# Patient Record
Sex: FEMALE | Race: BLACK OR AFRICAN AMERICAN | HISPANIC OR LATINO | URBAN - METROPOLITAN AREA
[De-identification: names, ages, dates, MRNs, and addresses within clinical notes are randomized per-mention and may not be internally consistent; named-entity substitution may affect disease eponyms.]

---

## 2017-01-26 ENCOUNTER — INPATIENT (INPATIENT)
Facility: HOSPITAL | Age: 37
LOS: 10 days | Discharge: HOME | DRG: 264 | End: 2017-02-06
Attending: FAMILY MEDICINE
Payer: MEDICAID

## 2017-01-26 VITALS
SYSTOLIC BLOOD PRESSURE: 231 MMHG | OXYGEN SATURATION: 96 % | DIASTOLIC BLOOD PRESSURE: 19 MMHG | RESPIRATION RATE: 16 BRPM | HEIGHT: 62 IN | TEMPERATURE: 97 F | HEART RATE: 113 BPM | WEIGHT: 253.09 LBS

## 2017-01-26 DIAGNOSIS — N19 UNSPECIFIED KIDNEY FAILURE: ICD-10-CM

## 2017-01-26 DIAGNOSIS — I10 ESSENTIAL (PRIMARY) HYPERTENSION: ICD-10-CM

## 2017-01-26 DIAGNOSIS — E11.9 TYPE 2 DIABETES MELLITUS WITHOUT COMPLICATIONS: ICD-10-CM

## 2017-01-26 DIAGNOSIS — I50.9 HEART FAILURE, UNSPECIFIED: ICD-10-CM

## 2017-01-26 DIAGNOSIS — D64.9 ANEMIA, UNSPECIFIED: ICD-10-CM

## 2017-01-26 DIAGNOSIS — R79.89 OTHER SPECIFIED ABNORMAL FINDINGS OF BLOOD CHEMISTRY: ICD-10-CM

## 2017-01-26 LAB
ALBUMIN SERPL ELPH-MCNC: 2.7 G/DL — LOW (ref 3.3–5.2)
ALP SERPL-CCNC: 96 U/L — SIGNIFICANT CHANGE UP (ref 40–120)
ALT FLD-CCNC: 23 U/L — SIGNIFICANT CHANGE UP
ALT FLD-CCNC: 24 U/L — SIGNIFICANT CHANGE UP
ANION GAP SERPL CALC-SCNC: 20 MMOL/L — HIGH (ref 5–17)
APPEARANCE UR: ABNORMAL
APTT BLD: 32.3 SEC — SIGNIFICANT CHANGE UP (ref 27.5–37.4)
AST SERPL-CCNC: 12 U/L — SIGNIFICANT CHANGE UP
BACTERIA # UR AUTO: ABNORMAL
BILIRUB SERPL-MCNC: 0.1 MG/DL — LOW (ref 0.4–2)
BILIRUB UR-MCNC: NEGATIVE — SIGNIFICANT CHANGE UP
BUN SERPL-MCNC: 98 MG/DL — HIGH (ref 8–20)
CALCIUM SERPL-MCNC: 7.6 MG/DL — LOW (ref 8.6–10.2)
CHLORIDE SERPL-SCNC: 103 MMOL/L — SIGNIFICANT CHANGE UP (ref 98–107)
CK MB CFR SERPL CALC: 7.4 NG/ML — HIGH (ref 0–6.7)
CK SERPL-CCNC: 370 U/L — HIGH (ref 25–170)
CO2 SERPL-SCNC: 16 MMOL/L — LOW (ref 22–29)
COLOR SPEC: YELLOW — SIGNIFICANT CHANGE UP
CREAT ?TM UR-MCNC: 69 MG/DL — SIGNIFICANT CHANGE UP
CREAT SERPL-MCNC: 9.3 MG/DL — HIGH (ref 0.5–1.3)
D DIMER BLD IA.RAPID-MCNC: 740 NG/ML DDU — HIGH
DIFF PNL FLD: ABNORMAL
EPI CELLS # UR: SIGNIFICANT CHANGE UP
FERRITIN SERPL-MCNC: 113.5 NG/ML — SIGNIFICANT CHANGE UP (ref 11–306)
GLUCOSE SERPL-MCNC: 169 MG/DL — HIGH (ref 70–115)
GLUCOSE UR QL: 100 MG/DL
HCT VFR BLD CALC: 22.7 % — LOW (ref 37–47)
HGB BLD-MCNC: 7.3 G/DL — LOW (ref 12–16)
INR BLD: 1.26 RATIO — HIGH (ref 0.88–1.16)
IRON SATN MFR SERPL: 11 % — LOW (ref 14–50)
IRON SATN MFR SERPL: 35 UG/DL — LOW (ref 37–145)
KETONES UR-MCNC: NEGATIVE — SIGNIFICANT CHANGE UP
LEUKOCYTE ESTERASE UR-ACNC: ABNORMAL
MCHC RBC-ENTMCNC: 26.1 PG — LOW (ref 27–31)
MCHC RBC-ENTMCNC: 32.2 G/DL — SIGNIFICANT CHANGE UP (ref 32–36)
MCV RBC AUTO: 81.1 FL — SIGNIFICANT CHANGE UP (ref 81–99)
NITRITE UR-MCNC: NEGATIVE — SIGNIFICANT CHANGE UP
NT-PROBNP SERPL-SCNC: 5925 PG/ML — HIGH (ref 0–300)
OSMOLALITY UR: 375 MOSM/KG — SIGNIFICANT CHANGE UP (ref 300–1000)
PH UR: 7 — SIGNIFICANT CHANGE UP (ref 4.8–8)
PLATELET # BLD AUTO: 338 K/UL — SIGNIFICANT CHANGE UP (ref 150–400)
POTASSIUM SERPL-MCNC: 4.2 MMOL/L — SIGNIFICANT CHANGE UP (ref 3.5–5.3)
POTASSIUM SERPL-SCNC: 4.2 MMOL/L — SIGNIFICANT CHANGE UP (ref 3.5–5.3)
PROT ?TM UR-MCNC: 81 MG/DL — HIGH (ref 0–12)
PROT SERPL-MCNC: 6.4 G/DL — LOW (ref 6.6–8.7)
PROT UR-MCNC: 500 MG/DL
PROT/CREAT UR-RTO: 1.17 RATIO — SIGNIFICANT CHANGE UP
PROTHROM AB SERPL-ACNC: 13.9 SEC — HIGH (ref 10–13.1)
RBC # BLD: 2.8 M/UL — LOW (ref 4.4–5.2)
RBC # FLD: 14.8 % — SIGNIFICANT CHANGE UP (ref 11–15.6)
RBC CASTS # UR COMP ASSIST: >50 /HPF (ref 0–4)
SODIUM SERPL-SCNC: 139 MMOL/L — SIGNIFICANT CHANGE UP (ref 135–145)
SODIUM UR-SCNC: 57 MMOL/L — SIGNIFICANT CHANGE UP
SP GR SPEC: 1.01 — SIGNIFICANT CHANGE UP (ref 1.01–1.02)
T3 SERPL-MCNC: 113 NG/DL — SIGNIFICANT CHANGE UP (ref 80–200)
T4 AB SER-ACNC: 5.82 UG/DL — SIGNIFICANT CHANGE UP (ref 4.5–12)
TIBC SERPL-MCNC: 316 UG/DL — SIGNIFICANT CHANGE UP (ref 220–430)
TRANSFERRIN SERPL-MCNC: 221 MG/DL — SIGNIFICANT CHANGE UP (ref 192–382)
TROPONIN T SERPL-MCNC: 0.1 NG/ML — HIGH (ref 0–0.06)
TROPONIN T SERPL-MCNC: 0.11 NG/ML — CRITICAL HIGH (ref 0–0.06)
TSH SERPL-MCNC: 4.06 UIU/ML — SIGNIFICANT CHANGE UP (ref 0.27–4.2)
UROBILINOGEN FLD QL: NEGATIVE MG/DL — SIGNIFICANT CHANGE UP
WBC # BLD: 10.41 K/UL — SIGNIFICANT CHANGE UP (ref 4.8–10.8)
WBC # FLD AUTO: 10.41 K/UL — SIGNIFICANT CHANGE UP (ref 4.8–10.8)
WBC UR QL: SIGNIFICANT CHANGE UP

## 2017-01-26 PROCEDURE — 99285 EMERGENCY DEPT VISIT HI MDM: CPT

## 2017-01-26 PROCEDURE — 93010 ELECTROCARDIOGRAM REPORT: CPT

## 2017-01-26 PROCEDURE — G0365: CPT | Mod: 26

## 2017-01-26 PROCEDURE — 71010: CPT | Mod: 26

## 2017-01-26 PROCEDURE — 76775 US EXAM ABDO BACK WALL LIM: CPT | Mod: 26

## 2017-01-26 PROCEDURE — 99223 1ST HOSP IP/OBS HIGH 75: CPT

## 2017-01-26 RX ORDER — DEXTROSE 50 % IN WATER 50 %
12.5 SYRINGE (ML) INTRAVENOUS ONCE
Qty: 0 | Refills: 0 | Status: DISCONTINUED | OUTPATIENT
Start: 2017-01-26 | End: 2017-02-02

## 2017-01-26 RX ORDER — DEXTROSE 50 % IN WATER 50 %
1 SYRINGE (ML) INTRAVENOUS ONCE
Qty: 0 | Refills: 0 | Status: DISCONTINUED | OUTPATIENT
Start: 2017-01-26 | End: 2017-02-02

## 2017-01-26 RX ORDER — DEXTROSE 50 % IN WATER 50 %
25 SYRINGE (ML) INTRAVENOUS ONCE
Qty: 0 | Refills: 0 | Status: DISCONTINUED | OUTPATIENT
Start: 2017-01-26 | End: 2017-02-02

## 2017-01-26 RX ORDER — GLUCAGON INJECTION, SOLUTION 0.5 MG/.1ML
1 INJECTION, SOLUTION SUBCUTANEOUS ONCE
Qty: 0 | Refills: 0 | Status: DISCONTINUED | OUTPATIENT
Start: 2017-01-26 | End: 2017-02-02

## 2017-01-26 RX ORDER — LABETALOL HCL 100 MG
20 TABLET ORAL ONCE
Qty: 0 | Refills: 0 | Status: COMPLETED | OUTPATIENT
Start: 2017-01-26 | End: 2017-01-26

## 2017-01-26 RX ORDER — FUROSEMIDE 40 MG
40 TABLET ORAL ONCE
Qty: 0 | Refills: 0 | Status: COMPLETED | OUTPATIENT
Start: 2017-01-26 | End: 2017-01-26

## 2017-01-26 RX ORDER — ERYTHROPOIETIN 10000 [IU]/ML
10000 INJECTION, SOLUTION INTRAVENOUS; SUBCUTANEOUS
Qty: 0 | Refills: 0 | Status: DISCONTINUED | OUTPATIENT
Start: 2017-01-26 | End: 2017-01-27

## 2017-01-26 RX ORDER — INSULIN LISPRO 100/ML
VIAL (ML) SUBCUTANEOUS
Qty: 0 | Refills: 0 | Status: DISCONTINUED | OUTPATIENT
Start: 2017-01-26 | End: 2017-02-02

## 2017-01-26 RX ORDER — SODIUM CHLORIDE 9 MG/ML
3 INJECTION INTRAMUSCULAR; INTRAVENOUS; SUBCUTANEOUS ONCE
Qty: 0 | Refills: 0 | Status: COMPLETED | OUTPATIENT
Start: 2017-01-26 | End: 2017-01-26

## 2017-01-26 RX ORDER — ASPIRIN/CALCIUM CARB/MAGNESIUM 325 MG
325 TABLET ORAL ONCE
Qty: 0 | Refills: 0 | Status: COMPLETED | OUTPATIENT
Start: 2017-01-26 | End: 2017-01-26

## 2017-01-26 RX ORDER — AMLODIPINE BESYLATE 2.5 MG/1
10 TABLET ORAL DAILY
Qty: 0 | Refills: 0 | Status: DISCONTINUED | OUTPATIENT
Start: 2017-01-27 | End: 2017-02-02

## 2017-01-26 RX ORDER — SODIUM BICARBONATE 1 MEQ/ML
0.08 SYRINGE (ML) INTRAVENOUS
Qty: 150 | Refills: 0 | Status: DISCONTINUED | OUTPATIENT
Start: 2017-01-26 | End: 2017-01-29

## 2017-01-26 RX ORDER — AMLODIPINE BESYLATE 2.5 MG/1
10 TABLET ORAL ONCE
Qty: 0 | Refills: 0 | Status: COMPLETED | OUTPATIENT
Start: 2017-01-26 | End: 2017-01-26

## 2017-01-26 RX ORDER — SODIUM CHLORIDE 9 MG/ML
1000 INJECTION, SOLUTION INTRAVENOUS
Qty: 0 | Refills: 0 | Status: DISCONTINUED | OUTPATIENT
Start: 2017-01-26 | End: 2017-02-02

## 2017-01-26 RX ORDER — ASPIRIN/CALCIUM CARB/MAGNESIUM 324 MG
81 TABLET ORAL DAILY
Qty: 0 | Refills: 0 | Status: DISCONTINUED | OUTPATIENT
Start: 2017-01-26 | End: 2017-02-02

## 2017-01-26 RX ORDER — METOPROLOL TARTRATE 50 MG
12.5 TABLET ORAL
Qty: 0 | Refills: 0 | Status: DISCONTINUED | OUTPATIENT
Start: 2017-01-26 | End: 2017-01-31

## 2017-01-26 RX ADMIN — Medication 20 MILLIGRAM(S): at 15:35

## 2017-01-26 RX ADMIN — Medication 60 MEQ/KG/HR: at 20:14

## 2017-01-26 RX ADMIN — Medication 325 MILLIGRAM(S): at 19:42

## 2017-01-26 RX ADMIN — AMLODIPINE BESYLATE 10 MILLIGRAM(S): 2.5 TABLET ORAL at 19:45

## 2017-01-26 RX ADMIN — Medication 40 MILLIGRAM(S): at 15:35

## 2017-01-26 RX ADMIN — ERYTHROPOIETIN 10000 UNIT(S): 10000 INJECTION, SOLUTION INTRAVENOUS; SUBCUTANEOUS at 22:51

## 2017-01-26 RX ADMIN — SODIUM CHLORIDE 3 MILLILITER(S): 9 INJECTION INTRAMUSCULAR; INTRAVENOUS; SUBCUTANEOUS at 15:36

## 2017-01-26 NOTE — H&P ADULT. - PROBLEM SELECTOR PLAN 5
no chest pain.  minimally elevated troponin.  CXR showing cardiomegaly.  serial troponin and echo ordered.  started on low dose ASA and metoprolol.  lipid panel ordered.  may consider cardiology if there is a bump in troponin.

## 2017-01-26 NOTE — ED ADULT NURSE NOTE - OBJECTIVE STATEMENT
Pt a7ox3 into ED for c/o BLE swelling and SOB x 1 month. States went to urgent care and was sent here. Denies chets pain. Lungs CTa. BLE 3+ edema noted

## 2017-01-26 NOTE — H&P ADULT. - PROBLEM SELECTOR PLAN 1
Likely due to diabetic and ?hypertensive nephropathy.  nephrology has been consulted.  on sodium bicarb drip.  renally dosed medications.  avoid nephrotoxins.  strict intake/output record.

## 2017-01-26 NOTE — H&P ADULT. - PROBLEM SELECTOR PLAN 2
Likely due to CKD.  has marcelina started on Epopoitin Likely due to CKD.  anemia work up.  has marcelina started on Epoetin  monitor H/H

## 2017-01-26 NOTE — H&P ADULT. - PROBLEM SELECTOR PLAN 6
received 20 mg of IV labetalol in ER.  norvasc 10 mg daily.  monitor BP received 20 mg of IV labetalol in ER.  started on metoprolol 12.5 mg bid and norvasc 10 mg daily.  monitor BP

## 2017-01-26 NOTE — ED STATDOCS - DETAILS:
I, Elina Cadena, performed the initial face to face bedside interview with this patient regarding history of present illness, review of symptoms and relevant past medical, social and family history.  I completed an independent physical examination.  I was the initial provider who evaluated this patient.   The history, relevant review of systems, past medical and surgical history, medical decision making, and physical examination was documented by the scribe in my presence and I attest to the accuracy of the documentation.

## 2017-01-26 NOTE — ED PROVIDER NOTE - OBJECTIVE STATEMENT
37 yo female c/o increasing shortness of breath and leg swelling x few days. Denies any chest pain. denies fever or chills. denies nausea or vomiting. decreased urine output.   patient states she is diabetic, but stopped taking her medication several years ago.   hasn't seen a doctor in many years

## 2017-01-26 NOTE — H&P ADULT. - FAMILY HISTORY
Father  Still living? Unknown  Family history of diabetes mellitus (DM), Age at diagnosis: Age Unknown     Mother  Still living? Unknown  Family history of diabetes mellitus (DM), Age at diagnosis: Age Unknown

## 2017-01-26 NOTE — ED PROVIDER NOTE - CARE PLAN
Principal Discharge DX:	Renal failure  Secondary Diagnosis:	Anemia  Secondary Diagnosis:	Myocardial infarction

## 2017-01-26 NOTE — ED STATDOCS - PROGRESS NOTE DETAILS
37 y/o F with h/o DM ( non compliant on meds x 6 years) presents to the ED from [facility] c/o increased edema to LE x one month. Pt is SOB on exertion, /119. Pt recently quit smoking and has multiple cardiac risk factors. Must r/o ACS, pe, CHF, hyperglycemic episode.

## 2017-01-26 NOTE — H&P ADULT. - ASSESSMENT
36 years old female with PMH of DM, not on any medication admitted with several months history of worsening lower extremity edema and exertional SOB.

## 2017-01-26 NOTE — H&P ADULT. - HISTORY OF PRESENT ILLNESS
36 years old female with PMH of DM, not on any medication for several years presented to the ER with several months history of worsening lower extremity edema. She also c/o exertional SOB. Denies any nausea, vomiting, chest pain, abdominal pain, dysuria or hematuria.

## 2017-01-26 NOTE — ED ADULT TRIAGE NOTE - CHIEF COMPLAINT QUOTE
pt presents to ED sent by urgent care for b/l LE swelling, SOB on exertion and hypertension. pt non-complaint with diabetes medication. breathing si even and unlabored. a&ox3 . pt hypertensive in triage. 231/109

## 2017-01-27 DIAGNOSIS — D64.9 ANEMIA, UNSPECIFIED: ICD-10-CM

## 2017-01-27 DIAGNOSIS — E66.01 MORBID (SEVERE) OBESITY DUE TO EXCESS CALORIES: ICD-10-CM

## 2017-01-27 DIAGNOSIS — I10 ESSENTIAL (PRIMARY) HYPERTENSION: ICD-10-CM

## 2017-01-27 DIAGNOSIS — N17.9 ACUTE KIDNEY FAILURE, UNSPECIFIED: ICD-10-CM

## 2017-01-27 DIAGNOSIS — E11.22 TYPE 2 DIABETES MELLITUS WITH DIABETIC CHRONIC KIDNEY DISEASE: ICD-10-CM

## 2017-01-27 LAB
24R-OH-CALCIDIOL SERPL-MCNC: 6.3 NG/ML — LOW (ref 30–100)
ANION GAP SERPL CALC-SCNC: 21 MMOL/L — HIGH (ref 5–17)
BUN SERPL-MCNC: 97 MG/DL — HIGH (ref 8–20)
CALCIUM SERPL-MCNC: 7.3 MG/DL — LOW (ref 8.6–10.2)
CHLORIDE SERPL-SCNC: 105 MMOL/L — SIGNIFICANT CHANGE UP (ref 98–107)
CHOLEST SERPL-MCNC: 143 MG/DL — SIGNIFICANT CHANGE UP (ref 110–199)
CO2 SERPL-SCNC: 16 MMOL/L — LOW (ref 22–29)
CREAT SERPL-MCNC: 8.61 MG/DL — HIGH (ref 0.5–1.3)
GLUCOSE SERPL-MCNC: 125 MG/DL — HIGH (ref 70–115)
HBA1C BLD-MCNC: 8 % — HIGH (ref 4–5.6)
HDLC SERPL-MCNC: 39 MG/DL — LOW
LIPID PNL WITH DIRECT LDL SERPL: 87 MG/DL — SIGNIFICANT CHANGE UP
PHOSPHATE SERPL-MCNC: 9.2 MG/DL — HIGH (ref 2.4–4.7)
POTASSIUM SERPL-MCNC: 4.2 MMOL/L — SIGNIFICANT CHANGE UP (ref 3.5–5.3)
POTASSIUM SERPL-SCNC: 4.2 MMOL/L — SIGNIFICANT CHANGE UP (ref 3.5–5.3)
PTH-INTACT FLD-MCNC: 546 PG/ML — HIGH (ref 15–65)
SODIUM SERPL-SCNC: 142 MMOL/L — SIGNIFICANT CHANGE UP (ref 135–145)
TOTAL CHOLESTEROL/HDL RATIO MEASUREMENT: 4 RATIO — SIGNIFICANT CHANGE UP (ref 3.3–7.1)
TRIGL SERPL-MCNC: 84 MG/DL — SIGNIFICANT CHANGE UP (ref 10–200)

## 2017-01-27 PROCEDURE — 99221 1ST HOSP IP/OBS SF/LOW 40: CPT

## 2017-01-27 PROCEDURE — 99223 1ST HOSP IP/OBS HIGH 75: CPT

## 2017-01-27 PROCEDURE — 93010 ELECTROCARDIOGRAM REPORT: CPT

## 2017-01-27 PROCEDURE — 99232 SBSQ HOSP IP/OBS MODERATE 35: CPT

## 2017-01-27 RX ORDER — ERYTHROPOIETIN 10000 [IU]/ML
10000 INJECTION, SOLUTION INTRAVENOUS; SUBCUTANEOUS
Qty: 0 | Refills: 0 | Status: DISCONTINUED | OUTPATIENT
Start: 2017-01-27 | End: 2017-01-30

## 2017-01-27 RX ORDER — SEVELAMER CARBONATE 2400 MG/1
1600 POWDER, FOR SUSPENSION ORAL
Qty: 0 | Refills: 0 | Status: DISCONTINUED | OUTPATIENT
Start: 2017-01-27 | End: 2017-02-02

## 2017-01-27 RX ORDER — IRON SUCROSE 20 MG/ML
100 INJECTION, SOLUTION INTRAVENOUS DAILY
Qty: 0 | Refills: 0 | Status: COMPLETED | OUTPATIENT
Start: 2017-01-27 | End: 2017-01-31

## 2017-01-27 RX ADMIN — Medication 12.5 MILLIGRAM(S): at 18:43

## 2017-01-27 RX ADMIN — SEVELAMER CARBONATE 1600 MILLIGRAM(S): 2400 POWDER, FOR SUSPENSION ORAL at 18:43

## 2017-01-27 RX ADMIN — Medication 12.5 MILLIGRAM(S): at 05:51

## 2017-01-27 RX ADMIN — Medication 60 MEQ/KG/HR: at 18:31

## 2017-01-27 RX ADMIN — AMLODIPINE BESYLATE 10 MILLIGRAM(S): 2.5 TABLET ORAL at 05:51

## 2017-01-27 RX ADMIN — IRON SUCROSE 210 MILLIGRAM(S): 20 INJECTION, SOLUTION INTRAVENOUS at 13:38

## 2017-01-27 RX ADMIN — Medication 81 MILLIGRAM(S): at 13:38

## 2017-01-27 RX ADMIN — Medication: at 13:38

## 2017-01-27 RX ADMIN — ERYTHROPOIETIN 10000 UNIT(S): 10000 INJECTION, SOLUTION INTRAVENOUS; SUBCUTANEOUS at 13:38

## 2017-01-27 NOTE — ED ADULT NURSE REASSESSMENT NOTE - NS ED NURSE REASSESS COMMENT FT1
patient alert skin warm and dry color good on cardiac monitor and  medicated as ordered iv infusing well awaiting bed in ctu report given to days

## 2017-01-27 NOTE — PROGRESS NOTE ADULT - SUBJECTIVE AND OBJECTIVE BOX
36 years old female with PMH of DM, not on any medication admitted with several months history of worsening lower extremity edema and exertional SOB. Feels better not SOB, found in stretcher eating lunch.    Vital Signs Last 24 Hrs  T(C): 36.8, Max: 36.8 (01-27 @ 03:29)  T(F): 98.2, Max: 98.2 (01-27 @ 03:29)  HR: 94 (94 - 113)  BP: 143/79 (143/74 - 231/109)  BP(mean): 149 (149 - 149)  RR: 15 (15 - 18)  SpO2: 100% (96% - 100%)    PHYSICAL EXAM:      Constitutional: obese    Neck: short, no jvd    Respiratory: decreased BS    Cardiovascular: s1s2 distant    Gastrointestinal: abd soft, nt/nd + BS      Extremities:+ pitting edema    Neurological: non focal    Skin:dry

## 2017-01-27 NOTE — CONSULT NOTE ADULT - SUBJECTIVE AND OBJECTIVE BOX
Vascular Attending:  Dr Yusuf      HPI:  36 years old female with PMH of DM, not on any medication for several years presented to the ER with several months history of worsening lower extremity edema. She also c/o exertional SOB. Denies any nausea, vomiting, chest pain, abdominal pain, dysuria or hematuria. (2017 18:33)      PAST MEDICAL & SURGICAL HISTORY:  Diabetes  No significant past surgical history      REVIEW OF SYSTEMS  General: denies fever, chills	  Respiratory and Thorax: + SOB denies cough	  Cardiovascular:	denies CP or palps  Gastrointestinal:	denies N/V    MEDICATIONS  (STANDING):  sodium bicarbonate  Infusion 0.078mEq/kG/Hr IV Continuous <Continuous>  amLODIPine   Tablet 10milliGRAM(s) Oral daily  insulin lispro (HumaLOG) corrective regimen sliding scale  SubCutaneous three times a day before meals  dextrose 5%. 1000milliLiter(s) IV Continuous <Continuous>  dextrose 50% Injectable 12.5Gram(s) IV Push once  dextrose 50% Injectable 25Gram(s) IV Push once  dextrose 50% Injectable 25Gram(s) IV Push once  aspirin enteric coated 81milliGRAM(s) Oral daily  metoprolol 12.5milliGRAM(s) Oral two times a day  iron sucrose IVPB 100milliGRAM(s) IV Intermittent daily  epoetin raina Injectable 07189Tect(s) SubCutaneous <User Schedule>  sevelamer hydrochloride 1600milliGRAM(s) Oral three times a day with meals    MEDICATIONS  (PRN):  dextrose Gel 1Dose(s) Oral once PRN Blood Glucose LESS THAN 70 milliGRAM(s)/deciliter  glucagon  Injectable 1milliGRAM(s) IntraMuscular once PRN Glucose LESS THAN 70 milligrams/deciliter      Allergies:No Known Allergies    SOCIAL HISTORY: Single, former smoker      Vital Signs Last 24 Hrs  T(C): 36.8, Max: 36.8 ( @ 03:29)  T(F): 98.2, Max: 98.2 ( @ 03:29)  HR: 94 (94 - 113)  BP: 143/79 (143/74 - 231/109)  BP(mean): 149 (149 - 149)  RR: 15 (15 - 18)  SpO2: 100% (96% - 100%)    PHYSICAL EXAM:  Constitutional: Obese female in NAD  Cardiovascular: nl S1, S2  Extremities: BLE edema, BUE without sig edema. R PIV, R hand dominant. Radial 2+B/L    LABS:                        7.3    10.41 )-----------( 338      ( 2017 15:21 )             22.7     2017 06:57    142    |  105    |  97.0   ----------------------------<  125    4.2     |  16.0   |  8.61     Ca    7.3        2017 06:57  Phos  9.2       2017 06:57    TPro  x      /  Alb  x      /  TBili  x      /  DBili  x      /  AST  x      /  ALT  23     /  AlkPhos  x      2017 23:01    PT/INR - ( 2017 15:21 )   PT: 13.9 sec;   INR: 1.26 ratio         PTT - ( 2017 15:21 )  PTT:32.3 sec  Urinalysis Basic - ( 2017 17:40 )    Color: Yellow / Appearance: Slightly Turbid / S.010 / pH: x  Gluc: x / Ketone: Negative  / Bili: Negative / Urobili: Negative mg/dL   Blood: x / Protein: 500 mg/dL / Nitrite: Negative   Leuk Esterase: Trace / RBC: >50 /HPF / WBC 0-2   Sq Epi: x / Non Sq Epi: Occasional / Bacteria: Many        RADIOLOGY & ADDITIONAL STUDIES    Impression and Plan: 35 y/o diabetic, obese female with renal failure acute on chronic likely due to DM  Vein mapping done  LUE limb preservation ordered for possible future L AVF/AVG  Renal optimization as per Dr Daniel. No need for HD at this time  Will follow for timing  Seen and discussed with Dr Yusuf

## 2017-01-27 NOTE — PROGRESS NOTE ADULT - PROBLEM SELECTOR PLAN 1
Likely due to diabetic vs hypertensive nephropathy.  Will need HD most likely  - check 24hrs urine; echo, MISSY; r/o sec causes HTN  renally dosed medications.  avoid nephrotoxins.  strict intake/output record.

## 2017-01-27 NOTE — ED ADULT NURSE REASSESSMENT NOTE - NS ED NURSE REASSESS COMMENT FT1
Report recvd from off going RN, pt sitting on stretcher, speaking and laughing with family at bedside, in no apparent distress, SodBicarb running at 60cc/hr via pump via 20G-RAC, pt denies pain, offers no complaints, POC discussed, pt and family verbalized understanding.

## 2017-01-27 NOTE — CONSULT NOTE ADULT - SUBJECTIVE AND OBJECTIVE BOX
Patient is a 36y old  Female who presents with a chief complaint of Lower extremity edema (2017 18:33)      HPI:  36 years old female with PMH of DM, not on any medication for several years presented to the ER with several months history of worsening lower extremity edema. She also c/o exertional SOB. Denies any nausea, vomiting, chest pain, abdominal pain, dysuria or hematuria. (2017 18:33)      PAST MEDICAL & SURGICAL HISTORY:  Diabetes  No significant past surgical history      FAMILY HISTORY:  Family history of diabetes mellitus (DM)      Social History: Non Smoker    MEDICATIONS  (STANDING):  sodium bicarbonate  Infusion 0.078mEq/kG/Hr IV Continuous,  amLODIPine   Tablet 10milliGRAM(s) Oral daily  insulin lispro (HumaLOG) corrective regimen sliding scale  SC  three times a day before meals  aspirin enteric coated 81milliGRAM(s) Oral daily  metoprolol 12.5milliGRAM(s) Oral two times a day  iron sucrose IVPB 100milliGRAM(s) IV Intermittent daily  epoetin raina Injectable 28958Nyun(s) SubCutaneous <User Schedule>  sevelamer hydrochloride 1600milliGRAM(s) Oral three times a day with meals    MEDICATIONS  (PRN):  dextrose Gel 1Dose(s) Oral once PRN Blood Glucose LESS THAN 70 milliGRAM(s)/deciliter  glucagon  Injectable 1milliGRAM(s) IntraMuscular once PRN Glucose LESS THAN 70 milligrams/deciliter      Allergies    No Known Allergies            REVIEW OF SYSTEMS:    CONSTITUTIONAL: No fever, weight loss, or fatigue  EYES: No eye pain, visual disturbances, or discharge  EMT:  No difficulty hearing, tinnitus, vertigo; No sinus or throat pain  NECK: No pain or stiffness  BREASTS: No pain, masses, or nipple discharge  RESPIRATORY: + shortness of breath w. Exertion,  CARDIOVASCULAR: No chest pain, palpitations, dizziness,   + leg swelling  GASTROINTESTINAL: No abdominal or epigastric pain. No nausea, vomiting, or hematemesis; No diarrhea or constipation. No melena or hematochezia.  GENITOURINARY: No dysuria, frequency, hematuria, or incontinence  NEUROLOGICAL: No headaches, memory loss, loss of strength, numbness, or tremors  SKIN: No itching, burning, rashes, or lesions   LYMPH NODES: No enlarged glands  ENDOCRINE: No heat or cold intolerance; No hair loss  MUSCULOSKELETAL: No joint pain or swelling; No muscle, back, or extremity pain  PSYCHIATRIC: No depression, anxiety, mood swings, or difficulty sleeping  HEME/LYMPH: No easy bruising, or bleeding gums        Vital Signs Last 24 Hrs  T(C): 36.8, Max: 36.8 ( @ 03:29)  T(F): 98.2, Max: 98.2 ( @ 03:29)  HR: 94 (94 - 113)  BP: 143/74 (143/74 - 231/109)  BP(mean): 149 (149 - 149)  RR: 18 (16 - 18)  SpO2: 98% (96% - 98%)    PHYSICAL EXAM:    GENERAL: NAD, well-groomed, well-developed  HEAD:  Atraumatic, Normocephalic  EYES: EOMI, PERRLA, conjunctiva and sclera clear  ENMT: No tonsillar erythema, exudates, or enlargement; Moist mucous membranes, Good dentition, No lesions  NECK: Supple, No JVD, Normal thyroid  NERVOUS SYSTEM:  Alert & Oriented X3, Good concentration; Motor Strength 5/5 B/L upper and lower extremities; DTRs 2+ intact and symmetric  CHEST/LUNG: Clear to percussion bilaterally; No rales, rhonchi, wheezing, or rubs  HEART: Regular rate and rhythm; No murmurs, rubs, or gallops  ABDOMEN: Soft, Nontender, Nondistended; Bowel sounds present  EXTREMITIES:  2+ Peripheral Pulses, No clubbing, cyanosis,  1+ edema  LYMPH: No lymphadenopathy noted  SKIN: No rashes or lesions      LABS:                        7.3    10.41 )-----------( 338      ( 2017 15:21 )             22.7     2017 06:57    142    |  105    |  97.0   ----------------------------<  125    4.2     |  16.0   |  8.61     Ca    7.3        2017 06:57  Phos  9.2       2017 06:57    TPro  x      /  Alb  x      /  TBili  x      /  DBili  x      /  AST  x      /  ALT  23     /  AlkPhos  x      2017 23:01    PT/INR - ( 2017 15:21 )   PT: 13.9 sec;   INR: 1.26 ratio         PTT - ( 2017 15:21 )  PTT:32.3 sec  Urinalysis Basic - ( 2017 17:40 )    Color: Yellow / Appearance: Slightly Turbid / S.010 / pH: x  Gluc: x / Ketone: Negative  / Bili: Negative / Urobili: Negative mg/dL   Blood: x / Protein: 500 mg/dL / Nitrite: Negative   Leuk Esterase: Trace / RBC: >50 /HPF / WBC 0-2   Sq Epi: x / Non Sq Epi: Occasional / Bacteria: Many      Phosphorus Level, Serum: 9.2 mg/dL ( @ 06:57)      RADIOLOGY & ADDITIONAL TESTS:       EXAM:  US KIDNEY(S)                          PROCEDURE DATE:  2017        INTERPRETATION:  INDICATIONS:  Renal failure    TECHNIQUE:  The examination was performed with the real time apparatus   with color flow and spectral doppler imaging.    COMPARISON EXAMINATION:  No prior exam    FINDINGS:  RIGHT KIDNEY:  Normal in size, shape, and configuration measuring 11.6 x   5.8 x 4.8 cm.  No cystic or solid masses.  No calculi.  LEFT KIDNEY:  Normal in size, shape, and configuration [id number] x   6.0 x 4.9 cm. No cystic or solid masses.  No calculi.    There was no evidence of obstruction in either kidney.    No focal abnormality of the bladder is noted. The bladder is not   distended. Ureteral jets not visualized.    IMPRESSION:   Normal renal sonogram.  No evidence of hydronephrosis.

## 2017-01-27 NOTE — ED ADULT NURSE REASSESSMENT NOTE - NS ED NURSE REASSESS COMMENT FT1
Pt provided with self care toiletries as requested; assisted to the restroom, linens cleaned for comfort.

## 2017-01-27 NOTE — ED ADULT NURSE REASSESSMENT NOTE - NS ED NURSE REASSESS COMMENT FT1
ECCO at bedside. Pt A&Ox3 and reports being comfortable, family also at bedside providing pt with emotional support.

## 2017-01-27 NOTE — ED ADULT NURSE REASSESSMENT NOTE - NS ED NURSE REASSESS COMMENT FT1
patient alert skin warm and dry color good on cardiac monitor denies pains or discomfort at this time iv infusing well will continue to monitor

## 2017-01-27 NOTE — CONSULT NOTE ADULT - ASSESSMENT
1.ESRD - Likely DM N , HTN , Hyper Phosphatemia , Metabolic Acidosis,  2.Renal & Fe - Deficiency Anemia,  3.N.S  4.Obesity

## 2017-01-28 LAB
CALCIUM SERPL-MCNC: 7.1 MG/DL — LOW (ref 8.4–10.5)
CALCIUM SERPL-MCNC: 7.5 MG/DL — LOW (ref 8.4–10.5)
HAV IGM SER-ACNC: SIGNIFICANT CHANGE UP
HBV CORE AB SER-ACNC: SIGNIFICANT CHANGE UP
HBV CORE IGM SER-ACNC: SIGNIFICANT CHANGE UP
HBV SURFACE AB SER-ACNC: <3 MIU/ML — LOW
HBV SURFACE AG SER-ACNC: SIGNIFICANT CHANGE UP
HCV AB S/CO SERPL IA: 0.11 S/CO — SIGNIFICANT CHANGE UP
HCV AB SERPL-IMP: SIGNIFICANT CHANGE UP
PTH-INTACT FLD-MCNC: 755 PG/ML — HIGH (ref 15–65)
TSH SERPL-MCNC: 3.55 UIU/ML — SIGNIFICANT CHANGE UP (ref 0.27–4.2)

## 2017-01-28 PROCEDURE — 99232 SBSQ HOSP IP/OBS MODERATE 35: CPT

## 2017-01-28 RX ORDER — CALCITRIOL 0.5 UG/1
0.25 CAPSULE ORAL DAILY
Qty: 0 | Refills: 0 | Status: DISCONTINUED | OUTPATIENT
Start: 2017-01-28 | End: 2017-01-29

## 2017-01-28 RX ORDER — ERGOCALCIFEROL 1.25 MG/1
50000 CAPSULE ORAL
Qty: 0 | Refills: 0 | Status: DISCONTINUED | OUTPATIENT
Start: 2017-01-28 | End: 2017-02-02

## 2017-01-28 RX ADMIN — Medication 12.5 MILLIGRAM(S): at 06:26

## 2017-01-28 RX ADMIN — SEVELAMER CARBONATE 1600 MILLIGRAM(S): 2400 POWDER, FOR SUSPENSION ORAL at 17:33

## 2017-01-28 RX ADMIN — IRON SUCROSE 210 MILLIGRAM(S): 20 INJECTION, SOLUTION INTRAVENOUS at 13:06

## 2017-01-28 RX ADMIN — Medication 12.5 MILLIGRAM(S): at 17:34

## 2017-01-28 RX ADMIN — Medication 60 MEQ/KG/HR: at 06:26

## 2017-01-28 RX ADMIN — AMLODIPINE BESYLATE 10 MILLIGRAM(S): 2.5 TABLET ORAL at 06:26

## 2017-01-28 RX ADMIN — CALCITRIOL 0.25 MICROGRAM(S): 0.5 CAPSULE ORAL at 13:07

## 2017-01-28 RX ADMIN — ERGOCALCIFEROL 50000 UNIT(S): 1.25 CAPSULE ORAL at 13:08

## 2017-01-28 RX ADMIN — SEVELAMER CARBONATE 1600 MILLIGRAM(S): 2400 POWDER, FOR SUSPENSION ORAL at 13:07

## 2017-01-28 RX ADMIN — Medication 60 MEQ/KG/HR: at 20:18

## 2017-01-28 RX ADMIN — Medication 81 MILLIGRAM(S): at 13:08

## 2017-01-28 NOTE — PROGRESS NOTE ADULT - SUBJECTIVE AND OBJECTIVE BOX
36 years old female with PMH of DM, not on any medication admitted with several months history of worsening lower extremity edema and exertional SOB. Feels better not SOB    Vital Signs Last 24 Hrs  T(C): 36.7, Max: 36.9 (01-28 @ 11:20)  T(F): 98.1, Max: 98.4 (01-28 @ 11:20)  HR: 94 (80 - 100)  BP: 140/90 (130/80 - 140/90)  BP(mean): --  RR: 16 (16 - 20)  SpO2: 98% (96% - 100%)    PHYSICAL EXAM:      Constitutional: obese    Neck: short, no jvd    Respiratory: decreased BS    Cardiovascular: s1s2 distant    Gastrointestinal: abd soft, nt/nd + BS      Extremities:+ pitting edema    Neurological: non focal    Skin:dry 36 years old female with PMH of DM, not on any medication admitted with several months history of worsening lower extremity edema and exertional SOB. Feels better not SOB. Found to have a creatinine of 8; not anuric or oliguric.    Vital Signs Last 24 Hrs  T(C): 36.7, Max: 36.9 (01-28 @ 11:20)  T(F): 98.1, Max: 98.4 (01-28 @ 11:20)  HR: 94 (80 - 100)  BP: 140/90 (130/80 - 140/90)  BP(mean): --  RR: 16 (16 - 20)  SpO2: 98% (96% - 100%)    PHYSICAL EXAM:      Constitutional: obese    Neck: short, no jvd    Respiratory: decreased BS    Cardiovascular: s1s2 distant    Gastrointestinal: abd soft, nt/nd + BS      Extremities:+ pitting edema    Neurological: non focal    Skin:dry

## 2017-01-28 NOTE — PROGRESS NOTE ADULT - PROBLEM SELECTOR PLAN 1
Likely due to diabetic vs hypertensive nephropathy.  24hrs urine; echo, MISSY; r/o sec causes HTN  renally dosed medications.  avoid nephrotoxins.  strict intake/output record.

## 2017-01-28 NOTE — PROCEDURE NOTE - NSPOSTCAREGUIDE_GEN_A_CORE
Instructed patient/caregiver regarding signs and symptoms of infection/Verbal/written post procedure instructions were given to patient/caregiver

## 2017-01-28 NOTE — PROCEDURE NOTE - ADDITIONAL PROCEDURE DETAILS
20G IV in Right AC. Ultrasound was used to ID a good venipuncture. Patient tolerated the procedure well.

## 2017-01-28 NOTE — PROGRESS NOTE ADULT - PROBLEM SELECTOR PLAN 1
Likely due to diabetic vs hypertensive nephropathy.  Will need HD most likely- outpt; on bicarb drip per renal  - check 24hrs urine; echo, MISSY; r/o sec causes HTN Likely due to diabetic vs hypertensive nephropathy.  - s/p bicarb drip per renal; worsening creatinine  - check 24hrs urine; echo, MISSY; r/o sec causes HTN  - biopsy in am; poss cath placement if renal decides so- not sure at this time  - normal 2d echo

## 2017-01-28 NOTE — PROGRESS NOTE ADULT - SUBJECTIVE AND OBJECTIVE BOX
Renal :      Prior Records Reviewed .    Seen in [facility]   in ,    Scr., 0.7 - 1.3 mg.,    Discharge DX : DM - 2, HTN , obesity , S/P I & D Abscess - R - 3rd Finger ,    Non compliant,

## 2017-01-28 NOTE — PROGRESS NOTE ADULT - SUBJECTIVE AND OBJECTIVE BOX
Renal :      ICU Vital Signs Last 24 Hrs  T(C): 36.7, Max: 36.8 (01-27 @ 12:01)  T(F): 98, Max: 98.3 (01-28 @ 04:03)  HR: 83 (83 - 97)  BP: 171/82 (143/79 - 187/89)  BP(mean): --  ABP: --  ABP(mean): --  RR: 15 (15 - 20)  SpO2: 100% (99% - 100%)        36 years old AA  female with PMH of DM - 2, HTN , VIOLET  ( Scr., 1.3 mg., in 2014 ) not on any medications  admitted with several month history of worsening lower extremity edema and exertional  Dyspnea,            PHYSICAL EXAM:      Constitutional: obese    Neck: short, no  JVD,    Respiratory: decreased BS    Cardiovascular: s1s2 distant    Gastrointestinal: abd soft, nt/nd + BS    Extremities:+ pitting edema    Neurological: non focal    Skin: dry

## 2017-01-28 NOTE — ED ADULT NURSE REASSESSMENT NOTE - NS ED NURSE REASSESS COMMENT FT1
Pt lying comfortably on stretcher, offers no complaints, POC discussed awaiting bed assignment. Pt verbalized understanding.

## 2017-01-29 LAB
24R-OH-CALCIDIOL SERPL-MCNC: 6.8 NG/ML — LOW (ref 30–100)
ANION GAP SERPL CALC-SCNC: 20 MMOL/L — HIGH (ref 5–17)
BUN SERPL-MCNC: 98 MG/DL — HIGH (ref 8–20)
CALCIUM SERPL-MCNC: 6.8 MG/DL — CRITICAL LOW (ref 8.6–10.2)
CHLORIDE SERPL-SCNC: 99 MMOL/L — SIGNIFICANT CHANGE UP (ref 98–107)
CO2 SERPL-SCNC: 19 MMOL/L — LOW (ref 22–29)
CREAT SERPL-MCNC: 9.4 MG/DL — HIGH (ref 0.5–1.3)
GLUCOSE SERPL-MCNC: 132 MG/DL — HIGH (ref 70–115)
POTASSIUM SERPL-MCNC: 4.4 MMOL/L — SIGNIFICANT CHANGE UP (ref 3.5–5.3)
POTASSIUM SERPL-SCNC: 4.4 MMOL/L — SIGNIFICANT CHANGE UP (ref 3.5–5.3)
SODIUM SERPL-SCNC: 138 MMOL/L — SIGNIFICANT CHANGE UP (ref 135–145)

## 2017-01-29 PROCEDURE — 99233 SBSQ HOSP IP/OBS HIGH 50: CPT

## 2017-01-29 RX ORDER — CALCITRIOL 0.5 UG/1
0.5 CAPSULE ORAL DAILY
Qty: 0 | Refills: 0 | Status: DISCONTINUED | OUTPATIENT
Start: 2017-01-29 | End: 2017-02-02

## 2017-01-29 RX ADMIN — AMLODIPINE BESYLATE 10 MILLIGRAM(S): 2.5 TABLET ORAL at 05:44

## 2017-01-29 RX ADMIN — Medication 81 MILLIGRAM(S): at 12:01

## 2017-01-29 RX ADMIN — Medication 12.5 MILLIGRAM(S): at 17:40

## 2017-01-29 RX ADMIN — Medication 12.5 MILLIGRAM(S): at 05:44

## 2017-01-29 RX ADMIN — CALCITRIOL 0.5 MICROGRAM(S): 0.5 CAPSULE ORAL at 12:02

## 2017-01-29 RX ADMIN — SEVELAMER CARBONATE 1600 MILLIGRAM(S): 2400 POWDER, FOR SUSPENSION ORAL at 09:02

## 2017-01-29 RX ADMIN — Medication 60 MEQ/KG/HR: at 05:44

## 2017-01-29 RX ADMIN — SEVELAMER CARBONATE 1600 MILLIGRAM(S): 2400 POWDER, FOR SUSPENSION ORAL at 17:40

## 2017-01-29 RX ADMIN — IRON SUCROSE 210 MILLIGRAM(S): 20 INJECTION, SOLUTION INTRAVENOUS at 12:01

## 2017-01-29 NOTE — PROGRESS NOTE ADULT - SUBJECTIVE AND OBJECTIVE BOX
Renal :    36 years old female with PMH of DM - 2 , not on any medications,  admitted with several month history of worsening lower extremity edema and exertional SOB.     Feels better now &  not dyspneic,    ICU Vital Signs Last 24 Hrs  T(C): 36.7, Max: 36.7 (01-29 @ 05:40)  T(F): 98, Max: 98.1 (01-29 @ 05:40)  HR: 93 (80 - 94)  BP: 128/78 (128/78 - 140/90)  BP(mean): --  ABP: --  ABP(mean): --  RR: 18 (16 - 20)  SpO2: 98% (97% - 100%)      PHYSICAL EXAM:      Constitutional: obese    Neck: short, no  JVD,    Respiratory: decreased BS, Both Bases,    Cardiovascular: S1, S2,  distant Heart sounds,    Gastrointestinal: abd soft, Obese, + BS    Extremities:+ pitting edema    Neurological: non focal    Skin: dry,

## 2017-01-30 DIAGNOSIS — N18.5 CHRONIC KIDNEY DISEASE, STAGE 5: ICD-10-CM

## 2017-01-30 DIAGNOSIS — E55.9 VITAMIN D DEFICIENCY, UNSPECIFIED: ICD-10-CM

## 2017-01-30 LAB
ALBUMIN SERPL ELPH-MCNC: 2.6 G/DL — LOW (ref 3.3–5.2)
ANION GAP SERPL CALC-SCNC: 18 MMOL/L — HIGH (ref 5–17)
ANION GAP SERPL CALC-SCNC: 18 MMOL/L — HIGH (ref 5–17)
BUN SERPL-MCNC: 102 MG/DL — CRITICAL HIGH (ref 8–20)
BUN SERPL-MCNC: 102 MG/DL — CRITICAL HIGH (ref 8–20)
CALCIUM SERPL-MCNC: 7.1 MG/DL — LOW (ref 8.6–10.2)
CALCIUM SERPL-MCNC: 7.1 MG/DL — LOW (ref 8.6–10.2)
CHLORIDE SERPL-SCNC: 101 MMOL/L — SIGNIFICANT CHANGE UP (ref 98–107)
CHLORIDE SERPL-SCNC: 101 MMOL/L — SIGNIFICANT CHANGE UP (ref 98–107)
CO2 SERPL-SCNC: 17 MMOL/L — LOW (ref 22–29)
CO2 SERPL-SCNC: 17 MMOL/L — LOW (ref 22–29)
CREAT SERPL-MCNC: 9.51 MG/DL — HIGH (ref 0.5–1.3)
CREAT SERPL-MCNC: 9.51 MG/DL — HIGH (ref 0.5–1.3)
GLUCOSE SERPL-MCNC: 110 MG/DL — SIGNIFICANT CHANGE UP (ref 70–115)
GLUCOSE SERPL-MCNC: 110 MG/DL — SIGNIFICANT CHANGE UP (ref 70–115)
HCG SERPL-ACNC: <2 MIU/ML — SIGNIFICANT CHANGE UP
HCT VFR BLD CALC: 21.4 % — LOW (ref 37–47)
HGB BLD-MCNC: 7.3 G/DL — LOW (ref 12–16)
INR BLD: 1.3 RATIO — HIGH (ref 0.88–1.16)
MCHC RBC-ENTMCNC: 27.4 PG — SIGNIFICANT CHANGE UP (ref 27–31)
MCHC RBC-ENTMCNC: 34.1 G/DL — SIGNIFICANT CHANGE UP (ref 32–36)
MCV RBC AUTO: 80.5 FL — LOW (ref 81–99)
PHOSPHATE SERPL-MCNC: 7.7 MG/DL — HIGH (ref 2.4–4.7)
PLATELET # BLD AUTO: 329 K/UL — SIGNIFICANT CHANGE UP (ref 150–400)
POTASSIUM SERPL-MCNC: 4.3 MMOL/L — SIGNIFICANT CHANGE UP (ref 3.5–5.3)
POTASSIUM SERPL-MCNC: 4.3 MMOL/L — SIGNIFICANT CHANGE UP (ref 3.5–5.3)
POTASSIUM SERPL-SCNC: 4.3 MMOL/L — SIGNIFICANT CHANGE UP (ref 3.5–5.3)
POTASSIUM SERPL-SCNC: 4.3 MMOL/L — SIGNIFICANT CHANGE UP (ref 3.5–5.3)
PROTHROM AB SERPL-ACNC: 14.3 SEC — HIGH (ref 10–13.1)
RBC # BLD: 2.66 M/UL — LOW (ref 4.4–5.2)
RBC # FLD: 14.5 % — SIGNIFICANT CHANGE UP (ref 11–15.6)
SODIUM SERPL-SCNC: 136 MMOL/L — SIGNIFICANT CHANGE UP (ref 135–145)
SODIUM SERPL-SCNC: 136 MMOL/L — SIGNIFICANT CHANGE UP (ref 135–145)
WBC # BLD: 11.46 K/UL — HIGH (ref 4.8–10.8)
WBC # FLD AUTO: 11.46 K/UL — HIGH (ref 4.8–10.8)

## 2017-01-30 PROCEDURE — 90937 HEMODIALYSIS REPEATED EVAL: CPT

## 2017-01-30 PROCEDURE — 71010: CPT | Mod: 26

## 2017-01-30 PROCEDURE — 99233 SBSQ HOSP IP/OBS HIGH 50: CPT

## 2017-01-30 RX ORDER — ERYTHROPOIETIN 10000 [IU]/ML
10000 INJECTION, SOLUTION INTRAVENOUS; SUBCUTANEOUS EVERY OTHER DAY
Qty: 0 | Refills: 0 | Status: DISCONTINUED | OUTPATIENT
Start: 2017-01-30 | End: 2017-02-02

## 2017-01-30 RX ADMIN — ERYTHROPOIETIN 10000 UNIT(S): 10000 INJECTION, SOLUTION INTRAVENOUS; SUBCUTANEOUS at 17:41

## 2017-01-30 RX ADMIN — Medication 12.5 MILLIGRAM(S): at 05:31

## 2017-01-30 RX ADMIN — CALCITRIOL 0.5 MICROGRAM(S): 0.5 CAPSULE ORAL at 14:00

## 2017-01-30 RX ADMIN — IRON SUCROSE 210 MILLIGRAM(S): 20 INJECTION, SOLUTION INTRAVENOUS at 17:58

## 2017-01-30 RX ADMIN — AMLODIPINE BESYLATE 10 MILLIGRAM(S): 2.5 TABLET ORAL at 05:31

## 2017-01-30 RX ADMIN — Medication 81 MILLIGRAM(S): at 13:59

## 2017-01-30 RX ADMIN — SEVELAMER CARBONATE 1600 MILLIGRAM(S): 2400 POWDER, FOR SUSPENSION ORAL at 19:43

## 2017-01-30 RX ADMIN — Medication 12.5 MILLIGRAM(S): at 17:58

## 2017-01-30 RX ADMIN — SEVELAMER CARBONATE 1600 MILLIGRAM(S): 2400 POWDER, FOR SUSPENSION ORAL at 13:59

## 2017-01-30 NOTE — DIETITIAN INITIAL EVALUATION ADULT. - PROBLEM SELECTOR PLAN 4
Likely due to renal failure.  no chest pain, oxygen saturation in normal range.  monitor and re-eval  re-eval

## 2017-01-30 NOTE — PROGRESS NOTE ADULT - SUBJECTIVE AND OBJECTIVE BOX
Patient is a 37y old  Female who presents with a chief complaint of Lower extremity edema , no new complaints     HPI:  36 years old female with PMH of DM, not on any medication for several years presented to the ER with several months history of worsening lower extremity edema. She also c/o exertional SOB. Denies any nausea, vomiting, chest pain, abdominal pain, dysuria or hematuria. HBA1C 8 , worsening renal failure renal follow up probable need to start  HD pending  acsess       Allergies    No Known Allergies    Intolerances        REVIEW OF SYSTEMS:  leg swelling both legs , obese no other complaints all negative     Vital Signs Last 24 Hrs  T(C): 36.9, Max: 36.9 (01-29 @ 21:45)  T(F): 98.4, Max: 98.4 (01-29 @ 21:45)  HR: 95 (94 - 100)  BP: 132/80 (132/80 - 158/84)  BP(mean): --  RR: 17 (17 - 18)  SpO2: 98% (97% - 100%)    PHYSICAL EXAM:    GENERAL:   awake alert  no distress   NECK: Supple, No JVD,   NERVOUS SYSTEM:  Alert & Oriented X3, Good concentration; no motor deficits  CHEST/LUNG: Clear to percussion bilaterally; No rales  HEART: Regular rate and rhythm; No murmurs  ABDOMEN: Soft, Nontender, Nondistended; Bowel sounds present  EXTREMITIES:  2+ Peripheral Pulses, No clubbing, leg pretibial edema    SKIN: No rashes or lesions    Patient is a 37y old  Female who presents with a chief complaint of Lower extremity edema (26 Jan 2017 18:33)    HPI:  36 years old female with PMH of DM, not on any medication for several years presented to the ER with several months history of worsening lower extremity edema. She also c/o exertional SOB. Denies any nausea, vomiting, chest pain, abdominal pain, dysuria or hematuria. (26 Jan 2017 18:33)      Allergies    No Known Allergies    Intolerances        REVIEW OF SYSTEMS:    CONSTITUTIONAL: No fever, weight loss, or fatigue  EYES: No eye pain, visual disturbances, or discharge  ENMT:  No difficulty hearing, tinnitus, vertigo; No sinus or throat pain  NECK: No pain or stiffness  BREASTS: No pain, masses, or nipple discharge  RESPIRATORY: No cough, wheezing, chills or hemoptysis; No shortness of breath  CARDIOVASCULAR: No chest pain, palpitations, dizziness, or leg swelling  GASTROINTESTINAL: No abdominal or epigastric pain. No nausea, vomiting, or hematemesis; No diarrhea or constipation. No melena or hematochezia.  GENITOURINARY: No dysuria, frequency, hematuria, or incontinence  NEUROLOGICAL: No headaches, memory loss, loss of strength, numbness, or tremors  SKIN: No itching, burning, rashes, or lesions   LYMPH NODES: No enlarged glands  ENDOCRINE: No heat or cold intolerance; No hair loss  MUSCULOSKELETAL: No joint pain or swelling; No muscle, back, or extremity pain  PSYCHIATRIC: No depression, anxiety, mood swings, or difficulty sleeping  HEME/LYMPH: No easy bruising, or bleeding gums  ALLERY AND IMMUNOLOGIC: No hives or eczema      Vital Signs Last 24 Hrs  T(C): 36.9, Max: 36.9 (01-29 @ 21:45)  T(F): 98.4, Max: 98.4 (01-29 @ 21:45)  HR: 95 (94 - 100)  BP: 132/80 (132/80 - 158/84)  BP(mean): --  RR: 17 (17 - 18)  SpO2: 98% (97% - 100%)    PHYSICAL EXAM:    GENERAL: NAD, well-groomed, well-developed  HEAD:  Atraumatic, Normocephalic  EYES: EOMI, PERRLA, conjunctiva and sclera clear  ENMT: No tonsillar erythema, exudates, or enlargement; Moist mucous membranes, Good dentition, No lesions  NECK: Supple, No JVD, Normal thyroid  NERVOUS SYSTEM:  Alert & Oriented X3, Good concentration; Motor Strength 5/5 B/L upper and lower extremities; DTRs 2+ intact and symmetric  CHEST/LUNG: Clear to percussion bilaterally; No rales, rhonchi, wheezing, or rubs  HEART: Regular rate and rhythm; No murmurs, rubs, or gallops  ABDOMEN: Soft, Nontender, Nondistended; Bowel sounds present  EXTREMITIES:  2+ Peripheral Pulses, No clubbing, cyanosis, or edema  LYMPH: No lymphadenopathy noted  SKIN: No rashes or lesions    CAPILLARY BLOOD GLUCOSE  200 (29 Jan 2017 21:45)  120 (29 Jan 2017 17:34)    LABS:                        7.3    11.46 )-----------( 329      ( 30 Jan 2017 06:56 )             21.4     30 Jan 2017 06:56    136    |  101    |  102.0  ----------------------------<  110    4.3     |  17.0   |  9.51     Ca    7.1        30 Jan 2017 06:56  Phos  7.7       30 Jan 2017 06:56    TPro  x      /  Alb  2.6    /  TBili  x      /  DBili  x      /  AST  x      /  ALT  x      /  AlkPhos  x      30 Jan 2017 06:56    PT/INR - ( 30 Jan 2017 06:56 )   PT: 14.3 sec;   INR: 1.30 ratio               RADIOLOGY & ADDITIONAL TESTS:    LABS:                        7.3    11.46 )-----------( 329      ( 30 Jan 2017 06:56 )             21.4     30 Jan 2017 06:56    136    |  101    |  102.0  ----------------------------<  110    4.3     |  17.0   |  9.51     Ca    7.1        30 Jan 2017 06:56  Phos  7.7       30 Jan 2017 06:56    TPro  x      /  Alb  2.6    /  TBili  x      /  DBili  x      /  AST  x      /  ALT  x      /  AlkPhos  x      30 Jan 2017 06:56    PT/INR - ( 30 Jan 2017 06:56 )   PT: 14.3 sec;   INR: 1.30 ratio               RADIOLOGY & ADDITIONAL TESTS:

## 2017-01-30 NOTE — DIETITIAN INITIAL EVALUATION ADULT. - OTHER INFO
Pt reports UBW ~250#, 1/30 weight 323#, bedscale confirms. Pt reports some LE edema pta. Denies known weight gain.

## 2017-01-30 NOTE — PROGRESS NOTE ADULT - SUBJECTIVE AND OBJECTIVE BOX
Renal :    For Tunneled  Central Venous catheter  in AM & to initiate HD , during  this admission,    Discussed w.  Vascular  Surgery,

## 2017-01-30 NOTE — PROGRESS NOTE ADULT - SUBJECTIVE AND OBJECTIVE BOX
Patient is a 37y old  Female who presents with a chief complaint of Lower extremity edema (26 Jan 2017 18:33)    Pt is S/P     RIJ  tunneled HD cath insertion   POD#   0    Vital Signs Last 24 Hrs  T(C): 36.9, Max: 36.9 (01-29 @ 21:45)  T(F): 98.4, Max: 98.4 (01-29 @ 21:45)  HR: 95 (94 - 100)  BP: 132/80 (132/80 - 158/84)  BP(mean): --  RR: 17 (17 - 18)  SpO2: 98% (97% - 100%)  I&O's Detail    I & Os for current day (as of 30 Jan 2017 12:49)  =============================================  IN:    sodium bicarbonate  Infusion: 180 ml    Oral Fluid: 50 ml    Total IN: 230 ml  ---------------------------------------------  OUT:    Voided: 900 ml    Total OUT: 900 ml  ---------------------------------------------  Total NET: -670 ml    MEDS:  amLODIPine   Tablet 10  aspirin enteric coated 81  metoprolol 12.5    PAST MEDICAL & SURGICAL HISTORY:  Diabetes  No significant past surgical history    Physical Exam:  R IJ tunneled cath insitu      LABS:                        7.3    11.46 )-----------( 329      ( 30 Jan 2017 06:56 )             21.4     30 Jan 2017 06:56    136    |  101    |  102.0  ----------------------------<  110    4.3     |  17.0   |  9.51     Ca    7.1        30 Jan 2017 06:56  Phos  7.7       30 Jan 2017 06:56    TPro  x      /  Alb  2.6    /  TBili  x      /  DBili  x      /  AST  x      /  ALT  x      /  AlkPhos  x      30 Jan 2017 06:56    PT/INR - ( 30 Jan 2017 06:56 )   PT: 14.3 sec;   INR: 1.30 ratio    CAPILLARY BLOOD GLUCOSE  200 (29 Jan 2017 21:45)  120 (29 Jan 2017 17:34)      Radiology and Additional Studies:    Assessment:37yFemaleHPI:  36 years old female with PMH of DM, not on any medication for several years presented to the ER with several months history of worsening lower extremity edema. She also c/o exertional SOB. Denies any nausea, vomiting, chest pain, abdominal pain, dysuria or hematuria. (26 Jan 2017 18:33)   S/P R IJ tunneled cath    POD# 0    Plan:  May use RIJ HD cath  HD per renal  Renal bx in am in IR  AVF later this week with Dr Yusuf  Seen and discussed with Dr. Hellen Molina

## 2017-01-30 NOTE — PROGRESS NOTE ADULT - SUBJECTIVE AND OBJECTIVE BOX
Renal :    Patient was seen and evaluated on dialysis.   Patient is tolerating the procedure well.   T(C): 36.9, Max: 36.9 (01-29 @ 21:45)  HR: 95 (94 - 100)  BP: 150/80 (132/80 - 158/84)  Wt(kg):1-1.5 L UF, Daily,  Continue dialysis:   Dialyzer: 170 Exeltra   QB: 300 ml.,  QD: 500ml.,    Access :  R - CVC,  Consent in Chart,  HEP-B Panel - P :

## 2017-01-30 NOTE — DIETITIAN INITIAL EVALUATION ADULT. - PROBLEM SELECTOR PLAN 6
received 20 mg of IV labetalol in ER.  started on metoprolol 12.5 mg bid and norvasc 10 mg daily.  monitor BP

## 2017-01-31 PROCEDURE — 90935 HEMODIALYSIS ONE EVALUATION: CPT

## 2017-01-31 PROCEDURE — 99233 SBSQ HOSP IP/OBS HIGH 50: CPT

## 2017-01-31 PROCEDURE — 36000 PLACE NEEDLE IN VEIN: CPT

## 2017-01-31 RX ORDER — TUBERCULIN PURIFIED PROTEIN DERIVATIVE 5 [IU]/.1ML
5 INJECTION, SOLUTION INTRADERMAL ONCE
Qty: 0 | Refills: 0 | Status: DISCONTINUED | OUTPATIENT
Start: 2017-01-31 | End: 2017-02-02

## 2017-01-31 RX ORDER — METOPROLOL TARTRATE 50 MG
25 TABLET ORAL
Qty: 0 | Refills: 0 | Status: DISCONTINUED | OUTPATIENT
Start: 2017-01-31 | End: 2017-02-02

## 2017-01-31 RX ADMIN — SEVELAMER CARBONATE 1600 MILLIGRAM(S): 2400 POWDER, FOR SUSPENSION ORAL at 08:53

## 2017-01-31 RX ADMIN — SEVELAMER CARBONATE 1600 MILLIGRAM(S): 2400 POWDER, FOR SUSPENSION ORAL at 17:30

## 2017-01-31 RX ADMIN — CALCITRIOL 0.5 MICROGRAM(S): 0.5 CAPSULE ORAL at 11:44

## 2017-01-31 RX ADMIN — Medication 81 MILLIGRAM(S): at 11:49

## 2017-01-31 RX ADMIN — Medication 12.5 MILLIGRAM(S): at 05:31

## 2017-01-31 RX ADMIN — Medication 2: at 17:30

## 2017-01-31 RX ADMIN — SEVELAMER CARBONATE 1600 MILLIGRAM(S): 2400 POWDER, FOR SUSPENSION ORAL at 11:44

## 2017-01-31 RX ADMIN — IRON SUCROSE 210 MILLIGRAM(S): 20 INJECTION, SOLUTION INTRAVENOUS at 14:21

## 2017-01-31 RX ADMIN — AMLODIPINE BESYLATE 10 MILLIGRAM(S): 2.5 TABLET ORAL at 05:31

## 2017-01-31 RX ADMIN — Medication 25 MILLIGRAM(S): at 17:30

## 2017-01-31 NOTE — PROGRESS NOTE ADULT - SUBJECTIVE AND OBJECTIVE BOX
Renal :      Labs , Meds  Reviewed,    Declined Kidney Biopsy ( Anxious )    Discussed w. Dr. Chambers,    136    |  101    |  102.0  ----------------------------<  110    Ca:7.1<L> (2017 06:56)  4.3     |  17.0<L>  |  9.51        Alb  2.6 g/dL<L>                          7.3<L>  11.46<H> )-----------( 329      ( 2017 06:56 )             21.4<L>    Phos:7.7 mg/dL      Urinalysis Basic - ( 2017 17:40 )  Color: Yellow / Appearance: Slightly Turbid<!> / S.010 / pH: x  Gluc: x / Ketone: Negative  / Bili: Negative / Urobilinogen : Negative mg/dL   Blood: x / Protein: 500 mg/dL<!> / Nitrite: Negative   Leuk Esterase: Trace<!> / RBC: >50 /HPF<!> / WBC 0-2   Sq Epi: x / Non Sq Epi: Occasional / Bacteria: Many<!>        UOsm:375 mosm/kg     Patient was seen and evaluated on dialysis.   Patient is tolerating the procedure well.   T(C): 36.6, Max: 37.2 ( @ 05:27)  HR: 95 (88 - 96)  BP: 167/83 (140/66 - 178/94)  Wt(kg): TBD  Continue dialysis: Daily,  Dialyzer: Exeltra 170  QB:300 ml., QD: 500ml.,  Goal UF2-3 over 3Hours

## 2017-01-31 NOTE — PROGRESS NOTE ADULT - SUBJECTIVE AND OBJECTIVE BOX
Patient is a 37y old  Female who presents with a chief complaint of Lower extremity edema (26 Jan 2017 18:33)    Pt is S/P     R IJ permcath POD#   1    Vital Signs Last 24 Hrs  T(C): 36.6, Max: 37.2 (01-31 @ 05:27)  T(F): 97.8, Max: 98.9 (01-31 @ 05:27)  HR: 95 (88 - 96)  BP: 167/83 (140/66 - 178/94)  BP(mean): --  RR: 18 (16 - 18)  SpO2: 97% (97% - 100%)    MEDS:  amLODIPine   Tablet 10  aspirin enteric coated 81  metoprolol 12.5    PAST MEDICAL & SURGICAL HISTORY:  Diabetes  No significant past surgical history    Physical Exam:  General: NAD, resting comfortably in bed  R [facility] site CDI      LABS:                        7.3    11.46 )-----------( 329      ( 30 Jan 2017 06:56 )             21.4     30 Jan 2017 06:56    136    |  101    |  102.0  ----------------------------<  110    4.3     |  17.0   |  9.51     Ca    7.1        30 Jan 2017 06:56  Phos  7.7       30 Jan 2017 06:56    TPro  x      /  Alb  2.6    /  TBili  x      /  DBili  x      /  AST  x      /  ALT  x      /  AlkPhos  x      30 Jan 2017 06:56    PT/INR - ( 30 Jan 2017 06:56 )   PT: 14.3 sec;   INR: 1.30 ratio           CAPILLARY BLOOD GLUCOSE  171 (31 Jan 2017 11:41)  124 (31 Jan 2017 08:52)  101 (30 Jan 2017 19:40)      Radiology and Additional Studies:    Assessment:37yFemaleHPI:  36 years old female with PMH of DM, not on any medication for several years presented to the ER with several months history of worsening lower extremity edema. She also c/o exertional SOB. Denies any nausea, vomiting, chest pain, abdominal pain, dysuria or hematuria. (26 Jan 2017 18:33)   S/P R IJ permcat    POD# 1    Plan:  Had refused renal bx this am  Mike HD  Tent AVF/AVG Thursday/Friday  Will need medical clearance  Seen and discussed with Dr. Yusuf

## 2017-01-31 NOTE — PROGRESS NOTE ADULT - PROBLEM SELECTOR PLAN 1
s/p right internal cath access for HD initiated 1/30   renal biopsy pending pt wants to think about it

## 2017-01-31 NOTE — PROGRESS NOTE ADULT - SUBJECTIVE AND OBJECTIVE BOX
Patient is a 37y old  Female who presents with a chief complaint of Lower extremity edema  on 1/26/17 HD in progress , no new complaints     HPI:  36 years old female with PMH of DM, not on any medication for several years presented to the ER with several months history of worsening lower extremity edema. She also c/o exertional SOB. Denies any nausea, vomiting, chest pain, abdominal pain, dysuria or hematuria. HBA1C 8 , worsening renal failure had access and HD initiated 1/30/17 , tolerating .      Allergies    No Known Allergies    Intolerances        REVIEW OF SYSTEMS:  leg swelling both legs , obese no other complaints all negative     Vital Signs Last 24 Hrs  T(C): 36.6, Max: 37.2 (01-31 @ 05:27)  T(F): 97.8, Max: 98.9 (01-31 @ 05:27)  HR: 95 (88 - 96)  BP: 167/83 (140/66 - 178/94)  BP(mean): --  RR: 18 (16 - 18)  SpO2: 97% (97% - 100%)  PHYSICAL EXAM:    GENERAL:   awake alert  no distress   NECK: Supple, No JVD,   NERVOUS SYSTEM:  Alert & Oriented X3, Good concentration; no motor deficits  CHEST/LUNG: Clear to percussion bilaterally; No rales  HEART: Regular rate and rhythm; No murmurs  ABDOMEN: Soft, Nontender, Nondistended; Bowel sounds present  EXTREMITIES:  2+ Peripheral Pulses, No clubbing, leg pretibial edema    SKIN: No rashes or lesions      Allergies    No Known Allergies    Intolerances:      RADIOLOGY & ADDITIONAL TESTS:    LABS:                        7.3    11.46 )-----------( 329      ( 30 Jan 2017 06:56 )             21.4     30 Jan 2017 06:56    136    |  101    |  102.0  ----------------------------<  110    4.3     |  17.0   |  9.51     Ca    7.1        30 Jan 2017 06:56  Phos  7.7       30 Jan 2017 06:56    TPro  x      /  Alb  2.6    /  TBili  x      /  DBili  x      /  AST  x      /  ALT  x      /  AlkPhos  x      30 Jan 2017 06:56    PT/INR - ( 30 Jan 2017 06:56 )   PT: 14.3 sec;   INR: 1.30 ratio               RADIOLOGY & ADDITIONAL TESTS:

## 2017-02-01 LAB
ABO RH CONFIRMATION: SIGNIFICANT CHANGE UP
ALBUMIN SERPL ELPH-MCNC: 2.4 G/DL — LOW (ref 3.3–5.2)
ANION GAP SERPL CALC-SCNC: 15 MMOL/L — SIGNIFICANT CHANGE UP (ref 5–17)
ANION GAP SERPL CALC-SCNC: 15 MMOL/L — SIGNIFICANT CHANGE UP (ref 5–17)
BLD GP AB SCN SERPL QL: SIGNIFICANT CHANGE UP
BUN SERPL-MCNC: 60 MG/DL — HIGH (ref 8–20)
BUN SERPL-MCNC: 60 MG/DL — HIGH (ref 8–20)
CALCIUM SERPL-MCNC: 7.8 MG/DL — LOW (ref 8.6–10.2)
CALCIUM SERPL-MCNC: 7.8 MG/DL — LOW (ref 8.6–10.2)
CHLORIDE SERPL-SCNC: 102 MMOL/L — SIGNIFICANT CHANGE UP (ref 98–107)
CHLORIDE SERPL-SCNC: 102 MMOL/L — SIGNIFICANT CHANGE UP (ref 98–107)
CO2 SERPL-SCNC: 23 MMOL/L — SIGNIFICANT CHANGE UP (ref 22–29)
CO2 SERPL-SCNC: 23 MMOL/L — SIGNIFICANT CHANGE UP (ref 22–29)
COLLECT DURATION TIME UR: 24 HR — SIGNIFICANT CHANGE UP
CREAT SERPL-MCNC: 6.57 MG/DL — HIGH (ref 0.5–1.3)
CREAT SERPL-MCNC: 6.57 MG/DL — HIGH (ref 0.5–1.3)
FERRITIN SERPL-MCNC: 283.9 NG/ML — SIGNIFICANT CHANGE UP (ref 11–306)
GLUCOSE SERPL-MCNC: 116 MG/DL — HIGH (ref 70–115)
GLUCOSE SERPL-MCNC: 116 MG/DL — HIGH (ref 70–115)
HAV IGM SER-ACNC: SIGNIFICANT CHANGE UP
HAV IGM SER-ACNC: SIGNIFICANT CHANGE UP
HBV CORE AB SER-ACNC: SIGNIFICANT CHANGE UP
HBV CORE IGM SER-ACNC: SIGNIFICANT CHANGE UP
HBV CORE IGM SER-ACNC: SIGNIFICANT CHANGE UP
HBV SURFACE AB SER-ACNC: <3 MIU/ML — LOW
HBV SURFACE AB SER-ACNC: SIGNIFICANT CHANGE UP
HBV SURFACE AG SER-ACNC: SIGNIFICANT CHANGE UP
HBV SURFACE AG SER-ACNC: SIGNIFICANT CHANGE UP
HCT VFR BLD CALC: 21 % — CRITICAL LOW (ref 37–47)
HCV AB S/CO SERPL IA: 0.1 S/CO — SIGNIFICANT CHANGE UP
HCV AB S/CO SERPL IA: 0.12 S/CO — SIGNIFICANT CHANGE UP
HCV AB SERPL-IMP: SIGNIFICANT CHANGE UP
HCV AB SERPL-IMP: SIGNIFICANT CHANGE UP
HGB BLD-MCNC: 6.6 G/DL — CRITICAL LOW (ref 12–16)
IRON SATN MFR SERPL: 12 % — LOW (ref 14–50)
IRON SATN MFR SERPL: 32 UG/DL — LOW (ref 37–145)
MCHC RBC-ENTMCNC: 26.2 PG — LOW (ref 27–31)
MCHC RBC-ENTMCNC: 31.4 G/DL — LOW (ref 32–36)
MCV RBC AUTO: 83.3 FL — SIGNIFICANT CHANGE UP (ref 81–99)
PHOSPHATE SERPL-MCNC: 5.4 MG/DL — HIGH (ref 2.4–4.7)
PLATELET # BLD AUTO: 305 K/UL — SIGNIFICANT CHANGE UP (ref 150–400)
POTASSIUM SERPL-MCNC: 4.4 MMOL/L — SIGNIFICANT CHANGE UP (ref 3.5–5.3)
POTASSIUM SERPL-MCNC: 4.4 MMOL/L — SIGNIFICANT CHANGE UP (ref 3.5–5.3)
POTASSIUM SERPL-SCNC: 4.4 MMOL/L — SIGNIFICANT CHANGE UP (ref 3.5–5.3)
POTASSIUM SERPL-SCNC: 4.4 MMOL/L — SIGNIFICANT CHANGE UP (ref 3.5–5.3)
PROT 24H UR-MRATE: SIGNIFICANT CHANGE UP
RBC # BLD: 2.52 M/UL — LOW (ref 4.4–5.2)
RBC # FLD: 14.9 % — SIGNIFICANT CHANGE UP (ref 11–15.6)
SODIUM SERPL-SCNC: 140 MMOL/L — SIGNIFICANT CHANGE UP (ref 135–145)
SODIUM SERPL-SCNC: 140 MMOL/L — SIGNIFICANT CHANGE UP (ref 135–145)
TIBC SERPL-MCNC: 259 UG/DL — SIGNIFICANT CHANGE UP (ref 220–430)
TOTAL VOLUME - 24 HOUR: 925 ML — SIGNIFICANT CHANGE UP
TRANSFERRIN SERPL-MCNC: 181 MG/DL — LOW (ref 192–382)
TYPE + AB SCN PNL BLD: SIGNIFICANT CHANGE UP
URINE CREATININE CALCULATION: 0.8 G/24 HR — SIGNIFICANT CHANGE UP (ref 0.8–1.8)
WBC # BLD: 11.02 K/UL — HIGH (ref 4.8–10.8)
WBC # FLD AUTO: 11.02 K/UL — HIGH (ref 4.8–10.8)

## 2017-02-01 PROCEDURE — 88312 SPECIAL STAINS GROUP 1: CPT | Mod: 26

## 2017-02-01 PROCEDURE — 88346 IMFLUOR 1ST 1ANTB STAIN PX: CPT | Mod: 26

## 2017-02-01 PROCEDURE — 88305 TISSUE EXAM BY PATHOLOGIST: CPT | Mod: 26

## 2017-02-01 PROCEDURE — 99233 SBSQ HOSP IP/OBS HIGH 50: CPT

## 2017-02-01 PROCEDURE — 88348 ELECTRON MICROSCOPY DX: CPT | Mod: 26

## 2017-02-01 PROCEDURE — 90935 HEMODIALYSIS ONE EVALUATION: CPT

## 2017-02-01 PROCEDURE — 88329 PATH CONSLTJ DRG SURG: CPT

## 2017-02-01 PROCEDURE — 88350 IMFLUOR EA ADDL 1ANTB STN PX: CPT | Mod: 26

## 2017-02-01 PROCEDURE — 77012 CT SCAN FOR NEEDLE BIOPSY: CPT | Mod: 26

## 2017-02-01 PROCEDURE — 88313 SPECIAL STAINS GROUP 2: CPT | Mod: 26

## 2017-02-01 PROCEDURE — 50200 RENAL BIOPSY PERQ: CPT | Mod: RT

## 2017-02-01 RX ORDER — FERROUS SULFATE 325(65) MG
325 TABLET ORAL
Qty: 0 | Refills: 0 | Status: DISCONTINUED | OUTPATIENT
Start: 2017-02-01 | End: 2017-02-02

## 2017-02-01 RX ORDER — CEFAZOLIN SODIUM 1 G
2000 VIAL (EA) INJECTION ONCE
Qty: 0 | Refills: 0 | Status: COMPLETED | OUTPATIENT
Start: 2017-02-01 | End: 2017-02-01

## 2017-02-01 RX ADMIN — ERYTHROPOIETIN 10000 UNIT(S): 10000 INJECTION, SOLUTION INTRAVENOUS; SUBCUTANEOUS at 18:38

## 2017-02-01 RX ADMIN — Medication 0.2 MILLIGRAM(S): at 16:45

## 2017-02-01 RX ADMIN — Medication 100 MILLIGRAM(S): at 22:22

## 2017-02-01 RX ADMIN — Medication 25 MILLIGRAM(S): at 22:22

## 2017-02-01 RX ADMIN — Medication 1: at 22:16

## 2017-02-01 RX ADMIN — SEVELAMER CARBONATE 1600 MILLIGRAM(S): 2400 POWDER, FOR SUSPENSION ORAL at 13:08

## 2017-02-01 RX ADMIN — Medication 25 MILLIGRAM(S): at 06:35

## 2017-02-01 RX ADMIN — Medication 1: at 12:13

## 2017-02-01 RX ADMIN — AMLODIPINE BESYLATE 10 MILLIGRAM(S): 2.5 TABLET ORAL at 06:35

## 2017-02-01 RX ADMIN — Medication 81 MILLIGRAM(S): at 12:13

## 2017-02-01 RX ADMIN — CALCITRIOL 0.5 MICROGRAM(S): 0.5 CAPSULE ORAL at 13:15

## 2017-02-01 NOTE — PROGRESS NOTE ADULT - SUBJECTIVE AND OBJECTIVE BOX
PRE OPERATIVE NOTE    Pre-op Diagnosis: CKD requiring HD  Procedure: L AVF creation/poss AVG  Surgeon:Dr Yusuf    Consent to be obtained by MD in periop holding area  PAST MEDICAL & SURGICAL HISTORY:  Diabetes  No significant past surgical history    Allergies:No Known Allergies       Daily Weight in k.2 (2017 15:50)  Vital Signs Last 24 Hrs  T(C): 36.1, Max: 37.1 ( @ 00:25)  T(F): 96.9, Max: 98.7 ( @ 00:25)  HR: 96 (93 - 101)  BP: 172/95 (136/60 - 176/96)  BP(mean): --  RR: 18 (17 - 18)  SpO2: 98% (98% - 100%)    MEDICATIONS  (STANDING):  amLODIPine   Tablet 10milliGRAM(s) Oral daily  insulin lispro (HumaLOG) corrective regimen sliding scale  SubCutaneous three times a day before meals  dextrose 5%. 1000milliLiter(s) IV Continuous <Continuous>  dextrose 50% Injectable 12.5Gram(s) IV Push once  dextrose 50% Injectable 25Gram(s) IV Push once  dextrose 50% Injectable 25Gram(s) IV Push once  aspirin enteric coated 81milliGRAM(s) Oral daily  sevelamer hydrochloride 1600milliGRAM(s) Oral three times a day with meals  ergocalciferol 78611Kpoa(s) Oral every week  calcitriol   Capsule 0.5MICROGram(s) Oral daily  epoetin raina Injectable 82109Dbtt(s) IV Push every other day  PPD  5 Tuberculin Unit(s) Injectable 5Unit(s) IntraDermal once  metoprolol 25milliGRAM(s) Oral two times a day  ferrous    sulfate 325milliGRAM(s) Oral two times a day with meals    MEDICATIONS  (PRN):  dextrose Gel 1Dose(s) Oral once PRN Blood Glucose LESS THAN 70 milliGRAM(s)/deciliter  glucagon  Injectable 1milliGRAM(s) IntraMuscular once PRN Glucose LESS THAN 70 milligrams/deciliter                            6.6    11.02 )-----------( 305      ( 2017 07:12 )             21.0     2017 07:12    140    |  102    |  60.0   ----------------------------<  116    4.4     |  23.0   |  6.57     Ca    7.8        2017 07:12  Phos  5.4       2017 07:12    TPro  x      /  Alb  2.4    /  TBili  x      /  DBili  x      /  AST  x      /  ALT  x      /  AlkPhos  x      2017 07:12      CAPILLARY BLOOD GLUCOSE  176 (2017 12:14)    Type & Screen: on chart    A/P: 37y Female planned for above procedure  NPO past midnight, except medications  amLODIPine   Tablet  metoprolol  Periop antibiotics ordered  HD per renal via permcat

## 2017-02-01 NOTE — PROGRESS NOTE ADULT - SUBJECTIVE AND OBJECTIVE BOX
Renal :    S/P Kidney Biopsy,      140    |  102    |  60.0<H>  ----------------------------<  116<H>  Ca:7.8<L> (01 Feb 2017 07:12)  4.4     |  23.0   |  6.57<H>                                  6.6<LL>  11.02<H> )-----------( 305      ( 01 Feb 2017 07:12 )             21.0<LL>    Phos:5.4 mg/dL.,  Ferritin:283.9 ng/mL Iron:32 ug/dL<L> TIBC:259 ug/dL Tsat:12 %<L>          UOsm:375 mosm/kg     Patient was seen and evaluated on dialysis.   Patient is tolerating the procedure well.   T(C): 36.1, Max: 37.1 (02-01 @ 00:25)  HR: 96 (93 - 101)  BP: 172/95 (136/60 - 176/96)  Wt(kg): TBD,  Continue dialysis: on Friday,  Dialyzer: Exeltra 170  QB: 350 ml.,  QD: 500ml.,    Goal UF 2-2.5 L  over 3 Hours       P : S/P CT Guided Kidney  Biopsy ( Diagnostic )    AVF in AM,    OP HD Arrangements per MARY GRACE,

## 2017-02-01 NOTE — PROGRESS NOTE ADULT - SUBJECTIVE AND OBJECTIVE BOX
CHIEF COMPLAINT/INTERVAL HISTORY:  Pt. seen and evaluated for CKD.  Pt. s/p renal biopsy.  Scheduled for HD today.  No gross bleeding.      REVIEW OF SYSTEMS:  No fever, CP, or SOB.    Vital Signs Last 24 Hrs  T(C): 36.1, Max: 37.1 (02-01 @ 00:25)  T(F): 96.9, Max: 98.7 (02-01 @ 00:25)  HR: 96 (93 - 101)  BP: 172/95 (136/60 - 176/96)  BP(mean): --  RR: 18 (17 - 18)  SpO2: 98% (97% - 100%)    PHYSICAL EXAM:  GENERAL: NAD  HEENT: EOMI, hearing normal, conjunctiva and sclera clear  Chest: CTA bilaterally, no wheezing  CV: S1S2, RRR,   GI: soft, +BS, NT/ND  Musculoskeletal: +LE edema  Psychiatric: affect nL, mood nL  Skin: warm and dry    LABS:                        6.6    11.02 )-----------( 305      ( 01 Feb 2017 07:12 )             21.0     01 Feb 2017 07:12    140    |  102    |  60.0   ----------------------------<  116    4.4     |  23.0   |  6.57     Ca    7.8        01 Feb 2017 07:12  Phos  5.4       01 Feb 2017 07:12    TPro  x      /  Alb  2.4    /  TBili  x      /  DBili  x      /  AST  x      /  ALT  x      /  AlkPhos  x      01 Feb 2017 07:12      Assessment and Plan:    CKD/ESRD: continue HD schedule.  Check renal biopsy.  Nephrology f/u.    Anemia: Will give 1 unit PRBC transfusion.  continue Epogen.  start Ferrous sulfate.     HTN:  continue antihypertensives    DM: Humalog sliding scale.     VTE ppx:  SCD

## 2017-02-01 NOTE — PROGRESS NOTE ADULT - SUBJECTIVE AND OBJECTIVE BOX
Patient is a 37y old  Female who presents with a chief complaint of Lower extremity edema (2017 18:33)  She is scheduled to have a LEFT ARTERIOVENOUS FISTULA CREATION, POSSIBLE ARTERIOVENOUS   GRAFT.    PAST MEDICAL HISTORY:  Diabetes  Hypertension  Super Obese - BMI = 46.3    PAST SURGICAL HISTORY:  No past surgical history      MEDICATIONS  (STANDING):  amLODIPine   Tablet 10milliGRAM(s) Oral daily  insulin lispro (HumaLOG) corrective regimen sliding scale  SubCutaneous three times a day before meals  aspirin enteric coated 81milliGRAM(s) Oral daily  sevelamer hydrochloride 1600milliGRAM(s) Oral three times a day with meals  ergocalciferol 71047Qeyl(s) Oral every week  calcitriol   Capsule 0.5MICROGram(s) Oral daily  epoetin raina Injectable 77425Xwsr(s) IV Push every other day  PPD  5 Tuberculin Unit(s) Injectable 5Unit(s) IntraDermal once  metoprolol 25milliGRAM(s) Oral two times a day  ferrous    sulfate 325milliGRAM(s) Oral two times a day with meals  ceFAZolin   IVPB 2000milliGRAM(s) IV Intermittent once    MEDICATIONS  (PRN):  dextrose Gel 1Dose(s) Oral once PRN Blood Glucose LESS THAN 70 milliGRAM(s)/deciliter  glucagon  Injectable 1milliGRAM(s) IntraMuscular once PRN Glucose LESS THAN 70 milligrams/deciliter      Allergies:  No Known Drug Allergies.  Eating mushrooms makes her eyes get red and tear and makes her sneeze.    SOCIAL HISTORY:  The patient says that she rarely drinks alcohol and does not use illicit drugs.  She                                 quit smoking 2 months ago after smoking 1/3 of a ppd x 15 years.                          6.6    11.02 )-----------( 305      ( 2017 07:12 )             21.0       PT/INR - ( 2017 06:56 )   PT: 14.3 sec;   INR: 1.30 ratio         PTT - ( 2017 15:21 )  PTT:32.3 sec    2017 07:12    140    |  102    |  60.0   ----------------------------<  116    4.4     |  23.0   |  6.57     Ca    7.8        2017 07:12  Phos  5.4       2017 07:12    TPro  x      /  Alb  2.4    /  TBili  x      /  DBili  x      /  AST  x      /  ALT  x      /  AlkPhos  x      2017 07:12      [id number]    Normal sinus rhythm  Nonspecific T wave abnormality  Prolonged QT  Abnormal ECG    TT ECHO:   2017    Summary:   1. Left ventricular ejection fraction, by visual estimation, is 55 to        60%.   2. Normal global left ventricular systolic function.   3. There is mild concentric left ventricular hypertrophy.     CHEST SINGLE VIEW FRONTAL   2017    Findings:  The lungs are clear. The heart and mediastinum unremarkable. A right   internal jugular central line has been inserted since the prior   examination. The tip is located in the region of the SVC/right atrium   junction. No evidence of pneumothorax. No evidence of pleural effusion..    ASA # =  3    Mallampati # = 2    (Teeth intact, no loose teeth)

## 2017-02-02 DIAGNOSIS — Z29.9 ENCOUNTER FOR PROPHYLACTIC MEASURES, UNSPECIFIED: ICD-10-CM

## 2017-02-02 LAB
ANION GAP SERPL CALC-SCNC: 12 MMOL/L — SIGNIFICANT CHANGE UP (ref 5–17)
BUN SERPL-MCNC: 36 MG/DL — HIGH (ref 8–20)
CALCIUM SERPL-MCNC: 7.8 MG/DL — LOW (ref 8.6–10.2)
CHLORIDE SERPL-SCNC: 98 MMOL/L — SIGNIFICANT CHANGE UP (ref 98–107)
CO2 SERPL-SCNC: 26 MMOL/L — SIGNIFICANT CHANGE UP (ref 22–29)
CREAT SERPL-MCNC: 4.7 MG/DL — HIGH (ref 0.5–1.3)
GLUCOSE SERPL-MCNC: 146 MG/DL — HIGH (ref 70–115)
HCT VFR BLD CALC: 23.1 % — LOW (ref 37–47)
HGB BLD-MCNC: 7.3 G/DL — LOW (ref 12–16)
MCHC RBC-ENTMCNC: 26.4 PG — LOW (ref 27–31)
MCHC RBC-ENTMCNC: 31.6 G/DL — LOW (ref 32–36)
MCV RBC AUTO: 83.7 FL — SIGNIFICANT CHANGE UP (ref 81–99)
OB PNL STL: NEGATIVE — SIGNIFICANT CHANGE UP
PLATELET # BLD AUTO: 264 K/UL — SIGNIFICANT CHANGE UP (ref 150–400)
POTASSIUM SERPL-MCNC: 4.3 MMOL/L — SIGNIFICANT CHANGE UP (ref 3.5–5.3)
POTASSIUM SERPL-SCNC: 4.3 MMOL/L — SIGNIFICANT CHANGE UP (ref 3.5–5.3)
RBC # BLD: 2.76 M/UL — LOW (ref 4.4–5.2)
RBC # FLD: 15.9 % — HIGH (ref 11–15.6)
SODIUM SERPL-SCNC: 136 MMOL/L — SIGNIFICANT CHANGE UP (ref 135–145)
WBC # BLD: 11.91 K/UL — HIGH (ref 4.8–10.8)
WBC # FLD AUTO: 11.91 K/UL — HIGH (ref 4.8–10.8)

## 2017-02-02 PROCEDURE — 99233 SBSQ HOSP IP/OBS HIGH 50: CPT

## 2017-02-02 PROCEDURE — 90935 HEMODIALYSIS ONE EVALUATION: CPT

## 2017-02-02 PROCEDURE — 36821 AV FUSION DIRECT ANY SITE: CPT

## 2017-02-02 RX ORDER — AMLODIPINE BESYLATE 2.5 MG/1
10 TABLET ORAL DAILY
Qty: 0 | Refills: 0 | Status: DISCONTINUED | OUTPATIENT
Start: 2017-02-02 | End: 2017-02-06

## 2017-02-02 RX ORDER — DEXTROSE 50 % IN WATER 50 %
25 SYRINGE (ML) INTRAVENOUS ONCE
Qty: 0 | Refills: 0 | Status: DISCONTINUED | OUTPATIENT
Start: 2017-02-02 | End: 2017-02-06

## 2017-02-02 RX ORDER — INSULIN LISPRO 100/ML
VIAL (ML) SUBCUTANEOUS
Qty: 0 | Refills: 0 | Status: DISCONTINUED | OUTPATIENT
Start: 2017-02-02 | End: 2017-02-06

## 2017-02-02 RX ORDER — FENTANYL CITRATE 50 UG/ML
25 INJECTION INTRAVENOUS
Qty: 0 | Refills: 0 | Status: DISCONTINUED | OUTPATIENT
Start: 2017-02-02 | End: 2017-02-02

## 2017-02-02 RX ORDER — TUBERCULIN PURIFIED PROTEIN DERIVATIVE 5 [IU]/.1ML
5 INJECTION, SOLUTION INTRADERMAL ONCE
Qty: 0 | Refills: 0 | Status: COMPLETED | OUTPATIENT
Start: 2017-02-02 | End: 2017-02-03

## 2017-02-02 RX ORDER — SODIUM CHLORIDE 9 MG/ML
1000 INJECTION INTRAMUSCULAR; INTRAVENOUS; SUBCUTANEOUS
Qty: 0 | Refills: 0 | Status: DISCONTINUED | OUTPATIENT
Start: 2017-02-02 | End: 2017-02-02

## 2017-02-02 RX ORDER — ASPIRIN/CALCIUM CARB/MAGNESIUM 324 MG
81 TABLET ORAL DAILY
Qty: 0 | Refills: 0 | Status: DISCONTINUED | OUTPATIENT
Start: 2017-02-02 | End: 2017-02-06

## 2017-02-02 RX ORDER — ONDANSETRON 8 MG/1
4 TABLET, FILM COATED ORAL ONCE
Qty: 0 | Refills: 0 | Status: DISCONTINUED | OUTPATIENT
Start: 2017-02-02 | End: 2017-02-02

## 2017-02-02 RX ORDER — FERROUS SULFATE 325(65) MG
325 TABLET ORAL
Qty: 0 | Refills: 0 | Status: DISCONTINUED | OUTPATIENT
Start: 2017-02-02 | End: 2017-02-03

## 2017-02-02 RX ORDER — DEXTROSE 50 % IN WATER 50 %
1 SYRINGE (ML) INTRAVENOUS ONCE
Qty: 0 | Refills: 0 | Status: DISCONTINUED | OUTPATIENT
Start: 2017-02-02 | End: 2017-02-06

## 2017-02-02 RX ORDER — GLUCAGON INJECTION, SOLUTION 0.5 MG/.1ML
1 INJECTION, SOLUTION SUBCUTANEOUS ONCE
Qty: 0 | Refills: 0 | Status: DISCONTINUED | OUTPATIENT
Start: 2017-02-02 | End: 2017-02-06

## 2017-02-02 RX ORDER — DEXTROSE 50 % IN WATER 50 %
12.5 SYRINGE (ML) INTRAVENOUS ONCE
Qty: 0 | Refills: 0 | Status: DISCONTINUED | OUTPATIENT
Start: 2017-02-02 | End: 2017-02-06

## 2017-02-02 RX ORDER — METOPROLOL TARTRATE 50 MG
25 TABLET ORAL
Qty: 0 | Refills: 0 | Status: DISCONTINUED | OUTPATIENT
Start: 2017-02-02 | End: 2017-02-03

## 2017-02-02 RX ORDER — ERGOCALCIFEROL 1.25 MG/1
50000 CAPSULE ORAL
Qty: 0 | Refills: 0 | Status: DISCONTINUED | OUTPATIENT
Start: 2017-02-02 | End: 2017-02-06

## 2017-02-02 RX ORDER — SODIUM CHLORIDE 9 MG/ML
1000 INJECTION, SOLUTION INTRAVENOUS
Qty: 0 | Refills: 0 | Status: DISCONTINUED | OUTPATIENT
Start: 2017-02-02 | End: 2017-02-06

## 2017-02-02 RX ORDER — CALCITRIOL 0.5 UG/1
0.5 CAPSULE ORAL DAILY
Qty: 0 | Refills: 0 | Status: DISCONTINUED | OUTPATIENT
Start: 2017-02-02 | End: 2017-02-06

## 2017-02-02 RX ORDER — SEVELAMER CARBONATE 2400 MG/1
1600 POWDER, FOR SUSPENSION ORAL
Qty: 0 | Refills: 0 | Status: DISCONTINUED | OUTPATIENT
Start: 2017-02-02 | End: 2017-02-06

## 2017-02-02 RX ADMIN — CALCITRIOL 0.5 MICROGRAM(S): 0.5 CAPSULE ORAL at 22:34

## 2017-02-02 RX ADMIN — Medication 25 MILLIGRAM(S): at 22:34

## 2017-02-02 RX ADMIN — SEVELAMER CARBONATE 1600 MILLIGRAM(S): 2400 POWDER, FOR SUSPENSION ORAL at 22:34

## 2017-02-02 RX ADMIN — SEVELAMER CARBONATE 1600 MILLIGRAM(S): 2400 POWDER, FOR SUSPENSION ORAL at 13:52

## 2017-02-02 RX ADMIN — Medication 325 MILLIGRAM(S): at 08:12

## 2017-02-02 RX ADMIN — SEVELAMER CARBONATE 1600 MILLIGRAM(S): 2400 POWDER, FOR SUSPENSION ORAL at 08:12

## 2017-02-02 RX ADMIN — Medication 325 MILLIGRAM(S): at 22:34

## 2017-02-02 RX ADMIN — Medication 25 MILLIGRAM(S): at 06:41

## 2017-02-02 RX ADMIN — AMLODIPINE BESYLATE 10 MILLIGRAM(S): 2.5 TABLET ORAL at 22:34

## 2017-02-02 RX ADMIN — Medication 81 MILLIGRAM(S): at 22:36

## 2017-02-02 RX ADMIN — Medication 81 MILLIGRAM(S): at 13:51

## 2017-02-02 RX ADMIN — Medication 1: at 22:35

## 2017-02-02 RX ADMIN — CALCITRIOL 0.5 MICROGRAM(S): 0.5 CAPSULE ORAL at 13:52

## 2017-02-02 RX ADMIN — AMLODIPINE BESYLATE 10 MILLIGRAM(S): 2.5 TABLET ORAL at 06:41

## 2017-02-02 NOTE — BRIEF OPERATIVE NOTE - OPERATION/FINDINGS
3 cores lower pole right kidney. gel foam tract embolization,  Adequate according to dr mcgee
Adequate L cephalic vein

## 2017-02-02 NOTE — BRIEF OPERATIVE NOTE - POST-OP DX
Renal failure  02/01/2017    Active  Zachary Chambers
End stage renal failure on dialysis  02/02/2017    Active  Merced Veloz  Renal failure  02/01/2017    Active  Zachary Chambers

## 2017-02-02 NOTE — BRIEF OPERATIVE NOTE - PROCEDURE
Biopsy of kidney with imaging guidance  02/01/2017    Active  HALPER
Arteriovenous fistula repair  02/02/2017  L brachiocephalic arteriovenous fistula creation  Active  MANDALIB

## 2017-02-02 NOTE — PROGRESS NOTE ADULT - PROBLEM SELECTOR PLAN 1
s/p right internal cath access for HD initiated 1/30   renal biopsy performd  for AV graft today  Will need PPD placed/Hepatitis panel for HD seat

## 2017-02-02 NOTE — PROGRESS NOTE ADULT - SUBJECTIVE AND OBJECTIVE BOX
Post-Op Note:    Procedure: Arteriovenous fistula repair  02/02/2017  L brachiocephalic arteriovenous fistula creation  Indication: End-stage Sondra; Disease  Surgeon: Luis Eduardo Munguia  Assistance: Magdiel    Subjective:  Patient seen and examined at bedside. She endorses mild left forearm discomfort. She denies f/c/n/v.  She states that she tolerated her diet and is voiding.  No complaints.      Objective:  Patient asleep, easily awoken NAD  ICU Vital Signs Last 24 Hrs  T(C): 36.6, Max: 36.9 (02-02 @ 01:04)  T(F): 97.9, Max: 98.5 (02-02 @ 06:40)  HR: 85 (85 - 93)  BP: 144/74 (135/85 - 167/95)  BP(mean): --  ABP: --  ABP(mean): --  RR: 17 (12 - 17)  SpO2: 99% (96% - 100%)    Chest: non-labored breathing  Heart: Normal S1,S2  Abdomen obese  Extremities: No edema.  LUE with palpable thrill. Radial pulses 2+ bilaterally      A/P: 36 yo female who is POD#0 s/p AVF fistula formation for end-stage renal disease. Doing well, with mild forearm discomfort    -continue diet  -Reassess discomfort in the morning, administer pain medication as needed  -Incentive spirometry  -further planning her primary team

## 2017-02-02 NOTE — PROGRESS NOTE ADULT - SUBJECTIVE AND OBJECTIVE BOX
Renal :    136    |  98     |  36.0<H>  ----------------------------<  146<H>  Ca:7.8<L> (02 Feb 2017 07:17)  4.3     |  26.0   |  4.70<H>      eGFR if Non : 11 <L>  eGFR if : 13 <L>    TPro  x      /  Alb  2.4 g/dL<L>  /  TBili  x      /  DBili  x      /  AST  x      /  ALT  x      /  AlkPhos  x      01 Feb 2017 07:12                        7.3<L>  11.91<H> )-----------( 264      ( 02 Feb 2017 07:17 )             23.1<L>    Phos:5.4 mg/dL<H> Mg:-- PTH:-- Uric acid:-- Serum Osm:--  Ferritin:283.9 ng/mL Iron:32 ug/dL<L> TIBC:259 ug/dL Tsat:12 %<L>  B12:-- TSH:-- (02-01 @ 07:12)      UProt:-- UCr:-- P/C Ratio:-- 24 hour Prot:-- UVol:-- CrCl:--  Veronique:-- UOsm:375 mosm/kg UVol:-- UCl:-- UK:-- (01-26 @ 17:40)    Patient was seen and evaluated on dialysis.   Patient is tolerating the procedure well.   T(C): 36.9, Max: 36.9 (02-01 @ 18:52)  HR: 90 (90 - 93)  BP: 151/83 (135/85 - 167/95)  Wt(kg): TBD  Continue dialysis: [facility] Op @ [facility],   Dialyzer: 170 Exeltra QB:  350 ml., QD: 500ml.,  Goal UF3 L over3 Hours

## 2017-02-02 NOTE — BRIEF OPERATIVE NOTE - PRE-OP DX
Renal failure  02/01/2017    Active  Zachary Chambers
End stage renal disease  02/02/2017    Active  Merced Veloz  Renal failure  02/01/2017    Active  Zachary Chambers

## 2017-02-02 NOTE — PROGRESS NOTE ADULT - SUBJECTIVE AND OBJECTIVE BOX
CC: Lower extremity edema (26 Jan 2017 18:33)    HPI:  36 years old female with PMH of DM, not on any medication for several years presented to the ER with several months history of worsening lower extremity edema. She also c/o exertional SOB. Denies any nausea, vomiting, chest pain, abdominal pain, dysuria or hematuria. (26 Jan 2017 18:33)    INTERVAL HPI/OVERNIGHT EVENTS: NPO for AV fistula, aggravated from not being able to eat    Vital Signs Last 24 Hrs  T(C): 36.9, Max: 36.9 (02-01 @ 18:52)  T(F): 98.5, Max: 98.5 (02-01 @ 18:52)  HR: 93 (68 - 96)  BP: 167/95 (135/85 - 167/95)  BP(mean): --  RR: 15 (15 - 19)  SpO2: 100% (97% - 100%)  I&O's Detail    I & Os for current day (as of 02 Feb 2017 12:24)  =============================================  IN:    Total IN: 0 ml  ---------------------------------------------  OUT:    Other: 1500 ml    Total OUT: 1500 ml  ---------------------------------------------  Total NET: -1500 ml                                7.3    11.91 )-----------( 264      ( 02 Feb 2017 07:17 )             23.1     02 Feb 2017 07:17    136    |  98     |  36.0   ----------------------------<  146    4.3     |  26.0   |  4.70     Ca    7.8        02 Feb 2017 07:17  Phos  5.4       01 Feb 2017 07:12    TPro  x      /  Alb  2.4    /  TBili  x      /  DBili  x      /  AST  x      /  ALT  x      /  AlkPhos  x      01 Feb 2017 07:12      CAPILLARY BLOOD GLUCOSE  109 (02 Feb 2017 11:03)  130 (02 Feb 2017 08:16)  154 (01 Feb 2017 21:45)  116 (01 Feb 2017 17:15)    LIVER FUNCTIONS - ( 01 Feb 2017 07:12 )  Alb: 2.4 g/dL / Pro: x     / ALK PHOS: x     / ALT: x     / AST: x     / GGT: x               MEDICATIONS  (STANDING):  amLODIPine   Tablet 10milliGRAM(s) Oral daily  insulin lispro (HumaLOG) corrective regimen sliding scale  SubCutaneous three times a day before meals  dextrose 5%. 1000milliLiter(s) IV Continuous <Continuous>  dextrose 50% Injectable 12.5Gram(s) IV Push once  dextrose 50% Injectable 25Gram(s) IV Push once  dextrose 50% Injectable 25Gram(s) IV Push once  aspirin enteric coated 81milliGRAM(s) Oral daily  sevelamer hydrochloride 1600milliGRAM(s) Oral three times a day with meals  ergocalciferol 26181Nahw(s) Oral every week  calcitriol   Capsule 0.5MICROGram(s) Oral daily  epoetin raina Injectable 18797Mcuw(s) IV Push every other day  PPD  5 Tuberculin Unit(s) Injectable 5Unit(s) IntraDermal once  metoprolol 25milliGRAM(s) Oral two times a day  ferrous    sulfate 325milliGRAM(s) Oral two times a day with meals    MEDICATIONS  (PRN):  dextrose Gel 1Dose(s) Oral once PRN Blood Glucose LESS THAN 70 milliGRAM(s)/deciliter  glucagon  Injectable 1milliGRAM(s) IntraMuscular once PRN Glucose LESS THAN 70 milligrams/deciliter      RADIOLOGY & ADDITIONAL TESTS:  ROS: Denies CP, SOB, dizziness, lightheadedness, nausea, vomitting CC: Lower extremity edema (26 Jan 2017 18:33)    HPI: 36 years old female with PMH of DMII, not on any medication for several years presented to the ER with several months history of worsening lower extremity edema. She also c/o exertional SOB. Denies any nausea, vomiting, chest pain, abdominal pain, dysuria or hematuria. (26 Jan 2017 18:33)    INTERVAL HPI/OVERNIGHT EVENTS: NPO for AV fistula, aggravated from not being able to eat    Vital Signs Last 24 Hrs  T(C): 36.9, Max: 36.9 (02-01 @ 18:52)  T(F): 98.5, Max: 98.5 (02-01 @ 18:52)  HR: 93 (68 - 96)  BP: 167/95 (135/85 - 167/95)  RR: 15 (15 - 19)  SpO2: 100% (97% - 100%)  I&O's Detail    I & Os for current day (as of 02 Feb 2017 12:24)  =============================================  IN:    Total IN: 0 ml  ---------------------------------------------  OUT:    Other: 1500 ml    Total OUT: 1500 ml  ---------------------------------------------  Total NET: -1500 ml                                7.3    11.91 )-----------( 264      ( 02 Feb 2017 07:17 )             23.1     02 Feb 2017 07:17    136    |  98     |  36.0   ----------------------------<  146    4.3     |  26.0   |  4.70     Ca    7.8        02 Feb 2017 07:17  Phos  5.4       01 Feb 2017 07:12    TPro  x      /  Alb  2.4    /  TBili  x      /  DBili  x      /  AST  x      /  ALT  x      /  AlkPhos  x      01 Feb 2017 07:12      CAPILLARY BLOOD GLUCOSE  109 (02 Feb 2017 11:03)  130 (02 Feb 2017 08:16)  154 (01 Feb 2017 21:45)  116 (01 Feb 2017 17:15)    LIVER FUNCTIONS - ( 01 Feb 2017 07:12 )  Alb: 2.4 g/dL / Pro: x     / ALK PHOS: x     / ALT: x     / AST: x     / GGT: x               MEDICATIONS  (STANDING):  amLODIPine   Tablet 10milliGRAM(s) Oral daily  insulin lispro (HumaLOG) corrective regimen sliding scale  SubCutaneous three times a day before meals  dextrose 5%. 1000milliLiter(s) IV Continuous <Continuous>  dextrose 50% Injectable 12.5Gram(s) IV Push once  dextrose 50% Injectable 25Gram(s) IV Push once  dextrose 50% Injectable 25Gram(s) IV Push once  aspirin enteric coated 81milliGRAM(s) Oral daily  sevelamer hydrochloride 1600milliGRAM(s) Oral three times a day with meals  ergocalciferol 86378Awwb(s) Oral every week  calcitriol   Capsule 0.5MICROGram(s) Oral daily  epoetin raina Injectable 43537Ojsw(s) IV Push every other day  PPD  5 Tuberculin Unit(s) Injectable 5Unit(s) IntraDermal once  metoprolol 25milliGRAM(s) Oral two times a day  ferrous    sulfate 325milliGRAM(s) Oral two times a day with meals    MEDICATIONS  (PRN):  dextrose Gel 1Dose(s) Oral once PRN Blood Glucose LESS THAN 70 milliGRAM(s)/deciliter  glucagon  Injectable 1milliGRAM(s) IntraMuscular once PRN Glucose LESS THAN 70 milligrams/deciliter      RADIOLOGY & ADDITIONAL TESTS:  ROS: Denies CP, SOB, dizziness, lightheadedness, nausea, vomitting

## 2017-02-03 ENCOUNTER — TRANSCRIPTION ENCOUNTER (OUTPATIENT)
Age: 37
End: 2017-02-03

## 2017-02-03 LAB
ANION GAP SERPL CALC-SCNC: 14 MMOL/L — SIGNIFICANT CHANGE UP (ref 5–17)
BUN SERPL-MCNC: 41 MG/DL — HIGH (ref 8–20)
CALCIUM SERPL-MCNC: 7.9 MG/DL — LOW (ref 8.6–10.2)
CHLORIDE SERPL-SCNC: 96 MMOL/L — LOW (ref 98–107)
CO2 SERPL-SCNC: 25 MMOL/L — SIGNIFICANT CHANGE UP (ref 22–29)
CREAT SERPL-MCNC: 5.81 MG/DL — HIGH (ref 0.5–1.3)
GLUCOSE SERPL-MCNC: 131 MG/DL — HIGH (ref 70–115)
HCT VFR BLD CALC: 21.8 % — LOW (ref 37–47)
HGB BLD-MCNC: 6.7 G/DL — CRITICAL LOW (ref 12–16)
MAGNESIUM SERPL-MCNC: 2 MG/DL — SIGNIFICANT CHANGE UP (ref 1.8–2.5)
MCHC RBC-ENTMCNC: 26.2 PG — LOW (ref 27–31)
MCHC RBC-ENTMCNC: 30.7 G/DL — LOW (ref 32–36)
MCV RBC AUTO: 85.2 FL — SIGNIFICANT CHANGE UP (ref 81–99)
PHOSPHATE SERPL-MCNC: 4.8 MG/DL — HIGH (ref 2.4–4.7)
PLATELET # BLD AUTO: 281 K/UL — SIGNIFICANT CHANGE UP (ref 150–400)
POTASSIUM SERPL-MCNC: 4.4 MMOL/L — SIGNIFICANT CHANGE UP (ref 3.5–5.3)
POTASSIUM SERPL-SCNC: 4.4 MMOL/L — SIGNIFICANT CHANGE UP (ref 3.5–5.3)
RBC # BLD: 2.56 M/UL — LOW (ref 4.4–5.2)
RBC # FLD: 16.1 % — HIGH (ref 11–15.6)
SODIUM SERPL-SCNC: 135 MMOL/L — SIGNIFICANT CHANGE UP (ref 135–145)
WBC # BLD: 13.05 K/UL — HIGH (ref 4.8–10.8)
WBC # FLD AUTO: 13.05 K/UL — HIGH (ref 4.8–10.8)

## 2017-02-03 PROCEDURE — 90935 HEMODIALYSIS ONE EVALUATION: CPT

## 2017-02-03 PROCEDURE — 99232 SBSQ HOSP IP/OBS MODERATE 35: CPT

## 2017-02-03 RX ORDER — ERYTHROPOIETIN 10000 [IU]/ML
12000 INJECTION, SOLUTION INTRAVENOUS; SUBCUTANEOUS
Qty: 0 | Refills: 0 | Status: DISCONTINUED | OUTPATIENT
Start: 2017-02-03 | End: 2017-02-06

## 2017-02-03 RX ORDER — METOPROLOL TARTRATE 50 MG
50 TABLET ORAL
Qty: 0 | Refills: 0 | Status: DISCONTINUED | OUTPATIENT
Start: 2017-02-03 | End: 2017-02-06

## 2017-02-03 RX ADMIN — Medication 81 MILLIGRAM(S): at 13:14

## 2017-02-03 RX ADMIN — SEVELAMER CARBONATE 1600 MILLIGRAM(S): 2400 POWDER, FOR SUSPENSION ORAL at 16:24

## 2017-02-03 RX ADMIN — ERGOCALCIFEROL 50000 UNIT(S): 1.25 CAPSULE ORAL at 13:14

## 2017-02-03 RX ADMIN — TUBERCULIN PURIFIED PROTEIN DERIVATIVE 5 UNIT(S): 5 INJECTION, SOLUTION INTRADERMAL at 13:18

## 2017-02-03 RX ADMIN — Medication 0.2 MILLIGRAM(S): at 12:56

## 2017-02-03 RX ADMIN — Medication 50 MILLIGRAM(S): at 17:51

## 2017-02-03 RX ADMIN — Medication 25 MILLIGRAM(S): at 16:24

## 2017-02-03 RX ADMIN — AMLODIPINE BESYLATE 10 MILLIGRAM(S): 2.5 TABLET ORAL at 05:42

## 2017-02-03 RX ADMIN — CALCITRIOL 0.5 MICROGRAM(S): 0.5 CAPSULE ORAL at 13:14

## 2017-02-03 RX ADMIN — Medication 325 MILLIGRAM(S): at 16:24

## 2017-02-03 RX ADMIN — Medication 3: at 16:20

## 2017-02-03 RX ADMIN — Medication 25 MILLIGRAM(S): at 05:42

## 2017-02-03 RX ADMIN — SEVELAMER CARBONATE 1600 MILLIGRAM(S): 2400 POWDER, FOR SUSPENSION ORAL at 13:15

## 2017-02-03 NOTE — PROGRESS NOTE ADULT - SUBJECTIVE AND OBJECTIVE BOX
Renal :        Patient was seen and evaluated on dialysis.   Patient is tolerating the procedure well.   T(C): 36.6, Max: 37 (02-03 @ 12:25)  HR: 92 (85 - 97)  BP: 141/80 (141/80 - 189/75)  Wt(kg): TBD,  Continue dialysis:   Dialyzer:   170 Exeltra   QB:   350 ml.,     QD: 500ml.,  Goal UF 2-3 L over 3 Hours

## 2017-02-03 NOTE — PROGRESS NOTE ADULT - SUBJECTIVE AND OBJECTIVE BOX
Renal :      Patient suffers from ESRD,    Biopsy Confirmed  DMN , &  Irreversible  CKD -5,    HD Dependant ,    She needs to be placed in an Out patient HD Center  for HD  X TIW - Life  Sustaining  measure,    Until  such time that she is  Successfully Transplanted ,      Thank you,

## 2017-02-03 NOTE — PROGRESS NOTE ADULT - PROBLEM SELECTOR PLAN 1
s/p right internal cath access for HD initiated 1/30  and LUE AVG 2/2/17  renal biopsy performed  PPD placed/Hepatitis panel for HD seat  Awaiting outpt HD placement

## 2017-02-03 NOTE — PROGRESS NOTE ADULT - SUBJECTIVE AND OBJECTIVE BOX
CC: Lower extremity edema (26 Jan 2017 18:33)    HPI: 36 years old female with PMH of DMII not on any medication for several years presented to the ER with several months history of worsening lower extremity edema. She also c/o exertional SOB. Denies any nausea, vomiting, chest pain, abdominal pain, dysuria or hematuria. (26 Jan 2017 18:33)    INTERVAL HPI/OVERNIGHT EVENTS: s/p LUE AVG placement  No complaints, + regular periods  Other ROS reviewed and neg     Vital Signs Last 24 Hrs  T(C): 37, Max: 37 (02-03 @ 12:25)  T(F): 98.6, Max: 98.6 (02-03 @ 12:25)  HR: 96 (85 - 97)  BP: 189/75 (142/74 - 189/75)  RR: 18 (12 - 18)  SpO2: 98% (94% - 100%)  I&O's Detail    I & Os for current day (as of 03 Feb 2017 14:47)  =============================================  IN:    Oral Fluid: 240 ml    Total IN: 240 ml  ---------------------------------------------  OUT:    Total OUT: 0 ml  ---------------------------------------------  Total NET: 240 ml                        6.7    13.05 )-----------( 281      ( 03 Feb 2017 09:13 )             21.8     03 Feb 2017 09:13    135    |  96     |  41.0   ----------------------------<  131    4.4     |  25.0   |  5.81     Ca    7.9        03 Feb 2017 09:13  Phos  4.8       03 Feb 2017 09:13  Mg     2.0       03 Feb 2017 09:13    CAPILLARY BLOOD GLUCOSE  160 (02 Feb 2017 22:32)  103 (02 Feb 2017 19:45)    MEDICATIONS  (STANDING):  amLODIPine   Tablet 10milliGRAM(s) Oral daily  insulin lispro (HumaLOG) corrective regimen sliding scale  SubCutaneous three times a day before meals  dextrose 5%. 1000milliLiter(s) IV Continuous <Continuous>  dextrose 50% Injectable 12.5Gram(s) IV Push once  dextrose 50% Injectable 25Gram(s) IV Push once  dextrose 50% Injectable 25Gram(s) IV Push once  aspirin enteric coated 81milliGRAM(s) Oral daily  sevelamer hydrochloride 1600milliGRAM(s) Oral three times a day with meals  ergocalciferol 20902Xdqa(s) Oral every week  calcitriol   Capsule 0.5MICROGram(s) Oral daily  metoprolol 25milliGRAM(s) Oral two times a day  ferrous    sulfate 325milliGRAM(s) Oral two times a day with meals  epoetin raina Injectable 89468Evlt(s) IV Push <User Schedule>    MEDICATIONS  (PRN):  dextrose Gel 1Dose(s) Oral once PRN Blood Glucose LESS THAN 70 milliGRAM(s)/deciliter  glucagon  Injectable 1milliGRAM(s) IntraMuscular once PRN Glucose LESS THAN 70 milligrams/deciliter      RADIOLOGY & ADDITIONAL TESTS: personally visualized    PHYSICAL EXAM:    General: morbidly obese female in no acute distress  Eyes: PERRLA, EOMI; conjunctiva and sclera clear  Head: Normocephalic; atraumatic  ENMT: No nasal discharge; airway clear  Neck: Supple; non tender; no masses, right side sutures from line  Respiratory: No wheezes, rales or rhonchi, right upper chest permacath - no erythema or discharge  Cardiovascular: Regular rate and rhythm. S1 and S2 Normal; No murmurs, gallops or rubs  Gastrointestinal: Soft non-tender non-distended; Normal bowel sounds  Genitourinary: No costovertebral angle tenderness  Extremities: Normal range of motion, + BL LE edema +2  Vascular: Peripheral pulses palpable 2+ bilaterally  Neurological: Alert and oriented x4  Skin: Warm and dry. No acute rash  Musculoskeletal: Normal tone, without deformities  Psychiatric: Cooperative and appropriate

## 2017-02-03 NOTE — PROGRESS NOTE ADULT - SUBJECTIVE AND OBJECTIVE BOX
Patient is a 37y old  Female who presents with a chief complaint of Lower extremity edema (26 Jan 2017 18:33)    Pt is S/P L AVF creation    POD#   1    Vital Signs Last 24 Hrs  T(C): 36.9, Max: 36.9 (02-03 @ 08:15)  T(F): 98.4, Max: 98.4 (02-03 @ 08:15)  HR: 984 (85 - 984)  BP: 173/81 (142/74 - 173/81)  BP(mean): --  RR: 18 (12 - 18)  SpO2: 94% (94% - 100%)    MEDS:  amLODIPine   Tablet 10  aspirin enteric coated 81  metoprolol 25    PAST MEDICAL & SURGICAL HISTORY:  Diabetes  L brachialcephalic AVF 2/2/17    Physical Exam:  General: NAD, resting comfortably in bed  Extremities: WWP, L AVF with +bruit, faint Thrill, +radial pulse. Incision CDI      LABS:                        6.7    13.05 )-----------( 281      ( 03 Feb 2017 09:13 )             21.8     03 Feb 2017 09:13    135    |  96     |  41.0   ----------------------------<  131    4.4     |  25.0   |  5.81     Ca    7.9        03 Feb 2017 09:13  Phos  4.8       03 Feb 2017 09:13  Mg     2.0       03 Feb 2017 09:13        CAPILLARY BLOOD GLUCOSE  160 (02 Feb 2017 22:32)  103 (02 Feb 2017 19:45)  109 (02 Feb 2017 14:06)      Radiology and Additional Studies:    Assessment:[id number]:  36 years old female with PMH of DM, not on any medication for several years presented to the ER with several months history of worsening lower extremity edema. She also c/o exertional SOB. Denies any nausea, vomiting, chest pain, abdominal pain, dysuria or hematuria. (26 Jan 2017 18:33)   S/P  L AVF creation    POD#   1    Plan:  Cont HD via R IJ permcath  May leave incision LEE ANN and follow up with vascular surgery clinic after discharge  Seen and discussed with Dr. Yusuf

## 2017-02-04 LAB
HCT VFR BLD CALC: 27.9 % — LOW (ref 37–47)
HGB BLD-MCNC: 9 G/DL — LOW (ref 12–16)
MCHC RBC-ENTMCNC: 26.9 PG — LOW (ref 27–31)
MCHC RBC-ENTMCNC: 32.3 G/DL — SIGNIFICANT CHANGE UP (ref 32–36)
MCV RBC AUTO: 83.5 FL — SIGNIFICANT CHANGE UP (ref 81–99)
PLATELET # BLD AUTO: 283 K/UL — SIGNIFICANT CHANGE UP (ref 150–400)
RBC # BLD: 3.34 M/UL — LOW (ref 4.4–5.2)
RBC # FLD: 16.4 % — HIGH (ref 11–15.6)
WBC # BLD: 13.05 K/UL — HIGH (ref 4.8–10.8)
WBC # FLD AUTO: 13.05 K/UL — HIGH (ref 4.8–10.8)

## 2017-02-04 PROCEDURE — 99233 SBSQ HOSP IP/OBS HIGH 50: CPT

## 2017-02-04 RX ADMIN — Medication 81 MILLIGRAM(S): at 12:37

## 2017-02-04 RX ADMIN — AMLODIPINE BESYLATE 10 MILLIGRAM(S): 2.5 TABLET ORAL at 06:41

## 2017-02-04 RX ADMIN — Medication 50 MILLIGRAM(S): at 17:38

## 2017-02-04 RX ADMIN — SEVELAMER CARBONATE 1600 MILLIGRAM(S): 2400 POWDER, FOR SUSPENSION ORAL at 12:37

## 2017-02-04 RX ADMIN — Medication 50 MILLIGRAM(S): at 06:41

## 2017-02-04 RX ADMIN — SEVELAMER CARBONATE 1600 MILLIGRAM(S): 2400 POWDER, FOR SUSPENSION ORAL at 09:03

## 2017-02-04 RX ADMIN — SEVELAMER CARBONATE 1600 MILLIGRAM(S): 2400 POWDER, FOR SUSPENSION ORAL at 17:38

## 2017-02-04 RX ADMIN — CALCITRIOL 0.5 MICROGRAM(S): 0.5 CAPSULE ORAL at 12:37

## 2017-02-04 NOTE — PROGRESS NOTE ADULT - ATTENDING COMMENTS
Discussed alesia MARTELL
Kidney Biopsy on Monday,
HD - M W F Next week,    Placement - P :
Pt seen and examined with NP. A&P reviewed.
Pt seen and examined with NP. A&P reviewed.

## 2017-02-04 NOTE — PROGRESS NOTE ADULT - SUBJECTIVE AND OBJECTIVE BOX
CC: Patient feeling a little better today.  Still with lower extremity edema    HPI:  36 years old female with PMH of DM, not on any medication for several years presented to the ER with several months history of worsening lower extremity edema. She also c/o exertional SOB. Denies any nausea, vomiting, chest pain, abdominal pain, dysuria or hematuria. (26 Jan 2017 18:33)    REVIEW OF SYSTEMS:    Patient denied fever, chills, abdominal pain, nausea, vomiting, cough, shortness of breath, chest pain or palpitations    Vital Signs Last 24 Hrs  T(C): 37, Max: 37 (02-04 @ 07:55)  T(F): 98.6, Max: 98.6 (02-04 @ 07:55)  HR: 93 (90 - 99)  BP: 140/83 (140/83 - 160/80)  BP(mean): --  RR: 18 (18 - 18)  SpO2: 90% (90% - 98%)I&O's Summary    I & Os for current day (as of 04 Feb 2017 16:06)  =============================================  IN: 0 ml / OUT: 1500 ml / NET: -1500 ml  CAPILLARY BLOOD GLUCOSE  134 (04 Feb 2017 12:30)  117 (04 Feb 2017 07:55)  155 (03 Feb 2017 22:24)  243 (03 Feb 2017 16:25)    PHYSICAL EXAM:  GENERAL: NAD, well-groomed  HEENT: PERRL, +EOMI, anicteric, no [place]  NECK: Supple, No JVD   CHEST/LUNG: CTA bilaterally; Normal effort; + right chest dialysis catheter noted  HEART: S1S2 Normal intensity, no murmurs, gallops or rubs noted  ABDOMEN: Soft, BS Normoactive, NT, ND, no HSM noted  EXTREMITIES:  2+ radial and DP pulses noted, no clubbing, cyanosis, + lower extremity edema noted bilaterally, FROM x 4; left avg site noted - minimal edema surrounding site  SKIN: No rashes or lesions noted  NEURO: A&Ox3, no focal deficits noted, CN II-XII intact  PSYCH: normal mood and affect; insight/judgement appropriate    LABS:                        9.0    13.05 )-----------( 283      ( 04 Feb 2017 06:26 )             27.9     03 Feb 2017 09:13    135    |  96     |  41.0   ----------------------------<  131    4.4     |  25.0   |  5.81     Ca    7.9        03 Feb 2017 09:13  Phos  4.8       03 Feb 2017 09:13  Mg     2.0       03 Feb 2017 09:13        RADIOLOGY & ADDITIONAL TESTS:    MEDICATIONS:  MEDICATIONS  (STANDING):  amLODIPine   Tablet 10milliGRAM(s) Oral daily  insulin lispro (HumaLOG) corrective regimen sliding scale  SubCutaneous three times a day before meals  dextrose 5%. 1000milliLiter(s) IV Continuous <Continuous>  dextrose 50% Injectable 12.5Gram(s) IV Push once  dextrose 50% Injectable 25Gram(s) IV Push once  dextrose 50% Injectable 25Gram(s) IV Push once  aspirin enteric coated 81milliGRAM(s) Oral daily  sevelamer hydrochloride 1600milliGRAM(s) Oral three times a day with meals  ergocalciferol 02544Ndbk(s) Oral every week  calcitriol   Capsule 0.5MICROGram(s) Oral daily  epoetin raina Injectable 35715Vnhz(s) IV Push <User Schedule>  metoprolol 50milliGRAM(s) Oral two times a day    MEDICATIONS  (PRN):  dextrose Gel 1Dose(s) Oral once PRN Blood Glucose LESS THAN 70 milliGRAM(s)/deciliter  glucagon  Injectable 1milliGRAM(s) IntraMuscular once PRN Glucose LESS THAN 70 milligrams/deciliter

## 2017-02-04 NOTE — PROGRESS NOTE ADULT - PROBLEM SELECTOR PLAN 1
s/p  R - IJ Tunneled access  &  HD initiated 1/30  , S/P LUE AVG (2/2/17)  renal biopsy performed  PPD placed/Hepatitis panel for HD placement :  Awaiting outpt HD placement

## 2017-02-04 NOTE — PROGRESS NOTE ADULT - PROBLEM SELECTOR PLAN 1
s/p right internal cath access for HD initiated 1/30  and LUE AVG 2/2/17  renal biopsy performed - results pending  PPD placed/Hepatitis panel for HD seat  Awaiting outpt HD placement

## 2017-02-04 NOTE — PROGRESS NOTE ADULT - SUBJECTIVE AND OBJECTIVE BOX
CC: Lower extremity edema (26 Jan 2017 18:33)    HPI: 36 years old female with PMH of DMII not on any medications, for several years presented to the ER with several months history of worsening lower extremity edema. She also c/o exertional SOB. Denies any nausea, vomiting, chest pain, abdominal pain, dysuria or hematuria. (26 Jan 2017 18:33)    INTERVAL HPI /OVERNIGHT EVENTS:     s/p LUE  AVG placement , + B & T :    Other ROS reviewed and neg     Vital Signs Last 24 Hrs  T(C): 37, Max: 37 (02-03 @ 12:25)  T(F): 98.6, Max: 98.6 (02-03 @ 12:25)  HR: 96 (85 - 97)  BP: 189/75 (142/74 - 189/75)  RR: 18 (12 - 18)  SpO2: 98% (94% - 100%)      I & Os for current day (as of 03 Feb 2017 14:47)  =============================================  IN:    Oral Fluid: 240 ml    Total IN: 240 ml  ---------------------------------------------  OUT:    Total OUT: 0 ml  ---------------------------------------------  Total NET: 240 ml                        6.7    13.05 )-----------( 281      ( 03 Feb 2017 09:13 )             21.8     03 Feb 2017 09:13    135    |  96     |  41.0   ----------------------------<  131    4.4     |  25.0   |  5.81     Ca    7.9        03 Feb 2017 09:13  Phos  4.8       03 Feb 2017 09:13  Mg     2.0       03 Feb 2017 09:13    CAPILLARY BLOOD GLUCOSE  160 (02 Feb 2017 22:32)  103 (02 Feb 2017 19:45)    MEDICATIONS  (STANDING):  amLODIPine   Tablet 10milliGRAM(s) Oral daily  insulin lispro (HumaLOG) corrective regimen sliding scale  SubCutaneous three times a day before meals  dextrose 5%. 1000milliLiter(s) IV Continuous <Continuous>  dextrose 50% Injectable 12.5Gram(s) IV Push once  dextrose 50% Injectable 25Gram(s) IV Push once  dextrose 50% Injectable 25Gram(s) IV Push once  aspirin enteric coated 81milliGRAM(s) Oral daily  sevelamer hydrochloride 1600milliGRAM(s) Oral three times a day with meals  ergocalciferol 79865Jrpt(s) Oral every week  calcitriol   Capsule 0.5MICROGram(s) Oral daily  metoprolol 25milliGRAM(s) Oral two times a day  ferrous    sulfate 325milliGRAM(s) Oral two times a day with meals  epoetin raina Injectable 07094Ilry(s) IV Push <User Schedule>    MEDICATIONS  (PRN):  dextrose Gel 1Dose(s) Oral once PRN Blood Glucose LESS THAN 70 milliGRAM(s)/deciliter  glucagon  Injectable 1milliGRAM(s) IntraMuscular once PRN Glucose LESS THAN 70 milligrams/deciliter      RADIOLOGY & ADDITIONAL TESTS: personally visualized    PHYSICAL EXAM:    General: morbidly obese female in no acute distress  Eyes: PERRLA, EOMI; conjunctiva and sclera clear  Head: Normocephalic; atraumatic  Neck: Supple; non tender; no masses, right side sutures from line, CVC site dry,  Respiratory: No wheezes, rales or rhonchi, right upper chest CVC  - no erythema or discharge  Cardiovascular: Regular rate and rhythm. S1 and S2 Normal; No murmurs, gallops or rubs  Gastrointestinal: Soft non-tender non-distended; Normal bowel sounds  Genitourinary: No costovertebral angle tenderness  Extremities: Normal range of motion, + BL LE edema +2  Vascular: Peripheral pulses palpable 2+ bilaterally  Neurological: Alert and oriented x4  Skin: Warm and dry. No acute rash  Musculoskeletal: Normal tone, without deformities  Psychiatric: Cooperative and appropriate

## 2017-02-05 DIAGNOSIS — J30.9 ALLERGIC RHINITIS, UNSPECIFIED: ICD-10-CM

## 2017-02-05 PROCEDURE — 99233 SBSQ HOSP IP/OBS HIGH 50: CPT

## 2017-02-05 RX ORDER — MONTELUKAST 4 MG/1
10 TABLET, CHEWABLE ORAL ONCE
Qty: 0 | Refills: 0 | Status: COMPLETED | OUTPATIENT
Start: 2017-02-05 | End: 2017-02-05

## 2017-02-05 RX ORDER — IRON SUCROSE 20 MG/ML
50 INJECTION, SOLUTION INTRAVENOUS
Qty: 0 | Refills: 0 | Status: DISCONTINUED | OUTPATIENT
Start: 2017-02-05 | End: 2017-02-06

## 2017-02-05 RX ORDER — LORATADINE 10 MG/1
10 TABLET ORAL DAILY
Qty: 0 | Refills: 0 | Status: DISCONTINUED | OUTPATIENT
Start: 2017-02-05 | End: 2017-02-06

## 2017-02-05 RX ADMIN — SEVELAMER CARBONATE 1600 MILLIGRAM(S): 2400 POWDER, FOR SUSPENSION ORAL at 11:53

## 2017-02-05 RX ADMIN — SEVELAMER CARBONATE 1600 MILLIGRAM(S): 2400 POWDER, FOR SUSPENSION ORAL at 08:49

## 2017-02-05 RX ADMIN — Medication 81 MILLIGRAM(S): at 11:54

## 2017-02-05 RX ADMIN — Medication 50 MILLIGRAM(S): at 17:00

## 2017-02-05 RX ADMIN — SEVELAMER CARBONATE 1600 MILLIGRAM(S): 2400 POWDER, FOR SUSPENSION ORAL at 17:00

## 2017-02-05 RX ADMIN — Medication 50 MILLIGRAM(S): at 05:42

## 2017-02-05 RX ADMIN — CALCITRIOL 0.5 MICROGRAM(S): 0.5 CAPSULE ORAL at 11:53

## 2017-02-05 RX ADMIN — AMLODIPINE BESYLATE 10 MILLIGRAM(S): 2.5 TABLET ORAL at 05:42

## 2017-02-05 NOTE — PROGRESS NOTE ADULT - SUBJECTIVE AND OBJECTIVE BOX
Renal :      HPI: 36 years old AA female with PMH of DM- II not on any medications, for several years presented to the ER with several month history of worsening lower extremity edema.     She also c/o exertional SOB.     Denies any nausea, vomiting, chest pain, abdominal pain, dysuria or hematuria. (26 Jan 2017 18:33)    INTERVAL HPI /OVERNIGHT EVENTS:     LUE  AVF  placement , + B & T :    HD on Friday , Still Edematous,    Other ROS reviewed and neg     ICU Vital Signs Last 24 Hrs  T(C): 37.1, Max: 37.1 (02-05 @ 00:21)  T(F): 98.7, Max: 98.7 (02-05 @ 00:21)  HR: 92 (92 - 101)  BP: 155/88 (148/88 - 155/88)  BP(mean): --  ABP: --  ABP(mean): --  RR: 18 (18 - 18)  SpO2: 95% (95% - 97%)        I & Os for current day (as of 03 Feb 2017 14:47)  =============================================  IN:    Oral Fluid: 240 ml    Total IN: 240 ml  ---------------------------------------------  OUT:    Total OUT: 0 ml  ---------------------------------------------  Total NET: 240 ml                        6.7    13.05 )-----------( 281      ( 03 Feb 2017 09:13 )             21.8     03 Feb 2017 09:13    135    |  96     |  41.0   ----------------------------<  131    4.4     |  25.0   |  5.81     Ca    7.9        03 Feb 2017 09:13  Phos  4.8       03 Feb 2017 09:13  Mg     2.0       03 Feb 2017 09:13    CAPILLARY BLOOD GLUCOSE  160 (02 Feb 2017 22:32)  103 (02 Feb 2017 19:45)    MEDICATIONS :  amLODIPine   Tablet 10milliGRAM(s) Oral daily  insulin lispro (HumaLOG) corrective regimen sliding scale  SubCutaneous three times a day before meals  aspirin enteric coated 81milliGRAM(s) Oral daily  sevelamer hydrochloride 1600milliGRAM(s) Oral three times a day with meals  ergocalciferol 29614Zfrl(s) Oral every week  calcitriol   Capsule 0.5MICROGram(s) Oral daily  metoprolol 25milliGRAM(s) Oral two times a day  Epoetin raina Injectable 53603Dvaq(s) IV  TIW    PHYSICAL EXAM:    General: morbidly obese female in no acute distress  Eyes: Pale,  Head: Normocephalic; atraumatic  Neck: Supple; non tender; no masses, right side sutures from line, CVC site dry,  Respiratory: No wheezes, rales or rhonchi, right upper chest CVC  - no erythema or discharge  Cardiovascular: Regular rate and rhythm. S1 and S2 Normal; No murmurs, gallops or rubs  Gastrointestinal: Soft non-tender non-distended; Normal bowel sounds  Genitourinary: No costovertebral angle tenderness  Extremities: Normal range of motion, + Bilateral LE edema +4  Vascular: Peripheral pulses palpable 2+ bilaterally  Neurological: Alert and oriented,  Skin: Warm and dry. No acute rash  Musculoskeletal: Normal tone, without deformities  Psychiatric: Cooperative and appropriate,      Rad :    History: s/p permacath.     Date and time of exam: 1/30/2017 1:33 PM.    Technique: A single AP view of the chest was obtained.    Comparison exam: 1/26/2017.    Findings:  The lungs are clear. The heart and mediastinum unremarkable. A right   internal jugular central line has been inserted since the prior   examination. The tip is located in the region of the SVC/right atrium   junction. No evidence of pneumothorax. No evidence of pleural effusion..    Impression:  Right internal jugular central line insertion since the prior study. No   evidence of pneumothorax.

## 2017-02-05 NOTE — PROGRESS NOTE ADULT - PROBLEM SELECTOR PROBLEM 6
HTN (hypertension)
HTN (hypertension)
Morbid obesity, unspecified obesity type
Vitamin D deficiency

## 2017-02-05 NOTE — PROGRESS NOTE ADULT - PROBLEM SELECTOR PROBLEM 3
Diabetes
Anemia
Diabetes
HTN (hypertension)

## 2017-02-05 NOTE — PROGRESS NOTE ADULT - PROBLEM SELECTOR PROBLEM 4
Elevated d-dimer
Diabetes
Elevated d-dimer
HTN (hypertension)
Diabetes

## 2017-02-05 NOTE — PROGRESS NOTE ADULT - SUBJECTIVE AND OBJECTIVE BOX
CC: Patient complained of rhinorrhea and nasal congestion.    HPI:  36 years old female with PMH of DM, not on any medication for several years presented to the ER with several months history of worsening lower extremity edema. She also c/o exertional SOB. Denies any nausea, vomiting, chest pain, abdominal pain, dysuria or hematuria. (26 Jan 2017 18:33)    REVIEW OF SYSTEMS:    Patient denied fever, chills, abdominal pain, nausea, vomiting, cough, shortness of breath, chest pain or palpitations    Vital Signs Last 24 Hrs  T(C): 37.1, Max: 37.1 (02-05 @ 00:21)  T(F): 98.7, Max: 98.7 (02-05 @ 00:21)  HR: 92 (92 - 101)  BP: 155/88 (148/88 - 155/88)  BP(mean): --  RR: 18 (18 - 18)  SpO2: 95% (95% - 97%)I&O's Summary  CAPILLARY BLOOD GLUCOSE  149 (05 Feb 2017 11:59)  107 (05 Feb 2017 08:15)  258 (04 Feb 2017 22:14)  126 (04 Feb 2017 17:30)    PHYSICAL EXAM:  GENERAL: NAD, well-groomed  HEENT: PERRL, +EOMI, anicteric, no [place]; + nasal erythema   NECK: Supple, No JVD   CHEST/LUNG: CTA bilaterally; Normal effort; + right chest HD catheter noted  HEART: S1S2 Normal intensity, no murmurs, gallops or rubs noted  ABDOMEN: Soft, BS Normoactive, NT, ND, no HSM noted  EXTREMITIES:  1+ radial and DP pulses noted, no clubbing, cyanosis; + bilateral lower extremity edema noted, FROM x 4; LUE avg site with edema noted, no erythema  SKIN: No rashes or lesions noted  NEURO: A&Ox3, no focal deficits noted, CN II-XII intact  PSYCH: normal mood and affect; insight/judgement appropriate    LABS:                        9.0    13.05 )-----------( 283      ( 04 Feb 2017 06:26 )             27.9       RADIOLOGY & ADDITIONAL TESTS:    MEDICATIONS:  MEDICATIONS  (STANDING):  amLODIPine   Tablet 10milliGRAM(s) Oral daily  insulin lispro (HumaLOG) corrective regimen sliding scale  SubCutaneous three times a day before meals  dextrose 5%. 1000milliLiter(s) IV Continuous <Continuous>  dextrose 50% Injectable 12.5Gram(s) IV Push once  dextrose 50% Injectable 25Gram(s) IV Push once  dextrose 50% Injectable 25Gram(s) IV Push once  aspirin enteric coated 81milliGRAM(s) Oral daily  sevelamer hydrochloride 1600milliGRAM(s) Oral three times a day with meals  ergocalciferol 48358Mhxa(s) Oral every week  calcitriol   Capsule 0.5MICROGram(s) Oral daily  epoetin raina Injectable 33974Ubyi(s) IV Push <User Schedule>  metoprolol 50milliGRAM(s) Oral two times a day  iron sucrose Injectable 50milliGRAM(s) IV Push <User Schedule>    MEDICATIONS  (PRN):  dextrose Gel 1Dose(s) Oral once PRN Blood Glucose LESS THAN 70 milliGRAM(s)/deciliter  glucagon  Injectable 1milliGRAM(s) IntraMuscular once PRN Glucose LESS THAN 70 milligrams/deciliter

## 2017-02-05 NOTE — PROGRESS NOTE ADULT - PROBLEM SELECTOR PROBLEM 7
Prophylactic measure
Vitamin D deficiency
Prophylactic measure

## 2017-02-05 NOTE — PROGRESS NOTE ADULT - PROBLEM SELECTOR PROBLEM 5
CHF (congestive heart failure)
CHF (congestive heart failure)
Diabetes
Morbid obesity, unspecified obesity type
Vitamin D deficiency
Morbid obesity, unspecified obesity type
Vitamin D deficiency

## 2017-02-05 NOTE — PROGRESS NOTE ADULT - PROBLEM SELECTOR PLAN 5
probab fluid overload sec to renal failure
Dietary/lifestyle modifications
Dietary/lifestyle modifications
Dietary/lifestyle modifications,    Per HM
Dietary/lifestyle modifications,    Per HM
ISS
TTE - P :
ergocalciferol 50.000 unit weekly for 12 weeks   then 2000 unit daily
Dietary/lifestyle modifications
ergocalciferol 50.000 unit weekly for 12 weeks   then 2000 unit daily

## 2017-02-05 NOTE — PROGRESS NOTE ADULT - PROBLEM SELECTOR PLAN 4
- not SOB now, no tachy
ISS
ISS
Insulin SS
Insulin SS
On metoprolol 12.5 mg bid and Norvasc 10 mg daily, controlled
On metoprolol 12.5 mg bid and Norvasc 10 mg daily.
On metoprolol dose increased to 25 mg bid continue  Norvasc 10 mg daily,
Per ,
continue current RX
started on metoprolol 12.5 mg bid and norvasc 10 mg daily.
ISS

## 2017-02-05 NOTE — PROGRESS NOTE ADULT - PROBLEM SELECTOR PLAN 3
stopped taking Glucophage several years ago.  FS with sliding scale insulin.  HbA1C
Continue current RX
Continue current RX
Stopped taking Glucophage several years ago.  FS with sliding scale insulin.  HbA1C - 8 %
Stopped taking Glucophage several years ago.  FS with sliding scale insulin.  HbA1C - 8 %  diabetic educator consult
Stopped taking Glucophage several years ago.  FS with sliding scale insulin., controlled , diabetic educator   HbA1C - 8 %  diabetic educator consult
continue current RX
continue current RX
due to ABL with menorrhagia + due to ESRD  continue Epogen/iron  transfused 2 units 2/3 - monitor H&H, hb improved from 6.7 to 9.0  guaiac pending
stopped taking Glucophage several years ago.  FS with sliding scale insulin.  HbA1C
stopped taking Glucophage several years ago.  FS with sliding scale insulin.  HbA1C of 8
continue current RX

## 2017-02-05 NOTE — PROGRESS NOTE ADULT - PROBLEM SELECTOR PLAN 6
received 20 mg of IV labetalol in ER.  started on metoprolol 12.5 mg bid and norvasc 10 mg daily.  monitor BP
Dietary/lifestyle modifications
On Vitamin D Supplements,
On Vitamin D Supplements,
Received 20 mg of IV labetalol in ER.  started on metoprolol 12.5 mg bid and Norvasc 10 mg daily.  monitor BP
Vitamin D

## 2017-02-05 NOTE — PROGRESS NOTE ADULT - PROBLEM SELECTOR PROBLEM 1
Renal failure
Allergic rhinitis
Renal failure
Renal failure, chronic, stage 5
Renal failure, chronic, stage 5
Renal failure

## 2017-02-05 NOTE — PROGRESS NOTE ADULT - ASSESSMENT
36 years old female with PMH of DM, not on any medication admitted with several months history of worsening lower extremity edema and exertional SOB.
1.? ESRD - DMN  2.HTN  3.Obesity
1.DMN - ESRD ( HD )  2.Anemia  3.Obesity,    Advised to consider Kidney Biopsy, to exclude  Treatable Kidney Diseases,
36 year old  AA  female with PMH of DM, not on any medications ,  admitted with several months history of worsening lower extremity edema and exertional Dyspnea, worsening renal failure HD initiated 1/30 , tolerating well.     S/P kidney BX, & AVG,
36 year old  AA  female with PMH of DM, not on any medications ,  admitted with several months history of worsening lower extremity edema and exertional Dyspnea, worsening renal failure HD initiated 1/30 , tolerating well. S/P kidney BX, for AV graft today
36 year old  AA  female with PMH of DM, not on any medications ,  admitted with several months history of worsening lower extremity edema and exertional Dyspnea, worsening renal failure HD initiated 1/30 , tolerating well. S/P kidney BX, for AV graft today
36 year old  AA  female with PMH of DM, not on any medications, admitted with several months history of worsening lower extremity edema and exertional Dyspnea, worsening renal failure HD initiated 1/30 , tolerating well. S/P kidney BX, for AV graft today
36 years old female with PMH of DM, not on any medication admitted with several months history of worsening lower extremity edema and exertional SOB.
DX : 36 year old  AA  female with PMH of DM, not on any medications ,  admitted with several months history of worsening lower extremity edema and exertional Dyspnea,    1, ESRD , DM N , Anemia, HTN , Obesity,    Kidney Biopsy in AM,
DX : 36 year old  AA  female with PMH of DM, not on any medications ,  admitted with several months history of worsening lower extremity edema and exertional Dyspnea, worsening renal failure BUN
DX : 36 year old  AA  female with PMH of DM, not on any medications ,  admitted with several months history of worsening lower extremity edema and exertional Dyspnea, worsening renal failure HD initiated 1/30 , had second HD today tolerating,
Imp : 36 year old  AA  female with PMH of DM - 2  Presented  w. ESRD ( Rapid  Progression in 2 years )    HD initiated 1/30/17  , tolerating well.     S/P kidney BX, Tunneled CVC - R IJ & AVF( LEAH )
The patient refused the renal biopsy
36 year old  AA  female with PMH of DM, not on any medications ,  admitted with several months history of worsening lower extremity edema and exertional Dyspnea, worsening renal failure HD initiated 1/30 , tolerating well. S/P kidney BX, for AV graft today

## 2017-02-05 NOTE — PROGRESS NOTE ADULT - PROBLEM SELECTOR PLAN 2
Likely due to CKD.  anemia work up.  has marcelina started on Epoetin  monitor H/H
Anemia work up iron studies   On Fe, Epoetin
Anemia work up iron studies in am   On Fe, Epoetin
CKD - 5.  Anemia work up.  On Fe, Epoetin
Due to Blood Loss  with menorrhagia + CKD - 5 :  Continue Epogen/iron  Transfused 2 units  - monitor Hgb.,
Due to Blood Loss  with menorrhagia +CKD - 5 :  Continue Epogen/iron  transfused 2 units today - monitor H&H, if drops again consider HemOnc eval  guaiacs  pending
Likely due to CKD.  anemia work up.  On Epoetin  monitor H/H
Likely due to CKD.  anemia work up.  has marcelina started on Epoetin  monitor H/H
due to ABL with menorrhagia + due to ESRD  continue Epogen/iron  transfused 2 units 2/3 - monitor H&H, hb improved from 6.7 to 9.0  guaiac pending
due to ABL with menorrhagia + due to ESRD  continue Epogen/iron  transfused 2 units today - monitor H&H, if drops again consider HemOnc eval  guaiac pending
s/p right internal cath access for HD initiated 1/30  and LUE AVG 2/2/17  renal biopsy performed - results pending  PPD placed/Hepatitis panel for HD seat  Awaiting outpt HD placement
s/p transfusion yesterday  Epogen/iron  monitor H&H

## 2017-02-06 VITALS
OXYGEN SATURATION: 95 % | DIASTOLIC BLOOD PRESSURE: 83 MMHG | RESPIRATION RATE: 18 BRPM | HEART RATE: 94 BPM | TEMPERATURE: 98 F | SYSTOLIC BLOOD PRESSURE: 155 MMHG

## 2017-02-06 LAB
HCT VFR BLD CALC: 27.4 % — LOW (ref 37–47)
HGB BLD-MCNC: 8.8 G/DL — LOW (ref 12–16)
MCHC RBC-ENTMCNC: 26.7 PG — LOW (ref 27–31)
MCHC RBC-ENTMCNC: 32.1 G/DL — SIGNIFICANT CHANGE UP (ref 32–36)
MCV RBC AUTO: 83.3 FL — SIGNIFICANT CHANGE UP (ref 81–99)
PLATELET # BLD AUTO: 291 K/UL — SIGNIFICANT CHANGE UP (ref 150–400)
RBC # BLD: 3.29 M/UL — LOW (ref 4.4–5.2)
RBC # FLD: 15.4 % — SIGNIFICANT CHANGE UP (ref 11–15.6)
WBC # BLD: 11.32 K/UL — HIGH (ref 4.8–10.8)
WBC # FLD AUTO: 11.32 K/UL — HIGH (ref 4.8–10.8)

## 2017-02-06 PROCEDURE — 90935 HEMODIALYSIS ONE EVALUATION: CPT

## 2017-02-06 PROCEDURE — 99239 HOSP IP/OBS DSCHRG MGMT >30: CPT

## 2017-02-06 RX ORDER — PANTOPRAZOLE SODIUM 20 MG/1
1 TABLET, DELAYED RELEASE ORAL
Qty: 30 | Refills: 0 | OUTPATIENT
Start: 2017-02-06 | End: 2017-03-08

## 2017-02-06 RX ORDER — IRON SUCROSE 20 MG/ML
2.5 INJECTION, SOLUTION INTRAVENOUS
Qty: 0 | Refills: 0 | COMMUNITY
Start: 2017-02-06

## 2017-02-06 RX ORDER — ASPIRIN/CALCIUM CARB/MAGNESIUM 324 MG
1 TABLET ORAL
Qty: 0 | Refills: 0 | COMMUNITY
Start: 2017-02-06

## 2017-02-06 RX ORDER — LORATADINE 10 MG/1
1 TABLET ORAL
Qty: 30 | Refills: 0 | OUTPATIENT
Start: 2017-02-06 | End: 2017-03-08

## 2017-02-06 RX ORDER — METOPROLOL TARTRATE 50 MG
100 TABLET ORAL DAILY
Qty: 0 | Refills: 0 | Status: DISCONTINUED | OUTPATIENT
Start: 2017-02-06 | End: 2017-02-06

## 2017-02-06 RX ORDER — AMLODIPINE BESYLATE 2.5 MG/1
1 TABLET ORAL
Qty: 30 | Refills: 0 | OUTPATIENT
Start: 2017-02-06 | End: 2017-03-08

## 2017-02-06 RX ORDER — ERYTHROPOIETIN 10000 [IU]/ML
12000 INJECTION, SOLUTION INTRAVENOUS; SUBCUTANEOUS
Qty: 0 | Refills: 0 | COMMUNITY
Start: 2017-02-06

## 2017-02-06 RX ORDER — SEVELAMER CARBONATE 2400 MG/1
2 POWDER, FOR SUSPENSION ORAL
Qty: 180 | Refills: 0 | OUTPATIENT
Start: 2017-02-06 | End: 2017-03-08

## 2017-02-06 RX ORDER — PANTOPRAZOLE SODIUM 20 MG/1
40 TABLET, DELAYED RELEASE ORAL
Qty: 0 | Refills: 0 | Status: DISCONTINUED | OUTPATIENT
Start: 2017-02-06 | End: 2017-02-06

## 2017-02-06 RX ORDER — ERGOCALCIFEROL 1.25 MG/1
1 CAPSULE ORAL
Qty: 4 | Refills: 0 | OUTPATIENT
Start: 2017-02-06 | End: 2017-03-08

## 2017-02-06 RX ORDER — METOPROLOL TARTRATE 50 MG
1 TABLET ORAL
Qty: 30 | Refills: 0 | OUTPATIENT
Start: 2017-02-06 | End: 2017-03-08

## 2017-02-06 RX ORDER — INSULIN LISPRO 100/ML
2 VIAL (ML) SUBCUTANEOUS
Qty: 1 | Refills: 0 | OUTPATIENT
Start: 2017-02-06

## 2017-02-06 RX ORDER — CALCITRIOL 0.5 UG/1
1 CAPSULE ORAL
Qty: 30 | Refills: 0 | OUTPATIENT
Start: 2017-02-06 | End: 2017-03-08

## 2017-02-06 RX ADMIN — ERYTHROPOIETIN 12000 UNIT(S): 10000 INJECTION, SOLUTION INTRAVENOUS; SUBCUTANEOUS at 14:00

## 2017-02-06 RX ADMIN — IRON SUCROSE 50 MILLIGRAM(S): 20 INJECTION, SOLUTION INTRAVENOUS at 13:13

## 2017-02-06 RX ADMIN — Medication 81 MILLIGRAM(S): at 11:08

## 2017-02-06 RX ADMIN — SEVELAMER CARBONATE 1600 MILLIGRAM(S): 2400 POWDER, FOR SUSPENSION ORAL at 08:21

## 2017-02-06 RX ADMIN — TUBERCULIN PURIFIED PROTEIN DERIVATIVE 5 UNIT(S): 5 INJECTION, SOLUTION INTRADERMAL at 02:21

## 2017-02-06 RX ADMIN — SEVELAMER CARBONATE 1600 MILLIGRAM(S): 2400 POWDER, FOR SUSPENSION ORAL at 17:24

## 2017-02-06 RX ADMIN — AMLODIPINE BESYLATE 10 MILLIGRAM(S): 2.5 TABLET ORAL at 05:53

## 2017-02-06 RX ADMIN — Medication 100 MILLIGRAM(S): at 13:47

## 2017-02-06 RX ADMIN — CALCITRIOL 0.5 MICROGRAM(S): 0.5 CAPSULE ORAL at 11:08

## 2017-02-06 RX ADMIN — SEVELAMER CARBONATE 1600 MILLIGRAM(S): 2400 POWDER, FOR SUSPENSION ORAL at 11:08

## 2017-02-06 NOTE — DISCHARGE NOTE ADULT - OTHER SIGNIFICANT FINDINGS
Pt seen and examined with NP. A&P reviewed.   Discussed with Dr. Barlow  Medically stable for DC  Time spent 65 min

## 2017-02-06 NOTE — DISCHARGE NOTE ADULT - CARE PROVIDERS DIRECT ADDRESSES
,[email or web address],DirectAddress_Unknown ,DirectAddress_Unknown,DirectAddress_Unknown

## 2017-02-06 NOTE — DISCHARGE NOTE ADULT - HOSPITAL COURSE
36 years old female with PMH of DM, not on any medication for several years presented to the ER with several months history of worsening lower extremity edema, exertional SOB,  worsening renal failure HD initiated 1/30 , tolerating  well. S/P kidney BX, S/P  AV graft . Hypertensive medications initiated. Patient also noted to have anemia of chronic disease and blood loss from menstruation. Received 2 units PRBC during HD, H&H now stable. The patient will continue on iron supplement and Epogen as per Renal. The patient has a seat in the community for HD Mon-Wed-FRi. The patient will be discharge on insulin sliding scale and will need follow up with PMD and Dr. Daniel as outpatient.     Vital Signs Last 24 Hrs  T(C): 36.8, Max: 36.8 (02-06 @ 11:40)  T(F): 98.2, Max: 98.2 (02-06 @ 11:40)  HR: 96 (90 - 104)  BP: 170/88 (152/83 - 184/98)  BP(mean): --  RR: 18 (18 - 18)  SpO2: 97% (90% - 98%)                              8.8    11.32 )-----------( 291      ( 06 Feb 2017 08:24 )             27.4             CAPILLARY BLOOD GLUCOSE  150 (06 Feb 2017 11:06)  103 (06 Feb 2017 08:17)  169 (05 Feb 2017 22:12)  139 (05 Feb 2017 16:57)    REVIEW OF SYSTEMS:    Patient denied fever, chills, abdominal pain, nausea, vomiting, cough, shortness of breath, chest pain or palpitations      PHYSICAL EXAM:  GENERAL: NAD, well-groomed  HEENT: PERRL, +EOMI, anicteric, no [place]; + nasal erythema   NECK: Supple, No JVD   CHEST/LUNG: CTA bilaterally; Normal effort; + right chest HD catheter noted  HEART: S1S2 Normal intensity, no murmurs, gallops or rubs noted  ABDOMEN: Soft, BS Normoactive, NT, ND, no HSM noted  EXTREMITIES:  1+ radial and DP pulses noted, no clubbing, cyanosis; + bilateral lower extremity edema noted, FROM x 4; LUE avg site with edema noted, no erythema  SKIN: No rashes or lesions noted  NEURO: A&Ox3, no focal deficits noted, CN II-XII intact  PSYCH: normal mood and affect; insight/judgement appropriate  Assessment and Plan:   · Assessment	    Problem/Plan - 1:  ·  Problem: Allergic rhinitis.  Plan: singulair and claritin started.     Problem/Plan - 2:  ·  Problem: Renal failure.  Plan: s/p right internal cath access for HD initiated 1/30  and LUE AVG 2/2/17  renal biopsy performed - results pending  PPD placed and was negative/Hepatitis panel for HD seat/HD placement.     Problem/Plan - 3:  ·  Problem: Anemia.  Plan: due to ABL with menorrhagia + due to ESRD  continue Epogen/iron  transfused 2 units 2/3 - monitor H&H, hb improved from 6.7 to 9.0    Problem/Plan - 4:  ·  Problem: HTN (hypertension).  Plan: continue current RX.     Problem/Plan - 5:  ·  Problem: Diabetes.  Plan: ISS.     Problem/Plan - 6:  Problem: Morbid obesity, unspecified obesity type. Plan: Dietary/lifestyle modifications.    Problem/Plan - 7:  ·  Problem: Vitamin D deficiency.  Plan: Vitamin D. 36 years old female with PMH of DM, not on any medication for several years presented to the ER with several months history of worsening lower extremity edema, exertional SOB,  worsening renal failure HD initiated 1/30 , tolerating  well. S/P kidney BX, S/P  AV graft . Hypertensive medications initiated. Patient also noted to have anemia of chronic disease and blood loss from menstruation. Received 2 units PRBC during HD, H&H now stable. The patient will continue on iron supplement and Epogen as per Renal. The patient has a seat in the community for HD Mon-Wed-FRi. The patient will be discharge on insulin sliding scale and will need follow up with PMD and  as outpatient. Renal biopsy results pending.    Vital Signs Last 24 Hrs  T(C): 36.8, Max: 36.8 (02-06 @ 11:40)  T(F): 98.2, Max: 98.2 (02-06 @ 11:40)  HR: 96 (90 - 104)  BP: 170/88 (152/83 - 184/98)  BP(mean): --  RR: 18 (18 - 18)  SpO2: 97% (90% - 98%)                              8.8    11.32 )-----------( 291      ( 06 Feb 2017 08:24 )             27.4             CAPILLARY BLOOD GLUCOSE  150 (06 Feb 2017 11:06)  103 (06 Feb 2017 08:17)  169 (05 Feb 2017 22:12)  139 (05 Feb 2017 16:57)    REVIEW OF SYSTEMS:    Patient denied fever, chills, abdominal pain, nausea, vomiting, cough, shortness of breath, chest pain or palpitations      PHYSICAL EXAM:  GENERAL: NAD, well-groomed  HEENT: PERRL, +EOMI, anicteric, no [place]; + nasal erythema   NECK: Supple, No JVD   CHEST/LUNG: CTA bilaterally; Normal effort; + right chest HD catheter noted  HEART: S1S2 Normal intensity, no murmurs, gallops or rubs noted  ABDOMEN: Soft, BS Normoactive, NT, ND, no HSM noted  EXTREMITIES:  1+ radial and DP pulses noted, no clubbing, cyanosis; + bilateral lower extremity edema noted, FROM x 4; LUE avg site with edema noted, no erythema  SKIN: No rashes or lesions noted  NEURO: A&Ox3, no focal deficits noted, CN II-XII intact  PSYCH: normal mood and affect; insight/judgement appropriate  Assessment and Plan:   · Assessment	    Problem/Plan - 1:  ·  Problem: Allergic rhinitis.  Plan: singulair and claritin started.     Problem/Plan - 2:  ·  Problem: Renal failure.  Plan: s/p right internal cath access for HD initiated 1/30  and LUE AVG 2/2/17  renal biopsy performed - results pending  PPD placed and was negative/Hepatitis panel for HD seat/HD placement in [place], to be followed by Dr. Barlow. Kidney bx pending.    Problem/Plan - 3:  ·  Problem: Anemia.  Plan: due to ABL with menorrhagia + due to ESRD  continue Epogen/iron  transfused 2 units 2/3 - monitor H&H, hb improved from 6.7 to 9.0    Problem/Plan - 4:  ·  Problem: HTN (hypertension).  Plan: continue current RX.     Problem/Plan - 5:  ·  Problem: Diabetes.  Plan: ISS.     Problem/Plan - 6:  Problem: Morbid obesity, unspecified obesity type. Plan: Dietary/lifestyle modifications.    Problem/Plan - 7:  ·  Problem: Vitamin D deficiency.  Plan: Vitamin D. 36 years old female with PMH of DM, not on any medication for several years presented to the ER with several months history of worsening lower extremity edema, exertional SOB,  worsening renal failure HD initiated 1/30 , tolerating  well. S/P kidney BX, S/P  AV graft . Hypertensive medications initiated. Patient also noted to have anemia of chronic disease and blood loss from menstruation. Received 2 units PRBC during HD, H&H now stable. The patient will continue on iron supplement and Epogen as per Renal. The patient has a seat in the community for HD Mon-Wed-FRi. The patient will be discharge on insulin sliding scale and will need follow up with PMD and  as outpatient. Renal biopsy results pending.    Vital Signs Last 24 Hrs  T(C): 36.8, Max: 36.8 (02-06 @ 11:40)  T(F): 98.2, Max: 98.2 (02-06 @ 11:40)  HR: 96 (90 - 104)  BP: 170/88 (152/83 - 184/98)  BP(mean): --  RR: 18 (18 - 18)  SpO2: 97% (90% - 98%)                              8.8    11.32 )-----------( 291      ( 06 Feb 2017 08:24 )             27.4             CAPILLARY BLOOD GLUCOSE  150 (06 Feb 2017 11:06)  103 (06 Feb 2017 08:17)  169 (05 Feb 2017 22:12)  139 (05 Feb 2017 16:57)    REVIEW OF SYSTEMS:    Patient denied fever, chills, abdominal pain, nausea, vomiting, cough, shortness of breath, chest pain or palpitations      PHYSICAL EXAM:  GENERAL: NAD, well-groomed  HEENT: PERRL, +EOMI, anicteric, no [place]; + nasal erythema   NECK: Supple, No JVD   CHEST/LUNG: CTA bilaterally; Normal effort; + right chest HD catheter noted  HEART: S1S2 Normal intensity, no murmurs, gallops or rubs noted  ABDOMEN: Soft, BS Normoactive, NT, ND, no HSM noted  EXTREMITIES:  1+ radial and DP pulses noted, no clubbing, cyanosis; + bilateral lower extremity edema noted, FROM x 4; LUE avg site with edema noted, no erythema  SKIN: No rashes or lesions noted  NEURO: A&Ox3, no focal deficits noted, CN II-XII intact  PSYCH: normal mood and affect; insight/judgement appropriate  Assessment and Plan:   · Assessment	    Problem/Plan - 1:  ·  Problem: Allergic rhinitis.  Plan: singulair and claritin started.     Problem/Plan - 2:  ·  Problem: Renal failure.  Plan: s/p right internal cath access for HD initiated 1/30  and LUE AVG 2/2/17  renal biopsy performed - results pending  PPD placed and was negative/Hepatitis panel for HD seat/HD placement in [place], to be followed by Dr. Hu. Kidney bx pending.    Problem/Plan - 3:  ·  Problem: Anemia.  Plan: due to ABL with menorrhagia + due to ESRD  continue Epogen/iron  transfused 2 units 2/3 - monitor H&H, hb improved from 6.7 to 9.0    Problem/Plan - 4:  ·  Problem: HTN (hypertension).  Plan: continue current RX.     Problem/Plan - 5:  ·  Problem: Diabetes.  Plan: ISS.     Problem/Plan - 6:  Problem: Morbid obesity, unspecified obesity type. Plan: Dietary/lifestyle modifications.    Problem/Plan - 7:  ·  Problem: Vitamin D deficiency.  Plan: Vitamin D.

## 2017-02-06 NOTE — DISCHARGE NOTE ADULT - MEDICATION SUMMARY - MEDICATIONS TO TAKE
I will START or STAY ON the medications listed below when I get home from the hospital:    aspirin 81 mg oral delayed release tablet  -- 1 tab(s) by mouth once a day  -- Indication: For Preventative measure    loratadine 10 mg oral tablet  -- 1 tab(s) by mouth once a day  -- Indication: For Allergic rhinitis    metoprolol succinate 100 mg oral tablet, extended release  -- 1 tab(s) by mouth once a day  -- Indication: For HTN (hypertension)    amLODIPine 10 mg oral tablet  -- 1 tab(s) by mouth once a day  -- Indication: For HTN (hypertension)    epoetin raina  -- 53145 unit(s) intravenous 3 times a week  as per renal  -- Indication: For Anemia    iron sucrose 20 mg/mL intravenous solution  -- 2.5 milliliter(s) intravenous  as per Renal during HD  -- Indication: For Anemia    sevelamer hydrochloride 800 mg oral tablet  -- 2 tab(s) by mouth 3 times a day (with meals)  -- Indication: For VIOLET (acute kidney injury)    pantoprazole 40 mg oral delayed release tablet  -- 1 tab(s) by mouth once a day (before a meal)  -- Indication: For GERD    calcitriol 0.5 mcg oral capsule  -- 1 cap(s) by mouth once a day  -- Indication: For Renal failure    ergocalciferol 50,000 intl units (1.25 mg) oral capsule  -- 1 cap(s) by mouth once a week  -- Indication: For Vitamin D deficiency I will START or STAY ON the medications listed below when I get home from the hospital:    aspirin 81 mg oral delayed release tablet  -- 1 tab(s) by mouth once a day  -- Indication: For Preventative measure    loratadine 10 mg oral tablet  -- 1 tab(s) by mouth once a day  -- Indication: For Allergic rhinitis    metoprolol succinate 100 mg oral tablet, extended release  -- 1 tab(s) by mouth once a day  -- Indication: For HTN (hypertension)    amLODIPine 10 mg oral tablet  -- 1 tab(s) by mouth once a day  -- Indication: For HTN (hypertension)    epoetin raina  -- 22817 unit(s) intravenous 3 times a week  as per renal  -- Indication: For Anemia    iron sucrose 20 mg/mL intravenous solution  -- 2.5 milliliter(s) intravenous  as per Renal during HD  -- Indication: For Anemia    sevelamer hydrochloride 800 mg oral tablet  -- 2 tab(s) by mouth 3 times a day (with meals)  -- Indication: For VIOLET (acute kidney injury)    pantoprazole 40 mg oral delayed release tablet  -- 1 tab(s) by mouth once a day (before a meal)  -- Indication: For GERD    calcitriol 0.5 mcg oral capsule  -- 1 cap(s) by mouth once a day  -- Indication: For Renal failure    ergocalciferol 50,000 intl units (1.25 mg) oral capsule  -- 1 cap(s) by mouth once a week  -- Indication: For Vitamin D deficiency I will START or STAY ON the medications listed below when I get home from the hospital:    aspirin 81 mg oral delayed release tablet  -- 1 tab(s) by mouth once a day  -- Indication: For Preventative measure    HumaLOG KwikPen 100 units/mL subcutaneous solution  -- 2 unit(s) subcutaneous 3 times a day (before meals) ; 1 Unit(s) if Glucose 151 - 200 2 Unit(s) if Glucose 201 - 250 3 Unit(s) if Glucose 251 - 300 4 Unit(s) if Glucose 301 - 350 5 Unit(s) if Glucose 351 - 400 6 Unit(s) if Glucose Greater Than 400  -- Indication: For Diabetes    loratadine 10 mg oral tablet  -- 1 tab(s) by mouth once a day  -- Indication: For Allergic rhinitis    metoprolol succinate 100 mg oral tablet, extended release  -- 1 tab(s) by mouth once a day  -- Indication: For HTN (hypertension)    amLODIPine 10 mg oral tablet  -- 1 tab(s) by mouth once a day  -- Indication: For HTN (hypertension)    epoetin raina  -- 77976 unit(s) intravenous 3 times a week  as per renal  -- Indication: For Anemia    iron sucrose 20 mg/mL intravenous solution  -- 2.5 milliliter(s) intravenous  as per Renal during HD  -- Indication: For Anemia    sevelamer hydrochloride 800 mg oral tablet  -- 2 tab(s) by mouth 3 times a day (with meals)  -- Indication: For VIOLET (acute kidney injury)    pantoprazole 40 mg oral delayed release tablet  -- 1 tab(s) by mouth once a day (before a meal)  -- Indication: For GERD    calcitriol 0.5 mcg oral capsule  -- 1 cap(s) by mouth once a day  -- Indication: For Renal failure    ergocalciferol 50,000 intl units (1.25 mg) oral capsule  -- 1 cap(s) by mouth once a week  -- Indication: For Vitamin D deficiency

## 2017-02-06 NOTE — DISCHARGE NOTE ADULT - INSTRUCTIONS
Choose lean meats and poultry without skin and prepare them without added saturated and trans fat.  Eat fish at least twice a week. Recent research shows that eating oily fish containing omega-3 fatty acids (for example, salmon, trout and herring) may help lower your risk of death from coronary artery disease.  Select fat-free, 1 percent fat and low-fat dairy products.  Cut back on foods containing partially hydrogenated vegetable oils to reduce trans fat in your diet.   To lower cholesterol, reduce saturated fat to no more than 5 to 6 percent of total calories. For someone eating 2,000 calories a day, that’s about 13 grams of saturated fat.  Cut back on beverages and foods with added sugars.  Choose and prepare foods with little or no salt. To lower blood pressure, aim to eat no more than 2,400 milligrams of sodium per day. Reducing daily intake to 1,500 mg is desirable because it can lower blood pressure even further.  If you drink alcohol, drink in moderation. That means one drink per day if you’re a woman and two drinks  per day if you’re a man.  Follow the American Heart Association recommendations when you eat out, and keep an eye on your portion sizes.  Renal restrictions  Consistent Carbohydrate Diet

## 2017-02-06 NOTE — DISCHARGE NOTE ADULT - CARE PROVIDER_API CALL
Edil Daniel (FARHAD), Internal Medicine; Nephrology  [address] [place] [id number]  Phone: [phone]  Fax: [phone] Timi Barlow), Internal Medicine; Nephrology  [address] [place] [address]  Phone: [phone]  Fax: [phone] Angel Hu), Internal Medicine; Nephrology  [address] [place] [id number]  Phone: [phone]  Fax: [phone]

## 2017-02-06 NOTE — PROGRESS NOTE ADULT - PROVIDER SPECIALTY LIST ADULT
Anesthesia
Anesthesia
Hospitalist
Intervent Radiology
Nephrology
Vascular Surgery
Nephrology
Hospitalist

## 2017-02-06 NOTE — DISCHARGE NOTE ADULT - PATIENT PORTAL LINK FT
“You can access the [facility] Patient Portal, offered by [facility], by registering with the following website: [email or web address]”

## 2017-02-06 NOTE — DISCHARGE NOTE ADULT - CARE PLAN
Principal Discharge DX:	Renal failure  Goal:	The patient will remain stable  Instructions for follow-up, activity and diet:	HD Mon-Wed-Fri  F/U with Dr. Daniel  Secondary Diagnosis:	Diabetes  Instructions for follow-up, activity and diet:	Insulin sliding scale  Accuchecks AC/HS  Consistent Carbohydrate diet  Secondary Diagnosis:	HTN (hypertension)  Instructions for follow-up, activity and diet:	Norvasc/Toprol  Secondary Diagnosis:	Anemia  Instructions for follow-up, activity and diet:	F/U labs as outpatient  continue iron/Vit C  Secondary Diagnosis:	Vitamin D deficiency  Instructions for follow-up, activity and diet:	supplement  Secondary Diagnosis:	Morbid obesity, unspecified obesity type  Instructions for follow-up, activity and diet:	dietary/lifestyle modifications Principal Discharge DX:	Renal failure  Goal:	The patient will remain stable  Instructions for follow-up, activity and diet:	HD Tues/Thurs/Sat  F/U with Dr. Barlow  Renal BX results pending, to be followed by Dr. Barlow  Secondary Diagnosis:	Diabetes  Instructions for follow-up, activity and diet:	Insulin sliding scale  Accuchecks AC/HS  Consistent Carbohydrate diet  Secondary Diagnosis:	HTN (hypertension)  Instructions for follow-up, activity and diet:	Norvasc/Toprol  Secondary Diagnosis:	Anemia  Instructions for follow-up, activity and diet:	F/U labs as outpatient  continue iron/Vit C  Secondary Diagnosis:	Vitamin D deficiency  Instructions for follow-up, activity and diet:	supplement  Secondary Diagnosis:	Morbid obesity, unspecified obesity type  Instructions for follow-up, activity and diet:	dietary/lifestyle modifications Principal Discharge DX:	Renal failure  Goal:	The patient will remain stable  Instructions for follow-up, activity and diet:	HD Tues/Thurs/Sat  F/U with Dr. Hu  Renal BX results pending, to be followed by Dr. Barlow  Secondary Diagnosis:	Diabetes  Instructions for follow-up, activity and diet:	Insulin sliding scale with Humalog Quick Pen  Accuchecks AC/HS  Consistent Carbohydrate diet  Secondary Diagnosis:	HTN (hypertension)  Instructions for follow-up, activity and diet:	Norvasc/Toprol  Secondary Diagnosis:	Anemia  Instructions for follow-up, activity and diet:	F/U labs as outpatient  continue iron/Vit C  Secondary Diagnosis:	Vitamin D deficiency  Instructions for follow-up, activity and diet:	supplement  Secondary Diagnosis:	Morbid obesity, unspecified obesity type  Instructions for follow-up, activity and diet:	dietary/lifestyle modifications

## 2017-02-06 NOTE — PROGRESS NOTE ADULT - SUBJECTIVE AND OBJECTIVE BOX
Renal :                                  8.8<L>  11.32<H> )-----------( 291      ( 2017 08:24 )             27.4<L>    Phos:4.8 mg/dL<H> M.0 mg/dL PTH:-- Uric acid:-- Serum Osm:--  Ferritin:-- Iron:-- TIBC:-- Tsat:--  B12:-- TSH:-- ( @ 09:13)      UProt:-- UCr:-- P/C Ratio:-- 24 hour Prot:-- UVol:-- CrCl:--  Veronique:-- UOsm:375 mosm/kg UVol:-- UCl:-- UK:-- ( @ 17:40)      Patient was seen and evaluated on dialysis.   Patient is tolerating the procedure well.   T(C): 36.8, Max: 36.8 ( @ 11:40)  HR: 96 (90 - 104)  BP: 170/88 (152/83 - 184/98)  Wt(kg): TBD,  Continue dialysis:   Dialyzer: 170 Exeltra  QB: 300 ml., QD: 500ml.,  Goal UF 2-3 L over 3 Hours ,      Discussed w. Maya Downs & Angella ( [facility] )    OP HD on Thursday - 7 PM,

## 2017-02-06 NOTE — DISCHARGE NOTE ADULT - PLAN OF CARE
The patient will remain stable NATALIE Mon-Wed-Fri  F/U with Dr. Daniel Insulin sliding scale  Accuchecks AC/HS  Consistent Carbohydrate diet Norvasc/Toprol F/U labs as outpatient  continue iron/Vit C supplement dietary/lifestyle modifications NATALIE Tues/Thurs/Sat  F/U with Dr. Barlow  Renal BX results pending, to be followed by Dr. Barlow Insulin sliding scale with Humalog Quick Pen  Accuchecks AC/HS  Consistent Carbohydrate diet NATALIE Tues/Thurs/Sat  F/U with Dr. Hu  Renal BX results pending, to be followed by Dr. Barlow

## 2017-02-14 LAB — SURGICAL PATHOLOGY FINAL REPORT - CH: SIGNIFICANT CHANGE UP

## 2017-02-21 ENCOUNTER — OUTPATIENT (OUTPATIENT)
Dept: OUTPATIENT SERVICES | Facility: HOSPITAL | Age: 37
LOS: 1 days | End: 2017-02-21
Payer: MEDICAID

## 2017-02-21 DIAGNOSIS — Z00.8 ENCOUNTER FOR OTHER GENERAL EXAMINATION: ICD-10-CM

## 2017-02-21 DIAGNOSIS — R76.11 NONSPECIFIC REACTION TO TUBERCULIN SKIN TEST WITHOUT ACTIVE TUBERCULOSIS: ICD-10-CM

## 2017-02-21 PROBLEM — E11.9 TYPE 2 DIABETES MELLITUS WITHOUT COMPLICATIONS: Chronic | Status: ACTIVE | Noted: 2017-01-26

## 2017-02-21 PROCEDURE — 71046 X-RAY EXAM CHEST 2 VIEWS: CPT

## 2017-02-22 PROCEDURE — 71020: CPT | Mod: 26

## 2017-02-27 ENCOUNTER — APPOINTMENT (OUTPATIENT)
Dept: VASCULAR SURGERY | Facility: CLINIC | Age: 37
End: 2017-02-27

## 2017-02-27 VITALS
WEIGHT: 293 LBS | OXYGEN SATURATION: 98 % | TEMPERATURE: 98.7 F | HEART RATE: 101 BPM | RESPIRATION RATE: 18 BRPM | HEIGHT: 62 IN | DIASTOLIC BLOOD PRESSURE: 109 MMHG | SYSTOLIC BLOOD PRESSURE: 182 MMHG

## 2017-02-27 PROBLEM — Z00.00 ENCOUNTER FOR PREVENTIVE HEALTH EXAMINATION: Status: ACTIVE | Noted: 2017-02-27

## 2017-02-27 RX ORDER — PANTOPRAZOLE 40 MG/1
40 TABLET, DELAYED RELEASE ORAL
Qty: 30 | Refills: 0 | Status: ACTIVE | COMMUNITY
Start: 2017-02-06

## 2017-02-27 RX ORDER — BLOOD SUGAR DIAGNOSTIC
STRIP MISCELLANEOUS
Qty: 100 | Refills: 0 | Status: ACTIVE | COMMUNITY
Start: 2017-02-17

## 2017-02-27 RX ORDER — CHOLECALCIFEROL (VITAMIN D3) 10(400)/ML
DROPS ORAL
Qty: 100 | Refills: 0 | Status: ACTIVE | COMMUNITY
Start: 2017-02-17

## 2017-02-27 RX ORDER — ERGOCALCIFEROL 1.25 MG/1
1.25 MG CAPSULE, LIQUID FILLED ORAL
Qty: 4 | Refills: 0 | Status: ACTIVE | COMMUNITY
Start: 2017-02-06

## 2017-02-27 RX ORDER — INSULIN LISPRO 100 [IU]/ML
100 INJECTION, SOLUTION INTRAVENOUS; SUBCUTANEOUS
Qty: 15 | Refills: 0 | Status: ACTIVE | COMMUNITY
Start: 2017-02-06

## 2017-02-27 RX ORDER — BLOOD SUGAR DIAGNOSTIC
31G X 8 MM STRIP MISCELLANEOUS
Qty: 100 | Refills: 0 | Status: ACTIVE | COMMUNITY
Start: 2017-02-08

## 2017-02-27 RX ORDER — LORATADINE 10 MG/1
10 TABLET ORAL
Qty: 30 | Refills: 0 | Status: ACTIVE | COMMUNITY
Start: 2017-02-06

## 2017-03-27 ENCOUNTER — APPOINTMENT (OUTPATIENT)
Dept: VASCULAR SURGERY | Facility: CLINIC | Age: 37
End: 2017-03-27

## 2017-03-27 VITALS
OXYGEN SATURATION: 90 % | HEART RATE: 96 BPM | SYSTOLIC BLOOD PRESSURE: 145 MMHG | DIASTOLIC BLOOD PRESSURE: 79 MMHG | WEIGHT: 293 LBS | HEIGHT: 62 IN | BODY MASS INDEX: 53.92 KG/M2 | TEMPERATURE: 97.7 F | RESPIRATION RATE: 19 BRPM

## 2017-03-28 RX ORDER — VALSARTAN 40 MG/1
TABLET, COATED ORAL
Refills: 0 | Status: ACTIVE | COMMUNITY

## 2017-04-01 ENCOUNTER — OUTPATIENT (OUTPATIENT)
Dept: OUTPATIENT SERVICES | Facility: HOSPITAL | Age: 37
LOS: 1 days | End: 2017-04-01
Payer: MEDICAID

## 2017-04-04 ENCOUNTER — INPATIENT (INPATIENT)
Facility: HOSPITAL | Age: 37
LOS: 8 days | Discharge: HOME | DRG: 186 | End: 2017-04-13
Attending: HOSPITALIST | Admitting: FAMILY MEDICINE
Payer: MEDICAID

## 2017-04-04 VITALS
RESPIRATION RATE: 24 BRPM | HEIGHT: 62 IN | HEART RATE: 101 BPM | WEIGHT: 293 LBS | DIASTOLIC BLOOD PRESSURE: 89 MMHG | TEMPERATURE: 99 F | OXYGEN SATURATION: 82 % | SYSTOLIC BLOOD PRESSURE: 160 MMHG

## 2017-04-04 DIAGNOSIS — J90 PLEURAL EFFUSION, NOT ELSEWHERE CLASSIFIED: ICD-10-CM

## 2017-04-04 PROCEDURE — 71010: CPT | Mod: 26

## 2017-04-04 PROCEDURE — 99285 EMERGENCY DEPT VISIT HI MDM: CPT

## 2017-04-04 PROCEDURE — 93010 ELECTROCARDIOGRAM REPORT: CPT

## 2017-04-04 RX ORDER — SODIUM CHLORIDE 9 MG/ML
3 INJECTION INTRAMUSCULAR; INTRAVENOUS; SUBCUTANEOUS EVERY 8 HOURS
Qty: 0 | Refills: 0 | Status: DISCONTINUED | OUTPATIENT
Start: 2017-04-04 | End: 2017-04-13

## 2017-04-04 NOTE — H&P ADULT - PROBLEM SELECTOR PLAN 4
- O2 via nasal canula to maintain O2 sat > 92%  - dialysis in the AM  - CT surgey consult for possible thoracocentesis - reproducible, most likely musculoskeletal  - troponin positive most likely due to renal failure or demand ischemia due to fluid overload   - troponins x 3   - continue aspirin, ARB, beta-blocker, O2  - BNP, TTE, ECG to be repeated in the AM  - pain is mild - reproducible, most likely pleuritic/musculoskeletal  - troponin positive most likely due to renal failure or demand ischemia due to fluid overload   - troponins x 3   - continue aspirin, ARB, beta-blocker, O2  - BNP, TTE, ECG to be repeated in the AM  - pain is mild

## 2017-04-04 NOTE — H&P ADULT - NSHPSOCIALHISTORY_GEN_ALL_CORE
doesn't smoke, doesn't drink, doesn't use any kind of illegal drugs  lives with mother  disabled, doesn't work

## 2017-04-04 NOTE — ED PROVIDER NOTE - OBJECTIVE STATEMENT
38 y/o F with hx of HTN and kidney disease presents to the ED c/o constant SOB that began 3 days ago. Pt states she feels like her "ribs are pressing down" and notes associated L-sided chest soreness and intermittent pedal edema. She states that she currently has no O2 treatments at home. Pt was scheduled for dialysis today but was unable to attend due to current ED visit. Denies fever, chills, CP, abd pain, n/v/d. Pt reports that her SOB has resolved in ED but still feels her chest soreness. No blood thinners. No further complaints at this time.

## 2017-04-04 NOTE — H&P ADULT - HISTORY OF PRESENT ILLNESS
Patient is 37 year old female with past medical history of end stage renal failure on dialysis T/Th/S, hypertension, diabetes, questionable CHF who comes to [facility] complaining of Patient is 37 year old female with past medical history of end stage renal failure on dialysis T/Th/S, hypertension, diabetes, questionable CHF who comes to [facility] complaining of shortness of breath. The patient was supposed to get hemodialysis today in the AM but came to the hospital because of the shortness of breath which began today.  The patient has some baseline shortness of breath for the past couple of months, she cannot walk 10 yards without feeling short of breath. Denies orthopnea, denies paroxysmal nocturnal dyspnea, sleeps with 4 pillows at night for comfort and not because she gets short of breath if she lies flat. She states that she gained weight over the last week but doesn't know how much. The shortness of breath is accompanied by chest pain which is achy, 6/10, non-radiating, intermittent. Excacerbated by taking a deep breath or coughing. It started after she was having a lot dry heaving on Saturday. Denies history of CAD, denies diaphoresis.  The shortness of breath and chest pain is accompanied non-productive cough for the past couple of weeks.  Denies fevers, chills, sick contacts or recent travel. The patient denies calf pain, denies previous history of clots or hemoptysis.   The patient is hemodialysis is receiving dialysis through a port and has only been recently diagnosed with renal failure. She states that doctors told her that her renal failure was due to a combination of obesity diabetes and hypertension. The patient currently denies symptoms of hyperkalemia like muscular weakness, constipation or confusion. Patient is 37 year old female with past medical history of end stage renal failure on dialysis T/Th/S, hypertension, diabetes  who comes to [facility] complaining of shortness of breath. The patient was discharged from [facility] with diagnosis of new onset end stage renal disease in February. The patient was supposed to get hemodialysis in the morning but instead came to the hospital because of the shortness of breath which began today.  The patient has some baseline shortness of breath for the past couple of months, she cannot walk 10 yards without feeling short of breath, however her shortness of breath worsened today.  Denies orthopnea, denies paroxysmal nocturnal dyspnea, sleeps with 4 pillows at night for comfort.  She states that she gained weight over the last week but doesn't know how much. The shortness of breath is accompanied by chest pain which is achy, 6/10, non-radiating, intermittent. Excacerbated by taking a deep breath or coughing. It started after she was having a lot dry heaving on Saturday. Denies history of CAD, denies diaphoresis.  The shortness of breath and chest pain is accompanied non-productive cough for the past couple of weeks.  Denies fevers, chills, sick contacts or recent travel. The patient denies calf pain, denies previous history of clots or hemoptysis.   The patient is hemodialysis receiving dialysis through a port and has only been recently diagnosed with renal failure. She states that doctors told her that her renal failure was due to a combination of obesity diabetes and hypertension. The patient currently denies symptoms of hyperkalemia like muscular weakness, constipation or confusion. Patient is 37 year old female with past medical history of end stage renal failure on dialysis T/Th/S, hypertension, diabetes, questionable CHF  who comes to [facility] complaining of shortness of breath. The patient was discharged from [facility] with diagnosis of new onset end stage renal disease in February. The patient was supposed to get hemodialysis in the morning but instead came to the hospital because of the shortness of breath which began today.  The patient has some baseline shortness of breath for the past couple of months, she cannot walk 10 yards without feeling short of breath, however her shortness of breath worsened today.  Denies orthopnea, denies paroxysmal nocturnal dyspnea, sleeps with 4 pillows at night for comfort.  She states that she gained weight over the last week but doesn't know how much. The shortness of breath is accompanied by chest pain which is achy, 6/10, non-radiating, intermittent. Excacerbated by taking a deep breath or coughing. It started after she was having a lot dry heaving on Saturday. Denies history of CAD, denies diaphoresis.  The shortness of breath and chest pain is accompanied non-productive cough for the past couple of weeks.  Denies fevers, chills, sick contacts or recent travel. The patient denies calf pain, denies previous history of clots or hemoptysis.   The patient is hemodialysis receiving dialysis through a port and has only been recently diagnosed with renal failure. Patient was due for revision of AV fistula with Luis Eduardo Munguia on Friday.  She states that doctors told her that her renal failure was due to a combination of obesity diabetes and hypertension. The patient currently denies symptoms of hyperkalemia like muscular weakness, constipation or confusion. Patient is 37 year old female with past medical history of end stage renal failure on dialysis T/Th/S, hypertension, diabetes, questionable CHF  who comes to [facility] complaining of shortness of breath. The patient was discharged from [facility] with diagnosis of new onset end stage renal disease in February. The patient was supposed to get hemodialysis in the morning but instead came to the hospital because of the shortness of breath.  The patient has some baseline shortness of breath for the past couple of months, she cannot walk 10 yards without feeling short of breath, however her shortness of breath worsened today.  Denies orthopnea, denies paroxysmal nocturnal dyspnea, sleeps with 4 pillows at night for comfort.  She states that she gained weight over the last week but doesn't know how much. The shortness of breath is accompanied by chest pain which is achy, 6/10, non-radiating, intermittent. Excacerbated by taking a deep breath or coughing. It started after she was having a lot dry heaving on Saturday. Denies history of CAD, denies diaphoresis.  The shortness of breath and chest pain is accompanied non-productive cough for the past couple of weeks.  Denies fevers, chills, sick contacts or recent travel. The patient denies calf pain, denies previous history of clots or hemoptysis.   The patient is hemodialysis receiving dialysis through a port and has only been recently diagnosed with renal failure. Patient was due for revision of AV fistula with Luis Eduardo Munguia on Friday.  She states that doctors told her that her renal failure was due to a combination of obesity diabetes and hypertension. The patient currently denies symptoms of hyperkalemia like muscular weakness, constipation or confusion.

## 2017-04-04 NOTE — H&P ADULT - PROBLEM SELECTOR PLAN 8
- most likely due to end stage renal disease   - previous iron studies showed low transferrin, normal ferritin  - will repeat iron studies

## 2017-04-04 NOTE — H&P ADULT - ASSESSMENT
cristi is 37 year old female with past medical history of end stage renal failure on dialysis T/Th/S, hypertension, diabetes  who comes to [facility] complaining of shortness of breath.  X-ray of the chest shows increasing pleural effusion on the left side as well as some mild pleural effusion on the right. Cause of pleural effusion is most likely fluid retention due to renal failure. Patient will have dialysis in the AM. Will consult CT surgery.

## 2017-04-04 NOTE — ED ADULT NURSE NOTE - OBJECTIVE STATEMENT
Pt. presents to ED with SOB. Pt. has been intermittently coughing for past three days. states rib pain. since coming to ED SOB has resolved. Pt. was to receive dialysis today but was unable to do so due to ED visit. Pt. VSS on bedside cardiac monitor, bilateral lower extremity noted.

## 2017-04-04 NOTE — ED PROVIDER NOTE - NS ED MD SCRIBE ATTENDING SCRIBE SECTIONS
PAST MEDICAL/SURGICAL/SOCIAL HISTORY/HIV/HISTORY OF PRESENT ILLNESS/REVIEW OF SYSTEMS/VITAL SIGNS( Pullset)/DISPOSITION/PHYSICAL EXAM

## 2017-04-04 NOTE — H&P ADULT - PROBLEM SELECTOR PLAN 7
- O2 via nasal canula to maintain O2 sat > 92%  - dialysis in the AM  - CT surgey consult for possible thoracocentesis

## 2017-04-04 NOTE — H&P ADULT - NSHPPHYSICALEXAM_GEN_ALL_CORE
Physical Exam: Constitutional: NAD, well developed, well groomed   HEENT: normocephalic, atraumatic, PERRLA, EOMI, Normal Hearing, moist mucous membranes  Neck: No lymphadenopathy, No JVD  Back: no cva tendereness, no paraspinal muscle tenderness   Respiratory: decreased air entry bilaterally into the lung bases extending to the mid portion of the left lung and to the lower third of the right lung, apex of the lung clear to auscultation bilaterally  Chest: hemodialysis port in place on the right side without erythema, exudate or edema   Cardiovascular:  regular rate, regular rhythm, no murmurs, no gallops   Gastrointestinal: BS+, obese, non-tender, no guarding, no rebound, no masses appreciated  Extremities: AV graft fisula in place over the left arm, palpable thrill present, no edema, no calf tenderness, no cyanosis.  Vascular: 2+ DP/PT pulses, positive popliteal pulses   Neurological: alert and oriented x 3, normal gait  Psychiatric: normal affect Physical Exam: Constitutional: NAD, well developed, well groomed   HEENT: normocephalic, atraumatic, PERRLA, EOMI, Normal Hearing, moist mucous membranes  Neck: No lymphadenopathy, No JVD  Back: no cva tendereness, no paraspinal muscle tenderness   Respiratory: decreased air entry bilaterally into the lung bases extending to the mid portion of the left lung and to the lower third of the right lung, apex of the lung clear to auscultation bilaterally  Chest: hemodialysis port in place on the right side without erythema, exudate or edema   Cardiovascular:  regular rate, regular rhythm, no murmurs, no gallops   Gastrointestinal: BS+, obese, non-tender, no guarding, no rebound, no masses appreciated  Extremities: AV graft fisula in place over the left arm, no palpable thrill present, no edema, no calf tenderness, no cyanosis.  Vascular: 2+ DP/PT pulses, positive popliteal pulses   Neurological: alert and oriented x 3, normal gait  Psychiatric: normal affect Physical Exam: Constitutional: NAD, well developed, well groomed   HEENT: normocephalic, atraumatic, PERRLA, EOMI, Normal Hearing, moist mucous membranes  Neck: No lymphadenopathy, No JVD  Back: no cva tendereness, no paraspinal muscle tenderness   Respiratory: decreased air entry bilaterally into the lung bases extending to the mid portion of the left lung and to the lower third of the right lung, apex of the lung clear to auscultation bilaterally, decreased percussion on right side, decreased tactile fermitus on right lung base   Chest: hemodialysis port in place on the right side without erythema, exudate or edema   Cardiovascular:  regular rate, regular rhythm, no murmurs, no gallops   Gastrointestinal: BS+, obese, non-tender, no guarding, no rebound, no masses appreciated  Extremities: AV graft fisula in place over the left arm, no palpable thrill present, no edema, no calf tenderness, no cyanosis.  Vascular: 2+ DP/PT pulses, positive popliteal pulses   Neurological: alert and oriented x 3, normal gait  Psychiatric: normal affect

## 2017-04-04 NOTE — H&P ADULT - PROBLEM SELECTOR PLAN 5
- most likely due to end stage renal disease   - previous iron studies showed low transferrin, normal ferritin  - will repeat iron studies - most likely due to renal failure  - mild elevation

## 2017-04-04 NOTE — H&P ADULT - PROBLEM SELECTOR PLAN 2
- continue sevelamer, continue calcium acetate   - continue epogen, continue IV iron  - dialysis in the AM

## 2017-04-04 NOTE — ED ADULT NURSE REASSESSMENT NOTE - NS ED NURSE REASSESS COMMENT FT1
Attempted to start IV x2. second RN Jose Manuel attempted x2 without success. MD Morales made aware. Third RN to attempt

## 2017-04-04 NOTE — H&P ADULT - NSHPLABSRESULTS_GEN_ALL_CORE
7.5    13.4  )-----------( 339      ( 05 Apr 2017 00:13 )             24.0    Vitals:  T: 37.2   HR: 101   O2 sat: 82 on room air, 97% on 3L of O2,  RR: 24 7.5    13.4  )-----------( 339      ( 05 Apr 2017 00:13 )             24.0    Vitals:  T: 37.2   HR: 101   O2 sat: 82 on room air, 97% on 3L of O2,  RR: 24      ecg: regular rate, regular rhythm, no st elevation, no st depression, no abnormal q waves, t wave flatenning in v5/v6, I and aVL

## 2017-04-04 NOTE — H&P ADULT - FAMILY HISTORY
<<-----Click on this checkbox to enter Family History Family history of diabetes mellitus (DM)     Mother  Still living? Unknown  Family history of renal failure, Age at diagnosis: Age Unknown

## 2017-04-04 NOTE — H&P ADULT - PROBLEM SELECTOR PLAN 6
- heparin 5000 BID - lantus 8 units q.h.s. for basal coverage  - insulin sliding scale   - hemoglobin a1c

## 2017-04-04 NOTE — H&P ADULT - NSHPREVIEWOFSYSTEMS_GEN_ALL_CORE
negative for calf pain, negative for hemoptysis  negative for fever, chills, sick contacts  negative for abdominal pain, diahrrhea   positive for nausea, shortness of breath  negative for muscular weakness, constipation

## 2017-04-04 NOTE — ED PROVIDER NOTE - CARE PLAN
Principal Discharge DX:	Pleural effusion  Secondary Diagnosis:	ESRD (end stage renal disease) on dialysis

## 2017-04-04 NOTE — H&P ADULT - PROBLEM SELECTOR PLAN 3
- continue metoprolol 100 xl, continue amlodipine 10 mg q.d.   - continue valsartan 40 mg - continue metoprolol 100 xl, continue amlodipine 10 mg q.d.   - continue valsartan 40 mg  - hydralazine 10 mg IV push PRN > 160 mmHg

## 2017-04-04 NOTE — H&P ADULT - PROBLEM SELECTOR PLAN 1
- admit to monitored +  under Dr. Do  - ambulate as tolerated  - diet DASH/TLC + consistent carb + renal restrictions + 1200 ml fluid restrict   - CBC/CMP to be repeated in the AM  - continue patient's home medicaitons   - furosemide 40 mg IV push once  - emergency dialysis in the AM   - if pleural effusion doesn't resolve with dialysis or patient becomes more symptomatic CT surgery consult  - renal consult - admit to monitored +  under Dr. Do  - ambulate as tolerated  - diet DASH/TLC + consistent carb + renal restrictions + 1200 ml fluid restrict   - CBC/CMP to be repeated in the AM  - continue patient's home medications    - furosemide 40 mg IV push   - emergency dialysis in the AM   - if pleural effusion doesn't resolve with dialysis or patient becomes more symptomatic CT surgery consult  - renal consult - admit to monitored +  under Dr. Do  - ambulate as tolerated  - diet DASH/TLC + consistent carb + renal restrictions + 1200 ml fluid restrict   - CBC/CMP to be repeated in the AM  - continue patient's home medications    - furosemide 80 mg IV push   - emergency dialysis in the AM   - if pleural effusion doesn't resolve with dialysis or patient becomes more symptomatic CT surgery consult  - renal consult - admit to monitored +  under Dr. Do  - ambulate as tolerated  - diet DASH/TLC + consistent carb + renal restrictions + 1200 ml fluid restrict   - CBC/CMP to be repeated in the AM  - continue patient's home medications    - furosemide 80 mg IV push   - emergency dialysis in the AM   - if pleural effusion doesn't resolve with dialysis or patient becomes more symptomatic CT surgery consult  - pleural fluid analysis  - renal consult

## 2017-04-05 DIAGNOSIS — I10 ESSENTIAL (PRIMARY) HYPERTENSION: ICD-10-CM

## 2017-04-05 DIAGNOSIS — R94.31 ABNORMAL ELECTROCARDIOGRAM [ECG] [EKG]: ICD-10-CM

## 2017-04-05 DIAGNOSIS — E11.9 TYPE 2 DIABETES MELLITUS WITHOUT COMPLICATIONS: ICD-10-CM

## 2017-04-05 DIAGNOSIS — J90 PLEURAL EFFUSION, NOT ELSEWHERE CLASSIFIED: ICD-10-CM

## 2017-04-05 DIAGNOSIS — R07.89 OTHER CHEST PAIN: ICD-10-CM

## 2017-04-05 DIAGNOSIS — R06.00 DYSPNEA, UNSPECIFIED: ICD-10-CM

## 2017-04-05 DIAGNOSIS — E11.29 TYPE 2 DIABETES MELLITUS WITH OTHER DIABETIC KIDNEY COMPLICATION: ICD-10-CM

## 2017-04-05 DIAGNOSIS — D50.9 IRON DEFICIENCY ANEMIA, UNSPECIFIED: ICD-10-CM

## 2017-04-05 DIAGNOSIS — Z41.8 ENCOUNTER FOR OTHER PROCEDURES FOR PURPOSES OTHER THAN REMEDYING HEALTH STATE: ICD-10-CM

## 2017-04-05 DIAGNOSIS — R79.1 ABNORMAL COAGULATION PROFILE: ICD-10-CM

## 2017-04-05 DIAGNOSIS — N18.6 END STAGE RENAL DISEASE: ICD-10-CM

## 2017-04-05 LAB
ALBUMIN FLD-MCNC: 1.8 G/DL — SIGNIFICANT CHANGE UP
ALBUMIN SERPL ELPH-MCNC: 3.4 G/DL — SIGNIFICANT CHANGE UP (ref 3.3–5.2)
ALBUMIN SERPL ELPH-MCNC: 3.7 G/DL — SIGNIFICANT CHANGE UP (ref 3.3–5.2)
ALP SERPL-CCNC: 156 U/L — HIGH (ref 40–120)
ALP SERPL-CCNC: 164 U/L — HIGH (ref 40–120)
ALT FLD-CCNC: 12 U/L — SIGNIFICANT CHANGE UP
ALT FLD-CCNC: 14 U/L — SIGNIFICANT CHANGE UP
ANION GAP SERPL CALC-SCNC: 16 MMOL/L — SIGNIFICANT CHANGE UP (ref 5–17)
ANION GAP SERPL CALC-SCNC: 18 MMOL/L — HIGH (ref 5–17)
AST SERPL-CCNC: 10 U/L — SIGNIFICANT CHANGE UP
AST SERPL-CCNC: 10 U/L — SIGNIFICANT CHANGE UP
B PERT IGG+IGM PNL SER: ABNORMAL
BASOPHILS # BLD AUTO: 0 K/UL — SIGNIFICANT CHANGE UP (ref 0–0.2)
BASOPHILS NFR BLD AUTO: 0.2 % — SIGNIFICANT CHANGE UP (ref 0–2)
BILIRUB SERPL-MCNC: 0.6 MG/DL — SIGNIFICANT CHANGE UP (ref 0.4–2)
BILIRUB SERPL-MCNC: 0.7 MG/DL — SIGNIFICANT CHANGE UP (ref 0.4–2)
BUN SERPL-MCNC: 64 MG/DL — HIGH (ref 8–20)
BUN SERPL-MCNC: 68 MG/DL — HIGH (ref 8–20)
CALCIUM SERPL-MCNC: 9 MG/DL — SIGNIFICANT CHANGE UP (ref 8.6–10.2)
CALCIUM SERPL-MCNC: 9.2 MG/DL — SIGNIFICANT CHANGE UP (ref 8.6–10.2)
CHLORIDE SERPL-SCNC: 92 MMOL/L — LOW (ref 98–107)
CHLORIDE SERPL-SCNC: 94 MMOL/L — LOW (ref 98–107)
CK SERPL-CCNC: 41 U/L — SIGNIFICANT CHANGE UP (ref 25–170)
CO2 SERPL-SCNC: 26 MMOL/L — SIGNIFICANT CHANGE UP (ref 22–29)
CO2 SERPL-SCNC: 27 MMOL/L — SIGNIFICANT CHANGE UP (ref 22–29)
COLOR FLD: YELLOW
CREAT SERPL-MCNC: 8.44 MG/DL — HIGH (ref 0.5–1.3)
CREAT SERPL-MCNC: 8.98 MG/DL — HIGH (ref 0.5–1.3)
EOSINOPHIL # BLD AUTO: 0.3 K/UL — SIGNIFICANT CHANGE UP (ref 0–0.5)
EOSINOPHIL NFR BLD AUTO: 2.5 % — SIGNIFICANT CHANGE UP (ref 0–6)
FLUID INTAKE SUBSTANCE CLASS: SIGNIFICANT CHANGE UP
FLUID SEGMENTED GRANULOCYTES: 92 % — SIGNIFICANT CHANGE UP
GLUCOSE FLD-MCNC: 208 MG/DL — SIGNIFICANT CHANGE UP
GLUCOSE SERPL-MCNC: 212 MG/DL — HIGH (ref 70–115)
GLUCOSE SERPL-MCNC: 216 MG/DL — HIGH (ref 70–115)
GRAM STN FLD: SIGNIFICANT CHANGE UP
HBA1C BLD-MCNC: 7.3 % — HIGH (ref 4–5.6)
HCT VFR BLD CALC: 24 % — LOW (ref 37–47)
HCT VFR BLD CALC: 24.2 % — LOW (ref 37–47)
HGB BLD-MCNC: 7.4 G/DL — LOW (ref 12–16)
HGB BLD-MCNC: 7.5 G/DL — LOW (ref 12–16)
INR BLD: 1.25 RATIO — HIGH (ref 0.88–1.16)
LYMPHOCYTES # BLD AUTO: 15.2 % — LOW (ref 20–55)
LYMPHOCYTES # BLD AUTO: 2 K/UL — SIGNIFICANT CHANGE UP (ref 1–4.8)
LYMPHOCYTES # FLD: 2 % — SIGNIFICANT CHANGE UP
MCHC RBC-ENTMCNC: 24 PG — LOW (ref 27–31)
MCHC RBC-ENTMCNC: 24.6 PG — LOW (ref 27–31)
MCHC RBC-ENTMCNC: 30.6 G/DL — LOW (ref 32–36)
MCHC RBC-ENTMCNC: 31.3 G/DL — LOW (ref 32–36)
MCV RBC AUTO: 78.6 FL — LOW (ref 81–99)
MCV RBC AUTO: 78.7 FL — LOW (ref 81–99)
MESOTHL CELL # FLD: 3 % — SIGNIFICANT CHANGE UP
MONOCYTES # BLD AUTO: 1.2 K/UL — HIGH (ref 0–0.8)
MONOCYTES NFR BLD AUTO: 8.8 % — SIGNIFICANT CHANGE UP (ref 3–10)
MONOS+MACROS # FLD: 3 % — SIGNIFICANT CHANGE UP
NEUTROPHILS # BLD AUTO: 9.5 K/UL — HIGH (ref 1.8–8)
NEUTROPHILS NFR BLD AUTO: 72.6 % — SIGNIFICANT CHANGE UP (ref 37–73)
NT-PROBNP SERPL-SCNC: HIGH PG/ML (ref 0–300)
PH FLD: 7 — SIGNIFICANT CHANGE UP
PLATELET # BLD AUTO: 339 K/UL — SIGNIFICANT CHANGE UP (ref 150–400)
PLATELET # BLD AUTO: 393 K/UL — SIGNIFICANT CHANGE UP (ref 150–400)
POTASSIUM SERPL-MCNC: 4 MMOL/L — SIGNIFICANT CHANGE UP (ref 3.5–5.3)
POTASSIUM SERPL-MCNC: 4 MMOL/L — SIGNIFICANT CHANGE UP (ref 3.5–5.3)
POTASSIUM SERPL-SCNC: 4 MMOL/L — SIGNIFICANT CHANGE UP (ref 3.5–5.3)
POTASSIUM SERPL-SCNC: 4 MMOL/L — SIGNIFICANT CHANGE UP (ref 3.5–5.3)
PROT FLD-MCNC: 4.1 G/DL — SIGNIFICANT CHANGE UP
PROT SERPL-MCNC: 8.2 G/DL — SIGNIFICANT CHANGE UP (ref 6.6–8.7)
PROT SERPL-MCNC: 8.3 G/DL — SIGNIFICANT CHANGE UP (ref 6.6–8.7)
PROTHROM AB SERPL-ACNC: 13.8 SEC — HIGH (ref 9.8–12.7)
RBC # BLD: 3.05 M/UL — LOW (ref 4.4–5.2)
RBC # BLD: 3.08 M/UL — LOW (ref 4.4–5.2)
RBC # FLD: 17 % — HIGH (ref 11–15.6)
RBC # FLD: 17.1 % — HIGH (ref 11–15.6)
RCV VOL RI: 4500 /UL — HIGH (ref 0–5)
SODIUM SERPL-SCNC: 136 MMOL/L — SIGNIFICANT CHANGE UP (ref 135–145)
SODIUM SERPL-SCNC: 137 MMOL/L — SIGNIFICANT CHANGE UP (ref 135–145)
SPECIMEN SOURCE: SIGNIFICANT CHANGE UP
TOTAL NUCLEATED CELL COUNT, BODY FLUID: 3430 /UL — HIGH (ref 0–5)
TROPONIN T SERPL-MCNC: 0.14 NG/ML — HIGH (ref 0–0.06)
TROPONIN T SERPL-MCNC: 0.15 NG/ML — HIGH (ref 0–0.06)
TUBE TYPE: SIGNIFICANT CHANGE UP
WBC # BLD: 13.1 K/UL — HIGH (ref 4.8–10.8)
WBC # BLD: 13.4 K/UL — HIGH (ref 4.8–10.8)
WBC # FLD AUTO: 13.1 K/UL — HIGH (ref 4.8–10.8)
WBC # FLD AUTO: 13.4 K/UL — HIGH (ref 4.8–10.8)

## 2017-04-05 PROCEDURE — 99223 1ST HOSP IP/OBS HIGH 75: CPT

## 2017-04-05 PROCEDURE — 99223 1ST HOSP IP/OBS HIGH 75: CPT | Mod: 25

## 2017-04-05 PROCEDURE — 71250 CT THORAX DX C-: CPT | Mod: 26

## 2017-04-05 PROCEDURE — 71010: CPT | Mod: 26

## 2017-04-05 PROCEDURE — 93306 TTE W/DOPPLER COMPLETE: CPT | Mod: 26

## 2017-04-05 PROCEDURE — 93010 ELECTROCARDIOGRAM REPORT: CPT

## 2017-04-05 RX ORDER — VANCOMYCIN HCL 1 G
500 VIAL (EA) INTRAVENOUS ONCE
Qty: 0 | Refills: 0 | Status: COMPLETED | OUTPATIENT
Start: 2017-04-05 | End: 2017-04-05

## 2017-04-05 RX ORDER — AZITHROMYCIN 500 MG/1
TABLET, FILM COATED ORAL
Qty: 0 | Refills: 0 | Status: DISCONTINUED | OUTPATIENT
Start: 2017-04-05 | End: 2017-04-05

## 2017-04-05 RX ORDER — DEXTROSE 50 % IN WATER 50 %
25 SYRINGE (ML) INTRAVENOUS ONCE
Qty: 0 | Refills: 0 | Status: DISCONTINUED | OUTPATIENT
Start: 2017-04-05 | End: 2017-04-13

## 2017-04-05 RX ORDER — TUBERCULIN PURIFIED PROTEIN DERIVATIVE 5 [IU]/.1ML
5 INJECTION, SOLUTION INTRADERMAL ONCE
Qty: 0 | Refills: 0 | Status: COMPLETED | OUTPATIENT
Start: 2017-04-05 | End: 2017-04-05

## 2017-04-05 RX ORDER — AMLODIPINE BESYLATE 2.5 MG/1
10 TABLET ORAL DAILY
Qty: 0 | Refills: 0 | Status: DISCONTINUED | OUTPATIENT
Start: 2017-04-05 | End: 2017-04-05

## 2017-04-05 RX ORDER — CEFTRIAXONE 500 MG/1
INJECTION, POWDER, FOR SOLUTION INTRAMUSCULAR; INTRAVENOUS
Qty: 0 | Refills: 0 | Status: DISCONTINUED | OUTPATIENT
Start: 2017-04-05 | End: 2017-04-05

## 2017-04-05 RX ORDER — VALSARTAN 80 MG/1
40 TABLET ORAL DAILY
Qty: 0 | Refills: 0 | Status: DISCONTINUED | OUTPATIENT
Start: 2017-04-05 | End: 2017-04-10

## 2017-04-05 RX ORDER — ERYTHROPOIETIN 10000 [IU]/ML
10000 INJECTION, SOLUTION INTRAVENOUS; SUBCUTANEOUS
Qty: 0 | Refills: 0 | Status: DISCONTINUED | OUTPATIENT
Start: 2017-04-05 | End: 2017-04-06

## 2017-04-05 RX ORDER — ASPIRIN/CALCIUM CARB/MAGNESIUM 324 MG
325 TABLET ORAL ONCE
Qty: 0 | Refills: 0 | Status: DISCONTINUED | OUTPATIENT
Start: 2017-04-05 | End: 2017-04-05

## 2017-04-05 RX ORDER — PIPERACILLIN AND TAZOBACTAM 4; .5 G/20ML; G/20ML
2.25 INJECTION, POWDER, LYOPHILIZED, FOR SOLUTION INTRAVENOUS EVERY 12 HOURS
Qty: 0 | Refills: 0 | Status: DISCONTINUED | OUTPATIENT
Start: 2017-04-06 | End: 2017-04-08

## 2017-04-05 RX ORDER — ATORVASTATIN CALCIUM 80 MG/1
40 TABLET, FILM COATED ORAL AT BEDTIME
Qty: 0 | Refills: 0 | Status: DISCONTINUED | OUTPATIENT
Start: 2017-04-05 | End: 2017-04-05

## 2017-04-05 RX ORDER — CEFTRIAXONE 500 MG/1
1 INJECTION, POWDER, FOR SOLUTION INTRAMUSCULAR; INTRAVENOUS ONCE
Qty: 0 | Refills: 0 | Status: DISCONTINUED | OUTPATIENT
Start: 2017-04-05 | End: 2017-04-05

## 2017-04-05 RX ORDER — FUROSEMIDE 40 MG
80 TABLET ORAL ONCE
Qty: 0 | Refills: 0 | Status: COMPLETED | OUTPATIENT
Start: 2017-04-05 | End: 2017-04-05

## 2017-04-05 RX ORDER — INSULIN GLARGINE 100 [IU]/ML
8 INJECTION, SOLUTION SUBCUTANEOUS AT BEDTIME
Qty: 0 | Refills: 0 | Status: DISCONTINUED | OUTPATIENT
Start: 2017-04-05 | End: 2017-04-05

## 2017-04-05 RX ORDER — PIPERACILLIN AND TAZOBACTAM 4; .5 G/20ML; G/20ML
INJECTION, POWDER, LYOPHILIZED, FOR SOLUTION INTRAVENOUS
Qty: 0 | Refills: 0 | Status: DISCONTINUED | OUTPATIENT
Start: 2017-04-05 | End: 2017-04-08

## 2017-04-05 RX ORDER — PIPERACILLIN AND TAZOBACTAM 4; .5 G/20ML; G/20ML
2.25 INJECTION, POWDER, LYOPHILIZED, FOR SOLUTION INTRAVENOUS ONCE
Qty: 0 | Refills: 0 | Status: COMPLETED | OUTPATIENT
Start: 2017-04-05 | End: 2017-04-05

## 2017-04-05 RX ORDER — MORPHINE SULFATE 50 MG/1
2 CAPSULE, EXTENDED RELEASE ORAL ONCE
Qty: 0 | Refills: 0 | Status: DISCONTINUED | OUTPATIENT
Start: 2017-04-05 | End: 2017-04-05

## 2017-04-05 RX ORDER — VANCOMYCIN HCL 1 G
VIAL (EA) INTRAVENOUS
Qty: 0 | Refills: 0 | Status: DISCONTINUED | OUTPATIENT
Start: 2017-04-05 | End: 2017-04-06

## 2017-04-05 RX ORDER — INSULIN LISPRO 100/ML
VIAL (ML) SUBCUTANEOUS
Qty: 0 | Refills: 0 | Status: DISCONTINUED | OUTPATIENT
Start: 2017-04-05 | End: 2017-04-13

## 2017-04-05 RX ORDER — METOPROLOL TARTRATE 50 MG
100 TABLET ORAL DAILY
Qty: 0 | Refills: 0 | Status: DISCONTINUED | OUTPATIENT
Start: 2017-04-05 | End: 2017-04-05

## 2017-04-05 RX ORDER — SODIUM CHLORIDE 9 MG/ML
1000 INJECTION, SOLUTION INTRAVENOUS
Qty: 0 | Refills: 0 | Status: DISCONTINUED | OUTPATIENT
Start: 2017-04-05 | End: 2017-04-13

## 2017-04-05 RX ORDER — CALCIUM ACETATE 667 MG
667 TABLET ORAL
Qty: 0 | Refills: 0 | Status: DISCONTINUED | OUTPATIENT
Start: 2017-04-05 | End: 2017-04-13

## 2017-04-05 RX ORDER — HEPARIN SODIUM 5000 [USP'U]/ML
5000 INJECTION INTRAVENOUS; SUBCUTANEOUS EVERY 12 HOURS
Qty: 0 | Refills: 0 | Status: DISCONTINUED | OUTPATIENT
Start: 2017-04-05 | End: 2017-04-13

## 2017-04-05 RX ORDER — DEXTROSE 50 % IN WATER 50 %
1 SYRINGE (ML) INTRAVENOUS ONCE
Qty: 0 | Refills: 0 | Status: DISCONTINUED | OUTPATIENT
Start: 2017-04-05 | End: 2017-04-13

## 2017-04-05 RX ORDER — AZITHROMYCIN 500 MG/1
500 TABLET, FILM COATED ORAL ONCE
Qty: 0 | Refills: 0 | Status: COMPLETED | OUTPATIENT
Start: 2017-04-05 | End: 2017-04-05

## 2017-04-05 RX ORDER — PANTOPRAZOLE SODIUM 20 MG/1
40 TABLET, DELAYED RELEASE ORAL
Qty: 0 | Refills: 0 | Status: DISCONTINUED | OUTPATIENT
Start: 2017-04-05 | End: 2017-04-13

## 2017-04-05 RX ORDER — GLUCAGON INJECTION, SOLUTION 0.5 MG/.1ML
1 INJECTION, SOLUTION SUBCUTANEOUS ONCE
Qty: 0 | Refills: 0 | Status: DISCONTINUED | OUTPATIENT
Start: 2017-04-05 | End: 2017-04-13

## 2017-04-05 RX ORDER — METOPROLOL TARTRATE 50 MG
50 TABLET ORAL
Qty: 0 | Refills: 0 | Status: DISCONTINUED | OUTPATIENT
Start: 2017-04-05 | End: 2017-04-13

## 2017-04-05 RX ORDER — DEXTROSE 50 % IN WATER 50 %
12.5 SYRINGE (ML) INTRAVENOUS ONCE
Qty: 0 | Refills: 0 | Status: DISCONTINUED | OUTPATIENT
Start: 2017-04-05 | End: 2017-04-13

## 2017-04-05 RX ORDER — ASPIRIN/CALCIUM CARB/MAGNESIUM 324 MG
81 TABLET ORAL DAILY
Qty: 0 | Refills: 0 | Status: DISCONTINUED | OUTPATIENT
Start: 2017-04-05 | End: 2017-04-13

## 2017-04-05 RX ORDER — CALCITRIOL 0.5 UG/1
0.5 CAPSULE ORAL DAILY
Qty: 0 | Refills: 0 | Status: DISCONTINUED | OUTPATIENT
Start: 2017-04-05 | End: 2017-04-13

## 2017-04-05 RX ORDER — AMLODIPINE BESYLATE 2.5 MG/1
10 TABLET ORAL DAILY
Qty: 0 | Refills: 0 | Status: DISCONTINUED | OUTPATIENT
Start: 2017-04-05 | End: 2017-04-08

## 2017-04-05 RX ADMIN — HEPARIN SODIUM 5000 UNIT(S): 5000 INJECTION INTRAVENOUS; SUBCUTANEOUS at 17:31

## 2017-04-05 RX ADMIN — Medication: at 08:59

## 2017-04-05 RX ADMIN — TUBERCULIN PURIFIED PROTEIN DERIVATIVE 5 UNIT(S): 5 INJECTION, SOLUTION INTRADERMAL at 17:45

## 2017-04-05 RX ADMIN — AZITHROMYCIN 255 MILLIGRAM(S): 500 TABLET, FILM COATED ORAL at 17:47

## 2017-04-05 RX ADMIN — AMLODIPINE BESYLATE 10 MILLIGRAM(S): 2.5 TABLET ORAL at 06:49

## 2017-04-05 RX ADMIN — MORPHINE SULFATE 2 MILLIGRAM(S): 50 CAPSULE, EXTENDED RELEASE ORAL at 07:09

## 2017-04-05 RX ADMIN — MORPHINE SULFATE 2 MILLIGRAM(S): 50 CAPSULE, EXTENDED RELEASE ORAL at 06:49

## 2017-04-05 RX ADMIN — VALSARTAN 40 MILLIGRAM(S): 80 TABLET ORAL at 15:05

## 2017-04-05 RX ADMIN — SODIUM CHLORIDE 3 MILLILITER(S): 9 INJECTION INTRAMUSCULAR; INTRAVENOUS; SUBCUTANEOUS at 05:57

## 2017-04-05 RX ADMIN — SODIUM CHLORIDE 3 MILLILITER(S): 9 INJECTION INTRAMUSCULAR; INTRAVENOUS; SUBCUTANEOUS at 00:22

## 2017-04-05 RX ADMIN — HEPARIN SODIUM 5000 UNIT(S): 5000 INJECTION INTRAVENOUS; SUBCUTANEOUS at 06:49

## 2017-04-05 RX ADMIN — MORPHINE SULFATE 2 MILLIGRAM(S): 50 CAPSULE, EXTENDED RELEASE ORAL at 12:51

## 2017-04-05 RX ADMIN — MORPHINE SULFATE 2 MILLIGRAM(S): 50 CAPSULE, EXTENDED RELEASE ORAL at 13:00

## 2017-04-05 RX ADMIN — Medication 50 MILLIGRAM(S): at 17:37

## 2017-04-05 RX ADMIN — PIPERACILLIN AND TAZOBACTAM 200 GRAM(S): 4; .5 INJECTION, POWDER, LYOPHILIZED, FOR SOLUTION INTRAVENOUS at 23:22

## 2017-04-05 RX ADMIN — SODIUM CHLORIDE 3 MILLILITER(S): 9 INJECTION INTRAMUSCULAR; INTRAVENOUS; SUBCUTANEOUS at 10:52

## 2017-04-05 RX ADMIN — CALCITRIOL 0.5 MICROGRAM(S): 0.5 CAPSULE ORAL at 10:53

## 2017-04-05 RX ADMIN — SODIUM CHLORIDE 3 MILLILITER(S): 9 INJECTION INTRAMUSCULAR; INTRAVENOUS; SUBCUTANEOUS at 23:22

## 2017-04-05 RX ADMIN — Medication 80 MILLIGRAM(S): at 02:23

## 2017-04-05 RX ADMIN — Medication: at 17:25

## 2017-04-05 RX ADMIN — Medication 81 MILLIGRAM(S): at 10:54

## 2017-04-05 NOTE — PROGRESS NOTE ADULT - PROBLEM SELECTOR PLAN 2
- 800 cc of pleural fluid was drained, will be sent for cytology, gram stain, culture, LDH, albumin  - exudative vs. transudative   - repeat chest x-ray

## 2017-04-05 NOTE — CONSULT NOTE ADULT - SUBJECTIVE AND OBJECTIVE BOX
called for a pigtail consult .        History of Present Illness: 	  Patient is 37 year old female with past medical history of end stage renal failure on dialysis T/Th/S, hypertension, diabetes, questionable CHF  who comes to [facility] complaining of shortness of breath. The patient was discharged from [facility] with diagnosis of new onset end stage renal disease in February. The patient was supposed to get hemodialysis in the morning but instead came to the hospital because of the shortness of breath which began today.  The patient has some baseline shortness of breath for the past couple of months, she cannot walk 10 yards without feeling short of breath, however her shortness of breath worsened today.  Denies orthopnea, denies paroxysmal nocturnal dyspnea, sleeps with 4 pillows at night for comfort.  She states that she gained weight over the last week but doesn't know how much. The shortness of breath is accompanied by chest pain which is achy, 6/10, non-radiating, intermittent. Excacerbated by taking a deep breath or coughing. It started after she was having a lot dry heaving on Saturday. Denies history of CAD, denies diaphoresis.  The shortness of breath and chest pain is accompanied non-productive cough for the past couple of weeks.  Denies fevers, chills, sick contacts or recent travel. The patient denies calf pain, denies previous history of clots or hemoptysis.   The patient is hemodialysis receiving dialysis through a port and has only been recently diagnosed with renal failure. Patient was due for revision of AV fistula with Luis Eduardo Munguia on Friday.  She states that doctors told her that her renal failure was due to a combination of obesity diabetes and hypertension. The patient currently denies symptoms of hyperkalemia like muscular weakness, constipation or confusion.      Review of Systems:  Review of Systems: negative for calf pain, negative for hemoptysis negative for fever, chills, sick contacts negative for abdominal pain, diahrrhea  positive for nausea, shortness of breath negative for muscular weakness, constipation	      Allergies and Intolerances:        Allergies:  	No Known Allergies:     Home Medications:   * Incomplete Medication History as of 05-Apr-2017 00:13 documented in Prescription Writer  · 	iron sucrose 20 mg/mL intravenous solution: 2.5 milliliter(s) intravenous  as per Renal during HD, Last Dose Taken:    · 	ergocalciferol 50,000 intl units (1.25 mg) oral capsule: 1 cap(s) orally once a week  · 	epoetin raina: 30516 unit(s) intravenous 3 times a week  	as per renal, Last Dose Taken:    · 	sevelamer hydrochloride 800 mg oral tablet: 2 tab(s) orally 3 times a day (with meals), Last Dose Taken:    · 	pantoprazole 40 mg oral delayed release tablet: 1 tab(s) orally once a day (before a meal), Last Dose Taken:    · 	calcitriol 0.5 mcg oral capsule: 1 cap(s) orally once a day, Last Dose Taken:    · 	loratadine 10 mg oral tablet: 1 tab(s) orally once a day, Last Dose Taken:    · 	aspirin 81 mg oral delayed release tablet: 1 tab(s) orally once a day, Last Dose Taken:    · 	metoprolol succinate 100 mg oral tablet, extended release: 1 tab(s) orally once a day, Last Dose Taken:    · 	amLODIPine 10 mg oral tablet: 1 tab(s) orally once a day, Last Dose Taken:    · 	HumaLOG KwikPen 100 units/mL subcutaneous solution: 2 unit(s) subcutaneous 3 times a day (before meals) , 1 Unit(s) if Glucose 151 - 200 2 Unit(s) if Glucose 201 - 250 3 Unit(s) if Glucose 251 - 300 4 Unit(s) if Glucose 301 - 350 5 Unit(s) if Glucose 351 - 400 6 Unit(s) if Glucose Greater Than 400, Last Dose Taken:    · 	valsartan 40 mg oral tablet: 1 tab(s) orally once a day, Last Dose Taken:      . .    Patient History:   Past Medical History:  Diabetes    End stage renal disease    Hypertension.    Past Surgical History:  No significant past surgical history.    Family History:  <<-----Click on this checkbox to enter Family History . Family history of diabetes mellitus (DM)     Mother  Still living? Unknown  Family history of renal failure, Age at diagnosis: Age Unknown.    Social History:  Social History (marital status, living situation, occupation, tobacco use, alcohol and drug use, and sexual history): doesn't smoke, doesn't drink, doesn't use any kind of illegal drugs lives with mother disabled, doesn't work	    Tobacco Screening:  · Core Measure Site	No	  · Has the patient used tobacco in the past 30 days?	No	    Risk Assessment:   Present on Admission:  Deep Venous Thrombosis	no	  Pulmonary Embolus	no	  Urinary Catheter	no	  Central Venous Catheter/PICC Line	no	  Surgical Site Incision	no	  Pressure Ulcer(s)	no	    Heart Failure:  Does this patient have a history of or has been diagnosed with heart failure? no.    HIV Screen (per [facility] Department of Health, HIV screening must be offered to every individual between ages 13 and 64)	Offered and patient declined	      Physical Exam:  Physical Exam: Physical Exam: Constitutional: NAD, well developed, well groomed  HEENT: normocephalic, atraumatic, PERRLA, EOMI, Normal Hearing, moist mucous membranes Neck: No lymphadenopathy, No JVD Back: no cva tendereness, no paraspinal muscle tenderness  Respiratory: decreased air entry bilaterally into the lung bases extending to the mid portion of the left lung and to the lower third of the right lung, apex of the lung clear to auscultation bilaterally Chest: hemodialysis port in place on the right side without erythema, exudate or edema  Cardiovascular:  regular rate, regular rhythm, no murmurs, no gallops  Gastrointestinal: BS+, obese, non-tender, no guarding, no rebound, no masses appreciated Extremities: AV graft fisula in place over the left arm, no palpable thrill present, no edema, no calf tenderness, no cyanosis. Vascular: 2+ DP/PT pulses, positive popliteal pulses  Neurological: alert and oriented x 3, normal gait Psychiatric: normal affect	      Labs and Results:  Labs, Radiology, Cardiology, and Other Results: 7.5   13.4  )-----------( 339      ( 05 Apr 2017 00:13 )            24.0  Vitals: T: 37.2   HR: 101   O2 sat: 82 on room air, 97% on 3L of O2,  RR: 24   ecg: regular rate, regular rhythm, no st elevation, no st depression, no abnormal q waves, t wave flatenning in v5/v6, I and aVL

## 2017-04-05 NOTE — ED ADULT NURSE REASSESSMENT NOTE - NS ED NURSE REASSESS COMMENT FT1
Chest tube placed by CRISTAL Hinojosa @ 0545.  draining to suction with 700mL yellow fluid draining. Pt. anxious but breathing even with nonrebreather. VSS. no other signs of distress noted. Chest tube placed by NP Rodri @ 0545.  draining to suction with 700mL yellow fluid draining. Pt. anxious but breathing even with nonrebreather, medicated with 2mg morphine. VSS. no other signs of distress noted.

## 2017-04-05 NOTE — PROGRESS NOTE ADULT - ASSESSMENT
Patient is 37 year old female with past medical history of end stage renal failure on dialysis T/Th/S, hypertension, diabetes, questionable CHF  who comes to [facility] complaining of shortness of breath. Likely fluid overloaded due to renal failure.

## 2017-04-05 NOTE — PROGRESS NOTE ADULT - SUBJECTIVE AND OBJECTIVE BOX
Patient is a 37y old  Female who presents with a chief complaint of shortness of breath (04 Apr 2017 23:54)  Well known to vascular surgery. Has L AVF not matured and was preop for superficialization on 4/7 as outpt with Dr Yusuf  S/P CT insertion for large pleural effusion  Remains SOB    MEDICATIONS  (STANDING):  sodium chloride 0.9% lock flush 3milliLiter(s) IV Push every 8 hours  pantoprazole    Tablet 40milliGRAM(s) Oral before breakfast  insulin lispro (HumaLOG) corrective regimen sliding scale  SubCutaneous three times a day before meals  dextrose 5%. 1000milliLiter(s) IV Continuous <Continuous>  dextrose 50% Injectable 12.5Gram(s) IV Push once  dextrose 50% Injectable 25Gram(s) IV Push once  dextrose 50% Injectable 25Gram(s) IV Push once  aspirin  chewable 81milliGRAM(s) Oral daily  valsartan 40milliGRAM(s) Oral daily  heparin  Injectable 5000Unit(s) SubCutaneous every 12 hours  amLODIPine   Tablet 10milliGRAM(s) Oral daily  calcium acetate 667milliGRAM(s) Oral three times a day with meals  calcitriol   Capsule 0.5MICROGram(s) Oral daily  metoprolol 50milliGRAM(s) Oral two times a day  epoetin raina Injectable 77098Enqi(s) IV Push <User Schedule>    MEDICATIONS  (PRN):  dextrose Gel 1Dose(s) Oral once PRN Blood Glucose LESS THAN 70 milliGRAM(s)/deciliter  glucagon  Injectable 1milliGRAM(s) IntraMuscular once PRN Glucose LESS THAN 70 milligrams/deciliter    Allergies:No Known Allergies    PAST MEDICAL & SURGICAL HISTORY:  End stage renal disease  Hypertension  Diabetes  L AVF Feb 2, 2017      Vital Signs Last 24 Hrs  T(C): 36.9, Max: 37.4 (04-04 @ 19:42)  T(F): 98.4, Max: 99.3 (04-04 @ 19:42)  HR: 83 (81 - 101)  BP: 147/73 (131/82 - 168/78)  BP(mean): --  RR: 20 (20 - 24)  SpO2: 96% (82% - 100%)  I&O's Detail    I & Os for current day (as of 05 Apr 2017 08:53)  =============================================  IN:    Total IN: 0 ml  ---------------------------------------------  OUT:    Chest Tube: 800 ml    Total OUT: 800 ml  ---------------------------------------------  Total NET: -800 ml      Physical Exam:  Pulmonary: Notably SOB, tachypnic  Extremities: L AVF +bruit, +thrill. R IJ permcath insitu. Site CDI      LABS:                        7.4    13.1  )-----------( 393      ( 05 Apr 2017 07:09 )             24.2     04-05    137  |  94<L>  |  68.0<H>  ----------------------------<  212<H>  4.0   |  27.0  |  8.98<H>    Ca    9.0      05 Apr 2017 07:09  Phos  4.1     04-05    TPro  8.2  /  Alb  3.4  /  TBili  0.7  /  DBili  x   /  AST  10  /  ALT  12  /  AlkPhos  156<H>  04-05    PT/INR - ( 05 Apr 2017 00:48 )   PT: 13.8 sec;   INR: 1.25 ratio           CAPILLARY BLOOD GLUCOSE    Radiology and Additional Studies:    Assessment and Plan: 37y Female Pleural effusion, not elsewhere classified  L AVF with +bruit and thrill, deep and needs superficialization  Tent scheduled for 4/7  Will need medical and cardiac clearance if to be performed in house. No urgency as pt has functional HD access with permcath and has not had issues  Etiology of effusion unclear; CT placed by CT surgery  Discussed with Dr Yusuf

## 2017-04-05 NOTE — CONSULT NOTE ADULT - SUBJECTIVE AND OBJECTIVE BOX
Patient is a 37y old  Female who presents with a chief complaint of shortness of breath (04 Apr 2017 23:54)      HPI:Patient is 37 year old female with past medical history of end stage renal failure on dialysis T/Th/S, hypertension, diabetes, questionable CHF  who comes to [facility] complaining of shortness of breath. The patient was discharged from [facility] with diagnosis of new onset end stage renal disease in February. The patient was supposed to get hemodialysis in the morning but instead came to the hospital because of the shortness of breath.  The patient has some baseline shortness of breath for the past couple of months, she cannot walk 10 yards without feeling short of breath, however her shortness of breath worsened today.  Denies orthopnea, denies paroxysmal nocturnal dyspnea, sleeps with 4 pillows at night for comfort.  She states that she gained weight over the last week but doesn't know how much. The shortness of breath is accompanied by chest pain which is achy, 6/10, non-radiating, intermittent. Excacerbated by taking a deep breath or coughing. It started after she was having a lot dry heaving on Saturday. Denies history of CAD, denies diaphoresis.  The shortness of breath and chest pain is accompanied non-productive cough for the past couple of weeks.  Denies fevers, chills, sick contacts or recent travel. The patient denies calf pain, denies previous history of clots or hemoptysis.   The patient is hemodialysis receiving dialysis through a port and has only been recently diagnosed with renal failure. Patient was due for revision of AV fistula with Luis Eduardo Munguia on Friday.  She states that doctors told her that her renal failure was due to a combination of obesity diabetes and hypertension. The patient currently denies symptoms of hyperkalemia like muscular weakness, constipation or confusion. (04 Apr 2017 23:54)    S/p chest tube.  SOB resolved. Cardiology consulted for abnormal ECG and preoperative risk stratification for AVF revision. Uncomplicated AVF vascular surgery in February.  No hx of adverse reaction to anesthesia or abnormal bleeding.     PAST MEDICAL & SURGICAL HISTORY:  End stage renal disease  Hypertension  Diabetes  No significant past surgical history    Allergies  No Known Allergies  Intolerances    MEDICATIONS  (STANDING):  sodium chloride 0.9% lock flush 3milliLiter(s) IV Push every 8 hours  pantoprazole    Tablet 40milliGRAM(s) Oral before breakfast  insulin lispro (HumaLOG) corrective regimen sliding scale  SubCutaneous three times a day before meals  dextrose 5%. 1000milliLiter(s) IV Continuous <Continuous>  dextrose 50% Injectable 12.5Gram(s) IV Push once  dextrose 50% Injectable 25Gram(s) IV Push once  dextrose 50% Injectable 25Gram(s) IV Push once  aspirin  chewable 81milliGRAM(s) Oral daily  valsartan 40milliGRAM(s) Oral daily  heparin  Injectable 5000Unit(s) SubCutaneous every 12 hours  amLODIPine   Tablet 10milliGRAM(s) Oral daily  calcium acetate 667milliGRAM(s) Oral three times a day with meals  calcitriol   Capsule 0.5MICROGram(s) Oral daily  metoprolol 50milliGRAM(s) Oral two times a day  epoetin raina Injectable 24933Dvbz(s) IV Push <User Schedule>  cloNIDine 0.1milliGRAM(s) Oral once  cefTRIAXone   IVPB  IV Intermittent   azithromycin  IVPB  IV Intermittent   cefTRIAXone   IVPB 1Gram(s) IV Intermittent once    MEDICATIONS  (PRN):  dextrose Gel 1Dose(s) Oral once PRN Blood Glucose LESS THAN 70 milliGRAM(s)/deciliter  glucagon  Injectable 1milliGRAM(s) IntraMuscular once PRN Glucose LESS THAN 70 milligrams/deciliter      FAMILY HISTORY:  Family history of renal failure (Mother): mother had dialysis in her mid 60s  Family history of diabetes mellitus (DM)  no premature CAD  no SCD    SOCIAL HISTORY: former smoker (quit 4 months ago), no etoh, no illicit drug use    PREVIOUS DIAGNOSTIC TESTING:    ECHO FINDINGS: 1/2017  PHYSICIAN INTERPRETATION:  Left Ventricle: The left ventricular internal cavity size is normal.   There is mild concentric left ventricular hypertrophy.  Global LV systolic function was normal. Left ventricular ejection   fraction, by visual estimation, is 55 to 60%. Spectral Doppler shows   normal pattern of LV diastolic filling.  Right Ventricle: The right ventricular size is normal. RV systolic   function is normal. TV S' 0.1 m/s.  Left Atrium: Normal left atrial size.  Right Atrium: The right atrium is normal in size.  Pericardium: There is no evidence of pericardial effusion.  Mitral Valve: The mitral valve is normal in structure. No evidence of   mitral valve regurgitation is seen.  Tricuspid Valve: The tricuspid valve is normal in structure. Trivial   tricuspid regurgitation is visualized. Adequate TR velocity was not   obtained to accurately assess RVSP.  Aortic Valve: The aortic valve was not well visualized. No evidence of   aortic valve regurgitation is seen.  Pulmonic Valve: The pulmonic valve is normal. Trace pulmonic valve   regurgitation.  Aorta: The aortic root is normal in size and structure.  Pulmonary Artery: The pulmonary artery is of normal size and origin.  Venous: The pulmonary veins appear normal. The inferior vena cava was   dilated, with respiratory size variation greater than 50%.        Summary:   1. Left ventricular ejection fraction, by visual estimation, is 55 to   60%.   2. Normal global left ventricular systolic function.   3. There is mild concentric left ventricular hypertrophy.     MD Tisha Electronically signed on 1/28/2017 at 4:05:28 PM    REVIEW OF SYSTEMS:  CONSTITUTIONAL: No fever, weight loss, or fatigue; + weight gain  EYES: No eye pain, visual disturbances, or discharge  ENMT:  No difficulty hearing, tinnitus, vertigo; No sinus or throat pain  NECK: No pain or stiffness  RESPIRATORY: No wheezing, chills or hemoptysis; + SOB + cough  CARDIOVASCULAR: No chest pain, palpitations, passing out, dizziness, or leg swelling, No PND or orthopnea  GASTROINTESTINAL: No abdominal or epigastric pain. No nausea, vomiting, or hematemesis; No diarrhea or constipation. No melena or hematochezia.  GENITOURINARY: No dysuria, frequency, hematuria, or incontinence  NEUROLOGICAL: No headaches, memory loss, loss of strength, numbness, or tremors  SKIN: No itching, burning, rashes, or lesions   LYMPH Nodes: No enlarged glands  ENDOCRINE: No heat or cold intolerance; No hair loss  MUSCULOSKELETAL: No joint pain or swelling; No muscle, back, or extremity pain  PSYCHIATRIC: No depression, anxiety, mood swings, or difficulty sleeping  HEME/LYMPH: No easy bruising, or bleeding gums  ALLERY AND IMMUNOLOGIC: No hives or eczema	    Vital Signs Last 24 Hrs  T(C): 36.7, Max: 37.2 (04-05 @ 02:05)  T(F): 98, Max: 98.9 (04-05 @ 02:05)  HR: 100 (81 - 105)  BP: 140/65 (131/82 - 179/88)  BP(mean): --  RR: 20 (20 - 22)  SpO2: 96% (88% - 100%)  Daily     Daily   I&O's Detail  I & Os for 24h ending 05 Apr 2017 07:00  =============================================  IN:    Total IN: 0 ml  ---------------------------------------------  OUT:    Chest Tube: 800 ml    Total OUT: 800 ml  ---------------------------------------------  Total NET: -800 ml    I & Os for current day (as of 05 Apr 2017 19:54)  =============================================  IN:    Total IN: 0 ml  ---------------------------------------------  OUT:    Other: 3200 ml    Total OUT: 3200 ml  ---------------------------------------------  Total NET: -3200 ml      PHYSICAL EXAM:  Appearance: Normal, well nourished, NAD	  HEENT:   Normal oral mucosa, PERRL, EOMI, sclera non-icteric	  Lymphatic: No cervical lymphadenopathy  Cardiovascular: Normal S1 S2, No JVD, No cardiac murmurs, No carotid bruits, trace LE edema  Respiratory: right base crackles; + left sided chest tube in place  Psychiatry: A & O x 3, Mood & affect appropriate  Gastrointestinal:  Soft, Non-tender, + BS, no bruits	  Skin: No rashes, No ecchymoses, No cyanosis, Dry  Neurologic: Grossly non-focal with full strength in all four extremities  Extremities: Normal range of motion, No clubbing, cyanosis  Vascular: Peripheral pulses palpable 2+ bilaterally, warm, L brachiocephalic AVF with bruit    INTERPRETATION OF TELEMETRY:sinus    ECG (tracing reviewed by me):SR 88 bpm, nonspecific T wave abnormalities, mild QT prolongation (predominantly ST prolongation, suggestive of hypocalcemia)    LABS:                        7.4    13.1  )-----------( 393      ( 05 Apr 2017 07:09 )             24.2     04-05    137  |  94<L>  |  68.0<H>  ----------------------------<  212<H>  4.0   |  27.0  |  8.98<H>    Ca    9.0      05 Apr 2017 07:09  Phos  4.1     04-05    TPro  8.2  /  Alb  3.4  /  TBili  0.7  /  DBili  x   /  AST  10  /  ALT  12  /  AlkPhos  156<H>  04-05    CARDIAC MARKERS ( 05 Apr 2017 07:09 )  x     / 0.14 ng/mL / x     / x     / x      CARDIAC MARKERS ( 05 Apr 2017 00:13 )  x     / 0.15 ng/mL / 41 U/L / x     / x        PT/INR - ( 05 Apr 2017 00:48 )   PT: 13.8 sec;   INR: 1.25 ratio      I&O's Summary  I & Os for 24h ending 05 Apr 2017 07:00  =============================================  IN: 0 ml / OUT: 800 ml / NET: -800 ml    I & Os for current day (as of 05 Apr 2017 19:54)  =============================================  IN: 0 ml / OUT: 3200 ml / NET: -3200 ml    BNPSerum Pro-Brain Natriuretic Peptide: 20069 pg/mL (04-05 @ 00:48)    RADIOLOGY & ADDITIONAL STUDIES:  CXR   4/5/17   LINES/TUBES: Unchanged right-sided central venous catheter with tip   overlying the right atrium. New left-sided pigtail catheter.  LUNGS/PLEURA: Decreased left pleural effusion with basilar atelectasis or  pneumonia. Unchanged bilateral perihilar opacities.  MEDIASTINUM: Cardiac silhouette is enlarged.  OTHER: None.  IMPRESSION:   1.  Since April 4, 2017, decreased left pleural effusion with basilar   atelectasis or pneumonia.  2.  Mild bilateralperihilar opacities, possibly pulmonary vascular   congestion.    4/4/17 Single frontal view of the chest demonstrates a small left lower lobe   effusion/atelectasis. Mild CHF. Right-sided dialysis catheter tip in   right atrium. The cardiomediastinal silhouette is enlarged. No acute   osseous abnormalities.Overlying EKG leads and wires are noted.  IMPRESSION: Cardiomegaly. Mild CHF. Small left lower lobe   effusion/atelectasis.

## 2017-04-05 NOTE — PROGRESS NOTE ADULT - SUBJECTIVE AND OBJECTIVE BOX
Progress note Pgy2  CC; shortness of breath    Patient is 37 year old female with past medical history of end stage renal failure on dialysis T/Th/S, hypertension, diabetes, questionable CHF  who comes to [facility] complaining of shortness of breath.  Patient states that shortness of breath has improved after fluid was drained. She is not ambulating, tolerating PO.    Vitals:  Vital Signs Last 24 Hrs  T(C): 36.9, Max: 37.4 (04-04 @ 19:42)  T(F): 98.4, Max: 99.3 (04-04 @ 19:42)  HR: 83 (81 - 101)  BP: 147/73 (131/82 - 168/78)  BP(mean): --  RR: 20 (20 - 24)  SpO2: 96% (82% - 100%)      Physical exam:     Constitutional: NAD, well developed, well groomed   HEENT: normocephalic, atraumatic, PERRLA, EOMI, Normal Hearing, moist mucous membranes  Neck: No lymphadenopathy, No JVD  Back: no cva tendereness, no paraspinal muscle tenderness   Respiratory: decreased air entry bilaterally into the lung bases extending to the mid portion of the left lung and to the lower third of the right lung, apex of the lung clear to auscultation bilaterally, decreased percussion on right side, decreased tactile fermitus on right lung base   Chest: hemodialysis port in place on the right side without erythema, exudate or edema   Cardiovascular:  regular rate, regular rhythm, no murmurs, no gallops   Gastrointestinal: BS+, obese, non-tender, no guarding, no rebound, no masses appreciated  Extremities: AV graft fisula in place over the left arm, no palpable thrill present, no edema, no calf tenderness, no cyanosis.  Vascular: 2+ DP/PT pulses, positive popliteal pulses   Neurological: alert and oriented x 3, normal gait  Psychiatric: normal affect      Labs:                         7.4    13.1  )-----------( 393      ( 05 Apr 2017 07:09 )             24.2       04-05    137  |  94<L>  |  68.0<H>  ----------------------------<  212<H>  4.0   |  27.0  |  8.98<H>    Ca    9.0      05 Apr 2017 07:09  Phos  4.1     04-05    TPro  8.2  /  Alb  3.4  /  TBili  0.7  /  DBili  x   /  AST  10  /  ALT  12  /  AlkPhos  156<H>  04-05

## 2017-04-05 NOTE — CONSULT NOTE ADULT - SUBJECTIVE AND OBJECTIVE BOX
Patient is a 37y old  Female who presents with a chief complaint of shortness of breath (04 Apr 2017 23:54)      HPI:  Patient is 37 year old female with past medical history of end stage renal failure on dialysis T/Th/S, hypertension, diabetes, questionable CHF  who comes to [facility] complaining of shortness of breath. The patient was discharged from [facility] with diagnosis of new onset end stage renal disease in February. The patient was receiving HD as outpatient at [facility].  Now found to have L > R pleural effusions and is s/p chest tube.  She was due for HD yesterday but instead came to ER for evaluation.      PAST MEDICAL & SURGICAL HISTORY:  End stage renal disease  Hypertension  Diabetes  No significant past surgical history      FAMILY HISTORY:  Family history of renal failure (Mother): mother had dialysis in her mid 60s  Family history of diabetes mellitus (DM)      Social History:  No current tobacco, etoh nor drugs    MEDICATIONS  (STANDING):  sodium chloride 0.9% lock flush 3milliLiter(s) IV Push every 8 hours  pantoprazole    Tablet 40milliGRAM(s) Oral before breakfast  insulin lispro (HumaLOG) corrective regimen sliding scale  SubCutaneous three times a day before meals  dextrose 5%. 1000milliLiter(s) IV Continuous <Continuous>  dextrose 50% Injectable 12.5Gram(s) IV Push once  dextrose 50% Injectable 25Gram(s) IV Push once  dextrose 50% Injectable 25Gram(s) IV Push once  aspirin  chewable 81milliGRAM(s) Oral daily  valsartan 40milliGRAM(s) Oral daily  heparin  Injectable 5000Unit(s) SubCutaneous every 12 hours  amLODIPine   Tablet 10milliGRAM(s) Oral daily  calcium acetate 667milliGRAM(s) Oral three times a day with meals  calcitriol   Capsule 0.5MICROGram(s) Oral daily  metoprolol 50milliGRAM(s) Oral two times a day  epoetin raina Injectable [id number](s) IV Push <User Schedule>  cloNIDine 0.1milliGRAM(s) Oral once    MEDICATIONS  (PRN):  dextrose Gel 1Dose(s) Oral once PRN Blood Glucose LESS THAN 70 milliGRAM(s)/deciliter  glucagon  Injectable 1milliGRAM(s) IntraMuscular once PRN Glucose LESS THAN 70 milligrams/deciliter      Allergies    No Known Allergies    Intolerances        REVIEW OF SYSTEMS:    CONSTITUTIONAL: No fever, weight loss, +fatigue  EYES: No eye pain, visual disturbances, or discharge  ENMT:  No difficulty hearing, tinnitus, vertigo; No sinus or throat pain  NECK: No pain or stiffness  BREASTS: No pain, masses, or nipple discharge  RESPIRATORY: + shortness of breath  CARDIOVASCULAR: No chest pain, palpitations, dizziness, or leg swelling  GASTROINTESTINAL: No abdominal or epigastric pain. No nausea, vomiting, or hematemesis; No diarrhea or constipation. No melena or hematochezia.  GENITOURINARY: No dysuria, frequency, hematuria, or incontinence  NEUROLOGICAL: No headaches, memory loss, loss of strength, numbness, or tremors  SKIN: No itching, burning, rashes, or lesions   LYMPH NODES: No enlarged glands  MUSCULOSKELETAL: +swelling; No muscle, back, or extremity pain        Vital Signs Last 24 Hrs  T(C): 36.6, Max: 37.4 (04-04 @ 19:42)  T(F): 97.8, Max: 99.3 (04-04 @ 19:42)  HR: 86 (81 - 101)  BP: 141/80 (131/82 - 168/78)  BP(mean): --  RR: 20 (20 - 24)  SpO2: 88% (82% - 100%)    PHYSICAL EXAM:    GENERAL: Weakness  HEAD:  Atraumatic, Normocephalic  EYES: EOMI, PERRLA, conjunctiva and sclera clear  NECK: Supple, No JVD, Normal thyroid  NERVOUS SYSTEM:  Alert & Oriented X3, intact and symmetric  CHEST/LUNG: Diminished BS at bases; L chest tube  HEART: Regular rate and rhythm; No rub  ABDOMEN: Soft, Nontender, Nondistended; Bowel sounds present  EXTREMITIES:  2+ Peripheral Pulses, +edema  LYMPH: No lymphadenopathy noted  SKIN: No rashes or lesions      LABS:                        7.4    13.1  )-----------( 393      ( 05 Apr 2017 07:09 )             24.2     04-05    137  |  94<L>  |  68.0<H>  ----------------------------<  212<H>  4.0   |  27.0  |  8.98<H>    Ca    9.0      05 Apr 2017 07:09  Phos  4.1     04-05    TPro  8.2  /  Alb  3.4  /  TBili  0.7  /  DBili  x   /  AST  10  /  ALT  12  /  AlkPhos  156<H>  04-05    PT/INR - ( 05 Apr 2017 00:48 )   PT: 13.8 sec;   INR: 1.25 ratio             Phosphorus Level, Serum: 4.1 mg/dL (04-05 @ 07:09)      RADIOLOGY & ADDITIONAL TESTS:   EXAM:  CHEST SINGLE VIEW FRONTAL                          PROCEDURE DATE:  04/05/2017        INTERPRETATION:  EXAMINATION DATE: April 5, 2017 at 0606 hours.  CLINICAL INFORMATION: Pigtail catheter placement.    TECHNIQUE: Frontal view of the chest  COMPARISON: April 4, 2017.    FINDINGS:    LINES/TUBES: Unchanged right-sided central venous catheter with tip   overlying the right atrium. New left-sided pigtail catheter.  LUNGS/PLEURA: Decreased left pleural effusion with basilar atelectasis or  pneumonia. Unchanged bilateral perihilar opacities.  MEDIASTINUM: Cardiac silhouette is enlarged.  OTHER: None.    IMPRESSION:     1.  Since April 4, 2017, decreased left pleural effusion with basilar   atelectasis or pneumonia.  2.  Mild bilateralperihilar opacities, possibly pulmonary vascular   congestion.

## 2017-04-05 NOTE — ED ADULT NURSE REASSESSMENT NOTE - NS ED NURSE REASSESS COMMENT FT1
3rd RN Luis able to place IV in right wrist. Pt. states "this is pinching, I am going to pull it out". Pt. educated on the necessity of the IV and the implications if she removes her IV.

## 2017-04-05 NOTE — PROGRESS NOTE ADULT - PROBLEM SELECTOR PLAN 1
- patient says shortness of breath has improved - patient says shortness of breath has only slightly improved  - reports feeling very uncomfortable because of the chest tube  - satting 88% on 5L of O2  - patient due for dialysis in 2 hurs

## 2017-04-06 LAB
ALLERGY+IMMUNOLOGY DIAG STUDY NOTE: SIGNIFICANT CHANGE UP
ANISOCYTOSIS BLD QL: SLIGHT — SIGNIFICANT CHANGE UP
BLD GP AB SCN SERPL QL: ABNORMAL
EOSINOPHIL NFR BLD AUTO: 4 % — SIGNIFICANT CHANGE UP (ref 0–5)
FERRITIN SERPL-MCNC: 250.9 NG/ML — SIGNIFICANT CHANGE UP (ref 11–306)
HAV IGM SER-ACNC: SIGNIFICANT CHANGE UP
HAV IGM SER-ACNC: SIGNIFICANT CHANGE UP
HBV CORE IGM SER-ACNC: SIGNIFICANT CHANGE UP
HBV CORE IGM SER-ACNC: SIGNIFICANT CHANGE UP
HBV SURFACE AG SER-ACNC: SIGNIFICANT CHANGE UP
HBV SURFACE AG SER-ACNC: SIGNIFICANT CHANGE UP
HCT VFR BLD CALC: 23.4 % — LOW (ref 37–47)
HCV AB S/CO SERPL IA: 0.27 S/CO — SIGNIFICANT CHANGE UP
HCV AB S/CO SERPL IA: 0.29 S/CO — SIGNIFICANT CHANGE UP
HCV AB SERPL-IMP: SIGNIFICANT CHANGE UP
HCV AB SERPL-IMP: SIGNIFICANT CHANGE UP
HGB BLD-MCNC: 7.3 G/DL — LOW (ref 12–16)
HYPOCHROMIA BLD QL: SLIGHT — SIGNIFICANT CHANGE UP
IRON SATN MFR SERPL: 17 UG/DL — LOW (ref 37–145)
IRON SATN MFR SERPL: 5 % — LOW (ref 14–50)
LDH SERPL L TO P-CCNC: 652 U/L — SIGNIFICANT CHANGE UP
LYMPHOCYTES # BLD AUTO: 4 % — LOW (ref 20–55)
MACROCYTES BLD QL: SLIGHT — SIGNIFICANT CHANGE UP
MCHC RBC-ENTMCNC: 24.8 PG — LOW (ref 27–31)
MCHC RBC-ENTMCNC: 31.2 G/DL — LOW (ref 32–36)
MCV RBC AUTO: 79.6 FL — LOW (ref 81–99)
MICROCYTES BLD QL: SLIGHT — SIGNIFICANT CHANGE UP
MONOCYTES NFR BLD AUTO: 1 % — LOW (ref 3–10)
NEUTROPHILS NFR BLD AUTO: 89 % — HIGH (ref 37–73)
OVALOCYTES BLD QL SMEAR: SLIGHT — SIGNIFICANT CHANGE UP
PLAT MORPH BLD: NORMAL — SIGNIFICANT CHANGE UP
PLATELET # BLD AUTO: 331 K/UL — SIGNIFICANT CHANGE UP (ref 150–400)
POIKILOCYTOSIS BLD QL AUTO: SLIGHT — SIGNIFICANT CHANGE UP
RBC # BLD: 2.94 M/UL — LOW (ref 4.4–5.2)
RBC # FLD: 17.1 % — HIGH (ref 11–15.6)
RBC BLD AUTO: ABNORMAL
TIBC SERPL-MCNC: 342 UG/DL — SIGNIFICANT CHANGE UP (ref 220–430)
TRANSFERRIN SERPL-MCNC: 239 MG/DL — SIGNIFICANT CHANGE UP (ref 192–382)
TYPE + AB SCN PNL BLD: SIGNIFICANT CHANGE UP
VANCOMYCIN TROUGH SERPL-MCNC: 5.9 UG/ML — LOW (ref 10–20)
VARIANT LYMPHS # BLD: 2 % — SIGNIFICANT CHANGE UP (ref 0–6)
WBC # BLD: 13.8 K/UL — HIGH (ref 4.8–10.8)
WBC # FLD AUTO: 13.8 K/UL — HIGH (ref 4.8–10.8)

## 2017-04-06 PROCEDURE — 71010: CPT | Mod: 26,77

## 2017-04-06 PROCEDURE — 99233 SBSQ HOSP IP/OBS HIGH 50: CPT | Mod: GC

## 2017-04-06 PROCEDURE — 86077 PHYS BLOOD BANK SERV XMATCH: CPT

## 2017-04-06 PROCEDURE — 71010: CPT | Mod: 26

## 2017-04-06 RX ORDER — VANCOMYCIN HCL 1 G
1000 VIAL (EA) INTRAVENOUS ONCE
Qty: 0 | Refills: 0 | Status: COMPLETED | OUTPATIENT
Start: 2017-04-06 | End: 2017-04-06

## 2017-04-06 RX ORDER — IRON SUCROSE 20 MG/ML
100 INJECTION, SOLUTION INTRAVENOUS
Qty: 0 | Refills: 0 | Status: DISCONTINUED | OUTPATIENT
Start: 2017-04-06 | End: 2017-04-09

## 2017-04-06 RX ORDER — ACETAMINOPHEN 500 MG
650 TABLET ORAL EVERY 8 HOURS
Qty: 0 | Refills: 0 | Status: COMPLETED | OUTPATIENT
Start: 2017-04-06 | End: 2017-04-12

## 2017-04-06 RX ORDER — ACETAMINOPHEN 500 MG
325 TABLET ORAL ONCE
Qty: 0 | Refills: 0 | Status: COMPLETED | OUTPATIENT
Start: 2017-04-06 | End: 2017-04-06

## 2017-04-06 RX ADMIN — AMLODIPINE BESYLATE 10 MILLIGRAM(S): 2.5 TABLET ORAL at 06:21

## 2017-04-06 RX ADMIN — VALSARTAN 40 MILLIGRAM(S): 80 TABLET ORAL at 06:21

## 2017-04-06 RX ADMIN — Medication 50 MILLIGRAM(S): at 18:40

## 2017-04-06 RX ADMIN — Medication 100 MILLIGRAM(S): at 00:15

## 2017-04-06 RX ADMIN — Medication 650 MILLIGRAM(S): at 22:15

## 2017-04-06 RX ADMIN — Medication 1: at 13:17

## 2017-04-06 RX ADMIN — Medication 650 MILLIGRAM(S): at 21:38

## 2017-04-06 RX ADMIN — Medication 325 MILLIGRAM(S): at 00:25

## 2017-04-06 RX ADMIN — SODIUM CHLORIDE 3 MILLILITER(S): 9 INJECTION INTRAMUSCULAR; INTRAVENOUS; SUBCUTANEOUS at 13:23

## 2017-04-06 RX ADMIN — HEPARIN SODIUM 5000 UNIT(S): 5000 INJECTION INTRAVENOUS; SUBCUTANEOUS at 06:21

## 2017-04-06 RX ADMIN — IRON SUCROSE 105 MILLIGRAM(S): 20 INJECTION, SOLUTION INTRAVENOUS at 15:53

## 2017-04-06 RX ADMIN — HEPARIN SODIUM 5000 UNIT(S): 5000 INJECTION INTRAVENOUS; SUBCUTANEOUS at 18:39

## 2017-04-06 RX ADMIN — PANTOPRAZOLE SODIUM 40 MILLIGRAM(S): 20 TABLET, DELAYED RELEASE ORAL at 06:20

## 2017-04-06 RX ADMIN — PIPERACILLIN AND TAZOBACTAM 200 GRAM(S): 4; .5 INJECTION, POWDER, LYOPHILIZED, FOR SOLUTION INTRAVENOUS at 21:38

## 2017-04-06 RX ADMIN — Medication 1: at 18:39

## 2017-04-06 RX ADMIN — Medication 50 MILLIGRAM(S): at 06:20

## 2017-04-06 RX ADMIN — Medication 1: at 08:12

## 2017-04-06 RX ADMIN — CALCITRIOL 0.5 MICROGRAM(S): 0.5 CAPSULE ORAL at 13:18

## 2017-04-06 RX ADMIN — Medication 250 MILLIGRAM(S): at 22:33

## 2017-04-06 RX ADMIN — Medication 81 MILLIGRAM(S): at 13:17

## 2017-04-06 RX ADMIN — Medication 325 MILLIGRAM(S): at 01:00

## 2017-04-06 RX ADMIN — SODIUM CHLORIDE 3 MILLILITER(S): 9 INJECTION INTRAMUSCULAR; INTRAVENOUS; SUBCUTANEOUS at 06:11

## 2017-04-06 RX ADMIN — SODIUM CHLORIDE 3 MILLILITER(S): 9 INJECTION INTRAMUSCULAR; INTRAVENOUS; SUBCUTANEOUS at 21:38

## 2017-04-06 NOTE — PROGRESS NOTE ADULT - SUBJECTIVE AND OBJECTIVE BOX
NEPHROLOGY INTERVAL HPI/OVERNIGHT EVENTS:  pt has less sob today but still pain at CT site  tolerated HD yesterday    MEDICATIONS  (STANDING):  sodium chloride 0.9% lock flush 3milliLiter(s) IV Push every 8 hours  pantoprazole    Tablet 40milliGRAM(s) Oral before breakfast  insulin lispro (HumaLOG) corrective regimen sliding scale  SubCutaneous three times a day before meals  dextrose 5%. 1000milliLiter(s) IV Continuous <Continuous>  dextrose 50% Injectable 12.5Gram(s) IV Push once  dextrose 50% Injectable 25Gram(s) IV Push once  dextrose 50% Injectable 25Gram(s) IV Push once  aspirin  chewable 81milliGRAM(s) Oral daily  valsartan 40milliGRAM(s) Oral daily  heparin  Injectable 5000Unit(s) SubCutaneous every 12 hours  amLODIPine   Tablet 10milliGRAM(s) Oral daily  calcium acetate 667milliGRAM(s) Oral three times a day with meals  calcitriol   Capsule 0.5MICROGram(s) Oral daily  metoprolol 50milliGRAM(s) Oral two times a day  epoetin raina Injectable [id number](s) IV Push <User Schedule>  cloNIDine 0.1milliGRAM(s) Oral once  piperacillin/tazobactam IVPB. 2.25Gram(s) IV Intermittent every 12 hours  piperacillin/tazobactam IVPB.  IV Intermittent   vancomycin  IVPB 1000milliGRAM(s) IV Intermittent once    MEDICATIONS  (PRN):  dextrose Gel 1Dose(s) Oral once PRN Blood Glucose LESS THAN 70 milliGRAM(s)/deciliter  glucagon  Injectable 1milliGRAM(s) IntraMuscular once PRN Glucose LESS THAN 70 milligrams/deciliter      Allergies    No Known Allergies          Vital Signs Last 24 Hrs  T(C): 36.3, Max: 36.7 (04-05 @ 15:30)  T(F): 97.4, Max: 98 (04-05 @ 15:30)  HR: 84 (84 - 105)  BP: 142/88 (140/65 - 179/88)  BP(mean): --  RR: 18 (18 - 20)  SpO2: 94% (88% - 96%)    PHYSICAL EXAM:  GENERAL: Weakness  HEAD:  Atraumatic, Normocephalic  EYES: EOMI, PERRLA, conjunctiva and sclera clear  NECK: Supple, No JVD, Normal thyroid  NERVOUS SYSTEM:  Alert & Oriented X3, intact and symmetric  CHEST/LUNG: Diminished BS at bases; L chest tube  HEART: Regular rate and rhythm; No rub  ABDOMEN: Soft, Nontender, Nondistended; Bowel sounds present  EXTREMITIES:  2+ Peripheral Pulses, +edema  LYMPH: No lymphadenopathy noted  SKIN: No rashes or lesions      LABS:                        7.3    13.8  )-----------( 331      ( 06 Apr 2017 09:00 )             23.4   Hemoglobin: 7.4 g/dL (04.05.17 @ 07:09)    % Saturation, Iron: 5 % (04.06.17 @ 07:29)    Ferritin, Serum: 250.9 ng/mL (04.06.17 @ 07:29)      04-06    137  |  93<L>  |  48.0<H>  ----------------------------<  173<H>  4.2   |  29.0  |  7.06<H>    Ca    8.5<L>      06 Apr 2017 07:29  Phos  4.4     04-06  Mg     2.3     04-06    TPro  7.9  /  Alb  3.2<L>  /  TBili  0.8  /  DBili  x   /  AST  11  /  ALT  11  /  AlkPhos  137<H>  04-06    PT/INR - ( 05 Apr 2017 00:48 )   PT: 13.8 sec;   INR: 1.25 ratio             Magnesium, Serum: 2.3 mg/dL (04-06 @ 07:29)  Phosphorus Level, Serum: 4.4 mg/dL (04-06 @ 07:29)      RADIOLOGY & ADDITIONAL TESTS:   EXAM:  CHEST SINGLE VIEW FRONTAL                          PROCEDURE DATE:  04/05/2017        INTERPRETATION:  EXAMINATION DATE: April 5, 2017 at 0606 hours.  CLINICAL INFORMATION: Pigtail catheter placement.    TECHNIQUE: Frontal view of the chest  COMPARISON: April 4, 2017.    FINDINGS:    LINES/TUBES: Unchanged right-sided central venous catheter with tip   overlying the right atrium. New left-sided pigtail catheter.  LUNGS/PLEURA: Decreased left pleural effusion with basilar atelectasis or  pneumonia. Unchanged bilateral perihilar opacities.  MEDIASTINUM: Cardiac silhouette is enlarged.  OTHER: None.    IMPRESSION:     1.  Since April 4, 2017, decreased left pleural effusion with basilar   atelectasis or pneumonia.  2.  Mild bilateralperihilar opacities, possibly pulmonary vascular   congestion.

## 2017-04-06 NOTE — PROGRESS NOTE ADULT - PROBLEM SELECTOR PLAN 1
- patient says shortness of breath has only slightly improved  - reports feeling very uncomfortable because of the chest tube  - satting 94% on room air   - patient got dialysis yesterday - patient says shortness of breath has only slightly improved  - reports feeling very uncomfortable because of the chest tube  - satting 94% on 50% ventimask  - patient got dialysis yesterday

## 2017-04-06 NOTE — PROGRESS NOTE ADULT - PROBLEM SELECTOR PLAN 2
- 800 cc of pleural fluid was drained, SPI=880, meets light's criteria for exudative effusion  - glucose=208, albumin=1.8, pH=7.0, cytopathology not sent to lab  - repeat chest x-ray

## 2017-04-06 NOTE — PROGRESS NOTE ADULT - ASSESSMENT
Patient is 37 year old female with past medical history of end stage renal failure on dialysis T/Th/S, hypertension, diabetes, questionable CHF  who comes to [facility] complaining of shortness of breath. Likely fluid overloaded due to renal failure.  Patient also has pneumonia.

## 2017-04-06 NOTE — PROGRESS NOTE ADULT - ASSESSMENT
ESRD: +PVC and effusions s/p L CT  Preliminary fluid analysis may be c/w exudate==> await further studies  - additional HD today with UF; will also reestablish outpatient TTS schedule  - AVF maturing==> needs vascular follow up    Anemia: hold NEELAM for now until malignant effusion excluded  - PRBCs if needed    RO: continue Phoslo and Calcitriol

## 2017-04-06 NOTE — PROGRESS NOTE ADULT - SUBJECTIVE AND OBJECTIVE BOX
Patient is a 37y old  Female who presents with a chief complaint of shortness of breath (04 Apr 2017 23:54)  2017 23:54)  Well known to vascular surgery. Has L AVF not matured and was preop for superficialization on 4/7 as outpt with Dr Yusuf  S/P CT insertion for large pleural effusion  Remains SOB and c/o discomfort R/T CT    PAST MEDICAL & SURGICAL HISTORY:  End stage renal disease  Hypertension  Diabetes  L AVF    MEDICATIONS  (STANDING):  sodium chloride 0.9% lock flush 3milliLiter(s) IV Push every 8 hours  pantoprazole    Tablet 40milliGRAM(s) Oral before breakfast  insulin lispro (HumaLOG) corrective regimen sliding scale  SubCutaneous three times a day before meals  dextrose 5%. 1000milliLiter(s) IV Continuous <Continuous>  dextrose 50% Injectable 12.5Gram(s) IV Push once  dextrose 50% Injectable 25Gram(s) IV Push once  dextrose 50% Injectable 25Gram(s) IV Push once  aspirin  chewable 81milliGRAM(s) Oral daily  valsartan 40milliGRAM(s) Oral daily  heparin  Injectable 5000Unit(s) SubCutaneous every 12 hours  amLODIPine   Tablet 10milliGRAM(s) Oral daily  calcium acetate 667milliGRAM(s) Oral three times a day with meals  calcitriol   Capsule 0.5MICROGram(s) Oral daily  metoprolol 50milliGRAM(s) Oral two times a day  cloNIDine 0.1milliGRAM(s) Oral once  piperacillin/tazobactam IVPB. 2.25Gram(s) IV Intermittent every 12 hours  piperacillin/tazobactam IVPB.  IV Intermittent   vancomycin  IVPB 1000milliGRAM(s) IV Intermittent once  iron sucrose IVPB 100milliGRAM(s) IV Intermittent <User Schedule>    MEDICATIONS  (PRN):  dextrose Gel 1Dose(s) Oral once PRN Blood Glucose LESS THAN 70 milliGRAM(s)/deciliter  glucagon  Injectable 1milliGRAM(s) IntraMuscular once PRN Glucose LESS THAN 70 milligrams/deciliter    Allergies:No Known Allergies    Vital Signs Last 24 Hrs  T(C): 37, Max: 37 (04-06 @ 11:23)  T(F): 98.6, Max: 98.6 (04-06 @ 11:23)  HR: 91 (84 - 105)  BP: 142/86 (140/65 - 179/88)  BP(mean): --  RR: 18 (18 - 20)  SpO2: 94% (94% - 96%)  I&O's Detail  I & Os for 24h ending 06 Apr 2017 07:00  =============================================  IN:    Oral Fluid: 240 ml    IV PiggyBack: 100 ml    Solution: 100 ml    Total IN: 440 ml  ---------------------------------------------  OUT:    Other: 3200 ml    Chest Tube: 20 ml    Total OUT: 3220 ml  ---------------------------------------------  Total NET: -2780 ml    I & Os for current day (as of 06 Apr 2017 13:03)  =============================================  IN:    Total IN: 0 ml  ---------------------------------------------  OUT:    Total OUT: 0 ml  ---------------------------------------------  Total NET: 0 ml      Physical Exam:  General: NAD, resting comfortably in bed  Pulmonary: Nonlabored breathing,   Extremities: L AVF with +bruit and thrill,      LABS:                        7.3    13.8  )-----------( 331      ( 06 Apr 2017 09:00 )             23.4     04-06    137  |  93<L>  |  48.0<H>  ----------------------------<  173<H>  4.2   |  29.0  |  7.06<H>    Ca    8.5<L>      06 Apr 2017 07:29  Phos  4.4     04-06  Mg     2.3     04-06    TPro  7.9  /  Alb  3.2<L>  /  TBili  0.8  /  DBili  x   /  AST  11  /  ALT  11  /  AlkPhos  137<H>  04-06    PT/INR - ( 05 Apr 2017 00:48 )   PT: 13.8 sec;   INR: 1.25 ratio           CAPILLARY BLOOD GLUCOSE  168 (06 Apr 2017 12:49)  165 (06 Apr 2017 07:53)  180 (05 Apr 2017 17:25)    Radiology and Additional Studies:    Assessment and Plan: 37y Female Pleural effusion,ESRD on HD with L AVF,  deep and needs superficialization  As discussed with Dr Cee, pt is not optimized for OR and will be rescheduled for 4/12  Will need medical and cardiac clearance if to be performed in house. No urgency as pt has functional HD access with permcath and has not had issues  Etiology of effusion unclear; CT placed by CT surgery  Discussed with Dr Yusuf    End stage renal disease  Hypertension  Diabetes  No significant past surgical history Patient is a 37y old  Female who presents with a chief complaint of shortness of breath (04 Apr 2017 23:54)  2017 23:54)  Well known to vascular surgery. Has L AVF not matured and was preop for superficialization on 4/7 as outpt with Dr Yusuf  S/P CT insertion for large pleural effusion  Remains SOB and c/o discomfort R/T CT    PAST MEDICAL & SURGICAL HISTORY:  End stage renal disease  Hypertension  Diabetes  L AVF    MEDICATIONS  (STANDING):  sodium chloride 0.9% lock flush 3milliLiter(s) IV Push every 8 hours  pantoprazole    Tablet 40milliGRAM(s) Oral before breakfast  insulin lispro (HumaLOG) corrective regimen sliding scale  SubCutaneous three times a day before meals  dextrose 5%. 1000milliLiter(s) IV Continuous <Continuous>  dextrose 50% Injectable 12.5Gram(s) IV Push once  dextrose 50% Injectable 25Gram(s) IV Push once  dextrose 50% Injectable 25Gram(s) IV Push once  aspirin  chewable 81milliGRAM(s) Oral daily  valsartan 40milliGRAM(s) Oral daily  heparin  Injectable 5000Unit(s) SubCutaneous every 12 hours  amLODIPine   Tablet 10milliGRAM(s) Oral daily  calcium acetate 667milliGRAM(s) Oral three times a day with meals  calcitriol   Capsule 0.5MICROGram(s) Oral daily  metoprolol 50milliGRAM(s) Oral two times a day  cloNIDine 0.1milliGRAM(s) Oral once  piperacillin/tazobactam IVPB. 2.25Gram(s) IV Intermittent every 12 hours  piperacillin/tazobactam IVPB.  IV Intermittent   vancomycin  IVPB 1000milliGRAM(s) IV Intermittent once  iron sucrose IVPB 100milliGRAM(s) IV Intermittent <User Schedule>    MEDICATIONS  (PRN):  dextrose Gel 1Dose(s) Oral once PRN Blood Glucose LESS THAN 70 milliGRAM(s)/deciliter  glucagon  Injectable 1milliGRAM(s) IntraMuscular once PRN Glucose LESS THAN 70 milligrams/deciliter    Allergies:No Known Allergies    Vital Signs Last 24 Hrs  T(C): 37, Max: 37 (04-06 @ 11:23)  T(F): 98.6, Max: 98.6 (04-06 @ 11:23)  HR: 91 (84 - 105)  BP: 142/86 (140/65 - 179/88)  BP(mean): --  RR: 18 (18 - 20)  SpO2: 94% (94% - 96%)  I&O's Detail  I & Os for 24h ending 06 Apr 2017 07:00  =============================================  IN:    Oral Fluid: 240 ml    IV PiggyBack: 100 ml    Solution: 100 ml    Total IN: 440 ml  ---------------------------------------------  OUT:    Other: 3200 ml    Chest Tube: 20 ml    Total OUT: 3220 ml  ---------------------------------------------  Total NET: -2780 ml    I & Os for current day (as of 06 Apr 2017 13:03)  =============================================  IN:    Total IN: 0 ml  ---------------------------------------------  OUT:    Total OUT: 0 ml  ---------------------------------------------  Total NET: 0 ml      Physical Exam:  General: NAD, resting comfortably in bed  Pulmonary: Nonlabored breathing,   Extremities: L AVF with +bruit and thrill,      LABS:                        7.3    13.8  )-----------( 331      ( 06 Apr 2017 09:00 )             23.4     04-06    137  |  93<L>  |  48.0<H>  ----------------------------<  173<H>  4.2   |  29.0  |  7.06<H>    Ca    8.5<L>      06 Apr 2017 07:29  Phos  4.4     04-06  Mg     2.3     04-06    TPro  7.9  /  Alb  3.2<L>  /  TBili  0.8  /  DBili  x   /  AST  11  /  ALT  11  /  AlkPhos  137<H>  04-06    PT/INR - ( 05 Apr 2017 00:48 )   PT: 13.8 sec;   INR: 1.25 ratio           CAPILLARY BLOOD GLUCOSE  168 (06 Apr 2017 12:49)  165 (06 Apr 2017 07:53)  180 (05 Apr 2017 17:25)    Radiology and Additional Studies:    Assessment and Plan: 37y Female Pleural effusion,ESRD on HD with L AVF,  deep and needs superficialization  As discussed with Dr Cee, pt is not optimized for OR and will be rescheduled for 4/12  Will need medical clearance. No urgency as pt has functional HD access with permcath and has not had issues  Cardiac clearance on chart  Etiology of effusion unclear; CT placed by CT surgery  Discussed with Dr Yusuf    End stage renal disease  Hypertension  Diabetes  No significant past surgical history

## 2017-04-06 NOTE — PROGRESS NOTE ADULT - SUBJECTIVE AND OBJECTIVE BOX
Progress note Pgy2  CC; shortness of breath    Patient is 37 year old female with past medical history of end stage renal failure on dialysis T/Th/S, hypertension, diabetes, questionable CHF  who comes to [facility] complaining of shortness of breath.  Patient states that shortness of breath has improved after fluid was drained. She is not ambulating, tolerating PO.  Complains of moderate pain at the site of the chest tube.     Vitals:  Vital Signs Last 24 Hrs  T(C): 36.3, Max: 36.9 (04-05 @ 07:44)  T(F): 97.4, Max: 98.4 (04-05 @ 07:44)  HR: 84 (82 - 105)  BP: 142/88 (140/65 - 179/88)  BP(mean): --  RR: 18 (18 - 20)  SpO2: 94% (88% - 96%)      Physical exam:     Constitutional: NAD, well developed, well groomed   HEENT: normocephalic, atraumatic, PERRLA, EOMI, Normal Hearing, moist mucous membranes  Neck: No lymphadenopathy, No JVD  Back: no cva tendereness, no paraspinal muscle tenderness, chest tube in place on the left side  Respiratory: decreased air entry bilaterally into the lung bases extending to the mid portion of the left lung    Chest: hemodialysis port in place on the right side without erythema, exudate or edema  Cardiovascular:  regular rate, regular rhythm, no murmurs, no gallops   Gastrointestinal: BS+, obese, non-tender, no guarding, no rebound, no masses appreciated  Extremities: AV graft fisula in place over the left arm, no palpable thrill present, no edema, no calf tenderness, no cyanosis.  Vascular: 2+ DP/PT pulses, positive popliteal pulses   Neurological: alert and oriented x 3, normal gait  Psychiatric: normal affect      Labs:                                    7.4    13.1  )-----------( 393      ( 05 Apr 2017 07:09 )             24.2     04-05    137  |  94<L>  |  68.0<H>  ----------------------------<  212<H>  4.0   |  27.0  |  8.98<H>    Ca    9.0      05 Apr 2017 07:09  Phos  4.1     04-05    TPro  8.2  /  Alb  3.4  /  TBili  0.7  /  DBili  x   /  AST  10  /  ALT  12  /  AlkPhos  156<H>  04-05 Progress note Pgy2  CC; shortness of breath    Patient is 37 year old female with past medical history of end stage renal failure on dialysis T/Th/S, hypertension, diabetes, questionable CHF  who comes to [facility] complaining of shortness of breath.  Patient states that shortness of breath has improved after fluid was drained. She is not ambulating, tolerating PO.  Complains of moderate pain at the site of the chest tube.  Satting 94% on 50% ventimask, otherwise desatting.     Vitals:  Vital Signs Last 24 Hrs  T(C): 36.3, Max: 36.9 (04-05 @ 07:44)  T(F): 97.4, Max: 98.4 (04-05 @ 07:44)  HR: 84 (82 - 105)  BP: 142/88 (140/65 - 179/88)  BP(mean): --  RR: 18 (18 - 20)  SpO2: 94% (88% - 96%)    CHest tube:  drained 20 ml over 8 hours       Physical exam:     Constitutional: NAD, well developed, well groomed   HEENT: normocephalic, atraumatic, PERRLA, EOMI, Normal Hearing, moist mucous membranes  Neck: No lymphadenopathy, No JVD  Back: no cva tendereness, no paraspinal muscle tenderness, chest tube in place on the left side  Respiratory: decreased air entry bilaterally into the lung bases extending to the mid portion of the left lung    Chest: hemodialysis port in place on the right side without erythema, exudate or edema  Cardiovascular:  regular rate, regular rhythm, no murmurs, no gallops   Gastrointestinal: BS+, obese, non-tender, no guarding, no rebound, no masses appreciated  Extremities: AV graft fisula in place over the left arm, no palpable thrill present, no edema, no calf tenderness, no cyanosis.  Vascular: 2+ DP/PT pulses, positive popliteal pulses   Neurological: alert and oriented x 3, normal gait  Psychiatric: normal affect      Labs:                                    7.4    13.1  )-----------( 393      ( 05 Apr 2017 07:09 )             24.2     04-05    137  |  94<L>  |  68.0<H>  ----------------------------<  212<H>  4.0   |  27.0  |  8.98<H>    Ca    9.0      05 Apr 2017 07:09  Phos  4.1     04-05    TPro  8.2  /  Alb  3.4  /  TBili  0.7  /  DBili  x   /  AST  10  /  ALT  12  /  AlkPhos  156<H>  04-05

## 2017-04-07 LAB
ABO RH CONFIRMATION: SIGNIFICANT CHANGE UP
ALBUMIN SERPL ELPH-MCNC: 2.5 G/DL — LOW (ref 3.3–5.2)
ALP SERPL-CCNC: 120 U/L — SIGNIFICANT CHANGE UP (ref 40–120)
ALT FLD-CCNC: 11 U/L — SIGNIFICANT CHANGE UP
ANION GAP SERPL CALC-SCNC: 10 MMOL/L — SIGNIFICANT CHANGE UP (ref 5–17)
ANION GAP SERPL CALC-SCNC: 17 MMOL/L — SIGNIFICANT CHANGE UP (ref 5–17)
ANISOCYTOSIS BLD QL: SLIGHT — SIGNIFICANT CHANGE UP
AST SERPL-CCNC: 12 U/L — SIGNIFICANT CHANGE UP
BASOPHILS # BLD AUTO: 0 K/UL — SIGNIFICANT CHANGE UP (ref 0–0.2)
BILIRUB SERPL-MCNC: 0.7 MG/DL — SIGNIFICANT CHANGE UP (ref 0.4–2)
BUN SERPL-MCNC: 21 MG/DL — HIGH (ref 8–20)
BUN SERPL-MCNC: 45 MG/DL — HIGH (ref 8–20)
CALCIUM SERPL-MCNC: 7.9 MG/DL — LOW (ref 8.6–10.2)
CALCIUM SERPL-MCNC: 8.6 MG/DL — SIGNIFICANT CHANGE UP (ref 8.6–10.2)
CHLORIDE SERPL-SCNC: 91 MMOL/L — LOW (ref 98–107)
CHLORIDE SERPL-SCNC: 94 MMOL/L — LOW (ref 98–107)
CO2 SERPL-SCNC: 27 MMOL/L — SIGNIFICANT CHANGE UP (ref 22–29)
CO2 SERPL-SCNC: 32 MMOL/L — HIGH (ref 22–29)
CREAT SERPL-MCNC: 3.67 MG/DL — HIGH (ref 0.5–1.3)
CREAT SERPL-MCNC: 6.58 MG/DL — HIGH (ref 0.5–1.3)
EOSINOPHIL # BLD AUTO: 0.4 K/UL — SIGNIFICANT CHANGE UP (ref 0–0.5)
EOSINOPHIL NFR BLD AUTO: 3 % — SIGNIFICANT CHANGE UP (ref 0–5)
GLUCOSE SERPL-MCNC: 149 MG/DL — HIGH (ref 70–115)
GLUCOSE SERPL-MCNC: 157 MG/DL — HIGH (ref 70–115)
HCT VFR BLD CALC: 21 % — CRITICAL LOW (ref 37–47)
HCT VFR BLD CALC: 28.8 % — LOW (ref 37–47)
HGB BLD-MCNC: 6.5 G/DL — CRITICAL LOW (ref 12–16)
HGB BLD-MCNC: 9 G/DL — LOW (ref 12–16)
HYPOCHROMIA BLD QL: SLIGHT — SIGNIFICANT CHANGE UP
LYMPHOCYTES # BLD AUTO: 1.6 K/UL — SIGNIFICANT CHANGE UP (ref 1–4.8)
LYMPHOCYTES # BLD AUTO: 13 % — LOW (ref 20–55)
MACROCYTES BLD QL: SLIGHT — SIGNIFICANT CHANGE UP
MAGNESIUM SERPL-MCNC: 2.1 MG/DL — SIGNIFICANT CHANGE UP (ref 1.8–2.5)
MCHC RBC-ENTMCNC: 24.3 PG — LOW (ref 27–31)
MCHC RBC-ENTMCNC: 25.1 PG — LOW (ref 27–31)
MCHC RBC-ENTMCNC: 31 G/DL — LOW (ref 32–36)
MCHC RBC-ENTMCNC: 31.3 G/DL — LOW (ref 32–36)
MCV RBC AUTO: 78.4 FL — LOW (ref 81–99)
MCV RBC AUTO: 80.4 FL — LOW (ref 81–99)
MICROCYTES BLD QL: SLIGHT — SIGNIFICANT CHANGE UP
MONOCYTES # BLD AUTO: 0.9 K/UL — HIGH (ref 0–0.8)
MONOCYTES NFR BLD AUTO: 7 % — SIGNIFICANT CHANGE UP (ref 3–10)
NEUTROPHILS # BLD AUTO: 9.5 K/UL — HIGH (ref 1.8–8)
NEUTROPHILS NFR BLD AUTO: 77 % — HIGH (ref 37–73)
PHOSPHATE SERPL-MCNC: 4.1 MG/DL — SIGNIFICANT CHANGE UP (ref 2.4–4.7)
PLAT MORPH BLD: NORMAL — SIGNIFICANT CHANGE UP
PLATELET # BLD AUTO: 318 K/UL — SIGNIFICANT CHANGE UP (ref 150–400)
PLATELET # BLD AUTO: 331 K/UL — SIGNIFICANT CHANGE UP (ref 150–400)
POIKILOCYTOSIS BLD QL AUTO: SLIGHT — SIGNIFICANT CHANGE UP
POTASSIUM SERPL-MCNC: 3.9 MMOL/L — SIGNIFICANT CHANGE UP (ref 3.5–5.3)
POTASSIUM SERPL-MCNC: 4.1 MMOL/L — SIGNIFICANT CHANGE UP (ref 3.5–5.3)
POTASSIUM SERPL-SCNC: 3.9 MMOL/L — SIGNIFICANT CHANGE UP (ref 3.5–5.3)
POTASSIUM SERPL-SCNC: 4.1 MMOL/L — SIGNIFICANT CHANGE UP (ref 3.5–5.3)
PROT SERPL-MCNC: 7.2 G/DL — SIGNIFICANT CHANGE UP (ref 6.6–8.7)
RBC # BLD: 2.68 M/UL — LOW (ref 4.4–5.2)
RBC # BLD: 3.58 M/UL — LOW (ref 4.4–5.2)
RBC # FLD: 16.6 % — HIGH (ref 11–15.6)
RBC # FLD: 16.9 % — HIGH (ref 11–15.6)
RBC BLD AUTO: ABNORMAL
SODIUM SERPL-SCNC: 133 MMOL/L — LOW (ref 135–145)
SODIUM SERPL-SCNC: 138 MMOL/L — SIGNIFICANT CHANGE UP (ref 135–145)
WBC # BLD: 12.5 K/UL — HIGH (ref 4.8–10.8)
WBC # BLD: 13.8 K/UL — HIGH (ref 4.8–10.8)
WBC # FLD AUTO: 12.5 K/UL — HIGH (ref 4.8–10.8)
WBC # FLD AUTO: 13.8 K/UL — HIGH (ref 4.8–10.8)

## 2017-04-07 PROCEDURE — 99233 SBSQ HOSP IP/OBS HIGH 50: CPT | Mod: GC

## 2017-04-07 PROCEDURE — 71010: CPT | Mod: 26

## 2017-04-07 RX ORDER — INSULIN LISPRO 100/ML
2 VIAL (ML) SUBCUTANEOUS ONCE
Qty: 0 | Refills: 0 | Status: COMPLETED | OUTPATIENT
Start: 2017-04-07 | End: 2017-04-07

## 2017-04-07 RX ADMIN — TUBERCULIN PURIFIED PROTEIN DERIVATIVE 5 UNIT(S): 5 INJECTION, SOLUTION INTRADERMAL at 18:39

## 2017-04-07 RX ADMIN — HEPARIN SODIUM 5000 UNIT(S): 5000 INJECTION INTRAVENOUS; SUBCUTANEOUS at 18:19

## 2017-04-07 RX ADMIN — CALCITRIOL 0.5 MICROGRAM(S): 0.5 CAPSULE ORAL at 18:19

## 2017-04-07 RX ADMIN — SODIUM CHLORIDE 3 MILLILITER(S): 9 INJECTION INTRAMUSCULAR; INTRAVENOUS; SUBCUTANEOUS at 06:14

## 2017-04-07 RX ADMIN — Medication 2: at 18:19

## 2017-04-07 RX ADMIN — Medication 2 UNIT(S): at 01:41

## 2017-04-07 RX ADMIN — HEPARIN SODIUM 5000 UNIT(S): 5000 INJECTION INTRAVENOUS; SUBCUTANEOUS at 06:15

## 2017-04-07 RX ADMIN — SODIUM CHLORIDE 3 MILLILITER(S): 9 INJECTION INTRAMUSCULAR; INTRAVENOUS; SUBCUTANEOUS at 14:04

## 2017-04-07 RX ADMIN — Medication 50 MILLIGRAM(S): at 06:15

## 2017-04-07 RX ADMIN — IRON SUCROSE 105 MILLIGRAM(S): 20 INJECTION, SOLUTION INTRAVENOUS at 11:20

## 2017-04-07 RX ADMIN — PIPERACILLIN AND TAZOBACTAM 200 GRAM(S): 4; .5 INJECTION, POWDER, LYOPHILIZED, FOR SOLUTION INTRAVENOUS at 22:46

## 2017-04-07 RX ADMIN — VALSARTAN 40 MILLIGRAM(S): 80 TABLET ORAL at 06:15

## 2017-04-07 RX ADMIN — AMLODIPINE BESYLATE 10 MILLIGRAM(S): 2.5 TABLET ORAL at 06:15

## 2017-04-07 RX ADMIN — SODIUM CHLORIDE 3 MILLILITER(S): 9 INJECTION INTRAMUSCULAR; INTRAVENOUS; SUBCUTANEOUS at 22:21

## 2017-04-07 RX ADMIN — Medication 50 MILLIGRAM(S): at 18:19

## 2017-04-07 RX ADMIN — PIPERACILLIN AND TAZOBACTAM 200 GRAM(S): 4; .5 INJECTION, POWDER, LYOPHILIZED, FOR SOLUTION INTRAVENOUS at 14:25

## 2017-04-07 RX ADMIN — PANTOPRAZOLE SODIUM 40 MILLIGRAM(S): 20 TABLET, DELAYED RELEASE ORAL at 06:15

## 2017-04-07 RX ADMIN — Medication 81 MILLIGRAM(S): at 18:19

## 2017-04-07 NOTE — PROGRESS NOTE ADULT - PROBLEM SELECTOR PLAN 2
- 800 cc of pleural fluid was drained, QJW=507, meets light's criteria for exudative effusion  - glucose=208, albumin=1.8, pH=7.0, cytopathology not sent to lab  - repeat chest x-ray - 800 cc of pleural fluid was drained during this hospital stay.  None drained overnight. IZA=488, meets light's criteria for exudative effusion  - glucose=208, albumin=1.8, pH=7.0, cytopathology not sent to lab; not enough fluid.  - repeat chest x-ray; no pneumothorax.  Chest tube in place.

## 2017-04-07 NOTE — PROGRESS NOTE ADULT - PROBLEM SELECTOR PLAN 1
- patient says shortness of breath has only slightly improved  - reports feeling very uncomfortable because of the chest tube  - satting 94% on 50% ventimask  - patient got dialysis yesterday - patient says shortness of breath has only improved  - reports feeling very uncomfortable because of the chest tube and the ventimask  - satting 94% on 50% ventimask (15L)  - patient got dialysis yesterday and is scheduled for another session today(4/7/17)

## 2017-04-07 NOTE — PROGRESS NOTE ADULT - SUBJECTIVE AND OBJECTIVE BOX
Patient is a 37y old  Female who presents with a chief complaint of shortness of breath (2017 23:54)      INTERVAL HPI/OVERNIGHT EVENTS:  37y  Female past medical history of end stage renal failure on dialysis T//S, hypertension, diabetes, questionable CHF  who comes to [facility] complaining of shortness of breath.  Patient states that shortness of breath has somewhat improved. She states she is ambulating a little and will get OOB to chair.  Patient is tolerating PO.  Complains of moderate pain at the site of the chest tube.  Satting 94% on 50% ventimask (15L), otherwise desatting at random times throughout the day and night.  Patient in NAD.      ANTIMICROBIAL:  piperacillin/tazobactam IVPB. 2.25Gram(s) IV Intermittent every 12 hours  piperacillin/tazobactam IVPB.  IV Intermittent     CARDIOVASCULAR:  valsartan 40milliGRAM(s) Oral daily  amLODIPine   Tablet 10milliGRAM(s) Oral daily  metoprolol 50milliGRAM(s) Oral two times a day  cloNIDine 0.1milliGRAM(s) Oral once    NEUROLOGIC:  acetaminophen   Tablet. 650milliGRAM(s) Oral every 8 hours PRN    HEMATOLOGIC:  aspirin  chewable 81milliGRAM(s) Oral daily  heparin  Injectable 5000Unit(s) SubCutaneous every 12 hours    GATROINTESTINAL:  pantoprazole    Tablet 40milliGRAM(s) Oral before breakfast    ENDO/METABOLIC:  insulin lispro (HumaLOG) corrective regimen sliding scale  SubCutaneous three times a day before meals  dextrose Gel 1Dose(s) Oral once PRN  dextrose 50% Injectable 12.5Gram(s) IV Push once  dextrose 50% Injectable 25Gram(s) IV Push once  dextrose 50% Injectable 25Gram(s) IV Push once  glucagon  Injectable 1milliGRAM(s) IntraMuscular once PRN    IV FLUID/NUTRITION:  dextrose 5%. 1000milliLiter(s) IV Continuous <Continuous>  calcium acetate 667milliGRAM(s) Oral three times a day with meals  calcitriol   Capsule 0.5MICROGram(s) Oral daily  iron sucrose IVPB 100milliGRAM(s) IV Intermittent <User Schedule>    Allergies    No Known Allergies    Vital Signs Last 24 Hrs  T(C): 37.2, Max: 38.3 ( @ 19:46)  T(F): 99, Max: 100.9 ( @ 19:46)  HR: 84 (84 - 107)  BP: 127/84 (127/84 - 188/86)  RR: 18 (18 - 20)  SpO2: 88% (84% - 100%)      Daily     Daily Weight in k.7 (2017 16:50)  I&O's Detail    I & Os for current day (as of 2017 07:29)  =============================================  IN:    Oral Fluid: 480 ml    Solution: 250 ml    IV PiggyBack: 100 ml    Total IN: 830 ml  ---------------------------------------------  OUT:    Other: 2200 ml    Voided: 100 ml    Total OUT: 2300 ml  ---------------------------------------------  Total NET: -1470 ml    REVIEW OF SYSTEMS:    CONSTITUTIONAL: No fever, weight loss, or fatigue  NECK: No pain or stiffness  RESPIRATORY: No cough, wheezing, chills or hemoptysis; mild shortness of breath, annoyed w her ventimask.  CARDIOVASCULAR: No chest pain, palpitations, dizziness, or leg swelling  GASTROINTESTINAL: No abdominal or epigastric pain. No nausea, vomiting, or hematemesis; No diarrhea or constipation. No melena or hematochezia.  GENITOURINARY: No dysuria, frequency, hematuria, or incontinence  NEUROLOGICAL: No headaches, memory loss, loss of strength, numbness, or tremors  SKIN: No itching, burning, rashes, or lesions       PHYSICAL EXAM:    Constitutional: NAD, well developed, well groomed, obese. hair stubble on chin.  Neck: No lymphadenopathy, No JVD  Back: no cva tendereness, no paraspinal muscle tenderness, chest tube in place on the left side, no bleeding, swelling or signs of infection.  Respiratory: decreased air entry b/l, no crackles, wheezing or rhonchi.    Chest: hemodialysis port in place on the right chest without erythema, swelling or drainage.  Cardiovascular:  rrr, s1, s2, no murmurs, no gallops   Gastrointestinal: BS+, obese abdomen, non-tender, no guarding, no rebound, no masses appreciated  Extremities: AV graft fisula in place over the left arm, no palpable thrill present, no edema, no calf tenderness, no cyanosis.  Vascular: 2+ DP/PT pulses, positive popliteal pulses   Neurological: alert and oriented x 3.  Psychiatric: normal affect.      LABS:                        7.3    13.8  )-----------( 331      ( 2017 09:00 )             23.4     CBC Full  -  ( 2017 09:00 )  WBC Count : 13.8 K/uL  Hemoglobin : 7.3 g/dL  Hematocrit : 23.4 %  Platelet Count - Automated : 331 K/uL  Mean Cell Volume : 79.6 fl  Mean Cell Hemoglobin : 24.8 pg  Mean Cell Hemoglobin Concentration : 31.2 g/dL  Auto Neutrophil # : x  Auto Lymphocyte # : x  Auto Monocyte # : x  Auto Eosinophil # : x  Auto Basophil # : x  Auto Neutrophil % : 89.0 %  Auto Lymphocyte % : 4.0 %  Auto Monocyte % : 1.0 %  Auto Eosinophil % : 4.0 %  Auto Basophil % : x        137  |  93<L>  |  48.0<H>  ----------------------------<  173<H>  4.2   |  29.0  |  7.06<H>    Ca    8.5<L>      2017 07:29  Phos  4.4       Mg     2.3     -    TPro  7.9  /  Alb  3.2<L>  /  TBili  0.8  /  DBili  x   /  AST  11  /  ALT  11  /  AlkPhos  137<H>          Hemoglobin A1C, Whole Blood: 7.3 % ( @ 07:09)  Hemoglobin A1C, Whole Blood: 8.0 % ( @ 06:57)    Culture Results:   No growth at 1 day.  Culture in progress ( @ 09:29)    Serum Pro-Brain Natriuretic Peptide: 88359 pg/mL ( @ 00:48)        LIVER FUNCTIONS - ( 2017 07:29 )  Alb: 3.2 g/dL / Pro: 7.9 g/dL / ALK PHOS: 137 U/L / ALT: 11 U/L / AST: 11 U/L / GGT: x             RADIOLOGY & ADDITIONAL TESTS:  EXAM:  CHEST SINGLE VIEW FRONTAL                          PROCEDURE DATE:  2017        INTERPRETATION:  CHEST AP PORTABLE:    History: Shortness of Breath.     Date and time of exam: 2017 5:52 AM.    Technique: A single AP view of the chest was obtained.    Comparison exam: 2017 9:43 PM.    Findings:  Again noted is a right internal jugular central line, unchanged. There is   a left pigtail chest tube, unchanged. Persistent atelectasis at the right   lung base. Atelectasis at theleft lung base as well. No evidence of   pneumothorax..    Impression:  Stable exam without significant change since the previous study.

## 2017-04-07 NOTE — PROGRESS NOTE ADULT - ASSESSMENT
Patient is 37 year old female with past medical history of end stage renal failure on dialysis T/Th/S, hypertension, diabetes, questionable CHF  who comes to [facility] complaining of shortness of breath. Likely fluid overloaded due to renal failure.  Patient also has pneumonia. Patient is 37 year old female with past medical history of end stage renal failure on dialysis T/Th/S, hypertension, diabetes, questionable CHF  who comes to [facility] complaining of shortness of breath. Likely fluid overloaded due to renal failure.  Patient also has pneumonia on zosyn (renal dose) day 2. (has received two doses of vancomycin and one dose of azithromycin during this hospitalization).

## 2017-04-07 NOTE — PROGRESS NOTE ADULT - SUBJECTIVE AND OBJECTIVE BOX
NEPHROLOGY INTERVAL HPI/OVERNIGHT EVENTS:    Examined earlier  Looks comfortable  HD today Anemic will need prbc's    MEDICATIONS  (STANDING):  sodium chloride 0.9% lock flush 3milliLiter(s) IV Push every 8 hours  pantoprazole    Tablet 40milliGRAM(s) Oral before breakfast  insulin lispro (HumaLOG) corrective regimen sliding scale  SubCutaneous three times a day before meals  dextrose 5%. 1000milliLiter(s) IV Continuous <Continuous>  dextrose 50% Injectable 12.5Gram(s) IV Push once  dextrose 50% Injectable 25Gram(s) IV Push once  dextrose 50% Injectable 25Gram(s) IV Push once  aspirin  chewable 81milliGRAM(s) Oral daily  valsartan 40milliGRAM(s) Oral daily  heparin  Injectable 5000Unit(s) SubCutaneous every 12 hours  amLODIPine   Tablet 10milliGRAM(s) Oral daily  calcium acetate 667milliGRAM(s) Oral three times a day with meals  calcitriol   Capsule 0.5MICROGram(s) Oral daily  metoprolol 50milliGRAM(s) Oral two times a day  cloNIDine 0.1milliGRAM(s) Oral once  piperacillin/tazobactam IVPB. 2.25Gram(s) IV Intermittent every 12 hours  piperacillin/tazobactam IVPB.  IV Intermittent   iron sucrose IVPB 100milliGRAM(s) IV Intermittent <User Schedule>    MEDICATIONS  (PRN):  dextrose Gel 1Dose(s) Oral once PRN Blood Glucose LESS THAN 70 milliGRAM(s)/deciliter  glucagon  Injectable 1milliGRAM(s) IntraMuscular once PRN Glucose LESS THAN 70 milligrams/deciliter  acetaminophen   Tablet. 650milliGRAM(s) Oral every 8 hours PRN Moderate Pain (4 - 6)      Allergies    No Known Allergies    Intolerances        Vital Signs Last 24 Hrs  T(C): 37.1, Max: 38.3 (-06 @ 19:46)  T(F): 98.8, Max: 100.9 (- @ 19:46)  HR: 75 (75 - 107)  BP: 131/70 (127/84 - 188/86)  BP(mean): --  RR: 18 (18 - 20)  SpO2: 90% (84% - 100%)  Daily     Daily Weight in k.8 (2017 07:40)    PHYSICAL EXAM:  GENERAL: Weakness  HEAD:  Atraumatic, Normocephalic  EYES: EOMI, PERRLA, conjunctiva and sclera clear  NECK: Supple, No JVD, Normal thyroid  NERVOUS SYSTEM:  Alert & Oriented X3, intact and symmetric  CHEST/LUNG: Diminished BS at bases; L chest tube  HEART: Regular rate and rhythm; No rub  ABDOMEN: Soft, Nontender, Nondistended; Bowel sounds present  EXTREMITIES:  2+ Peripheral Pulses, +edema  LYMPH: No lymphadenopathy noted  SKIN: No rashes or lesions            LABS:                        6.5    12.5  )-----------( 318      ( 2017 08:17 )             21.0     -    138  |  94<L>  |  45.0<H>  ----------------------------<  149<H>  4.1   |  27.0  |  6.58<H>    Ca    7.9<L>      2017 08:17  Phos  4.1       Mg     2.1         TPro  7.2  /  Alb  2.5<L>  /  TBili  0.7  /  DBili  x   /  AST  12  /  ALT  11  /  AlkPhos  120          Magnesium, Serum: 2.1 mg/dL ( @ 08:17)  Phosphorus Level, Serum: 4.1 mg/dL ( @ 08:17)          RADIOLOGY & ADDITIONAL TESTS:

## 2017-04-07 NOTE — PROGRESS NOTE ADULT - ASSESSMENT
ESRD:  HD today with UF; will also need to reestablish outpatient TTS schedule at some point  - AVF maturing==> needs vascular follow up  Pleural effussion: s/p L chest tube Preliminary fluid analysis may be c/w exudate==> await further studies    Anemia: prbc with HD today   TS 5% will hold off on IV iron for now as possibly an infectious process  hold NEELAM for now until malignant effusion excluded    RO: continue Phoslo and Calcitriol

## 2017-04-08 DIAGNOSIS — J18.9 PNEUMONIA, UNSPECIFIED ORGANISM: ICD-10-CM

## 2017-04-08 DIAGNOSIS — N18.6 END STAGE RENAL DISEASE: ICD-10-CM

## 2017-04-08 DIAGNOSIS — A49.9 BACTERIAL INFECTION, UNSPECIFIED: ICD-10-CM

## 2017-04-08 PROCEDURE — 99223 1ST HOSP IP/OBS HIGH 75: CPT

## 2017-04-08 PROCEDURE — 99233 SBSQ HOSP IP/OBS HIGH 50: CPT | Mod: GC

## 2017-04-08 RX ORDER — VANCOMYCIN HCL 1 G
500 VIAL (EA) INTRAVENOUS
Qty: 0 | Refills: 0 | Status: DISCONTINUED | OUTPATIENT
Start: 2017-04-08 | End: 2017-04-08

## 2017-04-08 RX ORDER — CEFEPIME 1 G/1
1000 INJECTION, POWDER, FOR SOLUTION INTRAMUSCULAR; INTRAVENOUS EVERY 24 HOURS
Qty: 0 | Refills: 0 | Status: DISCONTINUED | OUTPATIENT
Start: 2017-04-09 | End: 2017-04-11

## 2017-04-08 RX ORDER — AMLODIPINE BESYLATE 2.5 MG/1
10 TABLET ORAL DAILY
Qty: 0 | Refills: 0 | Status: DISCONTINUED | OUTPATIENT
Start: 2017-04-08 | End: 2017-04-13

## 2017-04-08 RX ORDER — CEFEPIME 1 G/1
INJECTION, POWDER, FOR SOLUTION INTRAMUSCULAR; INTRAVENOUS
Qty: 0 | Refills: 0 | Status: DISCONTINUED | OUTPATIENT
Start: 2017-04-08 | End: 2017-04-11

## 2017-04-08 RX ORDER — SACCHAROMYCES BOULARDII 250 MG
250 POWDER IN PACKET (EA) ORAL
Qty: 0 | Refills: 0 | Status: DISCONTINUED | OUTPATIENT
Start: 2017-04-08 | End: 2017-04-13

## 2017-04-08 RX ORDER — CEFEPIME 1 G/1
1000 INJECTION, POWDER, FOR SOLUTION INTRAMUSCULAR; INTRAVENOUS ONCE
Qty: 0 | Refills: 0 | Status: COMPLETED | OUTPATIENT
Start: 2017-04-08 | End: 2017-04-08

## 2017-04-08 RX ADMIN — PIPERACILLIN AND TAZOBACTAM 200 GRAM(S): 4; .5 INJECTION, POWDER, LYOPHILIZED, FOR SOLUTION INTRAVENOUS at 09:00

## 2017-04-08 RX ADMIN — AMLODIPINE BESYLATE 10 MILLIGRAM(S): 2.5 TABLET ORAL at 06:03

## 2017-04-08 RX ADMIN — Medication 1: at 17:38

## 2017-04-08 RX ADMIN — Medication 81 MILLIGRAM(S): at 13:16

## 2017-04-08 RX ADMIN — Medication 50 MILLIGRAM(S): at 17:38

## 2017-04-08 RX ADMIN — Medication 250 MILLIGRAM(S): at 18:06

## 2017-04-08 RX ADMIN — Medication 1: at 13:16

## 2017-04-08 RX ADMIN — SODIUM CHLORIDE 3 MILLILITER(S): 9 INJECTION INTRAMUSCULAR; INTRAVENOUS; SUBCUTANEOUS at 06:02

## 2017-04-08 RX ADMIN — SODIUM CHLORIDE 3 MILLILITER(S): 9 INJECTION INTRAMUSCULAR; INTRAVENOUS; SUBCUTANEOUS at 11:47

## 2017-04-08 RX ADMIN — Medication 50 MILLIGRAM(S): at 06:08

## 2017-04-08 RX ADMIN — HEPARIN SODIUM 5000 UNIT(S): 5000 INJECTION INTRAVENOUS; SUBCUTANEOUS at 17:38

## 2017-04-08 RX ADMIN — Medication 1: at 09:00

## 2017-04-08 RX ADMIN — PANTOPRAZOLE SODIUM 40 MILLIGRAM(S): 20 TABLET, DELAYED RELEASE ORAL at 06:03

## 2017-04-08 RX ADMIN — CALCITRIOL 0.5 MICROGRAM(S): 0.5 CAPSULE ORAL at 17:38

## 2017-04-08 RX ADMIN — CEFEPIME 100 MILLIGRAM(S): 1 INJECTION, POWDER, FOR SOLUTION INTRAMUSCULAR; INTRAVENOUS at 16:06

## 2017-04-08 RX ADMIN — HEPARIN SODIUM 5000 UNIT(S): 5000 INJECTION INTRAVENOUS; SUBCUTANEOUS at 06:03

## 2017-04-08 RX ADMIN — VALSARTAN 40 MILLIGRAM(S): 80 TABLET ORAL at 06:02

## 2017-04-08 NOTE — PROGRESS NOTE ADULT - ASSESSMENT
ESRD:  HD yest with UF; will also need to reestablish outpatient TTS schedule at some point  - AVF maturing==> needs vascular follow up  Pleural effussion: s/p L chest tube Preliminary fluid analysis may be c/w exudate==> await further studies    Anemia: prbc with HD yest Hg better  TS 5% will hold off on IV iron for now as possibly an infectious process  hold NEELAM for now until malignant effusion excluded    RO: continue Phoslo and Calcitriol

## 2017-04-08 NOTE — PROGRESS NOTE ADULT - PROBLEM SELECTOR PLAN 2
- 800 cc of pleural fluid was drained during this hospital stay.    - chest tube removed yesterday   FDN=427, meets light's criteria for exudative effusion  - glucose=208, albumin=1.8, pH=7.0 likely complicated effusion  -  cytopathology not sent to lab; not enough fluid.  - repeat chest x-ray; no pneumothorax, however patient has atelectasis  - RF, MISSY pending in lab  - fungal culture unable to send because of insufficient fluid - 800 cc of pleural fluid was drained during this hospital stay.    - chest tube removed yesterday   IKC=288, meets light's criteria for exudative effusion  - glucose=208, albumin=1.8, pH=7.0 likely complicated effusion vs. rheumatological disease  -  cytopathology not sent to lab   - repeat chest x-ray; no pneumothorax, however patient has atelectasis  - RF, MISSY pending in lab  - fungal culture unable to send because of insufficient fluid - 800 cc of pleural fluid was drained during this hospital stay.    - chest tube removed yesterday   COV=724, meets light's criteria for exudative effusion  - glucose=208, albumin=1.8, pH=7.0 likely complicated effusion vs. rheumatological disease  -  cytopathology not sent to lab   - repeat chest x-ray; no pneumothorax, however patient has atelectasis  - RF, MISSY pending in lab  - fungal culture unable to send because of insufficient fluid  -cx with gram neg organisms - d/c vanco  YAIMA pepe called

## 2017-04-08 NOTE — PROGRESS NOTE ADULT - SUBJECTIVE AND OBJECTIVE BOX
NEPHROLOGY INTERVAL HPI/OVERNIGHT EVENTS:    Examined earlier  Looks comfortable  HD yest      MEDICATIONS  (STANDING):  sodium chloride 0.9% lock flush 3milliLiter(s) IV Push every 8 hours  pantoprazole    Tablet 40milliGRAM(s) Oral before breakfast  insulin lispro (HumaLOG) corrective regimen sliding scale  SubCutaneous three times a day before meals  dextrose 5%. 1000milliLiter(s) IV Continuous <Continuous>  dextrose 50% Injectable 12.5Gram(s) IV Push once  dextrose 50% Injectable 25Gram(s) IV Push once  dextrose 50% Injectable 25Gram(s) IV Push once  aspirin  chewable 81milliGRAM(s) Oral daily  valsartan 40milliGRAM(s) Oral daily  heparin  Injectable 5000Unit(s) SubCutaneous every 12 hours  calcium acetate 667milliGRAM(s) Oral three times a day with meals  calcitriol   Capsule 0.5MICROGram(s) Oral daily  metoprolol 50milliGRAM(s) Oral two times a day  piperacillin/tazobactam IVPB. 2.25Gram(s) IV Intermittent every 12 hours  piperacillin/tazobactam IVPB.  IV Intermittent   iron sucrose IVPB 100milliGRAM(s) IV Intermittent <User Schedule>  amLODIPine   Tablet 10milliGRAM(s) Oral daily    MEDICATIONS  (PRN):  dextrose Gel 1Dose(s) Oral once PRN Blood Glucose LESS THAN 70 milliGRAM(s)/deciliter  glucagon  Injectable 1milliGRAM(s) IntraMuscular once PRN Glucose LESS THAN 70 milligrams/deciliter  acetaminophen   Tablet. 650milliGRAM(s) Oral every 8 hours PRN Moderate Pain (4 - 6)      Allergies    No Known Allergies    Intolerances        Vital Signs Last 24 Hrs  T(C): 36.8, Max: 37.2 (- @ 12:15)  T(F): 98.3, Max: 98.9 (- @ 12:15)  HR: 88 (87 - 98)  BP: 143/85 (143/85 - 166/58)  BP(mean): --  RR: 20 (18 - 20)  SpO2: 94% (94% - 95%)  Daily     Daily Weight in k.5 (2017 12:15)    PHYSICAL EXAM:  GENERAL: Weakness  HEAD:  NCAT  EYES: EOMI  NECK: Supple, No JVD  NERVOUS SYSTEM:  Alert & Oriented X3  CHEST/LUNG: Diminished BS at bases; L chest tube  HEART: Regular rate and rhythm; No rub  ABDOMEN: Soft, Nontender, Nondistended  EXTREMITIES:  2+ Peripheral Pulses, +edema          LABS:                        9.0    13.8  )-----------( 331      ( 2017 15:07 )             28.8     04-07    133<L>  |  91<L>  |  21.0<H>  ----------------------------<  157<H>  3.9   |  32.0<H>  |  3.67<H>    Ca    8.6      2017 15:07  Phos  4.1     04-07  Mg     2.1     -07    TPro  7.2  /  Alb  2.5<L>  /  TBili  0.7  /  DBili  x   /  AST  12  /  ALT  11  /  AlkPhos  120  -                RADIOLOGY & ADDITIONAL TESTS:

## 2017-04-08 NOTE — PROGRESS NOTE ADULT - SUBJECTIVE AND OBJECTIVE BOX
Patient is a 37y old  Female who presents with a chief complaint of shortness of breath (04 Apr 2017 23:54)      INTERVAL HPI/OVERNIGHT EVENTS:  37y  Female past medical history of end stage renal failure on dialysis T/Th/S, hypertension, diabetes, questionable CHF  who comes to [facility] complaining of shortness of breath.  Patient states that shortness of breath has somewhat improved overnight. Patient is now on ventimask, satting 94%.    Patient is tolerating PO.  Chest tube was taken out yesterday.  Complains of moderate pain at the site of the chest tube.  Patient is getting out of bed to [facility], urianting, had 2 non-diarrheal bowel movements yesterday.     Monitor:       No Known Allergies    Vital Signs Last 24 Hrs  ICU Vital Signs Last 24 Hrs  T(C): 36.8, Max: 37.2 (04-07 @ 12:15)  T(F): 98.3, Max: 98.9 (04-07 @ 12:15)  HR: 88 (75 - 98)  BP: 143/85 (131/70 - 166/58)  BP(mean): --  ABP: --  ABP(mean): --  RR: 20 (18 - 20)  SpO2: 94% (90% - 95%)    SpO2: 90-94% on ventimask       I&O's Summary  I & Os for 24h ending 07 Apr 2017 07:00  =============================================  IN: 830 ml / OUT: 2300 ml / NET: -1470 ml    I & Os for current day (as of 08 Apr 2017 06:54)  =============================================  IN: 100 ml / OUT: 2300 ml / NET: -2200 ml        PHYSICAL EXAM:    Constitutional: NAD, well developed, well groomed, obese.    Neck: No lymphadenopathy, No JVD  Back: no cva tendereness, no paraspinal muscle tenderness, chest tube in place on the left side, no bleeding, swelling or signs of infection.  Respiratory: decreased air entry b/l, marked crackles bilaterally in the lung bases, has wheezing on the left upper lobe of the lung  Chest: hemodialysis port in place on the right chest without erythema, swelling or drainage.  Cardiovascular:  rrr, s1, s2, no murmurs, no gallops   Gastrointestinal: BS+, obese abdomen, non-tender, no guarding, no rebound, no masses appreciated  Extremities: AV graft fisula in place over the left arm, no palpable thrill present, no edema, no calf tenderness, no cyanosis.  Vascular: 2+ DP/PT pulses, positive popliteal pulses   Neurological: alert and oriented x 3.  Psychiatric: normal affect.      LABS:                                   9.0    13.8  )-----------( 331      ( 07 Apr 2017 15:07 )             28.8       04-07    133<L>  |  91<L>  |  21.0<H>  ----------------------------<  157<H>  3.9   |  32.0<H>  |  3.67<H>    Ca    8.6      07 Apr 2017 15:07  Phos  4.1     04-07  Mg     2.1     04-07    TPro  7.2  /  Alb  2.5<L>  /  TBili  0.7  /  DBili  x   /  AST  12  /  ALT  11  /  AlkPhos  120  04-07            Hemoglobin A1C, Whole Blood: 7.3 % (04-05 @ 07:09)  Hemoglobin A1C, Whole Blood: 8.0 % (01-27 @ 06:57)    Culture Results:   No growth at 1 day.  Culture in progress (04-05 @ 09:29)    Serum Pro-Brain Natriuretic Peptide: 45314 pg/mL (04-05 @ 00:48)        LIVER FUNCTIONS - ( 06 Apr 2017 07:29 )  Alb: 3.2 g/dL / Pro: 7.9 g/dL / ALK PHOS: 137 U/L / ALT: 11 U/L / AST: 11 U/L / GGT: x             RADIOLOGY & ADDITIONAL TESTS:  EXAM:  CHEST SINGLE VIEW FRONTAL                          PROCEDURE DATE:  04/07/2017        INTERPRETATION:  CHEST AP PORTABLE:    History: Shortness of Breath.     Date and time of exam: 4/7/2017 5:52 AM.    Technique: A single AP view of the chest was obtained.    Comparison exam: 4/6/2017 9:43 PM.    Findings:  Again noted is a right internal jugular central line, unchanged. There is   a left pigtail chest tube, unchanged. Persistent atelectasis at the right   lung base. Atelectasis at theleft lung base as well. No evidence of   pneumothorax..    Impression:  Stable exam without significant change since the previous study.      CT scan:  multifocal pneumonia  - x-ray: persistent atelectasis of the lung bases Patient is a 37y old  Female who presents with a chief complaint of shortness of breath (04 Apr 2017 23:54)    INTERVAL HPI/OVERNIGHT EVENTS:  37y  Female past medical history of end stage renal failure on dialysis T/Th/S, hypertension, diabetes, questionable CHF  who comes to [facility] complaining of shortness of breath.  Patient states that shortness of breath has somewhat improved overnight. Patient is now on ventimask, satting 94%.   Patient is tolerating PO.  Chest tube was taken out yesterday.  Complains of moderate pain at the site of the chest tube.  Patient is getting out of bed to [facility], urianting, had 2 non-diarrheal bowel movements yesterday.     Tele: desat to high 70' s overnight - no symptoms   Patient seen and examined at bedside. NAD. States that she feels slightly better. with o2 on and has not been on O2 before       No Known Allergies    Vital Signs Last 24 Hrs  ICU Vital Signs Last 24 Hrs  T(C): 36.8, Max: 37.2 (04-07 @ 12:15)  T(F): 98.3, Max: 98.9 (04-07 @ 12:15)  HR: 88 (75 - 98)  BP: 143/85 (131/70 - 166/58)  BP(mean): --  ABP: --  ABP(mean): --  RR: 20 (18 - 20)  SpO2: 94% (90% - 95%)    SpO2: 90-94% on ventimask       I&O's Summary  I & Os for 24h ending 07 Apr 2017 07:00  =============================================  IN: 830 ml / OUT: 2300 ml / NET: -1470 ml    I & Os for current day (as of 08 Apr 2017 06:54)  =============================================  IN: 100 ml / OUT: 2300 ml / NET: -2200 ml        PHYSICAL EXAM:    Constitutional: NAD, well developed, well groomed, obese.    Neck: No lymphadenopathy, No JVD  Back: no cva tendereness, no paraspinal muscle tenderness, chest tube in place on the left side, no bleeding, swelling or signs of infection.  Respiratory: decreased air entry b/l, marked crackles bilaterally in the lung bases, has wheezing on the left upper lobe of the lung  Chest: hemodialysis port in place on the right chest without erythema, swelling or drainage.  Cardiovascular:  rrr, s1, s2, no murmurs, no gallops   Gastrointestinal: BS+, obese abdomen, non-tender, no guarding, no rebound, no masses appreciated  Extremities: AV graft fisula in place over the left arm, no palpable thrill present, no edema, no calf tenderness, no cyanosis.  Vascular: 2+ DP/PT pulses, positive popliteal pulses   Neurological: alert and oriented x 3.  Psychiatric: normal affect.      LABS:                                   9.0    13.8  )-----------( 331      ( 07 Apr 2017 15:07 )             28.8       04-07    133<L>  |  91<L>  |  21.0<H>  ----------------------------<  157<H>  3.9   |  32.0<H>  |  3.67<H>    Ca    8.6      07 Apr 2017 15:07  Phos  4.1     04-07  Mg     2.1     04-07    TPro  7.2  /  Alb  2.5<L>  /  TBili  0.7  /  DBili  x   /  AST  12  /  ALT  11  /  AlkPhos  120  04-07            Hemoglobin A1C, Whole Blood: 7.3 % (04-05 @ 07:09)  Hemoglobin A1C, Whole Blood: 8.0 % (01-27 @ 06:57)    Culture Results:   No growth at 1 day.  Culture in progress (04-05 @ 09:29)    Serum Pro-Brain Natriuretic Peptide: 50689 pg/mL (04-05 @ 00:48)        LIVER FUNCTIONS - ( 06 Apr 2017 07:29 )  Alb: 3.2 g/dL / Pro: 7.9 g/dL / ALK PHOS: 137 U/L / ALT: 11 U/L / AST: 11 U/L / GGT: x             RADIOLOGY & ADDITIONAL TESTS:  EXAM:  CHEST SINGLE VIEW FRONTAL                          PROCEDURE DATE:  04/07/2017        INTERPRETATION:  CHEST AP PORTABLE:    History: Shortness of Breath.     Date and time of exam: 4/7/2017 5:52 AM.    Technique: A single AP view of the chest was obtained.    Comparison exam: 4/6/2017 9:43 PM.    Findings:  Again noted is a right internal jugular central line, unchanged. There is   a left pigtail chest tube, unchanged. Persistent atelectasis at the right   lung base. Atelectasis at theleft lung base as well. No evidence of   pneumothorax..    Impression:  Stable exam without significant change since the previous study.      CT scan:  multifocal pneumonia  - x-ray: persistent atelectasis of the lung bases

## 2017-04-08 NOTE — PROGRESS NOTE ADULT - PROBLEM SELECTOR PLAN 1
- patient says shortness of breath has improved somewhat  - still has non-productive cough  - x-ray shows persistent infilitrate as well as atelectasis, will be placed on incentive spiromtery  - blood cultures are in lab, sputum cultures are pending collection  - satting 94% on 50% ventimask (10L)  - patient got dialysis yesterday  (usually getting dialysis T/T/S)  - cause of respiratory distress is likely combination of atelectasis, pneumonia and fluid overload  - cotninue incentive spirometry - likely 2/2 pleural effusion and MFPNA   -patient says shortness of breath has improved somewhat  -non productive cough - add mucomyst   -  incentive spiromtery  - blood cultures are in lab, sputum cultures are pending collection  - satting 94% on 50% ventimask (10L)  - patient got dialysis yesterday  (usually getting dialysis T/T/S)  - cause of respiratory distress is likely combination of atelectasis, pneumonia and fluid overload

## 2017-04-08 NOTE — PROGRESS NOTE ADULT - PROBLEM SELECTOR PLAN 3
- no episodes of chest pain in the last 24 hours   - troponin 0.10 --> 0.15   --> 0.14, likely because of renal failure   - on beta-blocker, ace and aspirin  - TTE= normal, BNP 17,000  - if repeat chest pain will do ck-mb, troponins - no episodes of chest pain in the last 24 hours   - troponin 0.10 --> 0.15   --> 0.14, likely because of renal failure   - on beta-blocker, ace and aspirin  - TTE= normal, BNP 17,000  - if repeat chest pain will do ck-mb, troponins  - seen by cardiology Dr. Alvarez

## 2017-04-08 NOTE — CONSULT NOTE ADULT - SUBJECTIVE AND OBJECTIVE BOX
[facility] INFECTIOUS DISEASES AND INTERNAL MEDICINE OF [place]  =======================================================  Ned Bangura MD [facility]   Sen De MD  Diplomates American Board of Internal Medicine and Infectious Diseases  =======================================================    [id number]  SORAYA JIMENEZ is a 37y  Female     This 37 y.o. F With ESRD on HD TTS, HTN, Dm2, h/o CHF who was admitted for SOB on 4/4/17.  Workup in hospital showed a CT scan of chest with Small left pleural effusion with consolidation in the left   lung and scattered opacities in the right lung, suspicious for multifocal pneumonia.    patient had placement of a left sided pigtail catheter for drainage. Fluid sent on 4/*6/17 showed Gram negative rods on cultures.  Blood and sputum cultures WERE NOT sent. patient had been on Zosyn 4/5-4/8, Vanco from 4/5 to 4/8, and Ceftriaxone and Azithro on 4/5/17.    =======================================================  Past Medical & Surgical Hx:  =====================  PAST MEDICAL & SURGICAL HISTORY:  End stage renal disease  Hypertension  Diabetes  No significant past surgical history      Problem List:  ==========  HEALTH ISSUES - PROBLEM Dx:  Type 2 diabetes mellitus with other kidney complication: Type 2 diabetes mellitus with other kidney complication  Essential hypertension: Essential hypertension  Nonspecific abnormal electrocardiogram (ECG): Nonspecific abnormal electrocardiogram (ECG)  Prolonged QT interval: Prolonged QT interval  Diabetes: Diabetes  INR ([employer] normal ratio) abnormal: INR ([employer] normal ratio) abnormal  Atypical chest pain: Atypical chest pain  Need for prophylactic measure: Need for prophylactic measure  Microcytic anemia: Microcytic anemia  Respiratory distress: Respiratory distress  Hypertension: Hypertension  End stage renal disease: End stage renal disease  Pleural effusion: Pleural effusion          Social Hx:  =======    FAMILY HISTORY:  Family history of renal failure (Mother): mother had dialysis in her mid 60s  Family history of diabetes mellitus (DM)      Allergies    No Known Allergies    Intolerances        MEDICATIONS  (STANDING):  sodium chloride 0.9% lock flush 3milliLiter(s) IV Push every 8 hours  pantoprazole    Tablet 40milliGRAM(s) Oral before breakfast  insulin lispro (HumaLOG) corrective regimen sliding scale  SubCutaneous three times a day before meals  dextrose 5%. 1000milliLiter(s) IV Continuous <Continuous>  dextrose 50% Injectable 12.5Gram(s) IV Push once  dextrose 50% Injectable 25Gram(s) IV Push once  dextrose 50% Injectable 25Gram(s) IV Push once  aspirin  chewable 81milliGRAM(s) Oral daily  valsartan 40milliGRAM(s) Oral daily  heparin  Injectable 5000Unit(s) SubCutaneous every 12 hours  calcium acetate 667milliGRAM(s) Oral three times a day with meals  calcitriol   Capsule 0.5MICROGram(s) Oral daily  metoprolol 50milliGRAM(s) Oral two times a day  iron sucrose IVPB 100milliGRAM(s) IV Intermittent <User Schedule>  amLODIPine   Tablet 10milliGRAM(s) Oral daily  cefepime  IVPB  IV Intermittent     MEDICATIONS  (PRN):  dextrose Gel 1Dose(s) Oral once PRN Blood Glucose LESS THAN 70 milliGRAM(s)/deciliter  glucagon  Injectable 1milliGRAM(s) IntraMuscular once PRN Glucose LESS THAN 70 milligrams/deciliter  acetaminophen   Tablet. 650milliGRAM(s) Oral every 8 hours PRN Moderate Pain (4 - 6)        ANTIBIOTICS:      =======================================================  REVIEW OF SYSTEMS:  CONSTITUTIONAL:  as per HPI  HEENT:  Eyes:  No diplopia or blurred vision. ENT:  No earache, sore throat or runny nose.  CARDIOVASCULAR:  No pressure, squeezing, strangling, tightness, heaviness or aching about the chest, neck, axilla or epigastrium.  RESPIRATORY:  No cough, shortness of breath, PND or orthopnea.  GASTROINTESTINAL:  No nausea, vomiting or diarrhea.  GENITOURINARY:  No dysuria, frequency or urgency. No Blood in urine  MUSCULOSKELETAL:  no joint aches, no muscle pain  SKIN:  No change in skin, hair or nails.  NEUROLOGIC:  No paresthesias, fasciculations, seizures or weakness.  PSYCHIATRIC:  No disorder of thought or mood.  ENDOCRINE:  No heat or cold intolerance, polyuria or polydipsia.  HEMATOLOGICAL:  No easy bruising or bleeding.     =======================================================  I&O's Detail    I & Os for current day (as of 08 Apr 2017 11:52)  =============================================  IN:    Solution: 100 ml    Total IN: 100 ml  ---------------------------------------------  OUT:    Other: 2300 ml    Total OUT: 2300 ml  ---------------------------------------------  Total NET: -2200 ml      Physical Exam:  ============  Vital Signs Last 24 Hrs  T(C): 36.6, Max: 37.2 (04-07 @ 12:15)  T(F): 97.9, Max: 98.9 (04-07 @ 12:15)  HR: 83 (83 - 98)  BP: 136/80 (136/80 - 166/58)  BP(mean): --  RR: 18 (18 - 20)  SpO2: 95% (94% - 95%)    GEN: NAD, pleasant  HEENT: normocephalic and atraumatic. EOMI. WILDA.    NECK: Supple. No carotid bruits.  No lymphadenopathy or thyromegaly.  LUNGS: Clear to auscultation.  HEART: Regular rate and rhythm without murmur.  ABDOMEN: Soft, nontender, and nondistended.  Positive bowel sounds.    EXTREMITIES: Without any cyanosis, clubbing, rash, lesions or edema.  NEUROLOGIC: Cranial nerves II through XII are grossly intact.  PSYCHIATRIC: Appropriate affect .  SKIN: No ulceration or induration present.    =======================================================  Labs:  ====   04-07    133<L>  |  91<L>  |  21.0<H>  ----------------------------<  157<H>  3.9   |  32.0<H>  |  3.67<H>    Ca    8.6      07 Apr 2017 15:07  Phos  4.1     04-07  Mg     2.1     04-07    TPro  7.2  /  Alb  2.5<L>  /  TBili  0.7  /  DBili  x   /  AST  12  /  ALT  11  /  AlkPhos  120  04-07                          9.0    13.8  )-----------( 331      ( 07 Apr 2017 15:07 )             28.8           LIVER FUNCTIONS - ( 07 Apr 2017 08:17 )  Alb: 2.5 g/dL / Pro: 7.2 g/dL / ALK PHOS: 120 U/L / ALT: 11 U/L / AST: 12 U/L / GGT: x               CAPILLARY BLOOD GLUCOSE        RECENT CULTURES:  04-06 .Blood Blood XXXX XXXX   No growth at 48 hours    04-05 .Body Fluid Pleural Fluid XXXX   No White blood cells  No organisms seen   Gram Negative Rods Growth in fluid media only Identification and  susceptibility to follow.  Culture in progress    ============================     EXAM:  CHEST SINGLE VIEW FRONTAL                          PROCEDURE DATE:  04/07/2017        INTERPRETATION:  CHEST AP PORTABLE:    History: d/c CT.     Date and time of exam: 4/7/2017 3:33 PM.    Technique: A single AP view of the chest was obtained.    Comparison exam: 4/7/2017 5:52 AM.    Findings:  The left pigtail chest tube has been removed since the prior examination.   No evidence of pneumothorax. Persistent left basilar infiltrate or   atelectasis. Persistent right basilar atelectasis..    Impression:  Removal of left chest tube since prior study. No evidence of   pneumothorax..        ===============   EXAM:  CT CHEST                          PROCEDURE DATE:  04/05/2017        INTERPRETATION:  EXAMINATION DATE: April 5, 2017 at 0103 hours.  CLINICAL INFORMATION: Left pleural effusion.    COMPARISON: None.    PROCEDURE:   CT of the Chest was performed without intravenous contrast.  Sagittal and coronal reformats were performed.    FINDINGS:    LUNGS AND LARGE AIRWAYS: Patchy opacities in the left lung with scattered   patchy opacities in the right lung, suspicious for multifocal pneumonia.  PLEURA: Small left pleural effusion.  VESSELS: Right-sided central venous catheter with tip terminating in the   superior cavoatrial junction.   HEART: Heart size is normal. No pericardial effusion.  MEDIASTINUM AND SHANITA: Subcentimeter short axis mediastinal lymph nodes.  CHEST WALL AND LOWER NECK: Within normal limits.  VISUALIZED UPPER ABDOMEN: Within normal limits.  BONES: Degenerative changes of the spine.    IMPRESSION: Small left pleural effusion with consolidation in the left   lung and scattered opacities in the right lung, suspicious for multifocal   pneumonia. [facility] INFECTIOUS DISEASES AND INTERNAL MEDICINE OF [place]  =======================================================  Ned Bangura MD [facility]   Sen De MD  Diplomates American Board of Internal Medicine and Infectious Diseases  =======================================================    [id number]  SORAYA JIMENEZ is a 37y  Female     This 37 y.o. F With ESRD on HD TTS, HTN, Dm2, h/o CHF who was admitted for SOB on 4/4/17.  Workup in hospital showed a CT scan of chest with Small left pleural effusion with consolidation in the left lung and scattered opacities in the right lung, suspicious for multifocal pneumonia.    patient had placement of a left sided pigtail catheter for drainage. Fluid sent on 4/*6/17 showed Gram negative rods on cultures. Pigtail was removed on 4/7/17.  Blood and sputum cultures WERE NOT sent. patient had been on Zosyn 4/5-4/8, Vanco from 4/5 to 4/8, and Ceftriaxone and Azithro on 4/5/17.    Prior to coming in, patient said she had 72 hours of SOB and crushing sensation when breathing. Denies sick contacts, no recent travel.   =======================================================  Past Medical & Surgical Hx:  =====================  PAST MEDICAL & SURGICAL HISTORY:  End stage renal disease  Hypertension  Diabetes  No significant past surgical history      Problem List:  ==========  HEALTH ISSUES - PROBLEM Dx:  Type 2 diabetes mellitus with other kidney complication: Type 2 diabetes mellitus with other kidney complication  Essential hypertension: Essential hypertension  Nonspecific abnormal electrocardiogram (ECG): Nonspecific abnormal electrocardiogram (ECG)  Prolonged QT interval: Prolonged QT interval  Diabetes: Diabetes  INR ([employer] normal ratio) abnormal: INR ([employer] normal ratio) abnormal  Atypical chest pain: Atypical chest pain  Need for prophylactic measure: Need for prophylactic measure  Microcytic anemia: Microcytic anemia  Respiratory distress: Respiratory distress  Hypertension: Hypertension  End stage renal disease: End stage renal disease  Pleural effusion: Pleural effusion          Social Hx:  =======  no current toxic habits.   denies IVDU    FAMILY HISTORY:  Family history of renal failure (Mother): mother had dialysis in her mid 60s  Family history of diabetes mellitus (DM)      Allergies    No Known Allergies    Intolerances        MEDICATIONS  (STANDING):  sodium chloride 0.9% lock flush 3milliLiter(s) IV Push every 8 hours  pantoprazole    Tablet 40milliGRAM(s) Oral before breakfast  insulin lispro (HumaLOG) corrective regimen sliding scale  SubCutaneous three times a day before meals  dextrose 5%. 1000milliLiter(s) IV Continuous <Continuous>  dextrose 50% Injectable 12.5Gram(s) IV Push once  dextrose 50% Injectable 25Gram(s) IV Push once  dextrose 50% Injectable 25Gram(s) IV Push once  aspirin  chewable 81milliGRAM(s) Oral daily  valsartan 40milliGRAM(s) Oral daily  heparin  Injectable 5000Unit(s) SubCutaneous every 12 hours  calcium acetate 667milliGRAM(s) Oral three times a day with meals  calcitriol   Capsule 0.5MICROGram(s) Oral daily  metoprolol 50milliGRAM(s) Oral two times a day  iron sucrose IVPB 100milliGRAM(s) IV Intermittent <User Schedule>  amLODIPine   Tablet 10milliGRAM(s) Oral daily  cefepime  IVPB  IV Intermittent     MEDICATIONS  (PRN):  dextrose Gel 1Dose(s) Oral once PRN Blood Glucose LESS THAN 70 milliGRAM(s)/deciliter  glucagon  Injectable 1milliGRAM(s) IntraMuscular once PRN Glucose LESS THAN 70 milligrams/deciliter  acetaminophen   Tablet. 650milliGRAM(s) Oral every 8 hours PRN Moderate Pain (4 - 6)        =======================================================  REVIEW OF SYSTEMS:  CONSTITUTIONAL:  as per HPI  HEENT:  Eyes:  No diplopia or blurred vision. ENT:  No earache, sore throat or runny nose.  CARDIOVASCULAR:  No pressure, squeezing, strangling, tightness, heaviness or aching about the chest, neck, axilla or epigastrium.  RESPIRATORY:  No cough, shortness of breath, PND or orthopnea.  GASTROINTESTINAL:  No nausea, vomiting or diarrhea.  GENITOURINARY:  No dysuria, frequency or urgency. No Blood in urine  MUSCULOSKELETAL:  no joint aches, no muscle pain  SKIN:  No change in skin, hair or nails.  NEUROLOGIC:  No paresthesias, fasciculations, seizures or weakness.  PSYCHIATRIC:  No disorder of thought or mood.  ENDOCRINE:  No heat or cold intolerance, polyuria or polydipsia.  HEMATOLOGICAL:  No easy bruising or bleeding.     =======================================================  I&O's Detail    I & Os for current day (as of 08 Apr 2017 11:52)  =============================================  IN:    Solution: 100 ml    Total IN: 100 ml  ---------------------------------------------  OUT:    Other: 2300 ml    Total OUT: 2300 ml  ---------------------------------------------  Total NET: -2200 ml      Physical Exam:  ============  Vital Signs Last 24 Hrs  T(C): 36.6, Max: 37.2 (04-07 @ 12:15)  T(F): 97.9, Max: 98.9 (04-07 @ 12:15)  HR: 83 (83 - 98)  BP: 136/80 (136/80 - 166/58)  BP(mean): --  RR: 18 (18 - 20)  SpO2: 95% (94% - 95%)    GEN: NAD, pleasant, obese  HEENT: normocephalic and atraumatic. EOMI. WILDA.    NECK: Supple. No carotid bruits.  No lymphadenopathy or thyromegaly.  LUNGS: diminished breath sounds at bases  CHEST: Right subclav tunneled HD cath  HEART: Regular rate and rhythm without murmur.  ABDOMEN: Soft, nontender, and nondistended.  Positive bowel sounds.    EXTREMITIES: Without any cyanosis, clubbing, rash, lesions or edema.  NEUROLOGIC: Cranial nerves II through XII are grossly intact.  PSYCHIATRIC: Appropriate affect .  SKIN: No ulceration or induration present.    =======================================================  Labs:  ====   04-07    133<L>  |  91<L>  |  21.0<H>  ----------------------------<  157<H>  3.9   |  32.0<H>  |  3.67<H>    Ca    8.6      07 Apr 2017 15:07  Phos  4.1     04-07  Mg     2.1     04-07    TPro  7.2  /  Alb  2.5<L>  /  TBili  0.7  /  DBili  x   /  AST  12  /  ALT  11  /  AlkPhos  120  04-07                          9.0    13.8  )-----------( 331      ( 07 Apr 2017 15:07 )             28.8           LIVER FUNCTIONS - ( 07 Apr 2017 08:17 )  Alb: 2.5 g/dL / Pro: 7.2 g/dL / ALK PHOS: 120 U/L / ALT: 11 U/L / AST: 12 U/L / GGT: x               CAPILLARY BLOOD GLUCOSE        RECENT CULTURES:  04-06 .Blood Blood XXXX XXXX   No growth at 48 hours    04-05 .Body Fluid Pleural Fluid XXXX   No White blood cells  No organisms seen   Gram Negative Rods Growth in fluid media only Identification and  susceptibility to follow.  Culture in progress    ============================     EXAM:  CHEST SINGLE VIEW FRONTAL                          PROCEDURE DATE:  04/07/2017        INTERPRETATION:  CHEST AP PORTABLE:    History: d/c CT.     Date and time of exam: 4/7/2017 3:33 PM.    Technique: A single AP view of the chest was obtained.    Comparison exam: 4/7/2017 5:52 AM.    Findings:  The left pigtail chest tube has been removed since the prior examination.   No evidence of pneumothorax. Persistent left basilar infiltrate or   atelectasis. Persistent right basilar atelectasis..    Impression:  Removal of left chest tube since prior study. No evidence of   pneumothorax..        ===============   EXAM:  CT CHEST                          PROCEDURE DATE:  04/05/2017        INTERPRETATION:  EXAMINATION DATE: April 5, 2017 at 0103 hours.  CLINICAL INFORMATION: Left pleural effusion.    COMPARISON: None.    PROCEDURE:   CT of the Chest was performed without intravenous contrast.  Sagittal and coronal reformats were performed.    FINDINGS:    LUNGS AND LARGE AIRWAYS: Patchy opacities in the left lung with scattered   patchy opacities in the right lung, suspicious for multifocal pneumonia.  PLEURA: Small left pleural effusion.  VESSELS: Right-sided central venous catheter with tip terminating in the   superior cavoatrial junction.   HEART: Heart size is normal. No pericardial effusion.  MEDIASTINUM AND SHANITA: Subcentimeter short axis mediastinal lymph nodes.  CHEST WALL AND LOWER NECK: Within normal limits.  VISUALIZED UPPER ABDOMEN: Within normal limits.  BONES: Degenerative changes of the spine.    IMPRESSION: Small left pleural effusion with consolidation in the left   lung and scattered opacities in the right lung, suspicious for multifocal   pneumonia.

## 2017-04-08 NOTE — PROGRESS NOTE ADULT - PROBLEM SELECTOR PLAN 4
- total iron decreased, ferritin normal, trasnferrin normal  - likely anemia due to ESRD   - continue IV venofer  - got 2 units of pRBC yesterday during dialysis

## 2017-04-08 NOTE — CONSULT NOTE ADULT - PROBLEM SELECTOR PROBLEM 4
Atypical chest pain
ESRD (end stage renal disease) on dialysis
Nonspecific abnormal electrocardiogram (ECG)

## 2017-04-08 NOTE — PROGRESS NOTE ADULT - ASSESSMENT
Patient is 37 year old female with past medical history of end stage renal failure on dialysis T/Th/S, hypertension, diabetes, questionable CHF  who comes to [facility] complaining of shortness of breath. Likely fluid overloaded due to renal failure.  Patient also has pneumonia on zosyn (renal dose) day 4. (has received two doses of vancomycin and one dose of azithromycin during this hospitalization). Patient is 37 year old female with past medical history of end stage renal failure on dialysis T/Th/S, hypertension, diabetes, questionable CHF  who comes to [facility] complaining of shortness of breath. Likely fluid overloaded due to renal failure.  Patient also has pneumonia on zosyn (renal dose) day 4. (has received two doses of vancomycin and one dose of azithromycin during this hospitalization). Patient reports feeling better.

## 2017-04-08 NOTE — CONSULT NOTE ADULT - PROBLEM SELECTOR RECOMMENDATION 4
nonspecific findings  In absence of angina or regional wall motion abnormalities on echo, would not do any further workup  can follow up as an outpatient  if she complains of acute chest pain, repeat 12 lead ECG and obtain CK, CK-MB as Troponin will likely be false elevated due to renal failure
- continue HD  - dosing abx for renal dysfunction

## 2017-04-09 LAB
-  AMIKACIN: SIGNIFICANT CHANGE UP
-  AMPICILLIN/SULBACTAM: SIGNIFICANT CHANGE UP
-  AMPICILLIN: SIGNIFICANT CHANGE UP
-  AZTREONAM: SIGNIFICANT CHANGE UP
-  CEFAZOLIN: SIGNIFICANT CHANGE UP
-  CEFEPIME: SIGNIFICANT CHANGE UP
-  CEFOXITIN: SIGNIFICANT CHANGE UP
-  CEFTAZIDIME: SIGNIFICANT CHANGE UP
-  CEFTRIAXONE: SIGNIFICANT CHANGE UP
-  CIPROFLOXACIN: SIGNIFICANT CHANGE UP
-  ERTAPENEM: SIGNIFICANT CHANGE UP
-  GENTAMICIN: SIGNIFICANT CHANGE UP
-  IMIPENEM: SIGNIFICANT CHANGE UP
-  LEVOFLOXACIN: SIGNIFICANT CHANGE UP
-  MEROPENEM: SIGNIFICANT CHANGE UP
-  PIPERACILLIN/TAZOBACTAM: SIGNIFICANT CHANGE UP
-  TOBRAMYCIN: SIGNIFICANT CHANGE UP
-  TRIMETHOPRIM/SULFAMETHOXAZOLE: SIGNIFICANT CHANGE UP
BASOPHILS # BLD AUTO: 0 K/UL — SIGNIFICANT CHANGE UP (ref 0–0.2)
BASOPHILS NFR BLD AUTO: 0.4 % — SIGNIFICANT CHANGE UP (ref 0–2)
EOSINOPHIL # BLD AUTO: 0.6 K/UL — HIGH (ref 0–0.5)
EOSINOPHIL NFR BLD AUTO: 8.6 % — HIGH (ref 0–6)
HCT VFR BLD CALC: 26.9 % — LOW (ref 37–47)
HCT VFR BLD CALC: 28.4 % — LOW (ref 37–47)
HGB BLD-MCNC: 8.5 G/DL — LOW (ref 12–16)
HGB BLD-MCNC: 8.9 G/DL — LOW (ref 12–16)
HIV 1 & 2 AB SERPL IA.RAPID: SIGNIFICANT CHANGE UP
LYMPHOCYTES # BLD AUTO: 1.2 K/UL — SIGNIFICANT CHANGE UP (ref 1–4.8)
LYMPHOCYTES # BLD AUTO: 15.6 % — LOW (ref 20–55)
MAGNESIUM SERPL-MCNC: 2.3 MG/DL — SIGNIFICANT CHANGE UP (ref 1.8–2.5)
MCHC RBC-ENTMCNC: 25.4 PG — LOW (ref 27–31)
MCHC RBC-ENTMCNC: 25.4 PG — LOW (ref 27–31)
MCHC RBC-ENTMCNC: 31.3 G/DL — LOW (ref 32–36)
MCHC RBC-ENTMCNC: 31.6 G/DL — LOW (ref 32–36)
MCV RBC AUTO: 80.5 FL — LOW (ref 81–99)
MCV RBC AUTO: 80.9 FL — LOW (ref 81–99)
METHOD TYPE: SIGNIFICANT CHANGE UP
MONOCYTES # BLD AUTO: 0.4 K/UL — SIGNIFICANT CHANGE UP (ref 0–0.8)
MONOCYTES NFR BLD AUTO: 5.6 % — SIGNIFICANT CHANGE UP (ref 3–10)
NEUTROPHILS # BLD AUTO: 5.2 K/UL — SIGNIFICANT CHANGE UP (ref 1.8–8)
NEUTROPHILS NFR BLD AUTO: 69.4 % — SIGNIFICANT CHANGE UP (ref 37–73)
PHOSPHATE SERPL-MCNC: 3.7 MG/DL — SIGNIFICANT CHANGE UP (ref 2.4–4.7)
PLATELET # BLD AUTO: 360 K/UL — SIGNIFICANT CHANGE UP (ref 150–400)
PLATELET # BLD AUTO: 364 K/UL — SIGNIFICANT CHANGE UP (ref 150–400)
RBC # BLD: 3.34 M/UL — LOW (ref 4.4–5.2)
RBC # BLD: 3.51 M/UL — LOW (ref 4.4–5.2)
RBC # FLD: 17.1 % — HIGH (ref 11–15.6)
RBC # FLD: 17.3 % — HIGH (ref 11–15.6)
WBC # BLD: 7.5 K/UL — SIGNIFICANT CHANGE UP (ref 4.8–10.8)
WBC # BLD: 8.6 K/UL — SIGNIFICANT CHANGE UP (ref 4.8–10.8)
WBC # FLD AUTO: 7.5 K/UL — SIGNIFICANT CHANGE UP (ref 4.8–10.8)
WBC # FLD AUTO: 8.6 K/UL — SIGNIFICANT CHANGE UP (ref 4.8–10.8)

## 2017-04-09 PROCEDURE — 99233 SBSQ HOSP IP/OBS HIGH 50: CPT | Mod: GC

## 2017-04-09 RX ORDER — FERROUS SULFATE 325(65) MG
325 TABLET ORAL DAILY
Qty: 0 | Refills: 0 | Status: DISCONTINUED | OUTPATIENT
Start: 2017-04-09 | End: 2017-04-13

## 2017-04-09 RX ORDER — IPRATROPIUM/ALBUTEROL SULFATE 18-103MCG
3 AEROSOL WITH ADAPTER (GRAM) INHALATION ONCE
Qty: 0 | Refills: 0 | Status: COMPLETED | OUTPATIENT
Start: 2017-04-09 | End: 2017-04-09

## 2017-04-09 RX ORDER — IPRATROPIUM/ALBUTEROL SULFATE 18-103MCG
3 AEROSOL WITH ADAPTER (GRAM) INHALATION EVERY 6 HOURS
Qty: 0 | Refills: 0 | Status: DISCONTINUED | OUTPATIENT
Start: 2017-04-09 | End: 2017-04-13

## 2017-04-09 RX ORDER — ALBUTEROL 90 UG/1
2.5 AEROSOL, METERED ORAL ONCE
Qty: 0 | Refills: 0 | Status: DISCONTINUED | OUTPATIENT
Start: 2017-04-09 | End: 2017-04-09

## 2017-04-09 RX ORDER — ACETYLCYSTEINE 200 MG/ML
1 VIAL (ML) MISCELLANEOUS EVERY 6 HOURS
Qty: 0 | Refills: 0 | Status: COMPLETED | OUTPATIENT
Start: 2017-04-09 | End: 2017-04-10

## 2017-04-09 RX ADMIN — Medication 3 MILLILITER(S): at 20:34

## 2017-04-09 RX ADMIN — Medication 2: at 12:52

## 2017-04-09 RX ADMIN — SODIUM CHLORIDE 3 MILLILITER(S): 9 INJECTION INTRAMUSCULAR; INTRAVENOUS; SUBCUTANEOUS at 17:11

## 2017-04-09 RX ADMIN — Medication 250 MILLIGRAM(S): at 05:19

## 2017-04-09 RX ADMIN — Medication 250 MILLIGRAM(S): at 18:14

## 2017-04-09 RX ADMIN — Medication 3 MILLILITER(S): at 14:56

## 2017-04-09 RX ADMIN — SODIUM CHLORIDE 3 MILLILITER(S): 9 INJECTION INTRAMUSCULAR; INTRAVENOUS; SUBCUTANEOUS at 05:10

## 2017-04-09 RX ADMIN — AMLODIPINE BESYLATE 10 MILLIGRAM(S): 2.5 TABLET ORAL at 05:19

## 2017-04-09 RX ADMIN — Medication 50 MILLIGRAM(S): at 05:19

## 2017-04-09 RX ADMIN — Medication 1 MILLILITER(S): at 20:33

## 2017-04-09 RX ADMIN — HEPARIN SODIUM 5000 UNIT(S): 5000 INJECTION INTRAVENOUS; SUBCUTANEOUS at 18:13

## 2017-04-09 RX ADMIN — Medication 1: at 08:53

## 2017-04-09 RX ADMIN — Medication 100 MILLIGRAM(S): at 12:53

## 2017-04-09 RX ADMIN — VALSARTAN 40 MILLIGRAM(S): 80 TABLET ORAL at 05:19

## 2017-04-09 RX ADMIN — Medication 50 MILLIGRAM(S): at 18:14

## 2017-04-09 RX ADMIN — Medication 325 MILLIGRAM(S): at 18:14

## 2017-04-09 RX ADMIN — PANTOPRAZOLE SODIUM 40 MILLIGRAM(S): 20 TABLET, DELAYED RELEASE ORAL at 05:19

## 2017-04-09 RX ADMIN — HEPARIN SODIUM 5000 UNIT(S): 5000 INJECTION INTRAVENOUS; SUBCUTANEOUS at 05:19

## 2017-04-09 RX ADMIN — SODIUM CHLORIDE 3 MILLILITER(S): 9 INJECTION INTRAMUSCULAR; INTRAVENOUS; SUBCUTANEOUS at 00:12

## 2017-04-09 RX ADMIN — Medication 2: at 18:14

## 2017-04-09 RX ADMIN — Medication 81 MILLIGRAM(S): at 12:52

## 2017-04-09 RX ADMIN — CALCITRIOL 0.5 MICROGRAM(S): 0.5 CAPSULE ORAL at 12:52

## 2017-04-09 RX ADMIN — Medication 3 MILLILITER(S): at 12:18

## 2017-04-09 RX ADMIN — Medication 1 MILLILITER(S): at 14:59

## 2017-04-09 NOTE — PROGRESS NOTE ADULT - PROBLEM SELECTOR PLAN 5
- total iron decreased, ferritin normal, trasnferrin normal  - likely anemia due to ESRD   - continue IV Venofer  - got 2 units of pRBC yesterday during dialysis - total iron decreased, ferritin normal, trasnferrin normal  - likely anemia due to ESRD   - rcvd 4 days of IV iron, will d/c today due to acute infection and resume with oral iron only   - got 2 units of pRBC yesterday during dialysis

## 2017-04-09 NOTE — PROGRESS NOTE ADULT - PROBLEM SELECTOR PLAN 2
- likely 2/2 pleural effusion and multi focal pna   -patient says shortness of breath has improved somewhat  - non productive cough - mucomyst added  -  incentive spiromtery  - blood cultures are in lab, sputum cultures are pending collection  - O2 sat in 90's on 4.5 L via nc  - c/w dialysis T/T/S  - cause of respiratory distress is likely combination of atelectasis, pneumonia and fluid overload - likely 2/2 pleural effusion and multi focal pna  along with some fluid overload   -tx for pna as above   -today, had some wheezing, add cxr for am to eval for worsening fluid build up   - c/w dialysis T/T/S; however, next session on monday per renal

## 2017-04-09 NOTE — PROGRESS NOTE ADULT - PROBLEM SELECTOR PLAN 7
- Dialysis via right IJ access TThS, continue Phoslo, calcitriol,  - continue Venofer  - L avf yet to mature. - Dialysis via right IJ access TThS, continue Phoslo, calcitriol,  -d/c venofer   - L avf yet to mature, OP vascular f/up

## 2017-04-09 NOTE — PROGRESS NOTE ADULT - SUBJECTIVE AND OBJECTIVE BOX
NEPHROLOGY INTERVAL HPI/OVERNIGHT EVENTS:    Examined earlier  Comfortable      MEDICATIONS  (STANDING):  sodium chloride 0.9% lock flush 3milliLiter(s) IV Push every 8 hours  pantoprazole    Tablet 40milliGRAM(s) Oral before breakfast  insulin lispro (HumaLOG) corrective regimen sliding scale  SubCutaneous three times a day before meals  dextrose 5%. 1000milliLiter(s) IV Continuous <Continuous>  dextrose 50% Injectable 12.5Gram(s) IV Push once  dextrose 50% Injectable 25Gram(s) IV Push once  dextrose 50% Injectable 25Gram(s) IV Push once  aspirin  chewable 81milliGRAM(s) Oral daily  valsartan 40milliGRAM(s) Oral daily  heparin  Injectable 5000Unit(s) SubCutaneous every 12 hours  calcium acetate 667milliGRAM(s) Oral three times a day with meals  calcitriol   Capsule 0.5MICROGram(s) Oral daily  metoprolol 50milliGRAM(s) Oral two times a day  iron sucrose IVPB 100milliGRAM(s) IV Intermittent <User Schedule>  amLODIPine   Tablet 10milliGRAM(s) Oral daily  cefepime  IVPB  IV Intermittent   cefepime  IVPB 1000milliGRAM(s) IV Intermittent every 24 hours  saccharomyces boulardii 250milliGRAM(s) Oral two times a day  ALBUTerol/ipratropium for Nebulization 3milliLiter(s) Nebulizer every 6 hours  acetylcysteine 20% Inhalation 1milliLiter(s) Inhalation every 6 hours    MEDICATIONS  (PRN):  dextrose Gel 1Dose(s) Oral once PRN Blood Glucose LESS THAN 70 milliGRAM(s)/deciliter  glucagon  Injectable 1milliGRAM(s) IntraMuscular once PRN Glucose LESS THAN 70 milligrams/deciliter  acetaminophen   Tablet. 650milliGRAM(s) Oral every 8 hours PRN Moderate Pain (4 - 6)  guaiFENesin   Syrup  (Sugar-Free) 100milliGRAM(s) Oral every 6 hours PRN Cough      Allergies    No Known Allergies    Intolerances        Vital Signs Last 24 Hrs  T(C): 36.7, Max: 36.8 (04-08 @ 17:34)  T(F): 98, Max: 98.3 (04-08 @ 17:34)  HR: 82 (70 - 98)  BP: 149/79 (145/83 - 168/96)  BP(mean): --  RR: 18 (18 - 18)  SpO2: 93% (93% - 94%)  Daily     Daily     PHYSICAL EXAM:  GENERAL: Weakness  HEAD:  NCAT  EYES: EOMI  NECK: Supple, No JVD  NERVOUS SYSTEM:  Alert & Oriented X3  CHEST/LUNG: Diminished BS at bases; L chest tube  HEART: Regular rate and rhythm; No rub  ABDOMEN: Soft, Nontender, Nondistended  EXTREMITIES:  2+ Peripheral Pulses, +edema          LABS:                        8.5    8.6   )-----------( 364      ( 09 Apr 2017 05:35 )             26.9     04-07    133<L>  |  91<L>  |  21.0<H>  ----------------------------<  157<H>  3.9   |  32.0<H>  |  3.67<H>    Ca    8.6      07 Apr 2017 15:07                  RADIOLOGY & ADDITIONAL TESTS:

## 2017-04-09 NOTE — PROGRESS NOTE ADULT - ASSESSMENT
Patient is 37 year old female with past medical history of end stage renal failure on dialysis T/Th/S, hypertension, diabetes, questionable CHF  who comes to [facility] complaining of shortness of breath. Likely fluid overloaded due to renal failure.  Patient also has pneumonia on zosyn (renal dose) day 4. (has received two doses of vancomycin and one dose of azithromycin during this hospitalization). Patient reports feeling better. Abx changed to Cefepime yesterday, day 2 today. Patient is 37 year old female with past medical history of end stage renal failure on dialysis T/Th/S, hypertension, diabetes, questionable CHF  who comes to [facility] complaining of shortness of breath. Likely fluid overloaded due to renal failure along with MF pna that was found - started on vanco and zosyn and now on cefepime. Patient also had a pleural effusion on the left s/p drainage, chest tube placement and subsequent removal without any complications.

## 2017-04-09 NOTE — PROGRESS NOTE ADULT - PROBLEM SELECTOR PLAN 4
- no episodes of chest pain in the last 24 hours   - troponin 0.10 --> 0.15   --> 0.14, likely because of renal failure   - on beta-blocker, ace and aspirin  - TTE= normal, BNP 17,000  - if repeat chest pain will do ck-mb, troponins  - seen by cardiology Dr. Alvarez - no episodes of chest pain in the last 24 hours   - troponin 0.10 --> 0.15   --> 0.14, likely because of renal failure   - on beta-blocker, ace and aspirin  - TTE= normal, BNP 17,000  - seen by cardiology Dr. Alvarez

## 2017-04-09 NOTE — PROGRESS NOTE ADULT - PROBLEM SELECTOR PLAN 3
- 800 cc of pleural fluid was drained during this hospital stay.    - s/p chest tube removed 3 days ago   Pleural fluid QXP=458, meets light's criteria for exudative effusion  - glucose=208, albumin=1.8, pH=7.0 likely complicated effusion vs. rheumatological disease  -  cytopathology not sent to lab   - repeat chest x-ray; no pneumothorax, however patient has atelectasis  - RF, MISSY pending in lab  - fungal culture unable to send because of insufficient fluid  -cx with gram neg organisms - d/c vanco  ID my called- and antibiotic regimen changed as above -s/p chest tube removal, repeat cxr in am   -cx with gram neg organisms - d/c vanco  ID eval called- and antibiotic regimen changed as above

## 2017-04-09 NOTE — PROGRESS NOTE ADULT - PROBLEM SELECTOR PLAN 6
- slightly elevated blood pressure (162 mmHg before dialysis 3 days ago)   - continue amlodipine, ace, beta-blocker  - BP this am 149/76, continue to monitor, might decrease once patient is less fluid overloaded

## 2017-04-09 NOTE — PROGRESS NOTE ADULT - ASSESSMENT
ESRD:  HD yest with UF; will also need to reestablish outpatient TTS schedule at some point Next HD vega  - AVF maturing==> needs vascular follow up  Pleural effussion: s/p L chest tube Preliminary fluid analysis may be c/w exudate==> await further studies    Anemia: prbc with HD yest Hg better  TS 5% will hold off on IV iron for now as possibly an infectious process  hold NEELAM for now until malignant effusion excluded    RO: continue Phoslo and Calcitriol

## 2017-04-09 NOTE — PROGRESS NOTE ADULT - SUBJECTIVE AND OBJECTIVE BOX
PGY1 Progress Note    INTERVAL/OVERNIGHT EVENTS:    OBJECTIVE: Vital Signs Last 24 Hrs  T(C): 36.7, Max: 36.8 (04-08 @ 17:34)  T(F): 98, Max: 98.3 (04-08 @ 17:34)  HR: 88 (70 - 98)  BP: 149/79 (136/80 - 168/96)  BP(mean): --  RR: 18 (18 - 18)  SpO2: 93% (93% - 95%)  I&O's Summary    I & Os for current day (as of 09 Apr 2017 06:18)  =============================================  IN: 100 ml / OUT: 2300 ml / NET: -2200 ml      PHYSICAL EXAM:  Constitutional: Obese, NAD, well-groomed, well-developed  HEENT: NC AT PERRLA, EOMI, Normal Hearing, moist mucous membranes  Neck: No lymphadenopathy, No JVD  Back: Normal spine flexure, No CVA tenderness  Respiratory:   Cardiovascular: S1 and S2, RRR, no M/G/R  Gastrointestinal: BS+, soft, NT/ND  Extremities:   Vascular: 2+ peripheral pulses  Neurological: A/O x 3, no focal deficits  Psychiatric: Normal mood, normal affect  Musculoskeletal: 5/5 strength b/l upper and lower extremities  Skin: No rashes    MEDICATIONS  (STANDING):  sodium chloride 0.9% lock flush 3milliLiter(s) IV Push every 8 hours  pantoprazole    Tablet 40milliGRAM(s) Oral before breakfast  insulin lispro (HumaLOG) corrective regimen sliding scale  SubCutaneous three times a day before meals  dextrose 5%. 1000milliLiter(s) IV Continuous <Continuous>  dextrose 50% Injectable 12.5Gram(s) IV Push once  dextrose 50% Injectable 25Gram(s) IV Push once  dextrose 50% Injectable 25Gram(s) IV Push once  aspirin  chewable 81milliGRAM(s) Oral daily  valsartan 40milliGRAM(s) Oral daily  heparin  Injectable 5000Unit(s) SubCutaneous every 12 hours  calcium acetate 667milliGRAM(s) Oral three times a day with meals  calcitriol   Capsule 0.5MICROGram(s) Oral daily  metoprolol 50milliGRAM(s) Oral two times a day  iron sucrose IVPB 100milliGRAM(s) IV Intermittent <User Schedule>  amLODIPine   Tablet 10milliGRAM(s) Oral daily  cefepime  IVPB  IV Intermittent   cefepime  IVPB 1000milliGRAM(s) IV Intermittent every 24 hours  saccharomyces boulardii 250milliGRAM(s) Oral two times a day    MEDICATIONS  (PRN):  dextrose Gel 1Dose(s) Oral once PRN Blood Glucose LESS THAN 70 milliGRAM(s)/deciliter  glucagon  Injectable 1milliGRAM(s) IntraMuscular once PRN Glucose LESS THAN 70 milligrams/deciliter  acetaminophen   Tablet. 650milliGRAM(s) Oral every 8 hours PRN Moderate Pain (4 - 6)      LABS:                        8.5    8.6   )-----------( 364      ( 09 Apr 2017 05:35 )             26.9     04-07    133<L>  |  91<L>  |  21.0<H>  ----------------------------<  157<H>  3.9   |  32.0<H>  |  3.67<H>    Ca    8.6      07 Apr 2017 15:07  Phos  4.1     04-07  Mg     2.1     04-07    TPro  7.2  /  Alb  2.5<L>  /  TBili  0.7  /  DBili  x   /  AST  12  /  ALT  11  /  AlkPhos  120  04-07          RADIOLOGY & ADDITIONAL TESTS: PGY1 Progress Note/ Medicine Sign-off note    Patient is 37 year old female with past medical history of end stage renal failure on dialysis T/Th/S, hypertension, diabetes, questionable CHF (per patient)  who comes to [facility] complaining of shortness of breath. The patient was discharged from [facility] with diagnosis of new onset end stage renal disease in February.   Patient is admitted for multifocal pna, had pleural effusion of left lung drained.    INTERVAL/OVERNIGHT EVENTS: Patient seen and examined bedside, c/o of discomfort at of left chest w/ cough, on 4.5 L supplemental oxygen via nasal cannula, with saturations in 90's. Patient denies sob, chills, night sweats. Denies problems with bm and with voiding.    OBJECTIVE: Vital Signs Last 24 Hrs  T(C): 36.7, Max: 36.8 (04-08 @ 17:34)  T(F): 98, Max: 98.3 (04-08 @ 17:34)  HR: 88 (70 - 98)  BP: 149/79 (136/80 - 168/96)  RR: 18 (18 - 18)  SpO2: 93% (93% - 95%)    II & Os for current day (as of 09 Apr 2017 06:18)  =============================================  IN: 100 ml / OUT: 2300 ml / NET: -2200 ml      PHYSICAL EXAM:  Constitutional: Obese, NAD, well-groomed, well-developed  HEENT: NC AT PERRLA, EOMI, Normal Hearing, moist mucous membranes  Neck: No lymphadenopathy, No JVD  Back: Normal spine flexure, No CVA tenderness  Respiratory: L lung expiratory wheezing, mild bibasilar crackles,   Cardiovascular: S1 and S2, RRR, no M/G/R  Gastrointestinal: Obese, BS+, soft, non-tender  Extremities: +2 bilateral pedal edema  Vascular: 2+ peripheral pulses  Neurological: A/O x 3, no focal deficits  Psychiatric: Normal mood, normal affect  Musculoskeletal: 5/5 strength b/l upper and lower extremities  Skin: No rashes    MEDICATIONS  (STANDING):  sodium chloride 0.9% lock flush 3milliLiter(s) IV Push every 8 hours  pantoprazole    Tablet 40milliGRAM(s) Oral before breakfast  insulin lispro (HumaLOG) corrective regimen sliding scale  SubCutaneous three times a day before meals  dextrose 5%. 1000milliLiter(s) IV Continuous <Continuous>  dextrose 50% Injectable 12.5Gram(s) IV Push once  dextrose 50% Injectable 25Gram(s) IV Push once  dextrose 50% Injectable 25Gram(s) IV Push once  aspirin  chewable 81milliGRAM(s) Oral daily  valsartan 40milliGRAM(s) Oral daily  heparin  Injectable 5000Unit(s) SubCutaneous every 12 hours  calcium acetate 667milliGRAM(s) Oral three times a day with meals  calcitriol   Capsule 0.5MICROGram(s) Oral daily  metoprolol 50milliGRAM(s) Oral two times a day  iron sucrose IVPB 100milliGRAM(s) IV Intermittent <User Schedule>  amLODIPine   Tablet 10milliGRAM(s) Oral daily  cefepime  IVPB  IV Intermittent   cefepime  IVPB 1000milliGRAM(s) IV Intermittent every 24 hours  saccharomyces boulardii 250milliGRAM(s) Oral two times a day    MEDICATIONS  (PRN):  dextrose Gel 1Dose(s) Oral once PRN Blood Glucose LESS THAN 70 milliGRAM(s)/deciliter  glucagon  Injectable 1milliGRAM(s) IntraMuscular once PRN Glucose LESS THAN 70 milligrams/deciliter  acetaminophen   Tablet. 650milliGRAM(s) Oral every 8 hours PRN Moderate Pain (4 - 6)      LABS:                        8.5    8.6   )-----------( 364      ( 09 Apr 2017 05:35 )             26.9     04-07    133<L>  |  91<L>  |  21.0<H>  ----------------------------<  157<H>  3.9   |  32.0<H>  |  3.67<H>    Ca    8.6      07 Apr 2017 15:07  Phos  4.1     04-07  Mg     2.1     04-07    TPro  7.2  /  Alb  2.5<L>  /  TBili  0.7  /  DBili  x   /  AST  12  /  ALT  11  /  AlkPhos  120  04-07          RADIOLOGY & ADDITIONAL TESTS: PGY1 Progress Note/ Medicine Sign-off note    Patient is a 37y old  Female who presents with a chief complaint of shortness of breath (04 Apr 2017 23:54)    Patient is 37 year old female with past medical history of end stage renal failure on dialysis T/Th/S, hypertension, diabetes, questionable CHF (per patient)  who comes to [facility] complaining of shortness of breath. The patient was discharged from [facility] with diagnosis of new onset end stage renal disease in February.   Patient is admitted for multifocal pna, had pleural effusion of left lung s/p drained.    INTERVAL/OVERNIGHT EVENTS: Patient seen and examined bedside, c/o of discomfort at of left chest w/ cough, on 4.5 L supplemental oxygen via nasal cannula, with saturations in 90's. Patient denies sob, chills, night sweats however feels that she is wheezing in one part of her lung . Denies problems with bm and with voiding.    OBJECTIVE: Vital Signs Last 24 Hrs  T(C): 36.7, Max: 36.8 (04-08 @ 17:34)  T(F): 98, Max: 98.3 (04-08 @ 17:34)  HR: 88 (70 - 98)  BP: 149/79 (136/80 - 168/96)  RR: 18 (18 - 18)  SpO2: 93% (93% - 95%)    II & Os for current day (as of 09 Apr 2017 06:18)  =============================================  IN: 100 ml / OUT: 2300 ml / NET: -2200 ml      PHYSICAL EXAM:  Constitutional: Obese, NAD, well-groomed, well-developed  HEENT: NC AT PERRLA, EOMI, Normal Hearing, moist mucous membranes  Back: Normal spine flexure, No CVA tenderness  Respiratory: R lung expiratory wheezing, mild bibasilar crackles. right sided PC in place   Cardiovascular: S1 and S2, RRR, no M/G/R  Gastrointestinal: Obese, BS+, soft, non-tender  Extremities: +2 bilateral pedal edema  Vascular: 2+ peripheral pulses  Neurological: A/O x 3, no focal deficits  Psychiatric: Normal mood, normal affect  Musculoskeletal: 5/5 strength b/l upper and lower extremities    MEDICATIONS  (STANDING):  sodium chloride 0.9% lock flush 3milliLiter(s) IV Push every 8 hours  pantoprazole    Tablet 40milliGRAM(s) Oral before breakfast  insulin lispro (HumaLOG) corrective regimen sliding scale  SubCutaneous three times a day before meals  dextrose 5%. 1000milliLiter(s) IV Continuous <Continuous>  dextrose 50% Injectable 12.5Gram(s) IV Push once  dextrose 50% Injectable 25Gram(s) IV Push once  dextrose 50% Injectable 25Gram(s) IV Push once  aspirin  chewable 81milliGRAM(s) Oral daily  valsartan 40milliGRAM(s) Oral daily  heparin  Injectable 5000Unit(s) SubCutaneous every 12 hours  calcium acetate 667milliGRAM(s) Oral three times a day with meals  calcitriol   Capsule 0.5MICROGram(s) Oral daily  metoprolol 50milliGRAM(s) Oral two times a day  iron sucrose IVPB 100milliGRAM(s) IV Intermittent <User Schedule>  amLODIPine   Tablet 10milliGRAM(s) Oral daily  cefepime  IVPB  IV Intermittent   cefepime  IVPB 1000milliGRAM(s) IV Intermittent every 24 hours  saccharomyces boulardii 250milliGRAM(s) Oral two times a day    MEDICATIONS  (PRN):  dextrose Gel 1Dose(s) Oral once PRN Blood Glucose LESS THAN 70 milliGRAM(s)/deciliter  glucagon  Injectable 1milliGRAM(s) IntraMuscular once PRN Glucose LESS THAN 70 milligrams/deciliter  acetaminophen   Tablet. 650milliGRAM(s) Oral every 8 hours PRN Moderate Pain (4 - 6)      LABS:                        8.5    8.6   )-----------( 364      ( 09 Apr 2017 05:35 )             26.9     04-07    133<L>  |  91<L>  |  21.0<H>  ----------------------------<  157<H>  3.9   |  32.0<H>  |  3.67<H>    Ca    8.6      07 Apr 2017 15:07  Phos  4.1     04-07  Mg     2.1     04-07    TPro  7.2  /  Alb  2.5<L>  /  TBili  0.7  /  DBili  x   /  AST  12  /  ALT  11  /  AlkPhos  120  04-07          RADIOLOGY & ADDITIONAL TESTS:

## 2017-04-09 NOTE — PROGRESS NOTE ADULT - PROBLEM SELECTOR PLAN 1
-c/w cefipime w/ florastor (day 2 today)  -supplental oxygen to keep O2 sat>92  -Blood cx negative  -pleural fluid serratia till date, still in progress -c/w cefipime w/ florastor (day 2 today of 6 days of total abx)   -pleural fluid culture + serratia  -ID on board  -patient with wheezing today - started on nebs with mucomyst; hold off of robitussin until nighttime for cough relief to sleep   -incentive spirometer   -supplental oxygen to keep O2 sat>92, titrate down O2 to keep proper O2 sat, now at 5 L - brought down to 4 while in rounds with bedside O2 sat of 95%  -Blood cx negative to date

## 2017-04-10 LAB
ALBUMIN SERPL ELPH-MCNC: 3 G/DL — LOW (ref 3.3–5.2)
ALP SERPL-CCNC: 128 U/L — HIGH (ref 40–120)
ALT FLD-CCNC: 17 U/L — SIGNIFICANT CHANGE UP
ANION GAP SERPL CALC-SCNC: 16 MMOL/L — SIGNIFICANT CHANGE UP (ref 5–17)
AST SERPL-CCNC: 16 U/L — SIGNIFICANT CHANGE UP
BILIRUB SERPL-MCNC: 0.7 MG/DL — SIGNIFICANT CHANGE UP (ref 0.4–2)
BLD GP AB SCN SERPL QL: SIGNIFICANT CHANGE UP
BUN SERPL-MCNC: 55 MG/DL — HIGH (ref 8–20)
CALCIUM SERPL-MCNC: 8.6 MG/DL — SIGNIFICANT CHANGE UP (ref 8.6–10.2)
CHLORIDE SERPL-SCNC: 92 MMOL/L — LOW (ref 98–107)
CO2 SERPL-SCNC: 26 MMOL/L — SIGNIFICANT CHANGE UP (ref 22–29)
CREAT SERPL-MCNC: 7.9 MG/DL — HIGH (ref 0.5–1.3)
CULTURE RESULTS: SIGNIFICANT CHANGE UP
GLUCOSE SERPL-MCNC: 155 MG/DL — HIGH (ref 70–115)
HCT VFR BLD CALC: 25.6 % — LOW (ref 37–47)
HGB BLD-MCNC: 8.1 G/DL — LOW (ref 12–16)
MCHC RBC-ENTMCNC: 25.2 PG — LOW (ref 27–31)
MCHC RBC-ENTMCNC: 31.6 G/DL — LOW (ref 32–36)
MCV RBC AUTO: 79.8 FL — LOW (ref 81–99)
ORGANISM # SPEC MICROSCOPIC CNT: SIGNIFICANT CHANGE UP
ORGANISM # SPEC MICROSCOPIC CNT: SIGNIFICANT CHANGE UP
PLATELET # BLD AUTO: 354 K/UL — SIGNIFICANT CHANGE UP (ref 150–400)
POTASSIUM SERPL-MCNC: 4.8 MMOL/L — SIGNIFICANT CHANGE UP (ref 3.5–5.3)
POTASSIUM SERPL-SCNC: 4.8 MMOL/L — SIGNIFICANT CHANGE UP (ref 3.5–5.3)
PROT SERPL-MCNC: 7.8 G/DL — SIGNIFICANT CHANGE UP (ref 6.6–8.7)
RBC # BLD: 3.21 M/UL — LOW (ref 4.4–5.2)
RBC # FLD: 17.7 % — HIGH (ref 11–15.6)
RHEUMATOID FACT SERPL-ACNC: 9.3 IU/ML — SIGNIFICANT CHANGE UP (ref 0–13.9)
SODIUM SERPL-SCNC: 134 MMOL/L — LOW (ref 135–145)
SPECIMEN SOURCE: SIGNIFICANT CHANGE UP
TYPE + AB SCN PNL BLD: SIGNIFICANT CHANGE UP
WBC # BLD: 6.6 K/UL — SIGNIFICANT CHANGE UP (ref 4.8–10.8)
WBC # FLD AUTO: 6.6 K/UL — SIGNIFICANT CHANGE UP (ref 4.8–10.8)

## 2017-04-10 PROCEDURE — 99233 SBSQ HOSP IP/OBS HIGH 50: CPT | Mod: GC

## 2017-04-10 PROCEDURE — 71010: CPT | Mod: 26

## 2017-04-10 RX ORDER — ALTEPLASE 100 MG
2 KIT INTRAVENOUS ONCE
Qty: 0 | Refills: 0 | Status: COMPLETED | OUTPATIENT
Start: 2017-04-10 | End: 2017-04-10

## 2017-04-10 RX ORDER — ASCORBIC ACID 60 MG
500 TABLET,CHEWABLE ORAL DAILY
Qty: 0 | Refills: 0 | Status: DISCONTINUED | OUTPATIENT
Start: 2017-04-10 | End: 2017-04-13

## 2017-04-10 RX ORDER — VALSARTAN 80 MG/1
80 TABLET ORAL DAILY
Qty: 0 | Refills: 0 | Status: DISCONTINUED | OUTPATIENT
Start: 2017-04-10 | End: 2017-04-11

## 2017-04-10 RX ADMIN — HEPARIN SODIUM 5000 UNIT(S): 5000 INJECTION INTRAVENOUS; SUBCUTANEOUS at 06:33

## 2017-04-10 RX ADMIN — Medication 3 MILLILITER(S): at 20:57

## 2017-04-10 RX ADMIN — Medication 50 MILLIGRAM(S): at 17:39

## 2017-04-10 RX ADMIN — CALCITRIOL 0.5 MICROGRAM(S): 0.5 CAPSULE ORAL at 12:50

## 2017-04-10 RX ADMIN — HEPARIN SODIUM 5000 UNIT(S): 5000 INJECTION INTRAVENOUS; SUBCUTANEOUS at 17:39

## 2017-04-10 RX ADMIN — VALSARTAN 80 MILLIGRAM(S): 80 TABLET ORAL at 17:39

## 2017-04-10 RX ADMIN — Medication 50 MILLIGRAM(S): at 06:34

## 2017-04-10 RX ADMIN — Medication 500 MILLIGRAM(S): at 17:40

## 2017-04-10 RX ADMIN — Medication 250 MILLIGRAM(S): at 06:33

## 2017-04-10 RX ADMIN — Medication 1: at 17:40

## 2017-04-10 RX ADMIN — Medication 325 MILLIGRAM(S): at 12:50

## 2017-04-10 RX ADMIN — Medication 81 MILLIGRAM(S): at 12:50

## 2017-04-10 RX ADMIN — ALTEPLASE 2 MILLIGRAM(S): KIT at 10:35

## 2017-04-10 RX ADMIN — AMLODIPINE BESYLATE 10 MILLIGRAM(S): 2.5 TABLET ORAL at 06:34

## 2017-04-10 RX ADMIN — ALTEPLASE 2 MILLIGRAM(S): KIT at 10:34

## 2017-04-10 RX ADMIN — PANTOPRAZOLE SODIUM 40 MILLIGRAM(S): 20 TABLET, DELAYED RELEASE ORAL at 06:34

## 2017-04-10 RX ADMIN — Medication 250 MILLIGRAM(S): at 17:39

## 2017-04-10 RX ADMIN — Medication 3 MILLILITER(S): at 14:44

## 2017-04-10 RX ADMIN — SODIUM CHLORIDE 3 MILLILITER(S): 9 INJECTION INTRAMUSCULAR; INTRAVENOUS; SUBCUTANEOUS at 13:04

## 2017-04-10 RX ADMIN — CEFEPIME 100 MILLIGRAM(S): 1 INJECTION, POWDER, FOR SOLUTION INTRAMUSCULAR; INTRAVENOUS at 17:40

## 2017-04-10 RX ADMIN — VALSARTAN 40 MILLIGRAM(S): 80 TABLET ORAL at 06:33

## 2017-04-10 RX ADMIN — SODIUM CHLORIDE 3 MILLILITER(S): 9 INJECTION INTRAMUSCULAR; INTRAVENOUS; SUBCUTANEOUS at 23:31

## 2017-04-10 NOTE — PROGRESS NOTE ADULT - PROBLEM SELECTOR PLAN 4
- no episodes of chest pain in the last 24 hours   - troponin 0.10 --> 0.15   --> 0.14, likely because of renal failure   - on beta-blocker, ace and aspirin  - TTE= normal, BNP 17,000  - seen by cardiology Dr. Alvarez - no episodes of chest pain in the last 24 hours   - troponin 0.10 --> 0.15   --> 0.14, likely because of renal failure   - on beta-blocker, ace-I and aspirin  - TTE= normal, BNP 17,000  - seen by cardiology Dr. Alvarez Patient asymptomatic and denies of chest pain in the last 48 hours   Elevated troponin (0.10 --> 0.15 --> 0.14) likely 2/2 ESRD    BNP 17,000 w/ TTE showing EF 60-65%  Seen by cardiology (Dr. Alvarez) predominantly normocytic - likely anemia of chronic disease 2/2 ESRD  s/p 4 days of IV venofer & 2 units of pRBC during dialysis  c/w PO ferrous sulfate 325 mg PO daily/vitamin c

## 2017-04-10 NOTE — PROGRESS NOTE ADULT - PROBLEM SELECTOR PLAN 6
- slightly elevated blood pressure (162 mmHg before dialysis 3 days ago)   - continue amlodipine, ace, beta-blocker  - BP this am 149/76, continue to monitor, might decrease once patient is less fluid overloaded - stable  - continue amlodipine, ace, beta-blocker stable w/ SBP 140s-160s  c/w amlodipine 10 mg PO daily; hold for SBP < 110 or HR < 60  c/w valsartan 40 mg PO daily   c/w metoprolol 50 mg PO BID; hold for SBP < 110 or HR < 60 c/w hemodialysis (Tu/Th/S) via right IJ access  c/w calcium acetate 667 mg PO TID & calcitriol 0.5 mcg PO daily   outpatient vascular follow up for left AVF which is currently maturing

## 2017-04-10 NOTE — PROGRESS NOTE ADULT - PROBLEM SELECTOR PLAN 8
- heparin 5000 BID HBA1c: 7.3  FS: 136 (10 Apr 2017 12:13), 157 (10 Apr 2017 07:41), 216 (09 Apr 2017 22:45), 205 (09 Apr 2017 17:02) HBA1c: 7.3  FS: 136 (10 Apr 2017 12:13), 157 (10 Apr 2017 07:41), 216 (09 Apr 2017 22:45), 205 (09 Apr 2017 17:02)  c/w humalog sliding scale c/w heparin 5000 unit Q12H

## 2017-04-10 NOTE — PROGRESS NOTE ADULT - SUBJECTIVE AND OBJECTIVE BOX
Patient is 37 year old female with past medical history of end stage renal failure on dialysis T/Th/S, hypertension, diabetes, questionable CHF (per patient)  who comes to [facility] complaining of shortness of breath. The patient was discharged from [facility] with diagnosis of new onset end stage renal disease in February. Patient is admitted for multifocal pna, had pleural effusion of left lung s/p drained.    INTERVAL/OVERNIGHT EVENTS: Patient seen and examined bedside. No acute events overnight. Patient denies of any chest pain, shortness of breath, bowel or urinary changes and calf tenderness.  Cardiac monitor reviewed: patient desat to 80-83%    OBJECTIVE: Vital Signs Last 24 Hrs  Vital Signs Last 24 Hrs  T(C): 37, Max: 37.4 (04-09 @ 22:45)  T(F): 98.6, Max: 99.4 (04-09 @ 22:45)  HR: 96 (82 - 104)  BP: 143/76 (143/76 - 165/87)  BP(mean): --  RR: 18 (18 - 18)  SpO2: 94% (93% - 100%)      PHYSICAL EXAM:  Constitutional: Obese, NAD, well-groomed, well-developed  HEENT: PERRLA, EOMI, Normal Hearing, moist mucous membranes  Respiratory: expiratory wheezing in right lung fields  Cardiovascular: S1 and S2, RRR, no M/G/R  Gastrointestinal: Obese, BS+, soft, non-tender  Extremities: +1 bilateral pedal edema, no cyanosis or calf tenderness    LABS:                                   8.9    7.5   )-----------( 360      ( 09 Apr 2017 11:56 )             28.4       Phos  3.7     04-09  Mg     2.3     04-09        RADIOLOGY & ADDITIONAL TESTS:    MEDICATIONS  (STANDING):  sodium chloride 0.9% lock flush 3milliLiter(s) IV Push every 8 hours  pantoprazole    Tablet 40milliGRAM(s) Oral before breakfast  insulin lispro (HumaLOG) corrective regimen sliding scale  SubCutaneous three times a day before meals  dextrose 5%. 1000milliLiter(s) IV Continuous <Continuous>  dextrose 50% Injectable 12.5Gram(s) IV Push once  dextrose 50% Injectable 25Gram(s) IV Push once  dextrose 50% Injectable 25Gram(s) IV Push once  aspirin  chewable 81milliGRAM(s) Oral daily  valsartan 40milliGRAM(s) Oral daily  heparin  Injectable 5000Unit(s) SubCutaneous every 12 hours  calcium acetate 667milliGRAM(s) Oral three times a day with meals  calcitriol   Capsule 0.5MICROGram(s) Oral daily  metoprolol 50milliGRAM(s) Oral two times a day  amLODIPine   Tablet 10milliGRAM(s) Oral daily  cefepime  IVPB  IV Intermittent   cefepime  IVPB 1000milliGRAM(s) IV Intermittent every 24 hours  saccharomyces boulardii 250milliGRAM(s) Oral two times a day  ALBUTerol/ipratropium for Nebulization 3milliLiter(s) Nebulizer every 6 hours  ferrous    sulfate 325milliGRAM(s) Oral daily    MEDICATIONS  (PRN):  dextrose Gel 1Dose(s) Oral once PRN Blood Glucose LESS THAN 70 milliGRAM(s)/deciliter  glucagon  Injectable 1milliGRAM(s) IntraMuscular once PRN Glucose LESS THAN 70 milligrams/deciliter  acetaminophen   Tablet. 650milliGRAM(s) Oral every 8 hours PRN Moderate Pain (4 - 6)  guaiFENesin   Syrup  (Sugar-Free) 100milliGRAM(s) Oral every 6 hours PRN Cough Patient is 37 year old female with past medical history of end stage renal failure on dialysis T/Th/S, hypertension, diabetes, questionable CHF (per patient)  who comes to [facility] complaining of shortness of breath. The patient was discharged from [facility] with diagnosis of new onset end stage renal disease in February. Patient is admitted for multifocal pna, had pleural effusion of left lung s/p drained.    INTERVAL/OVERNIGHT EVENTS: Patient seen and examined bedside. No acute events overnight. Patient stated that her SOB has improved.  Patient denies of any chest pain, bowel or urinary changes and calf tenderness. Cardiac monitor reviewed: patient desat to 80-83%    OBJECTIVE: Vital Signs Last 24 Hrs  Vital Signs Last 24 Hrs  T(C): 37, Max: 37.4 (04-09 @ 22:45)  T(F): 98.6, Max: 99.4 (04-09 @ 22:45)  HR: 96 (82 - 104)  BP: 143/76 (143/76 - 165/87)  RR: 18 (18 - 18)  SpO2: 94% (93% - 100%) on 4L NC      PHYSICAL EXAM:  Constitutional: Obese, NAD, well-groomed, well-developed  HEENT: PERRLA, EOMI, Normal Hearing, moist mucous membranes  Respiratory: expiratory wheezing in right lung fields  Cardiovascular: S1 and S2, RRR, no M/G/R  Gastrointestinal: Obese, BS+, soft, non-tender  Extremities: +1 bilateral pedal edema, no cyanosis or calf tenderness    LABS:                                   8.9    7.5   )-----------( 360      ( 09 Apr 2017 11:56 )             28.4       Phos  3.7     04-09  Mg     2.3     04-09        RADIOLOGY & ADDITIONAL TESTS:    MEDICATIONS  (STANDING):  sodium chloride 0.9% lock flush 3milliLiter(s) IV Push every 8 hours  pantoprazole    Tablet 40milliGRAM(s) Oral before breakfast  insulin lispro (HumaLOG) corrective regimen sliding scale  SubCutaneous three times a day before meals  dextrose 5%. 1000milliLiter(s) IV Continuous <Continuous>  dextrose 50% Injectable 12.5Gram(s) IV Push once  dextrose 50% Injectable 25Gram(s) IV Push once  dextrose 50% Injectable 25Gram(s) IV Push once  aspirin  chewable 81milliGRAM(s) Oral daily  valsartan 40milliGRAM(s) Oral daily  heparin  Injectable 5000Unit(s) SubCutaneous every 12 hours  calcium acetate 667milliGRAM(s) Oral three times a day with meals  calcitriol   Capsule 0.5MICROGram(s) Oral daily  metoprolol 50milliGRAM(s) Oral two times a day  amLODIPine   Tablet 10milliGRAM(s) Oral daily  cefepime  IVPB  IV Intermittent   cefepime  IVPB 1000milliGRAM(s) IV Intermittent every 24 hours  saccharomyces boulardii 250milliGRAM(s) Oral two times a day  ALBUTerol/ipratropium for Nebulization 3milliLiter(s) Nebulizer every 6 hours  ferrous    sulfate 325milliGRAM(s) Oral daily    MEDICATIONS  (PRN):  dextrose Gel 1Dose(s) Oral once PRN Blood Glucose LESS THAN 70 milliGRAM(s)/deciliter  glucagon  Injectable 1milliGRAM(s) IntraMuscular once PRN Glucose LESS THAN 70 milligrams/deciliter  acetaminophen   Tablet. 650milliGRAM(s) Oral every 8 hours PRN Moderate Pain (4 - 6)  guaiFENesin   Syrup  (Sugar-Free) 100milliGRAM(s) Oral every 6 hours PRN Cough 37 year old female w/ PMH ESRD on HD  (Tu/Th/S), HTN, DM & presents w/ dyspnea admitted for multifocal pneumonia w/ left-sided pleural effusion s/p drainage, chest tube placement & subsequent removal without any complications. Patient was started on vanco and zosyn and now on cefepime.     INTERVAL/OVERNIGHT EVENTS: Patient seen and examined bedside. No acute events overnight. Patient stated that her SOB has improved. Patient denies of any fever, chills, chest pain, bowel or urinary changes and calf tenderness. Cardiac monitor reviewed: patient desat to 80-83%    Allergies    No Known Allergies    Vital Signs Last 24 Hrs  T(F): 99.6, Max: 99.6 (04-10 @ 11:03)  HR: 97 (86 - 104)  BP: 160/88 (143/76 - 165/87)  RR: 18 (18 - 18)  SpO2: 85% (85% - 100%) on 4L NC    PHYSICAL EXAM:  Constitutional: Obese, NAD, well-groomed, well-developed  HEENT: PERRLA, EOMI, Normal Hearing, moist mucous membranes  Respiratory: expiratory wheezing B/L  Cardiovascular: S1& S2, RRR, no murmurs, rales or gallops   Gastrointestinal: Obese, BS+, soft, non-tender  Extremities: +1 bilateral pedal edema, no cyanosis or calf tenderness    LABS:                        8.1    6.6   )-----------( 354      ( 10 Apr 2017 08:46 )             25.6     04-10    134<L>  |  92<L>  |  55.0<H>  ----------------------------<  155<H>  4.8   |  26.0  |  7.90<H>    Ca    8.6      10 Apr 2017 08:52  Phos  3.7     04-09  Mg     2.3     04-09    TPro  7.8  /  Alb  3.0<L>  /  TBili  0.7  /  DBili  x   /  AST  16  /  ALT  17  /  AlkPhos  128<H>  04-10    HBA1c: 7.3  FS: 136 (10 Apr 2017 12:13), 157 (10 Apr 2017 07:41), 216 (09 Apr 2017 22:45), 205 (09 Apr 2017 17:02)    Pleural fluid culture: Serratia marcescens  Blood cultures: negative x2  HIV: negative  Hepatitis panel: negative    RADIOLOGY & ADDITIONAL TESTS:    TTE: 60-65%    MEDICATIONS  (STANDING):  sodium chloride 0.9% lock flush 3milliLiter(s) IV Push every 8 hours  pantoprazole    Tablet 40milliGRAM(s) Oral before breakfast  insulin lispro (HumaLOG) corrective regimen sliding scale  SubCutaneous three times a day before meals  dextrose 5%. 1000milliLiter(s) IV Continuous <Continuous>  dextrose 50% Injectable 12.5Gram(s) IV Push once  dextrose 50% Injectable 25Gram(s) IV Push once  dextrose 50% Injectable 25Gram(s) IV Push once  aspirin  chewable 81milliGRAM(s) Oral daily  valsartan 40milliGRAM(s) Oral daily  heparin  Injectable 5000Unit(s) SubCutaneous every 12 hours  calcium acetate 667milliGRAM(s) Oral three times a day with meals  calcitriol   Capsule 0.5MICROGram(s) Oral daily  metoprolol 50milliGRAM(s) Oral two times a day  amLODIPine   Tablet 10milliGRAM(s) Oral daily  cefepime  IVPB  IV Intermittent   cefepime  IVPB 1000milliGRAM(s) IV Intermittent every 24 hours  saccharomyces boulardii 250milliGRAM(s) Oral two times a day  ALBUTerol/ipratropium for Nebulization 3milliLiter(s) Nebulizer every 6 hours  ferrous    sulfate 325milliGRAM(s) Oral daily    MEDICATIONS  (PRN):  dextrose Gel 1Dose(s) Oral once PRN Blood Glucose LESS THAN 70 milliGRAM(s)/deciliter  glucagon  Injectable 1milliGRAM(s) IntraMuscular once PRN Glucose LESS THAN 70 milligrams/deciliter  acetaminophen   Tablet. 650milliGRAM(s) Oral every 8 hours PRN Moderate Pain (4 - 6)  guaiFENesin   Syrup  (Sugar-Free) 100milliGRAM(s) Oral every 6 hours PRN Cough 37 year old female w/ PMH ESRD on HD (Tu/Th/S), HTN, DM & presents w/ dyspnea admitted for multifocal pneumonia w/ left-sided pleural effusion s/p drainage, chest tube placement & subsequent removal without any complications. Patient was started on vanco and zosyn and now on cefepime.     INTERVAL/OVERNIGHT EVENTS: Patient seen and examined bedside. No acute events overnight. Patient stated that her SOB has improved. Patient denies of any fever, chills, chest pain, bowel or urinary changes and calf tenderness. Cardiac monitor reviewed: patient desat to 80-83%    Allergies    No Known Allergies    Vital Signs Last 24 Hrs  T(F): 99.6, Max: 99.6 (04-10 @ 11:03)  HR: 97 (86 - 104)  BP: 160/88 (143/76 - 165/87)  RR: 18 (18 - 18)  SpO2: 85% (85% - 100%) on 4L NC    PHYSICAL EXAM:  Constitutional: Obese, NAD, well-groomed, well-developed  HEENT: PERRLA, EOMI, Normal Hearing, moist mucous membranes  Respiratory: expiratory wheezing B/L  Cardiovascular: S1& S2, RRR, no murmurs, rales or gallops   Gastrointestinal: Obese, BS+, soft, non-tender  Extremities: +1 bilateral pedal edema, no cyanosis or calf tenderness    LABS:                        8.1    6.6   )-----------( 354      ( 10 Apr 2017 08:46 )             25.6     04-10    134<L>  |  92<L>  |  55.0<H>  ----------------------------<  155<H>  4.8   |  26.0  |  7.90<H>    Ca    8.6      10 Apr 2017 08:52  Phos  3.7     04-09  Mg     2.3     04-09    TPro  7.8  /  Alb  3.0<L>  /  TBili  0.7  /  DBili  x   /  AST  16  /  ALT  17  /  AlkPhos  128<H>  04-10    HBA1c: 7.3  FS: 136 (10 Apr 2017 12:13), 157 (10 Apr 2017 07:41), 216 (09 Apr 2017 22:45), 205 (09 Apr 2017 17:02)    Pleural fluid culture: Serratia marcescens  Blood cultures: negative x2  HIV: negative  Hepatitis panel: negative    RADIOLOGY & ADDITIONAL TESTS:    TTE: 60-65%    MEDICATIONS  (STANDING):  sodium chloride 0.9% lock flush 3milliLiter(s) IV Push every 8 hours  pantoprazole    Tablet 40milliGRAM(s) Oral before breakfast  insulin lispro (HumaLOG) corrective regimen sliding scale  SubCutaneous three times a day before meals  dextrose 5%. 1000milliLiter(s) IV Continuous <Continuous>  dextrose 50% Injectable 12.5Gram(s) IV Push once  dextrose 50% Injectable 25Gram(s) IV Push once  dextrose 50% Injectable 25Gram(s) IV Push once  aspirin  chewable 81milliGRAM(s) Oral daily  valsartan 40milliGRAM(s) Oral daily  heparin  Injectable 5000Unit(s) SubCutaneous every 12 hours  calcium acetate 667milliGRAM(s) Oral three times a day with meals  calcitriol   Capsule 0.5MICROGram(s) Oral daily  metoprolol 50milliGRAM(s) Oral two times a day  amLODIPine   Tablet 10milliGRAM(s) Oral daily  cefepime  IVPB  IV Intermittent   cefepime  IVPB 1000milliGRAM(s) IV Intermittent every 24 hours  saccharomyces boulardii 250milliGRAM(s) Oral two times a day  ALBUTerol/ipratropium for Nebulization 3milliLiter(s) Nebulizer every 6 hours  ferrous    sulfate 325milliGRAM(s) Oral daily    MEDICATIONS  (PRN):  dextrose Gel 1Dose(s) Oral once PRN Blood Glucose LESS THAN 70 milliGRAM(s)/deciliter  glucagon  Injectable 1milliGRAM(s) IntraMuscular once PRN Glucose LESS THAN 70 milligrams/deciliter  acetaminophen   Tablet. 650milliGRAM(s) Oral every 8 hours PRN Moderate Pain (4 - 6)  guaiFENesin   Syrup  (Sugar-Free) 100milliGRAM(s) Oral every 6 hours PRN Cough Patient is a 37y old  Female who presents with a chief complaint of shortness of breath (04 Apr 2017 23:54)    37 year old female w/ PMH ESRD on HD (Tu/Th/S), HTN, DM & presents w/ dyspnea admitted for multifocal pneumonia w/ left-sided pleural effusion s/p drainage, chest tube placement & subsequent removal without any complications. Patient was started on vanco and zosyn and now on cefepime.     INTERVAL/OVERNIGHT EVENTS: Patient seen and examined bedside. No acute events overnight. Patient stated that her SOB has improved. Patient denies of any fever, chills, chest pain, bowel or urinary changes and calf tenderness. Cardiac monitor reviewed: patient desat to 80-83%-Asx    No Known Allergies    Vital Signs Last 24 Hrs  T(F): 99.6, Max: 99.6 (04-10 @ 11:03)  HR: 97 (86 - 104)  BP: 160/88 (143/76 - 165/87)  RR: 18 (18 - 18)  SpO2: 85% (85% - 100%) on 4L NC    PHYSICAL EXAM:  Constitutional: Obese, NAD, well-groomed, well-developed  HEENT: PERRLA, EOMI, Normal Hearing, moist mucous membranes  Respiratory: expiratory wheezing B/L  Cardiovascular: S1& S2, RRR, no murmurs, rales or gallops   Gastrointestinal: Obese, BS+, soft, non-tender  Extremities: +2 bilateral pedal edema, no cyanosis or calf tenderness    LABS:                        8.1    6.6   )-----------( 354      ( 10 Apr 2017 08:46 )             25.6     04-10    134<L>  |  92<L>  |  55.0<H>  ----------------------------<  155<H>  4.8   |  26.0  |  7.90<H>    Ca    8.6      10 Apr 2017 08:52  Phos  3.7     04-09  Mg     2.3     04-09    TPro  7.8  /  Alb  3.0<L>  /  TBili  0.7  /  DBili  x   /  AST  16  /  ALT  17  /  AlkPhos  128<H>  04-10    HBA1c: 7.3  FS: 136 (10 Apr 2017 12:13), 157 (10 Apr 2017 07:41), 216 (09 Apr 2017 22:45), 205 (09 Apr 2017 17:02)    Pleural fluid culture: Serratia marcescens  Blood cultures: negative x2  HIV: negative  Hepatitis panel: negative    RADIOLOGY & ADDITIONAL TESTS:    TTE: 60-65%    MEDICATIONS  (STANDING):  sodium chloride 0.9% lock flush 3milliLiter(s) IV Push every 8 hours  pantoprazole    Tablet 40milliGRAM(s) Oral before breakfast  insulin lispro (HumaLOG) corrective regimen sliding scale  SubCutaneous three times a day before meals  dextrose 5%. 1000milliLiter(s) IV Continuous <Continuous>  dextrose 50% Injectable 12.5Gram(s) IV Push once  dextrose 50% Injectable 25Gram(s) IV Push once  dextrose 50% Injectable 25Gram(s) IV Push once  aspirin  chewable 81milliGRAM(s) Oral daily  valsartan 40milliGRAM(s) Oral daily  heparin  Injectable 5000Unit(s) SubCutaneous every 12 hours  calcium acetate 667milliGRAM(s) Oral three times a day with meals  calcitriol   Capsule 0.5MICROGram(s) Oral daily  metoprolol 50milliGRAM(s) Oral two times a day  amLODIPine   Tablet 10milliGRAM(s) Oral daily  cefepime  IVPB  IV Intermittent   cefepime  IVPB 1000milliGRAM(s) IV Intermittent every 24 hours  saccharomyces boulardii 250milliGRAM(s) Oral two times a day  ALBUTerol/ipratropium for Nebulization 3milliLiter(s) Nebulizer every 6 hours  ferrous    sulfate 325milliGRAM(s) Oral daily    MEDICATIONS  (PRN):  dextrose Gel 1Dose(s) Oral once PRN Blood Glucose LESS THAN 70 milliGRAM(s)/deciliter  glucagon  Injectable 1milliGRAM(s) IntraMuscular once PRN Glucose LESS THAN 70 milligrams/deciliter  acetaminophen   Tablet. 650milliGRAM(s) Oral every 8 hours PRN Moderate Pain (4 - 6)  guaiFENesin   Syrup  (Sugar-Free) 100milliGRAM(s) Oral every 6 hours PRN Cough

## 2017-04-10 NOTE — PROGRESS NOTE ADULT - PROBLEM SELECTOR PLAN 5
- total iron decreased, ferritin normal, trasnferrin normal  - likely anemia due to ESRD   - rcvd 4 days of IV iron, will d/c today due to acute infection and resume with oral iron only   - got 2 units of pRBC yesterday during dialysis - total iron decreased, ferritin normal, trasnferrin normal  - likely anemia due to ESRD   - rcvd 4 days of IV iron, will d/c today due to acute infection and resume with oral iron only   - got 2 units of pRBC during dialysis likely anemia of chronic disease 2/2 ESRD  s/p 4 days of IV venofer & 2 units of pRBC during dialysis  c/w PO ferrous sulfate 325 mg PO daily suboptimally controlled   c/w amlodipine 10 mg PO daily; hold for SBP < 110 or HR < 60  c/w valsartan 40 mg PO daily to be increased to 80mg   c/w metoprolol 50 mg PO BID; hold for SBP < 110 or HR < 60

## 2017-04-10 NOTE — PROGRESS NOTE ADULT - ASSESSMENT
his 37 y.o. F With ESRD on HD TTS, HTN, Dm2, h/o CHF who was admitted for SOB on 4/4/17.  Workup in hospital showed a CT scan of chest with Small left pleural effusion with consolidation in the left lung and scattered opacities in the right lung, suspicious for multifocal pneumonia.    patient had placement of a left sided pigtail catheter for drainage. Fluid sent on 4/*6/17 showed Gram negative rods on cultures. Pigtail was removed on 4/7/17.  Blood and sputum cultures WERE NOT sent. patient had been on Zosyn 4/5-4/8, Vanco from 4/5 to 4/8, and Ceftriaxone and Azithro on 4/5/17.   PT SEEN EARLIER TODAY IN DIALYSIS UNIT  PT PLEURAL  FLUID WITH SERRATIA  WILL CONTINUE CEFEPIME BU WILL LIKELY CHANGE BACK TO CEFTRIAXONE   WILL FOLLOWUP

## 2017-04-10 NOTE — PROGRESS NOTE ADULT - PROBLEM SELECTOR PLAN 3
-s/p chest tube removal  -cx with gram neg organisms - d/c vanco now on cefepime  ID eval called- and antibiotic regimen changed as above s/p drainage, chest tube placement & subsequent removal without any complications.  c/w cefipime 1 g IV Q24H w/ florastor (day 6 of antibiotics; day 3 of cefipime) Patient asymptomatic and denies of chest pain in the last 48 hours   Elevated troponin (0.10 --> 0.15 --> 0.14) likely 2/2 ESRD    BNP 17,000 w/ TTE showing EF 60-65%  Seen by cardiology (Dr. Alvarez)

## 2017-04-10 NOTE — PROGRESS NOTE ADULT - SUBJECTIVE AND OBJECTIVE BOX
SORAYA JIMENEZ is a 37y Female with HPI:    37 y.o. F With ESRD on HD TTS, HTN, Dm2, h/o CHF who was admitted for SOB on 4/4/17.  Workup in hospital showed a CT scan of chest with Small left pleural effusion with consolidation in the left lung and scattered opacities in the right lung, suspicious for multifocal pneumonia.    patient had placement of a left sided pigtail catheter for drainage. Fluid sent on 4/*6/17 showed Gram negative rods on cultures. Pigtail was removed on 4/7/17.  Blood and sputum cultures WERE NOT sent. patient had been on Zosyn 4/5-4/8, Vanco from 4/5 to 4/8, and Ceftriaxone and Azithro on 4/5/17.    Prior to coming in, patient said she had 72 hours of SOB and crushing sensation when breathing. Denies sick contacts, no recent travel      Allergies:  No Known Allergies      Medications:  sodium chloride 0.9% lock flush 3milliLiter(s) IV Push every 8 hours  pantoprazole    Tablet 40milliGRAM(s) Oral before breakfast  insulin lispro (HumaLOG) corrective regimen sliding scale  SubCutaneous three times a day before meals  dextrose 5%. 1000milliLiter(s) IV Continuous <Continuous>  dextrose Gel 1Dose(s) Oral once PRN  dextrose 50% Injectable 12.5Gram(s) IV Push once  dextrose 50% Injectable 25Gram(s) IV Push once  dextrose 50% Injectable 25Gram(s) IV Push once  glucagon  Injectable 1milliGRAM(s) IntraMuscular once PRN  aspirin  chewable 81milliGRAM(s) Oral daily  valsartan 40milliGRAM(s) Oral daily  heparin  Injectable 5000Unit(s) SubCutaneous every 12 hours  calcium acetate 667milliGRAM(s) Oral three times a day with meals  calcitriol   Capsule 0.5MICROGram(s) Oral daily  metoprolol 50milliGRAM(s) Oral two times a day  acetaminophen   Tablet. 650milliGRAM(s) Oral every 8 hours PRN  amLODIPine   Tablet 10milliGRAM(s) Oral daily  cefepime  IVPB  IV Intermittent   cefepime  IVPB 1000milliGRAM(s) IV Intermittent every 24 hours  saccharomyces boulardii 250milliGRAM(s) Oral two times a day  guaiFENesin   Syrup  (Sugar-Free) 100milliGRAM(s) Oral every 6 hours PRN  ALBUTerol/ipratropium for Nebulization 3milliLiter(s) Nebulizer every 6 hours  ferrous    sulfate 325milliGRAM(s) Oral daily      ANTIBIOTICS:         Review of Systems: - Negative except as mentioned above     Physical Exam:  ICU Vital Signs Last 24 Hrs  T(C): 37.6, Max: 37.6 (04-10 @ 11:03)  T(F): 99.6, Max: 99.6 (04-10 @ 11:03)  HR: 97 (86 - 104)  BP: 160/88 (143/76 - 165/87)  BP(mean): --  ABP: --  ABP(mean): --  RR: 18 (18 - 18)  SpO2: 85% (85% - 100%)    GEN: NAD, pleasant  HEENT: normocephalic and atraumatic. EOMI. WILDA...  NECK: Supple. No carotid bruits.  No lymphadenopathy or thyromegaly.  LUNGS: Clear to auscultation.  HEART: Regular rate and rhythm without murmur.  ABDOMEN: Soft, nontender, and nondistended.  Positive bowel sounds.  No hepatosplenomegaly was noted.  NO REBOUND NO GUARDING  EXTREMITIES: Without any cyanosis, clubbing, rash, lesions or edema.  NEUROLOGIC: Cranial nerves II through XII are grossly intact.    SKIN: No ulceration or induration present.      Labs:  04-10    134<L>  |  92<L>  |  55.0<H>  ----------------------------<  155<H>  4.8   |  26.0  |  7.90<H>    Ca    8.6      10 Apr 2017 08:52  Phos  3.7     04-09  Mg     2.3     04-09    TPro  7.8  /  Alb  3.0<L>  /  TBili  0.7  /  DBili  x   /  AST  16  /  ALT  17  /  AlkPhos  128<H>  04-10                          8.1    6.6   )-----------( 354      ( 10 Apr 2017 08:46 )             25.6           LIVER FUNCTIONS - ( 10 Apr 2017 08:52 )  Alb: 3.0 g/dL / Pro: 7.8 g/dL / ALK PHOS: 128 U/L / ALT: 17 U/L / AST: 16 U/L / GGT: x               CAPILLARY BLOOD GLUCOSE  136 (10 Apr 2017 12:13)  157 (10 Apr 2017 07:41)  216 (09 Apr 2017 22:45)  205 (09 Apr 2017 17:02)        RECENT CULTURES:  04-06 .Blood Blood XXXX XXXX   No growth at 48 hours    04-05 .Body Fluid Pleural Fluid Serratia marcescens   No White blood cells  No organisms seen   Serratia marcescens Growth in fluid media only

## 2017-04-10 NOTE — PROGRESS NOTE ADULT - PROBLEM SELECTOR PLAN 7
- Dialysis via right IJ access TThS, continue Phoslo, calcitriol,  -d/c venofer   - L avf yet to mature, OP vascular f/up c/w hemodialysis (Tu/Th/S) via via right IJ access  c/w calcium acetate 667 mg PO TID & calcitriol 0.5 mcg PO daily   outpatient vascular follow up for left AVF HBA1c: 7.3  FS: 136 (10 Apr 2017 12:13), 157 (10 Apr 2017 07:41), 216 (09 Apr 2017 22:45), 205 (09 Apr 2017 17:02)  c/w humalog sliding scale

## 2017-04-10 NOTE — PROGRESS NOTE ADULT - PROBLEM SELECTOR PLAN 1
-c/w cefipime w/ florastor (day 3 today of 6 days of total abx)   -pleural fluid culture + serratia  -ID on board  -c/w nebs with mucomyst  -incentive spirometer   -supplental oxygen to keep O2 sat>92  -Blood cx negative x2 so far -c/w cefipime w/ florastor (day 3  today of 6 days of total abx)   -pleural fluid culture + serratia  -ID on board  -c/w nebs with mucomyst  -incentive spirometer   -supplental oxygen to keep O2 sat>92  -Blood cx negative x2 so far Pleural fluid culture positive for Serratia marcescens  c/w cefipime 1 g IV Q24H w/ florastor (day 6 of antibiotics; day 3 of cefipime)    c/w dunebs Q6H & incentive spirometer   c/w supplental oxygen to keep O2 sat > 92%  Blood cultures negative x2 so far Pleural fluid culture positive for Serratia marcescens  c/w cefipime 1 g IV Q24H w/ florastor (day 6 of antibiotics; day 3 of cefipime)    c/w dunebs Q6H & incentive spirometer   c/w supplental oxygen to keep O2 sat > 92%  Blood cultures negative x2 so far  Respiratory distress improved. Now on 4L O2 with sat of 92% - practiced deep breathing with her. Incentive spirometer

## 2017-04-10 NOTE — PROGRESS NOTE ADULT - ASSESSMENT
Patient is 37 year old female with past medical history of end stage renal failure on dialysis T/Th/S, hypertension, diabetes, questionable CHF  who comes to [facility] complaining of shortness of breath. Likely fluid overloaded due to renal failure along with MF pna that was found - started on vanco and zosyn and now on cefepime. Patient also had a pleural effusion on the left s/p drainage, chest tube placement and subsequent removal without any complications. 37 year old female w/ PMH ESRD on HD  (Tu/Th/S), HTN, DM & presents w/ dyspnea admitted for multifocal pneumonia w/ left-sided pleural effusion s/p drainage, chest tube placement & subsequent removal without any complications. Patient was started on vanco and zosyn and now on cefepime. 37 year old female w/ PMH ESRD on HD (Tu/Th/S), HTN, DM & presents w/ dyspnea admitted for multifocal pneumonia w/ left-sided pleural effusion s/p drainage, chest tube placement & subsequent removal without any complications. Patient was started on vanco and zosyn and now on cefepime.

## 2017-04-11 DIAGNOSIS — J15.6 PNEUMONIA DUE TO OTHER GRAM-NEGATIVE BACTERIA: ICD-10-CM

## 2017-04-11 DIAGNOSIS — R04.0 EPISTAXIS: ICD-10-CM

## 2017-04-11 DIAGNOSIS — J86.9 PYOTHORAX WITHOUT FISTULA: ICD-10-CM

## 2017-04-11 LAB
ANA PAT FLD IF-IMP: ABNORMAL
ANA TITR SER: ABNORMAL

## 2017-04-11 PROCEDURE — 99233 SBSQ HOSP IP/OBS HIGH 50: CPT | Mod: GC

## 2017-04-11 PROCEDURE — 99232 SBSQ HOSP IP/OBS MODERATE 35: CPT

## 2017-04-11 RX ORDER — CEFEPIME 1 G/1
1000 INJECTION, POWDER, FOR SOLUTION INTRAMUSCULAR; INTRAVENOUS ONCE
Qty: 0 | Refills: 0 | Status: COMPLETED | OUTPATIENT
Start: 2017-04-11 | End: 2017-04-12

## 2017-04-11 RX ORDER — SODIUM CHLORIDE 0.65 %
1 AEROSOL, SPRAY (ML) NASAL THREE TIMES A DAY
Qty: 0 | Refills: 0 | Status: DISCONTINUED | OUTPATIENT
Start: 2017-04-11 | End: 2017-04-13

## 2017-04-11 RX ORDER — VALSARTAN 80 MG/1
160 TABLET ORAL DAILY
Qty: 0 | Refills: 0 | Status: DISCONTINUED | OUTPATIENT
Start: 2017-04-11 | End: 2017-04-13

## 2017-04-11 RX ORDER — CEFTAZIDIME 6 G/30ML
2 INJECTION, POWDER, FOR SOLUTION INTRAVENOUS
Qty: 0 | Refills: 0 | Status: COMPLETED | OUTPATIENT
Start: 2017-04-11 | End: 2017-04-12

## 2017-04-11 RX ORDER — HYDRALAZINE HCL 50 MG
10 TABLET ORAL EVERY 6 HOURS
Qty: 0 | Refills: 0 | Status: DISCONTINUED | OUTPATIENT
Start: 2017-04-11 | End: 2017-04-13

## 2017-04-11 RX ADMIN — Medication 250 MILLIGRAM(S): at 06:27

## 2017-04-11 RX ADMIN — SODIUM CHLORIDE 3 MILLILITER(S): 9 INJECTION INTRAMUSCULAR; INTRAVENOUS; SUBCUTANEOUS at 07:53

## 2017-04-11 RX ADMIN — AMLODIPINE BESYLATE 10 MILLIGRAM(S): 2.5 TABLET ORAL at 06:27

## 2017-04-11 RX ADMIN — HEPARIN SODIUM 5000 UNIT(S): 5000 INJECTION INTRAVENOUS; SUBCUTANEOUS at 17:04

## 2017-04-11 RX ADMIN — Medication 50 MILLIGRAM(S): at 17:04

## 2017-04-11 RX ADMIN — HEPARIN SODIUM 5000 UNIT(S): 5000 INJECTION INTRAVENOUS; SUBCUTANEOUS at 06:27

## 2017-04-11 RX ADMIN — VALSARTAN 160 MILLIGRAM(S): 80 TABLET ORAL at 12:07

## 2017-04-11 RX ADMIN — Medication 500 MILLIGRAM(S): at 12:07

## 2017-04-11 RX ADMIN — Medication 2: at 17:04

## 2017-04-11 RX ADMIN — Medication 50 MILLIGRAM(S): at 06:27

## 2017-04-11 RX ADMIN — Medication 1 SPRAY(S): at 15:15

## 2017-04-11 RX ADMIN — Medication 325 MILLIGRAM(S): at 12:07

## 2017-04-11 RX ADMIN — VALSARTAN 80 MILLIGRAM(S): 80 TABLET ORAL at 06:27

## 2017-04-11 RX ADMIN — Medication 81 MILLIGRAM(S): at 12:07

## 2017-04-11 RX ADMIN — PANTOPRAZOLE SODIUM 40 MILLIGRAM(S): 20 TABLET, DELAYED RELEASE ORAL at 06:27

## 2017-04-11 RX ADMIN — Medication 250 MILLIGRAM(S): at 17:05

## 2017-04-11 RX ADMIN — SODIUM CHLORIDE 3 MILLILITER(S): 9 INJECTION INTRAMUSCULAR; INTRAVENOUS; SUBCUTANEOUS at 12:28

## 2017-04-11 RX ADMIN — Medication 1 SPRAY(S): at 08:02

## 2017-04-11 RX ADMIN — Medication 1 SPRAY(S): at 06:28

## 2017-04-11 RX ADMIN — CALCITRIOL 0.5 MICROGRAM(S): 0.5 CAPSULE ORAL at 12:07

## 2017-04-11 NOTE — PROGRESS NOTE ADULT - PROBLEM SELECTOR PLAN 2
Resolved.  Will monitor for recurrence and consider nasal packing Resolved. Likely secondary to coughing episodes  Will monitor for recurrence and consider nasal packing Resolved. Likely secondary to coughing episodes and oxygen. Started on O2 with humidification  will add nasal spray as well

## 2017-04-11 NOTE — PROGRESS NOTE ADULT - SUBJECTIVE AND OBJECTIVE BOX
NEPHROLOGY INTERVAL HPI/OVERNIGHT EVENTS:    MEDICATIONS  (STANDING):  sodium chloride 0.9% lock flush 3milliLiter(s) IV Push every 8 hours  pantoprazole    Tablet 40milliGRAM(s) Oral before breakfast  insulin lispro (HumaLOG) corrective regimen sliding scale  SubCutaneous three times a day before meals  dextrose 5%. 1000milliLiter(s) IV Continuous <Continuous>  dextrose 50% Injectable 12.5Gram(s) IV Push once  dextrose 50% Injectable 25Gram(s) IV Push once  dextrose 50% Injectable 25Gram(s) IV Push once  aspirin  chewable 81milliGRAM(s) Oral daily  heparin  Injectable 5000Unit(s) SubCutaneous every 12 hours  calcium acetate 667milliGRAM(s) Oral three times a day with meals  calcitriol   Capsule 0.5MICROGram(s) Oral daily  metoprolol 50milliGRAM(s) Oral two times a day  amLODIPine   Tablet 10milliGRAM(s) Oral daily  saccharomyces boulardii 250milliGRAM(s) Oral two times a day  ALBUTerol/ipratropium for Nebulization 3milliLiter(s) Nebulizer every 6 hours  ferrous    sulfate 325milliGRAM(s) Oral daily  ascorbic acid 500milliGRAM(s) Oral daily  valsartan 160milliGRAM(s) Oral daily  cefepime  IVPB 1000milliGRAM(s) IV Intermittent once  cefTAZidime  IVPB 2Gram(s) IV Intermittent <User Schedule>    MEDICATIONS  (PRN):  dextrose Gel 1Dose(s) Oral once PRN Blood Glucose LESS THAN 70 milliGRAM(s)/deciliter  glucagon  Injectable 1milliGRAM(s) IntraMuscular once PRN Glucose LESS THAN 70 milligrams/deciliter  acetaminophen   Tablet. 650milliGRAM(s) Oral every 8 hours PRN Moderate Pain (4 - 6)  guaiFENesin   Syrup  (Sugar-Free) 100milliGRAM(s) Oral every 6 hours PRN Cough  sodium chloride 0.65% Nasal 1Spray(s) Both Nostrils three times a day PRN Dry Nostrils  hydrALAZINE Injectable 10milliGRAM(s) IV Push every 6 hours PRN SBP over 150      Allergies    No Known Allergies    Intolerances        Vital Signs Last 24 Hrs  T(C): 36.4, Max: 37.4 (04-10 @ 23:00)  T(F): 97.5, Max: 99.3 (04-10 @ 23:00)  HR: 96 (66 - 96)  BP: 152/81 (152/81 - 175/97)  BP(mean): --  RR: 18 (18 - 18)  SpO2: 92% (92% - 94%)  Daily     Daily     PHYSICAL EXAM:  GENERAL: Weakness  HEAD:  NCAT  EYES: EOMI  NECK: Supple, No JVD  NERVOUS SYSTEM:  Alert & Oriented X3  CHEST/LUNG: Diminished BS at bases; L chest tube  HEART: Regular rate and rhythm; No rub  ABDOMEN: Soft, Nontender, Nondistended  EXTREMITIES:  2+ Peripheral Pulses, +edema    LABS:                        8.1    6.6   )-----------( 354      ( 10 Apr 2017 08:46 )             25.6     04-10    134<L>  |  92<L>  |  55.0<H>  ----------------------------<  155<H>  4.8   |  26.0  |  7.90<H>    Ca    8.6      10 Apr 2017 08:52    TPro  7.8  /  Alb  3.0<L>  /  TBili  0.7  /  DBili  x   /  AST  16  /  ALT  17  /  AlkPhos  128<H>  04-10                RADIOLOGY & ADDITIONAL TESTS:

## 2017-04-11 NOTE — PROGRESS NOTE ADULT - ASSESSMENT
37 year old female w/ PMH ESRD on HD (Tu/Th/S), HTN, DM & presents w/ dyspnea admitted for multifocal pneumonia w/ left-sided pleural effusion s/p drainage, chest tube placement & subsequent removal without any complications. Patient was started on vanco and zosyn and now on cefepime day 4. I.D states to continue cefepime with possibility of changing to Ceftriaxone in future. Will f/u with recommendation. 37 year old female w/ PMH ESRD on HD (Tu/Th/S), HTN, DM & presents w/ dyspnea admitted for multifocal pneumonia w/ left-sided pleural effusion s/p drainage, chest tube placement & subsequent removal without any complications. Patient was started on vanco and zosyn and now on cefepime day 4. I.D states to complete course of cefepime today and start Ceftazidime (Fortaz) tomorrow 04/12/2017 for total course of 3 weeks on MWFpost dialysis. Pt sating at 95% on 2L oxygen. Will attempt to wean off by tomorrow in anticipation of discharge by Thursday. 37 year old female w/ PMH ESRD on HD (Tu/Th/S), HTN, DM & presents w/ dyspnea admitted for multifocal pneumonia w/ left-sided pleural effusion s/p drainage, chest tube placement & subsequent removal without any complications. Patient was started on vanco and zosyn and now on cefepime day 4. I.D states to complete course of cefepime today and start Ceftazidime (Fortaz) tomorrow 04/12/2017 for total course of 3 weeks on MWFpost dialysis (end day 5/2). Pt sating at 95% on 2L oxygen. Will attempt to wean off by tomorrow in anticipation of discharge by Thursday.

## 2017-04-11 NOTE — PROGRESS NOTE ADULT - PROBLEM SELECTOR PLAN 3
s/p drainage, chest tube placement & subsequent removal without any complications.  c/w cefepime 1 g IV Q24H w/ florastor (day 7 of antibiotics; day 4 of cefepime) plan as above and repeat cxr noted

## 2017-04-11 NOTE — PROGRESS NOTE ADULT - PROBLEM SELECTOR PLAN 9
Will consider VCD boot instead of heparin 5000 unit Q12H due to pt epistaxis and subconjuctival hemorrhage. Continue heparin 5000 unit Q12H. Will monitor pt subconjuctival hemorrhage to determine if heparin a potential causative factor. Continue heparin 5000 unit Q12H.

## 2017-04-11 NOTE — PROGRESS NOTE ADULT - SUBJECTIVE AND OBJECTIVE BOX
Patient is a 37y old  Female who presents with a chief complaint of shortness of breath (04 Apr 2017 23:54)    INTERVAL/OVERNIGHT EVENTS: Patient was seen and examined at bedside. Pt says she feels better generally. However, pt c/o 2 episodes of epistaxis overnight. Pt was seen applying ice to her nostrils in attempt to prevent further bleed. Pt epistaxis has since stopped. Pt denies being SOB and says she feels fine. Pt says she has occasional cough and refusing to take any cough syrup as per nurse. Pt denies any fever, chest pain, calf tenderness. Pt reports having a BM yesterday. Appetite is nl.    Cardiac monitor reviewed: Pt desat to 73% which occured during episodes of epistaxis without her oxygen in place.    No Known Allergies    Vital Signs Last 24 Hrs  T(C): 37.4, Max: 37.6 (04-10 @ 11:03)  T(F): 99.3, Max: 99.6 (04-10 @ 11:03)  HR: 76 (66 - 115)  BP: 156/85 (143/76 - 160/88)  RR: 18 (18 - 18)  SpO2: 94% (85% - 96%). On oxygen via NC at 4L    PHYSICAL EXAM:  Constitutional: Obese, NAD, well-groomed, well-developed  HEENT: PERRLA, EOMI, + subconjuctival hemorrhage. Nostrils clear with no active epistaxis  Respiratory: expiratory wheezing B/L  Cardiovascular: S1& S2, RRR, no murmurs, rales or gallops   Gastrointestinal: Obese, BS+, soft, non-tender  Extremities: +2 bilateral pedal edema, no cyanosis or calf tenderness. Lt arm fistula(unmatured)    LABS:                        8.1    6.6   )-----------( 354      ( 10 Apr 2017 08:46 )             25.6     04-10    134<L>  |  92<L>  |  55.0<H>  ----------------------------<  155<H>  4.8   |  26.0  |  7.90<H>    Ca    8.6      10 Apr 2017 08:52  Phos  3.7     04-09  Mg     2.3     04-09    TPro  7.8  /  Alb  3.0<L>  /  TBili  0.7  /  DBili  x   /  AST  16  /  ALT  17  /  AlkPhos  128<H>  04-10    HBA1c: 7.3  FS: 136 (10 Apr 2017 12:13), 157 (10 Apr 2017 07:41), 216 (09 Apr 2017 22:45), 205 (09 Apr 2017 17:02)    Pleural fluid culture: Serratia marcescens  Blood cultures: negative x2  HIV: negative    RADIOLOGY & ADDITIONAL TESTS:    TTE: 60-65%    MEDICATIONS  (STANDING):  sodium chloride 0.9% lock flush 3milliLiter(s) IV Push every 8 hours  pantoprazole    Tablet 40milliGRAM(s) Oral before breakfast  insulin lispro (HumaLOG) corrective regimen sliding scale  SubCutaneous three times a day before meals  dextrose 5%. 1000milliLiter(s) IV Continuous <Continuous>  dextrose 50% Injectable 12.5Gram(s) IV Push once  dextrose 50% Injectable 25Gram(s) IV Push once  dextrose 50% Injectable 25Gram(s) IV Push once  aspirin  chewable 81milliGRAM(s) Oral daily  heparin  Injectable 5000Unit(s) SubCutaneous every 12 hours  calcium acetate 667milliGRAM(s) Oral three times a day with meals  calcitriol   Capsule 0.5MICROGram(s) Oral daily  metoprolol 50milliGRAM(s) Oral two times a day  amLODIPine   Tablet 10milliGRAM(s) Oral daily  cefepime  IVPB  IV Intermittent   cefepime  IVPB 1000milliGRAM(s) IV Intermittent every 24 hours  saccharomyces boulardii 250milliGRAM(s) Oral two times a day  ALBUTerol/ipratropium for Nebulization 3milliLiter(s) Nebulizer every 6 hours  ferrous    sulfate 325milliGRAM(s) Oral daily  ascorbic acid 500milliGRAM(s) Oral daily  valsartan 160milliGRAM(s) Oral daily    MEDICATIONS  (PRN):  dextrose Gel 1Dose(s) Oral once PRN Blood Glucose LESS THAN 70 milliGRAM(s)/deciliter  glucagon  Injectable 1milliGRAM(s) IntraMuscular once PRN Glucose LESS THAN 70 milligrams/deciliter  acetaminophen   Tablet. 650milliGRAM(s) Oral every 8 hours PRN Moderate Pain (4 - 6)  guaiFENesin   Syrup  (Sugar-Free) 100milliGRAM(s) Oral every 6 hours PRN Cough  sodium chloride 0.65% Nasal 1Spray(s) Both Nostrils three times a day PRN Dry Nostrils Patient is a 37y old  Female who presents with a chief complaint of shortness of breath (04 Apr 2017 23:54)    INTERVAL/OVERNIGHT EVENTS: Patient was seen and examined at bedside. Pt says she feels better generally. However, pt c/o 2 episodes of epistaxis overnight. Pt was seen applying ice to her nostrils in attempt to prevent further bleed. Pt epistaxis has since stopped. Pt denies being SOB and says she feels fine. Pt says she has occasional cough and refusing to take any cough syrup as per nurse. Pt denies any fever, chest pain, calf tenderness. Pt reports having a BM yesterday. Appetite is nl.    Cardiac monitor reviewed: Pt desat to 73% which occurred during episodes of epistaxis without her oxygen in place. Pt oxygen sat this AM on 2L oxygen was 95%     No Known Allergies    Vital Signs Last 24 Hrs  T(C): 37.4, Max: 37.6 (04-10 @ 11:03)  T(F): 99.3, Max: 99.6 (04-10 @ 11:03)  HR: 76 (66 - 115)  BP: 156/85 (143/76 - 160/88)  RR: 18 (18 - 18)  SpO2: 95% on 2L oxygen    PHYSICAL EXAM:  Constitutional: Obese, NAD, well-groomed, well-developed  HEENT: PERRLA, EOMI, + subconjuctival hemorrhage. Nostrils clear with no active epistaxis  Respiratory: expiratory wheezing B/L  Cardiovascular: S1& S2, RRR, no murmurs, rales or gallops   Gastrointestinal: Obese, BS+, soft, non-tender  Extremities: +2 bilateral pedal edema, no cyanosis or calf tenderness. Lt arm fistula(unmatured)    LABS:                        8.1    6.6   )-----------( 354      ( 10 Apr 2017 08:46 )             25.6     04-10    134<L>  |  92<L>  |  55.0<H>  ----------------------------<  155<H>  4.8   |  26.0  |  7.90<H>    Ca    8.6      10 Apr 2017 08:52  Phos  3.7     04-09  Mg     2.3     04-09    TPro  7.8  /  Alb  3.0<L>  /  TBili  0.7  /  DBili  x   /  AST  16  /  ALT  17  /  AlkPhos  128<H>  04-10    HBA1c: 7.3  FS: 136 (10 Apr 2017 12:13), 157 (10 Apr 2017 07:41), 216 (09 Apr 2017 22:45), 205 (09 Apr 2017 17:02)    Pleural fluid culture: Serratia marcescens  Blood cultures: negative x2  HIV: negative    RADIOLOGY & ADDITIONAL TESTS:    TTE: 60-65%    MEDICATIONS  (STANDING):  sodium chloride 0.9% lock flush 3milliLiter(s) IV Push every 8 hours  pantoprazole    Tablet 40milliGRAM(s) Oral before breakfast  insulin lispro (HumaLOG) corrective regimen sliding scale  SubCutaneous three times a day before meals  dextrose 5%. 1000milliLiter(s) IV Continuous <Continuous>  dextrose 50% Injectable 12.5Gram(s) IV Push once  dextrose 50% Injectable 25Gram(s) IV Push once  dextrose 50% Injectable 25Gram(s) IV Push once  aspirin  chewable 81milliGRAM(s) Oral daily  heparin  Injectable 5000Unit(s) SubCutaneous every 12 hours  calcium acetate 667milliGRAM(s) Oral three times a day with meals  calcitriol   Capsule 0.5MICROGram(s) Oral daily  metoprolol 50milliGRAM(s) Oral two times a day  amLODIPine   Tablet 10milliGRAM(s) Oral daily  cefepime  IVPB  IV Intermittent   cefepime  IVPB 1000milliGRAM(s) IV Intermittent every 24 hours  saccharomyces boulardii 250milliGRAM(s) Oral two times a day  ALBUTerol/ipratropium for Nebulization 3milliLiter(s) Nebulizer every 6 hours  ferrous    sulfate 325milliGRAM(s) Oral daily  ascorbic acid 500milliGRAM(s) Oral daily  valsartan 160milliGRAM(s) Oral daily    MEDICATIONS  (PRN):  dextrose Gel 1Dose(s) Oral once PRN Blood Glucose LESS THAN 70 milliGRAM(s)/deciliter  glucagon  Injectable 1milliGRAM(s) IntraMuscular once PRN Glucose LESS THAN 70 milligrams/deciliter  acetaminophen   Tablet. 650milliGRAM(s) Oral every 8 hours PRN Moderate Pain (4 - 6)  guaiFENesin   Syrup  (Sugar-Free) 100milliGRAM(s) Oral every 6 hours PRN Cough  sodium chloride 0.65% Nasal 1Spray(s) Both Nostrils three times a day PRN Dry Nostrils Patient is a 37y old  Female who presents with a chief complaint of shortness of breath (04 Apr 2017 23:54)    INTERVAL/OVERNIGHT EVENTS: Patient was seen and examined at bedside. Pt says she feels better generally. However, pt c/o 2 episodes of epistaxis overnight. Pt was seen applying ice to her nostrils in attempt to prevent further bleed. Pt epistaxis has since stopped. Pt denies being SOB and says she feels fine. Pt says she has occasional cough and refusing to take any cough syrup as per nurse. Pt denies any fever, chest pain, calf tenderness. Pt reports having a BM yesterday. Appetite is nl.    Cardiac monitor reviewed: Pt desat to 73% which occurred during episodes of epistaxis without her oxygen in place. Pt oxygen sat this AM on 2L oxygen was 95%     No Known Allergies    Vital Signs Last 24 Hrs  T(C): 37.4, Max: 37.6 (04-10 @ 11:03)  T(F): 99.3, Max: 99.6 (04-10 @ 11:03)  HR: 76 (66 - 115)  BP: 156/85 (143/76 - 160/88)  RR: 18 (18 - 18)  SpO2: 95% on 2L oxygen    PHYSICAL EXAM:  Constitutional: Obese, NAD, well-groomed, well-developed  HEENT: PERRLA, EOMI, + subconjuctival hemorrhage. Nostrils clear with no active epistaxis  Respiratory: expiratory wheezing B/L, overall improved lung exam   Cardiovascular: S1& S2, RRR, no murmurs, rales or gallops   Gastrointestinal: Obese, BS+, soft, non-tender  Extremities: +2 bilateral pedal edema, no cyanosis or calf tenderness. Lt arm fistula(unmatured)    LABS:                        8.1    6.6   )-----------( 354      ( 10 Apr 2017 08:46 )             25.6     04-10    134<L>  |  92<L>  |  55.0<H>  ----------------------------<  155<H>  4.8   |  26.0  |  7.90<H>    Ca    8.6      10 Apr 2017 08:52  Phos  3.7     04-09  Mg     2.3     04-09    TPro  7.8  /  Alb  3.0<L>  /  TBili  0.7  /  DBili  x   /  AST  16  /  ALT  17  /  AlkPhos  128<H>  04-10    HBA1c: 7.3  FS: 136 (10 Apr 2017 12:13), 157 (10 Apr 2017 07:41), 216 (09 Apr 2017 22:45), 205 (09 Apr 2017 17:02)    Pleural fluid culture: Serratia marcescens  Blood cultures: negative x2  HIV: negative    RADIOLOGY & ADDITIONAL TESTS:    TTE: 60-65%    MEDICATIONS  (STANDING):  sodium chloride 0.9% lock flush 3milliLiter(s) IV Push every 8 hours  pantoprazole    Tablet 40milliGRAM(s) Oral before breakfast  insulin lispro (HumaLOG) corrective regimen sliding scale  SubCutaneous three times a day before meals  dextrose 5%. 1000milliLiter(s) IV Continuous <Continuous>  dextrose 50% Injectable 12.5Gram(s) IV Push once  dextrose 50% Injectable 25Gram(s) IV Push once  dextrose 50% Injectable 25Gram(s) IV Push once  aspirin  chewable 81milliGRAM(s) Oral daily  heparin  Injectable 5000Unit(s) SubCutaneous every 12 hours  calcium acetate 667milliGRAM(s) Oral three times a day with meals  calcitriol   Capsule 0.5MICROGram(s) Oral daily  metoprolol 50milliGRAM(s) Oral two times a day  amLODIPine   Tablet 10milliGRAM(s) Oral daily  cefepime  IVPB  IV Intermittent   cefepime  IVPB 1000milliGRAM(s) IV Intermittent every 24 hours  saccharomyces boulardii 250milliGRAM(s) Oral two times a day  ALBUTerol/ipratropium for Nebulization 3milliLiter(s) Nebulizer every 6 hours  ferrous    sulfate 325milliGRAM(s) Oral daily  ascorbic acid 500milliGRAM(s) Oral daily  valsartan 160milliGRAM(s) Oral daily    MEDICATIONS  (PRN):  dextrose Gel 1Dose(s) Oral once PRN Blood Glucose LESS THAN 70 milliGRAM(s)/deciliter  glucagon  Injectable 1milliGRAM(s) IntraMuscular once PRN Glucose LESS THAN 70 milligrams/deciliter  acetaminophen   Tablet. 650milliGRAM(s) Oral every 8 hours PRN Moderate Pain (4 - 6)  guaiFENesin   Syrup  (Sugar-Free) 100milliGRAM(s) Oral every 6 hours PRN Cough  sodium chloride 0.65% Nasal 1Spray(s) Both Nostrils three times a day PRN Dry Nostrils

## 2017-04-11 NOTE — PROGRESS NOTE ADULT - SUBJECTIVE AND OBJECTIVE BOX
Patient is a 37y old  Female who presents with a chief complaint of shortness of breath (04 Apr 2017 23:54)  Has improved, however conts to desat and has had complications with epistaxis    PAST MEDICAL & SURGICAL HISTORY:  End stage renal disease  Hypertension  Diabetes  LAVF creation    MEDICATIONS  (STANDING):  sodium chloride 0.9% lock flush 3milliLiter(s) IV Push every 8 hours  pantoprazole    Tablet 40milliGRAM(s) Oral before breakfast  insulin lispro (HumaLOG) corrective regimen sliding scale  SubCutaneous three times a day before meals  dextrose 5%. 1000milliLiter(s) IV Continuous <Continuous>  dextrose 50% Injectable 12.5Gram(s) IV Push once  dextrose 50% Injectable 25Gram(s) IV Push once  dextrose 50% Injectable 25Gram(s) IV Push once  aspirin  chewable 81milliGRAM(s) Oral daily  heparin  Injectable 5000Unit(s) SubCutaneous every 12 hours  calcium acetate 667milliGRAM(s) Oral three times a day with meals  calcitriol   Capsule 0.5MICROGram(s) Oral daily  metoprolol 50milliGRAM(s) Oral two times a day  amLODIPine   Tablet 10milliGRAM(s) Oral daily  cefepime  IVPB  IV Intermittent   cefepime  IVPB 1000milliGRAM(s) IV Intermittent every 24 hours  saccharomyces boulardii 250milliGRAM(s) Oral two times a day  ALBUTerol/ipratropium for Nebulization 3milliLiter(s) Nebulizer every 6 hours  ferrous    sulfate 325milliGRAM(s) Oral daily  ascorbic acid 500milliGRAM(s) Oral daily  valsartan 160milliGRAM(s) Oral daily    MEDICATIONS  (PRN):  dextrose Gel 1Dose(s) Oral once PRN Blood Glucose LESS THAN 70 milliGRAM(s)/deciliter  glucagon  Injectable 1milliGRAM(s) IntraMuscular once PRN Glucose LESS THAN 70 milligrams/deciliter  acetaminophen   Tablet. 650milliGRAM(s) Oral every 8 hours PRN Moderate Pain (4 - 6)  guaiFENesin   Syrup  (Sugar-Free) 100milliGRAM(s) Oral every 6 hours PRN Cough  sodium chloride 0.65% Nasal 1Spray(s) Both Nostrils three times a day PRN Dry Nostrils    Allergies:No Known Allergies      Vital Signs Last 24 Hrs  T(C): 37.4, Max: 37.6 (04-10 @ 11:03)  T(F): 99.3, Max: 99.6 (04-10 @ 11:03)  HR: 76 (66 - 115)  BP: 156/85 (156/85 - 160/88)  BP(mean): --  RR: 18 (18 - 18)  SpO2: 94% (85% - 96%)  I&O's Detail    I & Os for current day (as of 11 Apr 2017 09:09)  =============================================  IN:    Total IN: 0 ml  ---------------------------------------------  OUT:    Other: 2400 ml    Total OUT: 2400 ml  ---------------------------------------------  Total NET: -2400 ml      Physical Exam:  General: NAD, resting comfortably in bed  Pulmonary: Nonlabored breathing, raspy cough  Extremities:L AVF deep with +thrill    LABS:                        8.1    6.6   )-----------( 354      ( 10 Apr 2017 08:46 )             25.6     04-10    134<L>  |  92<L>  |  55.0<H>  ----------------------------<  155<H>  4.8   |  26.0  |  7.90<H>    Ca    8.6      10 Apr 2017 08:52  Phos  3.7     04-09  Mg     2.3     04-09    TPro  7.8  /  Alb  3.0<L>  /  TBili  0.7  /  DBili  x   /  AST  16  /  ALT  17  /  AlkPhos  128<H>  04-10      CAPILLARY BLOOD GLUCOSE  122 (11 Apr 2017 07:51)  172 (10 Apr 2017 21:13)  192 (10 Apr 2017 17:36)  136 (10 Apr 2017 12:13)    Radiology and Additional Studies:    Assessment and Plan: 37yFemalePleural effusion, ESRD on HD with L AVF,  deep and needs superficialization  pt is not optimized for OR at this time and will defer until pt is medically stable for dc.   Will need medical clearance. No urgency as pt has functional HD access with permcath and has not had issues  Can be done as same day procedure on or close to day of discharge  Please reconsult when pt is medically stable for OR  Discussed with Dr Yusuf

## 2017-04-11 NOTE — PROGRESS NOTE ADULT - PROBLEM SELECTOR PLAN 1
Pleural fluid culture positive for Serratia marcescens  c/w cefepime 1 g IV Q24H w/ florastor (day 7 of antibiotics; day 4 of cefepime) . I.D considering possible change to Ceftriaxone. Will continue current regimen for now.  c/w dunebs Q6H & incentive spirometer   c/w supplental oxygen to keep O2 sat > 92%  Respiratory distress improved. Pt desat overnight intermittently during epistaxis episodes without supplemental oxygen Pleural fluid culture positive for Serratia marcescens  c/w cefepime 1 g IV Q24H w/ florastor (day 7 of antibiotics; day 4 of cefepime) . Will be discontinued later today  c/w dunebs Q6H & incentive spirometer   Respiratory distress improved. Pt desat overnight intermittently during epistaxis episodes without supplemental oxygen. However, pt sating at 95% on 2L oxygen. Will attempt to wean off by tomorrow in anticipation of discharge. Pleural fluid culture positive for Serratia marcescens  c/w cefepime 1 g IV Q24H w/ florastor (day 7 of antibiotics; day 4 of cefepime) . Will be discontinued later today and to be started on fortaz by ID for presumed empyema with her MFPNA for a total of 4 weeks and with end day of 5/2   c/w dunebs Q6H & incentive spirometer   Respiratory distress improved. Pt desat overnight intermittently during epistaxis episodes without supplemental oxygen. However, pt sating at 95% on 2L oxygen. Will attempt to wean off by tomorrow in anticipation of discharge. (weaned her down today from 4L to 2L) - encouraged ambulation and lung exercises while seated along with Incentive spirometer

## 2017-04-11 NOTE — PROGRESS NOTE ADULT - PROBLEM SELECTOR PLAN 7
c/w hemodialysis (Tu/Th/S) via right IJ access  c/w calcium acetate 667 mg PO TID & calcitriol 0.5 mcg PO daily   outpatient vascular follow up for left AVF which is currently maturing c/w hemodialysis (Tu/Th/S) via right IJ access  c/w calcium acetate 667 mg PO TID & calcitriol 0.5 mcg PO daily   outpatient vascular follow up for left AVF which is currently maturing  seen by vascular and note appreciated   -will ask renal about adding epogen

## 2017-04-11 NOTE — PROGRESS NOTE ADULT - SUBJECTIVE AND OBJECTIVE BOX
[facility] [place] [facility]  =======================================================  Ned Bangura MD FACP   Sen De MD  Diplomates American Board of Internal Medicine and Infectious Diseases  =======================================================    [id number]  SORAYA JIMENEZ is a 37y  Female     patient seen and examined.   chart reviewed    =======================================================    Allergies  No Known Allergies  Intolerances      MEDICATIONS  (STANDING):  sodium chloride 0.9% lock flush 3milliLiter(s) IV Push every 8 hours  pantoprazole    Tablet 40milliGRAM(s) Oral before breakfast  insulin lispro (HumaLOG) corrective regimen sliding scale  SubCutaneous three times a day before meals  dextrose 5%. 1000milliLiter(s) IV Continuous <Continuous>  dextrose 50% Injectable 12.5Gram(s) IV Push once  dextrose 50% Injectable 25Gram(s) IV Push once  dextrose 50% Injectable 25Gram(s) IV Push once  aspirin  chewable 81milliGRAM(s) Oral daily  heparin  Injectable 5000Unit(s) SubCutaneous every 12 hours  calcium acetate 667milliGRAM(s) Oral three times a day with meals  calcitriol   Capsule 0.5MICROGram(s) Oral daily  metoprolol 50milliGRAM(s) Oral two times a day  amLODIPine   Tablet 10milliGRAM(s) Oral daily  cefepime  IVPB  IV Intermittent   cefepime  IVPB 1000milliGRAM(s) IV Intermittent every 24 hours  saccharomyces boulardii 250milliGRAM(s) Oral two times a day  ALBUTerol/ipratropium for Nebulization 3milliLiter(s) Nebulizer every 6 hours  ferrous    sulfate 325milliGRAM(s) Oral daily  ascorbic acid 500milliGRAM(s) Oral daily  valsartan 160milliGRAM(s) Oral daily        =======================================================  REVIEW OF SYSTEMS:  CONSTITUTIONAL:  as per HPI  HEENT:  Eyes:  No diplopia or blurred vision. ENT:  No earache, sore throat or runny nose.  CARDIOVASCULAR:  No pressure, squeezing, strangling, tightness, heaviness or aching about the chest, neck, axilla or epigastrium.  RESPIRATORY:  No cough, shortness of breath, PND or orthopnea.  GASTROINTESTINAL:  No nausea, vomiting or diarrhea.  GENITOURINARY:  No dysuria, frequency or urgency. No Blood in urine  MUSCULOSKELETAL:  no joint aches, no muscle pain  SKIN:  No change in skin, hair or nails.  NEUROLOGIC:  No paresthesias, fasciculations, seizures or weakness.  PSYCHIATRIC:  No disorder of thought or mood.  ENDOCRINE:  No heat or cold intolerance, polyuria or polydipsia.  HEMATOLOGICAL:  No easy bruising or bleeding.     =======================================================  Physical Exam:  ============  Vital Signs Last 24 Hrs  T(C): 37.4, Max: 37.6 (04-10 @ 11:03)  T(F): 99.3, Max: 99.6 (04-10 @ 11:03)  HR: 76 (66 - 115)  BP: 156/85 (156/85 - 160/88)  BP(mean): --  RR: 18 (18 - 18)  SpO2: 94% (85% - 96%)    GEN: NAD, pleasant, obese  HEENT: normocephalic and atraumatic. EOMI. WILDA.    NECK: Supple. No carotid bruits.  No lymphadenopathy or thyromegaly.  LUNGS: diminished breath sounds at bases  CHEST: Right subclav tunneled HD cath  HEART: Regular rate and rhythm without murmur.  ABDOMEN: Soft, nontender, and nondistended.  Positive bowel sounds.    EXTREMITIES: Without any cyanosis, clubbing, rash, lesions or edema.  NEUROLOGIC: Cranial nerves II through XII are grossly intact.  PSYCHIATRIC: Appropriate affect .  SKIN: No ulceration or induration present.    =======================================================  Labs:  ====     04-10    134<L>  |  92<L>  |  55.0<H>  ----------------------------<  155<H>  4.8   |  26.0  |  7.90<H>    Ca    8.6      10 Apr 2017 08:52  Phos  3.7     04-09  Mg     2.3     04-09    TPro  7.8  /  Alb  3.0<L>  /  TBili  0.7  /  DBili  x   /  AST  16  /  ALT  17  /  AlkPhos  128<H>  04-10                          8.1    6.6   )-----------( 354      ( 10 Apr 2017 08:46 )             25.6           LIVER FUNCTIONS - ( 10 Apr 2017 08:52 )  Alb: 3.0 g/dL / Pro: 7.8 g/dL / ALK PHOS: 128 U/L / ALT: 17 U/L / AST: 16 U/L / GGT: x               CAPILLARY BLOOD GLUCOSE  122 (11 Apr 2017 07:51)  172 (10 Apr 2017 21:13)  192 (10 Apr 2017 17:36)  136 (10 Apr 2017 12:13)        RECENT CULTURES:  04-06 .Blood Blood XXXX XXXX   No growth at 48 hours    04-05 .Body Fluid Pleural Fluid Serratia marcescens   No White blood cells  No organisms seen   Serratia marcescens Growth in fluid media only    Culture - Body Fluid with Gram Stain (04.05.17 @ 09:29)    -  Ceftazidime: S <=1    -  Meropenem: S <=1    -  Tobramycin: S <=4    Gram Stain:   No White blood cells  No organisms seen    -  Aztreonam: S <=4    -  Cefazolin: R >16    -  Cefoxitin: R <=8    -  Ceftriaxone: S <=1    -  Levofloxacin: S <=2    -  Trimethoprim/Sulfamethoxazole: S <=2/38    -  Amikacin: S <=16    -  Ampicillin: R <=8    -  Ampicillin/Sulbactam: R <=8/4    -  Cefepime: S <=4    -  Ertapenem: S <=1    -  Gentamicin: S <=4    -  Imipenem: S <=1    -  Ciprofloxacin: R >2    -  Piperacillin/Tazobactam: S <=16    Specimen Source: .Body Fluid Pleural Fluid    Culture Results:   Serratia marcescens Growth in fluid media only    Organism Identification: Serratia marcescens    Organism: Serratia marcescens    Method Type: REDD          ===============   EXAM:  CT CHEST                          PROCEDURE DATE:  04/05/2017        INTERPRETATION:  EXAMINATION DATE: April 5, 2017 at 0103 hours.  CLINICAL INFORMATION: Left pleural effusion.    COMPARISON: None.    PROCEDURE:   CT of the Chest was performed without intravenous contrast.  Sagittal and coronal reformats were performed.    FINDINGS:    LUNGS AND LARGE AIRWAYS: Patchy opacities in the left lung with scattered   patchy opacities in the right lung, suspicious for multifocal pneumonia.  PLEURA: Small left pleural effusion.  VESSELS: Right-sided central venous catheter with tip terminating in the   superior cavoatrial junction.   HEART: Heart size is normal. No pericardial effusion.  MEDIASTINUM AND SHANITA: Subcentimeter short axis mediastinal lymph nodes.  CHEST WALL AND LOWER NECK: Within normal limits.  VISUALIZED UPPER ABDOMEN: Within normal limits.  BONES: Degenerative changes of the spine.    IMPRESSION: Small left pleural effusion with consolidation in the left   lung and scattered opacities in the right lung, suspicious for multifocal   pneumonia.

## 2017-04-11 NOTE — PROGRESS NOTE ADULT - ASSESSMENT
ESRD:  HD yest with UF; will need to reestablish outpatient TTS schedule at some point. next HD thurs  - AVF maturing==> needs vascular follow up needs superficialization of AVF vascular noted    Anemia: s/p prbc with HD Hg better  TS 5% will hold off on IV iron for now as possibly an infectious process  hold NEELAM for now until malignant effusion excluded    RO: continue Phoslo and Calcitriol

## 2017-04-11 NOTE — PROGRESS NOTE ADULT - PROBLEM SELECTOR PLAN 5
predominantly normocytic - likely anemia of chronic disease 2/2 ESRD  H/H stable. c/w PO ferrous sulfate 325 mg PO daily/vitamin c predominantly normocytic - likely anemia of chronic disease 2/2 ESRD  H/H stable. c/w PO ferrous sulfate 325 mg PO daily/vitamin c  -will ask renal about adding epogen

## 2017-04-11 NOTE — PROGRESS NOTE ADULT - PROBLEM SELECTOR PLAN 4
Asymptomatic. Stable  Seen by cardiology (Dr. Alvarez) Asymptomatic. Stable  Seen by cardiology (Dr. Alvarez)  no further intervention

## 2017-04-11 NOTE — PROGRESS NOTE ADULT - PROBLEM SELECTOR PLAN 6
suboptimally controlled   Valsartan increased to 160mg po daily  c/w amlodipine 10 mg PO daily with holding parameter  c/w metoprolol 50 mg PO BID with holding parameter  Will follow B.P not well controlled - will repeat bp now and add hydralazine prn IV   Valsartan increased to 160mg po daily  c/w amlodipine 10 mg PO daily with holding parameter  c/w metoprolol 50 mg PO BID with holding parameter  Will follow B.P

## 2017-04-12 ENCOUNTER — TRANSCRIPTION ENCOUNTER (OUTPATIENT)
Age: 37
End: 2017-04-12

## 2017-04-12 DIAGNOSIS — R09.02 HYPOXEMIA: ICD-10-CM

## 2017-04-12 LAB
ALBUMIN SERPL ELPH-MCNC: 3.1 G/DL — LOW (ref 3.3–5.2)
ALP SERPL-CCNC: 106 U/L — SIGNIFICANT CHANGE UP (ref 40–120)
ALT FLD-CCNC: 17 U/L — SIGNIFICANT CHANGE UP
ANION GAP SERPL CALC-SCNC: 15 MMOL/L — SIGNIFICANT CHANGE UP (ref 5–17)
APPEARANCE UR: CLEAR — SIGNIFICANT CHANGE UP
AST SERPL-CCNC: 15 U/L — SIGNIFICANT CHANGE UP
BASOPHILS # BLD AUTO: 0 K/UL — SIGNIFICANT CHANGE UP (ref 0–0.2)
BASOPHILS NFR BLD AUTO: 0.5 % — SIGNIFICANT CHANGE UP (ref 0–2)
BILIRUB SERPL-MCNC: 0.5 MG/DL — SIGNIFICANT CHANGE UP (ref 0.4–2)
BILIRUB UR-MCNC: NEGATIVE — SIGNIFICANT CHANGE UP
BUN SERPL-MCNC: 49 MG/DL — HIGH (ref 8–20)
CALCIUM SERPL-MCNC: 8.8 MG/DL — SIGNIFICANT CHANGE UP (ref 8.6–10.2)
CHLORIDE SERPL-SCNC: 93 MMOL/L — LOW (ref 98–107)
CO2 SERPL-SCNC: 25 MMOL/L — SIGNIFICANT CHANGE UP (ref 22–29)
COLOR SPEC: YELLOW — SIGNIFICANT CHANGE UP
CREAT SERPL-MCNC: 6.96 MG/DL — HIGH (ref 0.5–1.3)
CULTURE RESULTS: SIGNIFICANT CHANGE UP
CULTURE RESULTS: SIGNIFICANT CHANGE UP
DIFF PNL FLD: ABNORMAL
EOSINOPHIL # BLD AUTO: 0.6 K/UL — HIGH (ref 0–0.5)
EOSINOPHIL NFR BLD AUTO: 9 % — HIGH (ref 0–6)
EPI CELLS # UR: ABNORMAL
GLUCOSE SERPL-MCNC: 153 MG/DL — HIGH (ref 70–115)
GLUCOSE UR QL: 50 MG/DL
HCT VFR BLD CALC: 26.1 % — LOW (ref 37–47)
HGB BLD-MCNC: 8.4 G/DL — LOW (ref 12–16)
KETONES UR-MCNC: NEGATIVE — SIGNIFICANT CHANGE UP
LACTATE BLDV-MCNC: 1 MMOL/L — SIGNIFICANT CHANGE UP (ref 0.7–2)
LEUKOCYTE ESTERASE UR-ACNC: ABNORMAL
LYMPHOCYTES # BLD AUTO: 1.4 K/UL — SIGNIFICANT CHANGE UP (ref 1–4.8)
LYMPHOCYTES # BLD AUTO: 23.1 % — SIGNIFICANT CHANGE UP (ref 20–55)
MAGNESIUM SERPL-MCNC: 2.1 MG/DL — SIGNIFICANT CHANGE UP (ref 1.8–2.5)
MCHC RBC-ENTMCNC: 25.4 PG — LOW (ref 27–31)
MCHC RBC-ENTMCNC: 32.2 G/DL — SIGNIFICANT CHANGE UP (ref 32–36)
MCV RBC AUTO: 78.9 FL — LOW (ref 81–99)
MONOCYTES # BLD AUTO: 0.2 K/UL — SIGNIFICANT CHANGE UP (ref 0–0.8)
MONOCYTES NFR BLD AUTO: 2.8 % — LOW (ref 3–10)
NEUTROPHILS # BLD AUTO: 3.9 K/UL — SIGNIFICANT CHANGE UP (ref 1.8–8)
NEUTROPHILS NFR BLD AUTO: 63.9 % — SIGNIFICANT CHANGE UP (ref 37–73)
NITRITE UR-MCNC: NEGATIVE — SIGNIFICANT CHANGE UP
PH UR: 8 — SIGNIFICANT CHANGE UP (ref 4.8–8)
PHOSPHATE SERPL-MCNC: 5 MG/DL — HIGH (ref 2.4–4.7)
PLATELET # BLD AUTO: 310 K/UL — SIGNIFICANT CHANGE UP (ref 150–400)
POTASSIUM SERPL-MCNC: 4.9 MMOL/L — SIGNIFICANT CHANGE UP (ref 3.5–5.3)
POTASSIUM SERPL-SCNC: 4.9 MMOL/L — SIGNIFICANT CHANGE UP (ref 3.5–5.3)
PROCALCITONIN SERPL-MCNC: 2.01 NG/ML — SIGNIFICANT CHANGE UP (ref 0–0.05)
PROT SERPL-MCNC: 7.9 G/DL — SIGNIFICANT CHANGE UP (ref 6.6–8.7)
PROT UR-MCNC: 500 MG/DL
RBC # BLD: 3.31 M/UL — LOW (ref 4.4–5.2)
RBC # FLD: 17.7 % — HIGH (ref 11–15.6)
RBC CASTS # UR COMP ASSIST: ABNORMAL /HPF (ref 0–4)
SODIUM SERPL-SCNC: 133 MMOL/L — LOW (ref 135–145)
SP GR SPEC: 1.01 — SIGNIFICANT CHANGE UP (ref 1.01–1.02)
SPECIMEN SOURCE: SIGNIFICANT CHANGE UP
SPECIMEN SOURCE: SIGNIFICANT CHANGE UP
UROBILINOGEN FLD QL: NEGATIVE MG/DL — SIGNIFICANT CHANGE UP
WBC # BLD: 6.1 K/UL — SIGNIFICANT CHANGE UP (ref 4.8–10.8)
WBC # FLD AUTO: 6.1 K/UL — SIGNIFICANT CHANGE UP (ref 4.8–10.8)
WBC UR QL: ABNORMAL

## 2017-04-12 PROCEDURE — 71010: CPT | Mod: 26

## 2017-04-12 PROCEDURE — 99233 SBSQ HOSP IP/OBS HIGH 50: CPT | Mod: GC

## 2017-04-12 RX ORDER — ERYTHROPOIETIN 10000 [IU]/ML
10000 INJECTION, SOLUTION INTRAVENOUS; SUBCUTANEOUS
Qty: 0 | Refills: 0 | Status: DISCONTINUED | OUTPATIENT
Start: 2017-04-12 | End: 2017-04-13

## 2017-04-12 RX ORDER — SACCHAROMYCES BOULARDII 250 MG
1 POWDER IN PACKET (EA) ORAL
Qty: 70 | Refills: 0 | OUTPATIENT
Start: 2017-04-12 | End: 2017-05-17

## 2017-04-12 RX ORDER — FOLIC ACID 0.8 MG
1 TABLET ORAL DAILY
Qty: 0 | Refills: 0 | Status: DISCONTINUED | OUTPATIENT
Start: 2017-04-12 | End: 2017-04-13

## 2017-04-12 RX ORDER — CEFEPIME 1 G/1
2000 INJECTION, POWDER, FOR SOLUTION INTRAMUSCULAR; INTRAVENOUS
Qty: 0 | Refills: 0 | Status: DISCONTINUED | OUTPATIENT
Start: 2017-04-12 | End: 2017-04-12

## 2017-04-12 RX ORDER — FERROUS SULFATE 325(65) MG
1 TABLET ORAL
Qty: 30 | Refills: 0 | OUTPATIENT
Start: 2017-04-12 | End: 2017-05-12

## 2017-04-12 RX ORDER — CEFTAZIDIME 6 G/30ML
2 INJECTION, POWDER, FOR SOLUTION INTRAVENOUS
Qty: 0 | Refills: 0 | COMMUNITY
Start: 2017-04-12 | End: 2017-05-02

## 2017-04-12 RX ORDER — CEFTAZIDIME 6 G/30ML
2 INJECTION, POWDER, FOR SOLUTION INTRAVENOUS
Qty: 0 | Refills: 0 | Status: DISCONTINUED | OUTPATIENT
Start: 2017-04-12 | End: 2017-04-13

## 2017-04-12 RX ADMIN — Medication 3 MILLILITER(S): at 08:34

## 2017-04-12 RX ADMIN — Medication 650 MILLIGRAM(S): at 19:15

## 2017-04-12 RX ADMIN — Medication 1 MILLIGRAM(S): at 18:35

## 2017-04-12 RX ADMIN — Medication 3 MILLILITER(S): at 21:43

## 2017-04-12 RX ADMIN — Medication 50 MILLIGRAM(S): at 06:10

## 2017-04-12 RX ADMIN — ERYTHROPOIETIN 10000 UNIT(S): 10000 INJECTION, SOLUTION INTRAVENOUS; SUBCUTANEOUS at 18:36

## 2017-04-12 RX ADMIN — CEFEPIME 100 MILLIGRAM(S): 1 INJECTION, POWDER, FOR SOLUTION INTRAMUSCULAR; INTRAVENOUS at 03:50

## 2017-04-12 RX ADMIN — Medication 10 MILLIGRAM(S): at 03:50

## 2017-04-12 RX ADMIN — Medication 650 MILLIGRAM(S): at 19:03

## 2017-04-12 RX ADMIN — SODIUM CHLORIDE 3 MILLILITER(S): 9 INJECTION INTRAMUSCULAR; INTRAVENOUS; SUBCUTANEOUS at 06:09

## 2017-04-12 RX ADMIN — Medication 50 MILLIGRAM(S): at 18:36

## 2017-04-12 RX ADMIN — Medication 325 MILLIGRAM(S): at 12:19

## 2017-04-12 RX ADMIN — Medication 1 SPRAY(S): at 20:07

## 2017-04-12 RX ADMIN — CEFTAZIDIME 100 GRAM(S): 6 INJECTION, POWDER, FOR SOLUTION INTRAVENOUS at 18:35

## 2017-04-12 RX ADMIN — VALSARTAN 160 MILLIGRAM(S): 80 TABLET ORAL at 06:10

## 2017-04-12 RX ADMIN — Medication 500 MILLIGRAM(S): at 12:19

## 2017-04-12 RX ADMIN — CALCITRIOL 0.5 MICROGRAM(S): 0.5 CAPSULE ORAL at 12:19

## 2017-04-12 RX ADMIN — Medication 10 MILLIGRAM(S): at 17:08

## 2017-04-12 RX ADMIN — Medication 250 MILLIGRAM(S): at 06:10

## 2017-04-12 RX ADMIN — SODIUM CHLORIDE 3 MILLILITER(S): 9 INJECTION INTRAMUSCULAR; INTRAVENOUS; SUBCUTANEOUS at 12:49

## 2017-04-12 RX ADMIN — Medication 81 MILLIGRAM(S): at 12:19

## 2017-04-12 RX ADMIN — PANTOPRAZOLE SODIUM 40 MILLIGRAM(S): 20 TABLET, DELAYED RELEASE ORAL at 06:10

## 2017-04-12 RX ADMIN — CEFTAZIDIME 100 GRAM(S): 6 INJECTION, POWDER, FOR SOLUTION INTRAVENOUS at 19:06

## 2017-04-12 RX ADMIN — AMLODIPINE BESYLATE 10 MILLIGRAM(S): 2.5 TABLET ORAL at 06:10

## 2017-04-12 RX ADMIN — SODIUM CHLORIDE 3 MILLILITER(S): 9 INJECTION INTRAMUSCULAR; INTRAVENOUS; SUBCUTANEOUS at 22:27

## 2017-04-12 NOTE — PROGRESS NOTE ADULT - PROBLEM SELECTOR PLAN 7
c/w hemodialysis (Tu/Th/S) via right IJ access  c/w calcium acetate 667 mg PO TID & calcitriol 0.5 mcg PO daily   outpatient vascular follow up for left AVF which is currently maturing  seen by vascular and note appreciated   -will ask renal about adding epogen c/w hemodialysis (Tu/Th/S) via right IJ access  c/w calcium acetate 667 mg PO TID & calcitriol 0.5 mcg PO daily   outpatient vascular follow up for left AVF which is currently maturing  seen by vascular and note appreciated HBA1c: 7.3  Pt received no insulin overnight. Given 2U 1704 yesterday. Last Accu 157. Stable.  c/w humalog sliding scale

## 2017-04-12 NOTE — DISCHARGE NOTE ADULT - HOSPITAL COURSE
37 year old female w/ PMH ESRD on HD (Tu/Th/S), HTN, DM & presents w/ dyspnea admitted for multifocal pneumonia w/ left-sided pleural effusion s/p drainage, chest tube placement & subsequent removal without any complications. Patient was started on vanco and zosyn and then. Pt switched to antibiotic Ceftazidime on which she will remain on for another 3 weeks starting 04/12/2017 on post dialysis days MWF. While admitted pt had episodes of epistaxis which resolved spontaneously likely due to her coughing spells which has since improved. Pt was found to desat without oxygen to and attempts were made to wean her off oxygen. Pt ****************************************************Pt is medically optimized for discharge to f/u with her PMD and pulmonologist in 1-2 days. 37 year old female w/ PMH ESRD on HD (Tu/Th/S), HTN, DM & presents w/ dyspnea admitted for multifocal pneumonia w/ left-sided pleural effusion s/p drainage, chest tube placement & subsequent removal without any complications. Pneumonia secondary to serratia marcescens. Patient was started on vanco and zosyn and then. Pt thought to have a superimposing emyema and now switched to antibiotic Ceftazidime on which she will remain on for another 3 weeks starting 04/12/2017 on post dialysis days MWF. While admitted pt had episodes of epistaxis which resolved spontaneously likely due to her coughing spells which has since improved. Pt was found to desat without oxygen to and attempts were made to wean her off oxygen. Pt *********************************************Pt is medically optimized for discharge to f/u with her PMD and pulmonologist in 1-2 days. 37 year old female w/ PMH ESRD on HD (Tu/Th/S), HTN, DM & presents w/ dyspnea admitted for multifocal pneumonia w/ left-sided pleural effusion s/p drainage, chest tube placement & subsequent removal without any complications. Pneumonia secondary to serratia marcescens. Patient was started on vanco and zosyn and then. Pt thought to have a superimposing emyema and now switched to antibiotic Ceftazidime on which she will remain on for another 3 weeks starting 04/12/2017 on post dialysis days MWF. While admitted pt had episodes of epistaxis which resolved spontaneously likely due to her coughing spells which has since improved. Pt will be discharged on cough medicine to be used only at night. Pt was found to desat without oxygen to and attempts were made to wean her off oxygen but unsuccessful. Prescription was sent for pt to get home oxygen and approved. Pt is medically optimized for discharge to f/u with her PMD and pulmonologist in 1-2 days. 37 year old female w/ PMH ESRD on HD (Tu/Th/S), HTN, DM & presents w/ dyspnea admitted for multifocal pneumonia w/ left-sided pleural effusion s/p drainage, chest tube placement & subsequent removal without any complications. Pneumonia secondary to serratia marcescens. Patient was started on vanco and zosyn and then. Pt thought to have a superimposing emyema and now switched to antibiotic Ceftazidime on which she will remain on for another 3 weeks starting 04/12/2017 on post dialysis days MWF. While admitted pt had episodes of epistaxis which resolved spontaneously likely due to her coughing spells which has since improved. Pt will be discharged on cough medicine to be used only at night. Pt was found to desat without oxygen to and attempts were made to wean her off oxygen but unsuccessful. Prescription was sent for pt to get home oxygen and approved. Pt is medically optimized for discharge to f/u with her PMD, pulmonologist, nephrologist in 1-2 days. 37 year old female w/ PMH ESRD on HD (Tu/Th/S), HTN, DM & presents w/ dyspnea admitted for multifocal pneumonia w/ left-sided pleural effusion s/p drainage, chest tube placement & subsequent removal without any complications. Pneumonia secondary to serratia marcescens. Patient was started on vanco and zosyn and then. Pt thought to have a superimposing emyema and now switched to antibiotic Ceftazidime on which she will remain on for another 3 weeks starting 04/12/2017 on post dialysis days MWF and to end on 5/2 . While admitted pt had episodes of epistaxis which resolved spontaneously likely due to her coughing spells which has since improved. Pt will be discharged on cough medicine to be used only at night. Pt was found to desat without oxygen to and attempts were made to wean her off oxygen but unsuccessful. Prescription was sent for pt to get home oxygen and approved. Pt is medically optimized for discharge to f/u with her PMD, pulmonologist, nephrologist in 1-2 days.  Plan d/w patient in detail   follow up needed in community   I have spent 45 minutes coordinating care for this discharge

## 2017-04-12 NOTE — PROGRESS NOTE ADULT - SUBJECTIVE AND OBJECTIVE BOX
Patient is a 37y old  Female who presents with a chief complaint of shortness of breath (04 Apr 2017 23:54)    INTERVAL/OVERNIGHT EVENTS: Patient was seen and examined at bedside. Pt continues to improve. Pt denies further episodes of epistaxis. Pt denies being SOB and says she feels fine. Pt says she has occasional cough productive for yellow sputum. Pt walked yesterday and desated to 75. Pt denies any fever, chest pain, calf tenderness, epistaxis. Pt reports having a BM yesterday. Appetite is wnl.    Cardiac monitor reviewed: No overnight events. Pt oxygen sat this AM on 2L oxygen was 96%     Vital Signs Last 24 Hrs  T(C): 37.2, Max: 37.2 (04-12 @ 06:05)  T(F): 98.9, Max: 98.9 (04-12 @ 06:05)  HR: 91 (84 - 96)  BP: 176/95 (152/81 - 176/95)  BP(mean): --  RR: 18 (18 - 18)  SpO2: 96% (91% - 96%)    PHYSICAL EXAM:  Constitutional: Obese, NAD, well-groomed, well-developed  HEENT: PERRLA, EOMI, + subconjuctival hemorrhage. Nostrils clear with no active epistaxis  Respiratory: expiratory wheezing B/L, crackles appreciated in lower lobes  Cardiovascular: S1& S2, RRR, no murmurs, rales or gallops   Gastrointestinal: Obese, BS+, soft, non-tender  Extremities: +2 bilateral pedal edema, no cyanosis or calf tenderness. LUE fistula(unmatured)    LABS:                                   8.1    6.6   )-----------( 354      ( 10 Apr 2017 08:46 )             25.6       04-10    04-10    134<L>  |  92<L>  |  55.0<H>  ----------------------------<  155<H>  4.8   |  26.0  |  7.90<H>    Ca    8.6      10 Apr 2017 08:52    TPro  7.8  /  Alb  3.0<L>  /  TBili  0.7  /  DBili  x   /  AST  16  /  ALT  17  /  AlkPhos  128<H>  04-10      HBA1c: 7.3  FS: 11/4/17: 122, 146, 204, 157, (10 Apr 2017 12:13), 157 (10 Apr 2017 07:41), 216 (09 Apr 2017 22:45), 205 (09 Apr 2017 17:02)    Pleural fluid culture: Serratia marcescens  Blood cultures: negative x2  HIV: negative    RADIOLOGY & ADDITIONAL TESTS:    TTE: 60-65%    MEDICATIONS  (STANDING):  sodium chloride 0.9% lock flush 3milliLiter(s) IV Push every 8 hours  pantoprazole    Tablet 40milliGRAM(s) Oral before breakfast  insulin lispro (HumaLOG) corrective regimen sliding scale  SubCutaneous three times a day before meals  dextrose 5%. 1000milliLiter(s) IV Continuous <Continuous>  dextrose 50% Injectable 12.5Gram(s) IV Push once  dextrose 50% Injectable 25Gram(s) IV Push once  dextrose 50% Injectable 25Gram(s) IV Push once  aspirin  chewable 81milliGRAM(s) Oral daily  heparin  Injectable 5000Unit(s) SubCutaneous every 12 hours  calcium acetate 667milliGRAM(s) Oral three times a day with meals  calcitriol   Capsule 0.5MICROGram(s) Oral daily  metoprolol 50milliGRAM(s) Oral two times a day  amLODIPine   Tablet 10milliGRAM(s) Oral daily  saccharomyces boulardii 250milliGRAM(s) Oral two times a day  ALBUTerol/ipratropium for Nebulization 3milliLiter(s) Nebulizer every 6 hours  ferrous    sulfate 325milliGRAM(s) Oral daily  ascorbic acid 500milliGRAM(s) Oral daily  valsartan 160milliGRAM(s) Oral daily  cefTAZidime  IVPB 2Gram(s) IV Intermittent <User Schedule>    MEDICATIONS  (PRN):  dextrose Gel 1Dose(s) Oral once PRN Blood Glucose LESS THAN 70 milliGRAM(s)/deciliter  glucagon  Injectable 1milliGRAM(s) IntraMuscular once PRN Glucose LESS THAN 70 milligrams/deciliter  acetaminophen   Tablet. 650milliGRAM(s) Oral every 8 hours PRN Moderate Pain (4 - 6)  sodium chloride 0.65% Nasal 1Spray(s) Both Nostrils three times a day PRN Dry Nostrils  hydrALAZINE Injectable 10milliGRAM(s) IV Push every 6 hours PRN SBP over 150  HYDROcodone/homatropine Syrup 5milliLiter(s) Oral every 6 hours PRN Cough Patient is a 37y old  Female who presents with a chief complaint of shortness of breath (04 Apr 2017 23:54)    INTERVAL/OVERNIGHT EVENTS: Patient was seen and examined at bedside. Pt continues to improve. Pt denies further episodes of epistaxis. Pt denies being SOB and says she feels fine. Pt says she has occasional cough productive for yellow sputum. Pt walked yesterday and desated to 75 without any symtpoms. Pt denies any fever, chest pain, calf tenderness, epistaxis. Pt reports having a BM yesterday. Appetite is wnl.    Cardiac monitor reviewed: No overnight events. Pt oxygen sat this AM on 2L oxygen was 96%     Vital Signs Last 24 Hrs  T(C): 37.2, Max: 37.2 (04-12 @ 06:05)  T(F): 98.9, Max: 98.9 (04-12 @ 06:05)  HR: 91 (84 - 96)  BP: 176/95 (152/81 - 176/95)  BP(mean): --  RR: 18 (18 - 18)  SpO2: 96% (91% - 96%)    PHYSICAL EXAM:  Constitutional: Obese, NAD, well-groomed, well-developed  HEENT: PERRLA, EOMI, + subconjuctival hemorrhage. Nostrils clear with no active epistaxis  Respiratory: expiratory wheezing B/L, crackles appreciated in lower lobes  Cardiovascular: S1& S2, RRR, no murmurs, rales or gallops   Gastrointestinal: Obese, BS+, soft, non-tender  Extremities: +2 bilateral pedal edema, no cyanosis or calf tenderness. LUE fistula(unmatured)    LABS:                                   8.1    6.6   )-----------( 354      ( 10 Apr 2017 08:46 )             25.6       04-10    04-10    134<L>  |  92<L>  |  55.0<H>  ----------------------------<  155<H>  4.8   |  26.0  |  7.90<H>    Ca    8.6      10 Apr 2017 08:52    TPro  7.8  /  Alb  3.0<L>  /  TBili  0.7  /  DBili  x   /  AST  16  /  ALT  17  /  AlkPhos  128<H>  04-10      HBA1c: 7.3  FS: 11/4/17: 122, 146, 204, 157, (10 Apr 2017 12:13), 157 (10 Apr 2017 07:41), 216 (09 Apr 2017 22:45), 205 (09 Apr 2017 17:02)    Pleural fluid culture: Serratia marcescens  Blood cultures: negative x2  HIV: negative    RADIOLOGY & ADDITIONAL TESTS:    TTE: 60-65%    MEDICATIONS  (STANDING):  sodium chloride 0.9% lock flush 3milliLiter(s) IV Push every 8 hours  pantoprazole    Tablet 40milliGRAM(s) Oral before breakfast  insulin lispro (HumaLOG) corrective regimen sliding scale  SubCutaneous three times a day before meals  dextrose 5%. 1000milliLiter(s) IV Continuous <Continuous>  dextrose 50% Injectable 12.5Gram(s) IV Push once  dextrose 50% Injectable 25Gram(s) IV Push once  dextrose 50% Injectable 25Gram(s) IV Push once  aspirin  chewable 81milliGRAM(s) Oral daily  heparin  Injectable 5000Unit(s) SubCutaneous every 12 hours  calcium acetate 667milliGRAM(s) Oral three times a day with meals  calcitriol   Capsule 0.5MICROGram(s) Oral daily  metoprolol 50milliGRAM(s) Oral two times a day  amLODIPine   Tablet 10milliGRAM(s) Oral daily  saccharomyces boulardii 250milliGRAM(s) Oral two times a day  ALBUTerol/ipratropium for Nebulization 3milliLiter(s) Nebulizer every 6 hours  ferrous    sulfate 325milliGRAM(s) Oral daily  ascorbic acid 500milliGRAM(s) Oral daily  valsartan 160milliGRAM(s) Oral daily  cefTAZidime  IVPB 2Gram(s) IV Intermittent <User Schedule>    MEDICATIONS  (PRN):  dextrose Gel 1Dose(s) Oral once PRN Blood Glucose LESS THAN 70 milliGRAM(s)/deciliter  glucagon  Injectable 1milliGRAM(s) IntraMuscular once PRN Glucose LESS THAN 70 milligrams/deciliter  acetaminophen   Tablet. 650milliGRAM(s) Oral every 8 hours PRN Moderate Pain (4 - 6)  sodium chloride 0.65% Nasal 1Spray(s) Both Nostrils three times a day PRN Dry Nostrils  hydrALAZINE Injectable 10milliGRAM(s) IV Push every 6 hours PRN SBP over 150  HYDROcodone/homatropine Syrup 5milliLiter(s) Oral every 6 hours PRN Cough

## 2017-04-12 NOTE — CONSULT NOTE ADULT - PROBLEM SELECTOR RECOMMENDATION 2
HD as scheduled  for AVF revision - patient may proceed with surgery without any further cardiac workup
above
as above  pending identification

## 2017-04-12 NOTE — DISCHARGE NOTE ADULT - SECONDARY DIAGNOSIS.
End stage renal disease Essential hypertension Type 2 diabetes mellitus with other kidney complication Microcytic anemia

## 2017-04-12 NOTE — PROGRESS NOTE ADULT - PROBLEM SELECTOR PLAN 5
predominantly normocytic - likely anemia of chronic disease 2/2 ESRD  H/H stable. c/w PO ferrous sulfate 325 mg PO daily/vitamin c  CBC this AM pending predominantly normocytic - likely anemia of chronic disease 2/2 ESRD  H/H stable. c/w PO ferrous sulfate 325 mg PO daily/vitamin c  CBC this AM pending  Nephrology did not think epogen was necessary. not well controlled but improving   Hydralazine given at 3:50am this morning  C/W Valsartan 160mg po daily  c/w amlodipine 10 mg PO daily with holding parameter  c/w metoprolol 50 mg PO BID with holding parameter  Continue to follow BP

## 2017-04-12 NOTE — DISCHARGE NOTE ADULT - PROVIDER TOKENS
FREE:[LAST:[Dr. Oscar Maciel],PHONE:[(   )    -],FAX:[(   )    -],ADDRESS:[Dr. Barlow  Nephrology  [address] [place] [address]]],TOKEN:'[id number]' TOKEN:'[id number]',FREE:[LAST:[Dr. Oscar Maciel],PHONE:[(   )    -],FAX:[(   )    -],ADDRESS:[Dr. Barlow  Nephrology  [address] [place] [address]]],FREE:[LAST:[Pulmonology],PHONE:[(   )    -],FAX:[(   )    -],ADDRESS:[Dr. Sen Dominguez MD  [address] [place] [address]  Phone: [phone]]]

## 2017-04-12 NOTE — PROGRESS NOTE ADULT - PROBLEM SELECTOR PLAN 8
HBA1c: 7.3  Pt received no insulin overnight. Given 2U 1704 yesterday. Last Accu 157. Stable.  c/w humalog sliding scale Continue heparin 5000 unit Q12H.

## 2017-04-12 NOTE — DISCHARGE NOTE ADULT - ADDITIONAL INSTRUCTIONS
F/u with pulmonologist and nephrologist in 1-2 days. F/u with pulmonologist and nephrologist in 1-2 weeks   f/up with [facility] in 2-3 days after discharge

## 2017-04-12 NOTE — PROGRESS NOTE ADULT - ASSESSMENT
37 year old female w/ PMH ESRD on HD (Tu/Th/S), HTN, DM & presents w/ dyspnea admitted for multifocal pneumonia w/ left-sided pleural effusion s/p drainage, chest tube placement & subsequent removal without any complications. Patient was started on vanco and zosyn and now on cefepime day 4. I.D states to complete course of cefepime yesterday and start Ceftazidime (Fortaz) today 04/12/2017 for total course of 3 weeks on MWFpost dialysis (end day 5/2). Pt sating at 91-96% on 2L oxygen. Plan to attempt to wean off by tomorrow in anticipation of discharge by Thursday. 37 year old female w/ PMH ESRD on HD (Tu/Th/S), HTN, DM & presents w/ dyspnea admitted for multifocal pneumonia w/ left-sided pleural effusion s/p drainage, chest tube placement & subsequent removal without any complications. Patient was started on vanco and zosyn and now on cefepime day 4. I.D states to complete course of cefepime yesterday and start Ceftazidime (Fortaz) today 04/12/2017 for total course of 3 weeks on MWFpost dialysis (end day 5/2). Pt sating at 91-96% on 2L oxygen. Pt desated to 75% ambulating yesterday. Will attempt to get home O2 by siting obesity as etyology for hypoxemia.

## 2017-04-12 NOTE — DISCHARGE NOTE ADULT - CARE PROVIDER_API CALL
Dr. Oscar Maciel,   Dr. Barlow  Nephrology  [address] [place] [address]  Phone: (   )    -  Fax: (   )    -    Ivan Wade (), Critical Care Medicine; Internal Medicine; Pulmonary Disease  [address] [place] [address]  Phone: [phone]  Fax: [phone] Ivan Wade (DO), Critical Care Medicine; Internal Medicine; Pulmonary Disease  [address] [place] [address]  Phone: [phone]  Fax: [phone]    Dr. Oscar Maciel,   Dr. Barlow  Nephrology  [address] [place] [address]  Phone: (   )    -  Fax: (   )    -    Pulmonology,   Dr. Sen Dominguez MD  [address] [place] [address]  Phone: [phone]  Phone: (   )    -  Fax: (   )    -

## 2017-04-12 NOTE — DISCHARGE NOTE ADULT - OTHER SIGNIFICANT FINDINGS
EXAM:  CT CHEST                          PROCEDURE DATE:  04/05/2017        INTERPRETATION:  EXAMINATION DATE: April 5, 2017 at 0103 hours.  CLINICAL INFORMATION: Left pleural effusion.    COMPARISON: None.    PROCEDURE:   CT of the Chest was performed without intravenous contrast.  Sagittal and coronal reformats were performed.    FINDINGS:    LUNGS AND LARGE AIRWAYS: Patchy opacities in the left lung with scattered   patchy opacities in the right lung, suspicious for multifocal pneumonia.  PLEURA: Small left pleural effusion.  VESSELS: Right-sided central venous catheter with tip terminating in the   superior cavoatrial junction.   HEART: Heart size is normal. No pericardial effusion.  MEDIASTINUM AND SHANITA: Subcentimeter short axis mediastinal lymph nodes.  CHEST WALL AND LOWER NECK: Within normal limits.  VISUALIZED UPPER ABDOMEN: Within normal limits.  BONES: Degenerative changes of the spine.    IMPRESSION: Small left pleural effusion with consolidation in the left   lung and scattered opacities in the right lung, suspicious for multifocal   pneumonia.        TIFFANY ROBLERO M.D., ATTENDING RADIOLOGIST  This document has been electronically signed. Apr 5 2017  9:13AM EXAM:  CT CHEST                          PROCEDURE DATE:  04/05/2017        INTERPRETATION:  EXAMINATION DATE: April 5, 2017 at 0103 hours.  CLINICAL INFORMATION: Left pleural effusion.    COMPARISON: None.    PROCEDURE:   CT of the Chest was performed without intravenous contrast.  Sagittal and coronal reformats were performed.    FINDINGS:    LUNGS AND LARGE AIRWAYS: Patchy opacities in the left lung with scattered   patchy opacities in the right lung, suspicious for multifocal pneumonia.  PLEURA: Small left pleural effusion.  VESSELS: Right-sided central venous catheter with tip terminating in the   superior cavoatrial junction.   HEART: Heart size is normal. No pericardial effusion.  MEDIASTINUM AND SHANITA: Subcentimeter short axis mediastinal lymph nodes.  CHEST WALL AND LOWER NECK: Within normal limits.  VISUALIZED UPPER ABDOMEN: Within normal limits.  BONES: Degenerative changes of the spine.    IMPRESSION: Small left pleural effusion with consolidation in the left   lung and scattered opacities in the right lung, suspicious for multifocal   pneumonia.        TIFFANY ROBLERO M.D., ATTENDING RADIOLOGIST  This document has been electronically signed. Apr 5 2017  9:13AM      4/12 cxr: 1.  Since April 10, 2017, slightly decreased patchy opacities in the left   lung, probably improved pneumonia.  2.  Mild right perihilar opacities, possibly pulmonary edema.    ECHO:  Summary:   1. Technically difficult study.   2. Endocardial visualization was enhanced with intravenous echo contrast.   3. Normal global left ventricular systolic function.   4. Left ventricular ejection fraction, by visual estimation, is 60 to   65%.   5. LV Ejection Fraction by Simmons's Method=64%.   6. Thickening of the anterior and posterior mitral valve leaflets.   7. Trace mitral valve regurgitation.   8. There is no evidence of pericardial effusion.

## 2017-04-12 NOTE — DISCHARGE NOTE ADULT - MEDICATION SUMMARY - MEDICATIONS TO CHANGE
I will SWITCH the dose or number of times a day I take the medications listed below when I get home from the hospital:  None I will SWITCH the dose or number of times a day I take the medications listed below when I get home from the hospital:    valsartan 40 mg oral tablet  -- 1 tab(s) by mouth once a day

## 2017-04-12 NOTE — CONSULT NOTE ADULT - SUBJECTIVE AND OBJECTIVE BOX
PULMONARY CONSULT NOTE      JONATHAN JIMENEZDANYEL-[id number]    Patient is a 37y old  Female who presents with a chief complaint of shortness of breath (12 Apr 2017 12:21)      HISTORY OF PRESENT ILLNESS:  Admitted with 3-4 dys og increased SOB  Found to have multilobar L PNA and empyema s/p left pigtail cath with Serratia obtained on culture  Denies cough, wheeze but remains O2 dependent  Minimal residual yellow sputum    MEDICATIONS  (STANDING):  sodium chloride 0.9% lock flush 3milliLiter(s) IV Push every 8 hours  pantoprazole    Tablet 40milliGRAM(s) Oral before breakfast  insulin lispro (HumaLOG) corrective regimen sliding scale  SubCutaneous three times a day before meals  dextrose 5%. 1000milliLiter(s) IV Continuous <Continuous>  dextrose 50% Injectable 12.5Gram(s) IV Push once  dextrose 50% Injectable 25Gram(s) IV Push once  dextrose 50% Injectable 25Gram(s) IV Push once  aspirin  chewable 81milliGRAM(s) Oral daily  heparin  Injectable 5000Unit(s) SubCutaneous every 12 hours  calcium acetate 667milliGRAM(s) Oral three times a day with meals  calcitriol   Capsule 0.5MICROGram(s) Oral daily  metoprolol 50milliGRAM(s) Oral two times a day  amLODIPine   Tablet 10milliGRAM(s) Oral daily  saccharomyces boulardii 250milliGRAM(s) Oral two times a day  ALBUTerol/ipratropium for Nebulization 3milliLiter(s) Nebulizer every 6 hours  ferrous    sulfate 325milliGRAM(s) Oral daily  ascorbic acid 500milliGRAM(s) Oral daily  valsartan 160milliGRAM(s) Oral daily  cefTAZidime  IVPB 2Gram(s) IV Intermittent <User Schedule>      MEDICATIONS  (PRN):  dextrose Gel 1Dose(s) Oral once PRN Blood Glucose LESS THAN 70 milliGRAM(s)/deciliter  glucagon  Injectable 1milliGRAM(s) IntraMuscular once PRN Glucose LESS THAN 70 milligrams/deciliter  acetaminophen   Tablet. 650milliGRAM(s) Oral every 8 hours PRN Moderate Pain (4 - 6)  sodium chloride 0.65% Nasal 1Spray(s) Both Nostrils three times a day PRN Dry Nostrils  hydrALAZINE Injectable 10milliGRAM(s) IV Push every 6 hours PRN SBP over 150  HYDROcodone/homatropine Syrup 5milliLiter(s) Oral every 6 hours PRN Cough      Allergies    No Known Allergies    Intolerances        PAST MEDICAL & SURGICAL HISTORY:  End stage renal disease  Hypertension  Diabetes  No significant past surgical history      FAMILY HISTORY:  Family history of renal failure (Mother): mother had dialysis in her mid 60s  Family history of diabetes mellitus (DM)      SOCIAL HISTORY  Smoking History:     REVIEW OF SYSTEMS:    CONSTITUTIONAL:  No fevers, chills, sweats    HEENT:  Eyes:  No diplopia or blurred vision. ENT:  No earache, sore throat or runny nose. sinus headache or postnasl drip    CARDIOVASCULAR:  No pressure, squeezing, tightness, or heaviness about the chest; no palpitations, leg swelling, orthopnea or PND    RESPIRATORY: above    GASTROINTESTINAL:  No abdominal pain, nausea, vomiting or diarrhea.    GENITOURINARY:  No dysuria, frequency or urgency.    NEUROLOGIC:  No paresthesias, fasciculations, seizures or weakness.    PSYCHIATRIC:  No disorder of thought or mood.    Vital Signs Last 24 Hrs  T(C): 37.1, Max: 37.2 (04-12 @ 06:05)  T(F): 98.7, Max: 98.9 (04-12 @ 06:05)  HR: 92 (84 - 92)  BP: 138/75 (138/75 - 176/95)  BP(mean): --  RR: 89 (18 - 89)  SpO2: 94% (91% - 96%)    PHYSICAL EXAMINATION:    GENERAL: The patient is a well-developed, obese female in no apparent distress.     HEENT: Head is normocephalic and atraumatic. Extraocular muscles are intact. Mucous membranes are moist.     NECK: Supple.     LUNGS: Clear to auscultation without wheezing, rales, or rhonchi. Respirations unlabored    HEART: Regular rate and rhythm without murmur.    ABDOMEN: Soft, nontender, and nondistended.  No hepatosplenomegaly is noted.    EXTREMITIES: Without any cyanosis, clubbing, rash, lesions or edema.    NEUROLOGIC: Grossly intact.      LABS:  Rheumatoid Factor Quant, Serum or Plasma (04.10.17 @ 17:19)    Rheumatoid Factor Quant, Serum or Plasma: 9.3 IU/mL    Anti-Nuclear Antibody (04.10.17 @ 17:19)    Anti Nuclear Factor Titer: 1:160    MISSY Pattern: Speckled    Comprehensive Metabolic Panel (04.10.17 @ 08:52)    Sodium, Serum: 134 mmol/L    Potassium, Serum: 4.8 mmol/L    Chloride, Serum: 92 mmol/L    Carbon Dioxide, Serum: 26.0 mmol/L    Anion Gap, Serum: 16 mmol/L    Blood Urea Nitrogen, Serum: 55.0 mg/dL    Creatinine, Serum: 7.90 mg/dL    Glucose, Serum: 155 mg/dL    Calcium, Total Serum: 8.6 mg/dL    Protein Total, Serum: 7.8 g/dL    Albumin, Serum: 3.0 g/dL    Bilirubin Total, Serum: 0.7 mg/dL    Alkaline Phosphatase, Serum: 128 U/L    Aspartate Aminotransferase (AST/SGOT): 16 U/L    Alanine Aminotransferase (ALT/SGPT): 17 U/L    eGFR if Non : 6: Interpretative comment  The units for eGFR are ml/min/1.73m2 (normalized body surface area). The  eGFR is calculated from a serum creatinine using the CKD-EPI equation.  Other variables required for calculation are race, age and sex. Among  patients w6: ith chronic kidney disease (CKD), the eGFR is useful in  determining the stage of disease according to KDOQI CKD classification.  All eGFR results are reported numerically with the following  interpretation.          GFR                    With6:                  Without     (ml/min/1.73 m2)    Kidney Damage       Kidney Damage        >= 90                    Stage 1                     Normal        60-89                    Stage 2                     Decreased GFR        :      Stage 3                     Stage 3        15-29                    Stage 4                     Stage 4        < 15                      Stage 5                     Stage 5  Each stage of CKD assumes that the associated GFR level has been in  eff6: ect for at least 3 months. Determination of stages one and two (with  eGFR > 59 ml/min/m2) requires estimation of kidney damage for at least 3  months as defined by structural or functional abnormalities.  Limitations: All estimates of GFR will be les6: s accurate for patients at  extremes of muscle mass (including but not limited to frail elderly,  critically ill, or cancer patients), those with unusual diets, and those  with conditions associated with reduced secretion or extrarenal  elimination of6:  creatinine. The eGFR equation is not recommended for use  in patients with unstable creatinine levels. mL/min/1.73M2    eGFR if African American: 7 mL/min/1.73M2    Complete Blood Count in AM (04.10.17 @ 08:46)    WBC Count: 6.6 K/uL    RBC Count: 3.21 M/uL    Hemoglobin: 8.1 g/dL    Hematocrit: 25.6 %    Mean Cell Volume: 79.8 fl    Mean Cell Hemoglobin: 25.2 pg    Mean Cell Hemoglobin Conc: 31.6 g/dL    Red Cell Distrib Width: 17.7 %    Platelet Count - Automated: 354 K/uL                                    MICROBIOLOGY:    RADIOLOGY & ADDITIONAL STUDIES:   EXAM:  CHEST SINGLE VIEW FRONTAL                          PROCEDURE DATE:  04/10/2017        INTERPRETATION:  CHEST AP PORTABLE:    History: pleural effusion.     Date and time of exam: 4/10/2017 5:14 AM.    Technique: A single AP view of the chest was obtained.    Comparison exam: 4/7/2017.    Findings:  Cardiomegaly. Again noted is a right internal jugular central line with   the tip in the region of the distal SVC. Left basilar infiltrate,   unchanged. Right basilar atelectasis, unchanged. No evidence of   pneumothorax..    Impression:  Stable exam without significant change since the previous study..      AMIE BRUNER M.D., ATTENDING RADIOLOGIST  This document has been electronically signed. Apr 10 2017  8:39AM     EXAM:  CT CHEST                          PROCEDURE DATE:  04/05/2017        INTERPRETATION:  EXAMINATION DATE: April 5, 2017 at 0103 hours.  CLINICAL INFORMATION: Left pleural effusion.    COMPARISON: None.    PROCEDURE:   CT of the Chest was performed without intravenous contrast.  Sagittal and coronal reformats were performed.    FINDINGS:    LUNGS AND LARGE AIRWAYS: Patchy opacities in the left lung with scattered   patchy opacities in the right lung, suspicious for multifocal pneumonia.  PLEURA: Small left pleural effusion.  VESSELS: Right-sided central venous catheter with tip terminating in the   superior cavoatrial junction.   HEART: Heart size is normal. No pericardial effusion.  MEDIASTINUM AND SHANITA: Subcentimeter short axis mediastinal lymph nodes.  CHEST WALL AND LOWER NECK: Within normal limits.  VISUALIZED UPPER ABDOMEN: Within normal limits.  BONES: Degenerative changes of the spine.    IMPRESSION: Small left pleural effusion with consolidation in the left   lung and scattered opacities in the right lung, suspicious for multifocal   pneumonia.      TIFFANY ROBLERO M.D., ATTENDING RADIOLOGIST  This document has been electronically signed. Apr 5 2017  9:13AM    (all films reviewed on PACS)

## 2017-04-12 NOTE — DISCHARGE NOTE ADULT - CARE PROVIDERS DIRECT ADDRESSES
,DirectAddress_Unknown,[email or web address],DirectAddress_Unknown ,[email or web address],DirectAddress_Unknown,DirectAddress_Unknown,DirectAddress_Unknown

## 2017-04-12 NOTE — CONSULT NOTE ADULT - PROBLEM SELECTOR RECOMMENDATION 9
complete ABX per ID  f/u with us as outpt for CCT in 8 wks  recall PRN
s/p thoracentesis  chest tube still in place
s/p left pigtail placement
- follow up on GNR from fluid cx  - check sputum cx today.  - change antibiotics to Cefepime 1 gram Q24H

## 2017-04-12 NOTE — PROGRESS NOTE ADULT - PROBLEM SELECTOR PLAN 3
plan as above and repeat cxr noted Asymptomatic. Stable  Seen by cardiology (Dr. Alvarez)  no further intervention

## 2017-04-12 NOTE — DISCHARGE NOTE ADULT - PLAN OF CARE
Resolving Margaret Stable Pt to continue dialysis MWF. Pt will need to f/u with nephrologist Dr. Barlow or Dr. Maciel outpatient. Pt presented with severe anemia and received 2 units prbc. Pt to continue on weekly home iron regimen, ferrous sulfate, Vit C and f/u with PMD Pt blood pressure controlled on hypertensives. Please take meds as prescribed and continue daily blood pressure check. F/U with PMD for repeat blood pressure and adjustment of meds as needed. Pt glucose was slightly elevated and pt was on insulin sliding scale during hospitalization. Pt to continue current regimen and f/u with PMD in 1-2 days. Will need antibiotic Ceftazidime for total of 3 weeks with end date of 05/02/2017. Pt will need repeat cxr as per pulmonology recommendation. Continue probiotic for 2 weeks after end date of antibiotics. Pt to f/u with pulmonology at Dr. Wade office in 1-2 days. Will need antibiotic Ceftazidime for total of 3 weeks with end date of 05/02/2017. Continue probiotic for 2 weeks after end date of antibiotics. Pt discharged on home oxygen. Pt to f/u with pulmonology at Dr. Wade office in 1-2 days to determine continuous need for oxygen and f/u CXR for pneumonia resolution. Will need antibiotic Ceftazidime for total of 3 weeks with end date of 05/02/2017. Continue probiotic for 2 weeks after end date of antibiotics. Pt continues to desat without oxygen and to be discharged on home oxygen. DALTON likely. Pt to f/u with pulmonology at Dr. Wade office in 1-2 days to determine continuous need for oxygen and f/u CXR for pneumonia resolution. Pt to continue dialysis MWF. Pt will need to f/u with nephrologist Dr. Barlow or Dr. Maciel outpatient.  Epogen and IV iron as outpatient with dialysis  [facility] already knows that patient is to get hd with abx after   Rcvd HD on MWF schedule, last HD on wednesday, to continue with HD on saturday- plan d/w renal Pt presented with severe anemia and received 2 units prbc. Pt to continue on weekly home iron regimen, Vit C and f/u with PMD Pt blood pressure controlled on hypertensives. Please take meds as prescribed and continue daily blood pressure check. F/U with PMD for repeat blood pressure and adjustment of meds as needed.  Your meds were adjusted as above, please monitor your blood pressure as outpatient and keep a record of it for your doctor Pt glucose was slightly elevated and pt was on insulin sliding scale during hospitalization. Pt to continue current regimen and f/u with PMD in 1-2 days.  diet modification Will need antibiotic Ceftazidime for total of 3 weeks with end date of 05/02/2017.   Pt continues to desat without oxygen and to be discharged on home oxygen. DALTON likely. Pt to f/u with pulmonology at Dr. Wade office in 1-2 days to determine continuous need for oxygen and f/u CXR for pneumonia resolution.  Patient received IV vancomycin and zosyn as outpatient

## 2017-04-12 NOTE — DISCHARGE NOTE ADULT - INSTRUCTIONS
•Heart-healthy fish. Eat heart-healthy fish at least twice a week. Fish can be a good alternative to high-fat meats. For example, cod, tuna and halibut have less total fat, saturated fat and cholesterol than do meat and poultry. Fish such as salmon, mackerel, tuna, sardines and bluefish are rich in omega-3 fatty acids, which promote heart health by lowering blood fats called triglycerides.      Avoid fried fish and fish with high levels of mercury, such as tilefish, swordfish and memo mackerel.    •"Good" fats. Foods containing monounsaturated and polyunsaturated fats can help lower your cholesterol levels. These include avocados, almonds, pecans, walnuts, olives, and canola, olive and peanut oils. But don't overdo it, as all fats are high in calories.      Foods to avoid:  Saturated fats. High-fat dairy products and animal proteins such as beef, hot dogs, sausage and vivas contain saturated fats. Limit your daily calories from saturated fat to less than 7 percent.  •Trans fats. These types of fats are found in processed snacks, baked goods, shortening and stick margarines. Avoid these items.  •Cholesterol. Sources of cholesterol include high-fat dairy products and high-fat animal proteins, egg yolks, liver, and other organ meats. Aim for no more than 200 milligrams (mg) of cholesterol a day.  Sodium. Aim for less than 2,300 mg of sodium a day.

## 2017-04-12 NOTE — PROGRESS NOTE ADULT - PROBLEM SELECTOR PLAN 2
Resolved. No further episodes. Likely secondary to coughing episodes and oxygen. Started on O2 with humidification

## 2017-04-12 NOTE — CONSULT NOTE ADULT - PROBLEM SELECTOR RECOMMENDATION 3
Home O2 to maintain sat>88%  will re-evaluate during office f/u
in some leads, ST segment appears prolonged, suggestive of hypocalcemia  check calcium  on calcitriol  avoid QT prolonging agents
s./p drainage  atelectasis on repeat CXR

## 2017-04-12 NOTE — DISCHARGE NOTE ADULT - PATIENT PORTAL LINK FT
“You can access the [employer] Patient Portal, offered by [facility], by registering with the following website: [email or web address]”

## 2017-04-12 NOTE — PROGRESS NOTE ADULT - PROBLEM SELECTOR PLAN 1
Pleural fluid culture positive for Serratia marcescens  cefepime 1 g IV Q24H w/ florastor D/C yesterday and start on fortaz by ID for presumed empyema with her MFPNA for a total of 4 weeks and with end day of 5/2   c/w duonebs Q6H & incentive spirometer   Respiratory distress improved. No overnight desat. However, pt sating at 95% on 2L oxygen. Plan to continue to wean off of O2 today - encouraged ambulation and lung exercises while seated along with Incentive spirometer with left sided empeyma and course likely complicated by an underlying DALTON/COPD picture   Pleural fluid culture positive for Serratia marcescens  cefepime 1 g IV Q24H w/ florastor D/C yesterday and start on fortaz by ID for presumed empyema with her MFPNA for a total of 4 weeks and with end day of 5/2 (given one dose already after HD)  c/w duonebs Q6H & incentive spirometer   Respiratory distress improved. No overnight desat. However, pt sating at 95% on 2L oxygen. Plan to continue to wean off of O2 today - encouraged ambulation and lung exercises while seated along with Incentive spirometer  seen by pulm   patient will likely have to go home with o2. plan d/c in am

## 2017-04-12 NOTE — PROGRESS NOTE ADULT - SUBJECTIVE AND OBJECTIVE BOX
NEPHROLOGY INTERVAL HPI/OVERNIGHT EVENTS:    Feels better   Interim noted   ID and Pulmonary follow up noted     MEDICATIONS  (STANDING):  sodium chloride 0.9% lock flush 3milliLiter(s) IV Push every 8 hours  pantoprazole    Tablet 40milliGRAM(s) Oral before breakfast  insulin lispro (HumaLOG) corrective regimen sliding scale  SubCutaneous three times a day before meals  dextrose 5%. 1000milliLiter(s) IV Continuous <Continuous>  dextrose 50% Injectable 12.5Gram(s) IV Push once  dextrose 50% Injectable 25Gram(s) IV Push once  dextrose 50% Injectable 25Gram(s) IV Push once  aspirin  chewable 81milliGRAM(s) Oral daily  heparin  Injectable 5000Unit(s) SubCutaneous every 12 hours  calcium acetate 667milliGRAM(s) Oral three times a day with meals  calcitriol   Capsule 0.5MICROGram(s) Oral daily  metoprolol 50milliGRAM(s) Oral two times a day  amLODIPine   Tablet 10milliGRAM(s) Oral daily  saccharomyces boulardii 250milliGRAM(s) Oral two times a day  ALBUTerol/ipratropium for Nebulization 3milliLiter(s) Nebulizer every 6 hours  ferrous    sulfate 325milliGRAM(s) Oral daily  ascorbic acid 500milliGRAM(s) Oral daily  valsartan 160milliGRAM(s) Oral daily  cefTAZidime  IVPB 2Gram(s) IV Intermittent <User Schedule>    MEDICATIONS  (PRN):  dextrose Gel 1Dose(s) Oral once PRN Blood Glucose LESS THAN 70 milliGRAM(s)/deciliter  glucagon  Injectable 1milliGRAM(s) IntraMuscular once PRN Glucose LESS THAN 70 milligrams/deciliter  acetaminophen   Tablet. 650milliGRAM(s) Oral every 8 hours PRN Moderate Pain (4 - 6)  sodium chloride 0.65% Nasal 1Spray(s) Both Nostrils three times a day PRN Dry Nostrils  hydrALAZINE Injectable 10milliGRAM(s) IV Push every 6 hours PRN SBP over 150  HYDROcodone/homatropine Syrup 5milliLiter(s) Oral every 6 hours PRN Cough      Allergies    No Known Allergies    Intolerances        Vital Signs Last 24 Hrs  T(C): 36.9, Max: 37.2 (04-12 @ 06:05)  T(F): 98.4, Max: 98.9 (-12 @ 06:05)  HR: 94 (84 - 94)  BP: 160/- (138/75 - 176/95)  BP(mean): --  RR: 18 (18 - 18)  SpO2: 94% (91% - 96%)  Daily     Daily Weight in k.2 (2017 14:25)  I&O's Detail  I & Os for 24h ending 2017 07:00  =============================================  IN:    Oral Fluid: 360 ml    Total IN: 360 ml  ---------------------------------------------  OUT:    Total OUT: 0 ml  ---------------------------------------------  Total NET: 360 ml    I & Os for current day (as of 2017 17:00)  =============================================  IN:    Oral Fluid: 640 ml    Total IN: 640 ml  ---------------------------------------------  OUT:    Total OUT: 0 ml  ---------------------------------------------  Total NET: 640 ml    I&O's Summary  I & Os for 24h ending 2017 07:00  =============================================  IN: 360 ml / OUT: 0 ml / NET: 360 ml    I & Os for current day (as of 2017 17:00)  =============================================  IN: 640 ml / OUT: 0 ml / NET: 640 ml      PHYSICAL EXAM:  PHYSICAL EXAM:  GENERAL: Weakness  HEAD:  NCAT  EYES: EOMI  NECK: Supple, No JVD  NERVOUS SYSTEM:  Alert & Oriented X3  CHEST/LUNG: Diminished BS at bases; L chest tube  HEART: Regular rate and rhythm; No rub  ABDOMEN: Soft, Nontender, Nondistended  EXTREMITIES:  2+ Peripheral Pulses, +edema      LABS:                        8.4    6.1   )-----------( 310      ( 2017 15:13 )             26.1     -    133<L>  |  93<L>  |  49.0<H>  ----------------------------<  153<H>  4.9   |  25.0  |  6.96<H>    Ca    8.8      2017 15:13  Phos  5.0       Mg     2.1         TPro  7.9  /  Alb  3.1<L>  /  TBili  0.5  /  DBili  x   /  AST  15  /  ALT  17  /  AlkPhos  106          Magnesium, Serum: 2.1 mg/dL ( @ 15:13)  Phosphorus Level, Serum: 5.0 mg/dL ( @ 15:13)          RADIOLOGY & ADDITIONAL TESTS:

## 2017-04-12 NOTE — DISCHARGE NOTE ADULT - CARE PLAN
Principal Discharge DX:	Multifocal pneumonia  Goal:	Resolving  Instructions for follow-up, activity and diet:	Mult  Secondary Diagnosis:	End stage renal disease  Secondary Diagnosis:	Essential hypertension  Secondary Diagnosis:	Type 2 diabetes mellitus with other kidney complication  Secondary Diagnosis:	Microcytic anemia Principal Discharge DX:	Multifocal pneumonia  Goal:	Resolving  Instructions for follow-up, activity and diet:	Will need antibiotic Ceftazidime for total of 3 weeks with end date of 05/02/2017. Pt will need repeat cxr as per pulmonology recommendation. Continue probiotic for 2 weeks after end date of antibiotics. Pt to f/u with pulmonology at Dr. Wade office in 1-2 days.  Secondary Diagnosis:	End stage renal disease  Goal:	Stable  Instructions for follow-up, activity and diet:	Pt to continue dialysis MWF. Pt will need to f/u with nephrologist Dr. Barlow or Dr. Maciel outpatient.  Secondary Diagnosis:	Essential hypertension  Goal:	Stable  Instructions for follow-up, activity and diet:	Pt blood pressure controlled on hypertensives. Please take meds as prescribed and continue daily blood pressure check. F/U with PMD for repeat blood pressure and adjustment of meds as needed.  Secondary Diagnosis:	Type 2 diabetes mellitus with other kidney complication  Goal:	Stable  Instructions for follow-up, activity and diet:	Pt glucose was slightly elevated and pt was on insulin sliding scale during hospitalization. Pt to continue current regimen and f/u with PMD in 1-2 days.  Secondary Diagnosis:	Microcytic anemia  Instructions for follow-up, activity and diet:	Pt presented with severe anemia and received 2 units prbc. Pt to continue on weekly home iron regimen, ferrous sulfate, Vit C and f/u with PMD Principal Discharge DX:	Multifocal pneumonia  Goal:	Resolving  Instructions for follow-up, activity and diet:	Will need antibiotic Ceftazidime for total of 3 weeks with end date of 05/02/2017. Continue probiotic for 2 weeks after end date of antibiotics. Pt discharged on home oxygen. Pt to f/u with pulmonology at Dr. Wade office in 1-2 days to determine continuous need for oxygen and f/u CXR for pneumonia resolution.  Secondary Diagnosis:	End stage renal disease  Goal:	Stable  Instructions for follow-up, activity and diet:	Pt to continue dialysis MWF. Pt will need to f/u with nephrologist Dr. Barlow or Dr. Maciel outpatient.  Secondary Diagnosis:	Essential hypertension  Goal:	Stable  Instructions for follow-up, activity and diet:	Pt blood pressure controlled on hypertensives. Please take meds as prescribed and continue daily blood pressure check. F/U with PMD for repeat blood pressure and adjustment of meds as needed.  Secondary Diagnosis:	Type 2 diabetes mellitus with other kidney complication  Goal:	Stable  Instructions for follow-up, activity and diet:	Pt glucose was slightly elevated and pt was on insulin sliding scale during hospitalization. Pt to continue current regimen and f/u with PMD in 1-2 days.  Secondary Diagnosis:	Microcytic anemia  Goal:	Stable  Instructions for follow-up, activity and diet:	Pt presented with severe anemia and received 2 units prbc. Pt to continue on weekly home iron regimen, ferrous sulfate, Vit C and f/u with PMD Principal Discharge DX:	Multifocal pneumonia  Goal:	Resolving  Instructions for follow-up, activity and diet:	Will need antibiotic Ceftazidime for total of 3 weeks with end date of 05/02/2017. Continue probiotic for 2 weeks after end date of antibiotics. Pt continues to desat without oxygen and to be discharged on home oxygen. DALTON likely. Pt to f/u with pulmonology at Dr. Wade office in 1-2 days to determine continuous need for oxygen and f/u CXR for pneumonia resolution.  Secondary Diagnosis:	End stage renal disease  Goal:	Stable  Instructions for follow-up, activity and diet:	Pt to continue dialysis MWF. Pt will need to f/u with nephrologist Dr. Barlow or Dr. Maciel outpatient.  Secondary Diagnosis:	Essential hypertension  Goal:	Stable  Instructions for follow-up, activity and diet:	Pt blood pressure controlled on hypertensives. Please take meds as prescribed and continue daily blood pressure check. F/U with PMD for repeat blood pressure and adjustment of meds as needed.  Secondary Diagnosis:	Type 2 diabetes mellitus with other kidney complication  Goal:	Stable  Instructions for follow-up, activity and diet:	Pt glucose was slightly elevated and pt was on insulin sliding scale during hospitalization. Pt to continue current regimen and f/u with PMD in 1-2 days.  Secondary Diagnosis:	Microcytic anemia  Goal:	Stable  Instructions for follow-up, activity and diet:	Pt presented with severe anemia and received 2 units prbc. Pt to continue on weekly home iron regimen, ferrous sulfate, Vit C and f/u with PMD Principal Discharge DX:	Multifocal pneumonia  Goal:	Resolving  Instructions for follow-up, activity and diet:	Will need antibiotic Ceftazidime for total of 3 weeks with end date of 05/02/2017.   Pt continues to desat without oxygen and to be discharged on home oxygen. DALTON likely. Pt to f/u with pulmonology at Dr. Wade office in 1-2 days to determine continuous need for oxygen and f/u CXR for pneumonia resolution.  Patient received IV vancomycin and zosyn as outpatient  Secondary Diagnosis:	End stage renal disease  Goal:	Stable  Instructions for follow-up, activity and diet:	Pt to continue dialysis MWF. Pt will need to f/u with nephrologist Dr. Barlow or Dr. Maciel outpatient.  Epogen and IV iron as outpatient with dialysis  [facility] already knows that patient is to get hd with abx after   Rcvd HD on MWF schedule, last HD on wednesday, to continue with HD on saturday- plan d/w renal  Secondary Diagnosis:	Essential hypertension  Goal:	Stable  Instructions for follow-up, activity and diet:	Pt blood pressure controlled on hypertensives. Please take meds as prescribed and continue daily blood pressure check. F/U with PMD for repeat blood pressure and adjustment of meds as needed.  Your meds were adjusted as above, please monitor your blood pressure as outpatient and keep a record of it for your doctor  Secondary Diagnosis:	Type 2 diabetes mellitus with other kidney complication  Goal:	Stable  Instructions for follow-up, activity and diet:	Pt glucose was slightly elevated and pt was on insulin sliding scale during hospitalization. Pt to continue current regimen and f/u with PMD in 1-2 days.  diet modification  Secondary Diagnosis:	Microcytic anemia  Goal:	Stable  Instructions for follow-up, activity and diet:	Pt presented with severe anemia and received 2 units prbc. Pt to continue on weekly home iron regimen, Vit C and f/u with PMD Principal Discharge DX:	Multifocal pneumonia  Goal:	Resolving  Instructions for follow-up, activity and diet:	Will need antibiotic Ceftazidime for total of 3 weeks with end date of 05/02/2017.   Pt continues to desat without oxygen and to be discharged on home oxygen. DALTON likely. Pt to f/u with pulmonology at Dr. Wade office in 1-2 days to determine continuous need for oxygen and f/u CXR for pneumonia resolution.  Patient received IV vancomycin and zosyn as outpatient  Secondary Diagnosis:	End stage renal disease  Goal:	Stable  Instructions for follow-up, activity and diet:	Pt to continue dialysis MWF. Pt will need to f/u with nephrologist Dr. Barlow or Dr. Maciel outpatient.  Epogen and IV iron as outpatient with dialysis  [facility] already knows that patient is to get hd with abx after   Rcvd HD on MWF schedule, last HD on wednesday, to continue with HD on saturday- plan d/w renal  Secondary Diagnosis:	Essential hypertension  Goal:	Stable  Instructions for follow-up, activity and diet:	Pt blood pressure controlled on hypertensives. Please take meds as prescribed and continue daily blood pressure check. F/U with PMD for repeat blood pressure and adjustment of meds as needed.  Your meds were adjusted as above, please monitor your blood pressure as outpatient and keep a record of it for your doctor  Secondary Diagnosis:	Type 2 diabetes mellitus with other kidney complication  Goal:	Stable  Instructions for follow-up, activity and diet:	Pt glucose was slightly elevated and pt was on insulin sliding scale during hospitalization. Pt to continue current regimen and f/u with PMD in 1-2 days.  diet modification  Secondary Diagnosis:	Microcytic anemia  Goal:	Stable  Instructions for follow-up, activity and diet:	Pt presented with severe anemia and received 2 units prbc. Pt to continue on weekly home iron regimen, Vit C and f/u with PMD

## 2017-04-12 NOTE — PROGRESS NOTE ADULT - ASSESSMENT
ESRD - HD today     Anemia - Add Epogen and folate    PNA , Empyema - Abx as per ID     RO - Phoslo and Rocaltrol

## 2017-04-12 NOTE — DISCHARGE NOTE ADULT - MEDICATION SUMMARY - MEDICATIONS TO TAKE
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  O2 sat on RA at rest is 86%  -- Indication: For Pneumonia    aspirin 81 mg oral delayed release tablet  -- 1 tab(s) by mouth once a day  -- Indication: For Atypical chest pain    valsartan 40 mg oral tablet  -- 1 tab(s) by mouth once a day  -- Indication: For Hypertension    HumaLOG KwikPen 100 units/mL subcutaneous solution  -- 2 unit(s) subcutaneous 3 times a day (before meals) ; 1 Unit(s) if Glucose 151 - 200 2 Unit(s) if Glucose 201 - 250 3 Unit(s) if Glucose 251 - 300 4 Unit(s) if Glucose 301 - 350 5 Unit(s) if Glucose 351 - 400 6 Unit(s) if Glucose Greater Than 400  -- Indication: For Diabetes    loratadine 10 mg oral tablet  -- 1 tab(s) by mouth once a day  -- Indication: For Multifocal pneumonia    metoprolol succinate 100 mg oral tablet, extended release  -- 1 tab(s) by mouth once a day  -- Indication: For Hypertension    amLODIPine 10 mg oral tablet  -- 1 tab(s) by mouth once a day  -- Indication: For Hypertension    cefTAZidime  -- 2 gram(s) intravenous 3 times a week on MWF to be infused over 30mins.  -- Indication: For Multifocal pneumonia    epoetin raina  -- 77903 unit(s) intravenous 3 times a week  as per renal  -- Indication: For Microcytic anemia    iron sucrose 20 mg/mL intravenous solution  -- 2.5 milliliter(s) intravenous  as per Renal during HD  -- Indication: For Microcytic anemia    calcium acetate 667 mg oral capsule  -- 1 cap(s) by mouth 3 times a day with meals  -- Indication: For End stage renal disease    sevelamer hydrochloride 800 mg oral tablet  -- 2 tab(s) by mouth 3 times a day (with meals)  -- Indication: For End stage renal disease    pantoprazole 40 mg oral delayed release tablet  -- 1 tab(s) by mouth once a day (before a meal)  -- Indication: For Atypical chest pain    calcitriol 0.5 mcg oral capsule  -- 1 cap(s) by mouth once a day  -- Indication: For End stage renal disease    ergocalciferol 50,000 intl units (1.25 mg) oral capsule  -- 1 cap(s) by mouth once a week  -- Indication: For End stage renal disease    folic acid 1 mg oral tablet  -- 1 tab(s) by mouth once a day  -- Indication: For Microcytic anemia 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  O2 sat on RA at rest is 86%  -- Indication: For dalton    aspirin 81 mg oral delayed release tablet  -- 1 tab(s) by mouth once a day  -- Indication: For Atypical chest pain    valsartan 40 mg oral tablet  -- 1 tab(s) by mouth once a day  -- Indication: For Hypertension    HumaLOG KwikPen 100 units/mL subcutaneous solution  -- 2 unit(s) subcutaneous 3 times a day (before meals) ; 1 Unit(s) if Glucose 151 - 200 2 Unit(s) if Glucose 201 - 250 3 Unit(s) if Glucose 251 - 300 4 Unit(s) if Glucose 301 - 350 5 Unit(s) if Glucose 351 - 400 6 Unit(s) if Glucose Greater Than 400  -- Indication: For Diabetes    loratadine 10 mg oral tablet  -- 1 tab(s) by mouth once a day  -- Indication: For Multifocal pneumonia    metoprolol succinate 100 mg oral tablet, extended release  -- 1 tab(s) by mouth once a day  -- Indication: For Hypertension    amLODIPine 10 mg oral tablet  -- 1 tab(s) by mouth once a day  -- Indication: For Hypertension    cefTAZidime  -- 2 gram(s) intravenous 3 times a week on MWF to be infused over 30mins.  -- Indication: For Multifocal pneumonia    epoetin raina  -- 19247 unit(s) intravenous 3 times a week  as per renal  -- Indication: For Microcytic anemia    iron sucrose 20 mg/mL intravenous solution  -- 2.5 milliliter(s) intravenous  as per Renal during HD  -- Indication: For Microcytic anemia    calcium acetate 667 mg oral capsule  -- 1 cap(s) by mouth 3 times a day with meals  -- Indication: For End stage renal disease    sevelamer hydrochloride 800 mg oral tablet  -- 2 tab(s) by mouth 3 times a day (with meals)  -- Indication: For End stage renal disease    pantoprazole 40 mg oral delayed release tablet  -- 1 tab(s) by mouth once a day (before a meal)  -- Indication: For Atypical chest pain    calcitriol 0.5 mcg oral capsule  -- 1 cap(s) by mouth once a day  -- Indication: For End stage renal disease    ergocalciferol 50,000 intl units (1.25 mg) oral capsule  -- 1 cap(s) by mouth once a week  -- Indication: For End stage renal disease    folic acid 1 mg oral tablet  -- 1 tab(s) by mouth once a day  -- Indication: For Microcytic anemia 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  O2 sat on RA at rest is 86%  -- Indication: For dalton    aspirin 81 mg oral delayed release tablet  -- 1 tab(s) by mouth once a day  -- Indication: For Atypical chest pain    valsartan 160 mg oral tablet  -- 1 tab(s) by mouth once a day  -- Indication: For Hypertension    HumaLOG KwikPen 100 units/mL subcutaneous solution  -- 2 unit(s) subcutaneous 3 times a day (before meals) ; 1 Unit(s) if Glucose 151 - 200 2 Unit(s) if Glucose 201 - 250 3 Unit(s) if Glucose 251 - 300 4 Unit(s) if Glucose 301 - 350 5 Unit(s) if Glucose 351 - 400 6 Unit(s) if Glucose Greater Than 400  -- Indication: For Diabetes    loratadine 10 mg oral tablet  -- 1 tab(s) by mouth once a day  -- Indication: For Allergy    metoprolol succinate 100 mg oral tablet, extended release  -- 1 tab(s) by mouth once a day  -- Indication: For Hypertension    amLODIPine 10 mg oral tablet  -- 1 tab(s) by mouth once a day  -- Indication: For Hypertension    cefTAZidime  -- 2 gram(s) intravenous 3 times a week on MWF to be infused over 30mins. end date 5/2  -- Indication: For Serratia marcescens infection/mfpna    epoetin raina  -- 06865 unit(s) intravenous 3 times a week  as per renal  -- Indication: For Microcytic anemia    iron sucrose 20 mg/mL intravenous solution  -- 2.5 milliliter(s) intravenous  as per Renal during HD  -- Indication: For Microcytic anemia    calcium acetate 667 mg oral capsule  -- 1 cap(s) by mouth 3 times a day with meals  -- Indication: For End stage renal disease    sevelamer hydrochloride 800 mg oral tablet  -- 2 tab(s) by mouth 3 times a day (with meals)  -- Indication: For End stage renal disease    pantoprazole 40 mg oral delayed release tablet  -- 1 tab(s) by mouth once a day (before a meal)  -- Indication: For Atypical chest pain    calcitriol 0.5 mcg oral capsule  -- 1 cap(s) by mouth once a day  -- Indication: For End stage renal disease    ergocalciferol 50,000 intl units (1.25 mg) oral capsule  -- 1 cap(s) by mouth once a week  -- Indication: For End stage renal disease    folic acid 1 mg oral tablet  -- 1 tab(s) by mouth once a day  -- Indication: For Microcytic anemia

## 2017-04-12 NOTE — PROGRESS NOTE ADULT - PROBLEM SELECTOR PLAN 6
not well controlled   Hydralazine given at 3:50am this morning  C/W Valsartan 160mg po daily  c/w amlodipine 10 mg PO daily with holding parameter  c/w metoprolol 50 mg PO BID with holding parameter  Will follow B.P not well controlled   Hydralazine given at 3:50am this morning  C/W Valsartan 160mg po daily  c/w amlodipine 10 mg PO daily with holding parameter  c/w metoprolol 50 mg PO BID with holding parameter  Continue to follow BP c/w hemodialysis (Tu/Th/S) via right IJ access  c/w calcium acetate 667 mg PO TID & calcitriol 0.5 mcg PO daily   outpatient vascular follow up for left AVF which is currently maturing  seen by vascular and note appreciated

## 2017-04-12 NOTE — PROGRESS NOTE ADULT - PROBLEM SELECTOR PLAN 4
Asymptomatic. Stable  Seen by cardiology (Dr. Alvarez)  no further intervention predominantly normocytic - likely anemia of chronic disease 2/2 ESRD  H/H stable. c/w PO ferrous sulfate 325 mg PO daily/vitamin c  CBC this AM pending  Nephrology started epogen

## 2017-04-12 NOTE — CONSULT NOTE ADULT - ASSESSMENT
Patient is 37 year old female with past medical history of end stage renal failure on dialysis T/Th/S, hypertension, diabetes, questionable CHF  who comes to [facility] complaining of shortness of breath. The patient was discharged from [facility] with diagnosis of new onset end stage renal disease in February. The patient was supposed to get hemodialysis in the morning but instead came to the hospital because of the shortness of breath which began today.  The patient has some baseline shortness of breath for the past couple of months, she cannot walk 10 yards without feeling short of breath, however her shortness of breath worsened today.  Denies orthopnea, denies paroxysmal nocturnal dyspnea, sleeps with 4 pillows at night for comfort.  She states that she gained weight over the last week   but doesn't know how much. The shortness of breath is accompanied by chest pain which is achy, 6/10, non-radiating, intermittent. Excacerbated by taking a deep breath or coughing. It started after she was having a lot dry heaving on Saturday. Denies history of CAD, denies diaphoresis.  The shortness of breath and chest pain is accompanied non-productive cough for the past couple of weeks.  Denies fevers, chills, sick contacts or recent travel. The patient denies calf pain, denies previous history of clots or hemoptysis.   The patient is hemodialysis receiving dialysis through a port and has only been recently diagnosed with renal failure. Patient was due for revision of AV fistula with [facility] on Friday.  She states that doctors told her that her renal failure was due to a combination of obesity diabetes and hypertension. The patient currently denies symptoms of hyperkalemia like muscular weakness, constipation or confusion.      left pigtail placement for large effusion,   d/w dr rowe
37 F with hypertension, diabetes, noted with ESRD earlier this year, presents with SOB, pleural effusions. Now s/p thoracentesis, chest tube still in place.  To undergo AVF revision. Asked by primary team to evaluate her  prior to AVF revision. ECG  with nonspecific T wave changes and mildly prolonged QT segment. Normal biventricular systolic function. She has no active cardiac conditions.  Overall, low cardiac risk for intermediate risk surgery.  She has less than 1% risk for postoperative cardiac events based on Vela Cardiac Risk Index.  No further cardiac diagnostic interventions warranted.    Encouraged long term cardiology follow up for increased risk of cardiovascular disease due to ESRD.
ESRD: +PVC and effusions s/p L CT  - HD today with UF as tolerates  - await evaluation of pleural effusion  - AVF maturing==> needs vascular follow up    Anemia: cont NEELAM and check Fe stores  - PRBCs as needed    RO: continue Phoslo and Calcitriol
Multilobar PNA on left with empyema responding well to rx  Will require home O2 is sats remain <88%  Course may be complicated by DALTON but will need outpt w/u
37 F with left pleural effusion s/p drainage and multifocal PNA

## 2017-04-13 VITALS — DIASTOLIC BLOOD PRESSURE: 90 MMHG | HEART RATE: 84 BPM | SYSTOLIC BLOOD PRESSURE: 150 MMHG

## 2017-04-13 PROCEDURE — 99239 HOSP IP/OBS DSCHRG MGMT >30: CPT

## 2017-04-13 RX ORDER — ALBUTEROL 90 UG/1
2 AEROSOL, METERED ORAL
Qty: 1 | Refills: 0 | OUTPATIENT
Start: 2017-04-13 | End: 2017-05-13

## 2017-04-13 RX ORDER — HYDRALAZINE HCL 50 MG
5 TABLET ORAL ONCE
Qty: 0 | Refills: 0 | Status: DISCONTINUED | OUTPATIENT
Start: 2017-04-13 | End: 2017-04-13

## 2017-04-13 RX ORDER — FOLIC ACID 0.8 MG
1 TABLET ORAL
Qty: 0 | Refills: 0 | COMMUNITY
Start: 2017-04-13

## 2017-04-13 RX ORDER — VALSARTAN 80 MG/1
1 TABLET ORAL
Qty: 0 | Refills: 0 | COMMUNITY

## 2017-04-13 RX ORDER — VALSARTAN 80 MG/1
1 TABLET ORAL
Qty: 30 | Refills: 0 | OUTPATIENT
Start: 2017-04-13 | End: 2017-05-13

## 2017-04-13 RX ADMIN — HEPARIN SODIUM 5000 UNIT(S): 5000 INJECTION INTRAVENOUS; SUBCUTANEOUS at 17:17

## 2017-04-13 RX ADMIN — VALSARTAN 160 MILLIGRAM(S): 80 TABLET ORAL at 06:07

## 2017-04-13 RX ADMIN — Medication 50 MILLIGRAM(S): at 17:19

## 2017-04-13 RX ADMIN — Medication 50 MILLIGRAM(S): at 06:07

## 2017-04-13 RX ADMIN — Medication 3 MILLILITER(S): at 08:24

## 2017-04-13 RX ADMIN — Medication 1: at 12:05

## 2017-04-13 RX ADMIN — Medication 1 MILLIGRAM(S): at 12:06

## 2017-04-13 RX ADMIN — SODIUM CHLORIDE 3 MILLILITER(S): 9 INJECTION INTRAMUSCULAR; INTRAVENOUS; SUBCUTANEOUS at 13:12

## 2017-04-13 RX ADMIN — AMLODIPINE BESYLATE 10 MILLIGRAM(S): 2.5 TABLET ORAL at 06:07

## 2017-04-13 RX ADMIN — Medication 325 MILLIGRAM(S): at 12:05

## 2017-04-13 RX ADMIN — Medication 10 MILLIGRAM(S): at 18:42

## 2017-04-13 RX ADMIN — Medication 250 MILLIGRAM(S): at 06:07

## 2017-04-13 RX ADMIN — Medication 500 MILLIGRAM(S): at 12:05

## 2017-04-13 RX ADMIN — Medication 250 MILLIGRAM(S): at 17:17

## 2017-04-13 RX ADMIN — Medication 81 MILLIGRAM(S): at 12:06

## 2017-04-13 RX ADMIN — SODIUM CHLORIDE 3 MILLILITER(S): 9 INJECTION INTRAMUSCULAR; INTRAVENOUS; SUBCUTANEOUS at 06:03

## 2017-04-13 RX ADMIN — PANTOPRAZOLE SODIUM 40 MILLIGRAM(S): 20 TABLET, DELAYED RELEASE ORAL at 06:07

## 2017-04-13 RX ADMIN — CALCITRIOL 0.5 MICROGRAM(S): 0.5 CAPSULE ORAL at 12:06

## 2017-04-13 NOTE — PROGRESS NOTE ADULT - ATTENDING COMMENTS
Patient with pneumonia continue Vanco and Zosyn. Awaiting results of pleural fluid analysis.  Patient with anemia due to ESRD will continue to monitor h/h for possible transfusion.  Patient is to get vascular AVG revision on Monday 4/10/17 will continue optimization in preparation for intervention.
note addended where needed. Patient was a prior smoker - quit 4 mos ago, prior to that smoked a few cig a day for over 10 years. we sat down with patient for atleast 30 minutes to go over her plan for the day, medical f/up, routine blood work and prevention of sequelae from uncontrolled DM.   Case d/w Dr Shields from ID. Pending culture to finalilze
Patient with pleural effusion exudative type, samples was sent for analysis, need to rule out malignancy.  Continue antibiotics course for pneumonia.
note addended where needed. Plan to d/c home with O2, had a lengthy discussion with patient for about 30 minute regarding her need for O2. Please see d/c papers for details.
note addended where needed. Plan d/w patient with male friend at bedside. we answered her questions and spent about 15 minutes explaining the rest of her plan of care.   Will likely c/w abx in the hospital for another few days prior to transitioning to oral. Encouraged OOB to ambulate
note addended where needed. Plan d/w patient and SW/CM
note addended where needed. Plan d/w patient
Patient with large pleural effusion on left side, samples to be studied for effusion etiology.  ESRD will need dialysis today.  Will monitor blood sugar and blood pressure closely and treat accordingly.
note addended where needed. Plan d/w patient at bedside.

## 2017-04-13 NOTE — PROGRESS NOTE ADULT - PROBLEM SELECTOR PLAN 6
c/w hemodialysis (Tu/Th/S) via right IJ access  c/w calcium acetate 667 mg PO TID & calcitriol 0.5 mcg PO daily   outpatient vascular follow up for left AVF which is currently maturing  seen by vascular and note appreciated

## 2017-04-13 NOTE — PROGRESS NOTE ADULT - PROBLEM SELECTOR PROBLEM 1
Multifocal pneumonia
Respiratory distress

## 2017-04-13 NOTE — PROGRESS NOTE ADULT - PROBLEM SELECTOR PLAN 1
with left sided empyema and course likely complicated by an underlying DALTON/COPD picture   Pleural fluid culture positive for Serratia marcescens  C/W Ceftazidime (Fortaz) for total course of 3 weeks on MWF post dialysis day 2/21 (end day 5/2).  c/w duonebs Q6H & incentive spirometer   Respiratory distress improved. No overnight desat. However, pt sating at 95% on 2L oxygen. Plan to continue to wean off of O2 today - encouraged ambulation and lung exercises while seated along with Incentive spirometer  seen by pulm   patient will likely have to go home with o2. plan d/c in am with left sided empyema and course likely complicated by an underlying DALTON/COPD picture   Pleural fluid culture positive for Serratia marcescens  C/W Ceftazidime (Fortaz) for total course of 3 weeks on MWF post dialysis day 2/21 (end day 5/2).  c/w duonebs Q6H & incentive spirometer    No overnight desat. However, pt sating at 95% on 2L oxygen.  d/c home with home O2. Patients O2 sat on RA is 86% at rest, goes down to 78% with ambulation on RA and then when put on 2L of O2 goes up to 92%. She needs oxygen for an underlying DALTON and likely COPD for which the pna is making her more oxygen dependent. She can be reevaluated in 3 mos by pulmonary

## 2017-04-13 NOTE — PROGRESS NOTE ADULT - PROBLEM SELECTOR PLAN 5
not well controlled but improving   Hydralazine given at 3:50am this morning  C/W Valsartan 160mg po daily  c/w amlodipine 10 mg PO daily with holding parameter  c/w metoprolol 50 mg PO BID with holding parameter  Continue to follow BP not well controlled but improving   Hydralazine given at 17:08 yesterday  C/W Valsartan 160mg po daily  c/w amlodipine 10 mg PO daily with holding parameter  c/w metoprolol 50 mg PO BID with holding parameter  Continue to follow BP not well controlled but improving   Hydralazine given at 17:08 yesterday  C/W Valsartan 160mg po daily, f/up with dietary intake   c/w amlodipine 10 mg PO daily with holding parameter  c/w metoprolol 50 mg PO BID with holding parameter  Continue to follow BP

## 2017-04-13 NOTE — PROGRESS NOTE ADULT - PROBLEM SELECTOR PLAN 4
predominantly normocytic - likely anemia of chronic disease 2/2 ESRD  H/H stable. c/w PO ferrous sulfate 325 mg PO daily/vitamin c  CBC this AM pending  Nephrology started epogen predominantly normocytic - likely anemia of chronic disease 2/2 ESRD  H/H stable. c/w PO ferrous sulfate 325 mg PO daily/vitamin c  CBC this AM pending  Nephrology started epogen and folate

## 2017-04-13 NOTE — PROGRESS NOTE ADULT - PROVIDER SPECIALTY LIST ADULT
Family Medicine
Infectious Disease
Infectious Disease
Nephrology
Vascular Surgery
Nephrology

## 2017-04-13 NOTE — PROGRESS NOTE ADULT - ASSESSMENT
ESRD - HD tomorrow     Anemia - Added Epogen and folate    PNA , Empyema - Abx as per ID   She is to receive Fortaz 2 G IV with HD TIW until 5-2-17   I called [facility] to make them aware of this order     RO - Phoslo and Rocaltrol

## 2017-04-13 NOTE — PROGRESS NOTE ADULT - ASSESSMENT
37 year old female w/ PMH ESRD on HD (Tu/Th/S), HTN, DM & presents w/ dyspnea admitted for multifocal pneumonia w/ left-sided pleural effusion s/p drainage, chest tube placement & subsequent removal without any complications. Patient was started on vanco and zosyn, then on cefepime 5d. Now Ceftazidime (Fortaz) for total course of 3 weeks on MWF post dialysis day 2/21 (end day 5/2). Pt sating at 92-96% on 2L oxygen. Will attempt to get home O2 by siting obesity as etiology for hypoxemia. 37 year old female w/ PMH ESRD on HD (Tu/Th/S), HTN, DM & presents w/ dyspnea admitted for multifocal pneumonia w/ left-sided pleural effusion s/p drainage, chest tube placement & subsequent removal without any complications. Patient was started on vanco and zosyn, then on cefepime 5d. Now Ceftazidime (Fortaz) for total course of 3 weeks on MWF post dialysis day 2/21 (end day 5/2). Pt sating at 92-96% on 2L oxygen. Will attempt to get home O2 by citing obesity as etiology for hypoxemia. Pulmonary concurs that home O2 is essential

## 2017-04-13 NOTE — PROGRESS NOTE ADULT - SUBJECTIVE AND OBJECTIVE BOX
Patient is a 37y old  Female who presents with a chief complaint of shortness of breath (04 Apr 2017 23:54)    INTERVAL/OVERNIGHT EVENTS: Patient was seen and examined at bedside. Pt continues to improve. Pt denies further episodes of epistaxis. Pt denies being SOB and says she feels fine. Pt says she has occasional cough productive for yellow sputum. Pt walked yesterday and desated to 75 without any symtpoms. Pt denies any fever, chest pain, calf tenderness, epistaxis. Pt reports having a BM yesterday. Appetite is wnl.    Cardiac monitor reviewed: No overnight events. Pt oxygen sat this AM on 2L oxygen was 96%     Vital Signs Last 24 Hrs  T(C): 37, Max: 38 (04-12 @ 18:58)  T(F): 98.6, Max: 100.4 (04-12 @ 18:58)  HR: 106 (91 - 117)  BP: 158/79 (138/75 - 176/95)  BP(mean): --  RR: 18 (18 - 18)  SpO2: 94% (92% - 96%)    PHYSICAL EXAM:  Constitutional: Obese, NAD, well-groomed, well-developed  HEENT: PERRLA, EOMI, + subconjuctival hemorrhage. Nostrils clear with no active epistaxis  Respiratory: expiratory wheezing B/L, crackles appreciated in lower lobes  Cardiovascular: S1& S2, RRR, no murmurs, rales or gallops   Gastrointestinal: Obese, BS+, soft, non-tender  Extremities: +2 bilateral pedal edema, no cyanosis or calf tenderness. LUE fistula(unmatured)    LABS:                                   8.4    6.1   )-----------( 310      ( 12 Apr 2017 15:13 )             26.1   04-12    133<L>  |  93<L>  |  49.0<H>  ----------------------------<  153<H>  4.9   |  25.0  |  6.96<H>    Ca    8.8      12 Apr 2017 15:13  Phos  5.0     04-12  Mg     2.1     04-12    TPro  7.9  /  Alb  3.1<L>  /  TBili  0.5  /  DBili  x   /  AST  15  /  ALT  17  /  AlkPhos  106  04-12    HBA1c: 7.3  FS: 04/12/17: 121, 150, 114; 04/11/17: 122, 146, 204, 157, (10 Apr 2017 12:13), 157 (10 Apr 2017 07:41), 216 (09 Apr 2017 22:45), 205 (09 Apr 2017 17:02)    Pleural fluid culture: Serratia marcescens  Blood cultures: negative x2  HIV: negative    RADIOLOGY & ADDITIONAL TESTS:    TTE: 60-65%    MEDICATIONS  (STANDING):  sodium chloride 0.9% lock flush 3milliLiter(s) IV Push every 8 hours  pantoprazole    Tablet 40milliGRAM(s) Oral before breakfast  insulin lispro (HumaLOG) corrective regimen sliding scale  SubCutaneous three times a day before meals  dextrose 5%. 1000milliLiter(s) IV Continuous <Continuous>  dextrose 50% Injectable 12.5Gram(s) IV Push once  dextrose 50% Injectable 25Gram(s) IV Push once  dextrose 50% Injectable 25Gram(s) IV Push once  aspirin  chewable 81milliGRAM(s) Oral daily  heparin  Injectable 5000Unit(s) SubCutaneous every 12 hours  calcium acetate 667milliGRAM(s) Oral three times a day with meals  calcitriol   Capsule 0.5MICROGram(s) Oral daily  metoprolol 50milliGRAM(s) Oral two times a day  amLODIPine   Tablet 10milliGRAM(s) Oral daily  saccharomyces boulardii 250milliGRAM(s) Oral two times a day  ALBUTerol/ipratropium for Nebulization 3milliLiter(s) Nebulizer every 6 hours  ferrous    sulfate 325milliGRAM(s) Oral daily  ascorbic acid 500milliGRAM(s) Oral daily  valsartan 160milliGRAM(s) Oral daily  epoetin raina Injectable 95012Ixux(s) IV Push <User Schedule>  folic acid 1milliGRAM(s) Oral daily  cefTAZidime  IVPB 2Gram(s) IV Intermittent <User Schedule>    MEDICATIONS  (PRN):  dextrose Gel 1Dose(s) Oral once PRN Blood Glucose LESS THAN 70 milliGRAM(s)/deciliter  glucagon  Injectable 1milliGRAM(s) IntraMuscular once PRN Glucose LESS THAN 70 milligrams/deciliter  sodium chloride 0.65% Nasal 1Spray(s) Both Nostrils three times a day PRN Dry Nostrils  hydrALAZINE Injectable 10milliGRAM(s) IV Push every 6 hours PRN SBP over 150  HYDROcodone/homatropine Syrup 5milliLiter(s) Oral every 6 hours PRN Cough Patient is a 37y old  Female who presents with a chief complaint of shortness of breath (04 Apr 2017 23:54)    INTERVAL/OVERNIGHT EVENTS: Patient was seen and examined at bedside. Pt continues to improve. Pt denies further episodes of epistaxis. Pt denies being SOB and says she feels fine. Pt says she has occasional cough productive for yellow sputum. Pt walked yesterday and desated to 75 without any symtpoms. Pt denies any fever, chest pain, calf tenderness, epistaxis. Pt reports having a BM yesterday. Appetite is wnl.    Pulmonary Consult recommended home oxygen. Folate and Epogen added on nephrology recommendation    Cardiac monitor reviewed: No overnight events. Pt oxygen sat this AM on 2L oxygen was 96%     Vital Signs Last 24 Hrs  T(C): 37, Max: 38 (04-12 @ 18:58)  T(F): 98.6, Max: 100.4 (04-12 @ 18:58)  HR: 106 (91 - 117)  BP: 158/79 (138/75 - 176/95)  BP(mean): --  RR: 18 (18 - 18)  SpO2: 94% (92% - 96%)    PHYSICAL EXAM:  Constitutional: Obese, NAD, well-groomed, well-developed  HEENT: PERRLA, EOMI, + subconjuctival hemorrhage. Nostrils clear with no active epistaxis  Respiratory: expiratory wheezing B/L, crackles appreciated in lower lobes  Cardiovascular: S1& S2, RRR, no murmurs, rales or gallops   Gastrointestinal: Obese, BS+, soft, non-tender  Extremities: +2 bilateral pedal edema, no cyanosis or calf tenderness. LUE fistula(unmatured)    LABS:                                   8.4    6.1   )-----------( 310      ( 12 Apr 2017 15:13 )             26.1   04-12    133<L>  |  93<L>  |  49.0<H>  ----------------------------<  153<H>  4.9   |  25.0  |  6.96<H>    Ca    8.8      12 Apr 2017 15:13  Phos  5.0     04-12  Mg     2.1     04-12    TPro  7.9  /  Alb  3.1<L>  /  TBili  0.5  /  DBili  x   /  AST  15  /  ALT  17  /  AlkPhos  106  04-12    HBA1c: 7.3  FS: 04/12/17: 121, 150, 114; 04/11/17: 122, 146, 204, 157, (10 Apr 2017 12:13), 157 (10 Apr 2017 07:41), 216 (09 Apr 2017 22:45), 205 (09 Apr 2017 17:02)    Pleural fluid culture: Serratia marcescens  Blood cultures: negative x2  HIV: negative    RADIOLOGY & ADDITIONAL TESTS:    TTE: 60-65%    MEDICATIONS  (STANDING):  sodium chloride 0.9% lock flush 3milliLiter(s) IV Push every 8 hours  pantoprazole    Tablet 40milliGRAM(s) Oral before breakfast  insulin lispro (HumaLOG) corrective regimen sliding scale  SubCutaneous three times a day before meals  dextrose 5%. 1000milliLiter(s) IV Continuous <Continuous>  dextrose 50% Injectable 12.5Gram(s) IV Push once  dextrose 50% Injectable 25Gram(s) IV Push once  dextrose 50% Injectable 25Gram(s) IV Push once  aspirin  chewable 81milliGRAM(s) Oral daily  heparin  Injectable 5000Unit(s) SubCutaneous every 12 hours  calcium acetate 667milliGRAM(s) Oral three times a day with meals  calcitriol   Capsule 0.5MICROGram(s) Oral daily  metoprolol 50milliGRAM(s) Oral two times a day  amLODIPine   Tablet 10milliGRAM(s) Oral daily  saccharomyces boulardii 250milliGRAM(s) Oral two times a day  ALBUTerol/ipratropium for Nebulization 3milliLiter(s) Nebulizer every 6 hours  ferrous    sulfate 325milliGRAM(s) Oral daily  ascorbic acid 500milliGRAM(s) Oral daily  valsartan 160milliGRAM(s) Oral daily  epoetin raina Injectable 86881Awpj(s) IV Push <User Schedule>  folic acid 1milliGRAM(s) Oral daily  cefTAZidime  IVPB 2Gram(s) IV Intermittent <User Schedule>    MEDICATIONS  (PRN):  dextrose Gel 1Dose(s) Oral once PRN Blood Glucose LESS THAN 70 milliGRAM(s)/deciliter  glucagon  Injectable 1milliGRAM(s) IntraMuscular once PRN Glucose LESS THAN 70 milligrams/deciliter  sodium chloride 0.65% Nasal 1Spray(s) Both Nostrils three times a day PRN Dry Nostrils  hydrALAZINE Injectable 10milliGRAM(s) IV Push every 6 hours PRN SBP over 150  HYDROcodone/homatropine Syrup 5milliLiter(s) Oral every 6 hours PRN Cough Patient is a 37y old  Female who presents with a chief complaint of shortness of breath (04 Apr 2017 23:54)    INTERVAL/OVERNIGHT EVENTS: Patient was seen and examined at bedside. Pt continues to improve. Pt denies further episodes of epistaxis. Pt denies being SOB and says she feels fine. Pt says she has occasional cough productive for yellow sputum. Pt denies any fever, N/V/D/C, chest pain, calf tenderness, epistaxis. Pt reports having a BM yesterday. Appetite is wnl.    Pulmonary Consult recommended home oxygen. Folate and Epogen added on nephrology recommendation    Cardiac monitor reviewed: No overnight events. Pt oxygen sat this AM on 2L oxygen was 96%     Vital Signs Last 24 Hrs  T(C): 37, Max: 38 (04-12 @ 18:58)  T(F): 98.6, Max: 100.4 (04-12 @ 18:58)  HR: 106 (91 - 117)  BP: 158/79 (138/75 - 176/95)  BP(mean): --  RR: 18 (18 - 18)  SpO2: 94% (92% - 96%) 3L O2    PHYSICAL EXAM:  Constitutional: Obese, NAD, well-groomed, well-developed  HEENT: PERRLA, EOMI, + L sided subconjuctival hemorrhage. Nostrils clear with no active epistaxis  Respiratory: Inspiratory and expiratory wheezing B/L in all lobes  Cardiovascular: S1& S2, RRR, no murmurs, rales or gallops   Gastrointestinal: Obese, BS+, soft, non-tender  Extremities: +2 bilateral pedal edema, no cyanosis or calf tenderness. LUE fistula(unmatured)    LABS:                                   8.4    6.1   )-----------( 310      ( 12 Apr 2017 15:13 )             26.1       133<L>  |  93<L>  |  49.0<H>  ----------------------------<  153<H>  4.9   |  25.0  |  6.96<H>    Ca    8.8      12 Apr 2017 15:13  Phos  5.0     04-12  Mg     2.1     04-12    TPro  7.9  /  Alb  3.1<L>  /  TBili  0.5  /  DBili  x   /  AST  15  /  ALT  17  /  AlkPhos  106  04-12    HBA1c: 7.3  FS: 04/12/17: 121, 150, 114; 04/11/17: 122, 146, 204, 157, (10 Apr 2017 12:13), 157 (10 Apr 2017 07:41), 216 (09 Apr 2017 22:45), 205 (09 Apr 2017 17:02)    Pleural fluid culture: Serratia marcescens  Blood cultures: negative x2  HIV: negative    RADIOLOGY & ADDITIONAL TESTS:    TTE: 60-65%    MEDICATIONS  (STANDING):  sodium chloride 0.9% lock flush 3milliLiter(s) IV Push every 8 hours  pantoprazole    Tablet 40milliGRAM(s) Oral before breakfast  insulin lispro (HumaLOG) corrective regimen sliding scale  SubCutaneous three times a day before meals  dextrose 5%. 1000milliLiter(s) IV Continuous <Continuous>  dextrose 50% Injectable 12.5Gram(s) IV Push once  dextrose 50% Injectable 25Gram(s) IV Push once  dextrose 50% Injectable 25Gram(s) IV Push once  aspirin  chewable 81milliGRAM(s) Oral daily  heparin  Injectable 5000Unit(s) SubCutaneous every 12 hours  calcium acetate 667milliGRAM(s) Oral three times a day with meals  calcitriol   Capsule 0.5MICROGram(s) Oral daily  metoprolol 50milliGRAM(s) Oral two times a day  amLODIPine   Tablet 10milliGRAM(s) Oral daily  saccharomyces boulardii 250milliGRAM(s) Oral two times a day  ALBUTerol/ipratropium for Nebulization 3milliLiter(s) Nebulizer every 6 hours  ferrous    sulfate 325milliGRAM(s) Oral daily  ascorbic acid 500milliGRAM(s) Oral daily  valsartan 160milliGRAM(s) Oral daily  epoetin raina Injectable 67177Yjru(s) IV Push <User Schedule>  folic acid 1milliGRAM(s) Oral daily  cefTAZidime  IVPB 2Gram(s) IV Intermittent <User Schedule>    MEDICATIONS  (PRN):  dextrose Gel 1Dose(s) Oral once PRN Blood Glucose LESS THAN 70 milliGRAM(s)/deciliter  glucagon  Injectable 1milliGRAM(s) IntraMuscular once PRN Glucose LESS THAN 70 milligrams/deciliter  sodium chloride 0.65% Nasal 1Spray(s) Both Nostrils three times a day PRN Dry Nostrils  hydrALAZINE Injectable 10milliGRAM(s) IV Push every 6 hours PRN SBP over 150  HYDROcodone/homatropine Syrup 5milliLiter(s) Oral every 6 hours PRN Cough

## 2017-04-13 NOTE — PROGRESS NOTE ADULT - SUBJECTIVE AND OBJECTIVE BOX
NEPHROLOGY INTERVAL HPI/OVERNIGHT EVENTS:    Feels better   No new events   meds reviewed   ID follow up noted     MEDICATIONS  (STANDING):  sodium chloride 0.9% lock flush 3milliLiter(s) IV Push every 8 hours  pantoprazole    Tablet 40milliGRAM(s) Oral before breakfast  insulin lispro (HumaLOG) corrective regimen sliding scale  SubCutaneous three times a day before meals  dextrose 5%. 1000milliLiter(s) IV Continuous <Continuous>  dextrose 50% Injectable 12.5Gram(s) IV Push once  dextrose 50% Injectable 25Gram(s) IV Push once  dextrose 50% Injectable 25Gram(s) IV Push once  aspirin  chewable 81milliGRAM(s) Oral daily  heparin  Injectable 5000Unit(s) SubCutaneous every 12 hours  calcium acetate 667milliGRAM(s) Oral three times a day with meals  calcitriol   Capsule 0.5MICROGram(s) Oral daily  metoprolol 50milliGRAM(s) Oral two times a day  amLODIPine   Tablet 10milliGRAM(s) Oral daily  saccharomyces boulardii 250milliGRAM(s) Oral two times a day  ALBUTerol/ipratropium for Nebulization 3milliLiter(s) Nebulizer every 6 hours  ferrous    sulfate 325milliGRAM(s) Oral daily  ascorbic acid 500milliGRAM(s) Oral daily  valsartan 160milliGRAM(s) Oral daily  epoetin raina Injectable 21961Gtgf(s) IV Push <User Schedule>  folic acid 1milliGRAM(s) Oral daily  cefTAZidime  IVPB 2Gram(s) IV Intermittent <User Schedule>    MEDICATIONS  (PRN):  dextrose Gel 1Dose(s) Oral once PRN Blood Glucose LESS THAN 70 milliGRAM(s)/deciliter  glucagon  Injectable 1milliGRAM(s) IntraMuscular once PRN Glucose LESS THAN 70 milligrams/deciliter  sodium chloride 0.65% Nasal 1Spray(s) Both Nostrils three times a day PRN Dry Nostrils  hydrALAZINE Injectable 10milliGRAM(s) IV Push every 6 hours PRN SBP over 150  HYDROcodone/homatropine Syrup 5milliLiter(s) Oral every 6 hours PRN Cough      Allergies    No Known Allergies    Intolerances          Vital Signs Last 24 Hrs  T(C): 36.6, Max: 38 ( @ 18:58)  T(F): 97.9, Max: 100.4 (04-12 @ 18:58)  HR: 91 (67 - 117)  BP: 138/78 (138/78 - 176/67)  BP(mean): --  RR: 18 (18 - 20)  SpO2: 95% (90% - 96%)  Daily     Daily Weight in k (2017 18:05)  I&O's Detail    I & Os for current day (as of 2017 12:52)  =============================================  IN:    Oral Fluid: 940 ml    Total IN: 940 ml  ---------------------------------------------  OUT:    Other: 2500 ml    Voided: 50 ml    Total OUT: 2550 ml  ---------------------------------------------  Total NET: -1610 ml    I&O's Summary    I & Os for current day (as of 2017 12:52)  =============================================  IN: 940 ml / OUT: 2550 ml / NET: -1610 ml      PHYSICAL EXAM:  HEAD:  NCAT  EYES: EOMI  NECK: Supple, No JVD  NERVOUS SYSTEM:  Alert & Oriented X3  CHEST/LUNG: Diminished BS at bases;   HEART: Regular rate and rhythm; No rub  ABDOMEN: Soft, Nontender, Nondistended  EXTREMITIES:  2+ Peripheral Pulses, +edema    LABS:                        8.4    6.1   )-----------( 310      ( 2017 15:13 )             26.1     04-12    133<L>  |  93<L>  |  49.0<H>  ----------------------------<  153<H>  4.9   |  25.0  |  6.96<H>    Ca    8.8      2017 15:13  Phos  5.0       Mg     2.1         TPro  7.9  /  Alb  3.1<L>  /  TBili  0.5  /  DBili  x   /  AST  15  /  ALT  17  /  AlkPhos  106        Urinalysis Basic - ( 2017 21:56 )    Color: Yellow / Appearance: Clear / S.010 / pH: x  Gluc: x / Ketone: Negative  / Bili: Negative / Urobili: Negative mg/dL   Blood: x / Protein: 500 mg/dL / Nitrite: Negative   Leuk Esterase: Trace / RBC: 3-5 /HPF / WBC 26-50   Sq Epi: x / Non Sq Epi: Many / Bacteria: x      Magnesium, Serum: 2.1 mg/dL ( @ 15:13)  Phosphorus Level, Serum: 5.0 mg/dL ( @ 15:13)          RADIOLOGY & ADDITIONAL TESTS:

## 2017-04-15 LAB
-  AMPICILLIN: SIGNIFICANT CHANGE UP
-  CIPROFLOXACIN: SIGNIFICANT CHANGE UP
-  NITROFURANTOIN: SIGNIFICANT CHANGE UP
-  TETRACYCLINE: SIGNIFICANT CHANGE UP
-  VANCOMYCIN: SIGNIFICANT CHANGE UP
CULTURE RESULTS: SIGNIFICANT CHANGE UP
METHOD TYPE: SIGNIFICANT CHANGE UP
ORGANISM # SPEC MICROSCOPIC CNT: SIGNIFICANT CHANGE UP
ORGANISM # SPEC MICROSCOPIC CNT: SIGNIFICANT CHANGE UP
SPECIMEN SOURCE: SIGNIFICANT CHANGE UP

## 2017-05-01 ENCOUNTER — APPOINTMENT (OUTPATIENT)
Dept: VASCULAR SURGERY | Facility: CLINIC | Age: 37
End: 2017-05-01

## 2017-05-01 VITALS
RESPIRATION RATE: 16 BRPM | OXYGEN SATURATION: 95 % | DIASTOLIC BLOOD PRESSURE: 91 MMHG | TEMPERATURE: 97.5 F | WEIGHT: 279 LBS | SYSTOLIC BLOOD PRESSURE: 160 MMHG | BODY MASS INDEX: 51.34 KG/M2 | HEART RATE: 90 BPM | HEIGHT: 62 IN

## 2017-05-02 ENCOUNTER — OUTPATIENT (OUTPATIENT)
Dept: OUTPATIENT SERVICES | Facility: HOSPITAL | Age: 37
LOS: 1 days | Discharge: HOME | End: 2017-05-02
Payer: MEDICARE

## 2017-05-02 DIAGNOSIS — N18.6 END STAGE RENAL DISEASE: ICD-10-CM

## 2017-05-02 DIAGNOSIS — Z99.2 DEPENDENCE ON RENAL DIALYSIS: ICD-10-CM

## 2017-05-02 DIAGNOSIS — E11.9 TYPE 2 DIABETES MELLITUS WITHOUT COMPLICATIONS: ICD-10-CM

## 2017-05-02 DIAGNOSIS — I10 ESSENTIAL (PRIMARY) HYPERTENSION: ICD-10-CM

## 2017-05-02 PROCEDURE — 36556 INSERT NON-TUNNEL CV CATH: CPT

## 2017-05-02 PROCEDURE — C1887: CPT

## 2017-05-02 PROCEDURE — 36581 REPLACE TUNNELED CV CATH: CPT | Mod: 79,RT

## 2017-05-02 PROCEDURE — 77002 NEEDLE LOCALIZATION BY XRAY: CPT

## 2017-05-02 PROCEDURE — C1750: CPT

## 2017-05-02 PROCEDURE — C1894: CPT

## 2017-05-10 ENCOUNTER — TRANSCRIPTION ENCOUNTER (OUTPATIENT)
Age: 37
End: 2017-05-10

## 2017-05-29 ENCOUNTER — CHART COPY (OUTPATIENT)
Age: 37
End: 2017-05-29

## 2017-05-29 DIAGNOSIS — Z86.39 PERSONAL HISTORY OF OTHER ENDOCRINE, NUTRITIONAL AND METABOLIC DISEASE: ICD-10-CM

## 2017-05-29 DIAGNOSIS — Z83.3 FAMILY HISTORY OF DIABETES MELLITUS: ICD-10-CM

## 2017-05-29 DIAGNOSIS — Z87.01 PERSONAL HISTORY OF PNEUMONIA (RECURRENT): ICD-10-CM

## 2017-05-29 DIAGNOSIS — Z86.79 PERSONAL HISTORY OF OTHER DISEASES OF THE CIRCULATORY SYSTEM: ICD-10-CM

## 2017-06-02 ENCOUNTER — APPOINTMENT (OUTPATIENT)
Dept: PULMONOLOGY | Facility: CLINIC | Age: 37
End: 2017-06-02

## 2017-06-02 VITALS
HEART RATE: 94 BPM | BODY MASS INDEX: 50.24 KG/M2 | DIASTOLIC BLOOD PRESSURE: 76 MMHG | SYSTOLIC BLOOD PRESSURE: 150 MMHG | WEIGHT: 273 LBS | OXYGEN SATURATION: 92 % | HEIGHT: 62 IN

## 2017-06-02 DIAGNOSIS — Z87.891 PERSONAL HISTORY OF NICOTINE DEPENDENCE: ICD-10-CM

## 2017-06-02 RX ORDER — METOPROLOL SUCCINATE 100 MG/1
100 TABLET, EXTENDED RELEASE ORAL
Qty: 30 | Refills: 0 | Status: DISCONTINUED | COMMUNITY
Start: 2017-02-06 | End: 2017-06-02

## 2017-06-02 RX ORDER — CALCIUM ACETATE 667 MG/1
667 CAPSULE ORAL
Qty: 180 | Refills: 0 | Status: DISCONTINUED | COMMUNITY
Start: 2017-02-15 | End: 2017-06-02

## 2017-06-05 ENCOUNTER — APPOINTMENT (OUTPATIENT)
Dept: VASCULAR SURGERY | Facility: CLINIC | Age: 37
End: 2017-06-05

## 2017-06-05 VITALS
BODY MASS INDEX: 50.24 KG/M2 | DIASTOLIC BLOOD PRESSURE: 83 MMHG | WEIGHT: 273 LBS | SYSTOLIC BLOOD PRESSURE: 144 MMHG | HEIGHT: 62 IN | OXYGEN SATURATION: 91 % | TEMPERATURE: 98 F | HEART RATE: 80 BPM | RESPIRATION RATE: 16 BRPM

## 2017-06-06 NOTE — H&P ADULT. - PROBLEM/PLAN-1
Visit Information Date & Time Provider Department Dept. Phone Encounter #  
 6/6/2017  3:40 PM Robert Ambrosio MD [facility] [place] [phone] [id number] Follow-up Instructions Return in about 6 months (around 12/6/2017). Follow-up and Disposition History Your Appointments 12/12/2017  4:00 PM  
ESTABLISHED PATIENT with Robert Ambrosio MD  
[facility] (JIM SCHEDULING) Appt Note: 6 mo fup  
 [address] [facility] [address] [facility]  
[address] [facility] [address] [address] Upcoming Health Maintenance Date Due Hepatitis C Screening 1960 DTaP/Tdap/Td series (1 - Tdap) 7/12/1981 FOBT Q 1 YEAR AGE 50-75 7/12/2010 PAP AKA CERVICAL CYTOLOGY 11/13/2012 INFLUENZA AGE 9 TO ADULT 8/1/2017 BREAST CANCER SCRN MAMMOGRAM 10/25/2018 Allergies as of 6/6/2017  Review Complete On: 6/6/2017 By: Robert Ambrosio MD  
  
 Severity Noted Reaction Type Reactions Contrast Agent [Iodine] High 04/23/2015   Not Verified Other (comments) Happened as a child but was sent to hospital for treatment. Current Immunizations  Reviewed on 5/3/2015 No immunizations on file. Not reviewed this visit You Were Diagnosed With   
  
 Codes Comments Family history of carotid artery stenosis    -  Primary ICD-10-CM: Z82.49 
ICD-9-CM: V17.49 Ascending aortic dissection (HCC)     ICD-10-CM: I71.01 
ICD-9-CM: 441.01 Hypercholesterolemia     ICD-10-CM: E78.00 ICD-9-CM: 272.0 Vitals BP Pulse Resp Height(growth percentile) Weight(growth percentile) SpO2  
 112/68 (BP 1 Location: Right arm, BP Patient Position: Sitting) 72 18 5' 1\" (1.549 m) 182 lb 6.4 oz (82.7 kg) 97% BMI OB Status Smoking Status 34.46 kg/m2 Hysterectomy Never Smoker Vitals History BMI and BSA Data Body Mass Index Body Surface Area 34.46 kg/m 2 1.89 m 2 Preferred Pharmacy Pharmacy Name Phone [id number] [facility] [phone] Your Updated Medication List  
  
   
This list is accurate as of: 6/6/17  4:24 PM.  Always use your most recent med list.  
  
  
  
  
 aspirin delayed-release 81 mg tablet Take  by mouth daily. b complex vitamins tablet Take 1 Tab by mouth daily. bromelains 500 mg Tab Take  by mouth. CO Q-10 100 mg capsule Generic drug:  co-enzyme Q-10 Take 100 mg by mouth daily. CYMBALTA 60 mg capsule Generic drug:  DULoxetine Take 60 mg by mouth daily. gabapentin 100 mg capsule Commonly known as:  NEURONTIN  
200 mg daily. ibuprofen 200 mg tablet Commonly known as:  MOTRIN Take  by mouth daily as needed for Pain. lisinopril 5 mg tablet Commonly known as:  Maicol Dance Take 1 Tab by mouth daily. metoprolol tartrate 50 mg tablet Commonly known as:  LOPRESSOR Take 1 Tab by mouth two (2) times a day. multivitamin tablet Commonly known as:  ONE A DAY Take 1 Tab by mouth daily. omeprazole 40 mg capsule Commonly known as:  PRILOSEC Take 40 mg by mouth Daily (before breakfast). pravastatin 10 mg tablet Commonly known as:  PRAVACHOL Take 10 mg by mouth nightly. VITAMIN C 500 mg tablet Generic drug:  ascorbic acid (vitamin C) Take  by mouth. VITAMIN D3 1,000 unit Cap Generic drug:  cholecalciferol Take  by mouth. We Performed the Following DUPLEX CAROTID BILATERAL [id number] CPT(R)] Follow-up Instructions Return in about 6 months (around 12/6/2017). Introducing \[facility] [place] [facility]\"" & HEALTH SERVICES! Dear Lali Keane: Thank you for requesting a [employer] account. Our records indicate that you already have an active [employer] account. You can access your account anytime at [email or web address] [employer]. [employer] Agent Did you know that you can access your hospital and ER discharge instructions at any time in [employer]? You can also review all of your test results from your hospital stay or ER visit. Additional Information If you have questions, please visit the Frequently Asked Questions section of the [employer] website at [email or web address] Remember, [employer] is NOT to be used for urgent needs. For medical emergencies, dial 911. Now available from your iPhone and Android! Please provide this summary of care documentation to your next provider. Your primary care clinician is listed as  Anila. If you have any questions after today's visit, please call [phone]. DISPLAY PLAN FREE TEXT

## 2017-06-07 RX ORDER — RENAGEL 800 MG/1
800 TABLET ORAL
Qty: 162 | Refills: 0 | Status: ACTIVE | COMMUNITY
Start: 2017-05-18

## 2017-06-07 RX ORDER — VALINE, LYSINE, HISTIDINE, ISOLEUCINE, LEUCINE, PHENYLALANINE, THREONINE, METHIONINE, TRYPTOPHAN, ALANINE, GLYCINE, ARGININE, PROLINE, GLUTAMIC ACID, SERINE, ASPARTIC ACID AND TYROSINE 1.44; 1.35; 1.18; 1.08; 1.08; 1; 980; 760; 320; 2.76; 2.06; 1.96; 1.34; 1.02; 1.02; 600; 5 G/100ML; G/100ML; G/100ML; G/100ML; G/100ML; G/100ML; MG/100ML; MG/100ML; MG/100ML; G/100ML; G/100ML; G/100ML; G/100ML; G/100ML; G/100ML; MG/100ML; MG/100ML
20 INJECTION, SOLUTION INTRAVENOUS
Qty: 4000 | Refills: 0 | Status: ACTIVE | COMMUNITY
Start: 2017-05-18

## 2017-06-12 ENCOUNTER — INPATIENT (INPATIENT)
Facility: HOSPITAL | Age: 37
LOS: 8 days | Discharge: HOME | DRG: 871 | End: 2017-06-21
Attending: HOSPITALIST | Admitting: FAMILY MEDICINE
Payer: MEDICARE

## 2017-06-12 VITALS
TEMPERATURE: 102 F | RESPIRATION RATE: 20 BRPM | HEART RATE: 127 BPM | DIASTOLIC BLOOD PRESSURE: 87 MMHG | SYSTOLIC BLOOD PRESSURE: 169 MMHG | HEIGHT: 62 IN | OXYGEN SATURATION: 74 % | WEIGHT: 272.93 LBS

## 2017-06-12 LAB
ALBUMIN SERPL ELPH-MCNC: 3.7 G/DL — SIGNIFICANT CHANGE UP (ref 3.3–5.2)
ALP SERPL-CCNC: 153 U/L — HIGH (ref 40–120)
ALT FLD-CCNC: 17 U/L — SIGNIFICANT CHANGE UP
ANION GAP SERPL CALC-SCNC: 16 MMOL/L — SIGNIFICANT CHANGE UP (ref 5–17)
AST SERPL-CCNC: 10 U/L — SIGNIFICANT CHANGE UP
BASOPHILS # BLD AUTO: 0 K/UL — SIGNIFICANT CHANGE UP (ref 0–0.2)
BASOPHILS NFR BLD AUTO: 0.1 % — SIGNIFICANT CHANGE UP (ref 0–2)
BILIRUB SERPL-MCNC: 1.1 MG/DL — SIGNIFICANT CHANGE UP (ref 0.4–2)
BUN SERPL-MCNC: 38 MG/DL — HIGH (ref 8–20)
CALCIUM SERPL-MCNC: 8.9 MG/DL — SIGNIFICANT CHANGE UP (ref 8.6–10.2)
CHLORIDE SERPL-SCNC: 92 MMOL/L — LOW (ref 98–107)
CO2 SERPL-SCNC: 27 MMOL/L — SIGNIFICANT CHANGE UP (ref 22–29)
CREAT SERPL-MCNC: 5.11 MG/DL — HIGH (ref 0.5–1.3)
EOSINOPHIL # BLD AUTO: 0 K/UL — SIGNIFICANT CHANGE UP (ref 0–0.5)
EOSINOPHIL NFR BLD AUTO: 0.3 % — SIGNIFICANT CHANGE UP (ref 0–6)
GLUCOSE SERPL-MCNC: 140 MG/DL — HIGH (ref 70–115)
HCT VFR BLD CALC: 29.7 % — LOW (ref 37–47)
HGB BLD-MCNC: 9.4 G/DL — LOW (ref 12–16)
LACTATE BLDV-MCNC: 1.6 MMOL/L — SIGNIFICANT CHANGE UP (ref 0.5–2)
LYMPHOCYTES # BLD AUTO: 1.3 K/UL — SIGNIFICANT CHANGE UP (ref 1–4.8)
LYMPHOCYTES # BLD AUTO: 8 % — LOW (ref 20–55)
MAGNESIUM SERPL-MCNC: 2 MG/DL — SIGNIFICANT CHANGE UP (ref 1.6–2.6)
MCHC RBC-ENTMCNC: 25.1 PG — LOW (ref 27–31)
MCHC RBC-ENTMCNC: 31.6 G/DL — LOW (ref 32–36)
MCV RBC AUTO: 79.4 FL — LOW (ref 81–99)
MONOCYTES # BLD AUTO: 0.7 K/UL — SIGNIFICANT CHANGE UP (ref 0–0.8)
MONOCYTES NFR BLD AUTO: 4.1 % — SIGNIFICANT CHANGE UP (ref 3–10)
NEUTROPHILS # BLD AUTO: 14.3 K/UL — HIGH (ref 1.8–8)
NEUTROPHILS NFR BLD AUTO: 87.1 % — HIGH (ref 37–73)
PHOSPHATE SERPL-MCNC: 2.5 MG/DL — SIGNIFICANT CHANGE UP (ref 2.4–4.7)
PLATELET # BLD AUTO: 225 K/UL — SIGNIFICANT CHANGE UP (ref 150–400)
POTASSIUM SERPL-MCNC: 3.3 MMOL/L — LOW (ref 3.5–5.3)
POTASSIUM SERPL-SCNC: 3.3 MMOL/L — LOW (ref 3.5–5.3)
PROT SERPL-MCNC: 8.6 G/DL — SIGNIFICANT CHANGE UP (ref 6.6–8.7)
RBC # BLD: 3.74 M/UL — LOW (ref 4.4–5.2)
RBC # FLD: 18 % — HIGH (ref 11–15.6)
SODIUM SERPL-SCNC: 135 MMOL/L — SIGNIFICANT CHANGE UP (ref 135–145)
WBC # BLD: 16.4 K/UL — HIGH (ref 4.8–10.8)
WBC # FLD AUTO: 16.4 K/UL — HIGH (ref 4.8–10.8)

## 2017-06-12 PROCEDURE — 99285 EMERGENCY DEPT VISIT HI MDM: CPT

## 2017-06-12 PROCEDURE — 93010 ELECTROCARDIOGRAM REPORT: CPT

## 2017-06-12 PROCEDURE — 71010: CPT | Mod: 26

## 2017-06-12 RX ORDER — SODIUM CHLORIDE 9 MG/ML
500 INJECTION INTRAMUSCULAR; INTRAVENOUS; SUBCUTANEOUS
Qty: 0 | Refills: 0 | Status: COMPLETED | OUTPATIENT
Start: 2017-06-12 | End: 2017-06-12

## 2017-06-12 RX ORDER — VANCOMYCIN HCL 1 G
1000 VIAL (EA) INTRAVENOUS ONCE
Qty: 0 | Refills: 0 | Status: COMPLETED | OUTPATIENT
Start: 2017-06-12 | End: 2017-06-12

## 2017-06-12 RX ORDER — CEFEPIME 1 G/1
1000 INJECTION, POWDER, FOR SOLUTION INTRAMUSCULAR; INTRAVENOUS EVERY 12 HOURS
Qty: 0 | Refills: 0 | Status: DISCONTINUED | OUTPATIENT
Start: 2017-06-12 | End: 2017-06-12

## 2017-06-12 RX ORDER — CEFTRIAXONE 500 MG/1
1 INJECTION, POWDER, FOR SOLUTION INTRAMUSCULAR; INTRAVENOUS EVERY 24 HOURS
Qty: 0 | Refills: 0 | Status: DISCONTINUED | OUTPATIENT
Start: 2017-06-12 | End: 2017-06-13

## 2017-06-12 RX ORDER — HEPARIN SODIUM 5000 [USP'U]/ML
5000 INJECTION INTRAVENOUS; SUBCUTANEOUS EVERY 8 HOURS
Qty: 0 | Refills: 0 | Status: DISCONTINUED | OUTPATIENT
Start: 2017-06-12 | End: 2017-06-21

## 2017-06-12 RX ORDER — SODIUM CHLORIDE 9 MG/ML
3 INJECTION INTRAMUSCULAR; INTRAVENOUS; SUBCUTANEOUS ONCE
Qty: 0 | Refills: 0 | Status: COMPLETED | OUTPATIENT
Start: 2017-06-12 | End: 2017-06-12

## 2017-06-12 RX ORDER — ACETAMINOPHEN 500 MG
975 TABLET ORAL ONCE
Qty: 0 | Refills: 0 | Status: COMPLETED | OUTPATIENT
Start: 2017-06-12 | End: 2017-06-12

## 2017-06-12 RX ORDER — LACTOBACILLUS ACIDOPHILUS 100MM CELL
1 CAPSULE ORAL
Qty: 0 | Refills: 0 | Status: DISCONTINUED | OUTPATIENT
Start: 2017-06-12 | End: 2017-06-13

## 2017-06-12 RX ADMIN — Medication 975 MILLIGRAM(S): at 20:38

## 2017-06-12 RX ADMIN — SODIUM CHLORIDE 2000 MILLILITER(S): 9 INJECTION INTRAMUSCULAR; INTRAVENOUS; SUBCUTANEOUS at 21:17

## 2017-06-12 RX ADMIN — SODIUM CHLORIDE 3 MILLILITER(S): 9 INJECTION INTRAMUSCULAR; INTRAVENOUS; SUBCUTANEOUS at 21:17

## 2017-06-12 RX ADMIN — Medication 250 MILLIGRAM(S): at 21:17

## 2017-06-12 RX ADMIN — SODIUM CHLORIDE 2000 MILLILITER(S): 9 INJECTION INTRAMUSCULAR; INTRAVENOUS; SUBCUTANEOUS at 23:02

## 2017-06-12 RX ADMIN — CEFEPIME 100 MILLIGRAM(S): 1 INJECTION, POWDER, FOR SOLUTION INTRAMUSCULAR; INTRAVENOUS at 23:00

## 2017-06-12 RX ADMIN — SODIUM CHLORIDE 2000 MILLILITER(S): 9 INJECTION INTRAMUSCULAR; INTRAVENOUS; SUBCUTANEOUS at 21:44

## 2017-06-12 NOTE — ED PROVIDER NOTE - OBJECTIVE STATEMENT
A 37 year old female with a hx of ESRD, on dialysis MWF presents to the ED c/o SOB. The pt has been experiencing SOB since this morning and went to her dialysis treatment. She states that the SOB did not subside during and after dialysis and that she also began to experience tachycardia. Pt was sent to the ED from her dialysis center. She denies any N/V/D and reports a productive cough for the past few 2 days. Pt has been on 2 L of O2 NC since April secondary to pneumonia and was supposed to come off of the oxygen in the near future. She denies any recent sick contacts. Pt does not have a PMD at this time. No further complaints at this time.

## 2017-06-12 NOTE — ED PROVIDER NOTE - NS ED MD SCRIBE ATTENDING SCRIBE SECTIONS
HISTORY OF PRESENT ILLNESS/RESULTS/HIV/PAST MEDICAL/SURGICAL/SOCIAL HISTORY/DISPOSITION/VITAL SIGNS( Pullset)/PHYSICAL EXAM/REVIEW OF SYSTEMS

## 2017-06-12 NOTE — ED PROVIDER NOTE - RESPIRATORY, MLM
Diminished breath sounds right lower lobe. Breath sounds clear and equal bilaterally. Breath sounds clear and equal bilaterally. non-productive cough

## 2017-06-13 DIAGNOSIS — R65.10 SYSTEMIC INFLAMMATORY RESPONSE SYNDROME (SIRS) OF NON-INFECTIOUS ORIGIN WITHOUT ACUTE ORGAN DYSFUNCTION: ICD-10-CM

## 2017-06-13 DIAGNOSIS — I15.0 RENOVASCULAR HYPERTENSION: ICD-10-CM

## 2017-06-13 DIAGNOSIS — A41.9 SEPSIS, UNSPECIFIED ORGANISM: ICD-10-CM

## 2017-06-13 DIAGNOSIS — E11.22 TYPE 2 DIABETES MELLITUS WITH DIABETIC CHRONIC KIDNEY DISEASE: ICD-10-CM

## 2017-06-13 DIAGNOSIS — J18.9 PNEUMONIA, UNSPECIFIED ORGANISM: ICD-10-CM

## 2017-06-13 DIAGNOSIS — Z99.2 DEPENDENCE ON RENAL DIALYSIS: Chronic | ICD-10-CM

## 2017-06-13 DIAGNOSIS — R09.02 HYPOXEMIA: ICD-10-CM

## 2017-06-13 DIAGNOSIS — I10 ESSENTIAL (PRIMARY) HYPERTENSION: ICD-10-CM

## 2017-06-13 DIAGNOSIS — N18.6 END STAGE RENAL DISEASE: ICD-10-CM

## 2017-06-13 LAB
ANION GAP SERPL CALC-SCNC: 17 MMOL/L — SIGNIFICANT CHANGE UP (ref 5–17)
APPEARANCE UR: CLEAR — SIGNIFICANT CHANGE UP
BASOPHILS # BLD AUTO: 0 K/UL — SIGNIFICANT CHANGE UP (ref 0–0.2)
BASOPHILS NFR BLD AUTO: 0.2 % — SIGNIFICANT CHANGE UP (ref 0–2)
BILIRUB UR-MCNC: NEGATIVE — SIGNIFICANT CHANGE UP
BUN SERPL-MCNC: 44 MG/DL — HIGH (ref 8–20)
CALCIUM SERPL-MCNC: 8.4 MG/DL — LOW (ref 8.6–10.2)
CHLORIDE SERPL-SCNC: 93 MMOL/L — LOW (ref 98–107)
CO2 SERPL-SCNC: 25 MMOL/L — SIGNIFICANT CHANGE UP (ref 22–29)
COLOR SPEC: YELLOW — SIGNIFICANT CHANGE UP
CREAT SERPL-MCNC: 5.78 MG/DL — HIGH (ref 0.5–1.3)
DIFF PNL FLD: ABNORMAL
EOSINOPHIL # BLD AUTO: 0.1 K/UL — SIGNIFICANT CHANGE UP (ref 0–0.5)
EOSINOPHIL NFR BLD AUTO: 1.2 % — SIGNIFICANT CHANGE UP (ref 0–6)
GLUCOSE SERPL-MCNC: 135 MG/DL — HIGH (ref 70–115)
GLUCOSE UR QL: 250 MG/DL
HCT VFR BLD CALC: 26.7 % — LOW (ref 37–47)
HGB BLD-MCNC: 8.4 G/DL — LOW (ref 12–16)
KETONES UR-MCNC: NEGATIVE — SIGNIFICANT CHANGE UP
LACTATE BLDV-MCNC: 0.9 MMOL/L — SIGNIFICANT CHANGE UP (ref 0.5–2)
LEUKOCYTE ESTERASE UR-ACNC: NEGATIVE — SIGNIFICANT CHANGE UP
LYMPHOCYTES # BLD AUTO: 1.3 K/UL — SIGNIFICANT CHANGE UP (ref 1–4.8)
LYMPHOCYTES # BLD AUTO: 10.7 % — LOW (ref 20–55)
MCHC RBC-ENTMCNC: 25.1 PG — LOW (ref 27–31)
MCHC RBC-ENTMCNC: 31.5 G/DL — LOW (ref 32–36)
MCV RBC AUTO: 79.9 FL — LOW (ref 81–99)
MONOCYTES # BLD AUTO: 0.6 K/UL — SIGNIFICANT CHANGE UP (ref 0–0.8)
MONOCYTES NFR BLD AUTO: 5.2 % — SIGNIFICANT CHANGE UP (ref 3–10)
NEUTROPHILS # BLD AUTO: 9.7 K/UL — HIGH (ref 1.8–8)
NEUTROPHILS NFR BLD AUTO: 82.3 % — HIGH (ref 37–73)
NITRITE UR-MCNC: NEGATIVE — SIGNIFICANT CHANGE UP
PH UR: 8 — SIGNIFICANT CHANGE UP (ref 5–8)
PLATELET # BLD AUTO: 225 K/UL — SIGNIFICANT CHANGE UP (ref 150–400)
POTASSIUM SERPL-MCNC: 3.8 MMOL/L — SIGNIFICANT CHANGE UP (ref 3.5–5.3)
POTASSIUM SERPL-SCNC: 3.8 MMOL/L — SIGNIFICANT CHANGE UP (ref 3.5–5.3)
PROT UR-MCNC: 500 MG/DL
RAPID RVP RESULT: SIGNIFICANT CHANGE UP
RBC # BLD: 3.34 M/UL — LOW (ref 4.4–5.2)
RBC # FLD: 18.2 % — HIGH (ref 11–15.6)
SODIUM SERPL-SCNC: 135 MMOL/L — SIGNIFICANT CHANGE UP (ref 135–145)
SP GR SPEC: 1.01 — SIGNIFICANT CHANGE UP (ref 1.01–1.02)
UROBILINOGEN FLD QL: NEGATIVE MG/DL — SIGNIFICANT CHANGE UP
WBC # BLD: 11.8 K/UL — HIGH (ref 4.8–10.8)
WBC # FLD AUTO: 11.8 K/UL — HIGH (ref 4.8–10.8)

## 2017-06-13 PROCEDURE — 99223 1ST HOSP IP/OBS HIGH 75: CPT

## 2017-06-13 PROCEDURE — 71250 CT THORAX DX C-: CPT | Mod: 26

## 2017-06-13 PROCEDURE — 12345: CPT | Mod: NC

## 2017-06-13 RX ORDER — INSULIN LISPRO 100/ML
2 VIAL (ML) SUBCUTANEOUS
Qty: 0 | Refills: 0 | Status: DISCONTINUED | OUTPATIENT
Start: 2017-06-13 | End: 2017-06-18

## 2017-06-13 RX ORDER — CALCIUM ACETATE 667 MG
667 TABLET ORAL
Qty: 0 | Refills: 0 | Status: DISCONTINUED | OUTPATIENT
Start: 2017-06-13 | End: 2017-06-21

## 2017-06-13 RX ORDER — DEXTROSE 50 % IN WATER 50 %
1 SYRINGE (ML) INTRAVENOUS ONCE
Qty: 0 | Refills: 0 | Status: DISCONTINUED | OUTPATIENT
Start: 2017-06-13 | End: 2017-06-21

## 2017-06-13 RX ORDER — DEXTROSE 50 % IN WATER 50 %
25 SYRINGE (ML) INTRAVENOUS ONCE
Qty: 0 | Refills: 0 | Status: DISCONTINUED | OUTPATIENT
Start: 2017-06-13 | End: 2017-06-21

## 2017-06-13 RX ORDER — VALSARTAN 80 MG/1
160 TABLET ORAL DAILY
Qty: 0 | Refills: 0 | Status: DISCONTINUED | OUTPATIENT
Start: 2017-06-13 | End: 2017-06-21

## 2017-06-13 RX ORDER — DEXTROSE 50 % IN WATER 50 %
12.5 SYRINGE (ML) INTRAVENOUS ONCE
Qty: 0 | Refills: 0 | Status: DISCONTINUED | OUTPATIENT
Start: 2017-06-13 | End: 2017-06-21

## 2017-06-13 RX ORDER — AMLODIPINE BESYLATE 2.5 MG/1
10 TABLET ORAL DAILY
Qty: 0 | Refills: 0 | Status: DISCONTINUED | OUTPATIENT
Start: 2017-06-13 | End: 2017-06-21

## 2017-06-13 RX ORDER — CEFEPIME 1 G/1
1000 INJECTION, POWDER, FOR SOLUTION INTRAMUSCULAR; INTRAVENOUS EVERY 24 HOURS
Qty: 0 | Refills: 0 | Status: DISCONTINUED | OUTPATIENT
Start: 2017-06-13 | End: 2017-06-20

## 2017-06-13 RX ORDER — GLUCAGON INJECTION, SOLUTION 0.5 MG/.1ML
1 INJECTION, SOLUTION SUBCUTANEOUS ONCE
Qty: 0 | Refills: 0 | Status: DISCONTINUED | OUTPATIENT
Start: 2017-06-13 | End: 2017-06-21

## 2017-06-13 RX ORDER — INSULIN LISPRO 100/ML
VIAL (ML) SUBCUTANEOUS
Qty: 0 | Refills: 0 | Status: DISCONTINUED | OUTPATIENT
Start: 2017-06-13 | End: 2017-06-21

## 2017-06-13 RX ORDER — SODIUM CHLORIDE 9 MG/ML
1000 INJECTION, SOLUTION INTRAVENOUS
Qty: 0 | Refills: 0 | Status: DISCONTINUED | OUTPATIENT
Start: 2017-06-13 | End: 2017-06-21

## 2017-06-13 RX ORDER — ASPIRIN/CALCIUM CARB/MAGNESIUM 324 MG
81 TABLET ORAL DAILY
Qty: 0 | Refills: 0 | Status: DISCONTINUED | OUTPATIENT
Start: 2017-06-13 | End: 2017-06-21

## 2017-06-13 RX ORDER — CALCITRIOL 0.5 UG/1
0.25 CAPSULE ORAL DAILY
Qty: 0 | Refills: 0 | Status: DISCONTINUED | OUTPATIENT
Start: 2017-06-13 | End: 2017-06-21

## 2017-06-13 RX ORDER — FOLIC ACID 0.8 MG
1 TABLET ORAL DAILY
Qty: 0 | Refills: 0 | Status: DISCONTINUED | OUTPATIENT
Start: 2017-06-13 | End: 2017-06-21

## 2017-06-13 RX ORDER — SEVELAMER CARBONATE 2400 MG/1
800 POWDER, FOR SUSPENSION ORAL
Qty: 0 | Refills: 0 | Status: DISCONTINUED | OUTPATIENT
Start: 2017-06-13 | End: 2017-06-21

## 2017-06-13 RX ORDER — ERGOCALCIFEROL 1.25 MG/1
50000 CAPSULE ORAL
Qty: 0 | Refills: 0 | Status: DISCONTINUED | OUTPATIENT
Start: 2017-06-13 | End: 2017-06-21

## 2017-06-13 RX ADMIN — SEVELAMER CARBONATE 800 MILLIGRAM(S): 2400 POWDER, FOR SUSPENSION ORAL at 18:12

## 2017-06-13 RX ADMIN — CEFTRIAXONE 100 GRAM(S): 500 INJECTION, POWDER, FOR SOLUTION INTRAMUSCULAR; INTRAVENOUS at 01:45

## 2017-06-13 RX ADMIN — Medication 2 UNIT(S): at 18:12

## 2017-06-13 RX ADMIN — SEVELAMER CARBONATE 800 MILLIGRAM(S): 2400 POWDER, FOR SUSPENSION ORAL at 08:46

## 2017-06-13 RX ADMIN — VALSARTAN 160 MILLIGRAM(S): 80 TABLET ORAL at 05:49

## 2017-06-13 RX ADMIN — Medication 81 MILLIGRAM(S): at 11:47

## 2017-06-13 RX ADMIN — SEVELAMER CARBONATE 800 MILLIGRAM(S): 2400 POWDER, FOR SUSPENSION ORAL at 11:47

## 2017-06-13 RX ADMIN — Medication 2: at 21:42

## 2017-06-13 RX ADMIN — Medication 2 UNIT(S): at 11:47

## 2017-06-13 RX ADMIN — AMLODIPINE BESYLATE 10 MILLIGRAM(S): 2.5 TABLET ORAL at 05:49

## 2017-06-13 RX ADMIN — CALCITRIOL 0.25 MICROGRAM(S): 0.5 CAPSULE ORAL at 11:47

## 2017-06-13 RX ADMIN — Medication 2 UNIT(S): at 08:47

## 2017-06-13 RX ADMIN — ERGOCALCIFEROL 50000 UNIT(S): 1.25 CAPSULE ORAL at 11:47

## 2017-06-13 RX ADMIN — Medication 1 MILLIGRAM(S): at 11:47

## 2017-06-13 NOTE — PROGRESS NOTE ADULT - SUBJECTIVE AND OBJECTIVE BOX
SORAYA JIMENEZ     Chief Complaint: Patient is a 37y old  Female who presents with a chief complaint of Cough (2017 00:27)      HPI: 36 y/o female with ESRD diagnosed in  s/p left UE AVF, right chest wall permacath, s/p renal biopsy, HTN, DM, obesity, Dcd in early may after admission for Multi-focal PNA s/p thoracocentesis with pleural fluid showing serratia marcescens. Patient completed Abx on  and has been doing will until 2 days ago she started having cough with white sputum. No sick contacts or travel since DC. She was coughing and feeling ill during HD yesterday and was sent to ER where she was noted to have fever, leukocytosis, tachycardia and CXR with new right LL infiltrates when compared to prior. No c/o pleurisy or hemoptysis. Patient with HCAP/sepsis and will be admitted for further management.        Pt assessed today in ERHR. Pt is in NAD at time of exam, sitting up in bed. Pt reports a productive cough for past few days w clear sputum. Pt states she had been feeling better since last discharge in May, then started to feel ill again past couple of days. Pt reports that she was supposed to have a procedure to AV fistula but has not been able to do so due to she had been hospitalized w PNA. Pt denies weakness, hemoptysis, chills/sweats, urinary symptoms     PAST MEDICAL & SURGICAL HISTORY:  End stage renal disease  Hypertension  Diabetes  Vascular dialysis catheter in place  No significant past surgical history      MEDICATIONS  (STANDING):  heparin  Injectable 5000Unit(s) SubCutaneous every 8 hours  aspirin enteric coated 81milliGRAM(s) Oral daily  valsartan 160milliGRAM(s) Oral daily  calcium acetate 667milliGRAM(s) Oral three times a day with meals  sevelamer hydrochloride 800milliGRAM(s) Oral three times a day with meals  amLODIPine   Tablet 10milliGRAM(s) Oral daily  calcitriol   Capsule 0.25MICROGram(s) Oral daily  folic acid 1milliGRAM(s) Oral daily  ergocalciferol [id number](s) Oral every week  insulin lispro Injectable (HumaLOG) 2Unit(s) SubCutaneous before breakfast  insulin lispro Injectable (HumaLOG) 2Unit(s) SubCutaneous before lunch  insulin lispro Injectable (HumaLOG) 2Unit(s) SubCutaneous before dinner  insulin lispro (HumaLOG) corrective regimen sliding scale  SubCutaneous Before meals and at bedtime  dextrose 5%. 1000milliLiter(s) IV Continuous <Continuous>  dextrose 50% Injectable 12.5Gram(s) IV Push once  dextrose 50% Injectable 25Gram(s) IV Push once  dextrose 50% Injectable 25Gram(s) IV Push once  cefepime  IVPB 1000milliGRAM(s) IV Intermittent every 24 hours      Allergies: No Known Allergies      REVIEW OF SYSTEMS:    CONSTITUTIONAL: No fever  ENMT:  No difficulty hearing, tinnitus, vertigo; No sinus or throat pain  NECK: No pain or stiffness  RESPIRATORY: +CECY cough, +CECY sob, no hemoptysis  CARDIOVASCULAR: No chest pain, palpitations, dizziness, or leg swelling  GASTROINTESTINAL: No abdominal or epigastric pain. No nausea, vomiting, or hematemesis; No diarrhea or constipation. No melena or hematochezia.  GENITOURINARY: No dysuria, frequency, hematuria, or incontinence  NEUROLOGICAL: No headaches, memory loss, loss of strength, numbness, or tremors  SKIN: No itching, burning, rashes, or lesions   MUSCULOSKELETAL: No joint pain or swelling; No muscle, back, or extremity pain  PSYCHIATRIC: No depression, anxiety, mood swings, or difficulty sleeping    Vital Signs Last 24 Hrs  T(C): 36.9, Max: 38.8 ( @ 20:02)  T(F): 98.5, Max: 101.9 ( @ 20:02)  HR: 103 (103 - 127)  BP: 175/107 (169/87 - 181/98)  BP(mean): 114 (114 - 114)  RR: 20 (20 - 22)  SpO2: 93% (74% - 93%)        CAPILLARY BLOOD GLUCOSE  140 (2017 11:46)  132 (2017 07:58)    LABS:                        8.4    11.8  )-----------( 225      ( 2017 09:28 )             26.7     06-    135  |  93<L>  |  44.0<H>  ----------------------------<  135<H>  3.8   |  25.0  |  5.78<H>    Ca    8.4<L>      2017 09:28  Phos  2.5     06-12  Mg     2.0     06-12    TPro  8.6  /  Alb  3.7  /  TBili  1.1  /  DBili  x   /  AST  10  /  ALT  17  /  AlkPhos  153<H>  06-12      Urinalysis Basic - ( 2017 02:07 )    Color: Yellow / Appearance: Clear / S.010 / pH: x  Gluc: x / Ketone: Negative  / Bili: Negative / Urobili: Negative mg/dL   Blood: x / Protein: 500 mg/dL / Nitrite: Negative   Leuk Esterase: Negative / RBC: 0-2 /HPF / WBC Negative   Sq Epi: x / Non Sq Epi: Moderate / Bacteria: x        RADIOLOGY & ADDITIONAL TESTS:    CXR 17  IMPRESSION:   No evidence of active chest disease.          PE:   General: obese female, NAD, sitting up in bed, well groomed in nightgown  Eyes: PERRLA, EOM intact  Neck: Supple and symmetrical, no JVD  CV: RRR, no m/r/g  Lungs: HD port noted w DCD left chest wall, lungs noted w diminished breath sounds  Skin: warm, dry, no rashes  Psych: normal mood/affect  Abdomen: Normal BS, soft, NT/ND  Extremities: LUE non functioning/non-mature  AV fistula site is noted,  no edema, cyanosis  Musculoskeletal: good strength b/l UE/LE, normal ROM, no joint swelling  Neuro: A & O X 3, CN 2-12 grossly intact, no sensory or motor deficit.     Pneumonia: PNEUMONIA, UNSPECIFIED ORGANISM  No h/o HF  H/o or current diagnosis of HF- ACEI/ARB contraindication unknown  Family history of renal failure: mother had dialysis in her mid 60s  Family history of diabetes mellitus (DM)  Handoff  MEWS Score: 4 (2017 03:56)  End stage renal disease  Hypertension  Diabetes  PNA (pneumonia)  Hypoxia: Hypoxia  SIRS (systemic inflammatory response syndrome): SIRS (systemic inflammatory response syndrome)  Type 2 diabetes mellitus with chronic kidney disease on chronic dialysis, with long-term current use of insulin: Type 2 diabetes mellitus with chronic kidney disease on chronic dialysis, with long-term current use of insulin  Renovascular hypertension: Renovascular hypertension  End stage renal disease: End stage renal disease  Sepsis, due to unspecified organism: Sepsis, due to unspecified organism  HCAP (healthcare-associated pneumonia): HCAP (healthcare-associated pneumonia)  Vascular dialysis catheter in place  No significant past surgical history  HIGH HEART RATE/SOB: HIGH HEART RATE/SOB  PNA (pneumonia): R/O PNA (pneumonia)  Hypoxia: R/O Hypoxia  60  End stage renal disease  Sepsis  Hypoxia SORAYA JIMENEZ     Chief Complaint: Patient is a 37 year old  Female who presents with a chief complaint of Cough (2017 00:27)      HPI: 38 y/o female with ESRD diagnosed in  s/p left UE AVF, right chest wall permacath, s/p renal biopsy, HTN, DM, obesity, Dcd in early may after admission for Multi-focal PNA s/p thoracocentesis with pleural fluid showing serratia marcescens. Patient completed Abx on  and has been doing will until 2 days ago she started having cough with white sputum. No sick contacts or travel since DC. She was coughing and feeling ill during HD yesterday and was sent to ER where she was noted to have fever, leukocytosis, tachycardia and CXR with new right LL infiltrates when compared to prior. No c/o pleurisy or hemoptysis. Patient with HCAP/sepsis and will be admitted for further management.        Pt assessed today in ERHR. Pt is in NAD at time of exam, sitting up in bed. Pt reports a productive cough for past few days w clear sputum. Pt states she had been feeling better since last discharge in May, then started to feel ill again past couple of days. Pt reports that she was supposed to have a procedure to AV fistula but has not been able to do so due to she had been hospitalized w PNA. Pt denies weakness, hemoptysis, chills/sweats, urinary symptoms     PAST MEDICAL & SURGICAL HISTORY:  End stage renal disease  Hypertension  Diabetes  Vascular dialysis catheter in place  No significant past surgical history      MEDICATIONS  (STANDING):  heparin  Injectable 5000Unit(s) SubCutaneous every 8 hours  aspirin enteric coated 81milliGRAM(s) Oral daily  valsartan 160milliGRAM(s) Oral daily  calcium acetate 667milliGRAM(s) Oral three times a day with meals  sevelamer hydrochloride 800milliGRAM(s) Oral three times a day with meals  amLODIPine   Tablet 10milliGRAM(s) Oral daily  calcitriol   Capsule 0.25MICROGram(s) Oral daily  folic acid 1milliGRAM(s) Oral daily  ergocalciferol [id number](s) Oral every week  insulin lispro Injectable (HumaLOG) 2Unit(s) SubCutaneous before breakfast  insulin lispro Injectable (HumaLOG) 2Unit(s) SubCutaneous before lunch  insulin lispro Injectable (HumaLOG) 2Unit(s) SubCutaneous before dinner  insulin lispro (HumaLOG) corrective regimen sliding scale  SubCutaneous Before meals and at bedtime  dextrose 5%. 1000milliLiter(s) IV Continuous <Continuous>  dextrose 50% Injectable 12.5Gram(s) IV Push once  dextrose 50% Injectable 25Gram(s) IV Push once  dextrose 50% Injectable 25Gram(s) IV Push once  cefepime  IVPB 1000milliGRAM(s) IV Intermittent every 24 hours      Allergies: No Known Allergies      REVIEW OF SYSTEMS:    CONSTITUTIONAL: No fever  ENMT:  No difficulty hearing, tinnitus, vertigo; No sinus or throat pain  NECK: No pain or stiffness  RESPIRATORY: +CECY cough, +CECY sob, no hemoptysis  CARDIOVASCULAR: No chest pain, palpitations, dizziness, or leg swelling  GASTROINTESTINAL: No abdominal or epigastric pain. No nausea, vomiting, or hematemesis; No diarrhea or constipation. No melena or hematochezia.  GENITOURINARY: No dysuria, frequency, hematuria, or incontinence  NEUROLOGICAL: No headaches, memory loss, loss of strength, numbness, or tremors  SKIN: No itching, burning, rashes, or lesions   MUSCULOSKELETAL: No joint pain or swelling; No muscle, back, or extremity pain  PSYCHIATRIC: No depression, anxiety, mood swings, or difficulty sleeping    Vital Signs Last 24 Hrs  T(C): 36.9, Max: 38.8 ( @ 20:02)  T(F): 98.5, Max: 101.9 ( @ 20:02)  HR: 103 (103 - 127)  BP: 175/107 (169/87 - 181/98)  BP(mean): 114 (114 - 114)  RR: 20 (20 - 22)  SpO2: 93% (74% - 93%)        CAPILLARY BLOOD GLUCOSE  140 (2017 11:46)  132 (2017 07:58)    LABS:                        8.4    11.8  )-----------( 225      ( 2017 09:28 )             26.7     06-    135  |  93<L>  |  44.0<H>  ----------------------------<  135<H>  3.8   |  25.0  |  5.78<H>    Ca    8.4<L>      2017 09:28  Phos  2.5     06-12  Mg     2.0     06-12    TPro  8.6  /  Alb  3.7  /  TBili  1.1  /  DBili  x   /  AST  10  /  ALT  17  /  AlkPhos  153<H>  06-12      Urinalysis Basic - ( 2017 02:07 )    Color: Yellow / Appearance: Clear / S.010 / pH: x  Gluc: x / Ketone: Negative  / Bili: Negative / Urobili: Negative mg/dL   Blood: x / Protein: 500 mg/dL / Nitrite: Negative   Leuk Esterase: Negative / RBC: 0-2 /HPF / WBC Negative   Sq Epi: x / Non Sq Epi: Moderate / Bacteria: x        RADIOLOGY & ADDITIONAL TESTS:    CXR 17  IMPRESSION:   No evidence of active chest disease.          PE:   General: obese female, NAD, sitting up in bed, well groomed in nightgown  Eyes: PERRLA, EOM intact  Neck: Supple and symmetrical, no JVD  CV: RRR, no m/r/g  Lungs: HD port noted w DCD left chest wall, lungs noted w diminished breath sounds  Skin: warm, dry, no rashes  Psych: normal mood/affect  Abdomen: Normal BS, soft, NT/ND  Extremities: LUE non functioning/mature  AV fistula site is noted with pos bruit,  no edema, cyanosis  Musculoskeletal: good strength b/l UE/LE, normal ROM, no joint swelling  Neuro: A & O X 3, CN 2-12 grossly intact, no sensory or motor deficit.     Pneumonia: PNEUMONIA, UNSPECIFIED ORGANISM  No h/o HF  H/o or current diagnosis of HF- ACEI/ARB contraindication unknown  Family history of renal failure: mother had dialysis in her mid 60s  Family history of diabetes mellitus (DM)  Handoff  MEWS Score: 4 (2017 03:56)  End stage renal disease  Hypertension  Diabetes  PNA (pneumonia)  Hypoxia: Hypoxia  SIRS (systemic inflammatory response syndrome): SIRS (systemic inflammatory response syndrome)  Type 2 diabetes mellitus with chronic kidney disease on chronic dialysis, with long-term current use of insulin: Type 2 diabetes mellitus with chronic kidney disease on chronic dialysis, with long-term current use of insulin  Renovascular hypertension: Renovascular hypertension  End stage renal disease: End stage renal disease  Sepsis, due to unspecified organism: Sepsis, due to unspecified organism  HCAP (healthcare-associated pneumonia): HCAP (healthcare-associated pneumonia)  Vascular dialysis catheter in place  No significant past surgical history  HIGH HEART RATE/SOB: HIGH HEART RATE/SOB  PNA (pneumonia): R/O PNA (pneumonia)  Hypoxia: R/O Hypoxia  60  End stage renal disease  Sepsis  Hypoxia

## 2017-06-13 NOTE — H&P ADULT - HISTORY OF PRESENT ILLNESS
36 y/o female with ESRD diagnosed in 1/17 s/p left UE AVF, right chest wall permacath, s/p renal biopsy, HTN, DM, obesity, Dcd in early may after admission for Multi-focal PNA s/p thoracocentesis with pleural fluid showing serratia marcescens. Patient completed Abx on 5/2 and has been doing will until 2 days ago she started having cough with white sputum. No sick contacts or travel since DC. She was coughing and feeling ill during HD yesterday and was sent to ER where she was noted to have fever, leukocytosis, tachycardia and CXR with new right LL infiltrates when compared to prior. No c/o pleurisy or hemoptysis. Patient with HCAP/sepsis and will be admitted for further management.

## 2017-06-13 NOTE — CONSULT NOTE ADULT - ASSESSMENT
1) ESRD: HD tomorrow  - needs intervention on AVF when stable    2) ID: probable PNA  - check cultures; IV antibiotics  - consider CT chest    3) Anemia: +CRF  - NEELAM at HD; check Fe stores
36 y/o F presenting with SOB, cough, fevers

## 2017-06-13 NOTE — ED ADULT NURSE REASSESSMENT NOTE - NS ED NURSE REASSESS COMMENT FT1
Patient refused to be moved from cubicle to razo. Patient wants to speak to charge RN. Charge RN and ANM made aware.

## 2017-06-13 NOTE — H&P ADULT - ASSESSMENT
36 y/o female with HCAP, Sepsis, Hypoxia, Hx of ESRD on HD via right chest wall permacath, DM, HTN, obesity

## 2017-06-13 NOTE — CONSULT NOTE ADULT - SUBJECTIVE AND OBJECTIVE BOX
Patient is a 37y old  Female who presents with a chief complaint of Cough (2017 00:27)      HPI:  36 y/o female with ESRD , HTN, DM, obesity, Dcd in early may after admission for Multi-focal PNA s/p thoracocentesis with pleural fluid showing serratia marcescens. Patient completed Abx on  and has been doing will until 2 days ago she started having cough with white sputum. I saw patient at outpatient HD last evening and she has significant dyspnea and tachycardia and after treatment was completed she was sent to ER.  Pt being treated with IV antibiotics now for suspected infectious process, likely PNA.    ROS: (as above) +fever; no cp, n/v/d, rash, abd pain, HAs      PAST MEDICAL & SURGICAL HISTORY:  End stage renal disease  Hypertension  Diabetes  Vascular dialysis catheter in place  No significant past surgical history      FAMILY HISTORY:  Family history of renal failure: mother had dialysis in her mid 60s  Family history of diabetes mellitus (DM)      Social History:  No current tobacco; no etoh nor drug abuse    MEDICATIONS  (STANDING):  heparin  Injectable 5000Unit(s) SubCutaneous every 8 hours  cefTRIAXone   IVPB 1Gram(s) IV Intermittent every 24 hours  aspirin enteric coated 81milliGRAM(s) Oral daily  valsartan 160milliGRAM(s) Oral daily  calcium acetate 667milliGRAM(s) Oral three times a day with meals  sevelamer hydrochloride 800milliGRAM(s) Oral three times a day with meals  amLODIPine   Tablet 10milliGRAM(s) Oral daily  calcitriol   Capsule 0.25MICROGram(s) Oral daily  folic acid 1milliGRAM(s) Oral daily  ergocalciferol 43595Vfkx(s) Oral every week  insulin lispro Injectable (HumaLOG) 2Unit(s) SubCutaneous before breakfast  insulin lispro Injectable (HumaLOG) 2Unit(s) SubCutaneous before lunch  insulin lispro Injectable (HumaLOG) 2Unit(s) SubCutaneous before dinner  insulin lispro (HumaLOG) corrective regimen sliding scale  SubCutaneous Before meals and at bedtime  dextrose 5%. 1000milliLiter(s) IV Continuous <Continuous>  dextrose 50% Injectable 12.5Gram(s) IV Push once  dextrose 50% Injectable 25Gram(s) IV Push once  dextrose 50% Injectable 25Gram(s) IV Push once    MEDICATIONS  (PRN):  dextrose Gel 1Dose(s) Oral once PRN Blood Glucose LESS THAN 70 milliGRAM(s)/deciliter  glucagon  Injectable 1milliGRAM(s) IntraMuscular once PRN Glucose LESS THAN 70 milligrams/deciliter          Vital Signs Last 24 Hrs  T(C): 36.9, Max: 38.8 ( @ 20:02)  T(F): 98.5, Max: 101.9 ( @ 20:02)  HR: 103 (103 - 127)  BP: 175/107 (169/87 - 181/98)  BP(mean): 114 (114 - 114)  RR: 20 (20 - 22)  SpO2: 93% (74% - 93%)    PHYSICAL EXAM:  HEENT: nasal O2  NECK: no jvd  COR: Tacycardia; no rub  LUNGS: coarse bilateral breath sounds; diminished at bases  ABD: soft, non tender    LABS:                        8.4    11.8  )-----------( 225      ( 2017 09:28 )             26.7         135  |  93<L>  |  44.0<H>  ----------------------------<  135<H>  3.8   |  25.0  |  5.78<H>    Ca    8.4<L>      2017 09:28  Phos  2.5       Mg     2.0         TPro  8.6  /  Alb  3.7  /  TBili  1.1  /  DBili  x   /  AST  10  /  ALT  17  /  AlkPhos  153<H>        Urinalysis Basic - ( 2017 02:07 )    Color: Yellow / Appearance: Clear / S.010 / pH: x  Gluc: x / Ketone: Negative  / Bili: Negative / Urobili: Negative mg/dL   Blood: x / Protein: 500 mg/dL / Nitrite: Negative   Leuk Esterase: Negative / RBC: 0-2 /HPF / WBC Negative   Sq Epi: x / Non Sq Epi: Moderate / Bacteria: x      Magnesium, Serum: 2.0 mg/dL ( @ 21:35)  Phosphorus Level, Serum: 2.5 mg/dL ( @ 21:35)      RADIOLOGY & ADDITIONAL TESTS:   EXAM:  CHEST SINGLE VIEW FRONTAL                          PROCEDURE DATE:  2017        INTERPRETATION:  Portable chest radiograph        CLINICAL INFORMATION:   Cough    TECHNIQUE:  Portable  AP view of the chest was obtained.    COMPARISON:2017 available for review.    FINDINGS: Right IJ tunneled double-lumen catheter tip in SVC.   The lungs  are clear.  No pleural abnormality is seen.         Heart size within normal limits allowing for magnification effect of AP   projection. Visualized osseous structures are intact.        IMPRESSION:   No evidence of active chest disease.             If cough persists despite conservative therapy and further evaluation of   lung parenchyma required, a noncontrast CT exam suggested to exclude   occult pathology not evident on plain film radiography..
[facility]  INFECTIOUS DISEASES AND INTERNAL MEDICINE OF [place]  =======================================================  Ned De MD  Diplomates American Board of Internal Medicine and Infectious Diseases  =======================================================      [id number]  SORAYA BARBARA    CC: Patient is a 37y old  Female who presents with a chief complaint of Cough (2017 00:27)    38y/o  Female with ESRD on HD s/p left UE AVF, right chest wall permacath, HTN, DM, obesity. patient was admitted in April with HAP and had a thoracocentesis with pleural fluid showing serratia marcescens and completed treatment on 17. Patient reports feeling well after treatment. Two to Three days ago patient started to feel SOB and have a productive cough. No fevers at home. Yesterday at the HD center she became SOB and was sent to the ER. Patient reports her nephew was sick with strep throat last week. In the ER patient was noted to have fever to 101.9F, tachycardia, and hypoxia to 74% on room air. Patient was started on IV Vancomycin x1 dose and IV Ceftriaxone. ID input requested.       Past Medical & Surgical Hx:  End stage renal disease  Hypertension  Diabetes  Vascular dialysis catheter in place  No significant past surgical history      Social Hx:  No ETOH, smoking or drug use.       FAMILY HISTORY:  Family history of renal failure: mother had dialysis in her mid 60s  Family history of diabetes mellitus (DM)      Allergies  No Known Allergies      Antibiotics:  cefTRIAXone   IVPB 1Gram(s) IV Intermittent every 24 hours       REVIEW OF SYSTEMS:  CONSTITUTIONAL:  + Fever or chills  HEENT:  No diplopia or blurred vision.  No earache, sore throat or runny nose.  CARDIOVASCULAR:  No pressure, squeezing, strangling, tightness, heaviness or aching about the chest, neck, axilla or epigastrium.  RESPIRATORY:  + cough, + shortness of breath,.  GASTROINTESTINAL:  No nausea, vomiting or diarrhea.  GENITOURINARY:  No dysuria, frequency or urgency.   MUSCULOSKELETAL:  no joint aches, no muscle pain  SKIN:  No change in skin, hair or nails.  NEUROLOGIC:  No paresthesias, fasciculations  PSYCHIATRIC:  No disorder of thought or mood.  ENDOCRINE:  No heat or cold intolerance  HEMATOLOGICAL:  No easy bruising or bleeding.       Physical Exam:  Vital Signs Last 24 Hrs  T(C): 36.9, Max: 38.8 ( @ 20:02)  T(F): 98.5, Max: 101.9 ( @ 20:02)  HR: 103 (103 - 127)  BP: 175/107 (169/87 - 181/98)  BP(mean): 114 (114 - 114)  RR: 20 (20 - 22)  SpO2: 93% (74% - 93%)  Height (cm): 157.5 ( @ 20:02)  Weight (kg): 123.8 ( @ 20:02)  BMI (kg/m2): 49.9 ( @ 20:02)  BSA (m2): 2.18 ( @ 20:02)      GEN: NAD, pleasant  HEENT: normocephalic and atraumatic. EOMI. PERRL.    NECK: Supple.   LUNGS: Clear to auscultation.  HEART: Regular rate and rhythm   CHEST: Rt Chest wall PermCath   ABDOMEN: Soft, nontender, and nondistended.  Positive bowel sounds.    : No CVA tenderness  EXTREMITIES: Without any edema.  MSK: No joint swelling  NEUROLOGIC: Cranial nerves II through XII are grossly intact.  PSYCHIATRIC: Appropriate affect .  SKIN: No rash      Labs:      135  |  93<L>  |  44.0<H>  ----------------------------<  135<H>  3.8   |  25.0  |  5.78<H>    Ca    8.4<L>      2017 09:28  Phos  2.5       Mg     2.0         TPro  8.6  /  Alb  3.7  /  TBili  1.1  /  DBili  x   /  AST  10  /  ALT  17  /  AlkPhos  153<H>                            8.4    11.8  )-----------( 225      ( 2017 09:28 )             26.7       Urinalysis Basic - ( 2017 02:07 )    Color: Yellow / Appearance: Clear / S.010 / pH: x  Gluc: x / Ketone: Negative  / Bili: Negative / Urobili: Negative mg/dL   Blood: x / Protein: 500 mg/dL / Nitrite: Negative   Leuk Esterase: Negative / RBC: 0-2 /HPF / WBC Negative   Sq Epi: x / Non Sq Epi: Moderate / Bacteria: x      LIVER FUNCTIONS - ( 2017 21:35 )  Alb: 3.7 g/dL / Pro: 8.6 g/dL / ALK PHOS: 153 U/L / ALT: 17 U/L / AST: 10 U/L / GGT: x             RECENT CULTURES:   @ 02:20    RVP  NotDetec        EXAM:  CHEST SINGLE VIEW FRONTAL                        PROCEDURE DATE:  2017    INTERPRETATION:  Portable chest radiograph      CLINICAL INFORMATION:   Cough  TECHNIQUE:  Portable  AP view of the chest was obtained.  COMPARISON: 2017 available for review.  FINDINGS: Right IJ tunneled double-lumen catheter tip in SVC.   The lungs  are clear.  No pleural abnormality is seen.       Heart size within normal limits allowing for magnification effect of AP   projection. Visualized osseous structures are intact.  IMPRESSION:     No evidence of active chest disease

## 2017-06-13 NOTE — PROGRESS NOTE ADULT - PROBLEM SELECTOR PLAN 1
Patient met criteria for HCAP based on admission in 4/17 for PNA. Initial tx was started with Abx to cover S. marcescens until new cx result and pending ID consult. Received 1 dose of Vancomycin in ED. Possible patient may need new HD catheter pending cx. Monitor WBC/temp curve, No probiotics with HD catheter, lactate normal will avoid IVF bolus with ESRD. Patient met criteria for HCAP based on admission in 4/17 for PNA with GNR - Serratioa - ? recurrency. Initial tx was started with Abx to cover S. marcescens until new cx result and pending ID consult. Received 1 dose of Vancomycin in ED. Possible patient may need new HD catheter pending cx. Monitor WBC/temp curve, No probiotics with HD catheter, lactate normal will avoid IVF bolus with ESRD.

## 2017-06-13 NOTE — PROGRESS NOTE ADULT - ATTENDING COMMENTS
Pt seen and examined with PA. A&P reviewed. Pt seen and examined with PA. A&P reviewed.  BActeremia with GNR - likely recurrency of Serratia marcescenses

## 2017-06-13 NOTE — ED ADULT NURSE REASSESSMENT NOTE - NS ED NURSE REASSESS COMMENT FT1
Pt sleeping comfortably on stretcher, easy arousability, family at bedsisde, pt 88 Sat on monitor, currently on 2L increased to 3.5L, pt tolerating well O2 sat 93, MD made aware.

## 2017-06-13 NOTE — H&P ADULT - PROBLEM SELECTOR PLAN 1
Patient meets criteria for HCAP based on admission in 4/17 for PNA. Will treat with Abx to cover S. marcescens until new cx return. Received 1 dose of Vancomycin in ED. Possible patient may need new HD catheter pending cx. Monitor WBC/temp curve, No probiotics with HD catheter, lactate normal will avoid IVF bolus with ESRD. ID eval called. Ambulation, DVT-P

## 2017-06-14 DIAGNOSIS — A41.50 GRAM-NEGATIVE SEPSIS, UNSPECIFIED: ICD-10-CM

## 2017-06-14 DIAGNOSIS — R78.81 BACTEREMIA: ICD-10-CM

## 2017-06-14 LAB
-  AMIKACIN: SIGNIFICANT CHANGE UP
-  AMIKACIN: SIGNIFICANT CHANGE UP
-  AMPICILLIN/SULBACTAM: SIGNIFICANT CHANGE UP
-  AMPICILLIN/SULBACTAM: SIGNIFICANT CHANGE UP
-  AMPICILLIN: SIGNIFICANT CHANGE UP
-  AMPICILLIN: SIGNIFICANT CHANGE UP
-  AZTREONAM: SIGNIFICANT CHANGE UP
-  AZTREONAM: SIGNIFICANT CHANGE UP
-  CEFAZOLIN: SIGNIFICANT CHANGE UP
-  CEFAZOLIN: SIGNIFICANT CHANGE UP
-  CEFEPIME: SIGNIFICANT CHANGE UP
-  CEFEPIME: SIGNIFICANT CHANGE UP
-  CEFOXITIN: SIGNIFICANT CHANGE UP
-  CEFOXITIN: SIGNIFICANT CHANGE UP
-  CEFTAZIDIME: SIGNIFICANT CHANGE UP
-  CEFTAZIDIME: SIGNIFICANT CHANGE UP
-  CEFTRIAXONE: SIGNIFICANT CHANGE UP
-  CEFTRIAXONE: SIGNIFICANT CHANGE UP
-  CIPROFLOXACIN: SIGNIFICANT CHANGE UP
-  CIPROFLOXACIN: SIGNIFICANT CHANGE UP
-  ERTAPENEM: SIGNIFICANT CHANGE UP
-  ERTAPENEM: SIGNIFICANT CHANGE UP
-  GENTAMICIN: SIGNIFICANT CHANGE UP
-  GENTAMICIN: SIGNIFICANT CHANGE UP
-  IMIPENEM: SIGNIFICANT CHANGE UP
-  IMIPENEM: SIGNIFICANT CHANGE UP
-  LEVOFLOXACIN: SIGNIFICANT CHANGE UP
-  LEVOFLOXACIN: SIGNIFICANT CHANGE UP
-  MEROPENEM: SIGNIFICANT CHANGE UP
-  MEROPENEM: SIGNIFICANT CHANGE UP
-  PIPERACILLIN/TAZOBACTAM: SIGNIFICANT CHANGE UP
-  PIPERACILLIN/TAZOBACTAM: SIGNIFICANT CHANGE UP
-  TOBRAMYCIN: SIGNIFICANT CHANGE UP
-  TOBRAMYCIN: SIGNIFICANT CHANGE UP
-  TRIMETHOPRIM/SULFAMETHOXAZOLE: SIGNIFICANT CHANGE UP
-  TRIMETHOPRIM/SULFAMETHOXAZOLE: SIGNIFICANT CHANGE UP
ANION GAP SERPL CALC-SCNC: 17 MMOL/L — SIGNIFICANT CHANGE UP (ref 5–17)
BUN SERPL-MCNC: 63 MG/DL — HIGH (ref 8–20)
CALCIUM SERPL-MCNC: 8.9 MG/DL — SIGNIFICANT CHANGE UP (ref 8.6–10.2)
CHLORIDE SERPL-SCNC: 96 MMOL/L — LOW (ref 98–107)
CO2 SERPL-SCNC: 23 MMOL/L — SIGNIFICANT CHANGE UP (ref 22–29)
CREAT SERPL-MCNC: 7.08 MG/DL — HIGH (ref 0.5–1.3)
CULTURE RESULTS: SIGNIFICANT CHANGE UP
FERRITIN SERPL-MCNC: 318.9 NG/ML — HIGH (ref 11–306)
GLUCOSE SERPL-MCNC: 166 MG/DL — HIGH (ref 70–115)
HCT VFR BLD CALC: 27 % — LOW (ref 37–47)
HGB BLD-MCNC: 8.5 G/DL — LOW (ref 12–16)
IRON SATN MFR SERPL: 17 % — SIGNIFICANT CHANGE UP (ref 14–50)
IRON SATN MFR SERPL: 49 UG/DL — SIGNIFICANT CHANGE UP (ref 37–145)
MCHC RBC-ENTMCNC: 25.2 PG — LOW (ref 27–31)
MCHC RBC-ENTMCNC: 31.5 G/DL — LOW (ref 32–36)
MCV RBC AUTO: 80.1 FL — LOW (ref 81–99)
METHOD TYPE: SIGNIFICANT CHANGE UP
METHOD TYPE: SIGNIFICANT CHANGE UP
ORGANISM # SPEC MICROSCOPIC CNT: SIGNIFICANT CHANGE UP
PLATELET # BLD AUTO: 218 K/UL — SIGNIFICANT CHANGE UP (ref 150–400)
POTASSIUM SERPL-MCNC: 4 MMOL/L — SIGNIFICANT CHANGE UP (ref 3.5–5.3)
POTASSIUM SERPL-SCNC: 4 MMOL/L — SIGNIFICANT CHANGE UP (ref 3.5–5.3)
RBC # BLD: 3.37 M/UL — LOW (ref 4.4–5.2)
RBC # FLD: 17.8 % — HIGH (ref 11–15.6)
SODIUM SERPL-SCNC: 136 MMOL/L — SIGNIFICANT CHANGE UP (ref 135–145)
SPECIMEN SOURCE: SIGNIFICANT CHANGE UP
TIBC SERPL-MCNC: 285 UG/DL — SIGNIFICANT CHANGE UP (ref 220–430)
TRANSFERRIN SERPL-MCNC: 199 MG/DL — SIGNIFICANT CHANGE UP (ref 192–382)
WBC # BLD: 10 K/UL — SIGNIFICANT CHANGE UP (ref 4.8–10.8)
WBC # FLD AUTO: 10 K/UL — SIGNIFICANT CHANGE UP (ref 4.8–10.8)

## 2017-06-14 PROCEDURE — 99233 SBSQ HOSP IP/OBS HIGH 50: CPT

## 2017-06-14 RX ORDER — ERYTHROPOIETIN 10000 [IU]/ML
4000 INJECTION, SOLUTION INTRAVENOUS; SUBCUTANEOUS
Qty: 0 | Refills: 0 | Status: DISCONTINUED | OUTPATIENT
Start: 2017-06-14 | End: 2017-06-16

## 2017-06-14 RX ORDER — ALTEPLASE 100 MG
2 KIT INTRAVENOUS ONCE
Qty: 0 | Refills: 0 | Status: COMPLETED | OUTPATIENT
Start: 2017-06-14 | End: 2017-06-14

## 2017-06-14 RX ORDER — LABETALOL HCL 100 MG
100 TABLET ORAL THREE TIMES A DAY
Qty: 0 | Refills: 0 | Status: DISCONTINUED | OUTPATIENT
Start: 2017-06-14 | End: 2017-06-18

## 2017-06-14 RX ORDER — IRON SUCROSE 20 MG/ML
200 INJECTION, SOLUTION INTRAVENOUS
Qty: 0 | Refills: 0 | Status: DISCONTINUED | OUTPATIENT
Start: 2017-06-14 | End: 2017-06-16

## 2017-06-14 RX ADMIN — ALTEPLASE 2 MILLIGRAM(S): KIT at 09:54

## 2017-06-14 RX ADMIN — ALTEPLASE 2 MILLIGRAM(S): KIT at 09:55

## 2017-06-14 RX ADMIN — CALCITRIOL 0.25 MICROGRAM(S): 0.5 CAPSULE ORAL at 17:58

## 2017-06-14 RX ADMIN — Medication 100 MILLIGRAM(S): at 18:02

## 2017-06-14 RX ADMIN — Medication 2 UNIT(S): at 17:59

## 2017-06-14 RX ADMIN — Medication 81 MILLIGRAM(S): at 18:00

## 2017-06-14 RX ADMIN — Medication 667 MILLIGRAM(S): at 17:58

## 2017-06-14 RX ADMIN — SEVELAMER CARBONATE 800 MILLIGRAM(S): 2400 POWDER, FOR SUSPENSION ORAL at 17:58

## 2017-06-14 RX ADMIN — CEFEPIME 100 MILLIGRAM(S): 1 INJECTION, POWDER, FOR SOLUTION INTRAMUSCULAR; INTRAVENOUS at 00:17

## 2017-06-14 RX ADMIN — Medication 2: at 17:59

## 2017-06-14 RX ADMIN — Medication 2 UNIT(S): at 09:56

## 2017-06-14 RX ADMIN — Medication 1 MILLIGRAM(S): at 18:01

## 2017-06-14 RX ADMIN — VALSARTAN 160 MILLIGRAM(S): 80 TABLET ORAL at 06:02

## 2017-06-14 RX ADMIN — IRON SUCROSE 110 MILLIGRAM(S): 20 INJECTION, SOLUTION INTRAVENOUS at 11:49

## 2017-06-14 RX ADMIN — AMLODIPINE BESYLATE 10 MILLIGRAM(S): 2.5 TABLET ORAL at 06:02

## 2017-06-14 RX ADMIN — ERYTHROPOIETIN 4000 UNIT(S): 10000 INJECTION, SOLUTION INTRAVENOUS; SUBCUTANEOUS at 12:37

## 2017-06-14 RX ADMIN — Medication 2 UNIT(S): at 14:01

## 2017-06-14 RX ADMIN — Medication 0.1 MILLIGRAM(S): at 13:04

## 2017-06-14 NOTE — PROGRESS NOTE ADULT - SUBJECTIVE AND OBJECTIVE BOX
SORAYA JIMENEZ     Chief Complaint: Patient is a 37 year old  Female who presents with a chief complaint of Cough (13 Jun 2017 00:27)      HPI: 36 y/o female with ESRD diagnosed in 1/17 s/p left UE AVF, right chest wall permacath, s/p renal biopsy, HTN, DM, obesity, Dcd in early may after admission for Multi-focal PNA s/p thoracocentesis with pleural fluid showing serratia marcescens. Patient completed Abx on 5/2 and has been doing will until 2 days ago she started having cough with white sputum. No sick contacts or travel since DC. She was coughing and feeling ill during HD yesterday and was sent to ER where she was noted to have fever, leukocytosis, tachycardia and CXR with new right LL infiltrates when compared to prior. No c/o pleurisy or hemoptysis. Patient with HCAP/sepsis and will be admitted for further management.        Pt assessed today in ERHR. Pt is in NAD at time of exam, sitting up in bed. Pt reports a productive cough for past few days w clear sputum. Pt states she had been feeling better since last discharge in May, then started to feel ill again past couple of days. Pt reports that she was supposed to have a procedure to AV fistula but has not been able to do so due to she had been hospitalized w PNA. Pt denies weakness, hemoptysis, chills/sweats, urinary symptoms     PAST MEDICAL & SURGICAL HISTORY:  End stage renal disease  Hypertension  Diabetes  Vascular dialysis catheter in place  No significant past surgical history      MEDICATIONS  (STANDING):  heparin  Injectable 5000Unit(s) SubCutaneous every 8 hours  aspirin enteric coated 81milliGRAM(s) Oral daily  valsartan 160milliGRAM(s) Oral daily  calcium acetate 667milliGRAM(s) Oral three times a day with meals  sevelamer hydrochloride 800milliGRAM(s) Oral three times a day with meals  amLODIPine   Tablet 10milliGRAM(s) Oral daily  calcitriol   Capsule 0.25MICROGram(s) Oral daily  folic acid 1milliGRAM(s) Oral daily  ergocalciferol 35736Itct(s) Oral every week  insulin lispro Injectable (HumaLOG) 2Unit(s) SubCutaneous before breakfast  insulin lispro Injectable (HumaLOG) 2Unit(s) SubCutaneous before lunch  insulin lispro Injectable (HumaLOG) 2Unit(s) SubCutaneous before dinner  insulin lispro (HumaLOG) corrective regimen sliding scale  SubCutaneous Before meals and at bedtime  dextrose 5%. 1000milliLiter(s) IV Continuous <Continuous>  dextrose 50% Injectable 12.5Gram(s) IV Push once  dextrose 50% Injectable 25Gram(s) IV Push once  dextrose 50% Injectable 25Gram(s) IV Push once  cefepime  IVPB 1000milliGRAM(s) IV Intermittent every 24 hours      Allergies: No Known Allergies      REVIEW OF SYSTEMS:    CONSTITUTIONAL: No fever  ENMT:  No difficulty hearing, tinnitus, vertigo; No sinus or throat pain  NECK: No pain or stiffness  RESPIRATORY: +CECY cough, +CECY sob, no hemoptysis  CARDIOVASCULAR: No chest pain, palpitations, dizziness, or leg swelling  GASTROINTESTINAL: No abdominal or epigastric pain. No nausea, vomiting, or hematemesis; No diarrhea or constipation. No melena or hematochezia.  GENITOURINARY: No dysuria, frequency, hematuria, or incontinence  NEUROLOGICAL: No headaches, memory loss, loss of strength, numbness, or tremors  SKIN: No itching, burning, rashes, or lesions   MUSCULOSKELETAL: No joint pain or swelling; No muscle, back, or extremity pain  PSYCHIATRIC: No depression, anxiety, mood swings, or difficulty sleeping    Vital Signs Last 24 Hrs   as per flowsheets personally reviewed      LABS: personally reviewed      RADIOLOGY & ADDITIONAL TESTS: personally visualized CT chest - infiltrates visualized    CXR 6/12/17  IMPRESSION:   No evidence of active chest disease.          PE:   General: obese female, NAD, sitting up in bed, well groomed in nightgown  Eyes: PERRLA, EOM intact  Neck: Supple and symmetrical, no JVD  CV: RRR, no m/r/g  Lungs: HD port noted w DCD left chest wall, lungs noted w diminished breath sounds at bases BL  Skin: warm, dry, no rashes  Psych: normal mood/affect  Abdomen: Normal BS, soft, NT/ND  Extremities: LUE non functioning/mature  AV fistula site is noted with pos bruit,  no edema, cyanosis  Musculoskeletal: good strength b/l UE/LE, normal ROM, no joint swelling  Neuro: A & O X 3, CN 2-12 grossly intact, no sensory or motor deficit.     Pneumonia: PNEUMONIA, UNSPECIFIED ORGANISM  No h/o HF  H/o or current diagnosis of HF- ACEI/ARB contraindication unknown  Family history of renal failure: mother had dialysis in her mid 60s  Family history of diabetes mellitus (DM)  Handoff  MEWS Score: 4 (13-Jun-2017 03:56)  End stage renal disease  Hypertension  Diabetes  PNA (pneumonia)  Hypoxia: Hypoxia  SIRS (systemic inflammatory response syndrome): SIRS (systemic inflammatory response syndrome)  Type 2 diabetes mellitus with chronic kidney disease on chronic dialysis, with long-term current use of insulin: Type 2 diabetes mellitus with chronic kidney disease on chronic dialysis, with long-term current use of insulin  Renovascular hypertension: Renovascular hypertension  End stage renal disease: End stage renal disease  Sepsis, due to unspecified organism: Sepsis, due to unspecified organism  HCAP (healthcare-associated pneumonia): HCAP (healthcare-associated pneumonia)  Vascular dialysis catheter in place  No significant past surgical history  HIGH HEART RATE/SOB: HIGH HEART RATE/SOB  PNA (pneumonia): R/O PNA (pneumonia)  Hypoxia: R/O Hypoxia  60  End stage renal disease  Sepsis  Hypoxia

## 2017-06-14 NOTE — PROGRESS NOTE ADULT - SUBJECTIVE AND OBJECTIVE BOX
NEPHROLOGY INTERVAL HPI/OVERNIGHT EVENTS: No new events.    MEDICATIONS  (STANDING):  heparin  Injectable 5000Unit(s) SubCutaneous every 8 hours  aspirin enteric coated 81milliGRAM(s) Oral daily  valsartan 160milliGRAM(s) Oral daily  calcium acetate 667milliGRAM(s) Oral three times a day with meals  sevelamer hydrochloride 800milliGRAM(s) Oral three times a day with meals  amLODIPine   Tablet 10milliGRAM(s) Oral daily  calcitriol   Capsule 0.25MICROGram(s) Oral daily  folic acid 1milliGRAM(s) Oral daily  ergocalciferol 32513Opyd(s) Oral every week  insulin lispro Injectable (HumaLOG) 2Unit(s) SubCutaneous before breakfast  insulin lispro Injectable (HumaLOG) 2Unit(s) SubCutaneous before lunch  insulin lispro Injectable (HumaLOG) 2Unit(s) SubCutaneous before dinner  insulin lispro (HumaLOG) corrective regimen sliding scale  SubCutaneous Before meals and at bedtime  dextrose 5%. 1000milliLiter(s) IV Continuous <Continuous>  dextrose 50% Injectable 12.5Gram(s) IV Push once  dextrose 50% Injectable 25Gram(s) IV Push once  dextrose 50% Injectable 25Gram(s) IV Push once  cefepime  IVPB 1000milliGRAM(s) IV Intermittent every 24 hours  labetalol 100milliGRAM(s) Oral three times a day  iron sucrose IVPB 200milliGRAM(s) IV Intermittent <User Schedule>    MEDICATIONS  (PRN):  dextrose Gel 1Dose(s) Oral once PRN Blood Glucose LESS THAN 70 milliGRAM(s)/deciliter  glucagon  Injectable 1milliGRAM(s) IntraMuscular once PRN Glucose LESS THAN 70 milligrams/deciliter      Allergies    No Known Allergies    Intolerances        Vital Signs Last 24 Hrs  T(C): 37.1, Max: 37.1 (06-14 @ 08:50)  T(F): 98.7, Max: 98.7 (06-14 @ 08:50)  HR: 92 (92 - 99)  BP: 170/98 (153/84 - 170/98)  BP(mean): --  RR: 18 (18 - 20)  SpO2: 96% (96% - 100%)  Daily     Daily     PHYSICAL EXAM:    GENERAL: appears acutely ill  HEAD:  wnl  EYES:   ENMT:   NECK: right permacath  NERVOUS SYSTEM: oriented   CHEST/LUNG: clear on 02 by mask  HEART: no rub  ABDOMEN: soft, not tender  EXTREMITIES:  legs same  LYMPH:   SKIN: no rash   neg  LABS:                        8.5    10.0  )-----------( 218      ( 2017 08:27 )             27.0     06-14    136  |  96<L>  |  63.0<H>  ----------------------------<  166<H>  4.0   |  23.0  |  7.08<H>    Ca    8.9      2017 08:27  Phos  2.5     06-12  Mg     2.0     06-12    TPro  8.6  /  Alb  3.7  /  TBili  1.1  /  DBili  x   /  AST  10  /  ALT  17  /  AlkPhos  153<H>  06-12      Urinalysis Basic - ( 2017 02:07 )    Color: Yellow / Appearance: Clear / S.010 / pH: x  Gluc: x / Ketone: Negative  / Bili: Negative / Urobili: Negative mg/dL   Blood: x / Protein: 500 mg/dL / Nitrite: Negative   Leuk Esterase: Negative / RBC: 0-2 /HPF / WBC Negative   Sq Epi: x / Non Sq Epi: Moderate / Bacteria: x              RADIOLOGY & ADDITIONAL TESTS:

## 2017-06-14 NOTE — PROGRESS NOTE ADULT - ATTENDING COMMENTS
Pt seen and examined with PA. A&P reviewed.  BActeremia with GNR - likely recurrency of Serratia marcescenses

## 2017-06-14 NOTE — PROGRESS NOTE ADULT - PROBLEM SELECTOR PLAN 1
Blood cultures with GNR  Source possibly HD Cath  CT Chest with PNA, images reviewed, will await official radiology report  No more fever  Leukocytosis improving  Continue Cefepime 1gm IV q 24hours give dose after HD on HD days

## 2017-06-14 NOTE — PROGRESS NOTE ADULT - PROBLEM SELECTOR PLAN 1
Patient met criteria for HCAP based on admission in 4/17 for PNA with GNR - Serratia - ? recurrency. Initial tx was started with Abx to cover S. marcescens until new cx result and pending ID consult. Received 1 dose of Vancomycin in ED. Possible patient may need new HD catheter pending cx. Monitor WBC/temp curve, No probiotics with HD catheter, lactate normal will avoid IVF bolus with ESRD.

## 2017-06-14 NOTE — PROGRESS NOTE ADULT - SUBJECTIVE AND OBJECTIVE BOX
[facility]  INFECTIOUS DISEASES AND INTERNAL MEDICINE OF [place]  =======================================================  Ned De MD  Diplomates American Board of Internal Medicine and Infectious Diseases  =======================================================    SORAYA JIMENEZ [id number]    Follow up: Bacteremia, HAP    Patient seen in HD  reports no fevers,   SOB slightly better    Allergies:  No Known Allergies      Antibiotics:  cefepime  IVPB 1000milliGRAM(s) IV Intermittent every 24 hours      REVIEW OF SYSTEMS:  CONSTITUTIONAL:  No Fever or chills  HEENT:  No diplopia or blurred vision.  No earache, sore throat or runny nose.  CARDIOVASCULAR:  No pressure, squeezing, strangling, tightness, heaviness or aching about the chest, neck, axilla or epigastrium.  RESPIRATORY:  + cough, + shortness of breath,.  GASTROINTESTINAL:  No nausea, vomiting or diarrhea.  GENITOURINARY:  No dysuria, frequency or urgency.   MUSCULOSKELETAL:  no joint aches, no muscle pain  SKIN:  No change in skin, hair or nails.  NEUROLOGIC:  No paresthesias, fasciculations  PSYCHIATRIC:  No disorder of thought or mood.  ENDOCRINE:  No heat or cold intolerance  HEMATOLOGICAL:  No easy bruising or bleeding.       Physical Exam:  Vital Signs Last 24 Hrs  T(C): 37.1, Max: 37.1 (06-14 @ 08:50)  T(F): 98.7, Max: 98.7 (06-14 @ 08:50)  HR: 92 (92 - 99)  BP: 170/98 (153/84 - 170/98)  RR: 18 (18 - 20)  SpO2: 96% (96% - 100%)      GEN: NAD, pleasant  HEENT: normocephalic and atraumatic. EOMI. PERRL.    NECK: Supple.   LUNGS: Clear to auscultation.  HEART: Regular rate and rhythm   CHEST: Rt Chest wall PermCath   ABDOMEN: Soft, nontender, and nondistended.  Positive bowel sounds.    : No CVA tenderness  EXTREMITIES: Without any edema.  MSK: No joint swelling  NEUROLOGIC: Cranial nerves II through XII are grossly intact.  PSYCHIATRIC: Appropriate affect .  SKIN: No rash        Labs:      136  |  96<L>  |  63.0<H>  ----------------------------<  166<H>  4.0   |  23.0  |  7.08<H>    Ca    8.9      2017 08:27  Phos  2.5       Mg     2.0         TPro  8.6  /  Alb  3.7  /  TBili  1.1  /  DBili  x   /  AST  10  /  ALT  17  /  AlkPhos  153<H>                            8.5    10.0  )-----------( 218      ( 2017 08:27 )             27.0     Urinalysis Basic - ( 2017 02:07 )    Color: Yellow / Appearance: Clear / S.010 / pH: x  Gluc: x / Ketone: Negative  / Bili: Negative / Urobili: Negative mg/dL   Blood: x / Protein: 500 mg/dL / Nitrite: Negative   Leuk Esterase: Negative / RBC: 0-2 /HPF / WBC Negative   Sq Epi: x / Non Sq Epi: Moderate / Bacteria: x      LIVER FUNCTIONS - ( 2017 21:35 )  Alb: 3.7 g/dL / Pro: 8.6 g/dL / ALK PHOS: 153 U/L / ALT: 17 U/L / AST: 10 U/L / GGT: x             RECENT CULTURES:   @ 02:20   [facility]     @ 02:07 .Urine Clean Catch (Midstream)     10,000 - 49,000 CFU/mL Corynebacterium species       @ 21:59 .Blood Blood-Peripheral     Growth in aerobic and anaerobic bottles: Gram Negative Rods  Aerobic Bottle: 15:29 Hours to positivity  Anaerobic Bottle: 15;29 Hours to positivity       @ 21:58 .Blood Blood-Peripheral     Growth in aerobic and anaerobic bottles: Gram Negative Rods  Aerobic Bottle: 15:29 Hours to positivity  Anaerobic Bottle: 15:29 Hours to positivity        EXAM:  CHEST SINGLE VIEW FRONTAL                        PROCEDURE DATE:  2017    INTERPRETATION:  Portable chest radiograph      CLINICAL INFORMATION:   Cough  TECHNIQUE:  Portable  AP view of the chest was obtained.  COMPARISON: 2017 available for review.  FINDINGS: Right IJ tunneled double-lumen catheter tip in SVC.   The lungs  are clear.  No pleural abnormality is seen.       Heart size within normal limits allowing for magnification effect of AP   projection. Visualized osseous structures are intact.  IMPRESSION:     No evidence of active chest disease

## 2017-06-15 DIAGNOSIS — D50.0 IRON DEFICIENCY ANEMIA SECONDARY TO BLOOD LOSS (CHRONIC): ICD-10-CM

## 2017-06-15 LAB
ANION GAP SERPL CALC-SCNC: 18 MMOL/L — HIGH (ref 5–17)
BUN SERPL-MCNC: 50 MG/DL — HIGH (ref 8–20)
CALCIUM SERPL-MCNC: 8.7 MG/DL — SIGNIFICANT CHANGE UP (ref 8.6–10.2)
CHLORIDE SERPL-SCNC: 99 MMOL/L — SIGNIFICANT CHANGE UP (ref 98–107)
CO2 SERPL-SCNC: 24 MMOL/L — SIGNIFICANT CHANGE UP (ref 22–29)
CREAT SERPL-MCNC: 6.29 MG/DL — HIGH (ref 0.5–1.3)
GLUCOSE SERPL-MCNC: 132 MG/DL — HIGH (ref 70–115)
HCT VFR BLD CALC: 24.7 % — LOW (ref 37–47)
HGB BLD-MCNC: 7.7 G/DL — LOW (ref 12–16)
MCHC RBC-ENTMCNC: 25.3 PG — LOW (ref 27–31)
MCHC RBC-ENTMCNC: 31.2 G/DL — LOW (ref 32–36)
MCV RBC AUTO: 81.3 FL — SIGNIFICANT CHANGE UP (ref 81–99)
PLATELET # BLD AUTO: 263 K/UL — SIGNIFICANT CHANGE UP (ref 150–400)
POTASSIUM SERPL-MCNC: 3.7 MMOL/L — SIGNIFICANT CHANGE UP (ref 3.5–5.3)
POTASSIUM SERPL-SCNC: 3.7 MMOL/L — SIGNIFICANT CHANGE UP (ref 3.5–5.3)
RBC # BLD: 3.04 M/UL — LOW (ref 4.4–5.2)
RBC # FLD: 17.7 % — HIGH (ref 11–15.6)
SODIUM SERPL-SCNC: 141 MMOL/L — SIGNIFICANT CHANGE UP (ref 135–145)
WBC # BLD: 9.2 K/UL — SIGNIFICANT CHANGE UP (ref 4.8–10.8)
WBC # FLD AUTO: 9.2 K/UL — SIGNIFICANT CHANGE UP (ref 4.8–10.8)

## 2017-06-15 PROCEDURE — 99233 SBSQ HOSP IP/OBS HIGH 50: CPT

## 2017-06-15 RX ADMIN — Medication 1 MILLIGRAM(S): at 13:08

## 2017-06-15 RX ADMIN — CEFEPIME 100 MILLIGRAM(S): 1 INJECTION, POWDER, FOR SOLUTION INTRAMUSCULAR; INTRAVENOUS at 23:16

## 2017-06-15 RX ADMIN — SEVELAMER CARBONATE 800 MILLIGRAM(S): 2400 POWDER, FOR SUSPENSION ORAL at 17:40

## 2017-06-15 RX ADMIN — Medication 100 MILLIGRAM(S): at 00:26

## 2017-06-15 RX ADMIN — VALSARTAN 160 MILLIGRAM(S): 80 TABLET ORAL at 05:56

## 2017-06-15 RX ADMIN — Medication 2: at 13:06

## 2017-06-15 RX ADMIN — Medication 100 MILLIGRAM(S): at 23:15

## 2017-06-15 RX ADMIN — SEVELAMER CARBONATE 800 MILLIGRAM(S): 2400 POWDER, FOR SUSPENSION ORAL at 09:56

## 2017-06-15 RX ADMIN — Medication 2 UNIT(S): at 17:40

## 2017-06-15 RX ADMIN — Medication 2 UNIT(S): at 09:55

## 2017-06-15 RX ADMIN — Medication 4: at 00:25

## 2017-06-15 RX ADMIN — Medication 4: at 23:15

## 2017-06-15 RX ADMIN — Medication 2 UNIT(S): at 13:06

## 2017-06-15 RX ADMIN — CEFEPIME 100 MILLIGRAM(S): 1 INJECTION, POWDER, FOR SOLUTION INTRAMUSCULAR; INTRAVENOUS at 00:24

## 2017-06-15 RX ADMIN — CALCITRIOL 0.25 MICROGRAM(S): 0.5 CAPSULE ORAL at 13:08

## 2017-06-15 RX ADMIN — Medication 81 MILLIGRAM(S): at 13:08

## 2017-06-15 RX ADMIN — Medication 100 MILLIGRAM(S): at 05:56

## 2017-06-15 RX ADMIN — SEVELAMER CARBONATE 800 MILLIGRAM(S): 2400 POWDER, FOR SUSPENSION ORAL at 13:08

## 2017-06-15 RX ADMIN — AMLODIPINE BESYLATE 10 MILLIGRAM(S): 2.5 TABLET ORAL at 05:56

## 2017-06-15 NOTE — PROGRESS NOTE ADULT - ASSESSMENT
1) ESRD: HD tomorrow will cont on MWF schedule  - needs intervention on AVF when stable    2) ID: probable PNA  - check cultures; IV antibiotics    3) Anemia: +CKD  - NEELAM at HD; TS 17% will need IV iron once infectious process resolves

## 2017-06-15 NOTE — ED ADULT NURSE REASSESSMENT NOTE - NS ED NURSE REASSESS COMMENT FT1
Assumed pt care @ 2330. Pt is A&Ox3 in NAD on 35% venti mask. NSR on cardiac monitor. Pt denies complaint. Safety maintained, Bed locked in lowest position. Pt awaiting bed placement. Pt very upset about her length of stay in the ED charge nurse aware. Pt does have a bed placement but is pending meds and giving report upstairs to 5 tower. Will continue to monitor.

## 2017-06-15 NOTE — PROGRESS NOTE ADULT - SUBJECTIVE AND OBJECTIVE BOX
NEPHROLOGY INTERVAL HPI/OVERNIGHT EVENTS:    Examined earlier  Looks comfortable    MEDICATIONS  (STANDING):  heparin  Injectable 5000Unit(s) SubCutaneous every 8 hours  aspirin enteric coated 81milliGRAM(s) Oral daily  valsartan 160milliGRAM(s) Oral daily  calcium acetate 667milliGRAM(s) Oral three times a day with meals  sevelamer hydrochloride 800milliGRAM(s) Oral three times a day with meals  amLODIPine   Tablet 10milliGRAM(s) Oral daily  calcitriol   Capsule 0.25MICROGram(s) Oral daily  folic acid 1milliGRAM(s) Oral daily  ergocalciferol 94702Kbnr(s) Oral every week  insulin lispro Injectable (HumaLOG) 2Unit(s) SubCutaneous before breakfast  insulin lispro Injectable (HumaLOG) 2Unit(s) SubCutaneous before lunch  insulin lispro Injectable (HumaLOG) 2Unit(s) SubCutaneous before dinner  insulin lispro (HumaLOG) corrective regimen sliding scale  SubCutaneous Before meals and at bedtime  dextrose 5%. 1000milliLiter(s) IV Continuous <Continuous>  dextrose 50% Injectable 12.5Gram(s) IV Push once  dextrose 50% Injectable 25Gram(s) IV Push once  dextrose 50% Injectable 25Gram(s) IV Push once  cefepime  IVPB 1000milliGRAM(s) IV Intermittent every 24 hours  labetalol 100milliGRAM(s) Oral three times a day  iron sucrose IVPB 200milliGRAM(s) IV Intermittent <User Schedule>  epoetin raina Injectable 4000Unit(s) IV Push <User Schedule>    MEDICATIONS  (PRN):  dextrose Gel 1Dose(s) Oral once PRN Blood Glucose LESS THAN 70 milliGRAM(s)/deciliter  glucagon  Injectable 1milliGRAM(s) IntraMuscular once PRN Glucose LESS THAN 70 milligrams/deciliter      Allergies    No Known Allergies    Intolerances        Vital Signs Last 24 Hrs  T(C): 36.5, Max: 36.9 (06-14 @ 23:35)  T(F): 97.7, Max: 98.5 (06-14 @ 23:35)  HR: 83 (83 - 98)  BP: 129/73 (129/73 - 153/85)  BP(mean): --  RR: 18 (18 - 20)  SpO2: 98% (98% - 98%)  Daily     Daily     PHYSICAL EXAM:  GEN: NAD  HEENT: nasal O2  NECK: no jvd  CV: RRR no rub  LUNGS: coarse bilateral breath sounds; diminished at bases  ABD: soft, non tender      LABS:                        7.7    9.2   )-----------( 263      ( 15 Juanjose 2017 06:26 )             24.7     06-15    141  |  99  |  50.0<H>  ----------------------------<  132<H>  3.7   |  24.0  |  6.29<H>    Ca    8.7      15 Juanjose 2017 06:26                  RADIOLOGY & ADDITIONAL TESTS:

## 2017-06-15 NOTE — PROGRESS NOTE ADULT - SUBJECTIVE AND OBJECTIVE BOX
CC: Cough (13 Jun 2017 00:27)    HPI: 38 y/o female with ESRD, DMII, HTN, HLD, recent Serratia sepsis due to PNA with need for thoracocentesis, s/p left UE AVF, right chest wall permacath, s/p renal biopsy p/w fevers. Patient completed Abx on 5/2 and has been doing will until 2 days ago she started having cough with white sputum. No sick contacts or travel since DC. She was coughing and feeling ill during HD yesterday and was sent to ER where she was noted to have fever, leukocytosis, tachycardia and CXR with new right LL infiltrates when compared to prior. No c/o pleurisy or hemoptysis. Patient with HCAP/sepsis and will be admitted for further management. (13 Jun 2017 00:27)    INTERVAL HPI/OVERNIGHT EVENTS: feels better, no complaints  Other ROS reviewed and neg     Vital Signs Last 24 Hrs  T(C): 36.5, Max: 36.9 (06-14 @ 23:35)  T(F): 97.7, Max: 98.5 (06-14 @ 23:35)  HR: 83 (83 - 98)  BP: 129/73 (129/73 - 153/85)  RR: 18 (18 - 20)  SpO2: 98% (98% - 98%)  I&O's Detail  I & Os for 24h ending 15 Juanjose 2017 07:00  =============================================  IN:    Oral Fluid: 340 ml    Total IN: 340 ml  ---------------------------------------------  OUT:    Other: 3000 ml    Total OUT: 3000 ml  ---------------------------------------------  Total NET: -2660 ml    I & Os for current day (as of 15 Juanjose 2017 17:24)  =============================================  IN:    Oral Fluid: 400 ml    Total IN: 400 ml  ---------------------------------------------  OUT:    Total OUT: 0 ml  ---------------------------------------------  Total NET: 400 ml                        7.7    9.2   )-----------( 263      ( 15 Juanjose 2017 06:26 )             24.7     15 Juanjose 2017 06:26    141    |  99     |  50.0   ----------------------------<  132    3.7     |  24.0   |  6.29     Ca    8.7        15 Juanjose 2017 06:26    CAPILLARY BLOOD GLUCOSE  135 (15 Juanjose 2017 16:47)  165 (15 Juanjose 2017 11:48)  130 (15 Juanjose 2017 07:40)  218 (15 Juanjose 2017 00:01)  168 (14 Jun 2017 17:53)    MEDICATIONS  (STANDING):  heparin  Injectable 5000Unit(s) SubCutaneous every 8 hours  aspirin enteric coated 81milliGRAM(s) Oral daily  valsartan 160milliGRAM(s) Oral daily  calcium acetate 667milliGRAM(s) Oral three times a day with meals  sevelamer hydrochloride 800milliGRAM(s) Oral three times a day with meals  amLODIPine   Tablet 10milliGRAM(s) Oral daily  calcitriol   Capsule 0.25MICROGram(s) Oral daily  folic acid 1milliGRAM(s) Oral daily  ergocalciferol 87052Cqvn(s) Oral every week  insulin lispro Injectable (HumaLOG) 2Unit(s) SubCutaneous before breakfast  insulin lispro Injectable (HumaLOG) 2Unit(s) SubCutaneous before lunch  insulin lispro Injectable (HumaLOG) 2Unit(s) SubCutaneous before dinner  insulin lispro (HumaLOG) corrective regimen sliding scale  SubCutaneous Before meals and at bedtime  dextrose 5%. 1000milliLiter(s) IV Continuous <Continuous>  dextrose 50% Injectable 12.5Gram(s) IV Push once  dextrose 50% Injectable 25Gram(s) IV Push once  dextrose 50% Injectable 25Gram(s) IV Push once  cefepime  IVPB 1000milliGRAM(s) IV Intermittent every 24 hours  labetalol 100milliGRAM(s) Oral three times a day  iron sucrose IVPB 200milliGRAM(s) IV Intermittent <User Schedule>  epoetin raina Injectable 4000Unit(s) IV Push <User Schedule>    MEDICATIONS  (PRN):  dextrose Gel 1Dose(s) Oral once PRN Blood Glucose LESS THAN 70 milliGRAM(s)/deciliter  glucagon  Injectable 1milliGRAM(s) IntraMuscular once PRN Glucose LESS THAN 70 milligrams/deciliter      RADIOLOGY & ADDITIONAL TESTS: personally visualized    PHYSICAL EXAM:    General: obese female in no acute distress  Eyes: PERRLA, EOMI; conjunctiva and sclera clear  Head: Normocephalic; atraumatic  ENMT: No nasal discharge; airway clear  Neck: Supple; non tender; no masses, right upper chest permacath in place  Respiratory: No wheezes, rales or rhonchi, + crackles at bases R>L  Cardiovascular: Regular rate and rhythm. S1 and S2   Gastrointestinal: Soft non-tender non-distended; Normal bowel sounds  Genitourinary: No costovertebral angle tenderness  Extremities: Normal range of motion, No clubbing, cyanosis or edema  Vascular: Peripheral pulses palpable 2+ bilaterally, LUE mature AVF with + bruit  Neurological: Alert and oriented x4  Skin: Warm and dry.   Musculoskeletal: Normal tone, without deformities  Psychiatric: Cooperative and appropriate

## 2017-06-16 LAB
BLD GP AB SCN SERPL QL: SIGNIFICANT CHANGE UP
HCT VFR BLD CALC: 24.7 % — LOW (ref 37–47)
HGB BLD-MCNC: 7.8 G/DL — LOW (ref 12–16)
MCHC RBC-ENTMCNC: 25.1 PG — LOW (ref 27–31)
MCHC RBC-ENTMCNC: 31.6 G/DL — LOW (ref 32–36)
MCV RBC AUTO: 79.4 FL — LOW (ref 81–99)
PLATELET # BLD AUTO: 275 K/UL — SIGNIFICANT CHANGE UP (ref 150–400)
RBC # BLD: 3.11 M/UL — LOW (ref 4.4–5.2)
RBC # FLD: 18 % — HIGH (ref 11–15.6)
TYPE + AB SCN PNL BLD: SIGNIFICANT CHANGE UP
WBC # BLD: 7.7 K/UL — SIGNIFICANT CHANGE UP (ref 4.8–10.8)
WBC # FLD AUTO: 7.7 K/UL — SIGNIFICANT CHANGE UP (ref 4.8–10.8)

## 2017-06-16 PROCEDURE — 99232 SBSQ HOSP IP/OBS MODERATE 35: CPT

## 2017-06-16 PROCEDURE — 99233 SBSQ HOSP IP/OBS HIGH 50: CPT

## 2017-06-16 RX ORDER — ERYTHROPOIETIN 10000 [IU]/ML
10000 INJECTION, SOLUTION INTRAVENOUS; SUBCUTANEOUS
Qty: 0 | Refills: 0 | Status: DISCONTINUED | OUTPATIENT
Start: 2017-06-16 | End: 2017-06-21

## 2017-06-16 RX ADMIN — Medication 100 MILLIGRAM(S): at 06:35

## 2017-06-16 RX ADMIN — ERYTHROPOIETIN 4000 UNIT(S): 10000 INJECTION, SOLUTION INTRAVENOUS; SUBCUTANEOUS at 11:35

## 2017-06-16 RX ADMIN — Medication 1 MILLIGRAM(S): at 17:27

## 2017-06-16 RX ADMIN — Medication 2 UNIT(S): at 17:27

## 2017-06-16 RX ADMIN — SEVELAMER CARBONATE 800 MILLIGRAM(S): 2400 POWDER, FOR SUSPENSION ORAL at 14:06

## 2017-06-16 RX ADMIN — Medication 100 MILLIGRAM(S): at 14:00

## 2017-06-16 RX ADMIN — VALSARTAN 160 MILLIGRAM(S): 80 TABLET ORAL at 06:35

## 2017-06-16 RX ADMIN — Medication 2: at 17:27

## 2017-06-16 RX ADMIN — AMLODIPINE BESYLATE 10 MILLIGRAM(S): 2.5 TABLET ORAL at 06:35

## 2017-06-16 RX ADMIN — Medication 81 MILLIGRAM(S): at 17:27

## 2017-06-16 RX ADMIN — Medication 100 MILLIGRAM(S): at 21:43

## 2017-06-16 RX ADMIN — Medication 2: at 21:43

## 2017-06-16 RX ADMIN — CEFEPIME 100 MILLIGRAM(S): 1 INJECTION, POWDER, FOR SOLUTION INTRAMUSCULAR; INTRAVENOUS at 21:44

## 2017-06-16 RX ADMIN — CALCITRIOL 0.25 MICROGRAM(S): 0.5 CAPSULE ORAL at 17:27

## 2017-06-16 RX ADMIN — Medication 2 UNIT(S): at 12:19

## 2017-06-16 RX ADMIN — ERYTHROPOIETIN 10000 UNIT(S): 10000 INJECTION, SOLUTION INTRAVENOUS; SUBCUTANEOUS at 14:17

## 2017-06-16 RX ADMIN — IRON SUCROSE 110 MILLIGRAM(S): 20 INJECTION, SOLUTION INTRAVENOUS at 11:34

## 2017-06-16 RX ADMIN — Medication 2 UNIT(S): at 08:53

## 2017-06-16 RX ADMIN — SEVELAMER CARBONATE 800 MILLIGRAM(S): 2400 POWDER, FOR SUSPENSION ORAL at 17:27

## 2017-06-16 RX ADMIN — SEVELAMER CARBONATE 800 MILLIGRAM(S): 2400 POWDER, FOR SUSPENSION ORAL at 08:53

## 2017-06-16 NOTE — PROGRESS NOTE ADULT - ASSESSMENT
Bacteremia in setting a history of pneumonia, infected pleural effusion and ESRD being dialyzed via tunneled HD catheter.    Awaiting cultures. Additional recommendations to follow. Bacteremia in setting a history of pneumonia, infected pleural effusion and ESRD being dialyzed via tunneled HD catheter.  Consider thoracic surgery consult  Awaiting cultures. Additional recommendations to follow.

## 2017-06-16 NOTE — PROGRESS NOTE ADULT - PROBLEM SELECTOR PLAN 1
Blood cultures with Serratia marcescens   repeat blood cultures pending  Source possibly HD Cath, need to remove HD cath and repeat blood cultures when catheter is out  Continue Cefepime 1gm IV q 24hours give dose after HD on HD days

## 2017-06-16 NOTE — PROGRESS NOTE ADULT - SUBJECTIVE AND OBJECTIVE BOX
[facility]  INFECTIOUS DISEASES AND INTERNAL MEDICINE OF [place]  =======================================================  Ned De MD  Diplomates American Board of Internal Medicine and Infectious Diseases  =======================================================      SORAYA JMIENEZ [id number]    Follow up: Bacteremia, HAP    Patient seen in HD  reports no fevers,   SOB slightly better    Allergies:  No Known Allergies      Antibiotics:  cefepime  IVPB 1000milliGRAM(s) IV Intermittent every 24 hours      REVIEW OF SYSTEMS:  CONSTITUTIONAL:  No Fever or chills  HEENT:  No diplopia or blurred vision.  No earache, sore throat or runny nose.  CARDIOVASCULAR:  No pressure, squeezing, strangling, tightness, heaviness or aching about the chest, neck, axilla or epigastrium.  RESPIRATORY:  + cough, + shortness of breath,.  GASTROINTESTINAL:  No nausea, vomiting or diarrhea.  GENITOURINARY:  No dysuria, frequency or urgency.   MUSCULOSKELETAL:  no joint aches, no muscle pain  SKIN:  No change in skin, hair or nails.  NEUROLOGIC:  No paresthesias, fasciculations  PSYCHIATRIC:  No disorder of thought or mood.  ENDOCRINE:  No heat or cold intolerance  HEMATOLOGICAL:  No easy bruising or bleeding.       Physical Exam:  Vital Signs Last 24 Hrs  T(C): 37.1, Max: 37.1 (06-16 @ 10:30)  T(F): 98.7, Max: 98.7 (06-16 @ 10:30)  HR: 84 (81 - 91)  BP: 159/85 (129/73 - 161/91)  RR: 18 (18 - 18)  SpO2: 96% (93% - 98%)      GEN: NAD, pleasant  HEENT: normocephalic and atraumatic. EOMI. PERRL.    NECK: Supple.   LUNGS: Clear to auscultation.  HEART: Regular rate and rhythm   CHEST: Rt Chest wall PermCath   ABDOMEN: Soft, nontender, and nondistended.  Positive bowel sounds.    : No CVA tenderness  EXTREMITIES: Without any edema.  MSK: No joint swelling  NEUROLOGIC: Cranial nerves II through XII are grossly intact.  PSYCHIATRIC: Appropriate affect .  SKIN: No rash      Labs  06-15    141  |  99  |  50.0<H>  ----------------------------<  132<H>  3.7   |  24.0  |  6.29<H>    Ca    8.7      15 Juanjose 2017 06:26                            7.8    7.7   )-----------( 275      ( 16 Jun 2017 06:15 )             24.7             RECENT CULTURES:  06-13 @ 02:20    [facility]    06-13 @ 02:07 .Urine Clean Catch (Midstream)     10,000 - 49,000 CFU/mL Corynebacterium species      06-12 @ 21:59 .Blood Blood-Peripheral Serratia marcescens    Growth in aerobic and anaerobic bottles: Serratia marcescens  Aerobic Bottle: 15:29 Hours to positivity  Anaerobic Bottle: 15;29 Hours to positivity      06-12 @ 21:58 .Blood Blood-Peripheral Serratia marcescens    Growth in aerobic and anaerobic bottles: Serratia marcescens  Aerobic Bottle: 15:29 Hours to positivity  Anaerobic Bottle: 15:29 Hours to positivity              EXAM:  CHEST SINGLE VIEW FRONTAL                        PROCEDURE DATE:  06/12/2017    INTERPRETATION:  Portable chest radiograph      CLINICAL INFORMATION:   Cough  TECHNIQUE:  Portable  AP view of the chest was obtained.  COMPARISON: 4/12/2017 available for review.  FINDINGS: Right IJ tunneled double-lumen catheter tip in SVC.   The lungs  are clear.  No pleural abnormality is seen.       Heart size within normal limits allowing for magnification effect of AP   projection. Visualized osseous structures are intact.  IMPRESSION:     No evidence of active chest disease

## 2017-06-16 NOTE — PROGRESS NOTE ADULT - PROBLEM SELECTOR PLAN 1
? etiology of Serratia bacteremia - f/u repeat BCx, and repeat set via permacath, as per ID permacath needs to be removed, though vascular disagreed.

## 2017-06-16 NOTE — PROGRESS NOTE ADULT - ASSESSMENT
36 y/o female, PMH ESRD on HD, DMII, HTN, HLD, obesity, recent Serratia sepsis due to PNA s/p thoracocentesis and prolonged IV abx course admitted for sepsis after presenting  with cough, fevers, leukocytosis. Pt responded to treatment with IV abx, fluids and grew Serratia in BCx x2, repeat BCx are still P. Ct chest suggestive of PNA, but clinically pt's is not consistent clinically  and suspicion is raised for permacath to be source of infection - discussed with vascular Dr. Ayoub - feels clinically is not indicated to remove catheter unless repeat BCx are positive. Will obtain repeat BCx via permacath and  cont current Tx with IV abx

## 2017-06-16 NOTE — PROGRESS NOTE ADULT - ATTENDING COMMENTS
BActeremia with  Serratia marcescenses - vascular to remove permacath if repeat BCx are pos, if not need to rediscuss with ID, renal

## 2017-06-16 NOTE — PROGRESS NOTE ADULT - SUBJECTIVE AND OBJECTIVE BOX
NEPHROLOGY INTERVAL HPI/OVERNIGHT EVENTS: No new events.    MEDICATIONS  (STANDING):  heparin  Injectable 5000Unit(s) SubCutaneous every 8 hours  aspirin enteric coated 81milliGRAM(s) Oral daily  valsartan 160milliGRAM(s) Oral daily  calcium acetate 667milliGRAM(s) Oral three times a day with meals  sevelamer hydrochloride 800milliGRAM(s) Oral three times a day with meals  amLODIPine   Tablet 10milliGRAM(s) Oral daily  calcitriol   Capsule 0.25MICROGram(s) Oral daily  folic acid 1milliGRAM(s) Oral daily  ergocalciferol 00455Ytig(s) Oral every week  insulin lispro Injectable (HumaLOG) 2Unit(s) SubCutaneous before breakfast  insulin lispro Injectable (HumaLOG) 2Unit(s) SubCutaneous before lunch  insulin lispro Injectable (HumaLOG) 2Unit(s) SubCutaneous before dinner  insulin lispro (HumaLOG) corrective regimen sliding scale  SubCutaneous Before meals and at bedtime  dextrose 5%. 1000milliLiter(s) IV Continuous <Continuous>  dextrose 50% Injectable 12.5Gram(s) IV Push once  dextrose 50% Injectable 25Gram(s) IV Push once  dextrose 50% Injectable 25Gram(s) IV Push once  cefepime  IVPB 1000milliGRAM(s) IV Intermittent every 24 hours  labetalol 100milliGRAM(s) Oral three times a day  iron sucrose IVPB 200milliGRAM(s) IV Intermittent <User Schedule>    MEDICATIONS  (PRN):  dextrose Gel 1Dose(s) Oral once PRN Blood Glucose LESS THAN 70 milliGRAM(s)/deciliter  glucagon  Injectable 1milliGRAM(s) IntraMuscular once PRN Glucose LESS THAN 70 milligrams/deciliter      Allergies    No Known Allergies    Intolerances        Vital Signs Last 24 Hrs  T(C): 37.1, Max: 37.1 (06-14 @ 08:50)  T(F): 98.7, Max: 98.7 (06-14 @ 08:50)  HR: 92 (92 - 99)  BP: 170/98 (153/84 - 170/98)  BP(mean): --  RR: 18 (18 - 20)  SpO2: 96% (96% - 100%)  Daily     Daily     PHYSICAL EXAM:    GENERAL: appears acutely ill  HEAD:  wnl  EYES:   ENMT:   NECK: right permacath  NERVOUS SYSTEM: oriented   CHEST/LUNG: clear on  by mask  HEART: no rub  ABDOMEN: soft, not tender  EXTREMITIES:  legs same  LYMPH:   SKIN: no rash   neg  LABS: 06-15    141  |  99  |  50.0<H>  ----------------------------<  132<H>  3.7   |  24.0  |  6.29<H>    Ca    8.7      15 Juanjose 2017 06:26                            8.5    10.0  )-----------( 218      ( 2017 08:27 )             27.0     06-    136  |  96<L>  |  63.0<H>  ----------------------------<  166<H>  4.0   |  23.0  |  7.08<H>    Ca    8.9      2017 08:27  Phos  2.5     -12  Mg     2.0     12    TPro  8.6  /  Alb  3.7  /  TBili  1.1  /  DBili  x   /  AST  10  /  ALT  17  /  AlkPhos  153<H>  06-12      Urinalysis Basic - ( 2017 02:07 )    Color: Yellow / Appearance: Clear / S.010 / pH: x  Gluc: x / Ketone: Negative  / Bili: Negative / Urobili: Negative mg/dL   Blood: x / Protein: 500 mg/dL / Nitrite: Negative   Leuk Esterase: Negative / RBC: 0-2 /HPF / WBC Negative   Sq Epi: x / Non Sq Epi: Moderate / Bacteria: x              RADIOLOGY & ADDITIONAL TESTS:

## 2017-06-16 NOTE — PROGRESS NOTE ADULT - SUBJECTIVE AND OBJECTIVE BOX
Asked to remove tunneled HD catheter and place temporary HD access.    Normal WBC for 3 days. No fever in last 24 hours.    Last positive blood culture from 6/12. Serratia - same culture as that from chest source in April.    I am told there is a blood culture from yesterday (6/15) pending.    Vital Signs Last 24 Hrs  T(C): 37, Max: 37.1 (06-16 @ 10:30)  T(F): 98.6, Max: 98.7 (06-16 @ 10:30)  HR: 89 (81 - 91)  BP: 161/87 (150/84 - 161/91)  BP(mean): --  RR: 18 (18 - 18)  SpO2: 97% (93% - 98%)    Awaiting results of most recent blood cultures.    I am the vascular attending on call this weekend and will happily arrange to have the tunneled catheter removed if yesterday's blood cultures prove to be positive.    I've recommended to Dr. Hillman that cultures be drawn from the HD catheter if this has not already been done.    Will follow. Asked to remove tunneled HD catheter and place temporary HD access.    Normal WBC for 3 days. No fever in last 24 hours.    Last positive blood culture from 6/12. Serratia - same culture as that from chest source in April.    Creator : Raina Zimmer Att Creation date / time : 2017-06-13 23:38:57 Subject : Preliminary Report     Message :   Preliminary report:    Multifocal pneumonia. Question area of cavitation in the right lower lobe.    Mediastinal and hilar adenopathy      Follow up official report.      I am told there is a blood culture from yesterday (6/15) pending.    Vital Signs Last 24 Hrs  T(C): 37, Max: 37.1 (06-16 @ 10:30)  T(F): 98.6, Max: 98.7 (06-16 @ 10:30)  HR: 89 (81 - 91)  BP: 161/87 (150/84 - 161/91)  BP(mean): --  RR: 18 (18 - 18)  SpO2: 97% (93% - 98%)    Awaiting results of most recent blood cultures.    I am the vascular attending on call this weekend and will happily arrange to have the tunneled catheter removed if yesterday's blood cultures prove to be positive.    I've recommended to Dr. Hillman that cultures be drawn from the HD catheter if this has not already been done.    Will follow.

## 2017-06-16 NOTE — PROGRESS NOTE ADULT - SUBJECTIVE AND OBJECTIVE BOX
CC: Cough (13 Jun 2017 00:27)    HPI: 38 y/o female with ESRD, DMII, HTN, HLD, recent Serratia sepsis due to PNA with need for thoracocentesis, s/p left UE AVF, right chest wall permacath, s/p renal biopsy p/w fevers. Patient completed Abx on 5/2 and has been doing will until 2 days ago she started having cough with white sputum. No sick contacts or travel since DC. She was coughing and feeling ill during HD yesterday and was sent to ER where she was noted to have fever, leukocytosis, tachycardia and CXR with new right LL infiltrates when compared to prior. No c/o pleurisy or hemoptysis. Patient with HCAP/sepsis and will be admitted for further management. (13 Jun 2017 00:27)    INTERVAL HPI/OVERNIGHT EVENTS: feels better, no complaints  Other ROS reviewed and neg     Vital Signs Last 24 Hrs  T(C): 37.1, Max: 37.1 (06-16 @ 10:30)  T(F): 98.7, Max: 98.7 (06-16 @ 10:30)  HR: 84 (81 - 91)  BP: 159/85 (150/84 - 161/91)  RR: 18 (18 - 18)  SpO2: 96% (93% - 98%)                               7.8    7.7   )-----------( 275      ( 16 Jun 2017 06:15 )             24.7     15 Juanjose 2017 06:26    141    |  99     |  50.0   ----------------------------<  132    3.7     |  24.0   |  6.29     Ca    8.7        15 Juanjose 2017 06:26    CAPILLARY BLOOD GLUCOSE  130 (16 Jun 2017 07:51)  236 (15 Juanjose 2017 22:00)  135 (15 Juanjose 2017 16:47)    MEDICATIONS  (STANDING):  heparin  Injectable 5000Unit(s) SubCutaneous every 8 hours  aspirin enteric coated 81milliGRAM(s) Oral daily  valsartan 160milliGRAM(s) Oral daily  calcium acetate 667milliGRAM(s) Oral three times a day with meals  sevelamer hydrochloride 800milliGRAM(s) Oral three times a day with meals  amLODIPine   Tablet 10milliGRAM(s) Oral daily  calcitriol   Capsule 0.25MICROGram(s) Oral daily  folic acid 1milliGRAM(s) Oral daily  ergocalciferol 54699Nouh(s) Oral every week  insulin lispro Injectable (HumaLOG) 2Unit(s) SubCutaneous before breakfast  insulin lispro Injectable (HumaLOG) 2Unit(s) SubCutaneous before lunch  insulin lispro Injectable (HumaLOG) 2Unit(s) SubCutaneous before dinner  insulin lispro (HumaLOG) corrective regimen sliding scale  SubCutaneous Before meals and at bedtime  dextrose 5%. 1000milliLiter(s) IV Continuous <Continuous>  dextrose 50% Injectable 12.5Gram(s) IV Push once  dextrose 50% Injectable 25Gram(s) IV Push once  dextrose 50% Injectable 25Gram(s) IV Push once  cefepime  IVPB 1000milliGRAM(s) IV Intermittent every 24 hours  labetalol 100milliGRAM(s) Oral three times a day  epoetin raina Injectable 66182Ewyy(s) IV Push <User Schedule>    MEDICATIONS  (PRN):  dextrose Gel 1Dose(s) Oral once PRN Blood Glucose LESS THAN 70 milliGRAM(s)/deciliter  glucagon  Injectable 1milliGRAM(s) IntraMuscular once PRN Glucose LESS THAN 70 milligrams/deciliter    RADIOLOGY & ADDITIONAL TESTS: personally visualized    PHYSICAL EXAM:    General: obese female in no acute distress  Eyes: PERRLA, EOMI; conjunctiva and sclera clear  Head: Normocephalic; atraumatic  ENMT: No nasal discharge; airway clear  Neck: Supple; non tender; no masses, right upper chest permacath in place  Respiratory: No wheezes, rales or rhonchi, + crackles at bases R>L  Cardiovascular: Regular rate and rhythm. S1 and S2   Gastrointestinal: Soft non-tender non-distended; Normal bowel sounds  Genitourinary: No costovertebral angle tenderness  Extremities: Normal range of motion, No clubbing, cyanosis or edema  Vascular: Peripheral pulses palpable 2+ bilaterally, LUE mature AVF with + bruit  Neurological: Alert and oriented x4  Skin: Warm and dry.   Musculoskeletal: Normal tone, without deformities  Psychiatric: Cooperative and appropriate

## 2017-06-17 DIAGNOSIS — Z29.9 ENCOUNTER FOR PROPHYLACTIC MEASURES, UNSPECIFIED: ICD-10-CM

## 2017-06-17 DIAGNOSIS — D64.9 ANEMIA, UNSPECIFIED: ICD-10-CM

## 2017-06-17 LAB
HCT VFR BLD CALC: 25 % — LOW (ref 37–47)
HGB BLD-MCNC: 7.8 G/DL — LOW (ref 12–16)
MCHC RBC-ENTMCNC: 25.2 PG — LOW (ref 27–31)
MCHC RBC-ENTMCNC: 31.2 G/DL — LOW (ref 32–36)
MCV RBC AUTO: 80.9 FL — LOW (ref 81–99)
PLATELET # BLD AUTO: 261 K/UL — SIGNIFICANT CHANGE UP (ref 150–400)
RBC # BLD: 3.09 M/UL — LOW (ref 4.4–5.2)
RBC # FLD: 18 % — HIGH (ref 11–15.6)
WBC # BLD: 8.2 K/UL — SIGNIFICANT CHANGE UP (ref 4.8–10.8)
WBC # FLD AUTO: 8.2 K/UL — SIGNIFICANT CHANGE UP (ref 4.8–10.8)

## 2017-06-17 PROCEDURE — 99233 SBSQ HOSP IP/OBS HIGH 50: CPT

## 2017-06-17 PROCEDURE — 99232 SBSQ HOSP IP/OBS MODERATE 35: CPT

## 2017-06-17 RX ORDER — SACCHAROMYCES BOULARDII 250 MG
250 POWDER IN PACKET (EA) ORAL
Qty: 0 | Refills: 0 | Status: DISCONTINUED | OUTPATIENT
Start: 2017-06-17 | End: 2017-06-21

## 2017-06-17 RX ORDER — IPRATROPIUM/ALBUTEROL SULFATE 18-103MCG
3 AEROSOL WITH ADAPTER (GRAM) INHALATION EVERY 6 HOURS
Qty: 0 | Refills: 0 | Status: COMPLETED | OUTPATIENT
Start: 2017-06-17 | End: 2017-06-20

## 2017-06-17 RX ADMIN — Medication 2 UNIT(S): at 19:06

## 2017-06-17 RX ADMIN — Medication 2 UNIT(S): at 12:51

## 2017-06-17 RX ADMIN — Medication 2 UNIT(S): at 09:36

## 2017-06-17 RX ADMIN — SEVELAMER CARBONATE 800 MILLIGRAM(S): 2400 POWDER, FOR SUSPENSION ORAL at 12:51

## 2017-06-17 RX ADMIN — SEVELAMER CARBONATE 800 MILLIGRAM(S): 2400 POWDER, FOR SUSPENSION ORAL at 19:06

## 2017-06-17 RX ADMIN — Medication 1 MILLIGRAM(S): at 12:51

## 2017-06-17 RX ADMIN — Medication 667 MILLIGRAM(S): at 19:05

## 2017-06-17 RX ADMIN — HEPARIN SODIUM 5000 UNIT(S): 5000 INJECTION INTRAVENOUS; SUBCUTANEOUS at 15:26

## 2017-06-17 RX ADMIN — Medication 3 MILLILITER(S): at 20:07

## 2017-06-17 RX ADMIN — Medication 667 MILLIGRAM(S): at 09:38

## 2017-06-17 RX ADMIN — Medication 250 MILLIGRAM(S): at 19:17

## 2017-06-17 RX ADMIN — Medication 6: at 22:12

## 2017-06-17 RX ADMIN — CALCITRIOL 0.25 MICROGRAM(S): 0.5 CAPSULE ORAL at 15:26

## 2017-06-17 RX ADMIN — Medication 100 MILLIGRAM(S): at 15:26

## 2017-06-17 RX ADMIN — Medication 81 MILLIGRAM(S): at 12:51

## 2017-06-17 RX ADMIN — CEFEPIME 100 MILLIGRAM(S): 1 INJECTION, POWDER, FOR SOLUTION INTRAMUSCULAR; INTRAVENOUS at 22:08

## 2017-06-17 RX ADMIN — VALSARTAN 160 MILLIGRAM(S): 80 TABLET ORAL at 06:12

## 2017-06-17 RX ADMIN — AMLODIPINE BESYLATE 10 MILLIGRAM(S): 2.5 TABLET ORAL at 06:12

## 2017-06-17 RX ADMIN — Medication 100 MILLIGRAM(S): at 22:08

## 2017-06-17 RX ADMIN — Medication 100 MILLIGRAM(S): at 06:12

## 2017-06-17 NOTE — PROGRESS NOTE ADULT - PROBLEM SELECTOR PLAN 5
REPEAT BLOOD C/S  TX AS PROB HD CATHETER RELATED BACTEREMIA  CHK C/S NEXT HD REPEAT BLOOD C/S  TX AS PROB HD CATHETER RELATED BACTEREMIA  CHK C/S NEXT HD  DIFF TO ERADICATE SERRATIA IF CATHETER RELATED - USUALLY FAILS

## 2017-06-17 NOTE — PROGRESS NOTE ADULT - ASSESSMENT
38 y/o F presenting with SOB, cough, fever, blood cultures with GNR  PROB CATHETER RELATED BACTEREMIA 36 y/o F presenting with SOB, cough, fever, blood cultures with GNR  PROB CATHETER RELATED BACTEREMIA AND POS PLEURAL CULTURES

## 2017-06-17 NOTE — PROGRESS NOTE ADULT - ASSESSMENT
Patient is a 38 y/o female, PMH ESRD on HD, DMII, HTN, HLD, obesity, recent Serratia sepsis due to PNA s/p thoracocentesis and prolonged IV abx course admitted for sepsis after presenting  with cough, fevers, leukocytosis. Pt responded to treatment with IV abx, fluids and grew Serratia in BCx x2, repeat BCx are still P. Ct chest suggestive of PNA, but clinically pt's is not consistent clinically  and suspicion is raised for permacath to be source of infection - discussed with vascular Dr. Ayoub - feels clinically is not indicated to remove catheter unless repeat BCx are positive. Will obtain repeat BCx via permacath and  cont current Tx with IV abx

## 2017-06-17 NOTE — PROGRESS NOTE ADULT - SUBJECTIVE AND OBJECTIVE BOX
CC: Cough (13 Jun 2017 00:27)    Patient seen and examined at bedside. No acute distress and no acute overnight events. No complaints. States that her SOB is improved    ROS: No chest pain, palpitations,  light headedness/dizziness, difficulty breathing, fevers/chills, abdominal pain, n/v, diarrhea/constipation, dysuria or increased urinary frequency.,     ICU Vital Signs Last 24 Hrs  T(C): 37, Max: 37.2 (06-16 @ 21:42)  T(F): 98.6, Max: 98.9 (06-16 @ 21:42)  HR: 84 (77 - 85)  BP: 154/80 (123/67 - 154/80)  BP(mean): --  ABP: --  ABP(mean): --  RR: 18 (18 - 19)  SpO2: 94% (94% - 98%)    Physical Exam:   GEN: middle aged female, morbidly obese, NAD   HEENT: EOMI, PERRLA   CVS: S1s2nl, no m/r/g RRR   Lungs: decreased to b/l bases   ABD: soft, obese, NT, bs +  Ext: no c/c/e. left forearm bruit noted. Right chest PC noted                     c/s: 190    TEL: desat to 85%     MEDICATIONS  (STANDING):  heparin  Injectable 5000Unit(s) SubCutaneous every 8 hours  aspirin enteric coated 81milliGRAM(s) Oral daily  valsartan 160milliGRAM(s) Oral daily  calcium acetate 667milliGRAM(s) Oral three times a day with meals  sevelamer hydrochloride 800milliGRAM(s) Oral three times a day with meals  amLODIPine   Tablet 10milliGRAM(s) Oral daily  calcitriol   Capsule 0.25MICROGram(s) Oral daily  folic acid 1milliGRAM(s) Oral daily  ergocalciferol 48896Wlhi(s) Oral every week  insulin lispro Injectable (HumaLOG) 2Unit(s) SubCutaneous before breakfast  insulin lispro Injectable (HumaLOG) 2Unit(s) SubCutaneous before lunch  insulin lispro Injectable (HumaLOG) 2Unit(s) SubCutaneous before dinner  insulin lispro (HumaLOG) corrective regimen sliding scale  SubCutaneous Before meals and at bedtime  dextrose 5%. 1000milliLiter(s) IV Continuous <Continuous>  dextrose 50% Injectable 12.5Gram(s) IV Push once  dextrose 50% Injectable 25Gram(s) IV Push once  dextrose 50% Injectable 25Gram(s) IV Push once  cefepime  IVPB 1000milliGRAM(s) IV Intermittent every 24 hours  labetalol 100milliGRAM(s) Oral three times a day  epoetin raina Injectable 33625Xdig(s) IV Push <User Schedule>    MEDICATIONS  (PRN):  dextrose Gel 1Dose(s) Oral once PRN Blood Glucose LESS THAN 70 milliGRAM(s)/deciliter  glucagon  Injectable 1milliGRAM(s) IntraMuscular once PRN Glucose LESS THAN 70 milligrams/deciliter      Labs:                         7.8    8.2   )-----------( 261      ( 17 Jun 2017 05:12 )             25.0     Blood cx with serratia, pending 2 sets in lab

## 2017-06-17 NOTE — PROGRESS NOTE ADULT - SUBJECTIVE AND OBJECTIVE BOX
No new culture results since 6/12.    CT of chest from 6/13 as noted.    Vital Signs Last 24 Hrs  T(C): 37, Max: 37.2 (06-16 @ 21:42)  T(F): 98.6, Max: 98.9 (06-16 @ 21:42)  HR: 85 (77 - 89)  BP: 150/71 (123/67 - 161/87)  BP(mean): --  RR: 18 (18 - 19)  SpO2: 94% (94% - 98%)    Complete Blood Count in AM (06.17.17 @ 05:12)    WBC Count: 8.2 K/uL    RBC Count: 3.09 M/uL    Hemoglobin: 7.8 g/dL    Hematocrit: 25.0 %    Mean Cell Volume: 80.9 fl    Mean Cell Hemoglobin: 25.2 pg    Mean Cell Hemoglobin Conc: 31.2 g/dL    Red Cell Distrib Width: 18.0 %    Platelet Count - Automated: 261 K/uL    WBC stable and normal for 4 days.    No new indications of ongoing infection - no indication of HD catheter infection.    Will follow new culture results (not yet logged in results section).    No intervention by me planned for now.    Will follow. Inhouse coverage [phone].

## 2017-06-17 NOTE — PROGRESS NOTE ADULT - SUBJECTIVE AND OBJECTIVE BOX
[facility]  INFECTIOUS DISEASES AND INTERNAL MEDICINE OF [place]  =======================================================  Ned De MD  Diplomates American Board of Internal Medicine and Infectious Diseases  =======================================================      BARBARA SORAYA [id number]    Follow up: Bacteremia, HAP    Patient seen in HD  reports no fevers,   SOB slightly better    Allergies:  No Known Allergies      Antibiotics:  cefepime  IVPB 1000milliGRAM(s) IV Intermittent every 24 hours  BLOODS NG      REVIEW OF SYSTEMS:  NEG    Physical Exam:  Vital Signs Last 24 Hrs  T(C): 37.1, Max: 37.1 (06-16 @ 10:30)  T(F): 98.7, Max: 98.7 (06-16 @ 10:30)  HR: 84 (81 - 91)  BP: 159/85 (129/73 - 161/91)  RR: 18 (18 - 18)  SpO2: 96% (93% - 98%)      GEN: NAD, pleasant  HEENT: normocephalic and atraumatic. EOMI. PERRL.    NECK: Supple.   LUNGS: Clear to auscultation.  HEART: Regular rate and rhythm   CHEST: Rt Chest wall PermCath   ABDOMEN: Soft, nontender, and nondistended.  Positive bowel sounds.    : No CVA tenderness  EXTREMITIES: Without any edema.  MSK: No joint swelling  NEUROLOGIC: Cranial nerves II through XII are grossly intact.  PSYCHIATRIC: Appropriate affect .  SKIN: No rash  CATHETER OUT    Labs  06-15    141  |  99  |  50.0<H>  ----------------------------<  132<H>  3.7   |  24.0  |  6.29<H>    Ca    8.7      15 Juanjose 2017 06:26                            7.8    7.7   )-----------( 275      ( 16 Jun 2017 06:15 )             24.7             RECENT CULTURES:  06-13 @ 02:20    [facility]    06-13 @ 02:07 .Urine Clean Catch (Midstream)     10,000 - 49,000 CFU/mL Corynebacterium species      06-12 @ 21:59 .Blood Blood-Peripheral Serratia marcescens    Growth in aerobic and anaerobic bottles: Serratia marcescens  Aerobic Bottle: 15:29 Hours to positivity  Anaerobic Bottle: 15;29 Hours to positivity      06-12 @ 21:58 .Blood Blood-Peripheral Serratia marcescens    Growth in aerobic and anaerobic bottles: Serratia marcescens  Aerobic Bottle: 15:29 Hours to positivity  Anaerobic Bottle: 15:29 Hours to positivity              EXAM:  CHEST SINGLE VIEW FRONTAL                        PROCEDURE DATE:  06/12/2017    INTERPRETATION:  Portable chest radiograph      CLINICAL INFORMATION:   Cough  TECHNIQUE:  Portable  AP view of the chest was obtained.  COMPARISON: 4/12/2017 available for review.  FINDINGS: Right IJ tunneled double-lumen catheter tip in SVC.   The lungs  are clear.  No pleural abnormality is seen.       Heart size within normal limits allowing for magnification effect of AP   projection. Visualized osseous structures are intact.  IMPRESSION:     No evidence of active chest disease [facility]  INFECTIOUS DISEASES AND INTERNAL MEDICINE OF [place]  =======================================================  Ned De MD  Diplomates American Board of Internal Medicine and Infectious Diseases  =======================================================      BARBARA SORAYA [id number]    Follow up: Bacteremia, HAP    Patient seen in HD  reports no fevers,   SOB slightly better    Allergies:  No Known Allergies      Antibiotics:  cefepime  IVPB 1000milliGRAM(s) IV Intermittent every 24 hours  BLOODS NG      REVIEW OF SYSTEMS:  NEG    Physical Exam:  Vital Signs Last 24 Hrs  T(C): 37.1, Max: 37.1 (06-16 @ 10:30)  T(F): 98.7, Max: 98.7 (06-16 @ 10:30)  HR: 84 (81 - 91)  BP: 159/85 (129/73 - 161/91)  RR: 18 (18 - 18)  SpO2: 96% (93% - 98%)      GEN: NAD, pleasant  HEENT: normocephalic and atraumatic. EOMI. PERRL.    NECK: Supple.   LUNGS: Clear to auscultation.  HEART: Regular rate and rhythm   CHEST: Rt Chest wall PermCath   ABDOMEN: Soft, nontender, and nondistended.  Positive bowel sounds.    : No CVA tenderness  EXTREMITIES: Without any edema.  MSK: No joint swelling  NEUROLOGIC: Cranial nerves II through XII are grossly intact.  PSYCHIATRIC: Appropriate affect .  SKIN: No rash      Labs  06-15    141  |  99  |  50.0<H>  ----------------------------<  132<H>  3.7   |  24.0  |  6.29<H>    Ca    8.7      15 Juanjose 2017 06:26                            7.8    7.7   )-----------( 275      ( 16 Jun 2017 06:15 )             24.7             RECENT CULTURES:  06-13 @ 02:20    RVP  [facility]    06-13 @ 02:07 .Urine Clean Catch (Midstream)     10,000 - 49,000 CFU/mL Corynebacterium species      06-12 @ 21:59 .Blood Blood-Peripheral Serratia marcescens    Growth in aerobic and anaerobic bottles: Serratia marcescens  Aerobic Bottle: 15:29 Hours to positivity  Anaerobic Bottle: 15;29 Hours to positivity      06-12 @ 21:58 .Blood Blood-Peripheral Serratia marcescens    Growth in aerobic and anaerobic bottles: Serratia marcescens  Aerobic Bottle: 15:29 Hours to positivity  Anaerobic Bottle: 15:29 Hours to positivity              EXAM:  CHEST SINGLE VIEW FRONTAL                        PROCEDURE DATE:  06/12/2017    INTERPRETATION:  Portable chest radiograph      CLINICAL INFORMATION:   Cough  TECHNIQUE:  Portable  AP view of the chest was obtained.  COMPARISON: 4/12/2017 available for review.  FINDINGS: Right IJ tunneled double-lumen catheter tip in SVC.   The lungs  are clear.  No pleural abnormality is seen.       Heart size within normal limits allowing for magnification effect of AP   projection. Visualized osseous structures are intact.  IMPRESSION:     No evidence of active chest disease

## 2017-06-17 NOTE — PROGRESS NOTE ADULT - PROBLEM SELECTOR PLAN 1
? etiology of Serratia bacteremia - f/u repeat BCx, and repeat set via permacath, as per ID permacath needs to be removed, though vascular disagreed.  c/w abx for now  patient is on day 5 of cefepime, florastor added today   param ? etiology of Serratia bacteremia - f/u repeat BCx, and repeat set via permacath, as per ID permacath needs to be removed, though vascular disagreed.  c/w abx for now  patient is on day 5 of cefepime, florastor added today   duonebs    also a prior smoker, quit prior to recent sequelae of events. ? COPD component - start duonebs for now for a few days

## 2017-06-17 NOTE — PROGRESS NOTE ADULT - PROBLEM SELECTOR PLAN 1
Blood cultures with Serratia marcescens   repeat blood cultures pending  catheter is out  Continue Cefepime 1gm IV q 24hours give dose after HD on HD days Blood cultures with Serratia marcescens   repeat blood cultures pending    Continue Cefepime 1gm IV q 24hours give dose after HD on HD days

## 2017-06-18 PROCEDURE — 99233 SBSQ HOSP IP/OBS HIGH 50: CPT

## 2017-06-18 RX ORDER — INSULIN LISPRO 100/ML
4 VIAL (ML) SUBCUTANEOUS
Qty: 0 | Refills: 0 | Status: DISCONTINUED | OUTPATIENT
Start: 2017-06-18 | End: 2017-06-21

## 2017-06-18 RX ORDER — LABETALOL HCL 100 MG
200 TABLET ORAL THREE TIMES A DAY
Qty: 0 | Refills: 0 | Status: DISCONTINUED | OUTPATIENT
Start: 2017-06-18 | End: 2017-06-21

## 2017-06-18 RX ADMIN — Medication 2: at 22:09

## 2017-06-18 RX ADMIN — VALSARTAN 160 MILLIGRAM(S): 80 TABLET ORAL at 06:06

## 2017-06-18 RX ADMIN — Medication 200 MILLIGRAM(S): at 22:09

## 2017-06-18 RX ADMIN — AMLODIPINE BESYLATE 10 MILLIGRAM(S): 2.5 TABLET ORAL at 06:06

## 2017-06-18 RX ADMIN — Medication 100 MILLIGRAM(S): at 06:06

## 2017-06-18 RX ADMIN — CALCITRIOL 0.25 MICROGRAM(S): 0.5 CAPSULE ORAL at 12:42

## 2017-06-18 RX ADMIN — Medication 3 MILLILITER(S): at 20:23

## 2017-06-18 RX ADMIN — Medication 3 MILLILITER(S): at 08:55

## 2017-06-18 RX ADMIN — Medication 200 MILLIGRAM(S): at 14:43

## 2017-06-18 RX ADMIN — SEVELAMER CARBONATE 800 MILLIGRAM(S): 2400 POWDER, FOR SUSPENSION ORAL at 17:32

## 2017-06-18 RX ADMIN — Medication 1 MILLIGRAM(S): at 12:43

## 2017-06-18 RX ADMIN — Medication 250 MILLIGRAM(S): at 17:32

## 2017-06-18 RX ADMIN — Medication 2 UNIT(S): at 12:43

## 2017-06-18 RX ADMIN — Medication 3 MILLILITER(S): at 15:08

## 2017-06-18 RX ADMIN — SEVELAMER CARBONATE 800 MILLIGRAM(S): 2400 POWDER, FOR SUSPENSION ORAL at 12:42

## 2017-06-18 RX ADMIN — Medication 250 MILLIGRAM(S): at 06:06

## 2017-06-18 RX ADMIN — SEVELAMER CARBONATE 800 MILLIGRAM(S): 2400 POWDER, FOR SUSPENSION ORAL at 09:58

## 2017-06-18 RX ADMIN — Medication 2 UNIT(S): at 08:43

## 2017-06-18 RX ADMIN — Medication 81 MILLIGRAM(S): at 12:42

## 2017-06-18 NOTE — PROGRESS NOTE ADULT - SUBJECTIVE AND OBJECTIVE BOX
Blood cultures No growth at 48 hours from 6/15 draw.    Plan as discussed in prior note.    Following.

## 2017-06-18 NOTE — PROGRESS NOTE ADULT - PROBLEM SELECTOR PLAN 5
c/w current medical regimen and monitor bp  currently on norvasc and valsartan  increase labetalol to 200 tid as bp still not well controlled

## 2017-06-18 NOTE — PROGRESS NOTE ADULT - SUBJECTIVE AND OBJECTIVE BOX
CC: Cough (13 Jun 2017 00:27)    Patient seen and examined at bedside. No acute distress and no acute overnight events. No complaints. States that she had n/v last night after eating some [employer]. No nausea now. States that she is frustrated with HD, asked a lot of questions about coming off and stated that SB wont do a transplant until she loses more weight. She has already gone from 303lb to 273lb. Stressed the importance of weight loss in this instance  +cough overnight with some white colored sputum     ROS: No chest pain, palpitations,  light headedness/dizziness,fevers/chills, abdominal pain, diarrhea/constipation, dysuria or increased urinary frequency.,     Vital Signs Last 24 Hrs  T(C): 36.3, Max: 37 (06-18 @ 05:08)  T(F): 97.4, Max: 98.6 (06-18 @ 05:08)  HR: 85 (84 - 87)  BP: 157/88 (154/80 - 173/88)  BP(mean): --  RR: 18 (18 - 18)  SpO2: 94% (94% - 99%)    Physical Exam:   GEN: middle aged female, morbidly obese, NAD   HEENT: EOMI, PERRLA   CVS: S1s2nl, no m/r/g RRR   Lungs: decreased to b/l bases   ABD: soft, obese, NT, bs +  Ext: no c/c/e. left forearm bruit noted. Right chest PC noted                     c/s: 269    TEL: desat overnight     MEDICATIONS  (STANDING):  heparin  Injectable 5000Unit(s) SubCutaneous every 8 hours  aspirin enteric coated 81milliGRAM(s) Oral daily  valsartan 160milliGRAM(s) Oral daily  calcium acetate 667milliGRAM(s) Oral three times a day with meals  sevelamer hydrochloride 800milliGRAM(s) Oral three times a day with meals  amLODIPine   Tablet 10milliGRAM(s) Oral daily  calcitriol   Capsule 0.25MICROGram(s) Oral daily  folic acid 1milliGRAM(s) Oral daily  ergocalciferol 23228Vriw(s) Oral every week  insulin lispro (HumaLOG) corrective regimen sliding scale  SubCutaneous Before meals and at bedtime  dextrose 5%. 1000milliLiter(s) IV Continuous <Continuous>  dextrose 50% Injectable 12.5Gram(s) IV Push once  dextrose 50% Injectable 25Gram(s) IV Push once  dextrose 50% Injectable 25Gram(s) IV Push once  cefepime  IVPB 1000milliGRAM(s) IV Intermittent every 24 hours  labetalol 100milliGRAM(s) Oral three times a day  epoetin raina Injectable 04734Njbi(s) IV Push <User Schedule>  saccharomyces boulardii 250milliGRAM(s) Oral two times a day  ALBUTerol/ipratropium for Nebulization 3milliLiter(s) Nebulizer every 6 hours  insulin lispro Injectable (HumaLOG) 4Unit(s) SubCutaneous three times a day before meals    MEDICATIONS  (PRN):  dextrose Gel 1Dose(s) Oral once PRN Blood Glucose LESS THAN 70 milliGRAM(s)/deciliter  glucagon  Injectable 1milliGRAM(s) IntraMuscular once PRN Glucose LESS THAN 70 milligrams/deciliter        Labs:                                    7.8    8.2   )-----------( 261      ( 17 Jun 2017 05:12 )             25.0       Blood cx with serratia, pending 2 sets in lab that are negative to date as of 6/15

## 2017-06-18 NOTE — PROGRESS NOTE ADULT - ASSESSMENT
1) ESRD: HD tomorrow will cont on MWF schedule  - needs intervention on AVF when stable    2) ID: probable PNA - doubt HD cath infection  - cultures noted; IV antibiotics    3) Anemia: +CKD  - NEELAM at HD; TS 17% will need IV iron once infectious process resolves

## 2017-06-18 NOTE — PROGRESS NOTE ADULT - PROBLEM SELECTOR PLAN 1
? etiology of Serratia bacteremia - f/u repeat BCx are negative thus far and repeat set via permacath, as per ID permacath needs to be removed, though vascular disagreed.  c/w abx for now  patient is on day 6 of cefepime, florastor added (this is day 4 since last negative blood culture) of a total of 14 days of tx. May be able to transition to levaquin doses orally - will d/w ID   duonebs    also a prior smoker, quit prior to recent sequelae of events. ? COPD component - start duonebs for now for a few days, titrate down on O2 requirement, does not like the NC, has on a VM with 8L of O2

## 2017-06-18 NOTE — PROGRESS NOTE ADULT - SUBJECTIVE AND OBJECTIVE BOX
NEPHROLOGY INTERVAL HPI/OVERNIGHT EVENTS:    Examined   Discussed long term dialysis and her candidacy for transplant with more weight loss    MEDICATIONS  (STANDING):  heparin  Injectable 5000Unit(s) SubCutaneous every 8 hours  aspirin enteric coated 81milliGRAM(s) Oral daily  valsartan 160milliGRAM(s) Oral daily  calcium acetate 667milliGRAM(s) Oral three times a day with meals  sevelamer hydrochloride 800milliGRAM(s) Oral three times a day with meals  amLODIPine   Tablet 10milliGRAM(s) Oral daily  calcitriol   Capsule 0.25MICROGram(s) Oral daily  folic acid 1milliGRAM(s) Oral daily  ergocalciferol 46917Vgpv(s) Oral every week  insulin lispro Injectable (HumaLOG) 2Unit(s) SubCutaneous before breakfast  insulin lispro Injectable (HumaLOG) 2Unit(s) SubCutaneous before lunch  insulin lispro Injectable (HumaLOG) 2Unit(s) SubCutaneous before dinner  insulin lispro (HumaLOG) corrective regimen sliding scale  SubCutaneous Before meals and at bedtime  dextrose 5%. 1000milliLiter(s) IV Continuous <Continuous>  dextrose 50% Injectable 12.5Gram(s) IV Push once  dextrose 50% Injectable 25Gram(s) IV Push once  dextrose 50% Injectable 25Gram(s) IV Push once  cefepime  IVPB 1000milliGRAM(s) IV Intermittent every 24 hours  labetalol 100milliGRAM(s) Oral three times a day  epoetin raina Injectable 61346Oeqz(s) IV Push <User Schedule>  saccharomyces boulardii 250milliGRAM(s) Oral two times a day  ALBUTerol/ipratropium for Nebulization 3milliLiter(s) Nebulizer every 6 hours    MEDICATIONS  (PRN):  dextrose Gel 1Dose(s) Oral once PRN Blood Glucose LESS THAN 70 milliGRAM(s)/deciliter  glucagon  Injectable 1milliGRAM(s) IntraMuscular once PRN Glucose LESS THAN 70 milligrams/deciliter      Allergies    No Known Allergies    Intolerances        Vital Signs Last 24 Hrs  T(C): 36.3, Max: 37 (06-18 @ 05:08)  T(F): 97.4, Max: 98.6 (06-18 @ 05:08)  HR: 85 (84 - 87)  BP: 157/88 (154/80 - 173/88)  BP(mean): --  RR: 18 (18 - 18)  SpO2: 94% (94% - 99%)  Daily     Daily Weight in k.1 (2017 10:52)    PHYSICAL EXAM:  GEN: NAD  HEENT: nasal O2  NECK: no jvd  CV: RRR no rub  LUNGS: coarse bilateral breath sounds; diminished at bases  ABD: soft, non tender    LABS:                        7.8    8.2   )-----------( 261      ( 2017 05:12 )             25.0                       RADIOLOGY & ADDITIONAL TESTS:

## 2017-06-19 LAB
ANION GAP SERPL CALC-SCNC: 18 MMOL/L — HIGH (ref 5–17)
BUN SERPL-MCNC: 70 MG/DL — HIGH (ref 8–20)
CALCIUM SERPL-MCNC: 9 MG/DL — SIGNIFICANT CHANGE UP (ref 8.6–10.2)
CHLORIDE SERPL-SCNC: 93 MMOL/L — LOW (ref 98–107)
CO2 SERPL-SCNC: 22 MMOL/L — SIGNIFICANT CHANGE UP (ref 22–29)
CREAT SERPL-MCNC: 8.33 MG/DL — HIGH (ref 0.5–1.3)
GLUCOSE SERPL-MCNC: 161 MG/DL — HIGH (ref 70–115)
POTASSIUM SERPL-MCNC: 5.5 MMOL/L — HIGH (ref 3.5–5.3)
POTASSIUM SERPL-SCNC: 5.5 MMOL/L — HIGH (ref 3.5–5.3)
SODIUM SERPL-SCNC: 133 MMOL/L — LOW (ref 135–145)

## 2017-06-19 PROCEDURE — 99233 SBSQ HOSP IP/OBS HIGH 50: CPT

## 2017-06-19 RX ADMIN — Medication 1 MILLIGRAM(S): at 12:39

## 2017-06-19 RX ADMIN — Medication 200 MILLIGRAM(S): at 22:59

## 2017-06-19 RX ADMIN — SEVELAMER CARBONATE 800 MILLIGRAM(S): 2400 POWDER, FOR SUSPENSION ORAL at 12:39

## 2017-06-19 RX ADMIN — Medication 250 MILLIGRAM(S): at 05:10

## 2017-06-19 RX ADMIN — CALCITRIOL 0.25 MICROGRAM(S): 0.5 CAPSULE ORAL at 12:39

## 2017-06-19 RX ADMIN — CEFEPIME 100 MILLIGRAM(S): 1 INJECTION, POWDER, FOR SOLUTION INTRAMUSCULAR; INTRAVENOUS at 01:08

## 2017-06-19 RX ADMIN — Medication 4: at 22:59

## 2017-06-19 RX ADMIN — Medication 200 MILLIGRAM(S): at 05:10

## 2017-06-19 RX ADMIN — AMLODIPINE BESYLATE 10 MILLIGRAM(S): 2.5 TABLET ORAL at 05:10

## 2017-06-19 RX ADMIN — VALSARTAN 160 MILLIGRAM(S): 80 TABLET ORAL at 05:10

## 2017-06-19 RX ADMIN — ERYTHROPOIETIN 10000 UNIT(S): 10000 INJECTION, SOLUTION INTRAVENOUS; SUBCUTANEOUS at 15:32

## 2017-06-19 RX ADMIN — Medication 81 MILLIGRAM(S): at 12:39

## 2017-06-19 RX ADMIN — Medication 3 MILLILITER(S): at 19:58

## 2017-06-19 RX ADMIN — Medication 3 MILLILITER(S): at 08:50

## 2017-06-19 RX ADMIN — SEVELAMER CARBONATE 800 MILLIGRAM(S): 2400 POWDER, FOR SUSPENSION ORAL at 08:47

## 2017-06-19 NOTE — PROGRESS NOTE ADULT - ASSESSMENT
Patient is a 38 y/o female, PMH ESRD on HD, DMII, HTN, HLD, obesity, recent Serratia sepsis due to PNA s/p thoracocentesis and prolonged IV abx course admitted for sepsis after presenting  with cough, fevers, leukocytosis. Pt responded to treatment with IV abx, fluids and grew Serratia in BCx x2, repeat BCx are still P. Ct chest suggestive of PNA, but clinically pt's is not consistent clinically  and suspicion is raised for permacath to be source of infection - discussed with vascular Dr. Ayoub - feels clinically is not indicated to remove catheter unless repeat BCx are positive. f/up current cx in lab

## 2017-06-19 NOTE — PROGRESS NOTE ADULT - PROBLEM SELECTOR PLAN 1
? etiology of Serratia bacteremia - f/u repeat BCx are negative thus far and repeat set via permacath  c/w abx for now  patient is on day 7 of cefepime, florastor (this is day 5 since last negative blood culture) of a total of 14 days of tx. May be able to transition to levaquin doses orally - will d/w ID   angbs    also a prior smoker, quit prior to recent sequelae of events. ? COPD component - started duonebs for a few days, titrate down on O2 requirement, does not like the NC, has on a VM with 7L of O2; will continue to titrate down

## 2017-06-19 NOTE — PROGRESS NOTE ADULT - PROBLEM SELECTOR PLAN 5
c/w current medical regimen and monitor bp  currently on norvasc and valsartan  increased labetalol to 200 tid, will give it another day. Her pre HD BP today was 153, will f/up after HD BP prior to any further changes to bp meds

## 2017-06-19 NOTE — PROGRESS NOTE ADULT - ASSESSMENT
1) ESRD: HD today will cont on MWF schedule  - needs intervention on AVF when stable    2) ID: probable PNA - doubt HD cath infection  - cultures noted; IV antibiotics    3) Anemia: +CKD  - NEELAM at HD; TS 17% will need IV iron once infectious process resolves

## 2017-06-19 NOTE — PROGRESS NOTE ADULT - SUBJECTIVE AND OBJECTIVE BOX
CC: Cough (13 Jun 2017 00:27)    Patient seen and examined at bedside. No acute distress and no acute overnight events. No complaints today. States that she feels a little better. Nephro came to talk to her yesterday but she was too tired.     ROS: No chest pain, palpitations,  light headedness/dizziness,fevers/chills, abdominal pain, diarrhea/constipation, dysuria or increased urinary frequency.,     Vital Signs Last 24 Hrs  T(C): 36.7, Max: 36.7 (06-19 @ 14:35)  T(F): 98.1, Max: 98.1 (06-19 @ 14:35)  HR: 85 (79 - 86)  BP: 158/82 (138/78 - 158/82)  BP(mean): --  RR: 18 (18 - 20)  SpO2: 98% (95% - 99%)    Physical Exam:   GEN: middle aged female, morbidly obese, NAD   HEENT: EOMI, PERRLA   CVS: S1s2nl, no m/r/g RRR   Lungs: decreased to b/l bases   ABD: soft, obese, NT, bs +  Ext: no c/c/e. left forearm bruit noted. Right chest PC noted                     c/s: 123    TEL: desat to 82% overnight     MEDICATIONS  (STANDING):  heparin  Injectable 5000Unit(s) SubCutaneous every 8 hours  aspirin enteric coated 81milliGRAM(s) Oral daily  valsartan 160milliGRAM(s) Oral daily  calcium acetate 667milliGRAM(s) Oral three times a day with meals  sevelamer hydrochloride 800milliGRAM(s) Oral three times a day with meals  amLODIPine   Tablet 10milliGRAM(s) Oral daily  calcitriol   Capsule 0.25MICROGram(s) Oral daily  folic acid 1milliGRAM(s) Oral daily  ergocalciferol 57970Yzdf(s) Oral every week  insulin lispro (HumaLOG) corrective regimen sliding scale  SubCutaneous Before meals and at bedtime  dextrose 5%. 1000milliLiter(s) IV Continuous <Continuous>  dextrose 50% Injectable 12.5Gram(s) IV Push once  dextrose 50% Injectable 25Gram(s) IV Push once  dextrose 50% Injectable 25Gram(s) IV Push once  cefepime  IVPB 1000milliGRAM(s) IV Intermittent every 24 hours  epoetin raina Injectable 21469Vfhv(s) IV Push <User Schedule>  saccharomyces boulardii 250milliGRAM(s) Oral two times a day  ALBUTerol/ipratropium for Nebulization 3milliLiter(s) Nebulizer every 6 hours  insulin lispro Injectable (HumaLOG) 4Unit(s) SubCutaneous three times a day before meals  labetalol 200milliGRAM(s) Oral three times a day    MEDICATIONS  (PRN):  dextrose Gel 1Dose(s) Oral once PRN Blood Glucose LESS THAN 70 milliGRAM(s)/deciliter  glucagon  Injectable 1milliGRAM(s) IntraMuscular once PRN Glucose LESS THAN 70 milligrams/deciliter          Labs:   no new labs - labs from  pending                         7.8    8.2   )-----------( 261      ( 17 Jun 2017 05:12 )             25.0       Blood cx with serratia, pending 2 sets in lab that are negative to date as of 6/15

## 2017-06-19 NOTE — PROGRESS NOTE ADULT - SUBJECTIVE AND OBJECTIVE BOX
NEPHROLOGY INTERVAL HPI/OVERNIGHT EVENTS:    Examined earlier  Looks comfortable    MEDICATIONS  (STANDING):  heparin  Injectable 5000Unit(s) SubCutaneous every 8 hours  aspirin enteric coated 81milliGRAM(s) Oral daily  valsartan 160milliGRAM(s) Oral daily  calcium acetate 667milliGRAM(s) Oral three times a day with meals  sevelamer hydrochloride 800milliGRAM(s) Oral three times a day with meals  amLODIPine   Tablet 10milliGRAM(s) Oral daily  calcitriol   Capsule 0.25MICROGram(s) Oral daily  folic acid 1milliGRAM(s) Oral daily  ergocalciferol 32184Xfnl(s) Oral every week  insulin lispro (HumaLOG) corrective regimen sliding scale  SubCutaneous Before meals and at bedtime  dextrose 5%. 1000milliLiter(s) IV Continuous <Continuous>  dextrose 50% Injectable 12.5Gram(s) IV Push once  dextrose 50% Injectable 25Gram(s) IV Push once  dextrose 50% Injectable 25Gram(s) IV Push once  cefepime  IVPB 1000milliGRAM(s) IV Intermittent every 24 hours  epoetin raina Injectable 39567Ghar(s) IV Push <User Schedule>  saccharomyces boulardii 250milliGRAM(s) Oral two times a day  ALBUTerol/ipratropium for Nebulization 3milliLiter(s) Nebulizer every 6 hours  insulin lispro Injectable (HumaLOG) 4Unit(s) SubCutaneous three times a day before meals  labetalol 200milliGRAM(s) Oral three times a day    MEDICATIONS  (PRN):  dextrose Gel 1Dose(s) Oral once PRN Blood Glucose LESS THAN 70 milliGRAM(s)/deciliter  glucagon  Injectable 1milliGRAM(s) IntraMuscular once PRN Glucose LESS THAN 70 milligrams/deciliter      Allergies    No Known Allergies    Intolerances        Vital Signs Last 24 Hrs  T(C): 36.7, Max: 36.7 ( @ 14:35)  T(F): 98.1, Max: 98.1 ( @ 14:35)  HR: 85 (79 - 86)  BP: 158/82 (138/78 - 158/82)  BP(mean): --  RR: 18 (18 - 20)  SpO2: 98% (95% - 99%)  Daily     Daily Weight in k.7 (2017 14:35)    PHYSICAL EXAM:  GEN: NAD  HEENT: nasal O2  NECK: no jvd  CV: RRR no rub  LUNGS: coarse bilateral breath sounds; diminished at bases  ABD: soft, non tender      LABS:        133<L>  |  93<L>  |  70.0<H>  ----------------------------<  161<H>  5.5<H>   |  22.0  |  8.33<H>    Ca    9.0      2017 15:50                  RADIOLOGY & ADDITIONAL TESTS:

## 2017-06-20 DIAGNOSIS — J15.6 PNEUMONIA DUE TO OTHER GRAM-NEGATIVE BACTERIA: ICD-10-CM

## 2017-06-20 LAB
ANION GAP SERPL CALC-SCNC: 18 MMOL/L — HIGH (ref 5–17)
BUN SERPL-MCNC: 48 MG/DL — HIGH (ref 8–20)
CALCIUM SERPL-MCNC: 9.2 MG/DL — SIGNIFICANT CHANGE UP (ref 8.6–10.2)
CHLORIDE SERPL-SCNC: 92 MMOL/L — LOW (ref 98–107)
CO2 SERPL-SCNC: 24 MMOL/L — SIGNIFICANT CHANGE UP (ref 22–29)
CREAT SERPL-MCNC: 5.64 MG/DL — HIGH (ref 0.5–1.3)
GLUCOSE SERPL-MCNC: 122 MG/DL — HIGH (ref 70–115)
HBV CORE AB SER-ACNC: SIGNIFICANT CHANGE UP
HBV CORE IGM SER-ACNC: SIGNIFICANT CHANGE UP
HBV SURFACE AB SER-ACNC: 86.4 MIU/ML — SIGNIFICANT CHANGE UP
HBV SURFACE AG SER-ACNC: SIGNIFICANT CHANGE UP
HCT VFR BLD CALC: 25 % — LOW (ref 37–47)
HCV AB S/CO SERPL IA: 0.14 S/CO — SIGNIFICANT CHANGE UP
HCV AB SERPL-IMP: SIGNIFICANT CHANGE UP
HGB BLD-MCNC: 8 G/DL — LOW (ref 12–16)
MCHC RBC-ENTMCNC: 25.6 PG — LOW (ref 27–31)
MCHC RBC-ENTMCNC: 32 G/DL — SIGNIFICANT CHANGE UP (ref 32–36)
MCV RBC AUTO: 80.1 FL — LOW (ref 81–99)
PLATELET # BLD AUTO: 287 K/UL — SIGNIFICANT CHANGE UP (ref 150–400)
POTASSIUM SERPL-MCNC: 5.1 MMOL/L — SIGNIFICANT CHANGE UP (ref 3.5–5.3)
POTASSIUM SERPL-SCNC: 5.1 MMOL/L — SIGNIFICANT CHANGE UP (ref 3.5–5.3)
RBC # BLD: 3.12 M/UL — LOW (ref 4.4–5.2)
RBC # FLD: 18.7 % — HIGH (ref 11–15.6)
SODIUM SERPL-SCNC: 134 MMOL/L — LOW (ref 135–145)
WBC # BLD: 7.2 K/UL — SIGNIFICANT CHANGE UP (ref 4.8–10.8)
WBC # FLD AUTO: 7.2 K/UL — SIGNIFICANT CHANGE UP (ref 4.8–10.8)

## 2017-06-20 PROCEDURE — 76882 US LMTD JT/FCL EVL NVASC XTR: CPT | Mod: 26,RT

## 2017-06-20 PROCEDURE — 99232 SBSQ HOSP IP/OBS MODERATE 35: CPT

## 2017-06-20 PROCEDURE — 99233 SBSQ HOSP IP/OBS HIGH 50: CPT

## 2017-06-20 RX ORDER — HYDRALAZINE HCL 50 MG
25 TABLET ORAL EVERY 8 HOURS
Qty: 0 | Refills: 0 | Status: DISCONTINUED | OUTPATIENT
Start: 2017-06-20 | End: 2017-06-21

## 2017-06-20 RX ORDER — SODIUM CHLORIDE 0.65 %
1 AEROSOL, SPRAY (ML) NASAL EVERY 4 HOURS
Qty: 0 | Refills: 0 | Status: DISCONTINUED | OUTPATIENT
Start: 2017-06-20 | End: 2017-06-21

## 2017-06-20 RX ADMIN — Medication 200 MILLIGRAM(S): at 06:12

## 2017-06-20 RX ADMIN — Medication 200 MILLIGRAM(S): at 22:31

## 2017-06-20 RX ADMIN — Medication 3 MILLILITER(S): at 09:10

## 2017-06-20 RX ADMIN — Medication 200 MILLIGRAM(S): at 14:25

## 2017-06-20 RX ADMIN — VALSARTAN 160 MILLIGRAM(S): 80 TABLET ORAL at 06:12

## 2017-06-20 RX ADMIN — SEVELAMER CARBONATE 800 MILLIGRAM(S): 2400 POWDER, FOR SUSPENSION ORAL at 17:54

## 2017-06-20 RX ADMIN — SEVELAMER CARBONATE 800 MILLIGRAM(S): 2400 POWDER, FOR SUSPENSION ORAL at 12:49

## 2017-06-20 RX ADMIN — Medication 3 MILLILITER(S): at 14:26

## 2017-06-20 RX ADMIN — Medication 25 MILLIGRAM(S): at 22:31

## 2017-06-20 RX ADMIN — AMLODIPINE BESYLATE 10 MILLIGRAM(S): 2.5 TABLET ORAL at 06:12

## 2017-06-20 RX ADMIN — Medication 25 MILLIGRAM(S): at 17:54

## 2017-06-20 RX ADMIN — Medication 1 MILLIGRAM(S): at 12:49

## 2017-06-20 RX ADMIN — CALCITRIOL 0.25 MICROGRAM(S): 0.5 CAPSULE ORAL at 12:49

## 2017-06-20 RX ADMIN — ERGOCALCIFEROL 50000 UNIT(S): 1.25 CAPSULE ORAL at 12:49

## 2017-06-20 RX ADMIN — Medication 250 MILLIGRAM(S): at 17:54

## 2017-06-20 RX ADMIN — Medication 81 MILLIGRAM(S): at 12:49

## 2017-06-20 RX ADMIN — SEVELAMER CARBONATE 800 MILLIGRAM(S): 2400 POWDER, FOR SUSPENSION ORAL at 08:30

## 2017-06-20 RX ADMIN — Medication 250 MILLIGRAM(S): at 06:12

## 2017-06-20 RX ADMIN — CEFEPIME 100 MILLIGRAM(S): 1 INJECTION, POWDER, FOR SOLUTION INTRAMUSCULAR; INTRAVENOUS at 03:00

## 2017-06-20 NOTE — PROGRESS NOTE ADULT - ASSESSMENT
Patient is a 36 y/o female, PMH ESRD on HD, DMII, HTN, HLD, obesity, recent Serratia sepsis due to PNA s/p thoracocentesis and prolonged IV abx course admitted for sepsis after presenting  with cough, fevers, leukocytosis. Pt responded to treatment with IV abx, fluids and grew Serratia in BCx x2, repeat BCx are still P. Ct chest suggestive of PNA, but clinically pt's is not consistent clinically  and suspicion is raised for permacath to be source of infection - discussed with vascular Dr. Ayoub - feels clinically is not indicated to remove catheter unless repeat BCx are positive. f/up current cx in lab. she was now found to have a mass in right lower back, pending ct of the back to r/o abscess

## 2017-06-20 NOTE — PROGRESS NOTE ADULT - ASSESSMENT
36 y/o F presenting with SOB, cough, fever, Serratia marcescens bacteremia.  HD cath cultures are negative.

## 2017-06-20 NOTE — PROGRESS NOTE ADULT - SUBJECTIVE AND OBJECTIVE BOX
NEPHROLOGY INTERVAL HPI/OVERNIGHT EVENTS:    Feels better   No new complaints   Went for sonogram of back lump   ID follow up noted       MEDICATIONS  (STANDING):  heparin  Injectable 5000Unit(s) SubCutaneous every 8 hours  aspirin enteric coated 81milliGRAM(s) Oral daily  valsartan 160milliGRAM(s) Oral daily  calcium acetate 667milliGRAM(s) Oral three times a day with meals  sevelamer hydrochloride 800milliGRAM(s) Oral three times a day with meals  amLODIPine   Tablet 10milliGRAM(s) Oral daily  calcitriol   Capsule 0.25MICROGram(s) Oral daily  folic acid 1milliGRAM(s) Oral daily  ergocalciferol 16825Bunv(s) Oral every week  insulin lispro (HumaLOG) corrective regimen sliding scale  SubCutaneous Before meals and at bedtime  dextrose 5%. 1000milliLiter(s) IV Continuous <Continuous>  dextrose 50% Injectable 12.5Gram(s) IV Push once  dextrose 50% Injectable 25Gram(s) IV Push once  dextrose 50% Injectable 25Gram(s) IV Push once  epoetin raina Injectable 63431Ugqf(s) IV Push <User Schedule>  saccharomyces boulardii 250milliGRAM(s) Oral two times a day  insulin lispro Injectable (HumaLOG) 4Unit(s) SubCutaneous three times a day before meals  labetalol 200milliGRAM(s) Oral three times a day    MEDICATIONS  (PRN):  dextrose Gel 1Dose(s) Oral once PRN Blood Glucose LESS THAN 70 milliGRAM(s)/deciliter  glucagon  Injectable 1milliGRAM(s) IntraMuscular once PRN Glucose LESS THAN 70 milligrams/deciliter  sodium chloride 0.65% Nasal 1Spray(s) Both Nostrils every 4 hours PRN Nasal Congestion      Allergies    No Known Allergies    Intolerances      ALLERY AND IMMUNOLOGIC: No hives or eczema      Vital Signs Last 24 Hrs  T(C): 36.7, Max: 37.2 (- @ 23:03)  T(F): 98, Max: 99 (- @ 23:03)  HR: 88 (88 - 94)  BP: 169/94 (141/78 - 171/93)  BP(mean): --  RR: 18 (17 - 18)  SpO2: 96% (94% - 100%)  Daily     Daily Weight in k.7 (2017 19:20)  I&O's Detail    I & Os for current day (as of 2017 14:59)  =============================================  IN:    Oral Fluid: 400 ml    Total IN: 400 ml  ---------------------------------------------  OUT:    Total OUT: 0 ml  ---------------------------------------------  Total NET: 400 ml    I&O's Summary    I & Os for current day (as of 2017 14:59)  =============================================  IN: 400 ml / OUT: 0 ml / NET: 400 ml      PHYSICAL EXAM:    GENERAL: NAD, Nontoxic   HEAD:  Atraumatic, No edema   NECK: Supple, No JVD, right permacath clean   CHEST/LUNG: EAE , NO wheeze   HEART: Regular rate and rhythm; No  rub   ABDOMEN: Soft, Nontender, Nondistended;  EXT . + Developing L arm AVF       LABS:                        8.0    7.2   )-----------( 287      ( 2017 05:47 )             25.0     06-19    133<L>  |  93<L>  |  70.0<H>  ----------------------------<  161<H>  5.5<H>   |  22.0  |  8.33<H>    Ca    9.0      2017 15:50                  RADIOLOGY & ADDITIONAL TESTS:

## 2017-06-20 NOTE — PROGRESS NOTE ADULT - ASSESSMENT
ESRD - HD tomorrow     Anemia - Continue epogen w/ HD     RO - Phoslo w/ meals     HTN - Watch on meds     Resolved Serratia bacteremia - PNA , Abx as per ID . Follow up cultures

## 2017-06-20 NOTE — PROGRESS NOTE ADULT - PROBLEM SELECTOR PLAN 1
bacteremia on 6/12/17;  HD catheter culture negative.   First set of negative cultures on 6/15/17  x 2 sets  - on cefepime  - will change to LEVAQUIN 750mg x 1 dose, then 500mg Q48H x 14 days (start 6/15/17)

## 2017-06-20 NOTE — ED PROVIDER NOTE - CONSTITUTIONAL MENTATION
Patient was advised of xray results and verbalized understanding. She stated that all questions were answered. Patient was advised to call our office if she had any further questions or concerns.   oriented to person, place, time/situation/alert/awake

## 2017-06-20 NOTE — PROGRESS NOTE ADULT - PROBLEM SELECTOR PLAN 5
c/w current medical regimen and monitor bp  currently on norvasc and valsartan; added hydralazine 25 q8h and will monitor bp   increased labetalol to 200 tid a few days ago - will monitor

## 2017-06-20 NOTE — PROGRESS NOTE ADULT - SUBJECTIVE AND OBJECTIVE BOX
CC: Cough (13 Jun 2017 00:27)    Patient seen and examined at bedside. No acute distress and no acute overnight events. No complaints today. States that she feels a little better today and will try to decrease her O2 to nasal  cannula however tends to get nosebleeds from them. c/o lump on her right lower back, non tender and no f/c    ROS: No chest pain, palpitations,  light headedness/dizziness,fevers/chills, abdominal pain, diarrhea/constipation, dysuria or increased urinary frequency.,     Vital Signs Last 24 Hrs  T(C): 36.7, Max: 37.2 (06-19 @ 23:03)  T(F): 98, Max: 99 (06-19 @ 23:03)  HR: 88 (88 - 94)  BP: 169/94 (141/78 - 171/93)  BP(mean): --  RR: 18 (18 - 18)  SpO2: 98% (94% - 100%)    Physical Exam:   GEN: middle aged female, morbidly obese, NAD   HEENT: EOMI, PERRLA   CVS: S1s2nl, no m/r/g RRR   Lungs: decreased to b/l bases   ABD: soft, obese, NT, bs +  Ext: no c/c/e. left forearm bruit noted. Right chest PC noted   Back: Right paraspinal lower approx 3cm round mass, immobile and non tender                     c/s: 114-128    TEL: desat to 88% overnight     MEDICATIONS  (STANDING):  heparin  Injectable 5000Unit(s) SubCutaneous every 8 hours  aspirin enteric coated 81milliGRAM(s) Oral daily  valsartan 160milliGRAM(s) Oral daily  calcium acetate 667milliGRAM(s) Oral three times a day with meals  sevelamer hydrochloride 800milliGRAM(s) Oral three times a day with meals  amLODIPine   Tablet 10milliGRAM(s) Oral daily  calcitriol   Capsule 0.25MICROGram(s) Oral daily  folic acid 1milliGRAM(s) Oral daily  ergocalciferol 72164Yhdt(s) Oral every week  insulin lispro (HumaLOG) corrective regimen sliding scale  SubCutaneous Before meals and at bedtime  dextrose 5%. 1000milliLiter(s) IV Continuous <Continuous>  dextrose 50% Injectable 12.5Gram(s) IV Push once  dextrose 50% Injectable 25Gram(s) IV Push once  dextrose 50% Injectable 25Gram(s) IV Push once  epoetin raina Injectable 45412Rbxb(s) IV Push <User Schedule>  saccharomyces boulardii 250milliGRAM(s) Oral two times a day  insulin lispro Injectable (HumaLOG) 4Unit(s) SubCutaneous three times a day before meals  labetalol 200milliGRAM(s) Oral three times a day  hydrALAZINE 25milliGRAM(s) Oral every 8 hours    MEDICATIONS  (PRN):  dextrose Gel 1Dose(s) Oral once PRN Blood Glucose LESS THAN 70 milliGRAM(s)/deciliter  glucagon  Injectable 1milliGRAM(s) IntraMuscular once PRN Glucose LESS THAN 70 milligrams/deciliter  sodium chloride 0.65% Nasal 1Spray(s) Both Nostrils every 4 hours PRN Nasal Congestion            Labs:                         8.0    7.2   )-----------( 287      ( 20 Jun 2017 05:47 )             25.0   06-19    133<L>  |  93<L>  |  70.0<H>  ----------------------------<  161<H>  5.5<H>   |  22.0  |  8.33<H>    Ca    9.0      19 Jun 2017 15:50        Blood cx with serratia, pending 2 sets in lab that are negative to date as of 6/15

## 2017-06-20 NOTE — PROGRESS NOTE ADULT - SUBJECTIVE AND OBJECTIVE BOX
[facility]  INFECTIOUS DISEASES AND INTERNAL [facility]IC[facility]  =======================================================  Ned De MD  Diplomates American Board of Internal Medicine and Infectious Diseases  =======================================================    BARBARA SORAYA [id number]  follow up: Serattia bacteremia.   patient seen and examined in follow up.  Chart and labs reviewed.     Blood cultures from 6/16/17 draw from dialysis catheter are negative x 2, in process.   denies fevers. feels generally well.   =======================================================  Allergies:  No Known Allergies    =======================================================  Medications:  heparin  Injectable 5000Unit(s) SubCutaneous every 8 hours  aspirin enteric coated 81milliGRAM(s) Oral daily  valsartan 160milliGRAM(s) Oral daily  calcium acetate 667milliGRAM(s) Oral three times a day with meals  sevelamer hydrochloride 800milliGRAM(s) Oral three times a day with meals  amLODIPine   Tablet 10milliGRAM(s) Oral daily  calcitriol   Capsule 0.25MICROGram(s) Oral daily  folic acid 1milliGRAM(s) Oral daily  ergocalciferol 57512Mroi(s) Oral every week  insulin lispro (HumaLOG) corrective regimen sliding scale  SubCutaneous Before meals and at bedtime  dextrose 5%. 1000milliLiter(s) IV Continuous <Continuous>  dextrose Gel 1Dose(s) Oral once PRN  dextrose 50% Injectable 12.5Gram(s) IV Push once  dextrose 50% Injectable 25Gram(s) IV Push once  dextrose 50% Injectable 25Gram(s) IV Push once  glucagon  Injectable 1milliGRAM(s) IntraMuscular once PRN  cefepime  IVPB 1000milliGRAM(s) IV Intermittent every 24 hours  epoetin raina Injectable 52070Ifrd(s) IV Push <User Schedule>  saccharomyces boulardii 250milliGRAM(s) Oral two times a day  ALBUTerol/ipratropium for Nebulization 3milliLiter(s) Nebulizer every 6 hours  insulin lispro Injectable (HumaLOG) 4Unit(s) SubCutaneous three times a day before meals  labetalol 200milliGRAM(s) Oral three times a day  sodium chloride 0.65% Nasal 1Spray(s) Both Nostrils every 4 hours PRN    =======================================================  REVIEW OF SYSTEMS:  CONSTITUTIONAL:  No Fever or chills  HEENT:   No diplopia or blurred vision.  No earache, sore throat or runny nose.  CARDIOVASCULAR:  No pressure, squeezing, strangling, tightness, heaviness or aching about the chest, neck, axilla or epigastrium.  RESPIRATORY:  No cough, shortness of breath, PND or orthopnea.  GASTROINTESTINAL:  No nausea, vomiting or diarrhea.  GENITOURINARY:  No dysuria, frequency or urgency. No Blood in urine  MUSCULOSKELETAL:  no joint aches, no muscle pain  SKIN:  No change in skin, hair or nails.  NEUROLOGIC:  No paresthesias, fasciculations, seizures or weakness.  PSYCHIATRIC:  No disorder of thought or mood.  ENDOCRINE:  No heat or cold intolerance, polyuria or polydipsia.  HEMATOLOGICAL:  No easy bruising or bleeding.     =======================================================     Physical Exam:  ICU Vital Signs Last 24 Hrs  T(C): 36.6, Max: 37.2 (06-19 @ 23:03)  T(F): 97.8, Max: 99 (06-19 @ 23:03)  HR: 90 (85 - 94)  BP: 171/93 (141/78 - 171/93)  RR: 18 (17 - 18)  SpO2: 94% (94% - 100%)    GEN: NAD, pleasant, obese  HEENT: normocephalic and atraumatic. EOMI. WILDA.    NECK: Supple. No carotid bruits.  No lymphadenopathy or thyromegaly.  LUNGS: good air entry anteriorly  Chest:  RIGHT sided chest Catheter; no erythema along track; non tender.   HEART: Regular rate and rhythm without murmur.  ABDOMEN: Soft, nontender, and nondistended.  Positive bowel sounds.    : No CVA tenderness  EXTREMITIES: Without any cyanosis, clubbing, rash, lesions or edema.  MSK: no joint swelling  NEUROLOGIC: Cranial nerves II through XII are grossly intact.  PSYCHIATRIC: Appropriate affect .  SKIN: No ulceration or induration present.    =======================================================    Labs:  06-19    133<L>  |  93<L>  |  70.0<H>  ----------------------------<  161<H>  5.5<H>   |  22.0  |  8.33<H>    Ca    9.0      19 Jun 2017 15:50                            8.0    7.2   )-----------( 287      ( 20 Jun 2017 05:47 )             25.0          RECENT CULTURES:  06-16 @ 18:34 .Blood Blood-Catheter     No growth at 48 hours        06-16 @ 18:33 .Blood Blood-Catheter     No growth at 48 hours        06-15 @ 13:45 .Blood Blood     No growth at 48 hours        06-15 @ 13:44 .Blood Blood     No growth at 48 hours        06-13 @ 02:20          NotDetec  06-13 @ 02:07 .Urine Clean Catch (Midstream)     10,000 - 49,000 CFU/mL Corynebacterium species        06-12 @ 21:59 .Blood Blood-Peripheral Serratia marcescens    Growth in aerobic and anaerobic bottles: Serratia marcescens  Aerobic Bottle: 15:29 Hours to positivity  Anaerobic Bottle: 15;29 Hours to positivity  .  TYPE: (C=Critical, N=Notification, A=Abnormal) c  TESTS:  _ bld gs    DATE/TIME CALLED: _ 06/13/ 06-12 @ 21:58 .Blood Blood-Peripheral Serratia marcescens    Growth in aerobic and anaerobic bottles: Serratia marcescens  Aerobic Bottle: 15:29 Hours to positivity  Anaerobic Bottle: 15:29 Hours to positivity  .  TYPE: (C=Critical, N=Notification, A=Abnormal) C  TESTS:  _ Bld gs  DATE/TIME CALLED: _ 06/13/20

## 2017-06-20 NOTE — PROGRESS NOTE ADULT - ATTENDING COMMENTS
In addition, nasal spray started. titrate off o2, pending ct back, can d/c to home In addition, nasal spray started. titrate off o2, pending ct back, can d/c to home. hyperkalemia noted after HD. stat bmp ordered

## 2017-06-21 VITALS
RESPIRATION RATE: 18 BRPM | HEART RATE: 91 BPM | SYSTOLIC BLOOD PRESSURE: 149 MMHG | TEMPERATURE: 98 F | DIASTOLIC BLOOD PRESSURE: 86 MMHG

## 2017-06-21 LAB
ANION GAP SERPL CALC-SCNC: 18 MMOL/L — HIGH (ref 5–17)
BUN SERPL-MCNC: 59 MG/DL — HIGH (ref 8–20)
CALCIUM SERPL-MCNC: 9 MG/DL — SIGNIFICANT CHANGE UP (ref 8.6–10.2)
CHLORIDE SERPL-SCNC: 94 MMOL/L — LOW (ref 98–107)
CO2 SERPL-SCNC: 22 MMOL/L — SIGNIFICANT CHANGE UP (ref 22–29)
CREAT SERPL-MCNC: 6.9 MG/DL — HIGH (ref 0.5–1.3)
CULTURE RESULTS: SIGNIFICANT CHANGE UP
GLUCOSE SERPL-MCNC: 114 MG/DL — SIGNIFICANT CHANGE UP (ref 70–115)
HCG UR QL: NEGATIVE — SIGNIFICANT CHANGE UP
HCT VFR BLD CALC: 23.8 % — LOW (ref 37–47)
HGB BLD-MCNC: 7.4 G/DL — LOW (ref 12–16)
MAGNESIUM SERPL-MCNC: 2.1 MG/DL — SIGNIFICANT CHANGE UP (ref 1.6–2.6)
MCHC RBC-ENTMCNC: 24.9 PG — LOW (ref 27–31)
MCHC RBC-ENTMCNC: 31.1 G/DL — LOW (ref 32–36)
MCV RBC AUTO: 80.1 FL — LOW (ref 81–99)
PHOSPHATE SERPL-MCNC: 5.7 MG/DL — HIGH (ref 2.4–4.7)
PLATELET # BLD AUTO: 276 K/UL — SIGNIFICANT CHANGE UP (ref 150–400)
POTASSIUM SERPL-MCNC: 5 MMOL/L — SIGNIFICANT CHANGE UP (ref 3.5–5.3)
POTASSIUM SERPL-SCNC: 5 MMOL/L — SIGNIFICANT CHANGE UP (ref 3.5–5.3)
RBC # BLD: 2.97 M/UL — LOW (ref 4.4–5.2)
RBC # FLD: 19 % — HIGH (ref 11–15.6)
SODIUM SERPL-SCNC: 134 MMOL/L — LOW (ref 135–145)
SPECIMEN SOURCE: SIGNIFICANT CHANGE UP
WBC # BLD: 7 K/UL — SIGNIFICANT CHANGE UP (ref 4.8–10.8)
WBC # FLD AUTO: 7 K/UL — SIGNIFICANT CHANGE UP (ref 4.8–10.8)

## 2017-06-21 PROCEDURE — 72131 CT LUMBAR SPINE W/O DYE: CPT | Mod: 26

## 2017-06-21 PROCEDURE — 99239 HOSP IP/OBS DSCHRG MGMT >30: CPT

## 2017-06-21 RX ORDER — LABETALOL HCL 100 MG
1 TABLET ORAL
Qty: 900 | Refills: 0 | OUTPATIENT
Start: 2017-06-21 | End: 2018-04-17

## 2017-06-21 RX ORDER — VALSARTAN 80 MG/1
1 TABLET ORAL
Qty: 0 | Refills: 0 | COMMUNITY
Start: 2017-06-21

## 2017-06-21 RX ORDER — SODIUM CHLORIDE 0.65 %
1 AEROSOL, SPRAY (ML) NASAL
Qty: 0 | Refills: 0 | COMMUNITY
Start: 2017-06-21

## 2017-06-21 RX ORDER — HYDRALAZINE HCL 50 MG
1 TABLET ORAL
Qty: 90 | Refills: 0 | OUTPATIENT
Start: 2017-06-21 | End: 2017-07-21

## 2017-06-21 RX ORDER — AMLODIPINE BESYLATE 2.5 MG/1
1 TABLET ORAL
Qty: 0 | Refills: 0 | COMMUNITY
Start: 2017-06-21

## 2017-06-21 RX ORDER — SACCHAROMYCES BOULARDII 250 MG
1 POWDER IN PACKET (EA) ORAL
Qty: 60 | Refills: 0 | OUTPATIENT
Start: 2017-06-21 | End: 2017-07-21

## 2017-06-21 RX ADMIN — ERYTHROPOIETIN 10000 UNIT(S): 10000 INJECTION, SOLUTION INTRAVENOUS; SUBCUTANEOUS at 10:12

## 2017-06-21 RX ADMIN — CALCITRIOL 0.25 MICROGRAM(S): 0.5 CAPSULE ORAL at 12:28

## 2017-06-21 RX ADMIN — AMLODIPINE BESYLATE 10 MILLIGRAM(S): 2.5 TABLET ORAL at 05:57

## 2017-06-21 RX ADMIN — Medication 200 MILLIGRAM(S): at 14:25

## 2017-06-21 RX ADMIN — Medication 250 MILLIGRAM(S): at 05:57

## 2017-06-21 RX ADMIN — Medication 250 MILLIGRAM(S): at 17:19

## 2017-06-21 RX ADMIN — Medication 200 MILLIGRAM(S): at 05:57

## 2017-06-21 RX ADMIN — Medication 25 MILLIGRAM(S): at 05:57

## 2017-06-21 RX ADMIN — VALSARTAN 160 MILLIGRAM(S): 80 TABLET ORAL at 05:57

## 2017-06-21 RX ADMIN — Medication 81 MILLIGRAM(S): at 12:28

## 2017-06-21 RX ADMIN — Medication 1 MILLIGRAM(S): at 14:18

## 2017-06-21 RX ADMIN — SEVELAMER CARBONATE 800 MILLIGRAM(S): 2400 POWDER, FOR SUSPENSION ORAL at 12:28

## 2017-06-21 RX ADMIN — Medication 25 MILLIGRAM(S): at 14:25

## 2017-06-21 RX ADMIN — SEVELAMER CARBONATE 800 MILLIGRAM(S): 2400 POWDER, FOR SUSPENSION ORAL at 17:19

## 2017-06-21 NOTE — PROGRESS NOTE ADULT - PROBLEM SELECTOR PROBLEM 4
End stage renal disease
Hypoxia
End stage renal disease
Hypoxia
Type 2 diabetes mellitus with chronic kidney disease on chronic dialysis, with long-term current use of insulin
Hypoxia
Type 2 diabetes mellitus with chronic kidney disease on chronic dialysis, with long-term current use of insulin

## 2017-06-21 NOTE — DISCHARGE NOTE ADULT - CARE PROVIDERS DIRECT ADDRESSES
[email or web address],DirectAddress_Unknown

## 2017-06-21 NOTE — PROGRESS NOTE ADULT - PROBLEM SELECTOR PLAN 1
? etiology of Serratia bacteremia - f/u repeat BCx are negative thus far and repeat set via permacath  ct back negative for abscess  c/w abx for now, s/p cefepime to 7 days and now on day 2 of levaquin for a total of 6 more doses to complete 14 days total     also a prior smoker, quit prior to recent sequelae of events. ? COPD component -to d/c home with O2 and nasal saline spray

## 2017-06-21 NOTE — DISCHARGE NOTE ADULT - PLAN OF CARE
complete antibiotics received IV abx for 7 days and was then changed to oral antibiotics from 6/22 for 6 more doses of q48h levaquin to complete treatment   Seen by vascular surgery and ID   follow up with ID for repeat cultures completed tx as above.   nebulizers and oxygen   continue with oxygen as needed at home to keep your saturation over 92% insulin sliding scale, please monitor you sugars at home left fistular malfunctioning, has a HD PC on the right with which she will continue to receive dialysis for now   Seen by vascular, no need to change it, pls f/up with your surgeon on the outside bp meds altered, labetalol increased and added hydralazine  Please check your Blood pressure at home and if the top number is always over 150 or if the bottom number is always over 100, please call your doctor c/w Oxygen as needed hgb 7.8, receiving epogen in dialysis   monitor hgb

## 2017-06-21 NOTE — PROGRESS NOTE ADULT - PROBLEM SELECTOR PROBLEM 2
PNA (pneumonia)
Bacteremia
Bacteremia
Hypoxia
Bacteremia
Hypoxia

## 2017-06-21 NOTE — DISCHARGE NOTE ADULT - ADDITIONAL INSTRUCTIONS
f/up with PCP ( you stated that you utilize your kidney doctor as your main doctor). Encouraged to find a PCP. The number to [facility] is as below, pls see them in 2-3 days   f/up with Vacular surgery and renal in 1 week

## 2017-06-21 NOTE — PROGRESS NOTE ADULT - PROBLEM SELECTOR PROBLEM 7
Chronic blood loss anemia
Prophylactic measure
Chronic blood loss anemia

## 2017-06-21 NOTE — PROGRESS NOTE ADULT - PROBLEM SELECTOR PROBLEM 3
Hypoxia
PNA (pneumonia)
PNA (pneumonia)
Sepsis
Type 2 diabetes mellitus with chronic kidney disease on chronic dialysis, with long-term current use of insulin
PNA (pneumonia)
Sepsis

## 2017-06-21 NOTE — DISCHARGE NOTE ADULT - CARE PLAN
Principal Discharge DX:	Bacteremia  Goal:	complete antibiotics  Instructions for follow-up, activity and diet:	received IV abx for 7 days and was then changed to oral antibiotics from 6/22 for 6 more doses of q48h levaquin to complete treatment   Seen by vascular surgery and ID   follow up with ID for repeat cultures  Secondary Diagnosis:	HCAP (healthcare-associated pneumonia)  Instructions for follow-up, activity and diet:	completed tx as above.   nebulizers and oxygen   continue with oxygen as needed at home to keep your saturation over 92%  Secondary Diagnosis:	Diabetes  Instructions for follow-up, activity and diet:	insulin sliding scale, please monitor you sugars at home  Secondary Diagnosis:	End stage renal disease  Instructions for follow-up, activity and diet:	left fistular malfunctioning, has a HD PC on the right with which she will continue to receive dialysis for now   Seen by vascular, no need to change it, pls f/up with your surgeon on the outside  Secondary Diagnosis:	Hypertension  Instructions for follow-up, activity and diet:	bp meds altered, labetalol increased and added hydralazine  Please check your Blood pressure at home and if the top number is always over 150 or if the bottom number is always over 100, please call your doctor  Secondary Diagnosis:	Hypoxia  Instructions for follow-up, activity and diet:	c/w Oxygen as needed  Secondary Diagnosis:	Normocytic anemia  Instructions for follow-up, activity and diet:	hgb 7.8, receiving epogen in dialysis   monitor hgb

## 2017-06-21 NOTE — DISCHARGE NOTE ADULT - MEDICATION SUMMARY - MEDICATIONS TO STOP TAKING
I will STOP taking the medications listed below when I get home from the hospital:    metoprolol succinate 100 mg oral tablet, extended release  -- 1 tab(s) by mouth once a day    iron sucrose 20 mg/mL intravenous solution  -- 2.5 milliliter(s) intravenous  as per Renal during HD    cefTAZidime  -- 2 gram(s) intravenous 3 times a week on MWF to be infused over 30mins. end date 5/2

## 2017-06-21 NOTE — PROGRESS NOTE ADULT - PROBLEM SELECTOR PLAN 2
CT Chest 6/13/17 with Mild mediastinal and subcarinal lymphadenopathy as well as mild bilateral hilar lymphadenopathy. Several small patchy parenchymal infiltrates in the right lower lobe as well as linear atelectasis in the right middle lobe. Linear atelectasis in the left lower lobe and lingula.
Blood cultures with Serratia marcescens   repeat blood cultures pending  Continue Cefepime
Pt stable on supplemental O2.  CT chest confirmed PNA   c/w home O2, tolerated titration down to her home amount
Pt stable on supplemental O2.  CT chest confirmed PNA   patient on o2 supplement as OP as well   encouraged to ambulate and see what her O2 would be; titrating down today
Pt stable on supplemental O2.  CT chest confirmed PNA - abx escalated as per ID to cefepime
Pt stable on supplemental O2.  CT chest confirmed PNA - abx escalated as per ID to cefepime  patient on o2 supplement as OP as well   encouraged to ambulate and see what her O2 would be
Blood cultures with GNR  Will repeat blood cultures  Continue Cefepime
Pt stable on supplemental O2. Will do CT chest to r/o PNA vs effusion.

## 2017-06-21 NOTE — DISCHARGE NOTE ADULT - HOSPITAL COURSE
Patient is a 36 y/o female, PMH ESRD on HD, DMII, HTN, HLD, obesity, recent Serratia sepsis due to PNA s/p thoracocentesis and prolonged IV abx course admitted for sepsis after presenting  with cough, fevers, leukocytosis. Pt responded to treatment with IV abx, fluids and grew Serratia in BCx x2, repeat BCx are still P. Ct chest suggestive of PNA, but clinically pt's is not consistent clinically  and suspicion is raised for permacath to be source of infection - discussed with vascular Dr. Ayoub - feels clinically is not indicated to remove catheter unless repeat BCx are positive. Her repeat cultures have been negative. She received 7 days of cefepime and is to have levaquin orally to complete her tx. As a former smoker, ? copd component and she will c/w her O2 as OP       DM controlled with a1c is 7.3. She remains with the permacath and continues to get HD as per schedule with epogen and iron for her anemia which is noted. Her htn has been difficult to control, in addition to adjusting her home meds, we have added hydralazine and will continue to monitor her BP   Yesterday, she noted a lump on her right lower back. An US wasn't definitive. We did a CT scan as well which is below and negative.     Plan to d/c home.   Total time coordinating this discharge was 40 minutes.

## 2017-06-21 NOTE — DISCHARGE NOTE ADULT - SECONDARY DIAGNOSIS.
HCAP (healthcare-associated pneumonia) Diabetes End stage renal disease Hypertension Hypoxia Normocytic anemia

## 2017-06-21 NOTE — DISCHARGE NOTE ADULT - PROVIDER TOKENS
TOKEN:'[id number]:'[id number]',FREE:[LAST:[[facility]],PHONE:[phone]],FAX:[(   )    -]]

## 2017-06-21 NOTE — DISCHARGE NOTE ADULT - MEDICATION SUMMARY - MEDICATIONS TO TAKE
I will START or STAY ON the medications listed below when I get home from the hospital:    aspirin 81 mg oral delayed release tablet  -- 1 tab(s) by mouth once a day  -- Indication: For Prophylactic measure    valsartan 160 mg oral tablet  -- 1 tab(s) by mouth once a day  -- Indication: For Hypertension    HumaLOG KwikPen 100 units/mL subcutaneous solution  -- 2 unit(s) subcutaneous 3 times a day (before meals) ; 1 Unit(s) if Glucose 151 - 200 2 Unit(s) if Glucose 201 - 250 3 Unit(s) if Glucose 251 - 300 4 Unit(s) if Glucose 301 - 350 5 Unit(s) if Glucose 351 - 400 6 Unit(s) if Glucose Greater Than 400  -- Indication: For diabetes    loratadine 10 mg oral tablet  -- 1 tab(s) by mouth once a day  -- Indication: For allergy    labetalol 200 mg oral tablet  -- 1 tab(s) by mouth 3 times a day  -- Indication: For Hypertension    amLODIPine 10 mg oral tablet  -- 1 tab(s) by mouth once a day  -- Indication: For Hypertension    epoetin raina  -- 21546 unit(s) intravenous 3 times a week  as per renal  -- Indication: For anemia    sodium chloride 0.65% nasal spray  -- 1 spray(s) into nose every 8 hours, As Needed  -- Indication: For To use with oxygen if nose is dry    sevelamer hydrochloride 800 mg oral tablet  -- 2 tab(s) by mouth 3 times a day (with meals)  -- Indication: For End stage renal disease    calcium acetate 667 mg oral capsule  -- 1 cap(s) by mouth 3 times a day with meals  -- Indication: For End stage renal disease    saccharomyces boulardii lyo 250 mg oral capsule  -- 1 cap(s) by mouth 2 times a day  -- Indication: For Prophylactic measure    pantoprazole 40 mg oral delayed release tablet  -- 1 tab(s) by mouth once a day (before a meal)  -- Indication: For Prophylactic measure    levoFLOXacin 500 mg oral tablet  -- 1 tab(s) by mouth every 48 hours  -- Indication: For Serratia marcescens infection and bacteremia/pna    hydrALAZINE 25 mg oral tablet  -- 1 tab(s) by mouth every 8 hours  -- Indication: For Hypertension    calcitriol 0.5 mcg oral capsule  -- 1 cap(s) by mouth once a day  -- Indication: For End stage renal disease    ergocalciferol 50,000 intl units (1.25 mg) oral capsule  -- 1 cap(s) by mouth once a week  -- Indication: For Hypovitaminosis D    folic acid 1 mg oral tablet  -- 1 tab(s) by mouth once a day  -- Indication: For anemia

## 2017-06-21 NOTE — PROGRESS NOTE ADULT - PROBLEM SELECTOR PROBLEM 5
End stage renal disease
Hypertension
End stage renal disease
Hypertension
End stage renal disease
End stage renal disease

## 2017-06-21 NOTE — DISCHARGE NOTE ADULT - PATIENT PORTAL LINK FT
“You can access the Follow[employer] Patient Portal, offered by [facility], by registering with the following website: [email or web address]”

## 2017-06-21 NOTE — DISCHARGE NOTE ADULT - CARE PROVIDER_API CALL
Martin Duron), Surgery; Vascular Surgery  [address] [place] [address]  Phone: [phone]  Fax: [phone]    Dilan Martin (), Nephrology  [address] [place] [address]  Phone: [phone]  Fax: [phone]    [facility],   Phone: [phone]  Fax: (   )    -

## 2017-06-21 NOTE — PROGRESS NOTE ADULT - PROBLEM SELECTOR PROBLEM 6
Hypertension
Normocytic anemia
Hypertension

## 2017-06-21 NOTE — PROGRESS NOTE ADULT - NSHPATTENDINGPLANDISCUSS_GEN_ALL_CORE
Dr Hillman
PA and med student
patient, nurse and nephrologist
patient. Offerred to speak to family, declined and stated that she will speak to them herself
Dr. Ayoub [facility]
patient
patient in detail and nurse
Dr. Baer
patient at bedside.
Dr Hillman
PA and med student

## 2017-06-21 NOTE — PROGRESS NOTE ADULT - SUBJECTIVE AND OBJECTIVE BOX
CC: Cough (13 Jun 2017 00:27)    Patient seen and examined at bedside. No acute distress and no acute overnight events. No complaints today. Right back lesion is smaller per patient.     ROS: No chest pain, palpitations,  light headedness/dizziness,fevers/chills, abdominal pain, diarrhea/constipation, dysuria or increased urinary frequency.,     Vital Signs Last 24 Hrs  T(C): 36.7, Max: 36.9 (06-20 @ 16:08)  T(F): 98.1, Max: 98.4 (06-20 @ 16:08)  HR: 91 (83 - 91)  BP: 149/86 (147/94 - 160/83)  BP(mean): --  RR: 18 (18 - 20)  SpO2: 97% (97% - 98%)    Physical Exam:   GEN: middle aged female, morbidly obese, NAD   HEENT: EOMI, PERRLA   CVS: S1s2nl, no m/r/g RRR   Lungs: decreased to b/l bases   ABD: soft, obese, NT, bs +  Ext: no c/c/e. left forearm bruit noted. Right chest PC noted   Back: Right paraspinal lower approx 3cm round mass, immobile and non tender and is more linear today rather than round                    c/s: 140    TEL: desat to 88% overnight     MEDICATIONS  (STANDING):  heparin  Injectable 5000Unit(s) SubCutaneous every 8 hours  aspirin enteric coated 81milliGRAM(s) Oral daily  valsartan 160milliGRAM(s) Oral daily  calcium acetate 667milliGRAM(s) Oral three times a day with meals  sevelamer hydrochloride 800milliGRAM(s) Oral three times a day with meals  amLODIPine   Tablet 10milliGRAM(s) Oral daily  calcitriol   Capsule 0.25MICROGram(s) Oral daily  folic acid 1milliGRAM(s) Oral daily  ergocalciferol 01843Iqwb(s) Oral every week  insulin lispro (HumaLOG) corrective regimen sliding scale  SubCutaneous Before meals and at bedtime  dextrose 5%. 1000milliLiter(s) IV Continuous <Continuous>  dextrose 50% Injectable 12.5Gram(s) IV Push once  dextrose 50% Injectable 25Gram(s) IV Push once  dextrose 50% Injectable 25Gram(s) IV Push once  epoetin raina Injectable 44151Obgz(s) IV Push <User Schedule>  saccharomyces boulardii 250milliGRAM(s) Oral two times a day  insulin lispro Injectable (HumaLOG) 4Unit(s) SubCutaneous three times a day before meals  labetalol 200milliGRAM(s) Oral three times a day  hydrALAZINE 25milliGRAM(s) Oral every 8 hours    MEDICATIONS  (PRN):  dextrose Gel 1Dose(s) Oral once PRN Blood Glucose LESS THAN 70 milliGRAM(s)/deciliter  glucagon  Injectable 1milliGRAM(s) IntraMuscular once PRN Glucose LESS THAN 70 milligrams/deciliter  sodium chloride 0.65% Nasal 1Spray(s) Both Nostrils every 4 hours PRN Nasal Congestion      Labs:                                    7.4    7.0   )-----------( 276      ( 21 Jun 2017 08:29 )             23.8   06-21    134<L>  |  94<L>  |  59.0<H>  ----------------------------<  114  5.0   |  22.0  |  6.90<H>    Ca    9.0      21 Jun 2017 08:29  Phos  5.7     06-21  Mg     2.1     06-21            Blood cx with serratia, pending 2 sets in lab that are negative to date as of 6/15

## 2017-06-21 NOTE — PROGRESS NOTE ADULT - PROBLEM SELECTOR PLAN 4
Antibiotics adjusted to renal dose
BGFS AC/HS w HISS, mealtime standing coverage.
Due to above
Renal diet, HD via permacath.  for aocd, c/w epogen (iron once infectious etiology has resolved)   f/up with vascular OP to work on the AVF in left Forearm
Renal diet, HD via permacath.  for aocd, c/w epogen (iron once infectious etiology has resolved)   vascular eval noted - no reason to change catheter at this time, c/w HD as per plan; need to discuss regarding possibility of intervention with AVF
Renal diet, HD via permacath.  for aocd, c/w epogen (iron once infectious etiology has resolved)   vascular eval noted - no reason to change catheter at this time, c/w HD as per plan; need to discuss regarding possibility of intervention with AVF
Renal diet, HD via permacath.  for aocd, c/w epogen (iron once infectious etiology has resolved)   vascular eval noted - no reason to change catheter at this time, c/w HD as per plan; need to discuss regarding possibility of intervention with AVF  cbc in am
BGFS AC/HS w HISS, mealtime standing coverage.
Due to above
Renal diet, HD via permacath.  for aocd, c/w epogen  vascular eval noted - no reason to change catheter at this time, c/w HD as per plan

## 2017-06-21 NOTE — PROGRESS NOTE ADULT - PROBLEM SELECTOR PROBLEM 1
Serratia marcescens infection
PNA (pneumonia)
Sepsis due to Gram-negative organism with acute respiratory failure
PNA (pneumonia)

## 2017-06-21 NOTE — PROGRESS NOTE ADULT - PROBLEM SELECTOR PLAN 3
NONE
resolving
BGFS AC/HS w HISS, mealtime standing coverage.  c/s 190 today
BGFS AC/HS w HISS, mealtime standing coverage.  c/s noted. Increased mealtime to 4 units premeal and will monitor   a1c is 7.3
BGFS AC/HS w HISS, mealtime standing coverage.  c/s noted. c/w premeal   a1c is 7.3
CT Chest images reviewed,  PNA, will await official report  Currently on Cefepime
ID consult appreciated: Patient with Fever, tachycardia, hypoxia and leukocytosis o 16.4k, CXR reporting no active disease, images showing with possible left sided effusion vs PNA,  CT Chest reviewed,  UA with no evidence of infection, Blood cultures peos for GNg, Continue Cefepime 1gm IV q 24hours.
ID consult appreciated: Patient with Fever, tachycardia, hypoxia and leukocytosis o 16.4k, CXR reporting no active disease, images showing with possible left sided effusion vs PNA,  CT Chest reviewed,  UA with no evidence of infection, Blood cultures peos for GNg, Continue Cefepime 1gm IV q 24hours.
see above
CT Chest images reviewed,  PNA, will await official report  Currently on Cefepime
ID consult appreciated: Patient with Fever, tachycardia, hypoxia and leukocytosis o 16.4k, CXR reporting no active disease, images showing with possible left sided effusion vs PNA, Will do CT Chest,  UA with no evidence of infection, Symptoms point to respiratory source, Blood cultures pending, Will switch to Cefepime 1gm IV q 24hours.

## 2017-06-21 NOTE — PROGRESS NOTE ADULT - PROBLEM SELECTOR PLAN 6
Cont Amlodipine 10 mg qd, Losartan 160 mg qd. Cont BP monitoring.
aocd   h/h stable   on epogen and folic acid
aocd   h/h stable   on epogen and folic acid; cbc in am
Cont Amlodipine 10 mg qd, Losartan 160 mg qd. Cont BP monitoring.

## 2017-06-21 NOTE — PROGRESS NOTE ADULT - ASSESSMENT
Patient is a 38 y/o female, PMH ESRD on HD, DMII, HTN, HLD, obesity, recent Serratia sepsis due to PNA s/p thoracocentesis and prolonged IV abx course admitted for sepsis after presenting  with cough, fevers, leukocytosis. Pt responded to treatment with IV abx, fluids and grew Serratia in BCx x2, repeat BCx are still P. Ct chest suggestive of PNA, but clinically pt's is not consistent clinically  and suspicion is raised for permacath to be source of infection - discussed with vascular Dr. Ayoub - feels clinically is not indicated to remove catheter unless repeat BCx are positive. f/up current cx in lab. she was now found to have a mass in right lower back, with a confirmed negative ct lumbar spine

## 2017-06-21 NOTE — PROGRESS NOTE ADULT - SUBJECTIVE AND OBJECTIVE BOX
Patient was seen and evaluated on dialysis.   No c/o CP SOB and cough less  no F/C  no swelling    T(C): 36.7, Max: 36.9 (06-20 @ 16:08)  HR: 91 (83 - 91)  BP: 149/86 (147/94 - 160/83)  Wt(kg): --  PE ;  NAD  lungs - CTA ex few post rhonchi,   CV gr 1 murmer,  No gallop or rub  Abd : soft, NT BS +, No masses  Ext- No edema  Neuro : Grossly intact, moving extremities                             7.4    7.0   )-----------( 276      ( 21 Jun 2017 08:29 )             23.8        06-21    134<L>  |  94<L>  |  59.0<H>  ----------------------------<  114  5.0   |  22.0  |  6.90<H>    Ca    9.0      21 Jun 2017 08:29  Phos  5.7     06-21  Mg     2.1     06-21        MEDICATIONS  (STANDING):  heparin  Injectable  aspirin enteric coated  valsartan  calcium acetate  sevelamer hydrochloride  amLODIPine   Tablet  calcitriol   Capsule  folic acid  ergocalciferol  insulin lispro (HumaLOG) corrective regimen sliding scale  dextrose 5%.  dextrose Gel PRN  dextrose 50% Injectable  dextrose 50% Injectable  dextrose 50% Injectable  glucagon  Injectable PRN  epoetin raina Injectable  saccharomyces boulardii  insulin lispro Injectable (HumaLOG)  labetalol  sodium chloride 0.65% Nasal PRN  hydrALAZINE      Patient stable  Mike HD easily  Continue Abx for Serratia as per ID  Pna better

## 2017-06-21 NOTE — DISCHARGE NOTE ADULT - OTHER SIGNIFICANT FINDINGS
7.4    7.0   )-----------( 276      ( 21 Jun 2017 08:29 )             23.8   06-21    134<L>  |  94<L>  |  59.0<H>  ----------------------------<  114  5.0   |  22.0  |  6.90<H>    Ca    9.0      21 Jun 2017 08:29  Phos  5.7     06-21  Mg     2.1     06-21    US back:   In the area of concern, there is a 3.2 x 1.3 x 4.8 cm hypoechoic lesion   within muscle, avascular.    IMPRESSION:  Nonspecific hypoechoic intramuscular lesion in the area of   concern in the right lower back. CT is recommended for further evaluation.    Ct lumbar spine:     Evaluation of the individual levels:  L1/L2 level: No disc herniation, spinal canal stenosis, foraminal   narrowing.  L2/L3 level: No disc herniation, spinal canal stenosis, foraminal   narrowing.  L3/L4 level: No disc herniation, spinal canal stenosis, foraminal   narrowing.  L4/L5 level: Mild disc bulge. No spinal canal stenosis or foraminal   narrowing.  L5/S1 level: Small midline disc herniation. No spinal canal stenosis or   foraminal narrowing.    IMPRESSION: No posterior soft tissue abscess or drainable fluid   collection identified.    ct chest:   IMPRESSION:     Mild mediastinal and subcarinal lymphadenopathy as well as mild bilateral   hilar lymphadenopathy.    Several small patchy parenchymal infiltrates in the right lower lobe as   well as linear atelectasis in the right middle lobe. Linear atelectasis   in the left lower lobe and lingula..

## 2017-06-21 NOTE — PROGRESS NOTE ADULT - PROBLEM SELECTOR PLAN 5
c/w current medical regimen and monitor bp  currently on norvasc and valsartan;   c/w labetalol.   tolerating hydralazine dose

## 2017-06-23 LAB
CULTURE RESULTS: SIGNIFICANT CHANGE UP
CULTURE RESULTS: SIGNIFICANT CHANGE UP
SPECIMEN SOURCE: SIGNIFICANT CHANGE UP
SPECIMEN SOURCE: SIGNIFICANT CHANGE UP

## 2017-07-27 ENCOUNTER — APPOINTMENT (OUTPATIENT)
Dept: VASCULAR SURGERY | Facility: CLINIC | Age: 37
End: 2017-07-27
Payer: MEDICARE

## 2017-07-27 VITALS
BODY MASS INDEX: 49.69 KG/M2 | HEIGHT: 62 IN | TEMPERATURE: 98.6 F | RESPIRATION RATE: 16 BRPM | DIASTOLIC BLOOD PRESSURE: 71 MMHG | WEIGHT: 270 LBS | SYSTOLIC BLOOD PRESSURE: 112 MMHG | HEART RATE: 85 BPM | OXYGEN SATURATION: 95 %

## 2017-07-27 PROCEDURE — 99214 OFFICE O/P EST MOD 30 MIN: CPT

## 2017-08-01 ENCOUNTER — INPATIENT (INPATIENT)
Facility: HOSPITAL | Age: 37
LOS: 12 days | Discharge: HOME | DRG: 252 | End: 2017-08-14
Attending: FAMILY MEDICINE | Admitting: HOSPITALIST
Payer: MEDICARE

## 2017-08-01 VITALS
DIASTOLIC BLOOD PRESSURE: 70 MMHG | TEMPERATURE: 99 F | WEIGHT: 270.07 LBS | SYSTOLIC BLOOD PRESSURE: 118 MMHG | RESPIRATION RATE: 22 BRPM | OXYGEN SATURATION: 96 % | HEART RATE: 85 BPM

## 2017-08-01 DIAGNOSIS — D64.9 ANEMIA, UNSPECIFIED: ICD-10-CM

## 2017-08-01 DIAGNOSIS — N18.6 END STAGE RENAL DISEASE: ICD-10-CM

## 2017-08-01 DIAGNOSIS — Z99.2 DEPENDENCE ON RENAL DIALYSIS: Chronic | ICD-10-CM

## 2017-08-01 DIAGNOSIS — E11.9 TYPE 2 DIABETES MELLITUS WITHOUT COMPLICATIONS: ICD-10-CM

## 2017-08-01 DIAGNOSIS — J18.9 PNEUMONIA, UNSPECIFIED ORGANISM: ICD-10-CM

## 2017-08-01 DIAGNOSIS — I77.0 ARTERIOVENOUS FISTULA, ACQUIRED: Chronic | ICD-10-CM

## 2017-08-01 DIAGNOSIS — R19.7 DIARRHEA, UNSPECIFIED: ICD-10-CM

## 2017-08-01 DIAGNOSIS — M79.605 PAIN IN LEFT LEG: ICD-10-CM

## 2017-08-01 DIAGNOSIS — I10 ESSENTIAL (PRIMARY) HYPERTENSION: ICD-10-CM

## 2017-08-01 DIAGNOSIS — Z29.9 ENCOUNTER FOR PROPHYLACTIC MEASURES, UNSPECIFIED: ICD-10-CM

## 2017-08-01 LAB
BLD GP AB SCN SERPL QL: SIGNIFICANT CHANGE UP
FERRITIN SERPL-MCNC: 328.2 NG/ML — HIGH (ref 11–306)
HCT VFR BLD CALC: 21.6 % — LOW (ref 37–47)
HGB BLD-MCNC: 6.7 G/DL — CRITICAL LOW (ref 12–16)
IRON SATN MFR SERPL: 58 UG/DL — SIGNIFICANT CHANGE UP (ref 37–145)
LACTATE BLDV-MCNC: 1.9 MMOL/L — SIGNIFICANT CHANGE UP (ref 0.5–2)
LDH SERPL L TO P-CCNC: 475 U/L — HIGH (ref 98–192)
MCHC RBC-ENTMCNC: 24.5 PG — LOW (ref 27–31)
MCHC RBC-ENTMCNC: 31 G/DL — LOW (ref 32–36)
MCV RBC AUTO: 79.1 FL — LOW (ref 81–99)
OB PNL STL: NEGATIVE — SIGNIFICANT CHANGE UP
PLATELET # BLD AUTO: 328 K/UL — SIGNIFICANT CHANGE UP (ref 150–400)
PROCALCITONIN SERPL-MCNC: 7.04 NG/ML — HIGH (ref 0–0.04)
RBC # BLD: 2.73 M/UL — LOW (ref 4.4–5.2)
RBC # FLD: 19.2 % — HIGH (ref 11–15.6)
TRANSFERRIN SERPL-MCNC: 181 MG/DL — LOW (ref 192–382)
TYPE + AB SCN PNL BLD: SIGNIFICANT CHANGE UP
WBC # BLD: 10.6 K/UL — SIGNIFICANT CHANGE UP (ref 4.8–10.8)
WBC # FLD AUTO: 10.6 K/UL — SIGNIFICANT CHANGE UP (ref 4.8–10.8)

## 2017-08-01 PROCEDURE — 93010 ELECTROCARDIOGRAM REPORT: CPT

## 2017-08-01 PROCEDURE — 99285 EMERGENCY DEPT VISIT HI MDM: CPT

## 2017-08-01 PROCEDURE — 99223 1ST HOSP IP/OBS HIGH 75: CPT | Mod: GC

## 2017-08-01 PROCEDURE — 71020: CPT | Mod: 26

## 2017-08-01 PROCEDURE — 71250 CT THORAX DX C-: CPT | Mod: 26

## 2017-08-01 PROCEDURE — 93970 EXTREMITY STUDY: CPT | Mod: 26

## 2017-08-01 RX ORDER — AMLODIPINE BESYLATE 2.5 MG/1
10 TABLET ORAL DAILY
Qty: 0 | Refills: 0 | Status: DISCONTINUED | OUTPATIENT
Start: 2017-08-01 | End: 2017-08-01

## 2017-08-01 RX ORDER — ASPIRIN/CALCIUM CARB/MAGNESIUM 324 MG
81 TABLET ORAL DAILY
Qty: 0 | Refills: 0 | Status: DISCONTINUED | OUTPATIENT
Start: 2017-08-01 | End: 2017-08-14

## 2017-08-01 RX ORDER — HYDRALAZINE HCL 50 MG
25 TABLET ORAL EVERY 8 HOURS
Qty: 0 | Refills: 0 | Status: DISCONTINUED | OUTPATIENT
Start: 2017-08-01 | End: 2017-08-05

## 2017-08-01 RX ORDER — PIPERACILLIN AND TAZOBACTAM 4; .5 G/20ML; G/20ML
2.25 INJECTION, POWDER, LYOPHILIZED, FOR SOLUTION INTRAVENOUS EVERY 8 HOURS
Qty: 0 | Refills: 0 | Status: DISCONTINUED | OUTPATIENT
Start: 2017-08-01 | End: 2017-08-02

## 2017-08-01 RX ORDER — FOLIC ACID 0.8 MG
1 TABLET ORAL DAILY
Qty: 0 | Refills: 0 | Status: DISCONTINUED | OUTPATIENT
Start: 2017-08-01 | End: 2017-08-14

## 2017-08-01 RX ORDER — HEPARIN SODIUM 5000 [USP'U]/ML
5000 INJECTION INTRAVENOUS; SUBCUTANEOUS EVERY 12 HOURS
Qty: 0 | Refills: 0 | Status: DISCONTINUED | OUTPATIENT
Start: 2017-08-01 | End: 2017-08-11

## 2017-08-01 RX ORDER — AMLODIPINE BESYLATE 2.5 MG/1
10 TABLET ORAL DAILY
Qty: 0 | Refills: 0 | Status: DISCONTINUED | OUTPATIENT
Start: 2017-08-01 | End: 2017-08-05

## 2017-08-01 RX ORDER — SODIUM CHLORIDE 9 MG/ML
1000 INJECTION, SOLUTION INTRAVENOUS
Qty: 0 | Refills: 0 | Status: DISCONTINUED | OUTPATIENT
Start: 2017-08-01 | End: 2017-08-14

## 2017-08-01 RX ORDER — PANTOPRAZOLE SODIUM 20 MG/1
40 TABLET, DELAYED RELEASE ORAL
Qty: 0 | Refills: 0 | Status: DISCONTINUED | OUTPATIENT
Start: 2017-08-01 | End: 2017-08-14

## 2017-08-01 RX ORDER — LABETALOL HCL 100 MG
200 TABLET ORAL THREE TIMES A DAY
Qty: 0 | Refills: 0 | Status: DISCONTINUED | OUTPATIENT
Start: 2017-08-01 | End: 2017-08-05

## 2017-08-01 RX ORDER — ERGOCALCIFEROL 1.25 MG/1
50000 CAPSULE ORAL
Qty: 0 | Refills: 0 | Status: DISCONTINUED | OUTPATIENT
Start: 2017-08-01 | End: 2017-08-14

## 2017-08-01 RX ORDER — SEVELAMER CARBONATE 2400 MG/1
400 POWDER, FOR SUSPENSION ORAL
Qty: 0 | Refills: 0 | Status: DISCONTINUED | OUTPATIENT
Start: 2017-08-01 | End: 2017-08-14

## 2017-08-01 RX ORDER — VANCOMYCIN HCL 1 G
500 VIAL (EA) INTRAVENOUS ONCE
Qty: 0 | Refills: 0 | Status: COMPLETED | OUTPATIENT
Start: 2017-08-01 | End: 2017-08-01

## 2017-08-01 RX ORDER — GLUCAGON INJECTION, SOLUTION 0.5 MG/.1ML
1 INJECTION, SOLUTION SUBCUTANEOUS ONCE
Qty: 0 | Refills: 0 | Status: DISCONTINUED | OUTPATIENT
Start: 2017-08-01 | End: 2017-08-14

## 2017-08-01 RX ORDER — LABETALOL HCL 100 MG
200 TABLET ORAL THREE TIMES A DAY
Qty: 0 | Refills: 0 | Status: DISCONTINUED | OUTPATIENT
Start: 2017-08-01 | End: 2017-08-01

## 2017-08-01 RX ORDER — INSULIN LISPRO 100/ML
2 VIAL (ML) SUBCUTANEOUS
Qty: 0 | Refills: 0 | Status: DISCONTINUED | OUTPATIENT
Start: 2017-08-01 | End: 2017-08-04

## 2017-08-01 RX ORDER — CEFTRIAXONE 500 MG/1
INJECTION, POWDER, FOR SOLUTION INTRAMUSCULAR; INTRAVENOUS
Qty: 0 | Refills: 0 | Status: DISCONTINUED | OUTPATIENT
Start: 2017-08-01 | End: 2017-08-01

## 2017-08-01 RX ORDER — DEXTROSE 50 % IN WATER 50 %
1 SYRINGE (ML) INTRAVENOUS ONCE
Qty: 0 | Refills: 0 | Status: DISCONTINUED | OUTPATIENT
Start: 2017-08-01 | End: 2017-08-14

## 2017-08-01 RX ORDER — CEFTRIAXONE 500 MG/1
1 INJECTION, POWDER, FOR SOLUTION INTRAMUSCULAR; INTRAVENOUS ONCE
Qty: 0 | Refills: 0 | Status: DISCONTINUED | OUTPATIENT
Start: 2017-08-01 | End: 2017-08-01

## 2017-08-01 RX ORDER — CALCIUM ACETATE 667 MG
667 TABLET ORAL
Qty: 0 | Refills: 0 | Status: DISCONTINUED | OUTPATIENT
Start: 2017-08-01 | End: 2017-08-02

## 2017-08-01 RX ORDER — DEXTROSE 50 % IN WATER 50 %
25 SYRINGE (ML) INTRAVENOUS ONCE
Qty: 0 | Refills: 0 | Status: DISCONTINUED | OUTPATIENT
Start: 2017-08-01 | End: 2017-08-14

## 2017-08-01 RX ORDER — CALCITRIOL 0.5 UG/1
0.5 CAPSULE ORAL DAILY
Qty: 0 | Refills: 0 | Status: DISCONTINUED | OUTPATIENT
Start: 2017-08-01 | End: 2017-08-14

## 2017-08-01 RX ORDER — INSULIN LISPRO 100/ML
VIAL (ML) SUBCUTANEOUS
Qty: 0 | Refills: 0 | Status: DISCONTINUED | OUTPATIENT
Start: 2017-08-01 | End: 2017-08-07

## 2017-08-01 RX ORDER — VANCOMYCIN HCL 1 G
1000 VIAL (EA) INTRAVENOUS ONCE
Qty: 0 | Refills: 0 | Status: DISCONTINUED | OUTPATIENT
Start: 2017-08-01 | End: 2017-08-01

## 2017-08-01 RX ORDER — SODIUM CHLORIDE 9 MG/ML
3 INJECTION INTRAMUSCULAR; INTRAVENOUS; SUBCUTANEOUS ONCE
Qty: 0 | Refills: 0 | Status: COMPLETED | OUTPATIENT
Start: 2017-08-01 | End: 2017-08-01

## 2017-08-01 RX ORDER — HYDRALAZINE HCL 50 MG
25 TABLET ORAL EVERY 8 HOURS
Qty: 0 | Refills: 0 | Status: DISCONTINUED | OUTPATIENT
Start: 2017-08-01 | End: 2017-08-01

## 2017-08-01 RX ORDER — PIPERACILLIN AND TAZOBACTAM 4; .5 G/20ML; G/20ML
2.25 INJECTION, POWDER, LYOPHILIZED, FOR SOLUTION INTRAVENOUS ONCE
Qty: 0 | Refills: 0 | Status: COMPLETED | OUTPATIENT
Start: 2017-08-01 | End: 2017-08-01

## 2017-08-01 RX ORDER — ERYTHROPOIETIN 10000 [IU]/ML
12000 INJECTION, SOLUTION INTRAVENOUS; SUBCUTANEOUS
Qty: 0 | Refills: 0 | Status: DISCONTINUED | OUTPATIENT
Start: 2017-08-01 | End: 2017-08-02

## 2017-08-01 RX ORDER — DEXTROSE 50 % IN WATER 50 %
12.5 SYRINGE (ML) INTRAVENOUS ONCE
Qty: 0 | Refills: 0 | Status: DISCONTINUED | OUTPATIENT
Start: 2017-08-01 | End: 2017-08-14

## 2017-08-01 RX ORDER — PIPERACILLIN AND TAZOBACTAM 4; .5 G/20ML; G/20ML
2.25 INJECTION, POWDER, LYOPHILIZED, FOR SOLUTION INTRAVENOUS ONCE
Qty: 0 | Refills: 0 | Status: DISCONTINUED | OUTPATIENT
Start: 2017-08-01 | End: 2017-08-02

## 2017-08-01 RX ADMIN — PIPERACILLIN AND TAZOBACTAM 200 GRAM(S): 4; .5 INJECTION, POWDER, LYOPHILIZED, FOR SOLUTION INTRAVENOUS at 21:34

## 2017-08-01 RX ADMIN — Medication 100 MILLIGRAM(S): at 23:51

## 2017-08-01 RX ADMIN — SODIUM CHLORIDE 3 MILLILITER(S): 9 INJECTION INTRAMUSCULAR; INTRAVENOUS; SUBCUTANEOUS at 14:55

## 2017-08-01 NOTE — H&P ADULT - PROBLEM SELECTOR PLAN 4
not calf tenderness ,pt has an area over her left lateral calf painfull to palpation, Wells score for DVT =1 and will do US of lower Extremities

## 2017-08-01 NOTE — ED ADULT NURSE REASSESSMENT NOTE - NS ED NURSE REASSESS COMMENT FT1
Report received from offgoing RN, chart as noted, pt with AV fistula to Left arm, never accessed, skin intact, HD Port to R chest wall, site asmyp. RR even and unlabored, tachypneic rr 22, on 2l/min vi nc. Family @ bedside, updated on POC, awaiting PRBC's from bloodbank @ this time. Abd round, soft, nontender on palp. +4quad bs, maex4. Will continue to monitor and reassess

## 2017-08-01 NOTE — H&P ADULT - PROBLEM SELECTOR PLAN 6
pt blood pressure is low in relation to previous admission , will hold BP med and will start if BP is aboved 160 pt blood pressure is low in relation to previous admission , will hold BP med and will start if BP is above 160

## 2017-08-01 NOTE — ED PROVIDER NOTE - PROGRESS NOTE DETAILS
b/l pna./ Given dialysis as well as recent hospitalization, SOB and constitutioonal Sx, we will admit for IV Abx

## 2017-08-01 NOTE — H&P ADULT - HISTORY OF PRESENT ILLNESS
36 y/o female with ESRD diagnosed in 1/17 s/p left UE AVF on hemodialysis on M-W-F, right chest wall permacath, HTN, DM, obesity, HCAP, comes into the hospital due to shortess of breath since 2 -3 days ago. The pt has shortness of breast on her base line but over the past 3 days her shortness of breath has increase and it's accompane by dry cough, she complain of chill but no fever. No sick contacts no recent travel . The pt was recently discharge from [facility] for HCAP and she got 7 days of cefepine and she also completed a 7 days of levaquin outpatient 36 y/o female with ESRD diagnosed in 1/17 s/p left UE AVF on hemodialysis on M-W-F, right chest wall permacath, HTN, DM, obesity, HCAP, comes into the hospital due to shortess of breath since 2 -3 days ago. The pt has shortness of breast on her base line but over the past 3 days her shortness of breath has increase and it's accompane by dry cough, she complain of chill but no fever. No sick contacts no recent travel . The pt was recently discharge from [facility] for HCAP and she got 7 days of cefepine and she also completed a 7 days of Levaquin outpatient. Pt denies orthopnea and paroxymal dyspnea  but she said that never sleep flat because she "she doesnt feel right when she lies flat". She has swelling in both leg but now are the same and not different from her base line. Pt denies chest pain, denies swelling in the legs, denies hx of clots .   Pt on the previous admission had pleural effusion and had bilateral chest tube place and her shortness of breast resolved after got her fluid removed, she denies to miss any of her dialysis seccions .   Pt denies chest pain, no palpitations.  Pt also complain of nonbloody diarrhea since today , she denies nausea of vomiting, no abdominal pain , not hx of Cdiff infection. 36 y/o female with ESRD diagnosed in 1/17 s/p left UE AVF on hemodialysis on M-W-F, right chest wall permacath, HTN, DM, obesity, HCAP, comes into the hospital due to shortess of breath since 2 -3 days ago. The pt has shortness of breast on her base line but over the past 3 days her shortness of breath has increase and it's accompane by dry cough, she complain of chill but no fever. At her base line  sometimes use O2 at home by nasal canula No sick contacts no recent travel . The pt was recently discharge from [facility] for HCAP and she got 7 days of cefepine and she also completed a 7 days of Levaquin outpatient. Pt denies orthopnea and paroxymal dyspnea  but she said that never sleep flat because she "she doesnt feel right when she lies flat". She has swelling in both leg but now are the same and not different from her base line. Pt denies chest pain, denies swelling in the legs, denies hx of clots .   Pt on the previous admission had pleural effusion and had bilateral chest tube place and her shortness of breast resolved after got her fluid removed, she denies to miss any of her dialysis seccions .   Pt denies chest pain, no palpitations.  Pt also complain of nonbloody diarrhea since today , she denies nausea of vomiting, no abdominal pain , not hx of Cdiff infection. 38 y/o female with ESRD diagnosed in 1/17 s/p left UE AVF on hemodialysis on M-W-F, right chest wall permacath, HTN, DM, obesity, HCAP a month ago, comes into the hospital due to shortess of breath since 2 -3 days ago. The pt has shortness of breast on her base line but over the past 3 days her shortness of breath has increase and it's accompane by dry cough, she complain of chill but no fever. At her base line  sometimes use O2 at home by nasal canula No sick contacts no recent travel . The pt was recently discharge from [facility] for HCAP and she got 7 days of cefepine and she also completed a 7 days of Levaquin outpatient. Pt denies orthopnea and paroxymal dyspnea  but she said that never sleep flat because she "she doesnt feel right when she lies flat". She has swelling in both leg but now are the same and not different from her base line. Pt denies chest pain, denies swelling in the legs, denies hx of clots .   Pt on the previous admission had pleural effusion and had bilateral chest tube place and her shortness of breast resolved after got her fluid removed, she denies to miss any of her dialysis seccions .   Pt denies chest pain, no palpitations.  Pt also complain of nonbloody diarrhea since today , she denies nausea of vomiting, no abdominal pain , not hx of Cdiff infection. 38 y/o female with ESRD diagnosed in 1/17 s/p left UE AVF on hemodialysis on M-W-F, right chest wall permacath, HTN, DM tx w/ insulin, obesity, HCAP a month ago, comes into the hospital due to  shortness of breath since 2 -3 days ago. The pt has shortness of breast on her base line but over the past 3 days her shortness of breath has increase and it's accompanied by dry cough, she complain of chill but no fever. At her base line  sometimes use O2 at home by nasal canula No sick contacts no recent travel . The pt was recently discharge from [facility] for HCAP and she got 7 days of Cefepime and she also completed a 7 days of Levaquin outpatient. Pt denies orthopnea and paroxymal dyspnea  but she said that never sleep flat because she "she doesnt feel right when she lies flat". She has swelling in both leg but now are the same and not different from her base line. Pt denies chest pain,  denies hx of clots .   Pt on the previous admission had left pleural effusion and had a chest tube place to drained her fluid,her shortness of breast resolved after got her fluid removed, she denies to miss any of her dialysis appointments .   Pt denies chest pain, no palpitations.  Pt also complain of several nonbloody diarrhea since today watery, she states uncountable,    she denies nausea of vomiting, no abdominal pain , not hx of Cdiff infection.

## 2017-08-01 NOTE — H&P ADULT - NSHPSOCIALHISTORY_GEN_ALL_CORE
not smoking, no alcohol, no illegal drugs, she smoked 1/2 a pack for 12 yrs, she lives w/ her family and she is unemployment no alcohol, no illegal drugs, stop smoking since december, she smoked 1/2 a pack for 12 yrs, she lives w/ her family and she is unemployment

## 2017-08-01 NOTE — ED ADULT NURSE NOTE - ED STAT RN HANDOFF DETAILS
bedside report given per protocol to CARLEY Winston on 2gul in room 23-09. Pt transported on cm, with pulse ox and o2 via NC to CT scan then to 2gul. Recieveing RN made aware of delays met during transport, RN verbalized understanding.

## 2017-08-01 NOTE — H&P ADULT - PROBLEM SELECTOR PLAN 1
admit to any bed w/ monitor and   diet DASH TLC + consistent carbohydrates + ESRD diet  ambulated as tolerated, O2 by a nasal canula to manteint O2 between 90-93 %  vanco + cefepin  lactate , procalcitonin, blood culture x 2, urine cx, UA  CT of the chest to look for empyema   consult ID doctor admit to any bed w/ monitor and   diet DASH TLC + consistent carbohydrates + ESRD diet  ambulated as tolerated, O2 by a nasal canula to keep O2 between 90-93 %  vanco + cefepime   lactate , procalcitonin, blood culture x 2, urine cx, UA  CT of the chest to look for empyema   consult ID doctor

## 2017-08-01 NOTE — ED PROVIDER NOTE - OBJECTIVE STATEMENT
37 year old female with PMH ESRD on HD, HTN, HLD, DM presenting with cough and SOB. Pt states Sx started 2 weeks ago with productive cough, congestion. For the pasts several days she has camryn ROMERO, lightheadedness, diffuse weakness and chills. No fever, abd pain, chest pain, nausea, vomiting.

## 2017-08-01 NOTE — H&P ADULT - NSHPPHYSICALEXAM_GEN_ALL_CORE
Gen: moderate shortness of breath  Lymph: no lymphadenopathy  Lungs:bilateral base rales, no wheezes, pectoriloquy in the right lower base, negative for tactil fremitus  Heart: RRR, NS1/S2, no murmurs, faint heart sounds  Ab: NBSx4, pelvic tenderness b/l, soft, non tender, non distended, no hepatosplenomegaly  Ext: no calf tenderness or pedal edema but there is an area of tendernss over the left lateral shin , AV fistula and LUE w/ positive thill, permacth over below the right clavicle withour erythema  Neuro: CN2-12 grossly intact  Psych: Normal mood and affect Gen: moderate shortness of breath  Lymph: no lymphadenopathy  Lungs:bilateral base rales, no wheezes, pectoriloquy in the right lower base, negative for tactil fremitus  Heart: RRR, NS1/S2, no murmurs, faint heart sounds  Ab: NBSx4,  not pelvic tenderness b/l, soft, non tender, non distended, no hepatosplenomegaly  Ext: no calf tenderness or pedal edema but there is an area of tendernss over the left lateral shin , AV fistula and LUE w/ positive thill, permacth over below the right clavicle withour erythema  Neuro: CN2-12 grossly intact  Psych: Normal mood and affect Gen: moderate shortness of breath  Lymph: no lymphadenopathy  Lungs:bilateral base rales, no wheezes, pectoriloquy in the right lower base, negative for tactil fremitus  Heart: RRR, NS1/S2, no murmurs, faint heart sounds  Ab: NBSx4,  not pelvic tenderness b/l, soft, non tender, non distended, no hepatosplenomegaly  Ext: no calf tenderness or pedal edema but there is an area of tenderness over the left lateral shin , AV fistula and LUE w/ positive thill, permacath  below the right clavicle without  erythema  Neuro: CN2-12 grossly intact  Psych: Normal mood and affect Gen: moderate shortness of breath  Lymph: no lymphadenopathy  Lungs: bilateral base rales, no wheezes, pectoriloquy in the right lower base, negative for tactile fremitus, normal percussion   Heart: RRR, NS1/S2, no murmurs, faint heart sounds  Ab: NBSx4,  not pelvic tenderness b/l, soft, non tender, non distended, no hepatosplenomegaly  Ext: no calf tenderness or pedal edema but there is an area of tenderness over the left lateral shin , AV fistula and LUE w/ positive thill, permacath  below the right clavicle without  erythema  Neuro: CN2-12 grossly intact  Psych: Normal mood and affect

## 2017-08-01 NOTE — H&P ADULT - NSHPLABSRESULTS_GEN_ALL_CORE
6.7    10.6  )-----------( 328      ( 01 Aug 2017 14:57 )             21.6  CXR: there an area of consolidation over the right  middle lobe, positive cardiomegaly, left costophrenic angle blunting   ECG 6.7    10.6  )-----------( 328      ( 01 Aug 2017 14:57 )             21.6  CXR: there an area of consolidation over the right  middle lobe, positive cardiomegaly, left costophrenic angle blunting

## 2017-08-01 NOTE — H&P ADULT - NSHPOUTPATIENTPROVIDERS_GEN_ALL_CORE
Nephrologist Dr Keenan  [facility]   Vascular Dr Hellen Rodgers Nephrologist Dr Desai  [facility]   Vascular Dr Hellen Rodgers

## 2017-08-01 NOTE — ED ADULT NURSE REASSESSMENT NOTE - NS ED NURSE REASSESS COMMENT FT1
pt complaining of dizziness and cough 'I just can't get it up' 'its stuck in there'   pt dangling on side of stretcher with 02 in use  mom at bedside

## 2017-08-01 NOTE — ED ADULT NURSE NOTE - OBJECTIVE STATEMENT
Pt received from offgoing RN Sixto, chart as noted, pt a&ox3 c/o SOBxseveral days, on 2l/min o2 @ home, reports incr in dyspnea on exertion. RR even and unlabored, clear and diminished bryon. Abd round, soft and nontender. MAEx4. Reports diarrhea, denies syncope denies blood thinners denies diaphoresis. Pt on HD with Port to Right chest wall, site asymp, Dialysis sched MoWedFri, AVF to Left arm, never accessed per pt, skin intact +bruit +thrill. F amily @ bedside, #20G IV noted to Right AC. Pt updated on POC, resting on chair, awaiting bed, will continue to monitor

## 2017-08-01 NOTE — H&P ADULT - PROBLEM SELECTOR PLAN 5
consult nephrologist  continue sevelamer  cincalcet  continue calcium acetated  AV fistula appear patent

## 2017-08-01 NOTE — ED ADULT TRIAGE NOTE - CHIEF COMPLAINT QUOTE
dizzy and weak, had dialyses yesterday. lt arm banded, chest wall access also. general malaise. has home oxygen. non productive cough. no chest pain

## 2017-08-01 NOTE — ED STATDOCS - PROGRESS NOTE DETAILS
38 y/o F pt with hx of ESRD, diabetes presents to ED c/o constant cough for a few days. Yesterday started feeling dizziness/ lightheaded and weakness. Today pt experienced diarrhea. No sick contact. M, W, F dialysis, she did not miss dialysis.

## 2017-08-01 NOTE — H&P ADULT - ASSESSMENT
36 y/o female with ESRD diagnosed in 1/17 s/p left UE AVF on hemodialysis on M-W-F, right chest wall permacath, HTN, DM, obesity, HCAP, comes into the hospital due to shortess of breath since 2 -3 days ago.   CXR shows right middle lobe infiltrates , left side pleural effusion improved from previous admission, most likely HCAP r/o empyema 38 y/o female with ESRD diagnosed in 1/17 s/p left UE AVF on hemodialysis on M-W-F, right chest wall permacath, HTN, DM, obesity, HCAP one month ago, comes into the hospital due to shortness of breath since 2 -3 days ago.   CXR shows right middle lobe infiltrates , left side pleural effusion improved from previous admission, most likely HCAP r/o empyema

## 2017-08-01 NOTE — H&P ADULT - PROBLEM SELECTOR PLAN 3
Pt Hb is low in comparation to previous dc, complaint of shortness of breath  will tranfuns 1 unit of packed RBC Pt Hb is low in comparison to previous dc, complaint of shortness of breath  will transfuse  1 unit of packed RBC

## 2017-08-01 NOTE — H&P ADULT - NSHPREVIEWOFSYSTEMS_GEN_ALL_CORE
Negative for chest pain and palpitations  Negative for vomiting and nausea  Negative for calf pain  Positive for shortness of breath and cough

## 2017-08-02 DIAGNOSIS — A04.7 ENTEROCOLITIS DUE TO CLOSTRIDIUM DIFFICILE: ICD-10-CM

## 2017-08-02 LAB
-  CANDIDA ALBICANS: SIGNIFICANT CHANGE UP
-  CANDIDA GLABRATA: SIGNIFICANT CHANGE UP
-  CANDIDA KRUSEI: SIGNIFICANT CHANGE UP
-  CANDIDA PARAPSILOSIS: SIGNIFICANT CHANGE UP
-  CANDIDA TROPICALIS: SIGNIFICANT CHANGE UP
-  COAGULASE NEGATIVE STAPHYLOCOCCUS: SIGNIFICANT CHANGE UP
-  K. PNEUMONIAE GROUP: SIGNIFICANT CHANGE UP
-  KPC RESISTANCE GENE: SIGNIFICANT CHANGE UP
-  STREPTOCOCCUS SP. (NOT GRP A, B OR S PNEUMONIAE): SIGNIFICANT CHANGE UP
A BAUMANNII DNA SPEC QL NAA+PROBE: SIGNIFICANT CHANGE UP
ALBUMIN SERPL ELPH-MCNC: 3.8 G/DL — SIGNIFICANT CHANGE UP (ref 3.3–5.2)
ALP SERPL-CCNC: 165 U/L — HIGH (ref 40–120)
ALT FLD-CCNC: 25 U/L — SIGNIFICANT CHANGE UP
ANION GAP SERPL CALC-SCNC: 18 MMOL/L — HIGH (ref 5–17)
AST SERPL-CCNC: 26 U/L — SIGNIFICANT CHANGE UP
BASOPHILS # BLD AUTO: 0 K/UL — SIGNIFICANT CHANGE UP (ref 0–0.2)
BASOPHILS NFR BLD AUTO: 0.5 % — SIGNIFICANT CHANGE UP (ref 0–2)
BILIRUB SERPL-MCNC: 0.7 MG/DL — SIGNIFICANT CHANGE UP (ref 0.4–2)
BUN SERPL-MCNC: 49 MG/DL — HIGH (ref 8–20)
C DIFF BY PCR RESULT: DETECTED
C DIFF TOX GENS STL QL NAA+PROBE: SIGNIFICANT CHANGE UP
CALCIUM SERPL-MCNC: 9 MG/DL — SIGNIFICANT CHANGE UP (ref 8.6–10.2)
CHLORIDE SERPL-SCNC: 94 MMOL/L — LOW (ref 98–107)
CO2 SERPL-SCNC: 25 MMOL/L — SIGNIFICANT CHANGE UP (ref 22–29)
CREAT SERPL-MCNC: 6.36 MG/DL — HIGH (ref 0.5–1.3)
E CLOAC COMP DNA BLD POS QL NAA+PROBE: SIGNIFICANT CHANGE UP
E COLI DNA BLD POS QL NAA+NON-PROBE: SIGNIFICANT CHANGE UP
ENTEROCOC DNA BLD POS QL NAA+NON-PROBE: SIGNIFICANT CHANGE UP
ENTEROCOC DNA BLD POS QL NAA+NON-PROBE: SIGNIFICANT CHANGE UP
EOSINOPHIL # BLD AUTO: 0.2 K/UL — SIGNIFICANT CHANGE UP (ref 0–0.5)
EOSINOPHIL NFR BLD AUTO: 2 % — SIGNIFICANT CHANGE UP (ref 0–6)
GLUCOSE SERPL-MCNC: 109 MG/DL — SIGNIFICANT CHANGE UP (ref 70–115)
GP B STREP DNA BLD POS QL NAA+NON-PROBE: SIGNIFICANT CHANGE UP
HAEM INFLU DNA BLD POS QL NAA+NON-PROBE: SIGNIFICANT CHANGE UP
HBA1C BLD-MCNC: 6.4 % — HIGH (ref 4–5.6)
HCT VFR BLD CALC: 23.1 % — LOW (ref 37–47)
HGB BLD-MCNC: 7.3 G/DL — LOW (ref 12–16)
INR BLD: 1.4 RATIO — HIGH (ref 0.88–1.16)
K OXYTOCA DNA BLD POS QL NAA+NON-PROBE: SIGNIFICANT CHANGE UP
L MONOCYTOG DNA BLD POS QL NAA+NON-PROBE: SIGNIFICANT CHANGE UP
LACTATE BLDV-MCNC: 0.8 MMOL/L — SIGNIFICANT CHANGE UP (ref 0.5–2)
LYMPHOCYTES # BLD AUTO: 1.4 K/UL — SIGNIFICANT CHANGE UP (ref 1–4.8)
LYMPHOCYTES # BLD AUTO: 18.3 % — LOW (ref 20–55)
MAGNESIUM SERPL-MCNC: 2.3 MG/DL — SIGNIFICANT CHANGE UP (ref 1.6–2.6)
MCHC RBC-ENTMCNC: 25.3 PG — LOW (ref 27–31)
MCHC RBC-ENTMCNC: 31.6 G/DL — LOW (ref 32–36)
MCV RBC AUTO: 80.2 FL — LOW (ref 81–99)
METHOD TYPE: SIGNIFICANT CHANGE UP
MONOCYTES # BLD AUTO: 0.5 K/UL — SIGNIFICANT CHANGE UP (ref 0–0.8)
MONOCYTES NFR BLD AUTO: 6.6 % — SIGNIFICANT CHANGE UP (ref 3–10)
MRSA SPEC QL CULT: SIGNIFICANT CHANGE UP
MSSA DNA SPEC QL NAA+PROBE: SIGNIFICANT CHANGE UP
N MEN ISLT CULT: SIGNIFICANT CHANGE UP
NEUTROPHILS # BLD AUTO: 5.6 K/UL — SIGNIFICANT CHANGE UP (ref 1.8–8)
NEUTROPHILS NFR BLD AUTO: 71.8 % — SIGNIFICANT CHANGE UP (ref 37–73)
P AERUGINOSA DNA BLD POS NAA+NON-PROBE: SIGNIFICANT CHANGE UP
PHOSPHATE SERPL-MCNC: 5.3 MG/DL — HIGH (ref 2.4–4.7)
PLATELET # BLD AUTO: 292 K/UL — SIGNIFICANT CHANGE UP (ref 150–400)
POTASSIUM SERPL-MCNC: 4.2 MMOL/L — SIGNIFICANT CHANGE UP (ref 3.5–5.3)
POTASSIUM SERPL-SCNC: 4.2 MMOL/L — SIGNIFICANT CHANGE UP (ref 3.5–5.3)
PROT SERPL-MCNC: 8.1 G/DL — SIGNIFICANT CHANGE UP (ref 6.6–8.7)
PROTEUS SP DNA BLD POS QL NAA+NON-PROBE: SIGNIFICANT CHANGE UP
PROTHROM AB SERPL-ACNC: 15.5 SEC — HIGH (ref 9.8–12.7)
RBC # BLD: 2.8 M/UL — LOW (ref 4.4–5.2)
RBC # BLD: 2.88 M/UL — LOW (ref 4.4–5.2)
RBC # FLD: 18.5 % — HIGH (ref 11–15.6)
RETICS #: 9 K/UL — LOW (ref 25–125)
RETICS/RBC NFR: 3.2 % — HIGH (ref 0.5–2.6)
S MARCESCENS DNA BLD POS NAA+NON-PROBE: SIGNIFICANT CHANGE UP
S PNEUM DNA BLD POS QL NAA+NON-PROBE: SIGNIFICANT CHANGE UP
S PYO DNA BLD POS QL NAA+NON-PROBE: SIGNIFICANT CHANGE UP
SODIUM SERPL-SCNC: 137 MMOL/L — SIGNIFICANT CHANGE UP (ref 135–145)
VANCOMYCIN TROUGH SERPL-MCNC: 4 UG/ML — LOW (ref 10–20)
WBC # BLD: 7.9 K/UL — SIGNIFICANT CHANGE UP (ref 4.8–10.8)
WBC # FLD AUTO: 7.9 K/UL — SIGNIFICANT CHANGE UP (ref 4.8–10.8)

## 2017-08-02 PROCEDURE — 99233 SBSQ HOSP IP/OBS HIGH 50: CPT | Mod: GC

## 2017-08-02 RX ORDER — METRONIDAZOLE 500 MG
TABLET ORAL
Qty: 0 | Refills: 0 | Status: DISCONTINUED | OUTPATIENT
Start: 2017-08-02 | End: 2017-08-02

## 2017-08-02 RX ORDER — VANCOMYCIN HCL 1 G
1500 VIAL (EA) INTRAVENOUS ONCE
Qty: 0 | Refills: 0 | Status: DISCONTINUED | OUTPATIENT
Start: 2017-08-02 | End: 2017-08-02

## 2017-08-02 RX ORDER — VANCOMYCIN HCL 1 G
1000 VIAL (EA) INTRAVENOUS ONCE
Qty: 0 | Refills: 0 | Status: DISCONTINUED | OUTPATIENT
Start: 2017-08-02 | End: 2017-08-02

## 2017-08-02 RX ORDER — CALCIUM ACETATE 667 MG
667 TABLET ORAL
Qty: 0 | Refills: 0 | Status: DISCONTINUED | OUTPATIENT
Start: 2017-08-02 | End: 2017-08-14

## 2017-08-02 RX ORDER — VANCOMYCIN HCL 1 G
125 VIAL (EA) INTRAVENOUS EVERY 6 HOURS
Qty: 0 | Refills: 0 | Status: DISCONTINUED | OUTPATIENT
Start: 2017-08-02 | End: 2017-08-14

## 2017-08-02 RX ORDER — METRONIDAZOLE 500 MG
500 TABLET ORAL EVERY 8 HOURS
Qty: 0 | Refills: 0 | Status: DISCONTINUED | OUTPATIENT
Start: 2017-08-02 | End: 2017-08-02

## 2017-08-02 RX ORDER — METRONIDAZOLE 500 MG
500 TABLET ORAL ONCE
Qty: 0 | Refills: 0 | Status: COMPLETED | OUTPATIENT
Start: 2017-08-02 | End: 2017-08-02

## 2017-08-02 RX ORDER — VANCOMYCIN HCL 1 G
1250 VIAL (EA) INTRAVENOUS ONCE
Qty: 0 | Refills: 0 | Status: DISCONTINUED | OUTPATIENT
Start: 2017-08-02 | End: 2017-08-02

## 2017-08-02 RX ORDER — SODIUM CHLORIDE 9 MG/ML
1000 INJECTION, SOLUTION INTRAVENOUS
Qty: 0 | Refills: 0 | Status: DISCONTINUED | OUTPATIENT
Start: 2017-08-02 | End: 2017-08-02

## 2017-08-02 RX ORDER — IPRATROPIUM/ALBUTEROL SULFATE 18-103MCG
3 AEROSOL WITH ADAPTER (GRAM) INHALATION EVERY 6 HOURS
Qty: 0 | Refills: 0 | Status: DISCONTINUED | OUTPATIENT
Start: 2017-08-02 | End: 2017-08-14

## 2017-08-02 RX ORDER — ERYTHROPOIETIN 10000 [IU]/ML
12000 INJECTION, SOLUTION INTRAVENOUS; SUBCUTANEOUS
Qty: 0 | Refills: 0 | Status: DISCONTINUED | OUTPATIENT
Start: 2017-08-02 | End: 2017-08-12

## 2017-08-02 RX ORDER — ALBUTEROL 90 UG/1
2.5 AEROSOL, METERED ORAL ONCE
Qty: 0 | Refills: 0 | Status: DISCONTINUED | OUTPATIENT
Start: 2017-08-02 | End: 2017-08-12

## 2017-08-02 RX ADMIN — ERYTHROPOIETIN 12000 UNIT(S): 10000 INJECTION, SOLUTION INTRAVENOUS; SUBCUTANEOUS at 16:55

## 2017-08-02 RX ADMIN — ERGOCALCIFEROL 50000 UNIT(S): 1.25 CAPSULE ORAL at 12:40

## 2017-08-02 RX ADMIN — Medication 3 MILLILITER(S): at 21:21

## 2017-08-02 RX ADMIN — Medication 3 MILLILITER(S): at 08:54

## 2017-08-02 RX ADMIN — SEVELAMER CARBONATE 400 MILLIGRAM(S): 2400 POWDER, FOR SUSPENSION ORAL at 18:50

## 2017-08-02 RX ADMIN — Medication 2 UNIT(S): at 08:24

## 2017-08-02 RX ADMIN — PIPERACILLIN AND TAZOBACTAM 200 GRAM(S): 4; .5 INJECTION, POWDER, LYOPHILIZED, FOR SOLUTION INTRAVENOUS at 06:37

## 2017-08-02 RX ADMIN — CALCITRIOL 0.5 MICROGRAM(S): 0.5 CAPSULE ORAL at 12:40

## 2017-08-02 RX ADMIN — Medication 600 MILLIGRAM(S): at 18:51

## 2017-08-02 RX ADMIN — Medication 1 MILLIGRAM(S): at 12:24

## 2017-08-02 RX ADMIN — Medication 81 MILLIGRAM(S): at 12:24

## 2017-08-02 RX ADMIN — Medication 100 MILLIGRAM(S): at 12:17

## 2017-08-02 RX ADMIN — PANTOPRAZOLE SODIUM 40 MILLIGRAM(S): 20 TABLET, DELAYED RELEASE ORAL at 06:37

## 2017-08-02 RX ADMIN — Medication 125 MILLIGRAM(S): at 18:52

## 2017-08-02 NOTE — PROGRESS NOTE ADULT - PROBLEM SELECTOR PLAN 5
hemodialysis on monday, wendsday , friday   consult nephrologist  continue sevelamer  cincalcet  continue calcium acetated  AV fistula appear patent  permacth in place with no erythema hemodialysis on monday, wendsday , friday   consult nephrologist  continue sevelamer  cincalcet  continue calcium acetated  AV fistula appear patent  permacth in place with no erythema  recent Cr= 6.36 hemodialysis on monday, wendsday , friday   consult nephrologist  continue sevelamer  cincalcet  continue calcium acetated  AV fistula present but deep, d/w Vascular NP - needs superficialization and can take to OR if stable by friday   permacth in place with no erythema  recent Cr= 6.36

## 2017-08-02 NOTE — PROGRESS NOTE ADULT - PROBLEM SELECTOR PLAN 2
C.diff toxin , pending results C.diff toxin , pending results  diarrhea improved over the past night cdiff +, to start on vanco oral   oral hydration

## 2017-08-02 NOTE — PROGRESS NOTE ADULT - PROBLEM SELECTOR PLAN 1
O2 by a nasal canula to keep O2 between 90-93 %  zosin q8  BCx pending  CT of the chest to look for empyema   consult ID doctor O2 by a nasal canula to keep O2 between 90-93 %  zosin q8 and vanco  BCx and CT of chest pending  no fever over night  CT of the chest to look for empyema   consult ID doctor O2 by a nasal canula to keep O2 between 90-93 %  zosin q8 and vanco  BCx and CT of chest pending  add duoneb q6  no fever over night  CT of the chest to look for empyema   consult ID doctor O2 by a nasal canula to keep O2 between 90-93 %  zosin q8 and vanco  BCx and CT of chest pending  CPOximetry   add duoneb q6 to current tx  no fever over night  Pending CT of the chest   consult ID doctor O2 by a nasal canula to keep O2 between 90-93 %  zosin q8 and vanco  BCx  pending  CPOximetry   add duoneb q6 to current tx  no fever over night  CT of the chest didn't shows pleural effusion, there are bilateral adenopathy decreased bilateral patchy airspace opacities, probably pneumonia.  consult ID doctor Dr Small O2 by a nasal canula to keep O2 between 90-93 %.   ct chest done, shows improvement, Seen by ID   d/c all abx except for tx for pna   BCx  pending  CPOximetry   add duoneb q6 to current tx  no fever over night  CT of the chest didn't shows pleural effusion, there are bilateral adenopathy decreased bilateral patchy airspace opacities, probably pneumonia.  consult ID doctor Dr Small

## 2017-08-02 NOTE — PROGRESS NOTE ADULT - PROBLEM SELECTOR PLAN 3
1 unit of packed RBC was transfused  at 2:00 1 unit of packed RBC was transfused  at 2:00  Hb yesterday= 6.7 today after 1 unit packed RBC hb =7.3 1 unit of packed RBC was transfused  at 2:00  Hb yesterday= 6.7 today after 1 unit packed RBC hb =7.3  c/w epogen   anemia w/up noted   to transfuse as needed

## 2017-08-02 NOTE — CONSULT NOTE ADULT - SUBJECTIVE AND OBJECTIVE BOX
[facility]  INFECTIOUS DISEASES AND INTERNAL MEDICINE OF [place]  =======================================================  Ned Bangura MD FACP   Sen De MD  Diplomates American Board of Internal Medicine and Infectious Diseases  =======================================================      [id number]  SORAYA BARBARA     CC: Patient is a 37y old  Female who presents with a chief complaint of Diarrhea and shortness of breath (01 Aug 2017 17:54)    38y/o  Female with ESRD  s/p left UE AVF on HD MWF, right chest wall permacath, HTN, DM, obesity, HCAP a month ago, comes into the hospital due to  shortness of breath since 2 -3 days ago. Reports SOB at baseline but worsening since the past 3 days. Associated with dry cough and chill but no fever. No sick contacts no recent travel. Recently discharged from [facility] s/p treatment of HAP with 7 days of Cefepime and she also completed a 7 days of Levaquin outpatient.  She also s/o several nonbloody diarrhea intermittently since the past week which is worsened since she walked into the ER. She was initially able to control her BM's but now is unable to control them. She denies nausea of vomiting, no abdominal pain. She has been afebrile with no leukocytosis this hospitalization. She had a CT Chest reporting decreased bilateral patchy airspace opacities since prior CT chest. Her stool for C Diff is positive. Patient was started on IV Vancomycin, Zosyn for possible PNA and Flagyl for C Diff. ID input requested.       Past Medical & Surgical Hx:  End stage renal disease  Hypertension  Diabetes  AVF (arteriovenous fistula)  Vascular dialysis catheter in place      Social Hx:  Recently quit smoking 6 months ago    FAMILY HISTORY:  Family history of renal failure: mother had dialysis in her mid 60s  Family history of diabetes mellitus (DM)      Allergies  Mushrooms (Hives (Mild))  No Known Drug Allergies      Antibiotics:  piperacillin/tazobactam IVPB. 2.25 Gram(s) IV Intermittent every 8 hours  vancomycin  IVPB 1000 milliGRAM(s) IV Intermittent once  metroNIDAZOLE    Tablet 500 milliGRAM(s) Oral every 8 hours         REVIEW OF SYSTEMS:  CONSTITUTIONAL:  No Fever or chills  HEENT:  No diplopia or blurred vision.  No earache, sore throat or runny nose.  CARDIOVASCULAR:  No pressure, squeezing, strangling, tightness, heaviness or aching about the chest, neck, axilla or epigastrium.  RESPIRATORY:  No cough, shortness of breath, PND or orthopnea.  GASTROINTESTINAL:  No nausea, vomiting or diarrhea.  GENITOURINARY:  No dysuria, frequency or urgency. No Blood in urine  MUSCULOSKELETAL:  no joint aches, no muscle pain  SKIN:  No change in skin, hair or nails.  NEUROLOGIC:  No paresthesias, fasciculations, seizures or weakness.  PSYCHIATRIC:  No disorder of thought or mood.  ENDOCRINE:  No heat or cold intolerance, polyuria or polydipsia.  HEMATOLOGICAL:  No easy bruising or bleeding.       Physical Exam:  Vital Signs Last 24 Hrs  T(C): 36.7 (02 Aug 2017 07:24), Max: 36.8 (01 Aug 2017 23:00)  T(F): 98.1 (02 Aug 2017 07:24), Max: 98.2 (01 Aug 2017 23:00)  HR: 85 (02 Aug 2017 07:24) (85 - 93)  BP: 150/80 (02 Aug 2017 07:24) (138/80 - 150/80)  RR: 20 (02 Aug 2017 07:24) (20 - 22)  SpO2: 94% (02 Aug 2017 07:24) (91% - 95%)    GEN: NAD, pleasant  HEENT: normocephalic and atraumatic. EOMI. PERRL.    NECK: Supple.  LUNGS: B/L wheezing  HEART: Regular rate and rhythm   ABDOMEN: Soft, nontender, and nondistended.  Positive bowel sounds.    : No CVA tenderness  EXTREMITIES: With B/L edema.  MSK: No joint swelling  NEUROLOGIC: Cranial nerves II through XII are grossly intact.  PSYCHIATRIC: Appropriate affect .  SKIN: No ulceration or induration present.        Labs:  08-02    137  |  94<L>  |  49.0<H>  ----------------------------<  109  4.2   |  25.0  |  6.36<H>    Ca    9.0      02 Aug 2017 06:10  Phos  5.3     08-02  Mg     2.3     08-02    TPro  8.1  /  Alb  3.8  /  TBili  0.7  /  DBili  x   /  AST  26  /  ALT  25  /  AlkPhos  165<H>  08-02                          7.3    7.9   )-----------( 292      ( 02 Aug 2017 06:10 )             23.1       PT/INR - ( 02 Aug 2017 01:45 )   PT: 15.5 sec;   INR: 1.40 ratio        LIVER FUNCTIONS - ( 02 Aug 2017 06:10 )  Alb: 3.8 g/dL / Pro: 8.1 g/dL / ALK PHOS: 165 U/L / ALT: 25 U/L / AST: 26 U/L / GGT: x             RECENT CULTURES:  Clostridium difficile Toxin by PCR (08.02.17 @ 05:05)    C Diff by PCR Result: Detected: TYPE:(C=Critical, N=Notification, A=Abnormal) C  TESTS: CDIFF PCR  DATE/TIME CALLED: 08/02/17 09:29  CALLED TO: 2GUL: Orquidea Mon RN  READ BACK (2 Patient Identifiers)(Y/N): Y  READ BACK VALUES (Y/N): Y  CALLED BY: luis carlos  Copy to Infection CoDetected: Brandan chamberss. 08/02/17 09:29    Clostridium difficile Toxin by PCR: RESULT INTERPRETATION:    Detected - Clostridium difficile toxin B detected by amplified DNA PCR .    C. difficile PCR test results should be interpreted only with  consideration of the patient's clinical situation and history.  This test  will detect the presence of toxigenic C. difficile.  However it cannot be  used as the sole criteria for the diagnosis of antibiotic associated  diarrhea, antibiotic associated colitis, or pseudomembranous colitis.  Colonization with C. difficile may exceed 20%in hospital patients, the  majority of whom are without Toxigenic Clostridium Difficile disease.  Testing is generally not recommended in children below the age of 1 year,  as up to half of healthy infants are asymptomatically colonized with C.  difficile.  In addition, C. difficile PCR testing cannot be used as a  "test of cure" as dead organism nucleic acids will persist and be  detected after treatment.  Successful treatment of C. difficile disease  is determined by resolution of clinical symptoms.        EXAM:  CT CHEST                        PROCEDURE DATE:  08/01/2017    INTERPRETATION:  EXAMINATION DATE: August 1, 2017 at 2237 hours.  CLINICAL INFORMATION: Pneumonia, cough, orthopnea, fever.  COMPARISON: June 13, 2017.  PROCEDURE:   CT of the Chest was performed without intravenous contrast.  Sagittal and coronal reformats were performed.  FINDINGS:  LUNGS AND LARGE AIRWAYS: Decreased bilateral patchy airspace opacities,   probably pneumonia.  PLEURA: No pleural effusion.  VESSELS: Right-sided central venous catheter with tip terminating in the   superior cavoatrial junction.   HEART: Heart size is normal. No pericardial effusion.  MEDIASTINUM AND SHANITA: Persistent mediastinal and hilar adenopathy.  CHEST WALL AND LOWER NECK: Within normal limits.  VISUALIZED UPPER ABDOMEN: Trace perihepatic ascites.  BONES: Within normal limits.  IMPRESSION:   1.  Since June 13, 2017, decreased bilateral patchy airspace opacities,   probably pneumonia.  2.  Persistent mediastinal and hilar adenopathy.

## 2017-08-02 NOTE — PROGRESS NOTE ADULT - PROBLEM SELECTOR PLAN 4
,pt has an area over her left lateral calf painfull to palpation, Wells score for DVT =1   Doppler US was negative for DVT pt has an area over her left lateral calf painful to palpation, Wells score for DVT =1   Doppler US was negative for DVT  heparin 5000 unit bid

## 2017-08-02 NOTE — PROGRESS NOTE ADULT - SUBJECTIVE AND OBJECTIVE BOX
CC: Diarrhea and shortness of breath  Patient is a 37y Female with PMH of  ESRD, DM, HTN and HCAP one month ago. Patient seen and examined at bedside in  moderated shortness of breath w/ O2 50%, she complaint of cough associated w/ clear secretions, over the night she had 3 nonbloody diarrhea, states that now she feels better in relation  to yesterday, no fever over nigh, she has been drinking and eating.      Denies fever, chest pain, abdominal pain, constipation, calf pain.  Cardiac monitor reviewed;    VS:   Vital Signs Last 24 Hrs  T(C): 36.8 (01 Aug 2017 23:00), Max: 37.3 (01 Aug 2017 11:45)  T(F): 98.2 (01 Aug 2017 23:00), Max: 99.2 (01 Aug 2017 11:45)  HR: 85 (01 Aug 2017 23:00) (85 - 93)  BP: 138/80 (01 Aug 2017 23:00) (118/70 - 140/72)  BP(mean): --  RR: 20 (01 Aug 2017 23:00) (20 - 22)  SpO2: 91% (01 Aug 2017 23:00) (91% - 96%)  Physical Exam:   PHYSICAL EXAM:      Constitutional:    Eyes:    ENMT:    Neck:    Breasts:    Back:    Respiratory:    Cardiovascular:    Gastrointestinal:    Genitourinary:    Rectal:    Extremities:    Vascular:    Neurological:    Skin:    Lymph Nodes:    Musculoskeletal:    Psychiatric:        Labs:                        6.7    10.6  )-----------( 328      ( 01 Aug 2017 14:57 )             21.6   08-01    134<L>  |  90<L>  |  44.0<H>  ----------------------------<  126<H>  5.4<H>   |  24.0  |  5.43<H>    Ca    8.7      01 Aug 2017 20:14  Phos  4.8     08-01  Mg     2.3     08-01    TPro  8.1  /  Alb  3.4  /  TBili  0.5  /  DBili  x   /  AST  40<H>  /  ALT  26  /  AlkPhos  157<H>  08-01        Radiology:  *Pull    Medications:  MEDICATIONS  (STANDING):  aspirin enteric coated 81 milliGRAM(s) Oral daily  epoetin raina Injectable 23072 Unit(s) IV Push every 3 days  calcium acetate 667 milliGRAM(s) Oral four times a day with meals  sevelamer hydrochloride 400 milliGRAM(s) Oral three times a day with meals  pantoprazole    Tablet 40 milliGRAM(s) Oral before breakfast  calcitriol   Capsule 0.5 MICROGram(s) Oral daily  ergocalciferol 37373 Unit(s) Oral every week  folic acid 1 milliGRAM(s) Oral daily  insulin lispro Injectable (HumaLOG) 2 Unit(s) SubCutaneous before breakfast  insulin lispro Injectable (HumaLOG) 2 Unit(s) SubCutaneous before lunch  insulin lispro Injectable (HumaLOG) 2 Unit(s) SubCutaneous before dinner  insulin lispro (HumaLOG) corrective regimen sliding scale   SubCutaneous three times a day before meals  dextrose 5%. 1000 milliLiter(s) (50 mL/Hr) IV Continuous <Continuous>  dextrose 50% Injectable 12.5 Gram(s) IV Push once  dextrose 50% Injectable 25 Gram(s) IV Push once  dextrose 50% Injectable 25 Gram(s) IV Push once  heparin  Injectable 5000 Unit(s) SubCutaneous every 12 hours  piperacillin/tazobactam IVPB. 2.25 Gram(s) IV Intermittent once  piperacillin/tazobactam IVPB. 2.25 Gram(s) IV Intermittent every 8 hours    MEDICATIONS  (PRN):  dextrose Gel 1 Dose(s) Oral once PRN Blood Glucose LESS THAN 70 milliGRAM(s)/deciliter  glucagon  Injectable 1 milliGRAM(s) IntraMuscular once PRN Glucose LESS THAN 70 milligrams/deciliter  labetalol 200 milliGRAM(s) Oral three times a day PRN give if systolic blood pressure is above 160 mmHg systolic  hydrALAZINE 25 milliGRAM(s) Oral every 8 hours PRN give if sBP > 160 mmHg  amLODIPine   Tablet 10 milliGRAM(s) Oral daily PRN give if sBP above 160 mmHG systolic CC: Diarrhea and shortness of breath  Patient is a 37y Female with PMH of  ESRD, DM, HTN and HCAP one month ago. Patient seen and examined at bedside in  mild  shortness of breath w/ O2 50%, she complaint of cough associated w/ clear secretions, over the night she had 3 nonbloody diarrhea, states that now she feels better in relation  to yesterday, no fever over nigh, she has been drinking and eating. Over the night pt O2 saturation went down to 86-87 % as per nurse and respiratory was called.      Denies fever, chest pain, abdominal pain, constipation, calf pain.  Cardiac monitor reviewed; no events    VS:   Vital Signs Last 24 Hrs  T(C): 36.8 (01 Aug 2017 23:00), Max: 37.3 (01 Aug 2017 11:45)  T(F): 98.2 (01 Aug 2017 23:00), Max: 99.2 (01 Aug 2017 11:45)  HR: 85 (01 Aug 2017 23:00) (85 - 93)  BP: 138/80 (01 Aug 2017 23:00) (118/70 - 140/72)  BP(mean): --  RR: 20 (01 Aug 2017 23:00) (20 - 22)  SpO2: 91% (01 Aug 2017 23:00) (91% - 96%)    PHYSICAL EXAM:  Gen: NAD  Lymph: no lymphadenopathy  HEENT: WILDA bilaterally, NC/AT  Lungs:  wheezes over both lung fields, rales over both lung bases  Heart: RRR, NS1/S2, no murmurs  Ab: NBSx4,, soft, non tender, non distended, no hepatosplenomegaly  Ext: no calf tenderness positive  pedal edema  Neuro: CN2-12 grossly intact, Pt A&O x 3  Psych: Normal mood and affect :      Labs:                        6.7    10.6  )-----------( 328      ( 01 Aug 2017 14:57 )             21.6   08-01    134<L>  |  90<L>  |  44.0<H>  ----------------------------<  126<H>  5.4<H>   |  24.0  |  5.43<H>    Ca    8.7      01 Aug 2017 20:14  Phos  4.8     08-01  Mg     2.3     08-01    TPro  8.1  /  Alb  3.4  /  TBili  0.5  /  DBili  x   /  AST  40<H>  /  ALT  26  /  AlkPhos  157<H>  08-01            Medications:  MEDICATIONS  (STANDING):  aspirin enteric coated 81 milliGRAM(s) Oral daily  epoetin raina Injectable 63959 Unit(s) IV Push every 3 days  calcium acetate 667 milliGRAM(s) Oral four times a day with meals  sevelamer hydrochloride 400 milliGRAM(s) Oral three times a day with meals  pantoprazole    Tablet 40 milliGRAM(s) Oral before breakfast  calcitriol   Capsule 0.5 MICROGram(s) Oral daily  ergocalciferol 58674 Unit(s) Oral every week  folic acid 1 milliGRAM(s) Oral daily  insulin lispro Injectable (HumaLOG) 2 Unit(s) SubCutaneous before breakfast  insulin lispro Injectable (HumaLOG) 2 Unit(s) SubCutaneous before lunch  insulin lispro Injectable (HumaLOG) 2 Unit(s) SubCutaneous before dinner  insulin lispro (HumaLOG) corrective regimen sliding scale   SubCutaneous three times a day before meals  dextrose 5%. 1000 milliLiter(s) (50 mL/Hr) IV Continuous <Continuous>  dextrose 50% Injectable 12.5 Gram(s) IV Push once  dextrose 50% Injectable 25 Gram(s) IV Push once  dextrose 50% Injectable 25 Gram(s) IV Push once  heparin  Injectable 5000 Unit(s) SubCutaneous every 12 hours  piperacillin/tazobactam IVPB. 2.25 Gram(s) IV Intermittent once  piperacillin/tazobactam IVPB. 2.25 Gram(s) IV Intermittent every 8 hours    MEDICATIONS  (PRN):  dextrose Gel 1 Dose(s) Oral once PRN Blood Glucose LESS THAN 70 milliGRAM(s)/deciliter  glucagon  Injectable 1 milliGRAM(s) IntraMuscular once PRN Glucose LESS THAN 70 milligrams/deciliter  labetalol 200 milliGRAM(s) Oral three times a day PRN give if systolic blood pressure is above 160 mmHg systolic  hydrALAZINE 25 milliGRAM(s) Oral every 8 hours PRN give if sBP > 160 mmHg  amLODIPine   Tablet 10 milliGRAM(s) Oral daily PRN give if sBP above 160 mmHG systolic CC: Diarrhea and shortness of breath  Patient is a 37y Female with PMH of  ESRD, DM, HTN and HCAP one month ago. Patient seen and examined at bedside in  mild  shortness of breath w/ O2 50%, she complaint of cough associated w/ clear secretions, over the night she had 3 nonbloody diarrhea, states that now she feels better in relation  to yesterday, no fever over nigh, she has been drinking and eating. Over the night pt O2 saturation went down to 86-87 % as per nurse and respiratory was called.      Denies fever, chest pain, abdominal pain, constipation, calf pain.  Cardiac monitor reviewed; no events    VS:   Vital Signs Last 24 Hrs  T(C): 36.8 (01 Aug 2017 23:00), Max: 37.3 (01 Aug 2017 11:45)  T(F): 98.2 (01 Aug 2017 23:00), Max: 99.2 (01 Aug 2017 11:45)  HR: 85 (01 Aug 2017 23:00) (85 - 93)  BP: 138/80 (01 Aug 2017 23:00) (118/70 - 140/72)  BP(mean): --  RR: 20 (01 Aug 2017 23:00) (20 - 22)  SpO2: 91% (01 Aug 2017 23:00) (91% - 96%)    PHYSICAL EXAM:  Gen: NAD  Lymph: no lymphadenopathy  HEENT: WILDA bilaterally, NC/AT  Skin: no cyanosis, no rash, capillary refills less than 2 sec  Lungs:  wheezes over both lung fields, rales over both lung bases  Heart: RRR, NS1/S2, no murmurs  Ab: NBSx4,, soft, non tender, non distended, no hepatosplenomegaly  Ext: no calf tenderness positive  pedal edema  Neuro: CN2-12 grossly intact, Pt A&O x 3  Psych: Normal mood and affect :      Labs:                        6.7    10.6  )-----------( 328      ( 01 Aug 2017 14:57 )             21.6   08-01    134<L>  |  90<L>  |  44.0<H>  ----------------------------<  126<H>  5.4<H>   |  24.0  |  5.43<H>    Ca    8.7      01 Aug 2017 20:14  Phos  4.8     08-01  Mg     2.3     08-01    TPro  8.1  /  Alb  3.4  /  TBili  0.5  /  DBili  x   /  AST  40<H>  /  ALT  26  /  AlkPhos  157<H>  08-01            Medications:  MEDICATIONS  (STANDING):  aspirin enteric coated 81 milliGRAM(s) Oral daily  epoetin raina Injectable 13098 Unit(s) IV Push every 3 days  calcium acetate 667 milliGRAM(s) Oral four times a day with meals  sevelamer hydrochloride 400 milliGRAM(s) Oral three times a day with meals  pantoprazole    Tablet 40 milliGRAM(s) Oral before breakfast  calcitriol   Capsule 0.5 MICROGram(s) Oral daily  ergocalciferol 72524 Unit(s) Oral every week  folic acid 1 milliGRAM(s) Oral daily  insulin lispro Injectable (HumaLOG) 2 Unit(s) SubCutaneous before breakfast  insulin lispro Injectable (HumaLOG) 2 Unit(s) SubCutaneous before lunch  insulin lispro Injectable (HumaLOG) 2 Unit(s) SubCutaneous before dinner  insulin lispro (HumaLOG) corrective regimen sliding scale   SubCutaneous three times a day before meals  dextrose 5%. 1000 milliLiter(s) (50 mL/Hr) IV Continuous <Continuous>  dextrose 50% Injectable 12.5 Gram(s) IV Push once  dextrose 50% Injectable 25 Gram(s) IV Push once  dextrose 50% Injectable 25 Gram(s) IV Push once  heparin  Injectable 5000 Unit(s) SubCutaneous every 12 hours  piperacillin/tazobactam IVPB. 2.25 Gram(s) IV Intermittent once  piperacillin/tazobactam IVPB. 2.25 Gram(s) IV Intermittent every 8 hours    MEDICATIONS  (PRN):  dextrose Gel 1 Dose(s) Oral once PRN Blood Glucose LESS THAN 70 milliGRAM(s)/deciliter  glucagon  Injectable 1 milliGRAM(s) IntraMuscular once PRN Glucose LESS THAN 70 milligrams/deciliter  labetalol 200 milliGRAM(s) Oral three times a day PRN give if systolic blood pressure is above 160 mmHg systolic  hydrALAZINE 25 milliGRAM(s) Oral every 8 hours PRN give if sBP > 160 mmHg  amLODIPine   Tablet 10 milliGRAM(s) Oral daily PRN give if sBP above 160 mmHG systolic CC: Diarrhea and shortness of breath  Patient is a 37y Female with PMH of  ESRD, DM, HTN and HCAP one month ago. Patient seen and examined at bedside in  mild  shortness of breath w/ O2 50%, she complaint of cough associated w/ clear secretions, over the night she had 3 nonbloody diarrhea, states that now she feels better in relation  to yesterday, no fever over nigh, she has been drinking and eating. Over the night pt had desaturations several times lower one was 81% and  respiratory therapy was called by the nurse.      Denies fever, chest pain, abdominal pain, constipation, calf pain.  Cardiac monitor reviewed; no events    VS:   Vital Signs Last 24 Hrs  T(C): 36.8 (01 Aug 2017 23:00), Max: 37.3 (01 Aug 2017 11:45)  T(F): 98.2 (01 Aug 2017 23:00), Max: 99.2 (01 Aug 2017 11:45)  HR: 85 (01 Aug 2017 23:00) (85 - 93)  BP: 138/80 (01 Aug 2017 23:00) (118/70 - 140/72)  BP(mean): --  RR: 20 (01 Aug 2017 23:00) (20 - 22)  SpO2: 91% (01 Aug 2017 23:00) (91% - 96%), several desaturation over night lower O2 sat was 81%    PHYSICAL EXAM:  Gen: NAD  Lymph: no lymphadenopathy  HEENT: WILDA bilaterally, NC/AT  Skin: no cyanosis, no rash, capillary refills less than 2 sec  Lungs:  wheezes over both lung fields, rales over both lung bases  Heart: RRR, NS1/S2, no murmurs  Ab: NBSx4,, soft, non tender, non distended, no hepatosplenomegaly  Ext: no calf tenderness positive  pedal edema  Neuro: CN2-12 grossly intact, Pt A&O x 3  Psych: Normal mood and affect :      Labs:                        6.7    10.6  )-----------( 328      ( 01 Aug 2017 14:57 )             21.6   08-01    134<L>  |  90<L>  |  44.0<H>  ----------------------------<  126<H>  5.4<H>   |  24.0  |  5.43<H>    Ca    8.7      01 Aug 2017 20:14  Phos  4.8     08-01  Mg     2.3     08-01    TPro  8.1  /  Alb  3.4  /  TBili  0.5  /  DBili  x   /  AST  40<H>  /  ALT  26  /  AlkPhos  157<H>  08-01            Medications:  MEDICATIONS  (STANDING):  aspirin enteric coated 81 milliGRAM(s) Oral daily  epoetin raina Injectable 19240 Unit(s) IV Push every 3 days  calcium acetate 667 milliGRAM(s) Oral four times a day with meals  sevelamer hydrochloride 400 milliGRAM(s) Oral three times a day with meals  pantoprazole    Tablet 40 milliGRAM(s) Oral before breakfast  calcitriol   Capsule 0.5 MICROGram(s) Oral daily  ergocalciferol 03551 Unit(s) Oral every week  folic acid 1 milliGRAM(s) Oral daily  insulin lispro Injectable (HumaLOG) 2 Unit(s) SubCutaneous before breakfast  insulin lispro Injectable (HumaLOG) 2 Unit(s) SubCutaneous before lunch  insulin lispro Injectable (HumaLOG) 2 Unit(s) SubCutaneous before dinner  insulin lispro (HumaLOG) corrective regimen sliding scale   SubCutaneous three times a day before meals  dextrose 5%. 1000 milliLiter(s) (50 mL/Hr) IV Continuous <Continuous>  dextrose 50% Injectable 12.5 Gram(s) IV Push once  dextrose 50% Injectable 25 Gram(s) IV Push once  dextrose 50% Injectable 25 Gram(s) IV Push once  heparin  Injectable 5000 Unit(s) SubCutaneous every 12 hours  piperacillin/tazobactam IVPB. 2.25 Gram(s) IV Intermittent once  piperacillin/tazobactam IVPB. 2.25 Gram(s) IV Intermittent every 8 hours    MEDICATIONS  (PRN):  dextrose Gel 1 Dose(s) Oral once PRN Blood Glucose LESS THAN 70 milliGRAM(s)/deciliter  glucagon  Injectable 1 milliGRAM(s) IntraMuscular once PRN Glucose LESS THAN 70 milligrams/deciliter  labetalol 200 milliGRAM(s) Oral three times a day PRN give if systolic blood pressure is above 160 mmHg systolic  hydrALAZINE 25 milliGRAM(s) Oral every 8 hours PRN give if sBP > 160 mmHg  amLODIPine   Tablet 10 milliGRAM(s) Oral daily PRN give if sBP above 160 mmHG systolic CC: Diarrhea and shortness of breath  Patient is a 37y Female with PMH of  ESRD, DM, HTN and HCAP one month ago. Patient seen and examined at bedside in  mild  shortness of breath w/ O2 50%, she complaint of cough associated w/ clear secretions, over the night she had 3 nonbloody diarrhea, states that now she feels better in relation  to yesterday, no fever over nigh, she has been drinking and eating. Over the night pt had desaturations several times lower one was 85% and  respiratory therapy was called by the nurse.      Denies fever, chest pain, abdominal pain, constipation, calf pain.  Cardiac monitor reviewed; no events    VS:   Vital Signs Last 24 Hrs  T(C): 36.8 (01 Aug 2017 23:00), Max: 37.3 (01 Aug 2017 11:45)  T(F): 98.2 (01 Aug 2017 23:00), Max: 99.2 (01 Aug 2017 11:45)  HR: 85 (01 Aug 2017 23:00) (85 - 93)  BP: 138/80 (01 Aug 2017 23:00) (118/70 - 140/72)  BP(mean): --  RR: 20 (01 Aug 2017 23:00) (20 - 22)  SpO2: 91% (01 Aug 2017 23:00) (91% - 96%), several desaturation over night lower O2 sat was 81%    PHYSICAL EXAM:  Gen: NAD  Lymph: no lymphadenopathy  HEENT: WILDA bilaterally, NC/AT  Skin: no cyanosis, no rash, capillary refills less than 2 sec  Lungs:  wheezes over both lung fields, rales over both lung bases  Heart: RRR, NS1/S2, no murmurs  Ab: NBSx4,, soft, non tender, non distended, no hepatosplenomegaly  Ext: no calf tenderness positive  pedal edema  Neuro: CN2-12 grossly intact, Pt A&O x 3  Psych: Normal mood and affect :      Labs:                        6.7    10.6  )-----------( 328      ( 01 Aug 2017 14:57 )             21.6   08-01    134<L>  |  90<L>  |  44.0<H>  ----------------------------<  126<H>  5.4<H>   |  24.0  |  5.43<H>    Ca    8.7      01 Aug 2017 20:14  Phos  4.8     08-01  Mg     2.3     08-01    TPro  8.1  /  Alb  3.4  /  TBili  0.5  /  DBili  x   /  AST  40<H>  /  ALT  26  /  AlkPhos  157<H>  08-01            Medications:  MEDICATIONS  (STANDING):  aspirin enteric coated 81 milliGRAM(s) Oral daily  epoetin raina Injectable 64630 Unit(s) IV Push every 3 days  calcium acetate 667 milliGRAM(s) Oral four times a day with meals  sevelamer hydrochloride 400 milliGRAM(s) Oral three times a day with meals  pantoprazole    Tablet 40 milliGRAM(s) Oral before breakfast  calcitriol   Capsule 0.5 MICROGram(s) Oral daily  ergocalciferol 74384 Unit(s) Oral every week  folic acid 1 milliGRAM(s) Oral daily  insulin lispro Injectable (HumaLOG) 2 Unit(s) SubCutaneous before breakfast  insulin lispro Injectable (HumaLOG) 2 Unit(s) SubCutaneous before lunch  insulin lispro Injectable (HumaLOG) 2 Unit(s) SubCutaneous before dinner  insulin lispro (HumaLOG) corrective regimen sliding scale   SubCutaneous three times a day before meals  dextrose 5%. 1000 milliLiter(s) (50 mL/Hr) IV Continuous <Continuous>  dextrose 50% Injectable 12.5 Gram(s) IV Push once  dextrose 50% Injectable 25 Gram(s) IV Push once  dextrose 50% Injectable 25 Gram(s) IV Push once  heparin  Injectable 5000 Unit(s) SubCutaneous every 12 hours  piperacillin/tazobactam IVPB. 2.25 Gram(s) IV Intermittent once  piperacillin/tazobactam IVPB. 2.25 Gram(s) IV Intermittent every 8 hours    MEDICATIONS  (PRN):  dextrose Gel 1 Dose(s) Oral once PRN Blood Glucose LESS THAN 70 milliGRAM(s)/deciliter  glucagon  Injectable 1 milliGRAM(s) IntraMuscular once PRN Glucose LESS THAN 70 milligrams/deciliter  labetalol 200 milliGRAM(s) Oral three times a day PRN give if systolic blood pressure is above 160 mmHg systolic  hydrALAZINE 25 milliGRAM(s) Oral every 8 hours PRN give if sBP > 160 mmHg  amLODIPine   Tablet 10 milliGRAM(s) Oral daily PRN give if sBP above 160 mmHG systolic CC: Diarrhea and shortness of breath     Patient seen and examined at bedside in  mild  shortness of breath w/ O2 50%, she complaint of cough associated w/ clear secretions, over the night she had 3 nonbloody diarrhea, states that now she feels better in relation  to yesterday, no fever over nigh, she has been drinking and eating. Over the night pt had desaturations several times lower one was 85% and  respiratory therapy was called by the nurse.    Denies fever, chest pain, abdominal pain, constipation, calf pain.    Cardiac monitor reviewed: Desat to 86% with a good pleth     VS:   Vital Signs Last 24 Hrs  T(C): 36.8 (01 Aug 2017 23:00), Max: 37.3 (01 Aug 2017 11:45)  T(F): 98.2 (01 Aug 2017 23:00), Max: 99.2 (01 Aug 2017 11:45)  HR: 85 (01 Aug 2017 23:00) (85 - 93)  BP: 138/80 (01 Aug 2017 23:00) (118/70 - 140/72)  BP(mean): --  RR: 20 (01 Aug 2017 23:00) (20 - 22)  SpO2: 91% (01 Aug 2017 23:00) (91% - 96%), several desaturation over night lower O2 sat was 81%    PHYSICAL EXAM:  Gen: NAD  Lymph: no lymphadenopathy  HEENT: WILDA bilaterally, NC/AT  Skin: no cyanosis, no rash, capillary refills less than 2 sec  Lungs:  wheezes over both lung fields insp and exp  Heart: RRR, NS1/S2, no murmurs  Ab: NBSx4,, soft, non tender, non distended, no hepatosplenomegaly  Ext: no calf tenderness positive  pedal edema  Neuro: CN2-12 grossly intact, Pt A&O x 3  Psych: Normal mood and affect :      Labs:                        6.7    10.6  )-----------( 328      ( 01 Aug 2017 14:57 )             21.6   08-01    134<L>  |  90<L>  |  44.0<H>  ----------------------------<  126<H>  5.4<H>   |  24.0  |  5.43<H>    Ca    8.7      01 Aug 2017 20:14  Phos  4.8     08-01  Mg     2.3     08-01    TPro  8.1  /  Alb  3.4  /  TBili  0.5  /  DBili  x   /  AST  40<H>  /  ALT  26  /  AlkPhos  157<H>  08-01    RADIOLOGY:   < from: CT Chest No Cont (08.01.17 @ 22:46) >  LUNGS AND LARGE AIRWAYS: Decreased bilateral patchy airspace opacities,   probably pneumonia.  PLEURA: No pleural effusion.  VESSELS: Right-sided central venous catheter with tip terminating in the   superior cavoatrial junction.   HEART: Heart size is normal. No pericardial effusion.  MEDIASTINUM AND SHANITA: Persistent mediastinal and hilar adenopathy.  CHEST WALL AND LOWER NECK: Within normal limits.  VISUALIZED UPPER ABDOMEN: Trace perihepatic ascites.  BONES: Within normal limits.    IMPRESSION:   1.  Since June 13, 2017, decreased bilateral patchy airspace opacities,   probably pneumonia.  2.  Persistent mediastinal and hilar adenopathy.     < end of copied text >      Medications:  MEDICATIONS  (STANDING):  aspirin enteric coated 81 milliGRAM(s) Oral daily  epoetin raina Injectable 25530 Unit(s) IV Push every 3 days  calcium acetate 667 milliGRAM(s) Oral four times a day with meals  sevelamer hydrochloride 400 milliGRAM(s) Oral three times a day with meals  pantoprazole    Tablet 40 milliGRAM(s) Oral before breakfast  calcitriol   Capsule 0.5 MICROGram(s) Oral daily  ergocalciferol 04925 Unit(s) Oral every week  folic acid 1 milliGRAM(s) Oral daily  insulin lispro Injectable (HumaLOG) 2 Unit(s) SubCutaneous before breakfast  insulin lispro Injectable (HumaLOG) 2 Unit(s) SubCutaneous before lunch  insulin lispro Injectable (HumaLOG) 2 Unit(s) SubCutaneous before dinner  insulin lispro (HumaLOG) corrective regimen sliding scale   SubCutaneous three times a day before meals  dextrose 5%. 1000 milliLiter(s) (50 mL/Hr) IV Continuous <Continuous>  dextrose 50% Injectable 12.5 Gram(s) IV Push once  dextrose 50% Injectable 25 Gram(s) IV Push once  dextrose 50% Injectable 25 Gram(s) IV Push once  heparin  Injectable 5000 Unit(s) SubCutaneous every 12 hours  piperacillin/tazobactam IVPB. 2.25 Gram(s) IV Intermittent once  piperacillin/tazobactam IVPB. 2.25 Gram(s) IV Intermittent every 8 hours    MEDICATIONS  (PRN):  dextrose Gel 1 Dose(s) Oral once PRN Blood Glucose LESS THAN 70 milliGRAM(s)/deciliter  glucagon  Injectable 1 milliGRAM(s) IntraMuscular once PRN Glucose LESS THAN 70 milligrams/deciliter  labetalol 200 milliGRAM(s) Oral three times a day PRN give if systolic blood pressure is above 160 mmHg systolic  hydrALAZINE 25 milliGRAM(s) Oral every 8 hours PRN give if sBP > 160 mmHg  amLODIPine   Tablet 10 milliGRAM(s) Oral daily PRN give if sBP above 160 mmHG systolic

## 2017-08-02 NOTE — PROGRESS NOTE ADULT - PROBLEM SELECTOR PLAN 6
pt blood pressure is low in relation to previous admission , will hold BP med and will start if BP is above 160 pt blood pressure is low in relation to previous admission , will hold BP med and will start if BP is above 160  BP  well controlled pt blood pressure is low in relation to previous admission , will hold BP med and will start if BP is above 160  BP  well controlled  plan for echo

## 2017-08-02 NOTE — PROGRESS NOTE ADULT - ASSESSMENT
36 y/o female with ESRD diagnosed in 1/17 s/p left UE AVF on hemodialysis on M-W-F, right chest wall permacath, HTN, DM, obesity, HCAP one month ago, comes into the hospital due to shortness of breath since 2 -3 days ago.   CXR shows right middle lobe infiltrates , left side pleural effusion improved from previous admission, most likely HCAP r/o empyema Patient is a 38 y/o female with ESRD diagnosed in 1/17 s/p left UE AVF on hemodialysis on M-W-F, right chest wall permacath, HTN, DM, obesity, HCAP one month ago, comes into the hospital due to shortness of breath since 2 -3 days ago.   CXR shows right middle lobe infiltrates , left side pleural effusion improved from previous admission, most likely HCAP r/o empyema   She is admitted for diarrhea, cdiff positive and found to have an improved ct chest

## 2017-08-02 NOTE — CONSULT NOTE ADULT - SUBJECTIVE AND OBJECTIVE BOX
HPI:  36 y/o female with ESRD diagnosed in 1/17 s/p left UE AVF on hemodialysis on M-W-F, right chest wall permacath, HTN, DM tx w/ insulin, obesity, HCAP a month ago, comes into the hospital due to  shortness of breath since 2 -3 days ago. The pt has shortness of breast on her base line but over the past 3 days her shortness of breath has increase and it's accompanied by dry cough, she complain of chill but no fever. At her base line  sometimes use O2 at home by nasal canula No sick contacts no recent travel . The pt was recently discharge from [facility] for HCAP and she got 7 days of Cefepime and she also completed a 7 days of Levaquin outpatient. Pt denies orthopnea and paroxymal dyspnea  but she said that never sleep flat because she "she doesnt feel right when she lies flat". She has swelling in both leg but now are the same and not different from her base line. Pt denies chest pain,  denies hx of clots .   Pt on the previous admission had left pleural effusion and had a chest tube place to drained her fluid,her shortness of breast resolved after got her fluid removed, she denies to miss any of her dialysis appointments .   Pt denies chest pain, no palpitations.  Pt also complain of several nonbloody diarrhea since today watery, she states uncountable,    she denies nausea of vomiting, no abdominal pain , not hx of Cdiff infection. (01 Aug 2017 17:54)      PAST MEDICAL & SURGICAL HISTORY:  End stage renal disease  Hypertension  Diabetes  AVF (arteriovenous fistula)  Vascular dialysis catheter in place      HEALTH ISSUES - PROBLEM Dx:  Preventive measure: Preventive measure  Diabetes: Diabetes  Hypertension: Hypertension  End stage renal disease: End stage renal disease  Left leg pain: Left leg pain  Symptomatic anemia: Symptomatic anemia  Diarrhea: Diarrhea  HCAP (healthcare-associated pneumonia): HCAP (healthcare-associated pneumonia)          Allergies    Mushrooms (Hives (Mild))  No Known Drug Allergies    Intolerances        FAMILY HISTORY:      MEDICATIONS  (STANDING):  aspirin enteric coated 81 milliGRAM(s) Oral daily  epoetin raina Injectable 16410 Unit(s) IV Push every 3 days  sevelamer hydrochloride 400 milliGRAM(s) Oral three times a day with meals  pantoprazole    Tablet 40 milliGRAM(s) Oral before breakfast  calcitriol   Capsule 0.5 MICROGram(s) Oral daily  ergocalciferol 02212 Unit(s) Oral every week  folic acid 1 milliGRAM(s) Oral daily  insulin lispro Injectable (HumaLOG) 2 Unit(s) SubCutaneous before breakfast  insulin lispro Injectable (HumaLOG) 2 Unit(s) SubCutaneous before lunch  insulin lispro Injectable (HumaLOG) 2 Unit(s) SubCutaneous before dinner  insulin lispro (HumaLOG) corrective regimen sliding scale   SubCutaneous three times a day before meals  dextrose 5%. 1000 milliLiter(s) (50 mL/Hr) IV Continuous <Continuous>  dextrose 50% Injectable 12.5 Gram(s) IV Push once  dextrose 50% Injectable 25 Gram(s) IV Push once  dextrose 50% Injectable 25 Gram(s) IV Push once  heparin  Injectable 5000 Unit(s) SubCutaneous every 12 hours  piperacillin/tazobactam IVPB. 2.25 Gram(s) IV Intermittent once  piperacillin/tazobactam IVPB. 2.25 Gram(s) IV Intermittent every 8 hours  vancomycin  IVPB 1250 milliGRAM(s) IV Intermittent once  ALBUTerol/ipratropium for Nebulization 3 milliLiter(s) Nebulizer every 6 hours  calcium acetate 667 milliGRAM(s) Oral three times a day with meals    MEDICATIONS  (PRN):  dextrose Gel 1 Dose(s) Oral once PRN Blood Glucose LESS THAN 70 milliGRAM(s)/deciliter  glucagon  Injectable 1 milliGRAM(s) IntraMuscular once PRN Glucose LESS THAN 70 milligrams/deciliter  labetalol 200 milliGRAM(s) Oral three times a day PRN give if systolic blood pressure is above 160 mmHg systolic  hydrALAZINE 25 milliGRAM(s) Oral every 8 hours PRN give if sBP > 160 mmHg  amLODIPine   Tablet 10 milliGRAM(s) Oral daily PRN give if sBP above 160 mmHG systolic      ICU Vital Signs Last 24 Hrs  T(C): 36.7 (02 Aug 2017 07:24), Max: 37.3 (01 Aug 2017 11:45)  T(F): 98.1 (02 Aug 2017 07:24), Max: 99.2 (01 Aug 2017 11:45)  HR: 85 (02 Aug 2017 07:24) (85 - 93)  BP: 150/80 (02 Aug 2017 07:24) (118/70 - 150/80)  BP(mean): --  ABP: --  ABP(mean): --  RR: 20 (02 Aug 2017 07:24) (20 - 22)  SpO2: 94% (02 Aug 2017 07:24) (91% - 96%)      I&O's Summary                            7.3    7.9   )-----------( 292      ( 02 Aug 2017 06:10 )             23.1       08-02    137  |  94<L>  |  49.0<H>  ----------------------------<  109  4.2   |  25.0  |  6.36<H>    Ca    9.0      02 Aug 2017 06:10  Phos  5.3     08-02  Mg     2.3     08-02    TPro  8.1  /  Alb  3.8  /  TBili  0.7  /  DBili  x   /  AST  26  /  ALT  25  /  AlkPhos  165<H>  08-02      PHYSICAL EXAM:      Constitutional: Appears acutely ill    Eyes: wnl    ENMT: wnl    Neck: right permacath    Breasts: wnl    Back: wnl    Respiratory: 02 by mask, bilateral lower lung  rhonchi, active  nonproductive cough    Cardiovascular: no rub or gallop    Gastrointestinal: soft not tender, bs +    Genitourinary: neg.    Rectal: Declined    Extremities: no edema, left upper arm fistula with bruit, immature    Vascular: wnl    Neurological: no focal defect noted    Skin: no rash    Lymph Nodes: wnl    Musculoskeletal: wnl    Psychiatric: wnl

## 2017-08-03 DIAGNOSIS — R78.81 BACTEREMIA: ICD-10-CM

## 2017-08-03 LAB
-  AMIKACIN: SIGNIFICANT CHANGE UP
-  AMPICILLIN/SULBACTAM: SIGNIFICANT CHANGE UP
-  AMPICILLIN: SIGNIFICANT CHANGE UP
-  AZTREONAM: SIGNIFICANT CHANGE UP
-  CEFAZOLIN: SIGNIFICANT CHANGE UP
-  CEFEPIME: SIGNIFICANT CHANGE UP
-  CEFOXITIN: SIGNIFICANT CHANGE UP
-  CEFTAZIDIME: SIGNIFICANT CHANGE UP
-  CEFTRIAXONE: SIGNIFICANT CHANGE UP
-  CIPROFLOXACIN: SIGNIFICANT CHANGE UP
-  ERTAPENEM: SIGNIFICANT CHANGE UP
-  GENTAMICIN: SIGNIFICANT CHANGE UP
-  IMIPENEM: SIGNIFICANT CHANGE UP
-  LEVOFLOXACIN: SIGNIFICANT CHANGE UP
-  MEROPENEM: SIGNIFICANT CHANGE UP
-  PIPERACILLIN/TAZOBACTAM: SIGNIFICANT CHANGE UP
-  TOBRAMYCIN: SIGNIFICANT CHANGE UP
-  TRIMETHOPRIM/SULFAMETHOXAZOLE: SIGNIFICANT CHANGE UP
ANION GAP SERPL CALC-SCNC: 15 MMOL/L — SIGNIFICANT CHANGE UP (ref 5–17)
ANION GAP SERPL CALC-SCNC: 17 MMOL/L — SIGNIFICANT CHANGE UP (ref 5–17)
BLD GP AB SCN SERPL QL: SIGNIFICANT CHANGE UP
BUN SERPL-MCNC: 33 MG/DL — HIGH (ref 8–20)
BUN SERPL-MCNC: 41 MG/DL — HIGH (ref 8–20)
C3 SERPL-MCNC: 104 MG/DL — SIGNIFICANT CHANGE UP (ref 80–180)
C4 SERPL-MCNC: 30 MG/DL — SIGNIFICANT CHANGE UP (ref 10–45)
CALCIUM SERPL-MCNC: 8.6 MG/DL — SIGNIFICANT CHANGE UP (ref 8.6–10.2)
CALCIUM SERPL-MCNC: 8.6 MG/DL — SIGNIFICANT CHANGE UP (ref 8.6–10.2)
CHLORIDE SERPL-SCNC: 92 MMOL/L — LOW (ref 98–107)
CHLORIDE SERPL-SCNC: 93 MMOL/L — LOW (ref 98–107)
CO2 SERPL-SCNC: 25 MMOL/L — SIGNIFICANT CHANGE UP (ref 22–29)
CO2 SERPL-SCNC: 26 MMOL/L — SIGNIFICANT CHANGE UP (ref 22–29)
CREAT SERPL-MCNC: 4.71 MG/DL — HIGH (ref 0.5–1.3)
CREAT SERPL-MCNC: 5.47 MG/DL — HIGH (ref 0.5–1.3)
CULTURE RESULTS: SIGNIFICANT CHANGE UP
GLUCOSE SERPL-MCNC: 100 MG/DL — SIGNIFICANT CHANGE UP (ref 70–115)
GLUCOSE SERPL-MCNC: 91 MG/DL — SIGNIFICANT CHANGE UP (ref 70–115)
HBV CORE AB SER-ACNC: SIGNIFICANT CHANGE UP
HBV SURFACE AB SER-ACNC: REACTIVE
HBV SURFACE AG SER-ACNC: SIGNIFICANT CHANGE UP
HCT VFR BLD CALC: 22.9 % — LOW (ref 37–47)
HCV AB S/CO SERPL IA: 0.12 S/CO — SIGNIFICANT CHANGE UP
HCV AB SERPL-IMP: SIGNIFICANT CHANGE UP
HGB BLD-MCNC: 7 G/DL — CRITICAL LOW (ref 12–16)
MAGNESIUM SERPL-MCNC: 2 MG/DL — SIGNIFICANT CHANGE UP (ref 1.6–2.6)
MCHC RBC-ENTMCNC: 25.4 PG — LOW (ref 27–31)
MCHC RBC-ENTMCNC: 30.6 G/DL — LOW (ref 32–36)
MCV RBC AUTO: 83 FL — SIGNIFICANT CHANGE UP (ref 81–99)
METHOD TYPE: SIGNIFICANT CHANGE UP
ORGANISM # SPEC MICROSCOPIC CNT: SIGNIFICANT CHANGE UP
PHOSPHATE SERPL-MCNC: 4 MG/DL — SIGNIFICANT CHANGE UP (ref 2.4–4.7)
PLATELET # BLD AUTO: 324 K/UL — SIGNIFICANT CHANGE UP (ref 150–400)
POTASSIUM SERPL-MCNC: 4.2 MMOL/L — SIGNIFICANT CHANGE UP (ref 3.5–5.3)
POTASSIUM SERPL-MCNC: 4.7 MMOL/L — SIGNIFICANT CHANGE UP (ref 3.5–5.3)
POTASSIUM SERPL-SCNC: 4.2 MMOL/L — SIGNIFICANT CHANGE UP (ref 3.5–5.3)
POTASSIUM SERPL-SCNC: 4.7 MMOL/L — SIGNIFICANT CHANGE UP (ref 3.5–5.3)
RBC # BLD: 2.76 M/UL — LOW (ref 4.4–5.2)
RBC # FLD: 18.3 % — HIGH (ref 11–15.6)
SODIUM SERPL-SCNC: 134 MMOL/L — LOW (ref 135–145)
SODIUM SERPL-SCNC: 134 MMOL/L — LOW (ref 135–145)
SPECIMEN SOURCE: SIGNIFICANT CHANGE UP
TYPE + AB SCN PNL BLD: SIGNIFICANT CHANGE UP
WBC # BLD: 9.7 K/UL — SIGNIFICANT CHANGE UP (ref 4.8–10.8)
WBC # FLD AUTO: 9.7 K/UL — SIGNIFICANT CHANGE UP (ref 4.8–10.8)

## 2017-08-03 PROCEDURE — 99233 SBSQ HOSP IP/OBS HIGH 50: CPT | Mod: GC

## 2017-08-03 PROCEDURE — 73562 X-RAY EXAM OF KNEE 3: CPT | Mod: 26,LT

## 2017-08-03 PROCEDURE — 73560 X-RAY EXAM OF KNEE 1 OR 2: CPT | Mod: 26,LT

## 2017-08-03 RX ORDER — CEFTRIAXONE 500 MG/1
2 INJECTION, POWDER, FOR SOLUTION INTRAMUSCULAR; INTRAVENOUS EVERY 24 HOURS
Qty: 0 | Refills: 0 | Status: DISCONTINUED | OUTPATIENT
Start: 2017-08-03 | End: 2017-08-14

## 2017-08-03 RX ADMIN — Medication 81 MILLIGRAM(S): at 12:17

## 2017-08-03 RX ADMIN — Medication 2 UNIT(S): at 18:04

## 2017-08-03 RX ADMIN — Medication 125 MILLIGRAM(S): at 12:17

## 2017-08-03 RX ADMIN — PANTOPRAZOLE SODIUM 40 MILLIGRAM(S): 20 TABLET, DELAYED RELEASE ORAL at 06:22

## 2017-08-03 RX ADMIN — Medication 1 MILLIGRAM(S): at 12:16

## 2017-08-03 RX ADMIN — Medication 2 UNIT(S): at 07:46

## 2017-08-03 RX ADMIN — Medication 600 MILLIGRAM(S): at 05:39

## 2017-08-03 RX ADMIN — Medication 3 MILLILITER(S): at 03:31

## 2017-08-03 RX ADMIN — SEVELAMER CARBONATE 400 MILLIGRAM(S): 2400 POWDER, FOR SUSPENSION ORAL at 07:46

## 2017-08-03 RX ADMIN — Medication 3 MILLILITER(S): at 15:06

## 2017-08-03 RX ADMIN — Medication 125 MILLIGRAM(S): at 00:01

## 2017-08-03 RX ADMIN — Medication 125 MILLIGRAM(S): at 20:30

## 2017-08-03 RX ADMIN — CEFTRIAXONE 100 GRAM(S): 500 INJECTION, POWDER, FOR SOLUTION INTRAMUSCULAR; INTRAVENOUS at 22:57

## 2017-08-03 RX ADMIN — Medication 200 MILLIGRAM(S): at 19:35

## 2017-08-03 RX ADMIN — SEVELAMER CARBONATE 400 MILLIGRAM(S): 2400 POWDER, FOR SUSPENSION ORAL at 12:17

## 2017-08-03 RX ADMIN — Medication 3 MILLILITER(S): at 09:05

## 2017-08-03 RX ADMIN — Medication 25 MILLIGRAM(S): at 19:36

## 2017-08-03 RX ADMIN — Medication 125 MILLIGRAM(S): at 05:40

## 2017-08-03 RX ADMIN — Medication 2 UNIT(S): at 12:17

## 2017-08-03 RX ADMIN — Medication 3 MILLILITER(S): at 20:34

## 2017-08-03 RX ADMIN — CALCITRIOL 0.5 MICROGRAM(S): 0.5 CAPSULE ORAL at 12:17

## 2017-08-03 NOTE — PROGRESS NOTE ADULT - PROBLEM SELECTOR PLAN 6
pt blood pressure is low in relation to previous admission , will hold BP med and will start if BP is above 160  BP  well controlled  plan for echo pt blood pressure is low in relation to previous admission , will hold BP med and will start if BP is above 160  BP  well controlled  echo noted

## 2017-08-03 NOTE — PROGRESS NOTE ADULT - PROBLEM SELECTOR PLAN 2
cdiff +,  on vanco oral q6 by ID  since yesterday  oral hydration  no diarrheas over past 24 hr cdiff +,  on vanco oral q6 by ID  (day #2)  oral hydration  no diarrhea over past 24 hr

## 2017-08-03 NOTE — PROGRESS NOTE ADULT - ASSESSMENT
Patient is a 36 y/o female with ESRD diagnosed in 1/17 s/p left UE AVF on hemodialysis on M-W-F, right chest wall permacath, HTN, DM, obesity, HCAP one month ago, comes into the hospital due to shortness of breath since 2 -3 days ago.   CXR shows right middle lobe infiltrates , left side pleural effusion improved from previous admission  She is admitted for diarrhea, cdiff positive and found to have an improved ct chest Patient is a 38 y/o female with ESRD diagnosed in 1/17 s/p left UE AVF on hemodialysis on M-W-F, right chest wall permacath, HTN, DM, obesity, HCAP one month ago, comes into the hospital due to shortness of breath since 2 -3 days ago. CXR shows right middle lobe infiltrates , left side pleural effusion improved from previous admission; confirmed with ct chest. Patient was dialyzed, SOB improved with Dialysis and will stay for tx of sob with further HD. Also with hemoglobin of 7, to be transfused for concomitant symptomatic anemia.  She is admitted for diarrhea, cdiff positive.

## 2017-08-03 NOTE — PROGRESS NOTE ADULT - PROBLEM SELECTOR PLAN 4
pt has an area over her left lateral calf painful to palpation, Wells score for DVT =1   Doppler US was negative for DVT  heparin 5000 unit bid pt has an area over her left lateral calf painful to palpation, Wells score for DVT =1   Doppler US was negative for DVT  xray left leg to be done and f/up

## 2017-08-03 NOTE — PROGRESS NOTE ADULT - PROBLEM SELECTOR PLAN 3
1 unit of packed RBC was transfused  on July 2    Hb yesterday= 6.7 today after 1 unit packed RBC hb =7.3  c/w epogen   anemia w/up noted   to transfuse as needed  follow up of Hb w/ CBC today 1 unit of packed RBC was transfused  on July 2    Hb yesterday= 6.7 today after 1 unit packed RBC hb =7.3  c/w epogen   anemia w/up noted   to give prbc today for a component of sob with sx anemia

## 2017-08-03 NOTE — PROGRESS NOTE ADULT - SUBJECTIVE AND OBJECTIVE BOX
[facility]  INFECTIOUS DISEASES AND INTERNAL MEDICINE OF [place]  =======================================================  Ned De MD  Diplomates American Board of Internal Medicine and Infectious Diseases  =======================================================    SORAYA JIMENEZ [id number]      Follow up: C Diff colitis/Bacteremia    No more diarrhea  No fevers    Allergies:  Mushrooms (Hives (Mild))  No Known Drug Allergies      Antibiotics:  vancomycin    Solution 125 milliGRAM(s) Oral every 6 hours  cefTRIAXone   IVPB 2 Gram(s) IV Intermittent every 24 hours       REVIEW OF SYSTEMS:  CONSTITUTIONAL:  No Fever or chills  HEENT:  No diplopia or blurred vision.  No earache, sore throat or runny nose.  CARDIOVASCULAR:  No pressure, squeezing, strangling, tightness, heaviness or aching about the chest, neck, axilla or epigastrium.  RESPIRATORY:  No cough, shortness of breath  GASTROINTESTINAL:  No nausea, vomiting or diarrhea.  GENITOURINARY:  No flank pain  MUSCULOSKELETAL:  no joint aches, no muscle pain  SKIN:  No change in skin, hair or nails.  NEUROLOGIC:  No paresthesias, fasciculations  PSYCHIATRIC:  No disorder of thought or mood.  ENDOCRINE:  No heat or cold intolerance.  HEMATOLOGICAL:  No easy bruising or bleeding.       Physical Exam:  Vital Signs Last 24 Hrs  T(C): 36.9 (03 Aug 2017 19:35), Max: 37.1 (03 Aug 2017 00:12)  T(F): 98.4 (03 Aug 2017 19:35), Max: 98.7 (03 Aug 2017 00:12)  HR: 89 (03 Aug 2017 19:35) (89 - 93)  BP: 174/86 (03 Aug 2017 19:35) (140/82 - 174/86)  RR: 18 (03 Aug 2017 19:35) (18 - 20)  SpO2: 96% (03 Aug 2017 19:35) (95% - 100%)      GEN: NAD, pleasant  HEENT: normocephalic and atraumatic. EOMI. PERRL.    NECK: Supple.  LUNGS: B/L wheezing  HEART: Regular rate and rhythm   ABDOMEN: Soft, nontender, and nondistended.  Positive bowel sounds.    : No CVA tenderness  EXTREMITIES: With B/L edema.  MSK: No joint swelling  NEUROLOGIC: Cranial nerves II through XII are grossly intact.  PSYCHIATRIC: Appropriate affect .  SKIN: No ulceration or induration present.      Labs:  08-03    134<L>  |  92<L>  |  41.0<H>  ----------------------------<  91  4.2   |  25.0  |  5.47<H>    Ca    8.6      03 Aug 2017 16:57  Phos  4.0     08-03  Mg     2.0     08-03    TPro  8.1  /  Alb  3.8  /  TBili  0.7  /  DBili  x   /  AST  26  /  ALT  25  /  AlkPhos  165<H>  08-02                          7.0    9.7   )-----------( 324      ( 03 Aug 2017 06:34 )             22.9       PT/INR - ( 02 Aug 2017 01:45 )   PT: 15.5 sec;   INR: 1.40 ratio         LIVER FUNCTIONS - ( 02 Aug 2017 06:10 )  Alb: 3.8 g/dL / Pro: 8.1 g/dL / ALK PHOS: 165 U/L / ALT: 25 U/L / AST: 26 U/L / GGT: x             Clostridium difficile Toxin by PCR (08.02.17 @ 05:05)    C Diff by PCR Result: Detected: TYPE:(C=Critical, N=Notification, A=Abnormal) C    Clostridium difficile Toxin by PCR: RESULT INTERPRETATION:  Detected - Clostridium difficile toxin B detected by amplified DNA PCR .    C. difficile PCR test results should be interpreted only with  consideration of the patient's clinical situation and history.  This test  will detect the presence of toxigenic C. difficile.  However it cannot be  used as the sole criteria for the diagnosis of antibiotic associated  diarrhea, antibiotic associated colitis, or pseudomembranous colitis.  Colonization with C. difficile may exceed 20%in hospital patients, the  majority of whom are without Toxigenic Clostridium Difficile disease.  Testing is generally not recommended in children below the age of 1 year,  as up to half of healthy infants are asymptomatically colonized with C.  difficile.  In addition, C. difficile PCR testing cannot be used as a  "test of cure" as dead organism nucleic acids will persist and be  detected after treatment.  Successful treatment of C. difficile disease  is determined by resolution of clinical symptoms.          RECENT CULTURES:  Culture - Blood (08.01.17 @ 20:27)    Specimen Source: .Blood Blood    Culture Results:   Growth in anaerobic bottle: Gram Negative Rods  Anaerobic Bottle: 18:43 Hours to positivity  Aerobic Bottle: No growth to date      Culture - Blood (08.01.17 @ 20:27)    -  Aztreonam: S <=4    -  Cefazolin: R >16    -  Meropenem: S <=1    -  Ampicillin: R <=8    -  Trimethoprim/Sulfamethoxazole: S <=2/38    -  Amikacin: S <=16    -  Cefepime: S <=4    -  Ceftriaxone: S <=1    -  Ertapenem: S <=1    -  Imipenem: S <=1    -  Levofloxacin: S <=2    -  Ampicillin/Sulbactam: R <=8/4    -  Cefoxitin: R <=8    -  Ceftazidime: S <=1    -  Ciprofloxacin: I 2    -  Gentamicin: S <=4    -  Piperacillin/Tazobactam: S <=16    -  Serratia marcescens: Detec    -  Tobramycin: S <=4    Specimen Source: .Blood Blood    Organism: Blood Culture PCR    Organism: Serratia marcescens    Culture Results:   Growth in aerobic and anaerobic bottles: Serratia marcescens  Aerobic Bottle: 17:13 Hours to positivity  Anaerobic Bottle: 17:13 Hours to positivity

## 2017-08-03 NOTE — PROGRESS NOTE ADULT - SUBJECTIVE AND OBJECTIVE BOX
NEPHROLOGY INTERVAL HPI/OVERNIGHT EVENTS: SOB with movement.    MEDICATIONS  (STANDING):  aspirin enteric coated 81 milliGRAM(s) Oral daily  sevelamer hydrochloride 400 milliGRAM(s) Oral three times a day with meals  pantoprazole    Tablet 40 milliGRAM(s) Oral before breakfast  calcitriol   Capsule 0.5 MICROGram(s) Oral daily  ergocalciferol 39727 Unit(s) Oral every week  folic acid 1 milliGRAM(s) Oral daily  insulin lispro Injectable (HumaLOG) 2 Unit(s) SubCutaneous before breakfast  insulin lispro Injectable (HumaLOG) 2 Unit(s) SubCutaneous before lunch  insulin lispro Injectable (HumaLOG) 2 Unit(s) SubCutaneous before dinner  insulin lispro (HumaLOG) corrective regimen sliding scale   SubCutaneous three times a day before meals  dextrose 5%. 1000 milliLiter(s) (50 mL/Hr) IV Continuous <Continuous>  dextrose 50% Injectable 12.5 Gram(s) IV Push once  dextrose 50% Injectable 25 Gram(s) IV Push once  dextrose 50% Injectable 25 Gram(s) IV Push once  heparin  Injectable 5000 Unit(s) SubCutaneous every 12 hours  ALBUTerol/ipratropium for Nebulization 3 milliLiter(s) Nebulizer every 6 hours  calcium acetate 667 milliGRAM(s) Oral three times a day with meals  epoetin raina Injectable 99888 Unit(s) IV Push <User Schedule>  vancomycin    Solution 125 milliGRAM(s) Oral every 6 hours    MEDICATIONS  (PRN):  dextrose Gel 1 Dose(s) Oral once PRN Blood Glucose LESS THAN 70 milliGRAM(s)/deciliter  glucagon  Injectable 1 milliGRAM(s) IntraMuscular once PRN Glucose LESS THAN 70 milligrams/deciliter  labetalol 200 milliGRAM(s) Oral three times a day PRN give if systolic blood pressure is above 160 mmHg systolic  hydrALAZINE 25 milliGRAM(s) Oral every 8 hours PRN give if sBP > 160 mmHg  amLODIPine   Tablet 10 milliGRAM(s) Oral daily PRN give if sBP above 160 mmHG systolic  ALBUTerol    0.083%. 2.5 milliGRAM(s) Nebulizer once PRN sputum induction      Allergies    Mushrooms (Hives (Mild))  No Known Drug Allergies    Intolerances        Vital Signs Last 24 Hrs  T(C): 37.1 (03 Aug 2017 00:12), Max: 37.1 (03 Aug 2017 00:12)  T(F): 98.7 (03 Aug 2017 00:12), Max: 981 (02 Aug 2017 13:23)  HR: 89 (03 Aug 2017 06:56) (81 - 93)  BP: 140/82 (03 Aug 2017 00:25) (140/82 - 168/72)  BP(mean): --  RR: 18 (03 Aug 2017 06:56) (18 - 18)  SpO2: 100% (03 Aug 2017 06:56) (91% - 100%)  Daily     Daily Weight in k.3 (02 Aug 2017 17:21)    PHYSICAL EXAM:    GENERAL: appears acutely ill - sob with ambulation in room  HEAD:    EYES: wnl  ENMT:   NECK: veins same  NERVOUS SYSTEM:  wnl  CHEST/LUNG: right base  crackles, pt on  by mask  HEART: reg rate  ABDOMEN: soft , no pain  EXTREMITIES: leg edema   LYMPH:   SKIN: no rash    LABS:     134<L>  |  93<L>  |  33.0<H>  ----------------------------<  100  4.7   |  26.0  |  4.71<H>    Ca    8.6      03 Aug 2017 06:34  Phos  4.0     08-03  Mg     2.0     08-03    TPro  8.1  /  Alb  3.8  /  TBili  0.7  /  DBili  x   /  AST  26  /  ALT  25  /  AlkPhos  165<H>                            7.3    7.9   )-----------( 292      ( 02 Aug 2017 06:10 )             23.1     08-03    134<L>  |  93<L>  |  33.0<H>  ----------------------------<  100  4.7   |  26.0  |  4.71<H>    Ca    8.6      03 Aug 2017 06:34  Phos  4.0     08-03  Mg     2.0     08-03    TPro  8.1  /  Alb  3.8  /  TBili  0.7  /  DBili  x   /  AST  26  /  ALT  25  /  AlkPhos  165<H>  08-02    PT/INR - ( 02 Aug 2017 01:45 )   PT: 15.5 sec;   INR: 1.40 ratio             Phosphorus Level, Serum: 4.0 mg/dL (08-03 @ 06:34)  Magnesium, Serum: 2.0 mg/dL (- @ 06:34)          RADIOLOGY & ADDITIONAL TESTS:

## 2017-08-03 NOTE — CHART NOTE - NSCHARTNOTEFT_GEN_A_CORE
PGY2 Addendum  Patinet had 9 beats of vtach, asymptomatic,denies any chest pain on auscultation, no sob, with cpap mask on.  She is stable in NAD.  Heart S1, S2, no murmurs;    mag, phos, ordered;

## 2017-08-03 NOTE — PROGRESS NOTE ADULT - PROBLEM SELECTOR PLAN 1
Blood cultures with Serratia marcescens  Will repeat blood cultures  Start Ceftriaxone 2gm IV q 24 hours  Source could be HD cath

## 2017-08-03 NOTE — PROGRESS NOTE ADULT - PROBLEM SELECTOR PLAN 1
O2 by a nasal canula to keep O2 between 90-93 %.   ct chest done, shows improvement from previous CT, Seen by ID yesterday  d/c all abx except for tx for C.diff   preliminary BCx  negative  CPOximetry   add duoneb q6 to current tx  no fever over night  CT of the chest didn't shows pleural effusion, there are bilateral adenopathy decreased bilateral patchy airspace opacities, probably pneumonia.  consult ID doctor Dr Small who recommend no tx antibiotics O2 by a nasal canula to keep O2 between 90-93 %.   ct chest done, shows improvement from previous CT, Seen by ID yesterday  d/c all abx except for tx for C.diff   preliminary BCx  negative  CPOximetry   added duoneb q6 to current tx  no fever over night

## 2017-08-03 NOTE — PROGRESS NOTE ADULT - ATTENDING COMMENTS
Note addended where needed. Plan discussed with patient in extent. Plan d/w Vascular NP and resident team

## 2017-08-03 NOTE — PROGRESS NOTE ADULT - PROBLEM SELECTOR PLAN 5
hemodialysis on monday, wendsday , friday   epoetin injection once a week  continue sevelamer  cincalcet  continue calcium acetated  AV fistula present but deep, d/w Vascular NP - needs superficialization and can take to OR if stable by Friday   permacth in place with no erythema  recent Cr= 6.36 hemodialysis on monday, wednesday , friday   epoetin injection once a week  continue sevelamer  cincalcet  continue calcium acetated  AV fistula present but deep, d/w Vascular NP - needs superficialization and can take to OR if stable by Friday - asked vascular np to consider it, however, patient has cdiff positive   permacth in place with no erythema  recent Cr= 6.36

## 2017-08-03 NOTE — PROGRESS NOTE ADULT - SUBJECTIVE AND OBJECTIVE BOX
CC: Diarrhea and shortness of breath     Patient seen and examined at bedside in  mild  shortness of breath w/ O2 50%, she complaint of cough associated w/ clear secretions, she has not diarrheas since yesterday at 8;30am , states that now she feels better in relation  to previous days, no fever over nigh, she has been drinking and eating, she got her HD yesterday, swelling in her legs went down significantly. Over the night pt had desaturations several times lower one was 79%. Pt at 4:30 she had an episodes of 9 beat ventricular tachycardia, she had tx w/ Duoneb previous to this episodes which can induce tachycardia.    Denies fever, chest pain, abdominal pain, constipation, calf pain.    Cardiac monitor reviewed: Desat to 79%, at 4:30 she had an episodes of 9 beat ventricular tachycardia    Vital Signs Last 24 Hrs  T(C): 37.1 (03 Aug 2017 00:12), Max: 37.1 (03 Aug 2017 00:12)  T(F): 98.7 (03 Aug 2017 00:12), Max: 981 (02 Aug 2017 13:23)  HR: 93 (03 Aug 2017 00:12) (81 - 93)  BP: 140/82 (03 Aug 2017 00:25) (140/82 - 168/72)  BP(mean): --  RR: 18 (03 Aug 2017 00:12) (18 - 20)  SpO2: 99% (03 Aug 2017 03:31) (91% - 100%)PHYSICAL EXAM:    Gen: NAD  Lymph: no lymphadenopathy  HEENT: WILDA bilaterally, NC/AT  Skin: no cyanosis, no rash, capillary refills less than 2 sec  Lungs:  inspiratory and expiratory wheezing  over both lung fields , rales over both lung fields  Heart: RRR, NS1/S2, no murmurs  Ab: NBSx4, soft, non tender, non distended, no hepatosplenomegaly  Ext: no calf tenderness,  no pedal  edema, AVF in LUE  Neuro: CN2-12 grossly intact, Pt A&O x 3  Psych: Normal mood and affect :      Labs:                                   7.3    7.9   )-----------( 292      ( 02 Aug 2017 06:10 )             23.1   08-02    137  |  94<L>  |  49.0<H>  ----------------------------<  109  4.2   |  25.0  |  6.36<H>    Ca    9.0      02 Aug 2017 06:10  Phos  5.3     08-02  Mg     2.3     08-02    TPro  8.1  /  Alb  3.8  /  TBili  0.7  /  DBili  x   /  AST  26  /  ALT  25  /  AlkPhos  165<H>  08-02              RADIOLOGY:   < from: CT Chest No Cont (08.01.17 @ 22:46) >  LUNGS AND LARGE AIRWAYS: Decreased bilateral patchy airspace opacities,   probably pneumonia.  PLEURA: No pleural effusion.  VESSELS: Right-sided central venous catheter with tip terminating in the   superior cavoatrial junction.   HEART: Heart size is normal. No pericardial effusion.  MEDIASTINUM AND SHANITA: Persistent mediastinal and hilar adenopathy.  CHEST WALL AND LOWER NECK: Within normal limits.  VISUALIZED UPPER ABDOMEN: Trace perihepatic ascites.  BONES: Within normal limits.    IMPRESSION:   1.  Since June 13, 2017, decreased bilateral patchy airspace opacities,   probably pneumonia.  2.  Persistent mediastinal and hilar adenopathy.     < end of copied text >      Medications:  MEDICATIONS  (STANDING):  aspirin enteric coated 81 milliGRAM(s) Oral daily  epoetin raina Injectable 01964 Unit(s) IV Push every 3 days  calcium acetate 667 milliGRAM(s) Oral four times a day with meals  sevelamer hydrochloride 400 milliGRAM(s) Oral three times a day with meals  pantoprazole    Tablet 40 milliGRAM(s) Oral before breakfast  calcitriol   Capsule 0.5 MICROGram(s) Oral daily  ergocalciferol 67692 Unit(s) Oral every week  folic acid 1 milliGRAM(s) Oral daily  insulin lispro Injectable (HumaLOG) 2 Unit(s) SubCutaneous before breakfast  insulin lispro Injectable (HumaLOG) 2 Unit(s) SubCutaneous before lunch  insulin lispro Injectable (HumaLOG) 2 Unit(s) SubCutaneous before dinner  insulin lispro (HumaLOG) corrective regimen sliding scale   SubCutaneous three times a day before meals  dextrose 5%. 1000 milliLiter(s) (50 mL/Hr) IV Continuous <Continuous>  dextrose 50% Injectable 12.5 Gram(s) IV Push once  dextrose 50% Injectable 25 Gram(s) IV Push once  dextrose 50% Injectable 25 Gram(s) IV Push once  heparin  Injectable 5000 Unit(s) SubCutaneous every 12 hours  vancomycin solution  ml q6    MEDICATIONS  (PRN):  dextrose Gel 1 Dose(s) Oral once PRN Blood Glucose LESS THAN 70 milliGRAM(s)/deciliter  glucagon  Injectable 1 milliGRAM(s) IntraMuscular once PRN Glucose LESS THAN 70 milligrams/deciliter  labetalol 200 milliGRAM(s) Oral three times a day PRN give if systolic blood pressure is above 160 mmHg systolic  hydrALAZINE 25 milliGRAM(s) Oral every 8 hours PRN give if sBP > 160 mmHg  amLODIPine   Tablet 10 milliGRAM(s) Oral daily PRN give if sBP above 160 mmHG systolic CC: Diarrhea and shortness of breath     Patient seen and examined at bedside in  mild  shortness of breath  which has improved since admission,  she complains of cough associated w/ clear secretions, she has not had diarrhea since yesterday at 8;30am , states that now she feels better in relation  to previous days, no fever over night, she has been drinking and eating, she got her HD yesterday, swelling in her legs went down significantly. Over the night pt had desaturations several times lower one was 79%. Pt at 4:30 she had an episodes of 9 beat ventricular tachycardia, she had tx w/ Duoneb previous to this episodes which can induce tachycardia.    Denies fever, chest pain, abdominal pain, constipation, calf pain.    Cardiac monitor reviewed: Desat to 79%, at 4:30 she had an episodes of 9 beat ventricular tachycardia    Vital Signs Last 24 Hrs  T(C): 37.1 (03 Aug 2017 00:12), Max: 37.1 (03 Aug 2017 00:12)  T(F): 98.7 (03 Aug 2017 00:12), Max: 981 (02 Aug 2017 13:23)  HR: 93 (03 Aug 2017 00:12) (81 - 93)  BP: 140/82 (03 Aug 2017 00:25) (140/82 - 168/72)  BP(mean): --  RR: 18 (03 Aug 2017 00:12) (18 - 20)  SpO2: 99% (03 Aug 2017 03:31) (91% - 100%)PHYSICAL EXAM:  c/s 97-91    Gen: NAD  Lymph: no lymphadenopathy  HEENT: WILDA bilaterally, NC/AT  Skin: no cyanosis, no rash, capillary refills less than 2 sec  Lungs:  inspiratory and expiratory wheezing  over both lung fields scattered with some rhonchi mild   Heart: RRR, NS1/S2, no murmurs  Ab: NBSx4, soft, non tender, non distended, no hepatosplenomegaly  Ext: no calf tenderness,  no pedal  edema, AVF in LUE  Neuro: CN2-12 grossly intact, Pt A&O x 3  Psych: Normal mood and affect :      Labs:                                   7.3    7.9   )-----------( 292      ( 02 Aug 2017 06:10 )             23.1   08-02    137  |  94<L>  |  49.0<H>  ----------------------------<  109  4.2   |  25.0  |  6.36<H>    Ca    9.0      02 Aug 2017 06:10  Phos  5.3     08-02  Mg     2.3     08-02    TPro  8.1  /  Alb  3.8  /  TBili  0.7  /  DBili  x   /  AST  26  /  ALT  25  /  AlkPhos  165<H>  08-02              RADIOLOGY:   < from: CT Chest No Cont (08.01.17 @ 22:46) >  LUNGS AND LARGE AIRWAYS: Decreased bilateral patchy airspace opacities,   probably pneumonia.  PLEURA: No pleural effusion.  VESSELS: Right-sided central venous catheter with tip terminating in the   superior cavoatrial junction.   HEART: Heart size is normal. No pericardial effusion.  MEDIASTINUM AND SHANITA: Persistent mediastinal and hilar adenopathy.  CHEST WALL AND LOWER NECK: Within normal limits.  VISUALIZED UPPER ABDOMEN: Trace perihepatic ascites.  BONES: Within normal limits.    IMPRESSION:   1.  Since June 13, 2017, decreased bilateral patchy airspace opacities,   probably pneumonia.  2.  Persistent mediastinal and hilar adenopathy.     < end of copied text >      Medications:  MEDICATIONS  (STANDING):  aspirin enteric coated 81 milliGRAM(s) Oral daily  epoetin raina Injectable 82099 Unit(s) IV Push every 3 days  calcium acetate 667 milliGRAM(s) Oral four times a day with meals  sevelamer hydrochloride 400 milliGRAM(s) Oral three times a day with meals  pantoprazole    Tablet 40 milliGRAM(s) Oral before breakfast  calcitriol   Capsule 0.5 MICROGram(s) Oral daily  ergocalciferol 17721 Unit(s) Oral every week  folic acid 1 milliGRAM(s) Oral daily  insulin lispro Injectable (HumaLOG) 2 Unit(s) SubCutaneous before breakfast  insulin lispro Injectable (HumaLOG) 2 Unit(s) SubCutaneous before lunch  insulin lispro Injectable (HumaLOG) 2 Unit(s) SubCutaneous before dinner  insulin lispro (HumaLOG) corrective regimen sliding scale   SubCutaneous three times a day before meals  dextrose 5%. 1000 milliLiter(s) (50 mL/Hr) IV Continuous <Continuous>  dextrose 50% Injectable 12.5 Gram(s) IV Push once  dextrose 50% Injectable 25 Gram(s) IV Push once  dextrose 50% Injectable 25 Gram(s) IV Push once  heparin  Injectable 5000 Unit(s) SubCutaneous every 12 hours  vancomycin solution  ml q6    MEDICATIONS  (PRN):  dextrose Gel 1 Dose(s) Oral once PRN Blood Glucose LESS THAN 70 milliGRAM(s)/deciliter  glucagon  Injectable 1 milliGRAM(s) IntraMuscular once PRN Glucose LESS THAN 70 milligrams/deciliter  labetalol 200 milliGRAM(s) Oral three times a day PRN give if systolic blood pressure is above 160 mmHg systolic  hydrALAZINE 25 milliGRAM(s) Oral every 8 hours PRN give if sBP > 160 mmHg  amLODIPine   Tablet 10 milliGRAM(s) Oral daily PRN give if sBP above 160 mmHG systolic

## 2017-08-04 LAB
ANION GAP SERPL CALC-SCNC: 16 MMOL/L — SIGNIFICANT CHANGE UP (ref 5–17)
BUN SERPL-MCNC: 25 MG/DL — HIGH (ref 8–20)
CALCIUM SERPL-MCNC: 8.4 MG/DL — LOW (ref 8.6–10.2)
CHLORIDE SERPL-SCNC: 94 MMOL/L — LOW (ref 98–107)
CO2 SERPL-SCNC: 25 MMOL/L — SIGNIFICANT CHANGE UP (ref 22–29)
CREAT SERPL-MCNC: 3.98 MG/DL — HIGH (ref 0.5–1.3)
GLUCOSE SERPL-MCNC: 86 MG/DL — SIGNIFICANT CHANGE UP (ref 70–115)
GRAM STN FLD: SIGNIFICANT CHANGE UP
HCT VFR BLD CALC: 23.7 % — LOW (ref 37–47)
HGB BLD-MCNC: 7.6 G/DL — LOW (ref 12–16)
MCHC RBC-ENTMCNC: 25.9 PG — LOW (ref 27–31)
MCHC RBC-ENTMCNC: 32.1 G/DL — SIGNIFICANT CHANGE UP (ref 32–36)
MCV RBC AUTO: 80.9 FL — LOW (ref 81–99)
PLATELET # BLD AUTO: 284 K/UL — SIGNIFICANT CHANGE UP (ref 150–400)
POTASSIUM SERPL-MCNC: 4 MMOL/L — SIGNIFICANT CHANGE UP (ref 3.5–5.3)
POTASSIUM SERPL-SCNC: 4 MMOL/L — SIGNIFICANT CHANGE UP (ref 3.5–5.3)
RBC # BLD: 2.93 M/UL — LOW (ref 4.4–5.2)
RBC # FLD: 17.8 % — HIGH (ref 11–15.6)
SODIUM SERPL-SCNC: 135 MMOL/L — SIGNIFICANT CHANGE UP (ref 135–145)
SPECIMEN SOURCE: SIGNIFICANT CHANGE UP
WBC # BLD: 8 K/UL — SIGNIFICANT CHANGE UP (ref 4.8–10.8)
WBC # FLD AUTO: 8 K/UL — SIGNIFICANT CHANGE UP (ref 4.8–10.8)

## 2017-08-04 PROCEDURE — 99233 SBSQ HOSP IP/OBS HIGH 50: CPT | Mod: GC

## 2017-08-04 RX ADMIN — Medication 2 UNIT(S): at 08:06

## 2017-08-04 RX ADMIN — Medication 1 MILLIGRAM(S): at 11:50

## 2017-08-04 RX ADMIN — Medication 3 MILLILITER(S): at 14:51

## 2017-08-04 RX ADMIN — SEVELAMER CARBONATE 400 MILLIGRAM(S): 2400 POWDER, FOR SUSPENSION ORAL at 11:51

## 2017-08-04 RX ADMIN — SEVELAMER CARBONATE 400 MILLIGRAM(S): 2400 POWDER, FOR SUSPENSION ORAL at 17:12

## 2017-08-04 RX ADMIN — Medication 125 MILLIGRAM(S): at 08:07

## 2017-08-04 RX ADMIN — Medication 3 MILLILITER(S): at 02:05

## 2017-08-04 RX ADMIN — Medication 3 MILLILITER(S): at 20:32

## 2017-08-04 RX ADMIN — Medication 2 UNIT(S): at 11:51

## 2017-08-04 RX ADMIN — CALCITRIOL 0.5 MICROGRAM(S): 0.5 CAPSULE ORAL at 11:51

## 2017-08-04 RX ADMIN — PANTOPRAZOLE SODIUM 40 MILLIGRAM(S): 20 TABLET, DELAYED RELEASE ORAL at 08:06

## 2017-08-04 RX ADMIN — Medication 125 MILLIGRAM(S): at 13:46

## 2017-08-04 RX ADMIN — Medication 125 MILLIGRAM(S): at 02:11

## 2017-08-04 RX ADMIN — SEVELAMER CARBONATE 400 MILLIGRAM(S): 2400 POWDER, FOR SUSPENSION ORAL at 08:07

## 2017-08-04 RX ADMIN — Medication 81 MILLIGRAM(S): at 11:50

## 2017-08-04 RX ADMIN — Medication 125 MILLIGRAM(S): at 20:00

## 2017-08-04 RX ADMIN — CEFTRIAXONE 100 GRAM(S): 500 INJECTION, POWDER, FOR SOLUTION INTRAMUSCULAR; INTRAVENOUS at 22:28

## 2017-08-04 RX ADMIN — Medication 3 MILLILITER(S): at 09:18

## 2017-08-04 NOTE — PROGRESS NOTE ADULT - PROBLEM SELECTOR PLAN 6
hemodialysis as schedule   epoetin injection once a week  continue sevelamer  cincalcet  continue calcium acetated  AV fistula present but deep, d/w Vascular NP - needs superficialization and can take to OR if stable by Friday - asked vascular np to consider it, however, patient has cdiff positive   permacth in place with no erythema  recent Cr= 5.47 hemodialysis as schedule   epoetin injection once a week  continue sevelamer  cincalcet  continue calcium acetated  AV fistula present but deep, d/w Vascular NP - needs superficialization prior to use   permacth in place with no erythema  recent Cr= 5.47

## 2017-08-04 NOTE — PROGRESS NOTE ADULT - PROBLEM SELECTOR PLAN 2
O2 by a nasal canula to keep O2 between 90-93 %.   ct chest done, shows improvement from previous CT  BCx positive for serratia marcencens  CPOximetry   Duoneb q6   no fever over night  Rocephin 2gm daily (day # 2) RESOLVED on last admission. NO RECURRENCE   O2 by a nasal canula to keep O2 between 90-93 %.   ct chest done, shows improvement from previous CT  BCx positive for serratia marcencens  CPOximetry   Duoneb q6   no fever over night  Rocephin 2gm daily (day # 2)

## 2017-08-04 NOTE — PROGRESS NOTE ADULT - PROBLEM SELECTOR PLAN 1
Blood cultures with Serratia marcescens  Will repeat blood cultures  Continue Ceftriaxone 2gm IV q 24 hours  Source could be HD cath since same bacteremia in June 2017 which was treated with salvage of the permacath

## 2017-08-04 NOTE — PROGRESS NOTE ADULT - ASSESSMENT
Patient is a 36 y/o female with ESRD diagnosed in 1/17 s/p left UE AVF on hemodialysis on M-W-F, right chest wall permacath, HTN, DM, obesity, HCAP one month ago, comes into the hospital due to shortness of breath since 2 -3 days ago. CXR shows right middle lobe infiltrates , left side pleural effusion improved from previous admission; confirmed with ct chest. Patient was dialyzed yesterday.  Also with hemoglobin of 7.6 today, transfused for symptomatic anemia yesterday.  She is admitted for diarrhea, cdiff positive. Patient is a 36 y/o female with ESRD diagnosed in 1/17 s/p left UE AVF on hemodialysis on M-W-F, right chest wall permacath, HTN, DM, obesity, HCAP one month ago, comes into the hospital due to shortness of breath since 2 -3 days ago. CXR shows right middle lobe infiltrates , left side pleural effusion improved from previous admission; confirmed with ct chest. Patient was dialyzed yesterday.  Also with hemoglobin of 7.6 today, transfused for symptomatic anemia yesterday.  She is admitted for diarrhea, cdiff positive. Incidentally, found to have + blood cx with serratia; concern for now needing to get the PC out b/c this is a recurrence of bacteremia

## 2017-08-04 NOTE — PROGRESS NOTE ADULT - SUBJECTIVE AND OBJECTIVE BOX
CC: Diarrhea and shortness of breath     Patient seen and examined at bedside in  mild  shortness of breath  which has improved since admission,  she complains of cough associated w/ clear secretions, she has not had diarrhea over the past 48 hrs, denies abdominal pain ,  feels better in relation  to previous days, complain of dryness in her mouth and nose associated w/ O2 tx, no fever over night, she has been drinking and eating, she got her HD yesterday and transfusion of one 1 unit RBC , swelling in her legs went down significantly. Over the night pt had desaturations several times lower one was 81%. Monitor show one  episode of 4 beat ventricular tachycardia at 1:30am.    Denies fever, chest pain, abdominal pain, constipation, calf pain, palpitations, headache, n/v    Cardiac monitor reviewed: Desat to 81%, an episodes of 4 beat ventricular tachycardia    Vital Signs Last 24 Hrs  T(C): 36.8 (04 Aug 2017 00:04), Max: 36.9 (03 Aug 2017 19:35)  T(F): 98.2 (04 Aug 2017 00:04), Max: 98.4 (03 Aug 2017 19:35)  HR: 84 (04 Aug 2017 00:04) (84 - 92)  BP: 148/76 (04 Aug 2017 00:04) (145/80 - 174/86)  BP(mean): --  RR: 18 (04 Aug 2017 00:04) (18 - 20)  SpO2: 97% (04 Aug 2017 00:04) (95% - 100%)    GENERAL: NAD, well-groomed, well-developed  HEAD:  Atraumatic, Normocephalic  EYES: EOMI, PERRLA, conjunctiva and sclera clear  ENMT:  Moist mucous membranes, Good dentition, No lesions  NECK: Supple, No JVD, Normal thyroid  LUNG: inspiratory and expiratory wheezing bilaterally; bilateral  rales,  HEART: Regular rate and rhythm; No murmurs, rubs, or gallops  ABDOMEN: Soft, Nontender, Nondistended; Bowel sounds present  EXTREMITIES:  2+ Peripheral Pulses, No clubbing, cyanosis, or edema, LUE AVF placement  LYMPH: No lymphadenopathy noted  Chest: permacath over right upper chest  NERVOUS SYSTEM:  Alert & Oriented X3, Good concentration; Motor Strength 5/5 B/L upper and lower extremities  SKIN: No rashes or lesions , no hematomas, capillary refills less than 2 seconds    Labs:                        7.6    8.0   )-----------( 284      ( 04 Aug 2017 06:09 )             23.7                                       Ca    9.0      02 Aug 2017 06:10  Phos  5.3     08-02  Mg     2.3     08-02    TPro  8.1  /  Alb  3.8  /  TBili  0.7  /  DBili  x   /  AST  26  /  ALT  25  /  AlkPhos  165<H>  08-02              RADIOLOGY:   < from: CT Chest No Cont (08.01.17 @ 22:46) >  LUNGS AND LARGE AIRWAYS: Decreased bilateral patchy airspace opacities,   probably pneumonia.  PLEURA: No pleural effusion.  VESSELS: Right-sided central venous catheter with tip terminating in the   superior cavoatrial junction.   HEART: Heart size is normal. No pericardial effusion.  MEDIASTINUM AND SHANITA: Persistent mediastinal and hilar adenopathy.  CHEST WALL AND LOWER NECK: Within normal limits.  VISUALIZED UPPER ABDOMEN: Trace perihepatic ascites.  BONES: Within normal limits.    IMPRESSION:   1.  Since June 13, 2017, decreased bilateral patchy airspace opacities,   probably pneumonia.  2.  Persistent mediastinal and hilar adenopathy.     < end of copied text >      Medications:  MEDICATIONS  (STANDING):  aspirin enteric coated 81 milliGRAM(s) Oral daily  epoetin raina Injectable 03608 Unit(s) IV Push every 3 days  calcium acetate 667 milliGRAM(s) Oral four times a day with meals  sevelamer hydrochloride 400 milliGRAM(s) Oral three times a day with meals  pantoprazole    Tablet 40 milliGRAM(s) Oral before breakfast  calcitriol   Capsule 0.5 MICROGram(s) Oral daily  ergocalciferol 04822 Unit(s) Oral every week  folic acid 1 milliGRAM(s) Oral daily  insulin lispro Injectable (HumaLOG) 2 Unit(s) SubCutaneous before breakfast  insulin lispro Injectable (HumaLOG) 2 Unit(s) SubCutaneous before lunch  insulin lispro Injectable (HumaLOG) 2 Unit(s) SubCutaneous before dinner  insulin lispro (HumaLOG) corrective regimen sliding scale   SubCutaneous three times a day before meals  dextrose 5%. 1000 milliLiter(s) (50 mL/Hr) IV Continuous <Continuous>  dextrose 50% Injectable 12.5 Gram(s) IV Push once  dextrose 50% Injectable 25 Gram(s) IV Push once  dextrose 50% Injectable 25 Gram(s) IV Push once  heparin  Injectable 5000 Unit(s) SubCutaneous every 12 hours  vancomycin solution  ml q6  ceftriaxone 2gm every 24 hr    MEDICATIONS  (PRN):  dextrose Gel 1 Dose(s) Oral once PRN Blood Glucose LESS THAN 70 milliGRAM(s)/deciliter  glucagon  Injectable 1 milliGRAM(s) IntraMuscular once PRN Glucose LESS THAN 70 milligrams/deciliter  labetalol 200 milliGRAM(s) Oral three times a day PRN give if systolic blood pressure is above 160 mmHg systolic  hydrALAZINE 25 milliGRAM(s) Oral every 8 hours PRN give if sBP > 160 mmHg  amLODIPine   Tablet 10 milliGRAM(s) Oral daily PRN give if sBP above 160 mmHG systolic CC: Diarrhea and shortness of breath     Patient seen and examined at bedside in  mild  shortness of breath  which has improved since admission,  she complains of cough associated w/ clear secretions, she has not had diarrhea over the past 48 hrs, denies abdominal pain ,  feels better in relation  to previous days, complain of dryness in her mouth and nose associated w/ O2 tx, no fever over night, she has been drinking and eating, she got her HD yesterday and transfusion of one 1 unit RBC , swelling in her legs went down significantly. Over the night pt had desaturations several times lower one was 81%. Monitor show one  episode of 4 beat ventricular tachycardia at 1:30am.    Denies fever, chest pain, abdominal pain, constipation, calf pain, palpitations, headache, n/v    Cardiac monitor reviewed: Desat to 81%, an episodes of 4 beat ventricular tachycardia    Vital Signs Last 24 Hrs  T(C): 36.8 (04 Aug 2017 00:04), Max: 36.9 (03 Aug 2017 19:35)  T(F): 98.2 (04 Aug 2017 00:04), Max: 98.4 (03 Aug 2017 19:35)  HR: 84 (04 Aug 2017 00:04) (84 - 92)  BP: 148/76 (04 Aug 2017 00:04) (145/80 - 174/86)  BP(mean): --  RR: 18 (04 Aug 2017 00:04) (18 - 20)  SpO2: 97% (04 Aug 2017 00:04) (95% - 100%)  c/s     GENERAL: NAD, well-groomed, well-developed  HEAD:  Atraumatic, Normocephalic  EYES: EOMI, PERRLA, conjunctiva and sclera clear  ENMT:  Moist mucous membranes, Good dentition, No lesions  NECK: Supple, No JVD, Normal thyroid. right chest HD catheter in place  LUNG: inspiratory and expiratory wheezing bilaterally; bilateral  rales,  HEART: Regular rate and rhythm; No murmurs, rubs, or gallops  ABDOMEN: Soft, Nontender, Nondistended; Bowel sounds present  EXTREMITIES:  2+ Peripheral Pulses, No clubbing, cyanosis, or edema, LUE AVF placement + thrill palable  LYMPH: No lymphadenopathy noted  NERVOUS SYSTEM:  Alert & Oriented X3, Good concentration; Motor Strength 5/5 B/L upper and lower extremities  SKIN: No rashes or lesions , no hematomas, capillary refills less than 2 seconds    Labs:                        7.6    8.0   )-----------( 284      ( 04 Aug 2017 06:09 )             23.7   Ca    9.0      02 Aug 2017 06:10  Phos  5.3     08-02  Mg     2.3     08-02    TPro  8.1  /  Alb  3.8  /  TBili  0.7  /  DBili  x   /  AST  26  /  ALT  25  /  AlkPhos  165<H>  08-02    Blood cx: anish tamayo              RADIOLOGY:   < from: CT Chest No Cont (08.01.17 @ 22:46) >  LUNGS AND LARGE AIRWAYS: Decreased bilateral patchy airspace opacities,   probably pneumonia.  PLEURA: No pleural effusion.  VESSELS: Right-sided central venous catheter with tip terminating in the   superior cavoatrial junction.   HEART: Heart size is normal. No pericardial effusion.  MEDIASTINUM AND SHANITA: Persistent mediastinal and hilar adenopathy.  CHEST WALL AND LOWER NECK: Within normal limits.  VISUALIZED UPPER ABDOMEN: Trace perihepatic ascites.  BONES: Within normal limits.    IMPRESSION:   1.  Since June 13, 2017, decreased bilateral patchy airspace opacities,   probably pneumonia.  2.  Persistent mediastinal and hilar adenopathy.     < end of copied text >      Medications:  MEDICATIONS  (STANDING):  aspirin enteric coated 81 milliGRAM(s) Oral daily  epoetin raina Injectable 94676 Unit(s) IV Push every 3 days  calcium acetate 667 milliGRAM(s) Oral four times a day with meals  sevelamer hydrochloride 400 milliGRAM(s) Oral three times a day with meals  pantoprazole    Tablet 40 milliGRAM(s) Oral before breakfast  calcitriol   Capsule 0.5 MICROGram(s) Oral daily  ergocalciferol 69104 Unit(s) Oral every week  folic acid 1 milliGRAM(s) Oral daily  insulin lispro Injectable (HumaLOG) 2 Unit(s) SubCutaneous before breakfast  insulin lispro Injectable (HumaLOG) 2 Unit(s) SubCutaneous before lunch  insulin lispro Injectable (HumaLOG) 2 Unit(s) SubCutaneous before dinner  insulin lispro (HumaLOG) corrective regimen sliding scale   SubCutaneous three times a day before meals  dextrose 5%. 1000 milliLiter(s) (50 mL/Hr) IV Continuous <Continuous>  dextrose 50% Injectable 12.5 Gram(s) IV Push once  dextrose 50% Injectable 25 Gram(s) IV Push once  dextrose 50% Injectable 25 Gram(s) IV Push once  heparin  Injectable 5000 Unit(s) SubCutaneous every 12 hours  vancomycin solution  ml q6  ceftriaxone 2gm every 24 hr    MEDICATIONS  (PRN):  dextrose Gel 1 Dose(s) Oral once PRN Blood Glucose LESS THAN 70 milliGRAM(s)/deciliter  glucagon  Injectable 1 milliGRAM(s) IntraMuscular once PRN Glucose LESS THAN 70 milligrams/deciliter  labetalol 200 milliGRAM(s) Oral three times a day PRN give if systolic blood pressure is above 160 mmHg systolic  hydrALAZINE 25 milliGRAM(s) Oral every 8 hours PRN give if sBP > 160 mmHg  amLODIPine   Tablet 10 milliGRAM(s) Oral daily PRN give if sBP above 160 mmHG systolic

## 2017-08-04 NOTE — PROGRESS NOTE ADULT - ATTENDING COMMENTS
Note addended where needed. Plan discussed with patient at bedside. Spoke to Vascular NP and ID as well.

## 2017-08-04 NOTE — PROGRESS NOTE ADULT - SUBJECTIVE AND OBJECTIVE BOX
NEPHROLOGY INTERVAL HPI/OVERNIGHT EVENTS:    Examined earlier  Looks comfortable    MEDICATIONS  (STANDING):  aspirin enteric coated 81 milliGRAM(s) Oral daily  sevelamer hydrochloride 400 milliGRAM(s) Oral three times a day with meals  pantoprazole    Tablet 40 milliGRAM(s) Oral before breakfast  calcitriol   Capsule 0.5 MICROGram(s) Oral daily  ergocalciferol 41197 Unit(s) Oral every week  folic acid 1 milliGRAM(s) Oral daily  insulin lispro (HumaLOG) corrective regimen sliding scale   SubCutaneous three times a day before meals  dextrose 5%. 1000 milliLiter(s) (50 mL/Hr) IV Continuous <Continuous>  dextrose 50% Injectable 12.5 Gram(s) IV Push once  dextrose 50% Injectable 25 Gram(s) IV Push once  dextrose 50% Injectable 25 Gram(s) IV Push once  heparin  Injectable 5000 Unit(s) SubCutaneous every 12 hours  ALBUTerol/ipratropium for Nebulization 3 milliLiter(s) Nebulizer every 6 hours  calcium acetate 667 milliGRAM(s) Oral three times a day with meals  epoetin raina Injectable 46521 Unit(s) IV Push <User Schedule>  vancomycin    Solution 125 milliGRAM(s) Oral every 6 hours  cefTRIAXone   IVPB 2 Gram(s) IV Intermittent every 24 hours    MEDICATIONS  (PRN):  dextrose Gel 1 Dose(s) Oral once PRN Blood Glucose LESS THAN 70 milliGRAM(s)/deciliter  glucagon  Injectable 1 milliGRAM(s) IntraMuscular once PRN Glucose LESS THAN 70 milligrams/deciliter  labetalol 200 milliGRAM(s) Oral three times a day PRN give if systolic blood pressure is above 160 mmHg systolic  hydrALAZINE 25 milliGRAM(s) Oral every 8 hours PRN give if sBP > 160 mmHg  amLODIPine   Tablet 10 milliGRAM(s) Oral daily PRN give if sBP above 160 mmHG systolic  ALBUTerol    0.083%. 2.5 milliGRAM(s) Nebulizer once PRN sputum induction      Allergies    Mushrooms (Hives (Mild))  No Known Drug Allergies    Intolerances        Vital Signs Last 24 Hrs  T(C): 36.6 (04 Aug 2017 15:23), Max: 36.9 (03 Aug 2017 19:35)  T(F): 97.8 (04 Aug 2017 15:23), Max: 98.4 (03 Aug 2017 19:35)  HR: 90 (04 Aug 2017 15:23) (84 - 90)  BP: 147/80 (04 Aug 2017 08:08) (147/80 - 174/86)  BP(mean): --  RR: 18 (04 Aug 2017 15:23) (18 - 20)  SpO2: 99% (04 Aug 2017 15:23) (91% - 99%)  Daily     Daily Weight in k.5 (03 Aug 2017 19:35)    PHYSICAL EXAM:  GEN: NAD  HEENT: normocephalic and atraumatic  NECK: Supple.  LUNGS: B/L wheezing  HEART: Regular rate and rhythm   ABDOMEN: Soft, nontender, and nondistended.  Positive bowel sounds.    EXTREMITIES: With B/L edema.    LABS:                        7.6    8.0   )-----------( 284      ( 04 Aug 2017 06:09 )             23.7     08-04    135  |  94<L>  |  25.0<H>  ----------------------------<  86  4.0   |  25.0  |  3.98<H>    Ca    8.4<L>      04 Aug 2017 06:09  Phos  4.0     08-03  Mg     2.0     08-03                  RADIOLOGY & ADDITIONAL TESTS:

## 2017-08-04 NOTE — PROGRESS NOTE ADULT - PROBLEM SELECTOR PLAN 7
pt blood pressure is low in relation to previous admission , will hold BP med and will start if BP is above 160  BP  well controlled  echo noted

## 2017-08-04 NOTE — PROGRESS NOTE ADULT - SUBJECTIVE AND OBJECTIVE BOX
[facility]  INFECTIOUS DISEASES AND INTERNAL MEDICINE OF [place]  =======================================================  Ned De MD  Diplomates American Board of Internal Medicine and Infectious Diseases  =======================================================    SORAYA JIMENEZ [id number]      Follow up: C Diff colitis/Bacteremia    No more diarrhea  No fevers    Allergies:  Mushrooms (Hives (Mild))  No Known Drug Allergies      Antibiotics:  vancomycin    Solution 125 milliGRAM(s) Oral every 6 hours  cefTRIAXone   IVPB 2 Gram(s) IV Intermittent every 24 hours       REVIEW OF SYSTEMS:  CONSTITUTIONAL:  No Fever or chills  HEENT:  No diplopia or blurred vision.  No earache, sore throat or runny nose.  CARDIOVASCULAR:  No pressure, squeezing, strangling, tightness, heaviness or aching about the chest, neck, axilla or epigastrium.  RESPIRATORY:  No cough, shortness of breath  GASTROINTESTINAL:  No nausea, vomiting or diarrhea.  GENITOURINARY:  No flank pain  MUSCULOSKELETAL:  no joint aches, no muscle pain  SKIN:  No change in skin, hair or nails.  NEUROLOGIC:  No paresthesias, fasciculations  PSYCHIATRIC:  No disorder of thought or mood.  ENDOCRINE:  No heat or cold intolerance.  HEMATOLOGICAL:  No easy bruising or bleeding.       Physical Exam:  Vital Signs Last 24 Hrs  T(C): 36.4 (04 Aug 2017 08:08), Max: 36.9 (03 Aug 2017 19:35)  T(F): 97.6 (04 Aug 2017 08:08), Max: 98.4 (03 Aug 2017 19:35)  HR: 87 (04 Aug 2017 08:08) (84 - 92)  BP: 147/80 (04 Aug 2017 08:08) (145/80 - 174/86)  RR: 18 (04 Aug 2017 08:08) (18 - 20)  SpO2: 92% (04 Aug 2017 09:19) (92% - 100%)      GEN: NAD, pleasant  HEENT: normocephalic and atraumatic. EOMI. PERRL.    NECK: Supple.  LUNGS: B/L wheezing  HEART: Regular rate and rhythm   ABDOMEN: Soft, nontender, and nondistended.  Positive bowel sounds.    : No CVA tenderness  EXTREMITIES: With B/L edema.  MSK: No joint swelling  NEUROLOGIC: Cranial nerves II through XII are grossly intact.  PSYCHIATRIC: Appropriate affect .  SKIN: No ulceration or induration present.      Labs:  08-04    135  |  94<L>  |  25.0<H>  ----------------------------<  86  4.0   |  25.0  |  3.98<H>    Ca    8.4<L>      04 Aug 2017 06:09  Phos  4.0     08-03  Mg     2.0     08-03                            7.6    8.0   )-----------( 284      ( 04 Aug 2017 06:09 )             23.7         Clostridium difficile Toxin by PCR (08.02.17 @ 05:05)    C Diff by PCR Result: Detected: TYPE:(C=Critical, N=Notification, A=Abnormal) C    Clostridium difficile Toxin by PCR: RESULT INTERPRETATION:  Detected - Clostridium difficile toxin B detected by amplified DNA PCR .    C. difficile PCR test results should be interpreted only with  consideration of the patient's clinical situation and history.  This test  will detect the presence of toxigenic C. difficile.  However it cannot be  used as the sole criteria for the diagnosis of antibiotic associated  diarrhea, antibiotic associated colitis, or pseudomembranous colitis.  Colonization with C. difficile may exceed 20%in hospital patients, the  majority of whom are without Toxigenic Clostridium Difficile disease.  Testing is generally not recommended in children below the age of 1 year,  as up to half of healthy infants are asymptomatically colonized with C.  difficile.  In addition, C. difficile PCR testing cannot be used as a  "test of cure" as dead organism nucleic acids will persist and be  detected after treatment.  Successful treatment of C. difficile disease  is determined by resolution of clinical symptoms.          RECENT CULTURES:  Culture - Blood (08.01.17 @ 20:27)    Specimen Source: .Blood Blood    Culture Results:   Growth in anaerobic bottle: Gram Negative Rods  Anaerobic Bottle: 18:43 Hours to positivity  Aerobic Bottle: No growth to shanti      Culture - Blood (08.01.17 @ 20:27)    -  Aztreonam: S <=4    -  Cefazolin: R >16    -  Meropenem: S <=1    -  Ampicillin: R <=8    -  Trimethoprim/Sulfamethoxazole: S <=2/38    -  Amikacin: S <=16    -  Cefepime: S <=4    -  Ceftriaxone: S <=1    -  Ertapenem: S <=1    -  Imipenem: S <=1    -  Levofloxacin: S <=2    -  Ampicillin/Sulbactam: R <=8/4    -  Cefoxitin: R <=8    -  Ceftazidime: S <=1    -  Ciprofloxacin: I 2    -  Gentamicin: S <=4    -  Piperacillin/Tazobactam: S <=16    -  Serratia marcescens: Detec    -  Tobramycin: S <=4    Specimen Source: .Blood Blood    Organism: Blood Culture PCR    Organism: Serratia marcescens    Culture Results:   Growth in aerobic and anaerobic bottles: Serratia marcescens  Aerobic Bottle: 17:13 Hours to positivity  Anaerobic Bottle: 17:13 Hours to positivity

## 2017-08-04 NOTE — PROGRESS NOTE ADULT - PROBLEM SELECTOR PLAN 5
pt has an area over her left lateral calf painful to palpation, Wells score for DVT =1   Doppler US was negative for DVT  xray left leg to be done yesterday shows No radiographic osseous pathology.

## 2017-08-04 NOTE — PROGRESS NOTE ADULT - PROBLEM SELECTOR PLAN 3
cdiff +,  on vanco oral q6 by ID  (day #3)  oral hydration  no diarrhea over past 48 hr  contact isolation cdiff +,  on vanco oral q6 by ID  (day #3)  oral hydration  no diarrhea over past 48 hr  contact isolation can be taken off

## 2017-08-04 NOTE — PROGRESS NOTE ADULT - PROBLEM SELECTOR PLAN 4
1 unit of packed RBC was transfused  on July 3    Hb today =7.6  c/w epogen   anemia w/up noted   follow of Hb w/ cbc daily LIkely 2/2 aocd. Normocytic. 2/2 ESRD   1 unit of packed RBC was transfused  on July 3    Hb today =7.6  c/w epogen   anemia w/up noted   follow of Hb w/ cbc daily

## 2017-08-04 NOTE — PROGRESS NOTE ADULT - ASSESSMENT
ESRD on HD TTS schedule Next HD vega  ID f/u noted Blood Cx  Serratia marccrissyn on Abx source could be HD cath   Will discuss w vasc sx exchange permcath  Cdiff colitis on PO vanco feeling better

## 2017-08-04 NOTE — PROGRESS NOTE ADULT - PROBLEM SELECTOR PLAN 1
-Rocephin 2 gr x 24 hr (day #2)  - follow up w/BCx  - evaluate permacath as source of bacteriemia + for serratia mercences, previous BCs for 6/12 were positive for the same organism -Rocephin 2 gr x 24 hr (day #2)  - follow up w/BCx  - evaluate permacath as source of bacteriemia + for serratia mercences, previous BCs for 6/12 were positive for the same organism  -patient does not want to get PC out if it means that she will be getting a temp catheter in her groin. She cannot go for superficialization now given bacteremia - awaiting d/w Vascular/ID and patient

## 2017-08-05 ENCOUNTER — TRANSCRIPTION ENCOUNTER (OUTPATIENT)
Age: 37
End: 2017-08-05

## 2017-08-05 LAB
-  AMIKACIN: SIGNIFICANT CHANGE UP
-  AMPICILLIN/SULBACTAM: SIGNIFICANT CHANGE UP
-  AMPICILLIN: SIGNIFICANT CHANGE UP
-  AZTREONAM: SIGNIFICANT CHANGE UP
-  CEFAZOLIN: SIGNIFICANT CHANGE UP
-  CEFEPIME: SIGNIFICANT CHANGE UP
-  CEFOXITIN: SIGNIFICANT CHANGE UP
-  CEFTAZIDIME: SIGNIFICANT CHANGE UP
-  CEFTRIAXONE: SIGNIFICANT CHANGE UP
-  CIPROFLOXACIN: SIGNIFICANT CHANGE UP
-  ERTAPENEM: SIGNIFICANT CHANGE UP
-  GENTAMICIN: SIGNIFICANT CHANGE UP
-  IMIPENEM: SIGNIFICANT CHANGE UP
-  LEVOFLOXACIN: SIGNIFICANT CHANGE UP
-  MEROPENEM: SIGNIFICANT CHANGE UP
-  PIPERACILLIN/TAZOBACTAM: SIGNIFICANT CHANGE UP
-  TOBRAMYCIN: SIGNIFICANT CHANGE UP
-  TRIMETHOPRIM/SULFAMETHOXAZOLE: SIGNIFICANT CHANGE UP
ANA PAT FLD IF-IMP: ABNORMAL
ANA TITR SER: ABNORMAL
ANION GAP SERPL CALC-SCNC: 16 MMOL/L — SIGNIFICANT CHANGE UP (ref 5–17)
ANISOCYTOSIS BLD QL: SLIGHT — SIGNIFICANT CHANGE UP
BUN SERPL-MCNC: 44 MG/DL — HIGH (ref 8–20)
CALCIUM SERPL-MCNC: 8.5 MG/DL — LOW (ref 8.6–10.2)
CHLORIDE SERPL-SCNC: 93 MMOL/L — LOW (ref 98–107)
CO2 SERPL-SCNC: 24 MMOL/L — SIGNIFICANT CHANGE UP (ref 22–29)
CREAT SERPL-MCNC: 6.02 MG/DL — HIGH (ref 0.5–1.3)
EOSINOPHIL NFR BLD AUTO: 1 % — SIGNIFICANT CHANGE UP (ref 0–5)
GLUCOSE SERPL-MCNC: 121 MG/DL — HIGH (ref 70–115)
HCT VFR BLD CALC: 24.3 % — LOW (ref 37–47)
HGB BLD-MCNC: 7.7 G/DL — LOW (ref 12–16)
HYPOCHROMIA BLD QL: SLIGHT — SIGNIFICANT CHANGE UP
LYMPHOCYTES # BLD AUTO: 47 % — SIGNIFICANT CHANGE UP (ref 20–55)
MACROCYTES BLD QL: SLIGHT — SIGNIFICANT CHANGE UP
MCHC RBC-ENTMCNC: 25.3 PG — LOW (ref 27–31)
MCHC RBC-ENTMCNC: 31.7 G/DL — LOW (ref 32–36)
MCV RBC AUTO: 79.9 FL — LOW (ref 81–99)
METHOD TYPE: SIGNIFICANT CHANGE UP
MICROCYTES BLD QL: SLIGHT — SIGNIFICANT CHANGE UP
MONOCYTES NFR BLD AUTO: 3 % — SIGNIFICANT CHANGE UP (ref 3–10)
NEUTROPHILS NFR BLD AUTO: 47 % — SIGNIFICANT CHANGE UP (ref 37–73)
OVALOCYTES BLD QL SMEAR: SLIGHT — SIGNIFICANT CHANGE UP
PLAT MORPH BLD: NORMAL — SIGNIFICANT CHANGE UP
PLATELET # BLD AUTO: 288 K/UL — SIGNIFICANT CHANGE UP (ref 150–400)
POIKILOCYTOSIS BLD QL AUTO: SLIGHT — SIGNIFICANT CHANGE UP
POTASSIUM SERPL-MCNC: 3.9 MMOL/L — SIGNIFICANT CHANGE UP (ref 3.5–5.3)
POTASSIUM SERPL-SCNC: 3.9 MMOL/L — SIGNIFICANT CHANGE UP (ref 3.5–5.3)
RBC # BLD: 3.04 M/UL — LOW (ref 4.4–5.2)
RBC # FLD: 17.6 % — HIGH (ref 11–15.6)
RBC BLD AUTO: ABNORMAL
SODIUM SERPL-SCNC: 133 MMOL/L — LOW (ref 135–145)
VARIANT LYMPHS # BLD: 2 % — SIGNIFICANT CHANGE UP (ref 0–6)
WBC # BLD: 8 K/UL — SIGNIFICANT CHANGE UP (ref 4.8–10.8)
WBC # FLD AUTO: 8 K/UL — SIGNIFICANT CHANGE UP (ref 4.8–10.8)

## 2017-08-05 PROCEDURE — 99233 SBSQ HOSP IP/OBS HIGH 50: CPT | Mod: GC

## 2017-08-05 RX ORDER — LABETALOL HCL 100 MG
100 TABLET ORAL THREE TIMES A DAY
Qty: 0 | Refills: 0 | Status: DISCONTINUED | OUTPATIENT
Start: 2017-08-05 | End: 2017-08-05

## 2017-08-05 RX ORDER — CYCLOBENZAPRINE HYDROCHLORIDE 10 MG/1
5 TABLET, FILM COATED ORAL THREE TIMES A DAY
Qty: 0 | Refills: 0 | Status: DISCONTINUED | OUTPATIENT
Start: 2017-08-05 | End: 2017-08-14

## 2017-08-05 RX ORDER — SODIUM CHLORIDE 0.65 %
1 AEROSOL, SPRAY (ML) NASAL THREE TIMES A DAY
Qty: 0 | Refills: 0 | Status: COMPLETED | OUTPATIENT
Start: 2017-08-05 | End: 2017-08-07

## 2017-08-05 RX ORDER — HYDRALAZINE HCL 50 MG
25 TABLET ORAL EVERY 8 HOURS
Qty: 0 | Refills: 0 | Status: DISCONTINUED | OUTPATIENT
Start: 2017-08-05 | End: 2017-08-06

## 2017-08-05 RX ORDER — HYDRALAZINE HCL 50 MG
10 TABLET ORAL ONCE
Qty: 0 | Refills: 0 | Status: DISCONTINUED | OUTPATIENT
Start: 2017-08-05 | End: 2017-08-06

## 2017-08-05 RX ORDER — HYDRALAZINE HCL 50 MG
10 TABLET ORAL ONCE
Qty: 0 | Refills: 0 | Status: COMPLETED | OUTPATIENT
Start: 2017-08-05 | End: 2017-08-05

## 2017-08-05 RX ORDER — AMLODIPINE BESYLATE 2.5 MG/1
10 TABLET ORAL DAILY
Qty: 0 | Refills: 0 | Status: DISCONTINUED | OUTPATIENT
Start: 2017-08-05 | End: 2017-08-12

## 2017-08-05 RX ADMIN — AMLODIPINE BESYLATE 10 MILLIGRAM(S): 2.5 TABLET ORAL at 18:54

## 2017-08-05 RX ADMIN — PANTOPRAZOLE SODIUM 40 MILLIGRAM(S): 20 TABLET, DELAYED RELEASE ORAL at 09:02

## 2017-08-05 RX ADMIN — Medication 1 SPRAY(S): at 13:14

## 2017-08-05 RX ADMIN — SEVELAMER CARBONATE 400 MILLIGRAM(S): 2400 POWDER, FOR SUSPENSION ORAL at 18:53

## 2017-08-05 RX ADMIN — Medication 3 MILLILITER(S): at 09:28

## 2017-08-05 RX ADMIN — Medication 3 MILLILITER(S): at 20:22

## 2017-08-05 RX ADMIN — SEVELAMER CARBONATE 400 MILLIGRAM(S): 2400 POWDER, FOR SUSPENSION ORAL at 13:14

## 2017-08-05 RX ADMIN — ERYTHROPOIETIN 12000 UNIT(S): 10000 INJECTION, SOLUTION INTRAVENOUS; SUBCUTANEOUS at 15:52

## 2017-08-05 RX ADMIN — SEVELAMER CARBONATE 400 MILLIGRAM(S): 2400 POWDER, FOR SUSPENSION ORAL at 09:02

## 2017-08-05 RX ADMIN — Medication 3 MILLILITER(S): at 15:09

## 2017-08-05 RX ADMIN — Medication 125 MILLIGRAM(S): at 02:00

## 2017-08-05 RX ADMIN — Medication 1 MILLIGRAM(S): at 13:14

## 2017-08-05 RX ADMIN — Medication 125 MILLIGRAM(S): at 09:03

## 2017-08-05 RX ADMIN — CALCITRIOL 0.5 MICROGRAM(S): 0.5 CAPSULE ORAL at 13:14

## 2017-08-05 RX ADMIN — Medication 25 MILLIGRAM(S): at 23:09

## 2017-08-05 RX ADMIN — Medication 25 MILLIGRAM(S): at 13:13

## 2017-08-05 RX ADMIN — CEFTRIAXONE 100 GRAM(S): 500 INJECTION, POWDER, FOR SOLUTION INTRAMUSCULAR; INTRAVENOUS at 23:09

## 2017-08-05 RX ADMIN — CYCLOBENZAPRINE HYDROCHLORIDE 5 MILLIGRAM(S): 10 TABLET, FILM COATED ORAL at 18:54

## 2017-08-05 RX ADMIN — Medication 10 MILLIGRAM(S): at 09:02

## 2017-08-05 RX ADMIN — Medication 3 MILLILITER(S): at 03:19

## 2017-08-05 RX ADMIN — Medication 81 MILLIGRAM(S): at 13:14

## 2017-08-05 RX ADMIN — Medication 1 SPRAY(S): at 23:09

## 2017-08-05 NOTE — PROGRESS NOTE ADULT - PROBLEM SELECTOR PLAN 7
pt blood pressure is low in relation to previous admission , will hold BP med and will start if BP is above 160  BP  well controlled  echo noted note well controlled   -resume home meds - given respiratory wheezing, hold off of labetalol at this time - resume norvasc and hydralazine   echo noted

## 2017-08-05 NOTE — PROGRESS NOTE ADULT - SUBJECTIVE AND OBJECTIVE BOX
[facility]  INFECTIOUS DISEASES AND INTERNAL MEDICINE OF [place]  =======================================================  Ned De MD  Diplomates American Board of Internal Medicine and Infectious Diseases  =======================================================    SORAYA JIMENEZ [id number]      Follow up: C Diff colitis/Bacteremia    No more diarrhea  No fevers    Allergies:  Mushrooms (Hives (Mild))  No Known Drug Allergies      Antibiotics:  vancomycin    Solution 125 milliGRAM(s) Oral every 6 hours  cefTRIAXone   IVPB 2 Gram(s) IV Intermittent every 24 hours       REVIEW OF SYSTEMS:  CONSTITUTIONAL:  No Fever or chills  HEENT:  No diplopia or blurred vision.  No earache, sore throat or runny nose.  CARDIOVASCULAR:  No pressure, squeezing, strangling, tightness, heaviness or aching about the chest, neck, axilla or epigastrium.  RESPIRATORY:  No cough, shortness of breath  GASTROINTESTINAL:  No nausea, vomiting or diarrhea.  GENITOURINARY:  No flank pain  MUSCULOSKELETAL:  no joint aches, no muscle pain  SKIN:  No change in skin, hair or nails.  NEUROLOGIC:  No paresthesias, fasciculations  PSYCHIATRIC:  No disorder of thought or mood.  ENDOCRINE:  No heat or cold intolerance.  HEMATOLOGICAL:  No easy bruising or bleeding.       Physical Exam:  Vital Signs Last 24 Hrs  T(C): 36.6 (05 Aug 2017 08:00), Max: 36.6 (04 Aug 2017 15:23)  T(F): 97.8 (05 Aug 2017 08:00), Max: 97.8 (04 Aug 2017 15:23)  HR: 95 (05 Aug 2017 08:00) (90 - 95)  BP: 185/92 (05 Aug 2017 08:00) (158/90 - 185/92)  RR: 18 (05 Aug 2017 08:00) (18 - 20)  SpO2: 96% (05 Aug 2017 09:28) (91% - 99%)      GEN: NAD, pleasant  HEENT: normocephalic and atraumatic. EOMI. PERRL.    NECK: Supple.  LUNGS: B/L wheezing  HEART: Regular rate and rhythm   ABDOMEN: Soft, nontender, and nondistended.  Positive bowel sounds.    : No CVA tenderness  EXTREMITIES: With B/L edema.  MSK: No joint swelling  NEUROLOGIC: Cranial nerves II through XII are grossly intact.  PSYCHIATRIC: Appropriate affect .  SKIN: No ulceration or induration present.      Labs:  08-05    133<L>  |  93<L>  |  44.0<H>  ----------------------------<  121<H>  3.9   |  24.0  |  6.02<H>    Ca    8.5<L>      05 Aug 2017 06:56                            7.7    8.0   )-----------( 288      ( 05 Aug 2017 06:56 )             24.3       Clostridium difficile Toxin by PCR (08.02.17 @ 05:05)    C Diff by PCR Result: Detected: TYPE:(C=Critical, N=Notification, A=Abnormal) C    Clostridium difficile Toxin by PCR: RESULT INTERPRETATION:  Detected - Clostridium difficile toxin B detected by amplified DNA PCR .    C. difficile PCR test results should be interpreted only with  consideration of the patient's clinical situation and history.  This test  will detect the presence of toxigenic C. difficile.  However it cannot be  used as the sole criteria for the diagnosis of antibiotic associated  diarrhea, antibiotic associated colitis, or pseudomembranous colitis.  Colonization with C. difficile may exceed 20%in hospital patients, the  majority of whom are without Toxigenic Clostridium Difficile disease.  Testing is generally not recommended in children below the age of 1 year,  as up to half of healthy infants are asymptomatically colonized with C.  difficile.  In addition, C. difficile PCR testing cannot be used as a  "test of cure" as dead organism nucleic acids will persist and be  detected after treatment.  Successful treatment of C. difficile disease  is determined by resolution of clinical symptoms.          RECENT CULTURES:  Culture - Sputum . (08.04.17 @ 07:34)    Gram Stain:   Few White blood cells  No organisms seen    Specimen Source: .Sputum Sputum    Culture Results:   Few Routine respiratory bonnie present  Culture in progress      Culture - Blood (08.01.17 @ 20:27)    -  Ampicillin/Sulbactam: R <=8/4    -  Ciprofloxacin: I 2    -  Piperacillin/Tazobactam: S <=16    -  Amikacin: S <=16    -  Ampicillin: R <=8    -  Aztreonam: S <=4    -  Trimethoprim/Sulfamethoxazole: S <=2/38    -  Cefepime: S <=4    -  Cefoxitin: R <=8    -  Ceftriaxone: S <=1    -  Ertapenem: S <=1    -  Gentamicin: S <=4    -  Imipenem: S <=1    -  Levofloxacin: S <=2    -  Cefazolin: R >16    -  Ceftazidime: S <=1    -  Meropenem: S <=1    -  Tobramycin: S <=4    Specimen Source: .Blood Blood    Organism: Serratia marcescens    Culture Results:   Growth in anaerobic bottle: Serratia marcescens  Anaerobic Bottle: 18:43 Hours to positivity  Aerobic Bottle: No growth to date      Culture - Blood (08.01.17 @ 20:27)    -  Ampicillin: R <=8    -  Trimethoprim/Sulfamethoxazole: S <=2/38    -  Aztreonam: S <=4    -  Cefazolin: R >16    -  Meropenem: S <=1    -  Ampicillin/Sulbactam: R <=8/4    -  Cefoxitin: R <=8    -  Ceftazidime: S <=1    -  Ciprofloxacin: I 2    -  Gentamicin: S <=4    -  Piperacillin/Tazobactam: S <=16    -  Serratia marcescens: Detec    -  Tobramycin: S <=4    -  Amikacin: S <=16    -  Cefepime: S <=4    -  Ceftriaxone: S <=1    -  Ertapenem: S <=1    -  Imipenem: S <=1    -  Levofloxacin: S <=2    Specimen Source: .Blood Blood    Organism: Blood Culture PCR    Organism: Serratia marcescens    Culture Results:   Growth in aerobic and anaerobic bottles: Serratia marcescens  Aerobic Bottle: 17:13 Hours to positivity  Anaerobic Bottle: 17:13 Hours to positivity

## 2017-08-05 NOTE — DISCHARGE NOTE ADULT - CARE PLAN
Principal Discharge DX:	Bacteremia due to Gram-negative bacteria  Goal:	-resolved  Instructions for follow-up, activity and diet:	-seek medical attention if symptoms such as fever, chills appear  Secondary Diagnosis:	Diabetes  Goal:	A1c less than 7%  Instructions for follow-up, activity and diet:	Healthy carbohydrates. During digestion, sugars (simple carbohydrates) and starches (complex carbohydrates) break down into blood glucose. Focus on the healthiest carbohydrates, such as fruits, vegetables, whole grains, legumes (beans, peas and lentils) and low-fat dairy products.  •Fiber-rich foods. Dietary fiber includes all parts of plant foods that your body can't digest or absorb. Fiber moderates how your body digests and helps control blood sugar levels. Foods high in fiber include vegetables, fruits, nuts, legumes (beans, peas and lentils), whole-wheat flour and wheat bran.  Secondary Diagnosis:	Hypertension  Goal:	blood pressure of 140/90 mmhg  Instructions for follow-up, activity and diet:	continue treatment with labetalol   continue treatment with amlodipine  diet low in soudium  Secondary Diagnosis:	End stage renal disease  Goal:	-  Instructions for follow-up, activity and diet:	continue hemodialysis Monday- Wednesday- Friday   keep good control of blood pressure and blood sugar  follow up regular appointment with nephrology  Secondary Diagnosis:	Clostridium difficile colitis  Goal:	resolve  Instructions for follow-up, activity and diet:	-use probiotics in case of any treatment with antibiotics at home  -eat yogurt   -wash your hands frequently  -seek medical attentions if you have diarrheas Principal Discharge DX:	Bacteremia due to Gram-negative bacteria  Goal:	-resolved  Instructions for follow-up, activity and diet:	-seek medical attention if symptoms such as fever, chills appear  Secondary Diagnosis:	Diabetes  Goal:	A1c less than 7%  Instructions for follow-up, activity and diet:	-continue treatment with Humalog insulin by sliding scale  Healthy carbohydrates. During digestion, sugars (simple carbohydrates) and starches (complex carbohydrates) break down into blood glucose. Focus on the healthiest carbohydrates, such as fruits, vegetables, whole grains, legumes (beans, peas and lentils) and low-fat dairy products.  •Fiber-rich foods. Dietary fiber includes all parts of plant foods that your body can't digest or absorb. Fiber moderates how your body digests and helps control blood sugar levels. Foods high in fiber include vegetables, fruits, nuts, legumes (beans, peas and lentils), whole-wheat flour and wheat bran.  Secondary Diagnosis:	Hypertension  Goal:	blood pressure of 140/90 mmhg  Instructions for follow-up, activity and diet:	continue treatment with labetalol   continue treatment with amlodipine  continue treatment with hydralazine   diet low in sodium  Follow the DASH Diet: Dietary Approaches to Stop Hypertension) is a dietary pattern promoted by the [place].-based National Heart, Lung, and Blood [place] [part of the National Institutes of Health ("NIH"), an agency of the [place] Department of Health and Human Services] to prevent and control hypertension. The DASH diet is rich in fruits, vegetables, whole grains, and low-fat dairy foods; includes meat, fish, poultry, nuts, and beans; and is limited in sugar-sweetened foods and beverages, red meat, and added fats. In addition to its effect on blood pressure, it is designed to be a well-balanced approach to eating for the general public. DASH is recommended by the [place] Department of Agriculture ("USDA") as one of its ideal eating plans for all Americans.  Secondary Diagnosis:	End stage renal disease  Goal:	-  Instructions for follow-up, activity and diet:	-continue hemodialysis Monday- Wednesday- Friday ( 3 times per week)  -keep good control of blood pressure and blood sugar  -follow up regular appointment with nephrology  Secondary Diagnosis:	Clostridium difficile colitis  Goal:	resolve  Instructions for follow-up, activity and diet:	-use probiotics in case of any treatment with antibiotics at home  -eat yogurt   -wash your hands frequently  -seek medical attentions if you have diarrheas and/or abdominal pain during treatment with antibiotics Principal Discharge DX:	Bacteremia due to Gram-negative bacteria  Goal:	-resolved  Instructions for follow-up, activity and diet:	-seek medical attention if symptoms such as fever, chills appear  - Please continue Gentamycin on Dialysis days for the next 2 weeks  - Please continue Vancomycin as prescribed fro the next 2 weeks while still on antibiotics  Secondary Diagnosis:	Diabetes  Goal:	A1c less than 7%  Instructions for follow-up, activity and diet:	-continue treatment with home Humalog insulin by sliding scale  Healthy carbohydrates. During digestion, sugars (simple carbohydrates) and starches (complex carbohydrates) break down into blood glucose. Focus on the healthiest carbohydrates, such as fruits, vegetables, whole grains, legumes (beans, peas and lentils) and low-fat dairy products.  •Fiber-rich foods. Dietary fiber includes all parts of plant foods that your body can't digest or absorb. Fiber moderates how your body digests and helps control blood sugar levels. Foods high in fiber include vegetables, fruits, nuts, legumes (beans, peas and lentils), whole-wheat flour and wheat bran.  Secondary Diagnosis:	Hypertension  Goal:	blood pressure of 140/90  Instructions for follow-up, activity and diet:	continue treatment with Verapamil and hydralazine as prescribed   diet low in sodium  Follow the DASH Diet: Dietary Approaches to Stop Hypertension) is a dietary pattern promoted by the [place].-based National Heart, Lung, and Blood [place] [part of the National Institutes of Health ("NIH"), an agency of the [place] Department of Health and Human Services] to prevent and control hypertension. The DASH diet is rich in fruits, vegetables, whole grains, and low-fat dairy foods; includes meat, fish, poultry, nuts, and beans; and is limited in sugar-sweetened foods and beverages, red meat, and added fats. In addition to its effect on blood pressure, it is designed to be a well-balanced approach to eating for the general public. DASH is recommended by the [place] Department of Agriculture ("USDA") as one of its ideal eating plans for all Americans.  Secondary Diagnosis:	End stage renal disease  Goal:	-  Instructions for follow-up, activity and diet:	-continue hemodialysis Monday- Wednesday- Friday ( 3 times per week)  -keep good control of blood pressure and blood sugar  -follow up regular appointment with nephrology  Secondary Diagnosis:	Clostridium difficile colitis  Goal:	resolved  Instructions for follow-up, activity and diet:	- Please continue Vancomycin as prescribed fro the next 2 weeks while still on antibiotics  - use probiotics in case of any treatment with antibiotics at home  -eat yogurt   -wash your hands frequently  -seek medical attentions if you have diarrheas and/or abdominal pain during treatment with antibiotics Principal Discharge DX:	Bacteremia due to Gram-negative bacteria  Goal:	-resolved  Instructions for follow-up, activity and diet:	-seek medical attention if symptoms such as fever, chills appear  - Please continue Gentamycin on Dialysis days for the next 2 weeks  - Please continue Vancomycin as prescribed fro the next 2 weeks while still on antibiotics  Secondary Diagnosis:	Diabetes  Goal:	A1c less than 7%  Instructions for follow-up, activity and diet:	-continue treatment with home Humalog insulin by sliding scale  Healthy carbohydrates. During digestion, sugars (simple carbohydrates) and starches (complex carbohydrates) break down into blood glucose. Focus on the healthiest carbohydrates, such as fruits, vegetables, whole grains, legumes (beans, peas and lentils) and low-fat dairy products.  •Fiber-rich foods. Dietary fiber includes all parts of plant foods that your body can't digest or absorb. Fiber moderates how your body digests and helps control blood sugar levels. Foods high in fiber include vegetables, fruits, nuts, legumes (beans, peas and lentils), whole-wheat flour and wheat bran.  Secondary Diagnosis:	Hypertension  Goal:	blood pressure of 140/90  Instructions for follow-up, activity and diet:	continue treatment with Verapamil and hydralazine as prescribed   diet low in sodium  Follow the DASH Diet: Dietary Approaches to Stop Hypertension) is a dietary pattern promoted by the [place].-based National Heart, Lung, and Blood [place] [part of the National Institutes of Health ("NIH"), an agency of the [place] Department of Health and Human Services] to prevent and control hypertension. The DASH diet is rich in fruits, vegetables, whole grains, and low-fat dairy foods; includes meat, fish, poultry, nuts, and beans; and is limited in sugar-sweetened foods and beverages, red meat, and added fats. In addition to its effect on blood pressure, it is designed to be a well-balanced approach to eating for the general public. DASH is recommended by the [place] Department of Agriculture ("USDA") as one of its ideal eating plans for all Americans.  Secondary Diagnosis:	End stage renal disease  Goal:	-  Instructions for follow-up, activity and diet:	-continue hemodialysis Monday- Wednesday- Friday ( 3 times per week)  -keep good control of blood pressure and blood sugar  -follow up regular appointment with nephrology  Secondary Diagnosis:	Clostridium difficile colitis  Goal:	resolved  Instructions for follow-up, activity and diet:	- Please continue Vancomycin as prescribed fro the next 2 weeks while still on antibiotics  - use probiotics in case of any treatment with antibiotics at home  -eat yogurt   -wash your hands frequently  -seek medical attentions if you have diarrheas and/or abdominal pain during treatment with antibiotics Principal Discharge DX:	Bacteremia due to Gram-negative bacteria  Goal:	-resolved  Instructions for follow-up, activity and diet:	-seek medical attention if symptoms such as fever, chills appear  - Please continue Gentamycin on Dialysis days for the next 2 weeks  - Please continue Vancomycin as prescribed fro the next 2 weeks while still on antibiotics  Secondary Diagnosis:	Diabetes  Goal:	A1c less than 7%  Instructions for follow-up, activity and diet:	-continue treatment with home Humalog insulin by sliding scale  Healthy carbohydrates. During digestion, sugars (simple carbohydrates) and starches (complex carbohydrates) break down into blood glucose. Focus on the healthiest carbohydrates, such as fruits, vegetables, whole grains, legumes (beans, peas and lentils) and low-fat dairy products.  •Fiber-rich foods. Dietary fiber includes all parts of plant foods that your body can't digest or absorb. Fiber moderates how your body digests and helps control blood sugar levels. Foods high in fiber include vegetables, fruits, nuts, legumes (beans, peas and lentils), whole-wheat flour and wheat bran.  Secondary Diagnosis:	Hypertension  Goal:	blood pressure of 140/90  Instructions for follow-up, activity and diet:	continue treatment with Verapamil and hydralazine as prescribed   diet low in sodium  Follow the DASH Diet: Dietary Approaches to Stop Hypertension) is a dietary pattern promoted by the [place].-based National Heart, Lung, and Blood [place] [part of the National Institutes of Health ("NIH"), an agency of the [place] Department of Health and Human Services] to prevent and control hypertension. The DASH diet is rich in fruits, vegetables, whole grains, and low-fat dairy foods; includes meat, fish, poultry, nuts, and beans; and is limited in sugar-sweetened foods and beverages, red meat, and added fats. In addition to its effect on blood pressure, it is designed to be a well-balanced approach to eating for the general public. DASH is recommended by the [place] Department of Agriculture ("USDA") as one of its ideal eating plans for all Americans.  Secondary Diagnosis:	End stage renal disease  Goal:	-maintenance  Instructions for follow-up, activity and diet:	-continue hemodialysis Monday- Wednesday- Friday ( 3 times per week)  -keep good control of blood pressure and blood sugar  -follow up regular appointment with nephrology  Secondary Diagnosis:	Clostridium difficile colitis  Goal:	resolved  Instructions for follow-up, activity and diet:	- Please continue Vancomycin as prescribed fro the next 2 weeks while still on antibiotics  - use probiotics in case of any treatment with antibiotics at home  -eat yogurt   -wash your hands frequently  -seek medical attentions if you have diarrheas and/or abdominal pain during treatment with antibiotics Principal Discharge DX:	Bacteremia due to Gram-negative bacteria  Goal:	-resolved  Instructions for follow-up, activity and diet:	-seek medical attention if symptoms such as fever, chills appear  - Please continue Gentamycin on Dialysis days for the next 2 weeks  - Please continue Vancomycin as prescribed fro the next 2 weeks while still on antibiotics  Secondary Diagnosis:	Diabetes  Goal:	A1c less than 7%  Instructions for follow-up, activity and diet:	-continue treatment with home Humalog insulin by sliding scale  Healthy carbohydrates. During digestion, sugars (simple carbohydrates) and starches (complex carbohydrates) break down into blood glucose. Focus on the healthiest carbohydrates, such as fruits, vegetables, whole grains, legumes (beans, peas and lentils) and low-fat dairy products.  •Fiber-rich foods. Dietary fiber includes all parts of plant foods that your body can't digest or absorb. Fiber moderates how your body digests and helps control blood sugar levels. Foods high in fiber include vegetables, fruits, nuts, legumes (beans, peas and lentils), whole-wheat flour and wheat bran.  Secondary Diagnosis:	Hypertension  Goal:	blood pressure of 140/90  Instructions for follow-up, activity and diet:	continue treatment with Verapamil and hydralazine as prescribed   diet low in sodium  Follow the DASH Diet: Dietary Approaches to Stop Hypertension) is a dietary pattern promoted by the [place].-based National Heart, Lung, and Blood [place] [part of the National Institutes of Health ("NIH"), an agency of the [place] Department of Health and Human Services] to prevent and control hypertension. The DASH diet is rich in fruits, vegetables, whole grains, and low-fat dairy foods; includes meat, fish, poultry, nuts, and beans; and is limited in sugar-sweetened foods and beverages, red meat, and added fats. In addition to its effect on blood pressure, it is designed to be a well-balanced approach to eating for the general public. DASH is recommended by the [place] Department of Agriculture ("USDA") as one of its ideal eating plans for all Americans.  Secondary Diagnosis:	End stage renal disease  Goal:	-maintenance  Instructions for follow-up, activity and diet:	-continue hemodialysis Monday- Wednesday- Friday ( 3 times per week)  -keep good control of blood pressure and blood sugar  -follow up regular appointment with nephrology  Secondary Diagnosis:	Clostridium difficile colitis  Goal:	resolved  Instructions for follow-up, activity and diet:	- Please continue Vancomycin as prescribed fro the next 2 weeks while still on antibiotics  - use probiotics in case of any treatment with antibiotics at home  -eat yogurt   -wash your hands frequently  -seek medical attentions if you have diarrheas and/or abdominal pain during treatment with antibiotics Principal Discharge DX:	Bacteremia due to Gram-negative bacteria  Goal:	-resolved  Instructions for follow-up, activity and diet:	-seek medical attention if symptoms such as fever, chills appear  - Please continue Gentamycin on Dialysis days for the next 2 weeks  - Please continue Vancomycin as prescribed fro the next 2 weeks while still on antibiotics  Secondary Diagnosis:	Diabetes  Goal:	A1c less than 7%  Instructions for follow-up, activity and diet:	-continue treatment with home Humalog insulin by sliding scale  Healthy carbohydrates. During digestion, sugars (simple carbohydrates) and starches (complex carbohydrates) break down into blood glucose. Focus on the healthiest carbohydrates, such as fruits, vegetables, whole grains, legumes (beans, peas and lentils) and low-fat dairy products.  •Fiber-rich foods. Dietary fiber includes all parts of plant foods that your body can't digest or absorb. Fiber moderates how your body digests and helps control blood sugar levels. Foods high in fiber include vegetables, fruits, nuts, legumes (beans, peas and lentils), whole-wheat flour and wheat bran.  Secondary Diagnosis:	Hypertension  Goal:	blood pressure of 140/90  Instructions for follow-up, activity and diet:	continue treatment with Verapamil and hydralazine as prescribed   diet low in sodium  Follow the DASH Diet: Dietary Approaches to Stop Hypertension) is a dietary pattern promoted by the [place].-based National Heart, Lung, and Blood [place] [part of the National Institutes of Health ("NIH"), an agency of the [place] Department of Health and Human Services] to prevent and control hypertension. The DASH diet is rich in fruits, vegetables, whole grains, and low-fat dairy foods; includes meat, fish, poultry, nuts, and beans; and is limited in sugar-sweetened foods and beverages, red meat, and added fats. In addition to its effect on blood pressure, it is designed to be a well-balanced approach to eating for the general public. DASH is recommended by the [place] Department of Agriculture ("USDA") as one of its ideal eating plans for all Americans.  Secondary Diagnosis:	End stage renal disease  Goal:	-maintenance  Instructions for follow-up, activity and diet:	-continue hemodialysis Monday- Wednesday- Friday ( 3 times per week)  -keep good control of blood pressure and blood sugar  -follow up regular appointment with nephrology  Secondary Diagnosis:	Clostridium difficile colitis  Goal:	resolved  Instructions for follow-up, activity and diet:	- Please continue Vancomycin as prescribed fro the next 2 weeks while still on antibiotics  - use probiotics in case of any treatment with antibiotics at home  -eat yogurt   -wash your hands frequently  -seek medical attentions if you have diarrheas and/or abdominal pain during treatment with antibiotics Principal Discharge DX:	Bacteremia due to Gram-negative bacteria  Goal:	-resolved  Instructions for follow-up, activity and diet:	-seek medical attention if symptoms such as fever, chills appear  - Please continue Gentamycin on Dialysis days for the next 2 weeks  - Please continue Vancomycin as prescribed fro the next 2 weeks while still on antibiotics  Secondary Diagnosis:	Diabetes  Goal:	A1c less than 7%  Instructions for follow-up, activity and diet:	-continue treatment with home Humalog insulin by sliding scale  Healthy carbohydrates. During digestion, sugars (simple carbohydrates) and starches (complex carbohydrates) break down into blood glucose. Focus on the healthiest carbohydrates, such as fruits, vegetables, whole grains, legumes (beans, peas and lentils) and low-fat dairy products.  •Fiber-rich foods. Dietary fiber includes all parts of plant foods that your body can't digest or absorb. Fiber moderates how your body digests and helps control blood sugar levels. Foods high in fiber include vegetables, fruits, nuts, legumes (beans, peas and lentils), whole-wheat flour and wheat bran.  Secondary Diagnosis:	Hypertension  Goal:	blood pressure of 140/90  Instructions for follow-up, activity and diet:	continue treatment with Verapamil and hydralazine as prescribed   diet low in sodium  Follow the DASH Diet: Dietary Approaches to Stop Hypertension) is a dietary pattern promoted by the [place].-based National Heart, Lung, and Blood [place] [part of the National Institutes of Health ("NIH"), an agency of the [place] Department of Health and Human Services] to prevent and control hypertension. The DASH diet is rich in fruits, vegetables, whole grains, and low-fat dairy foods; includes meat, fish, poultry, nuts, and beans; and is limited in sugar-sweetened foods and beverages, red meat, and added fats. In addition to its effect on blood pressure, it is designed to be a well-balanced approach to eating for the general public. DASH is recommended by the [place] Department of Agriculture ("USDA") as one of its ideal eating plans for all Americans.  Secondary Diagnosis:	End stage renal disease  Goal:	-maintenance  Instructions for follow-up, activity and diet:	-continue hemodialysis Monday- Wednesday- Friday ( 3 times per week)  -keep good control of blood pressure and blood sugar  -follow up regular appointment with nephrology  Secondary Diagnosis:	Clostridium difficile colitis  Goal:	resolved  Instructions for follow-up, activity and diet:	- Please continue Vancomycin as prescribed fro the next 2 weeks while still on antibiotics  - use probiotics in case of any treatment with antibiotics at home  -eat yogurt   -wash your hands frequently  -seek medical attentions if you have diarrheas and/or abdominal pain during treatment with antibiotics Principal Discharge DX:	Bacteremia due to Gram-negative bacteria  Goal:	-resolved  Instructions for follow-up, activity and diet:	-seek medical attention if symptoms such as fever, chills appear  - Please continue Gentamycin on Dialysis days for the next 2 weeks  - Please continue Vancomycin as prescribed fro the next 2 weeks while still on antibiotics  Secondary Diagnosis:	Diabetes  Goal:	A1c less than 7%  Instructions for follow-up, activity and diet:	-continue treatment with home Humalog insulin by sliding scale  Healthy carbohydrates. During digestion, sugars (simple carbohydrates) and starches (complex carbohydrates) break down into blood glucose. Focus on the healthiest carbohydrates, such as fruits, vegetables, whole grains, legumes (beans, peas and lentils) and low-fat dairy products.  •Fiber-rich foods. Dietary fiber includes all parts of plant foods that your body can't digest or absorb. Fiber moderates how your body digests and helps control blood sugar levels. Foods high in fiber include vegetables, fruits, nuts, legumes (beans, peas and lentils), whole-wheat flour and wheat bran.  Secondary Diagnosis:	Hypertension  Goal:	blood pressure of 140/90  Instructions for follow-up, activity and diet:	continue treatment with Verapamil and hydralazine as prescribed   diet low in sodium  Follow the DASH Diet: Dietary Approaches to Stop Hypertension) is a dietary pattern promoted by the [place].-based National Heart, Lung, and Blood [place] [part of the National Institutes of Health ("NIH"), an agency of the [place] Department of Health and Human Services] to prevent and control hypertension. The DASH diet is rich in fruits, vegetables, whole grains, and low-fat dairy foods; includes meat, fish, poultry, nuts, and beans; and is limited in sugar-sweetened foods and beverages, red meat, and added fats. In addition to its effect on blood pressure, it is designed to be a well-balanced approach to eating for the general public. DASH is recommended by the [place] Department of Agriculture ("USDA") as one of its ideal eating plans for all Americans.  Secondary Diagnosis:	End stage renal disease  Goal:	-maintenance  Instructions for follow-up, activity and diet:	-continue hemodialysis Monday- Wednesday- Friday ( 3 times per week)  -keep good control of blood pressure and blood sugar  -follow up regular appointment with nephrology  Secondary Diagnosis:	Clostridium difficile colitis  Goal:	resolved  Instructions for follow-up, activity and diet:	- Please continue Vancomycin as prescribed fro the next 2 weeks while still on antibiotics  - use probiotics in case of any treatment with antibiotics at home  -eat yogurt   -wash your hands frequently  -seek medical attentions if you have diarrheas and/or abdominal pain during treatment with antibiotics Principal Discharge DX:	Bacteremia due to Gram-negative bacteria  Goal:	-resolved  Instructions for follow-up, activity and diet:	-seek medical attention if symptoms such as fever, chills appear  - Please continue Gentamycin on Dialysis days for the next 2 weeks  - Please continue Vancomycin as prescribed fro the next 2 weeks while still on antibiotics  Secondary Diagnosis:	Diabetes  Goal:	A1c less than 7%  Instructions for follow-up, activity and diet:	-continue treatment with home Humalog insulin by sliding scale  Healthy carbohydrates. During digestion, sugars (simple carbohydrates) and starches (complex carbohydrates) break down into blood glucose. Focus on the healthiest carbohydrates, such as fruits, vegetables, whole grains, legumes (beans, peas and lentils) and low-fat dairy products.  •Fiber-rich foods. Dietary fiber includes all parts of plant foods that your body can't digest or absorb. Fiber moderates how your body digests and helps control blood sugar levels. Foods high in fiber include vegetables, fruits, nuts, legumes (beans, peas and lentils), whole-wheat flour and wheat bran.  Secondary Diagnosis:	Hypertension  Goal:	blood pressure of 140/90  Instructions for follow-up, activity and diet:	continue treatment with Verapamil and hydralazine as prescribed   diet low in sodium  Follow the DASH Diet: Dietary Approaches to Stop Hypertension) is a dietary pattern promoted by the [place].-based National Heart, Lung, and Blood [place] [part of the National Institutes of Health ("NIH"), an agency of the [place] Department of Health and Human Services] to prevent and control hypertension. The DASH diet is rich in fruits, vegetables, whole grains, and low-fat dairy foods; includes meat, fish, poultry, nuts, and beans; and is limited in sugar-sweetened foods and beverages, red meat, and added fats. In addition to its effect on blood pressure, it is designed to be a well-balanced approach to eating for the general public. DASH is recommended by the [place] Department of Agriculture ("USDA") as one of its ideal eating plans for all Americans.  Secondary Diagnosis:	End stage renal disease  Goal:	-maintenance  Instructions for follow-up, activity and diet:	-continue hemodialysis Monday- Wednesday- Friday ( 3 times per week)  -keep good control of blood pressure and blood sugar  -follow up regular appointment with nephrology  Secondary Diagnosis:	Clostridium difficile colitis  Goal:	resolved  Instructions for follow-up, activity and diet:	- Please continue Vancomycin as prescribed fro the next 2 weeks while still on antibiotics  - use probiotics in case of any treatment with antibiotics at home  -eat yogurt   -wash your hands frequently  -seek medical attentions if you have diarrheas and/or abdominal pain during treatment with antibiotics Principal Discharge DX:	Bacteremia due to Gram-negative bacteria  Goal:	-resolved  Instructions for follow-up, activity and diet:	-seek medical attention if symptoms such as fever, chills appear  - Please continue Gentamycin on Dialysis days for the next 2 weeks  - Please continue Vancomycin as prescribed fro the next 2 weeks while still on antibiotics  Secondary Diagnosis:	Diabetes  Goal:	A1c less than 7%  Instructions for follow-up, activity and diet:	-continue treatment with home Humalog insulin by sliding scale  Healthy carbohydrates. During digestion, sugars (simple carbohydrates) and starches (complex carbohydrates) break down into blood glucose. Focus on the healthiest carbohydrates, such as fruits, vegetables, whole grains, legumes (beans, peas and lentils) and low-fat dairy products.  •Fiber-rich foods. Dietary fiber includes all parts of plant foods that your body can't digest or absorb. Fiber moderates how your body digests and helps control blood sugar levels. Foods high in fiber include vegetables, fruits, nuts, legumes (beans, peas and lentils), whole-wheat flour and wheat bran.  Secondary Diagnosis:	Hypertension  Goal:	blood pressure of 140/90  Instructions for follow-up, activity and diet:	continue treatment with Verapamil and hydralazine as prescribed   diet low in sodium  Follow the DASH Diet: Dietary Approaches to Stop Hypertension) is a dietary pattern promoted by the [place].S.-based National Heart, Lung, and Blood [place] [part of the National Institutes of Health ("NIH"), an agency of the [place] Department of Health and Human Services] to prevent and control hypertension. The DASH diet is rich in fruits, vegetables, whole grains, and low-fat dairy foods; includes meat, fish, poultry, nuts, and beans; and is limited in sugar-sweetened foods and beverages, red meat, and added fats. In addition to its effect on blood pressure, it is designed to be a well-balanced approach to eating for the general public. DASH is recommended by the [place] Department of Agriculture ("USDA") as one of its ideal eating plans for all Americans.  Secondary Diagnosis:	End stage renal disease  Goal:	-maintenance  Instructions for follow-up, activity and diet:	-continue hemodialysis Monday- Wednesday- Friday ( 3 times per week)  -keep good control of blood pressure and blood sugar  -follow up regular appointment with nephrology  Secondary Diagnosis:	Clostridium difficile colitis  Goal:	resolved  Instructions for follow-up, activity and diet:	- Please continue Vancomycin as prescribed fro the next 2 weeks while still on antibiotics  - use probiotics in case of any treatment with antibiotics at home  -eat yogurt   -wash your hands frequently  -seek medical attentions if you have diarrheas and/or abdominal pain during treatment with antibiotics

## 2017-08-05 NOTE — DISCHARGE NOTE ADULT - CARE PROVIDER_API CALL
Abhijeet Farmer), Vascular Surgery  [address]  [address] [place] [address]  Phone: [phone]  Fax: [phone]    Timi Barlow), Internal Medicine; Nephrology  [address] [place] [address]  Phone: [phone]  Fax: [phone]

## 2017-08-05 NOTE — CONSULT NOTE ADULT - SUBJECTIVE AND OBJECTIVE BOX
GENERAL SURGERY CONSULT NOTE    FROM:   FOR:   RFC:    HPI:  36 y/o female with ESRD diagnosed in 1/17 s/p left UE AVF on hemodialysis on M-W-F, right chest wall permacath, HTN, DM tx w/ insulin, obesity, HCAP a month ago, comes into the hospital due to  shortness of breath since 2 -3 days ago. The pt has shortness of breast on her base line but over the past 3 days her shortness of breath has increase and it's accompanied by dry cough, she complain of chill but no fever. At her base line  sometimes use O2 at home by nasal canula No sick contacts no recent travel . The pt was recently discharge from [facility] for HCAP and she got 7 days of Cefepime and she also completed a 7 days of Levaquin outpatient. Pt denies orthopnea and paroxymal dyspnea  but she said that never sleep flat because she "she doesnt feel right when she lies flat". She has swelling in both leg but now are the same and not different from her base line. Pt denies chest pain,  denies hx of clots .   Pt on the previous admission had left pleural effusion and had a chest tube place to drained her fluid,her shortness of breast resolved after got her fluid removed, she denies to miss any of her dialysis appointments .   Pt denies chest pain, no palpitations.  Pt also complain of several nonbloody diarrhea since today watery, she states uncountable,    she denies nausea of vomiting, no abdominal pain , not hx of Cdiff infection. (01 Aug 2017 17:54)    Pt seen and examined. Pt has no complaints being treated for C Diff. Has recurrent Bacteremia, possibly due to infected permacath. Pt denies F/C/N/V/CP/SOB. Pt had permacath placed in January by Dr. Molina. In june pt had an episode of bacteriemia and was treated.       CURRENT MEDICAL PROBLEMS:  Bacteremia due to Gram-negative bacteria  Clostridium difficile colitis  Preventive measure  Diabetes  Hypertension  End stage renal disease  Left leg pain  Symptomatic anemia  Diarrhea  HCAP (healthcare-associated pneumonia)      PAST MEDICAL HISTORY:  End stage renal disease  Hypertension  Diabetes      SURGICAL HISTORY:  AVF (arteriovenous fistula)  Vascular dialysis catheter in place  No significant past surgical history      REVIEW OF SYSTEMS:    CONSTITUTIONAL: No fever, weight loss, or fatigue  ENMT:  No difficulty hearing, tinnitus, vertigo; No sinus or throat pain  NECK: No pain or stiffness  BREASTS: No pain, masses, or nipple discharge  RESPIRATORY: No cough, wheezing, chills or hemoptysis; No shortness of breath  CARDIOVASCULAR: No chest pain, palpitations, dizziness, or leg swelling  GASTROINTESTINAL: No abdominal or epigastric pain. No nausea, vomiting, or hematemesis; No diarrhea or constipation. No melena or hematochezia.  GENITOURINARY: No dysuria, frequency, hematuria, or incontinence  NEUROLOGICAL: No headaches, memory loss, loss of strength, numbness, or tremors  SKIN: No itching, burning, rashes, or lesions   MUSCULOSKELETAL: No joint pain or swelling; No muscle, back, or extremity pain  ALLERGY AND IMMUNOLOGIC: No hives or eczema      MEDICATIONS  (STANDING):  aspirin enteric coated 81 milliGRAM(s) Oral daily  sevelamer hydrochloride 400 milliGRAM(s) Oral three times a day with meals  pantoprazole    Tablet 40 milliGRAM(s) Oral before breakfast  calcitriol   Capsule 0.5 MICROGram(s) Oral daily  ergocalciferol 31600 Unit(s) Oral every week  folic acid 1 milliGRAM(s) Oral daily  insulin lispro (HumaLOG) corrective regimen sliding scale   SubCutaneous three times a day before meals  dextrose 5%. 1000 milliLiter(s) (50 mL/Hr) IV Continuous <Continuous>  dextrose 50% Injectable 12.5 Gram(s) IV Push once  dextrose 50% Injectable 25 Gram(s) IV Push once  dextrose 50% Injectable 25 Gram(s) IV Push once  heparin  Injectable 5000 Unit(s) SubCutaneous every 12 hours  ALBUTerol/ipratropium for Nebulization 3 milliLiter(s) Nebulizer every 6 hours  calcium acetate 667 milliGRAM(s) Oral three times a day with meals  epoetin raina Injectable 75468 Unit(s) IV Push <User Schedule>  vancomycin    Solution 125 milliGRAM(s) Oral every 6 hours  cefTRIAXone   IVPB 2 Gram(s) IV Intermittent every 24 hours  sodium chloride 0.65% Nasal 1 Spray(s) Both Nostrils three times a day  hydrALAZINE 25 milliGRAM(s) Oral every 8 hours  amLODIPine   Tablet 10 milliGRAM(s) Oral daily  predniSONE   Tablet 40 milliGRAM(s) Oral daily    MEDICATIONS  (PRN):  dextrose Gel 1 Dose(s) Oral once PRN Blood Glucose LESS THAN 70 milliGRAM(s)/deciliter  glucagon  Injectable 1 milliGRAM(s) IntraMuscular once PRN Glucose LESS THAN 70 milligrams/deciliter  ALBUTerol    0.083%. 2.5 milliGRAM(s) Nebulizer once PRN sputum induction  hydrALAZINE Injectable 10 milliGRAM(s) IV Push once PRN hold if heart rate<60  cyclobenzaprine 5 milliGRAM(s) Oral three times a day PRN Muscle Spasm      Allergies    Mushrooms (Hives (Mild))  No Known Drug Allergies    Intolerances        SOCIAL HISTORY:    FAMILY HISTORY:      Vital Signs Last 24 Hrs  T(C): 36.9 (05 Aug 2017 17:40), Max: 36.9 (05 Aug 2017 17:40)  T(F): 98.5 (05 Aug 2017 17:40), Max: 98.5 (05 Aug 2017 17:40)  HR: 92 (05 Aug 2017 17:40) (92 - 99)  BP: 161/86 (05 Aug 2017 17:40) (154/- - 185/92)  BP(mean): --  RR: 20 (05 Aug 2017 17:40) (18 - 20)  SpO2: 95% (05 Aug 2017 17:40) (93% - 99%)    PHYSICAL EXAM:    GENERAL: NAD, well-groomed, well-developed  HEAD:  Atraumatic, Normocephalic  EYES: EOMI, PERRLA, conjunctiva and sclera clear  ENMT: No tonsillar erythema, exudates, or enlargement; Moist mucous membranes, Good dentition, No lesions  NECK: Supple, No JVD, Normal thyroid  NERVOUS SYSTEM:  Alert & Oriented X3, Good concentration; Motor Strength 5/5 B/L upper and lower extremities; DTRs 2+ intact and symmetric  CHEST/LUNG: Permcath in place. No erythema, No discharge. Clear to percussion bilaterally; No rales, rhonchi, wheezing, or rubs  HEART: Regular rate and rhythm; No murmurs, rubs, or gallops  ABDOMEN: Soft, Nontender, Nondistended; Bowel sounds present  EXTREMITIES:  2+ Peripheral Pulses, No clubbing, cyanosis, or edema  LYMPH: No lymphadenopathy noted  SKIN: No rashes or lesions    LABS:                        7.7    8.0   )-----------( 288      ( 05 Aug 2017 06:56 )             24.3     08-05    133<L>  |  93<L>  |  44.0<H>  ----------------------------<  121<H>  3.9   |  24.0  |  6.02<H>    Ca    8.5<L>      05 Aug 2017 06:56                RADIOLOGY & ADDITIONAL STUDIES:

## 2017-08-05 NOTE — CONSULT NOTE ADULT - ASSESSMENT
38 y/o F with Recurrent bacteremia with possibly due to infected permacath. To go to dialysis today  - Cont care per primary team  - To undergo dialysis today   - Permcath to be removed at bedside.   - Plan for Replacement of permacath

## 2017-08-05 NOTE — DISCHARGE NOTE ADULT - PLAN OF CARE
-resolved -seek medical attention if symptoms such as fever, chills appear A1c less than 7% Healthy carbohydrates. During digestion, sugars (simple carbohydrates) and starches (complex carbohydrates) break down into blood glucose. Focus on the healthiest carbohydrates, such as fruits, vegetables, whole grains, legumes (beans, peas and lentils) and low-fat dairy products.  •Fiber-rich foods. Dietary fiber includes all parts of plant foods that your body can't digest or absorb. Fiber moderates how your body digests and helps control blood sugar levels. Foods high in fiber include vegetables, fruits, nuts, legumes (beans, peas and lentils), whole-wheat flour and wheat bran. blood pressure of 140/90 mmhg continue treatment with labetalol   continue treatment with amlodipine  diet low in soudium - continue hemodialysis Monday- Wednesday- Friday   keep good control of blood pressure and blood sugar  follow up regular appointment with nephrology resolve -use probiotics in case of any treatment with antibiotics at home  -eat yogurt   -wash your hands frequently  -seek medical attentions if you have diarrheas -continue treatment with Humalog insulin by sliding scale  Healthy carbohydrates. During digestion, sugars (simple carbohydrates) and starches (complex carbohydrates) break down into blood glucose. Focus on the healthiest carbohydrates, such as fruits, vegetables, whole grains, legumes (beans, peas and lentils) and low-fat dairy products.  •Fiber-rich foods. Dietary fiber includes all parts of plant foods that your body can't digest or absorb. Fiber moderates how your body digests and helps control blood sugar levels. Foods high in fiber include vegetables, fruits, nuts, legumes (beans, peas and lentils), whole-wheat flour and wheat bran. continue treatment with labetalol   continue treatment with amlodipine  continue treatment with hydralazine   diet low in sodium  Follow the DASH Diet: Dietary Approaches to Stop Hypertension) is a dietary pattern promoted by the [place].-based National Heart, Lung, and Blood [place] [part of the National Institutes of Health ("[facility]"), an agency of the [place] Department of Health and Human Services] to prevent and control hypertension. The DASH diet is rich in fruits, vegetables, whole grains, and low-fat dairy foods; includes meat, fish, poultry, nuts, and beans; and is limited in sugar-sweetened foods and beverages, red meat, and added fats. In addition to its effect on blood pressure, it is designed to be a well-balanced approach to eating for the general public. DASH is recommended by the [place] Department of Agriculture ("USDA") as one of its ideal eating plans for all Americans. -continue hemodialysis Monday- Wednesday- Friday ( 3 times per week)  -keep good control of blood pressure and blood sugar  -follow up regular appointment with nephrology -use probiotics in case of any treatment with antibiotics at home  -eat yogurt   -wash your hands frequently  -seek medical attentions if you have diarrheas and/or abdominal pain during treatment with antibiotics -seek medical attention if symptoms such as fever, chills appear  - Please continue Gentamycin on Dialysis days for the next 2 weeks  - Please continue Vancomycin as prescribed fro the next 2 weeks while still on antibiotics blood pressure of 140/90 resolved -continue treatment with home Humalog insulin by sliding scale  Healthy carbohydrates. During digestion, sugars (simple carbohydrates) and starches (complex carbohydrates) break down into blood glucose. Focus on the healthiest carbohydrates, such as fruits, vegetables, whole grains, legumes (beans, peas and lentils) and low-fat dairy products.  •Fiber-rich foods. Dietary fiber includes all parts of plant foods that your body can't digest or absorb. Fiber moderates how your body digests and helps control blood sugar levels. Foods high in fiber include vegetables, fruits, nuts, legumes (beans, peas and lentils), whole-wheat flour and wheat bran. continue treatment with Verapamil and hydralazine as prescribed   diet low in sodium  Follow the DASH Diet: Dietary Approaches to Stop Hypertension) is a dietary pattern promoted by the [place].-based National Heart, Lung, and Blood [place] [part of the National Institutes of Health ("[facility] an agency of the [place] Department of Health and Human Services] to prevent and control hypertension. The DASH diet is rich in fruits, vegetables, whole grains, and low-fat dairy foods; includes meat, fish, poultry, nuts, and beans; and is limited in sugar-sweetened foods and beverages, red meat, and added fats. In addition to its effect on blood pressure, it is designed to be a well-balanced approach to eating for the general public. DASH is recommended by the [place] Department of Agriculture ("USDA") as one of its ideal eating plans for all Americans. - Please continue Vancomycin as prescribed fro the next 2 weeks while still on antibiotics  - use probiotics in case of any treatment with antibiotics at home  -eat yogurt   -wash your hands frequently  -seek medical attentions if you have diarrheas and/or abdominal pain during treatment with antibiotics -maintenance

## 2017-08-05 NOTE — DISCHARGE NOTE ADULT - NS AS ACTIVITY OBS
Sex allowed/Bathing allowed/Stairs allowed/Walking-Outdoors allowed/Driving allowed/Showering allowed/Walking-Indoors allowed Stairs allowed/Bathing allowed/Walking-Outdoors allowed/Sex allowed/Walking-Indoors allowed/Driving allowed/Showering allowed/Return to Work/School allowed

## 2017-08-05 NOTE — PROGRESS NOTE ADULT - SUBJECTIVE AND OBJECTIVE BOX
NEPHROLOGY INTERVAL HPI/OVERNIGHT EVENTS:    Examined earlier  Looks comfortable    MEDICATIONS  (STANDING):  aspirin enteric coated 81 milliGRAM(s) Oral daily  sevelamer hydrochloride 400 milliGRAM(s) Oral three times a day with meals  pantoprazole    Tablet 40 milliGRAM(s) Oral before breakfast  calcitriol   Capsule 0.5 MICROGram(s) Oral daily  ergocalciferol 75902 Unit(s) Oral every week  folic acid 1 milliGRAM(s) Oral daily  insulin lispro (HumaLOG) corrective regimen sliding scale   SubCutaneous three times a day before meals  dextrose 5%. 1000 milliLiter(s) (50 mL/Hr) IV Continuous <Continuous>  dextrose 50% Injectable 12.5 Gram(s) IV Push once  dextrose 50% Injectable 25 Gram(s) IV Push once  dextrose 50% Injectable 25 Gram(s) IV Push once  heparin  Injectable 5000 Unit(s) SubCutaneous every 12 hours  ALBUTerol/ipratropium for Nebulization 3 milliLiter(s) Nebulizer every 6 hours  calcium acetate 667 milliGRAM(s) Oral three times a day with meals  epoetin raina Injectable 33135 Unit(s) IV Push <User Schedule>  vancomycin    Solution 125 milliGRAM(s) Oral every 6 hours  cefTRIAXone   IVPB 2 Gram(s) IV Intermittent every 24 hours  sodium chloride 0.65% Nasal 1 Spray(s) Both Nostrils three times a day    MEDICATIONS  (PRN):  dextrose Gel 1 Dose(s) Oral once PRN Blood Glucose LESS THAN 70 milliGRAM(s)/deciliter  glucagon  Injectable 1 milliGRAM(s) IntraMuscular once PRN Glucose LESS THAN 70 milligrams/deciliter  hydrALAZINE 25 milliGRAM(s) Oral every 8 hours PRN give if sBP > 160 mmHg  amLODIPine   Tablet 10 milliGRAM(s) Oral daily PRN give if sBP above 160 mmHG systolic  ALBUTerol    0.083%. 2.5 milliGRAM(s) Nebulizer once PRN sputum induction  labetalol 100 milliGRAM(s) Oral three times a day PRN give if systolic blood pressure is above 160 mmHg systolic  hydrALAZINE Injectable 10 milliGRAM(s) IV Push once PRN hold if heart rate<60      Allergies    Mushrooms (Hives (Mild))  No Known Drug Allergies    Intolerances        Vital Signs Last 24 Hrs  T(C): 36.2 (05 Aug 2017 00:05), Max: 36.6 (04 Aug 2017 15:23)  T(F): 97.2 (05 Aug 2017 00:05), Max: 97.8 (04 Aug 2017 15:23)  HR: 95 (05 Aug 2017 00:05) (90 - 95)  BP: 171/86 (05 Aug 2017 00:05) (158/90 - 171/86)  BP(mean): --  RR: 18 (05 Aug 2017 00:05) (18 - 20)  SpO2: 99% (05 Aug 2017 00:05) (91% - 99%)  Daily     Daily     PHYSICAL EXAM:  GEN: NAD  HEENT: normocephalic and atraumatic  NECK: Supple.  LUNGS: B/L wheezing  HEART: Regular rate and rhythm   ABDOMEN: Soft, nontender, and nondistended.  Positive bowel sounds.    EXTREMITIES: With B/L edema.          LABS:                        7.7    8.0   )-----------( 288      ( 05 Aug 2017 06:56 )             24.3     08-05    133<L>  |  93<L>  |  44.0<H>  ----------------------------<  121<H>  3.9   |  24.0  |  6.02<H>    Ca    8.5<L>      05 Aug 2017 06:56                  RADIOLOGY & ADDITIONAL TESTS:

## 2017-08-05 NOTE — PROGRESS NOTE ADULT - PROBLEM SELECTOR PLAN 2
RESOLVED on last admission. NO RECURRENCE   O2 by a nasal canula to keep O2 between 90-93 %.   ct chest done, shows improvement from previous CT  BCx positive for serratia marcencens  CPOximetry   Duoneb q6   no fever over night  Rocephin 2gm daily (day # 2) cdiff +,  on vanco oral q6 by ID  (day #3)  oral hydration  no diarrhea over past 48 hr  contact isolation can be taken off

## 2017-08-05 NOTE — PROGRESS NOTE ADULT - PROBLEM SELECTOR PLAN 3
cdiff +,  on vanco oral q6 by ID  (day #3)  oral hydration  no diarrhea over past 48 hr  contact isolation can be taken off LIkely 2/2 aocd. Normocytic. 2/2 ESRD   1 unit of packed RBC was transfused  on July 3    Hb today =7.6  c/w epogen   anemia w/up noted   follow of Hb w/ cbc daily

## 2017-08-05 NOTE — PROGRESS NOTE ADULT - SUBJECTIVE AND OBJECTIVE BOX
CC: Diarrhea and shortness of breath    Patient seen and examined at bedside.    No acute events overnight.     Denies fever, chest pain, abdominal pain, constipation, calf pain, palpitations, headache, n/v    Cardiac monitor reviewed: Desat to 81%, an episodes of 4 beat ventricular tachycardia    ROS  General- denies any fever/chills  Resp-  sob and cough resolving  GI- denies any abodminal pain or diarrhea  - denies any dysuria/hematuria  Extremities- denies any calf pain or swellng;        Vital Signs Last 24 Hrs  T(C): 36.2 (05 Aug 2017 00:05), Max: 36.6 (04 Aug 2017 15:23)  T(F): 97.2 (05 Aug 2017 00:05), Max: 97.8 (04 Aug 2017 15:23)  HR: 95 (05 Aug 2017 00:05) (87 - 95)  BP: 171/86 (05 Aug 2017 00:05) (147/80 - 171/86)   Hypertensive  RR: 18 (05 Aug 2017 00:05) (18 - 20)  SpO2: 99% (05 Aug 2017 00:05) (91% - 99%      PE  GENERAL: NAD, well-groomed, well-developed  HEAD:  Atraumatic, Normocephalic  EYES: EOMI, PERRLA, conjunctiva and sclera clear  ENMT:  Moist mucous membranes, Good dentition, No lesions  NECK: Supple, No JVD, Normal thyroid. right chest HD catheter in place  LUNG: inspiratory and expiratory wheezing bilaterally; bilateral  rales,  HEART: Regular rate and rhythm; No murmurs, rubs, or gallops  ABDOMEN: Soft, Nontender, Nondistended; Bowel sounds present  EXTREMITIES:  2+ Peripheral Pulses, No clubbing, cyanosis, or edema, LUE AVF placement + thrill palable  LYMPH: No lymphadenopathy noted  NERVOUS SYSTEM:  Alert & Oriented X3, Good concentration; Motor Strength 5/5 B/L upper and lower extremities  SKIN: No rashes or lesions , no hematomas, capillary refills less than 2 seconds    Lab Results:    CBC Full  -  ( 04 Aug 2017 06:09 )  WBC Count : 8.0 K/uL  Hemoglobin : 7.6 g/dL   Anemia  Hematocrit : 23.7 %  Platelet Count - Automated : 284 K/uL  Mean Cell Volume : 80.9 fl   Normocytic  Mean Cell Hemoglobin : 25.9 pg  Mean Cell Hemoglobin Concentration : 32.1 g/dL               7.6    8.0   )-----------( 284      ( 04 Aug 2017 06:09 )             23.7     08-04    135  |  94<L>  |  25.0<H>  ----------------------------<  86  4.0   |  25.0  |  3.98<H>    Ca    8.4<L>      04 Aug 2017 06:09                  MICROBIOLOGY/CULTURES:      RADIOLOGY RESULTS:      Blood cx: serratia m.              RADIOLOGY:   < from: CT Chest No Cont (08.01.17 @ 22:46) >  LUNGS AND LARGE AIRWAYS: Decreased bilateral patchy airspace opacities,   probably pneumonia.  PLEURA: No pleural effusion.  VESSELS: Right-sided central venous catheter with tip terminating in the   superior cavoatrial junction.   HEART: Heart size is normal. No pericardial effusion.  MEDIASTINUM AND SHANITA: Persistent mediastinal and hilar adenopathy.  CHEST WALL AND LOWER NECK: Within normal limits.  VISUALIZED UPPER ABDOMEN: Trace perihepatic ascites.  BONES: Within normal limits.    IMPRESSION:   1.  Since June 13, 2017, decreased bilateral patchy airspace opacities,   probably pneumonia.  2.  Persistent mediastinal and hilar adenopathy.     < end of copied text >      MEDICATIONS  (STANDING):  aspirin enteric coated 81 milliGRAM(s) Oral daily  sevelamer hydrochloride 400 milliGRAM(s) Oral three times a day with meals  pantoprazole    Tablet 40 milliGRAM(s) Oral before breakfast  calcitriol   Capsule 0.5 MICROGram(s) Oral daily  ergocalciferol 49129 Unit(s) Oral every week  folic acid 1 milliGRAM(s) Oral daily  insulin lispro (HumaLOG) corrective regimen sliding scale   SubCutaneous three times a day before meals  dextrose 5%. 1000 milliLiter(s) (50 mL/Hr) IV Continuous <Continuous>  dextrose 50% Injectable 12.5 Gram(s) IV Push once  dextrose 50% Injectable 25 Gram(s) IV Push once  dextrose 50% Injectable 25 Gram(s) IV Push once  heparin  Injectable 5000 Unit(s) SubCutaneous every 12 hours  ALBUTerol/ipratropium for Nebulization 3 milliLiter(s) Nebulizer every 6 hours  calcium acetate 667 milliGRAM(s) Oral three times a day with meals  epoetin raina Injectable 10384 Unit(s) IV Push <User Schedule>  vancomycin    Solution 125 milliGRAM(s) Oral every 6 hours  cefTRIAXone   IVPB 2 Gram(s) IV Intermittent every 24 hours    MEDICATIONS  (PRN):  dextrose Gel 1 Dose(s) Oral once PRN Blood Glucose LESS THAN 70 milliGRAM(s)/deciliter  glucagon  Injectable 1 milliGRAM(s) IntraMuscular once PRN Glucose LESS THAN 70 milligrams/deciliter  labetalol 200 milliGRAM(s) Oral three times a day PRN give if systolic blood pressure is above 160 mmHg systolic  hydrALAZINE 25 milliGRAM(s) Oral every 8 hours PRN give if sBP > 160 mmHg  amLODIPine   Tablet 10 milliGRAM(s) Oral daily PRN give if sBP above 160 mmHG systolic  ALBUTerol    0.083%. 2.5 milliGRAM(s) Nebulizer once PRN sputum induction CC: Diarrhea and shortness of breath    Patient seen and examined at bedside.  No acute events overnight. Last night she states that she had some nasal bleeding, abated with humidified O2 and this am, she states that she felt SOB from that O2. She was wheezing on exam throughout all lung fields and is currently planning to "wait it out" until she feels better. We offerred steroids and an xray - she will try another neb tx first.     Denies fever, chest pain, abdominal pain, constipation, calf pain, palpitations, headache, n/v.    Cardiac monitor reviewed: Desat to 81%    ROS  General- denies any fever/chills  Resp-  sob and cough resolving  GI- denies any abodminal pain or diarrhea  - denies any dysuria/hematuria  Extremities- denies any calf pain or swellng;        Vital Signs Last 24 Hrs  T(C): 36.2 (05 Aug 2017 00:05), Max: 36.6 (04 Aug 2017 15:23)  T(F): 97.2 (05 Aug 2017 00:05), Max: 97.8 (04 Aug 2017 15:23)  HR: 95 (05 Aug 2017 00:05) (87 - 95)  BP: 171/86 (05 Aug 2017 00:05) (147/80 - 171/86)   Hypertensive  RR: 18 (05 Aug 2017 00:05) (18 - 20)  SpO2: 99% (05 Aug 2017 00:05) (91% - 99%  c/s 190      PE  GENERAL: NAD, well-groomed, well-developed  HEAD:  Atraumatic, Normocephalic  EYES: EOMI, PERRLA, conjunctiva and sclera clear  ENMT:  Moist mucous membranes, Good dentition, No lesions  NECK: Supple, No JVD, Normal thyroid. right chest HD catheter in place  LUNG: inspiratory and expiratory wheezing bilaterally; bilateral  rales,  HEART: Regular rate and rhythm; No murmurs, rubs, or gallops  ABDOMEN: Soft, Nontender, Nondistended; Bowel sounds present  EXTREMITIES:  2+ Peripheral Pulses, No clubbing, cyanosis, or edema, LUE AVF placement + thrill palable  LYMPH: No lymphadenopathy noted  NERVOUS SYSTEM:  Alert & Oriented X3, Good concentration; Motor Strength 5/5 B/L upper and lower extremities  SKIN: No rashes or lesions , no hematomas, capillary refills less than 2 seconds    Lab Results:    CBC Full  -  ( 04 Aug 2017 06:09 )  WBC Count : 8.0 K/uL  Hemoglobin : 7.6 g/dL   Anemia  Hematocrit : 23.7 %  Platelet Count - Automated : 284 K/uL  Mean Cell Volume : 80.9 fl   Normocytic  Mean Cell Hemoglobin : 25.9 pg  Mean Cell Hemoglobin Concentration : 32.1 g/dL               7.6    8.0   )-----------( 284      ( 04 Aug 2017 06:09 )             23.7     08-04    135  |  94<L>  |  25.0<H>  ----------------------------<  86  4.0   |  25.0  |  3.98<H>    Ca    8.4<L>      04 Aug 2017 06:09    MICROBIOLOGY/CULTURES:  Blood cx: serratia m. Repeat blood cx in lab     RADIOLOGY RESULTS:    RADIOLOGY:   < from: CT Chest No Cont (08.01.17 @ 22:46) >  LUNGS AND LARGE AIRWAYS: Decreased bilateral patchy airspace opacities,   probably pneumonia.  PLEURA: No pleural effusion.  VESSELS: Right-sided central venous catheter with tip terminating in the   superior cavoatrial junction.   HEART: Heart size is normal. No pericardial effusion.  MEDIASTINUM AND SHANITA: Persistent mediastinal and hilar adenopathy.  CHEST WALL AND LOWER NECK: Within normal limits.  VISUALIZED UPPER ABDOMEN: Trace perihepatic ascites.  BONES: Within normal limits.    IMPRESSION:   1.  Since June 13, 2017, decreased bilateral patchy airspace opacities,   probably pneumonia.  2.  Persistent mediastinal and hilar adenopathy.     < end of copied text >      MEDICATIONS  (STANDING):  aspirin enteric coated 81 milliGRAM(s) Oral daily  sevelamer hydrochloride 400 milliGRAM(s) Oral three times a day with meals  pantoprazole    Tablet 40 milliGRAM(s) Oral before breakfast  calcitriol   Capsule 0.5 MICROGram(s) Oral daily  ergocalciferol 98735 Unit(s) Oral every week  folic acid 1 milliGRAM(s) Oral daily  insulin lispro (HumaLOG) corrective regimen sliding scale   SubCutaneous three times a day before meals  dextrose 5%. 1000 milliLiter(s) (50 mL/Hr) IV Continuous <Continuous>  dextrose 50% Injectable 12.5 Gram(s) IV Push once  dextrose 50% Injectable 25 Gram(s) IV Push once  dextrose 50% Injectable 25 Gram(s) IV Push once  heparin  Injectable 5000 Unit(s) SubCutaneous every 12 hours  ALBUTerol/ipratropium for Nebulization 3 milliLiter(s) Nebulizer every 6 hours  calcium acetate 667 milliGRAM(s) Oral three times a day with meals  epoetin raina Injectable 03941 Unit(s) IV Push <User Schedule>  vancomycin    Solution 125 milliGRAM(s) Oral every 6 hours  cefTRIAXone   IVPB 2 Gram(s) IV Intermittent every 24 hours    MEDICATIONS  (PRN):  dextrose Gel 1 Dose(s) Oral once PRN Blood Glucose LESS THAN 70 milliGRAM(s)/deciliter  glucagon  Injectable 1 milliGRAM(s) IntraMuscular once PRN Glucose LESS THAN 70 milligrams/deciliter  labetalol 200 milliGRAM(s) Oral three times a day PRN give if systolic blood pressure is above 160 mmHg systolic  hydrALAZINE 25 milliGRAM(s) Oral every 8 hours PRN give if sBP > 160 mmHg  amLODIPine   Tablet 10 milliGRAM(s) Oral daily PRN give if sBP above 160 mmHG systolic  ALBUTerol    0.083%. 2.5 milliGRAM(s) Nebulizer once PRN sputum induction

## 2017-08-05 NOTE — DISCHARGE NOTE ADULT - CARE PROVIDERS DIRECT ADDRESSES
[email or web address]

## 2017-08-05 NOTE — PROGRESS NOTE ADULT - PROBLEM SELECTOR PLAN 1
Blood cultures with Serratia marcescens  repeat blood cultures pending  Continue Ceftriaxone 2gm IV q 24 hours  Source could be HD cath since same bacteremia in June 2017 which was treated with salvage of the permacath  Needs Vascular surgery consult ro remove Line

## 2017-08-05 NOTE — PROGRESS NOTE ADULT - PROBLEM SELECTOR PLAN 5
pt has an area over her left lateral calf painful to palpation, Wells score for DVT =1   Doppler US was negative for DVT  xray left leg to be done yesterday shows No radiographic osseous pathology. RESOLVED on last admission. NO RECURRENCE   O2 by a nasal canula to keep O2 between 90-93 %.   ct chest done, shows improvement from previous CT

## 2017-08-05 NOTE — DISCHARGE NOTE ADULT - INSTRUCTIONS
Healthy carbohydrates. During digestion, sugars (simple carbohydrates) and starches (complex carbohydrates) break down into blood glucose. Focus on the healthiest carbohydrates, such as fruits, vegetables, whole grains, legumes (beans, peas and lentils) and low-fat dairy products.  •Fiber-rich foods. Dietary fiber includes all parts of plant foods that your body can't digest or absorb. Fiber moderates how your body digests and helps control blood sugar levels. Foods high in fiber include vegetables, fruits, nuts, legumes (beans, peas and lentils), whole-wheat flour and wheat bran.

## 2017-08-05 NOTE — DISCHARGE NOTE ADULT - ADDITIONAL INSTRUCTIONS
- Please f/u with Dr. Farmer, Vascular surgery in 1-2 weeks  - Please f/u with your PMD in 1-2 weeks  - Please keep your Dialysis appointments Pt went for AV fistulogram this AM, AVF is not functioning properly. As per attending, please use permacath for dialysis. Dispo as per primary team, please follow-up with Dr. Farmer outpatient next week.  - Please f/u with Dr. Farmer, Vascular surgery in 1 weeks  - Please f/u with your PMD in 1-2 weeks  - Please keep your Dialysis appointments

## 2017-08-05 NOTE — PROGRESS NOTE ADULT - PROBLEM SELECTOR PLAN 4
LIkely 2/2 aocd. Normocytic. 2/2 ESRD   1 unit of packed RBC was transfused  on July 3    Hb today =7.6  c/w epogen   anemia w/up noted   follow of Hb w/ cbc daily xray and US duplex negative   Likely muscle cramping.   Will try some flexeril

## 2017-08-05 NOTE — PROGRESS NOTE ADULT - PROBLEM SELECTOR PLAN 6
hemodialysis as schedule   epoetin injection once a week  continue sevelamer  cincalcet  continue calcium acetated  AV fistula present but deep, d/w Vascular NP - needs superficialization prior to use   permacth in place with no erythema  recent Cr= 5.47 hemodialysis as schedule   epoetin injection once a week  continue sevelamer  cincalcet, continue calcium acetated  AV fistula present but deep, d/w Vascular NP - needs superficialization prior to use   permacth in place with no erythema

## 2017-08-05 NOTE — DISCHARGE NOTE ADULT - MEDICATION SUMMARY - MEDICATIONS TO TAKE
I will START or STAY ON the medications listed below when I get home from the hospital:    gentamicin 100 mg/100 mL-0.9% intravenous solution  -- 100 milligram(s) intravenous 2 times a week please give at dialysis wed and friday  -- Indication: For infection    aspirin 81 mg oral delayed release tablet  -- 1 tab(s) by mouth once a day  -- Indication: For Coronary Artery Dz    verapamil 80 mg oral tablet  -- 1 tab(s) by mouth every 8 hours  -- Indication: For Hypertension    loratadine 10 mg oral tablet  -- 1 tab(s) by mouth once a day  -- Indication: For Allergies    albuterol 0.63 mg/3 mL (0.021%) inhalation solution  -- 3 milliliter(s) inhaled prn  -- For inhalation only.  It is very important that you take or use this exactly as directed.  Do not skip doses or discontinue unless directed by your doctor.  Obtain medical advice before taking any non-prescription drugs as some may affect the action of this medication.    -- Indication: For Copd    albuterol-ipratropium 2.5 mg-0.5 mg/3 mL inhalation solution  -- 3 milliliter(s) inhaled every 6 hours  -- Indication: For Copd    epoetin raina  -- 29881 unit(s) intravenous 3 times a week  as per renal  -- Indication: For Anemia    vancomycin 250 mg/5 mL oral solution  -- 2.5 milliliter(s) by mouth every 6 hours MDD:End on Friday 8/25/17,  take every day until 8/25/17;  -- Indication: For infection    sodium chloride 0.65% nasal spray  -- 1 spray(s) into nose every 8 hours, As Needed  -- Indication: For nasal hydration    sevelamer hydrochloride 800 mg oral tablet  -- 2 tab(s) by mouth 3 times a day (with meals)  -- Indication: For Lower phosphate levels    calcium acetate 667 mg oral capsule  -- 1 cap(s) by mouth 3 times a day with meals  -- Indication: For Supplement    pantoprazole 40 mg oral delayed release tablet  -- 1 tab(s) by mouth once a day (before a meal)  -- Indication: For Acid reflux    hydrALAZINE 25 mg oral tablet  -- 1 tab(s) by mouth every 8 hours  -- Indication: For HTN    ergocalciferol 50,000 intl units (1.25 mg) oral capsule  -- 1 cap(s) by mouth once a week  -- Indication: For nutritio    calcitriol 0.5 mcg oral capsule  -- 1 cap(s) by mouth once a day  -- Indication: For nutrition    folic acid 1 mg oral tablet  -- 1 tab(s) by mouth once a day  -- Indication: For nutrition I will START or STAY ON the medications listed below when I get home from the hospital:    gentamicin 100 mg/100 mL-0.9% intravenous solution  -- 100 milligram(s) intravenous 2 times a week please give at dialysis wed and friday  -- Indication: For infection    verapamil 80 mg oral tablet  -- 1 tab(s) by mouth every 8 hours  -- Indication: For Hypertension    loratadine 10 mg oral tablet  -- 1 tab(s) by mouth once a day  -- Indication: For Allergies    albuterol 0.63 mg/3 mL (0.021%) inhalation solution  -- 3 milliliter(s) inhaled prn  -- For inhalation only.  It is very important that you take or use this exactly as directed.  Do not skip doses or discontinue unless directed by your doctor.  Obtain medical advice before taking any non-prescription drugs as some may affect the action of this medication.    -- Indication: For Copd    albuterol-ipratropium 2.5 mg-0.5 mg/3 mL inhalation solution  -- 3 milliliter(s) inhaled every 6 hours  -- Indication: For Copd    epoetin raina  -- 35285 unit(s) intravenous 3 times a week  as per renal  -- Indication: For Anemia    vancomycin 250 mg/5 mL oral solution  -- 2.5 milliliter(s) by mouth every 6 hours MDD:End on Friday 8/25/17,  take every day until 8/25/17;  -- Indication: For infection    calcium acetate 667 mg oral capsule  -- 1 cap(s) by mouth 3 times a day with meals  -- Indication: For Supplement    sevelamer hydrochloride 800 mg oral tablet  -- 2 tab(s) by mouth 3 times a day (with meals)  -- Indication: For Lower phosphate levels    pantoprazole 40 mg oral delayed release tablet  -- 1 tab(s) by mouth once a day (before a meal)  -- Indication: For Acid reflux    hydrALAZINE 25 mg oral tablet  -- 1 tab(s) by mouth every 8 hours  -- Indication: For HTN    ergocalciferol 50,000 intl units (1.25 mg) oral capsule  -- 1 cap(s) by mouth once a week  -- Indication: For nutritio    folic acid 1 mg oral tablet  -- 1 tab(s) by mouth once a day  -- Indication: For nutrition I will START or STAY ON the medications listed below when I get home from the hospital:    gentamicin 100 mg/100 mL-0.9% intravenous solution  -- 100 milligram(s) intravenous 2 times a week please give at dialysis wed and friday  -- Indication: For infection    verapamil 80 mg oral tablet  -- 1 tab(s) by mouth every 8 hours  -- Indication: For Hypertension    HumaLOG KwikPen 100 units/mL subcutaneous solution  -- 3 milliliter(s) subcutaneous prn  -- Do not drink alcoholic beverages when taking this medication.  It is very important that you take or use this exactly as directed.  Do not skip doses or discontinue unless directed by your doctor.  Keep in refrigerator.  Do not freeze.    -- Indication: For Diabetes    loratadine 10 mg oral tablet  -- 1 tab(s) by mouth once a day  -- Indication: For Allergies    albuterol 0.63 mg/3 mL (0.021%) inhalation solution  -- 3 milliliter(s) inhaled prn  -- For inhalation only.  It is very important that you take or use this exactly as directed.  Do not skip doses or discontinue unless directed by your doctor.  Obtain medical advice before taking any non-prescription drugs as some may affect the action of this medication.    -- Indication: For Copd    albuterol-ipratropium 2.5 mg-0.5 mg/3 mL inhalation solution  -- 3 milliliter(s) inhaled every 6 hours  -- Indication: For Copd    epoetin raina  -- 64011 unit(s) intravenous 3 times a week  as per renal  -- Indication: For Anemia    vancomycin 250 mg/5 mL oral solution  -- 2.5 milliliter(s) by mouth every 6 hours MDD:End on Friday 8/25/17,  take every day until 8/25/17;  -- Indication: For infection    calcium acetate 667 mg oral capsule  -- 1 cap(s) by mouth 3 times a day with meals  -- Indication: For Supplement    sevelamer hydrochloride 800 mg oral tablet  -- 2 tab(s) by mouth 3 times a day (with meals)  -- Indication: For Lower phosphate levels    pantoprazole 40 mg oral delayed release tablet  -- 1 tab(s) by mouth once a day (before a meal)  -- Indication: For Acid reflux    hydrALAZINE 25 mg oral tablet  -- 1 tab(s) by mouth every 8 hours  -- Indication: For HTN    ergocalciferol 50,000 intl units (1.25 mg) oral capsule  -- 1 cap(s) by mouth once a week  -- Indication: For nutritio    folic acid 1 mg oral tablet  -- 1 tab(s) by mouth once a day  -- Indication: For nutrition

## 2017-08-05 NOTE — PROGRESS NOTE ADULT - PROBLEM SELECTOR PLAN 1
-Rocephin 2 gr x 24 hr (day #2)  - follow up w/BCx  - evaluate permacath as source of bacteriemia + for serratia mercences, previous BCs for 6/12 were positive for the same organism  -patient does not want to get PC out if it means that she will be getting a temp catheter in her groin. She cannot go for superficialization now given bacteremia - awaiting d/w Vascular/ID and patient -Rocephin 2 gr x 24 hr (day #3)  - follow up w/BCx  - evaluate permacath as source of bacteriemia + for serratia mercences, previous BCs for 6/12 were positive for the same organism  -patient does not want to get PC out if it means that she will be getting a temp catheter in her groin. She cannot go for superficialization now given bacteremia - awaiting d/w Vascular/ID and patient

## 2017-08-05 NOTE — PROGRESS NOTE ADULT - ASSESSMENT
Patient is a 36 y/o female with ESRD diagnosed in 1/17 s/p left UE AVF on hemodialysis on M-W-F, right chest wall permacath, HTN, DM, obesity, HCAP one month ago, comes into the hospital due to shortness of breath since 2 -3 days ago. CXR shows right middle lobe infiltrates , left side pleural effusion improved from previous admission; confirmed with ct chest. Patient was dialyzed yesterday.  Also with hemoglobin of 7.6 today, transfused for symptomatic anemia yesterday.    She is admitted for diarrhea, cdiff positive. Incidentally, found to have + blood cx with serratia; concern for now needing to get the PC out b/c this is a recurrence of bacteremia     HD 5 (8/5/2017)-  Nephrology consult note appreciated;  ESRD on HD (TTS schedule) Next HD Today;  ID f/u noted, jaydon has positive Blood Cx for Serratia marcescen, patient on Antibiotics, source could be HD cath;  Plan is to discuss with  vascular surgery about exchange permcath;  Patient is responding well to treatment of Cdiff colitis with PO vanco; Patient is a 38 y/o female with ESRD diagnosed in 1/17 s/p left UE AVF on hemodialysis on M-W-F, right chest wall permacath, HTN, DM, obesity, HCAP one month ago, comes into the hospital due to shortness of breath since 2 -3 days ago. CXR shows right middle lobe infiltrates , left side pleural effusion improved from previous admission; confirmed with ct chest. Patient was dialyzed yesterday.  Also with hemoglobin of 7.6 today, transfused for symptomatic anemia yesterday.    She is admitted for diarrhea, cdiff positive. Incidentally, found to have + blood cx with serratia; concern for now needing to get the PC out b/c this is a recurrence of bacteremia     HD 5 (8/5/2017)-  Nephrology consult note appreciated;  ESRD on HD (TTS schedule) Next HD Today;  ID f/u noted, jaydon has positive Blood Cx for Serratia marcescen, patient on Antibiotics, source could be HD cath;  Plan is to discuss with  vascular surgery about exchange permcath;  Patient is responding well to treatment of Cdiff colitis with PO vanco;    Antibotic Day 4, Patient on Vancomycin and Ceftriaxone; Patient is a 36 y/o female with ESRD diagnosed in 1/17 s/p left UE AVF on hemodialysis on M-W-F, right chest wall permacath, HTN, DM, obesity, HCAP one month ago, comes into the hospital due to shortness of breath since 2 -3 days ago. CXR shows right middle lobe infiltrates , left side pleural effusion improved from previous admission; confirmed with ct chest. Patient was dialyzed yesterday.  Also with hemoglobin of 7.6 today, transfused for symptomatic anemia yesterday.    She is admitted for diarrhea, cdiff positive. Incidentally, found to have + blood cx with serratia; concern for now needing to get the PC out b/c this is a recurrence of bacteremia     HD 5 (8/5/2017)-  Nephrology consult note appreciated;  ESRD on HD (TTS schedule) Next HD Today;  ID f/u noted, jaydon has positive Blood Cx for Serratia marcescen, patient on Antibiotics, source could be HD cath;  Plan is to discuss with  vascular surgery about exchange permcath;  Patient is responding well to treatment of Cdiff colitis with PO vanco;    Antibotic Day 4, Patient on Vancomycin and Ceftriaxone;  Patient has dryness of nasal mucosa overnight did not respond well to humidified air; subsequently ordered nasal mist spray today; Patient is a 36 y/o female with ESRD diagnosed in 1/17 s/p left UE AVF on hemodialysis on M-W-F, right chest wall permacath, HTN, DM, obesity, HCAP one month ago, comes into the hospital due to shortness of breath since 2 -3 days ago. CXR shows right middle lobe infiltrates , left side pleural effusion improved from previous admission; confirmed with ct chest. Patient was dialyzed yesterday.  Also with hemoglobin of 7.6 today, transfused for symptomatic anemia yesterday.She is admitted for diarrhea, cdiff positive. Incidentally, found to have + blood cx with serratia; concern for now needing to get the PC out b/c this is a recurrence of bacteremia     HD 5 (8/5/2017)-  Nephrology consult note appreciated;  ESRD on HD (TTS schedule) Next HD Today;  ID f/u noted, jaydon has positive Blood Cx for Serratia marcescen, patient on Antibiotics, source could be HD cath;  Plan is to discuss with  vascular surgery about exchange permcath;  Patient is responding well to treatment of Cdiff colitis with PO vanco;

## 2017-08-05 NOTE — DISCHARGE NOTE ADULT - PATIENT PORTAL LINK FT
“You can access the [facility] Patient Portal, offered by [facility], by registering with the following website: [email or web address]”

## 2017-08-05 NOTE — DISCHARGE NOTE ADULT - HOSPITAL COURSE
36 y/o female with ESRD diagnosed in 1/17 s/p left UE AVF on hemodialysis on M-W-F, right chest wall permacath, HTN, DM tx w/ insulin, obesity, HCAP a month ago, comes into the hospital due to  shortness of breath since 2 -3 days ago and severe diarrhea.  PT was admitted due to HCAP PNA and C.dff colitis, on her second day of hospital course she was evaluated by ID doctor who stop treatment with antibiotics due to improvement of HCAP pneumonia, she was teated w/ oral vancomycin fof her C.dff infection whict resolved with 4-5 days of treatment . Her urinalisis was negative as well her echocardiogram, blood culture were positive for serratia marcenscess and she was treated with ceftriaxone, this is the second time that her blood culture came back positive for the same germ, focus of infection was her permacth. Pt was on HD during her hospitalization and she was tranfused a couple of time due to symptomatic anemia.   Pt is medically optimal for discharge today. 38 y/o female with ESRD diagnosed in 1/17 s/p left UE AVF on hemodialysis on M-W-F, right chest wall permacath, HTN, DM tx w/ insulin, obesity, HCAP a month ago, comes into the hospital due to  shortness of breath since 2 -3 days ago and severe diarrhea.  PT was admitted due to HCAP PNA and C.dff colitis, on her second day of hospital course she was evaluated by ID doctor who stop treatment with antibiotics due to improvement of HCAP pneumonia seen on previous CT scan, she was teated w/ oral vancomycin for her C.dff infection which resolved with  6 days of treatment . Her urinalysis was negative as well her echocardiogram, blood culture were positive for serratia marcenscess  and she was treated with IV ceftriaxone, this is the second time that her blood culture came back positive for the same germ, focus of infection was her permacth , pt was evaluated by ID , Vascular and Nephrology, plan was to removed permacth.. Pt was on HD during her hospitalization and she was tranfused a couple of time due to symptomatic anemia. On July 4 her blood culture came back negative with no growth, her sputum culture was negative during her hospitalization.     Pt is medically optimal for discharge today. 36 y/o female with ESRD diagnosed in 1/17 s/p left UE AVF on hemodialysis on M-W-F, right chest wall permacath, HTN, DM tx w/ insulin, obesity, HCAP a month ago, comes into the hospital due to  shortness of breath since 2 -3 days ago and severe diarrhea.  PT was admitted due to HCAP PNA and C.dff colitis, on her second day of hospital course she was evaluated by ID doctor who stop treatment with antibiotics due to improvement of HCAP pneumonia seen on previous CT scan, she was teated w/ oral vancomycin for her C.dff infection which resolved with  6 days of treatment . Her urinalysis was negative as well her echocardiogram, blood culture were positive for serratia marcenscess  and she was treated with IV ceftriaxone, this is the second time that her blood culture came back positive for the same germ, focus of infection was her permacth , pt was evaluated by ID , Vascular and Nephrology, plan was to removed permacth.. Pt was on HD during her hospitalization and she was tranfused a couple of time due to symptomatic anemia. On July 4 her blood culture came back negative with no growth, her sputum culture was negative during her hospitalization.     Patient is s/p permacath replacement and revision of LUE AVF, with resolved bacteremia and C. difficile infection.     Pt is medically optimal for discharge today.    Update 8/13: Patient to go for Fistulogram tomorrow 8/14 in the AM, spoke with Vascular and patient may continue on heparin for the morning, patient is NPO after midnight. Patient to go for dialysis after Fistulogram and then DC tomorrow following that. Patient to be discharged on Gentamycin 100 mg on Dialysis days only, for 2 weeks, and also Vanco should be continued for 2 weeks while the patient is still on abx.   Following Fistulogram and Dialysis, if patient continues to remain stable, please discharge home on oral antibiotics. 38 y/o female with ESRD diagnosed in 1/17 s/p left UE AVF on hemodialysis on M-W-F, right chest wall permacath, HTN, DM tx w/ insulin, obesity, HCAP a month ago, comes into the hospital due to  shortness of breath since 2 -3 days ago and severe diarrhea. PT was admitted due to HCAP PNA and C.dff colitis, on her second day of hospital course she was evaluated by ID doctor who stop treatment with antibiotics due to improvement of HCAP pneumonia seen on previous CT scan, she was teated w/ oral vancomycin for her C.dff infection which resolved with  6 days of treatment . Her urinalysis was negative as well her echocardiogram, blood culture were positive for serratia marcenscess  and she was treated with IV ceftriaxone, this is the second time that her blood culture came back positive for the same germ, focus of infection was her permacth , pt was evaluated by ID , Vascular and Nephrology, plan was to removed permacth. Pt was on HD during her hospitalization and she was tranfused a couple of time due to symptomatic anemia. On July 4 her blood culture came back negative with no growth, her sputum culture was negative during her hospitalization.     Patient is now s/p permacath replacement and revision of LUE AVF, with resolved bacteremia and C. difficile infection. Patient's BP medications were also changed with input from the nephrology team to obtain better BP control. Patient is currently stable and and is medically optimized for discharge today.    Update 8/13: Patient to go for Fistulogram tomorrow 8/14 in the AM, spoke with Vascular and patient may continue on heparin for the morning, patient is NPO after midnight. Patient to go for dialysis after Fistulogram and then DC tomorrow following that. Patient to be discharged on Gentamycin 100 mg on Dialysis days only, for 2 weeks, and also Vanco should be continued for 2 weeks while the patient is still on abx.   Following Fistulogram and Dialysis, if patient continues to remain stable, please discharge home on oral antibiotics.  -  Please call patient pharmacy to complete out-patient med rec. Also, patient was switched from Norvasc to verapamil for BP control in the hospital, please confirm BP medications for discharge with attending. Once this is confirmed, please update med rec in DC summary, outpatient med rec has been confirmed with pharmacist. 36 y/o female with ESRD diagnosed in 1/17 s/p left UE AVF on hemodialysis on M-W-F, right chest wall permacath, HTN, DM tx w/ insulin, obesity, HCAP a month ago, comes into the hospital due to  shortness of breath since 2 -3 days ago and severe diarrhea. PT was admitted due to HCAP PNA and C.dff colitis, on her second day of hospital course she was evaluated by ID doctor who stop treatment with antibiotics due to improvement of HCAP pneumonia seen on previous CT scan, she was teated w/ oral vancomycin for her C.dff infection which resolved with  6 days of treatment . Her urinalysis was negative as well her echocardiogram, blood culture were positive for serratia marcenscess  and she was treated with IV ceftriaxone, this is the second time that her blood culture came back positive for the same germ, focus of infection was her permacth , pt was evaluated by ID , Vascular and Nephrology, plan was to removed permacth. Pt was on HD during her hospitalization and she was tranfused a couple of time due to symptomatic anemia. On July 4 her blood culture came back negative with no growth, her sputum culture was negative during her hospitalization.     Patient is now s/p permacath replacement and revision of LUE AVF, with resolved bacteremia and C. difficile infection. Patient's BP medications were also changed with input from the nephrology team to obtain better BP control. Patient is currently stable and and is medically optimized for discharge today.    8/14/17 Pt went for AV fistulogram this AM, AVF is not functioning properly. As per attending, please use permacath for dialysis. Dispo as per primary team, please follow-up with Dr. Farmer outpatient next week.   Patient to go for dialysis after Fistulogram and then DC tomorrow following that. Patient to be discharged on Gentamycin 100 mg on Dialysis days only, for 2 weeks, and also Vanco should be continued for 2 weeks while the patient is still on abx.   Following Dialysis, if patient continues to remain stable, please discharge home on oral antibiotics.  Pt went for AV fistulogram this AM, AVF is not functioning properly. As per attending, please use permacath for dialysis. Dispo as per primary team, please follow-up with Dr. Farmer outpatient next week. 36 y/o female with ESRD diagnosed in 1/17 s/p left UE AVF on hemodialysis on M-W-F, right chest wall permacath, HTN, DM tx w/ insulin, obesity, HCAP a month ago, comes into the hospital due to  shortness of breath since 2 -3 days ago and severe diarrhea. PT was admitted due to HCAP PNA and C.dff colitis, on her second day of hospital course she was evaluated by ID doctor who stop treatment with antibiotics due to improvement of HCAP pneumonia seen on previous CT scan, she was teated w/ oral vancomycin for her C.dff infection which resolved with  6 days of treatment . Her urinalysis was negative as well her echocardiogram, blood culture were positive for serratia marcenscess  and she was treated with IV ceftriaxone, this is the second time that her blood culture came back positive for the same germ, focus of infection was her permacth , pt was evaluated by ID , Vascular and Nephrology, plan was to removed permacth. Pt was on HD during her hospitalization and she was tranfused a couple of time due to symptomatic anemia. On July 4 her blood culture came back negative with no growth, her sputum culture was negative during her hospitalization.     Patient is now s/p permacath replacement and revision of LUE AVF, with resolved bacteremia and C. difficile infection. Patient's BP medications were also changed with input from the nephrology team to obtain better BP control. Patient is currently stable and and is medically optimized for discharge today.    8/14/17 Pt went for AV fistulogram this AM, AVF is not functioning properly. As per attending, please use permacath for dialysis. Dispo as per primary team, please follow-up with Dr. Farmer outpatient next week.   Patient to go for dialysis after Fistulogram and then DC tomorrow following that. Patient to be discharged on Gentamycin 100 mg on Dialysis days only, for 2 weeks, and also Vanco should be continued for 2 weeks while the patient is still on abx.   Following Dialysis, if patient continues to remain stable, please discharge home on oral antibiotics.  Pt went for AV fistulogram this AM, AVF is not functioning properly. As per attending, please use permacath for dialysis. Dispo as per primary team, please follow-up with Dr. Farmer outpatient next week.    Pt seen and examined with FMR.  A&P reviewed.  Discussed with renal and ID  DC on gentamycin with HD + po vanco for Cdiff.  F/u in 1 week with vascular for access.  Time spent 65 min

## 2017-08-05 NOTE — DISCHARGE NOTE ADULT - MEDICATION SUMMARY - MEDICATIONS TO STOP TAKING
I will STOP taking the medications listed below when I get home from the hospital:    amLODIPine 10 mg oral tablet  -- 1 tab(s) by mouth once a day I will STOP taking the medications listed below when I get home from the hospital:    labetalol 200 mg oral tablet  -- 1 tab(s) by mouth 3 times a day    amLODIPine 10 mg oral tablet  -- 1 tab(s) by mouth once a day

## 2017-08-05 NOTE — DISCHARGE NOTE ADULT - OTHER SIGNIFICANT FINDINGS
< from: TTE Echo Complete w/Doppler (04.05.17 @ 16:17) >   Summary:   TTE:  1. Technically difficult study.   2. Endocardial visualization was enhanced with intravenous echo contrast.   3. Normal global left ventricular systolic function.   4. Left ventricular ejection fraction, by visual estimation, is 60 to   65%.   5. LV Ejection Fraction by Simmons's Method=64%.   6. Thickening of the anterior and posterior mitral valve leaflets.   7. Trace mitral valve regurgitation.   8. There isno evidence of pericardial effusion.    Lower leg Doppler US:  < from: US Duplex Venous Lower Ext Complete, Bilateral (08.01.17 @ 19:09) >  There is normal compressibility of the bilateral common femoral, femoral   and popliteal veins. No calf vein thrombosis is detected.    Doppler examination shows normal spontaneous and phasic flow.    iMPRESSION:   No evidence of bilateral lower extremity deep venous thrombosis. < from: TTE Echo Complete w/Doppler (04.05.17 @ 16:17) >     Summary:   TTE:  1. Technically difficult study.   2. Endocardial visualization was enhanced with intravenous echo contrast.   3. Normal global left ventricular systolic function.   4. Left ventricular ejection fraction, by visual estimation, is 60 to   65%.   5. LV Ejection Fraction by Simmons's Method=64%.   6. Thickening of the anterior and posterior mitral valve leaflets.   7. Trace mitral valve regurgitation.   8. There isno evidence of pericardial effusion.    Lower leg Doppler US:  < from: US Duplex Venous Lower Ext Complete, Bilateral (08.01.17 @ 19:09) >  There is normal compressibility of the bilateral common femoral, femoral   and popliteal veins. No calf vein thrombosis is detected.    Doppler examination shows normal spontaneous and phasic flow.    iMPRESSION:   No evidence of bilateral lower extremity deep venous thrombosis.                        7.7    8.0   )-----------( 288      ( 05 Aug 2017 06:56 )             24.3   08-05    133<L>  |  93<L>  |  44.0<H>  ----------------------------<  121<H>  3.9   |  24.0  |  6.02<H>    Ca    8.5<L>      05 Aug 2017 06:56  08-05    133<L>  |  93<L>  |  44.0<H>  ----------------------------<  121<H>  3.9   |  24.0  |  6.02<H>    Ca    8.5<L>      05 Aug 2017 06:56 < from: TTE Echo Complete w/Doppler (04.05.17 @ 16:17) >     Summary:   TTE:  1. Technically difficult study.   2. Endocardial visualization was enhanced with intravenous echo contrast.   3. Normal global left ventricular systolic function.   4. Left ventricular ejection fraction, by visual estimation, is 60 to   65%.   5. LV Ejection Fraction by Simmons's Method=64%.   6. Thickening of the anterior and posterior mitral valve leaflets.   7. Trace mitral valve regurgitation.   8. There isno evidence of pericardial effusion.    Lower leg Doppler US:  < from: US Duplex Venous Lower Ext Complete, Bilateral (08.01.17 @ 19:09) >  There is normal compressibility of the bilateral common femoral, femoral   and popliteal veins. No calf vein thrombosis is detected.    Doppler examination shows normal spontaneous and phasic flow.    iMPRESSION:   No evidence of bilateral lower extremity deep venous thrombosis.

## 2017-08-06 LAB
CULTURE RESULTS: SIGNIFICANT CHANGE UP
SPECIMEN SOURCE: SIGNIFICANT CHANGE UP

## 2017-08-06 PROCEDURE — 99233 SBSQ HOSP IP/OBS HIGH 50: CPT | Mod: GC

## 2017-08-06 RX ORDER — HYDRALAZINE HCL 50 MG
10 TABLET ORAL EVERY 8 HOURS
Qty: 0 | Refills: 0 | Status: DISCONTINUED | OUTPATIENT
Start: 2017-08-06 | End: 2017-08-14

## 2017-08-06 RX ORDER — HYDRALAZINE HCL 50 MG
50 TABLET ORAL EVERY 8 HOURS
Qty: 0 | Refills: 0 | Status: DISCONTINUED | OUTPATIENT
Start: 2017-08-06 | End: 2017-08-14

## 2017-08-06 RX ORDER — TRAMADOL HYDROCHLORIDE 50 MG/1
25 TABLET ORAL ONCE
Qty: 0 | Refills: 0 | Status: DISCONTINUED | OUTPATIENT
Start: 2017-08-06 | End: 2017-08-10

## 2017-08-06 RX ADMIN — Medication 50 MILLIGRAM(S): at 17:06

## 2017-08-06 RX ADMIN — Medication 125 MILLIGRAM(S): at 13:30

## 2017-08-06 RX ADMIN — Medication 3 MILLILITER(S): at 03:18

## 2017-08-06 RX ADMIN — CALCITRIOL 0.5 MICROGRAM(S): 0.5 CAPSULE ORAL at 11:57

## 2017-08-06 RX ADMIN — Medication 1 SPRAY(S): at 13:29

## 2017-08-06 RX ADMIN — Medication 3 MILLILITER(S): at 09:39

## 2017-08-06 RX ADMIN — Medication 25 MILLIGRAM(S): at 05:43

## 2017-08-06 RX ADMIN — Medication 3 MILLILITER(S): at 15:10

## 2017-08-06 RX ADMIN — CEFTRIAXONE 100 GRAM(S): 500 INJECTION, POWDER, FOR SOLUTION INTRAMUSCULAR; INTRAVENOUS at 21:04

## 2017-08-06 RX ADMIN — Medication 50 MILLIGRAM(S): at 21:05

## 2017-08-06 RX ADMIN — SEVELAMER CARBONATE 400 MILLIGRAM(S): 2400 POWDER, FOR SUSPENSION ORAL at 08:52

## 2017-08-06 RX ADMIN — Medication 1 SPRAY(S): at 05:43

## 2017-08-06 RX ADMIN — Medication 125 MILLIGRAM(S): at 20:57

## 2017-08-06 RX ADMIN — PANTOPRAZOLE SODIUM 40 MILLIGRAM(S): 20 TABLET, DELAYED RELEASE ORAL at 08:52

## 2017-08-06 RX ADMIN — Medication 3 MILLILITER(S): at 20:03

## 2017-08-06 RX ADMIN — Medication 1 SPRAY(S): at 21:05

## 2017-08-06 RX ADMIN — Medication 1 MILLIGRAM(S): at 11:57

## 2017-08-06 RX ADMIN — Medication 81 MILLIGRAM(S): at 11:57

## 2017-08-06 RX ADMIN — SEVELAMER CARBONATE 400 MILLIGRAM(S): 2400 POWDER, FOR SUSPENSION ORAL at 11:57

## 2017-08-06 RX ADMIN — SEVELAMER CARBONATE 400 MILLIGRAM(S): 2400 POWDER, FOR SUSPENSION ORAL at 17:06

## 2017-08-06 RX ADMIN — AMLODIPINE BESYLATE 10 MILLIGRAM(S): 2.5 TABLET ORAL at 05:43

## 2017-08-06 RX ADMIN — Medication 25 MILLIGRAM(S): at 11:57

## 2017-08-06 NOTE — PROGRESS NOTE ADULT - PROBLEM SELECTOR PLAN 3
LIkely 2/2 aocd. Normocytic. 2/2 ESRD   1 unit of packed RBC was transfused  on July 3    Hb today =7.7  c/w epogen   anemia w/up noted   follow of Hb w/ cbc daily

## 2017-08-06 NOTE — PROGRESS NOTE ADULT - PROBLEM SELECTOR PLAN 5
RESOLVED on last admission. NO RECURRENCE   O2 by a nasal canula to keep O2 between 90-93 %.   ct chest done, shows improvement from previous CT RESOLVED on last admission. NO RECURRENCE   O2 by a nasal canula to keep O2 between 90-93 %.   ct chest done, shows improvement from previous CT  Wheezing improved - likely has an Underlying OHS/DALTON

## 2017-08-06 NOTE — PROGRESS NOTE ADULT - PROBLEM SELECTOR PLAN 2
cdiff +,  on vanco oral q6 by ID  (day #5)  oral hydration  no diarrhea   contact isolation can be taken off cdiff +,  on vanco oral q6 by ID  (day #5)  oral hydration  contact isolation  off

## 2017-08-06 NOTE — PROGRESS NOTE ADULT - PROBLEM SELECTOR PLAN 1
-Rocephin 2 gr x 24 hr (day #4)  - follow up w/BCx  - evaluate permacath as source of bacteriemia + for serratia mercences, previous BCs for 6/12 were positive for the same organism  -patient does not want to get PC out if it means that she will be getting a temp catheter in her groin. She cannot go for superficialization now given bacteremia -   - nephrology , vascular and id recommend exchange permacath -Rocephin 2 gr x 24 hr (day #4)  - follow up w/BCx from 8/4 are negative to date   - evaluate permacath as source of bacteriemia + for serratia mercences, previous BCs for 6/12 were positive for the same organism  -patient does not want to get PC out if it means that she will be getting a temp catheter in her groin. She cannot go for superficialization now given bacteremia -   - nephrology ,  id recommend exchange permacath - however primary vascular surgeon does not think that the PC is the source and prefers to salvage the PC and tx with abx.

## 2017-08-06 NOTE — PROGRESS NOTE ADULT - SUBJECTIVE AND OBJECTIVE BOX
NEPHROLOGY INTERVAL HPI/OVERNIGHT EVENTS:    Examined earlier  looks comfortable    MEDICATIONS  (STANDING):  aspirin enteric coated 81 milliGRAM(s) Oral daily  sevelamer hydrochloride 400 milliGRAM(s) Oral three times a day with meals  pantoprazole    Tablet 40 milliGRAM(s) Oral before breakfast  calcitriol   Capsule 0.5 MICROGram(s) Oral daily  ergocalciferol 19527 Unit(s) Oral every week  folic acid 1 milliGRAM(s) Oral daily  insulin lispro (HumaLOG) corrective regimen sliding scale   SubCutaneous three times a day before meals  dextrose 5%. 1000 milliLiter(s) (50 mL/Hr) IV Continuous <Continuous>  dextrose 50% Injectable 12.5 Gram(s) IV Push once  dextrose 50% Injectable 25 Gram(s) IV Push once  dextrose 50% Injectable 25 Gram(s) IV Push once  heparin  Injectable 5000 Unit(s) SubCutaneous every 12 hours  ALBUTerol/ipratropium for Nebulization 3 milliLiter(s) Nebulizer every 6 hours  calcium acetate 667 milliGRAM(s) Oral three times a day with meals  epoetin raina Injectable 13300 Unit(s) IV Push <User Schedule>  vancomycin    Solution 125 milliGRAM(s) Oral every 6 hours  cefTRIAXone   IVPB 2 Gram(s) IV Intermittent every 24 hours  sodium chloride 0.65% Nasal 1 Spray(s) Both Nostrils three times a day  hydrALAZINE 25 milliGRAM(s) Oral every 8 hours  amLODIPine   Tablet 10 milliGRAM(s) Oral daily  predniSONE   Tablet 40 milliGRAM(s) Oral daily    MEDICATIONS  (PRN):  dextrose Gel 1 Dose(s) Oral once PRN Blood Glucose LESS THAN 70 milliGRAM(s)/deciliter  glucagon  Injectable 1 milliGRAM(s) IntraMuscular once PRN Glucose LESS THAN 70 milligrams/deciliter  ALBUTerol    0.083%. 2.5 milliGRAM(s) Nebulizer once PRN sputum induction  hydrALAZINE Injectable 10 milliGRAM(s) IV Push once PRN hold if heart rate<60  cyclobenzaprine 5 milliGRAM(s) Oral three times a day PRN Muscle Spasm      Allergies    Mushrooms (Hives (Mild))  No Known Drug Allergies    Intolerances        Vital Signs Last 24 Hrs  T(C): 36.4 (06 Aug 2017 08:00), Max: 36.9 (05 Aug 2017 17:40)  T(F): 97.6 (06 Aug 2017 08:00), Max: 98.5 (05 Aug 2017 17:40)  HR: 100 (06 Aug 2017 08:00) (92 - 100)  BP: 163/85 (06 Aug 2017 08:00) (150/84 - 189/96)  BP(mean): --  RR: 20 (06 Aug 2017 08:00) (20 - 20)  SpO2: 98% (06 Aug 2017 09:40) (18% - 98%)  Daily     Daily Weight in k (05 Aug 2017 13:55)    PHYSICAL EXAM:  HEENT: normocephalic and atraumatic  NECK: Supple.  LUNGS: B/L wheezing  HEART: Regular rate and rhythm   ABDOMEN: Soft, nontender, and nondistended.  Positive bowel sounds.    EXTREMITIES: With B/L edema.        LABS:                        7.7    8.0   )-----------( 288      ( 05 Aug 2017 06:56 )             24.3     08-05    133<L>  |  93<L>  |  44.0<H>  ----------------------------<  121<H>  3.9   |  24.0  |  6.02<H>    Ca    8.5<L>      05 Aug 2017 06:56                  RADIOLOGY & ADDITIONAL TESTS:

## 2017-08-06 NOTE — PROGRESS NOTE ADULT - ASSESSMENT
ESRD on HD TTS schedule  ID f/u noted + Blood Cx  Serratia marcescen (8/1) on Abx source could be HD cath.   Had + Blood cx same organism 6/12- afebrile white count not elevated does not look toxic  Should exchange permcath -BUT HOLD FOR NOW- will d/w pt primary vasc sx Dr Zamorano- vega  There are plans to superficialize AVF after infectious process resolves  Cdiff colitis on PO vanco feeling better

## 2017-08-06 NOTE — PROGRESS NOTE ADULT - SUBJECTIVE AND OBJECTIVE BOX
CC: Diarrhea and shortness of breath    Patient seen and examined at bedside.  No acute events overnight. She is having cough w/ clear secretions, no fever over night, she denies any diarrhea or abdominal pain . She states that she felt SOB and needs O2. She was wheezing on exam throughout all lung fields, she got HD yesterday. She refuse tx w/ steroid and don't want to take it.  Denies fever, chest pain, , constipation, calf pain, palpitations, headache, n/v.    Cardiac monitor reviewed: Desat to 86%    ROS  General- denies any fever/chills  Resp-  sob and cough resolving  GI- denies any abodminal pain or diarrhea  - denies any dysuria/hematuria  Extremities- denies any calf pain or swellng;        Vital Signs Last 24 Hrs  T(C): 36.6 (05 Aug 2017 23:20), Max: 36.9 (05 Aug 2017 17:40)  T(F): 97.8 (05 Aug 2017 23:20), Max: 98.5 (05 Aug 2017 17:40)  HR: 100 (06 Aug 2017 05:47) (92 - 100)  BP: 150/84 (06 Aug 2017 05:47) (150/84 - 189/96)  BP(mean): --  RR: 20 (05 Aug 2017 23:20) (18 - 20)  SpO2: 98% (06 Aug 2017 03:18) (18% - 98%)    PE  GENERAL: NAD, well-groomed, well-developed  HEAD:  Atraumatic, Normocephalic  EYES: EOMI, PERRLA, conjunctiva and sclera clear  ENMT:  Moist mucous membranes, Good dentition, No lesions  NECK: Supple, No JVD, Normal thyroid. right chest HD catheter in place  LUNG: inspiratory and expiratory wheezing bilaterally; bilateral  rales,  HEART: Regular rate and rhythm; No murmurs, rubs, or gallops  ABDOMEN: Soft, Nontender, Nondistended; Bowel sounds present  EXTREMITIES:  2+ Peripheral Pulses, No clubbing, cyanosis, or edema, LUE AVF placement + thrill palpable  LYMPH: No lymphadenopathy noted  NERVOUS SYSTEM:  Alert & Oriented X3, Good concentration; Motor Strength 5/5 B/L upper and lower extremities  SKIN: No rashes or lesions , no hematomas, capillary refills less than 2 seconds    Lab Results:                          7.7    8.0   )-----------( 288      ( 05 Aug 2017 06:56 )             24.3   08-05    133<L>  |  93<L>  |  44.0<H>  ----------------------------<  121<H>  3.9   |  24.0  |  6.02<H>    Ca    8.5<L>      05 Aug 2017 06:56        MICROBIOLOGY/CULTURES:  Blood cx: serratia m. Repeat blood cx in lab     RADIOLOGY RESULTS:    RADIOLOGY:   < from: CT Chest No Cont (08.01.17 @ 22:46) >  LUNGS AND LARGE AIRWAYS: Decreased bilateral patchy airspace opacities,   probably pneumonia.  PLEURA: No pleural effusion.  VESSELS: Right-sided central venous catheter with tip terminating in the   superior cavoatrial junction.   HEART: Heart size is normal. No pericardial effusion.  MEDIASTINUM AND SHANITA: Persistent mediastinal and hilar adenopathy.  CHEST WALL AND LOWER NECK: Within normal limits.  VISUALIZED UPPER ABDOMEN: Trace perihepatic ascites.  BONES: Within normal limits.    IMPRESSION:   1.  Since June 13, 2017, decreased bilateral patchy airspace opacities,   probably pneumonia.  2.  Persistent mediastinal and hilar adenopathy.     < end of copied text >      MEDICATIONS  (STANDING):  aspirin enteric coated 81 milliGRAM(s) Oral daily  sevelamer hydrochloride 400 milliGRAM(s) Oral three times a day with meals  pantoprazole    Tablet 40 milliGRAM(s) Oral before breakfast  calcitriol   Capsule 0.5 MICROGram(s) Oral daily  ergocalciferol 81480 Unit(s) Oral every week  folic acid 1 milliGRAM(s) Oral daily  insulin lispro (HumaLOG) corrective regimen sliding scale   SubCutaneous three times a day before meals  dextrose 5%. 1000 milliLiter(s) (50 mL/Hr) IV Continuous <Continuous>  dextrose 50% Injectable 12.5 Gram(s) IV Push once  dextrose 50% Injectable 25 Gram(s) IV Push once  dextrose 50% Injectable 25 Gram(s) IV Push once  heparin  Injectable 5000 Unit(s) SubCutaneous every 12 hours  ALBUTerol/ipratropium for Nebulization 3 milliLiter(s) Nebulizer every 6 hours  calcium acetate 667 milliGRAM(s) Oral three times a day with meals  epoetin raina Injectable 05240 Unit(s) IV Push <User Schedule>  vancomycin    Solution 125 milliGRAM(s) Oral every 6 hours  cefTRIAXone   IVPB 2 Gram(s) IV Intermittent every 24 hours    MEDICATIONS  (PRN):  dextrose Gel 1 Dose(s) Oral once PRN Blood Glucose LESS THAN 70 milliGRAM(s)/deciliter  glucagon  Injectable 1 milliGRAM(s) IntraMuscular once PRN Glucose LESS THAN 70 milligrams/deciliter  labetalol 200 milliGRAM(s) Oral three times a day PRN give if systolic blood pressure is above 160 mmHg systolic  hydrALAZINE 25 milliGRAM(s) Oral every 8 hours PRN give if sBP > 160 mmHg  amLODIPine   Tablet 10 milliGRAM(s) Oral daily PRN give if sBP above 160 mmHG systolic  ALBUTerol    0.083%. 2.5 milliGRAM(s) Nebulizer once PRN sputum induction CC: Diarrhea and shortness of breath    Patient seen and examined at bedside.  No acute events overnight. She is having cough w/ clear secretions, no fever over night, she denies any diarrhea or abdominal pain . She states that she felt SOB and needs O2. She was wheezing on exam throughout all lung fields, she got HD yesterday. She refuse tx w/ steroid and don't want to take it.  Denies fever, chest pain, , constipation, calf pain, palpitations, headache, n/v.    Cardiac monitor reviewed: Desat to 86%    ROS  General- denies any fever/chills  Resp-  sob and cough resolving  GI- denies any abodminal pain or diarrhea  - denies any dysuria/hematuria  Extremities- denies any calf pain or swellng;        Vital Signs Last 24 Hrs  T(C): 36.6 (05 Aug 2017 23:20), Max: 36.9 (05 Aug 2017 17:40)  T(F): 97.8 (05 Aug 2017 23:20), Max: 98.5 (05 Aug 2017 17:40)  HR: 100 (06 Aug 2017 05:47) (92 - 100)  BP: 150/84 (06 Aug 2017 05:47) (150/84 - 189/96)  BP(mean): --  RR: 20 (05 Aug 2017 23:20) (18 - 20)  SpO2: 98% (06 Aug 2017 03:18) (18% - 98%)      I&O's Summary    05 Aug 2017 07:01  -  06 Aug 2017 07:00  --------------------------------------------------------  IN: 600 mL / OUT: 3100 mL / NET: -2500 mL      PE  GENERAL: NAD, well-groomed, well-developed  HEAD:  Atraumatic, Normocephalic  EYES: EOMI, PERRLA, conjunctiva and sclera clear  ENMT:  Moist mucous membranes, Good dentition, No lesions  NECK: Supple, No JVD, Normal thyroid. right chest HD catheter in place  LUNG: inspiratory and expiratory wheezing bilaterally; bilateral  rales,  HEART: Regular rate and rhythm; No murmurs, rubs, or gallops  ABDOMEN: Soft, Nontender, Nondistended; Bowel sounds present  EXTREMITIES:  2+ Peripheral Pulses, No clubbing, cyanosis, or edema, LUE AVF placement + thrill palpable  LYMPH: No lymphadenopathy noted  NERVOUS SYSTEM:  Alert & Oriented X3, Good concentration; Motor Strength 5/5 B/L upper and lower extremities  SKIN: No rashes or lesions , no hematomas, capillary refills less than 2 seconds    Lab Results:                          7.7    8.0   )-----------( 288      ( 05 Aug 2017 06:56 )             24.3   08-05    133<L>  |  93<L>  |  44.0<H>  ----------------------------<  121<H>  3.9   |  24.0  |  6.02<H>    Ca    8.5<L>      05 Aug 2017 06:56        MICROBIOLOGY/CULTURES:  Blood cx: serratia m. Repeat blood cx in lab     RADIOLOGY RESULTS:    RADIOLOGY:   < from: CT Chest No Cont (08.01.17 @ 22:46) >  LUNGS AND LARGE AIRWAYS: Decreased bilateral patchy airspace opacities,   probably pneumonia.  PLEURA: No pleural effusion.  VESSELS: Right-sided central venous catheter with tip terminating in the   superior cavoatrial junction.   HEART: Heart size is normal. No pericardial effusion.  MEDIASTINUM AND SHANITA: Persistent mediastinal and hilar adenopathy.  CHEST WALL AND LOWER NECK: Within normal limits.  VISUALIZED UPPER ABDOMEN: Trace perihepatic ascites.  BONES: Within normal limits.    IMPRESSION:   1.  Since June 13, 2017, decreased bilateral patchy airspace opacities,   probably pneumonia.  2.  Persistent mediastinal and hilar adenopathy.     < end of copied text >      MEDICATIONS  (STANDING):  aspirin enteric coated 81 milliGRAM(s) Oral daily  sevelamer hydrochloride 400 milliGRAM(s) Oral three times a day with meals  pantoprazole    Tablet 40 milliGRAM(s) Oral before breakfast  calcitriol   Capsule 0.5 MICROGram(s) Oral daily  ergocalciferol 94978 Unit(s) Oral every week  folic acid 1 milliGRAM(s) Oral daily  insulin lispro (HumaLOG) corrective regimen sliding scale   SubCutaneous three times a day before meals  dextrose 5%. 1000 milliLiter(s) (50 mL/Hr) IV Continuous <Continuous>  dextrose 50% Injectable 12.5 Gram(s) IV Push once  dextrose 50% Injectable 25 Gram(s) IV Push once  dextrose 50% Injectable 25 Gram(s) IV Push once  heparin  Injectable 5000 Unit(s) SubCutaneous every 12 hours  ALBUTerol/ipratropium for Nebulization 3 milliLiter(s) Nebulizer every 6 hours  calcium acetate 667 milliGRAM(s) Oral three times a day with meals  epoetin raina Injectable 93686 Unit(s) IV Push <User Schedule>  vancomycin    Solution 125 milliGRAM(s) Oral every 6 hours  cefTRIAXone   IVPB 2 Gram(s) IV Intermittent every 24 hours    MEDICATIONS  (PRN):  dextrose Gel 1 Dose(s) Oral once PRN Blood Glucose LESS THAN 70 milliGRAM(s)/deciliter  glucagon  Injectable 1 milliGRAM(s) IntraMuscular once PRN Glucose LESS THAN 70 milligrams/deciliter  labetalol 200 milliGRAM(s) Oral three times a day PRN give if systolic blood pressure is above 160 mmHg systolic  hydrALAZINE 25 milliGRAM(s) Oral every 8 hours PRN give if sBP > 160 mmHg  amLODIPine   Tablet 10 milliGRAM(s) Oral daily PRN give if sBP above 160 mmHG systolic  ALBUTerol    0.083%. 2.5 milliGRAM(s) Nebulizer once PRN sputum induction CC: Diarrhea and shortness of breath    Patient seen and examined at bedside.  No acute events overnight. She is having cough w/ clear secretions, no fever over night, she denies any diarrhea or abdominal pain . She states that she felt SOB and needs O2. She was wheezing on exam throughout all lung fields, she got HD yesterday. She refuse tx w/ steroid and didn't want to take it. However, again states that her SOB is improved. She still has the left lower posterior calf pain and states that it is 10/10 and cramping in sensation. No further diarrhea   Denies fever, chest pain,  constipation, palpitations, headache, n/v.    Cardiac monitor reviewed: Desat to 86%    ROS  General- denies any fever/chills  Resp-  sob and cough resolving  GI- denies any abodminal pain or diarrhea  - denies any dysuria/hematuria    Vital Signs Last 24 Hrs  T(C): 36.6 (05 Aug 2017 23:20), Max: 36.9 (05 Aug 2017 17:40)  T(F): 97.8 (05 Aug 2017 23:20), Max: 98.5 (05 Aug 2017 17:40)  HR: 100 (06 Aug 2017 05:47) (92 - 100)  BP: 150/84 (06 Aug 2017 05:47) (150/84 - 189/96)  BP(mean): --  RR: 20 (05 Aug 2017 23:20) (18 - 20)  SpO2: 98% (06 Aug 2017 03:18) (18% - 98%)      I&O's Summary    05 Aug 2017 07:01  -  06 Aug 2017 07:00  --------------------------------------------------------  IN: 600 mL / OUT: 3100 mL / NET: -2500 mL      PE  GENERAL: NAD, well-groomed, well-developed  HEAD:  Atraumatic, Normocephalic  EYES: EOMI, PERRLA, conjunctiva and sclera clear  ENMT:  Moist mucous membranes, Good dentition, No lesions  NECK: Supple, No JVD, Normal thyroid. right chest HD catheter in place  LUNG: inspiratory and expiratory wheezing bilaterally; bilateral  rales,  HEART: Regular rate and rhythm; No murmurs, rubs, or gallops  ABDOMEN: Soft, Nontender, Nondistended; Bowel sounds present  EXTREMITIES:  2+ Peripheral Pulses, No clubbing, cyanosis, or edema, LUE AVF placement + thrill palpable  LYMPH: No lymphadenopathy noted  NERVOUS SYSTEM:  Alert & Oriented X3, Good concentration; Motor Strength 5/5 B/L upper and lower extremities  SKIN: No rashes or lesions , no hematomas, capillary refills less than 2 seconds    Lab Results:                          7.7    8.0   )-----------( 288      ( 05 Aug 2017 06:56 )             24.3   08-05    133<L>  |  93<L>  |  44.0<H>  ----------------------------<  121<H>  3.9   |  24.0  |  6.02<H>    Ca    8.5<L>      05 Aug 2017 06:56        MICROBIOLOGY/CULTURES:  Blood cx: serratia m. Repeat blood cx in lab     RADIOLOGY RESULTS:    RADIOLOGY:   < from: CT Chest No Cont (08.01.17 @ 22:46) >  LUNGS AND LARGE AIRWAYS: Decreased bilateral patchy airspace opacities,   probably pneumonia.  PLEURA: No pleural effusion.  VESSELS: Right-sided central venous catheter with tip terminating in the   superior cavoatrial junction.   HEART: Heart size is normal. No pericardial effusion.  MEDIASTINUM AND SHANITA: Persistent mediastinal and hilar adenopathy.  CHEST WALL AND LOWER NECK: Within normal limits.  VISUALIZED UPPER ABDOMEN: Trace perihepatic ascites.  BONES: Within normal limits.    IMPRESSION:   1.  Since June 13, 2017, decreased bilateral patchy airspace opacities,   probably pneumonia.  2.  Persistent mediastinal and hilar adenopathy.     < end of copied text >      MEDICATIONS  (STANDING):  aspirin enteric coated 81 milliGRAM(s) Oral daily  sevelamer hydrochloride 400 milliGRAM(s) Oral three times a day with meals  pantoprazole    Tablet 40 milliGRAM(s) Oral before breakfast  calcitriol   Capsule 0.5 MICROGram(s) Oral daily  ergocalciferol 42209 Unit(s) Oral every week  folic acid 1 milliGRAM(s) Oral daily  insulin lispro (HumaLOG) corrective regimen sliding scale   SubCutaneous three times a day before meals  dextrose 5%. 1000 milliLiter(s) (50 mL/Hr) IV Continuous <Continuous>  dextrose 50% Injectable 12.5 Gram(s) IV Push once  dextrose 50% Injectable 25 Gram(s) IV Push once  dextrose 50% Injectable 25 Gram(s) IV Push once  heparin  Injectable 5000 Unit(s) SubCutaneous every 12 hours  ALBUTerol/ipratropium for Nebulization 3 milliLiter(s) Nebulizer every 6 hours  calcium acetate 667 milliGRAM(s) Oral three times a day with meals  epoetin raina Injectable 43575 Unit(s) IV Push <User Schedule>  vancomycin    Solution 125 milliGRAM(s) Oral every 6 hours  cefTRIAXone   IVPB 2 Gram(s) IV Intermittent every 24 hours    MEDICATIONS  (PRN):  dextrose Gel 1 Dose(s) Oral once PRN Blood Glucose LESS THAN 70 milliGRAM(s)/deciliter  glucagon  Injectable 1 milliGRAM(s) IntraMuscular once PRN Glucose LESS THAN 70 milligrams/deciliter  labetalol 200 milliGRAM(s) Oral three times a day PRN give if systolic blood pressure is above 160 mmHg systolic  hydrALAZINE 25 milliGRAM(s) Oral every 8 hours PRN give if sBP > 160 mmHg  amLODIPine   Tablet 10 milliGRAM(s) Oral daily PRN give if sBP above 160 mmHG systolic  ALBUTerol    0.083%. 2.5 milliGRAM(s) Nebulizer once PRN sputum induction

## 2017-08-06 NOTE — PROGRESS NOTE ADULT - ASSESSMENT
Patient is a 36 y/o female with ESRD diagnosed in 1/17 s/p left UE AVF on hemodialysis on M-W-F, right chest wall permacath, HTN, DM, obesity, HCAP one month ago, comes into the hospital due to shortness of breath since 2 -3 days ago. CXR shows right middle lobe infiltrates , left side pleural effusion improved from previous admission; confirmed with ct chest. Patient was dialyzed yesterday.  Also with hemoglobin of 7.6 today, transfused for symptomatic anemia yesterday.She is admitted for diarrhea, cdiff positive. Incidentally, found to have + blood cx with serratia; concern for now needing to get the PC out b/c this is a recurrence of bacteremia     HD 5 (8/5/2017)-  Nephrology consult note appreciated;  ESRD on HD (TTS schedule) Next HD Today;  ID f/u noted, jaydon has positive Blood Cx for Serratia marcescen, patient on Antibiotics, source could be HD cath;  Plan is to discuss with  vascular surgery about exchange permcath;  Patient is responding well to treatment of Cdiff colitis with PO vanco;

## 2017-08-06 NOTE — PROGRESS NOTE ADULT - PROBLEM SELECTOR PLAN 4
xray and US duplex negative   Likely muscle cramping.   Will try some flexeril xray and US duplex negative   Likely muscle cramping.  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

## 2017-08-06 NOTE — PROGRESS NOTE ADULT - PROBLEM SELECTOR PLAN 6
hemodialysis as schedule   epoetin injection once a week  continue sevelamer  cincalcet, continue calcium acetated  AV fistula present but deep, d/w Vascular NP - needs superficialization prior to use   permacth in place with no erythema

## 2017-08-06 NOTE — PROGRESS NOTE ADULT - ATTENDING COMMENTS
Note addended where needed. Plan discussed with patient and primary team. D/w Renal as well. Will await collaboration with Vascular surgery on Monday

## 2017-08-07 DIAGNOSIS — A41.53 SEPSIS DUE TO SERRATIA: ICD-10-CM

## 2017-08-07 DIAGNOSIS — R78.81 BACTEREMIA: ICD-10-CM

## 2017-08-07 LAB
ANION GAP SERPL CALC-SCNC: 17 MMOL/L — SIGNIFICANT CHANGE UP (ref 5–17)
ANISOCYTOSIS BLD QL: SLIGHT — SIGNIFICANT CHANGE UP
AUTO DIFF PNL BLD: NEGATIVE — SIGNIFICANT CHANGE UP
BUN SERPL-MCNC: 45 MG/DL — HIGH (ref 8–20)
C-ANCA SER-ACNC: NEGATIVE — SIGNIFICANT CHANGE UP
CALCIUM SERPL-MCNC: 9.1 MG/DL — SIGNIFICANT CHANGE UP (ref 8.6–10.2)
CHLORIDE SERPL-SCNC: 90 MMOL/L — LOW (ref 98–107)
CO2 SERPL-SCNC: 23 MMOL/L — SIGNIFICANT CHANGE UP (ref 22–29)
CREAT SERPL-MCNC: 6 MG/DL — HIGH (ref 0.5–1.3)
CULTURE RESULTS: SIGNIFICANT CHANGE UP
DACRYOCYTES BLD QL SMEAR: SLIGHT — SIGNIFICANT CHANGE UP
EOSINOPHIL NFR BLD AUTO: 3 % — SIGNIFICANT CHANGE UP (ref 0–5)
GLUCOSE SERPL-MCNC: 132 MG/DL — HIGH (ref 70–115)
HCT VFR BLD CALC: 25.2 % — LOW (ref 37–47)
HGB BLD-MCNC: 8.1 G/DL — LOW (ref 12–16)
HYPOCHROMIA BLD QL: SLIGHT — SIGNIFICANT CHANGE UP
LYMPHOCYTES # BLD AUTO: 21 % — SIGNIFICANT CHANGE UP (ref 20–55)
MACROCYTES BLD QL: SLIGHT — SIGNIFICANT CHANGE UP
MCHC RBC-ENTMCNC: 25.2 PG — LOW (ref 27–31)
MCHC RBC-ENTMCNC: 32.1 G/DL — SIGNIFICANT CHANGE UP (ref 32–36)
MCV RBC AUTO: 78.3 FL — LOW (ref 81–99)
MICROCYTES BLD QL: SLIGHT — SIGNIFICANT CHANGE UP
MONOCYTES NFR BLD AUTO: 7 % — SIGNIFICANT CHANGE UP (ref 3–10)
NEUTROPHILS NFR BLD AUTO: 66 % — SIGNIFICANT CHANGE UP (ref 37–73)
ORGANISM # SPEC MICROSCOPIC CNT: SIGNIFICANT CHANGE UP
ORGANISM # SPEC MICROSCOPIC CNT: SIGNIFICANT CHANGE UP
OVALOCYTES BLD QL SMEAR: SLIGHT — SIGNIFICANT CHANGE UP
P-ANCA SER-ACNC: NEGATIVE — SIGNIFICANT CHANGE UP
PLAT MORPH BLD: NORMAL — SIGNIFICANT CHANGE UP
PLATELET # BLD AUTO: 279 K/UL — SIGNIFICANT CHANGE UP (ref 150–400)
POIKILOCYTOSIS BLD QL AUTO: SLIGHT — SIGNIFICANT CHANGE UP
POLYCHROMASIA BLD QL SMEAR: SLIGHT — SIGNIFICANT CHANGE UP
POTASSIUM SERPL-MCNC: 4.1 MMOL/L — SIGNIFICANT CHANGE UP (ref 3.5–5.3)
POTASSIUM SERPL-SCNC: 4.1 MMOL/L — SIGNIFICANT CHANGE UP (ref 3.5–5.3)
RBC # BLD: 3.22 M/UL — LOW (ref 4.4–5.2)
RBC # FLD: 17.1 % — HIGH (ref 11–15.6)
RBC BLD AUTO: ABNORMAL
SCHISTOCYTES BLD QL AUTO: SLIGHT — SIGNIFICANT CHANGE UP
SODIUM SERPL-SCNC: 130 MMOL/L — LOW (ref 135–145)
SPECIMEN SOURCE: SIGNIFICANT CHANGE UP
VARIANT LYMPHS # BLD: 3 % — SIGNIFICANT CHANGE UP (ref 0–6)
WBC # BLD: 9.2 K/UL — SIGNIFICANT CHANGE UP (ref 4.8–10.8)
WBC # FLD AUTO: 9.2 K/UL — SIGNIFICANT CHANGE UP (ref 4.8–10.8)

## 2017-08-07 PROCEDURE — 99233 SBSQ HOSP IP/OBS HIGH 50: CPT

## 2017-08-07 RX ORDER — INSULIN LISPRO 100/ML
VIAL (ML) SUBCUTANEOUS
Qty: 0 | Refills: 0 | Status: DISCONTINUED | OUTPATIENT
Start: 2017-08-07 | End: 2017-08-14

## 2017-08-07 RX ADMIN — AMLODIPINE BESYLATE 10 MILLIGRAM(S): 2.5 TABLET ORAL at 05:31

## 2017-08-07 RX ADMIN — SEVELAMER CARBONATE 400 MILLIGRAM(S): 2400 POWDER, FOR SUSPENSION ORAL at 16:47

## 2017-08-07 RX ADMIN — Medication 3 MILLILITER(S): at 08:47

## 2017-08-07 RX ADMIN — Medication 3 MILLILITER(S): at 16:19

## 2017-08-07 RX ADMIN — Medication 1: at 11:28

## 2017-08-07 RX ADMIN — Medication 125 MILLIGRAM(S): at 07:46

## 2017-08-07 RX ADMIN — PANTOPRAZOLE SODIUM 40 MILLIGRAM(S): 20 TABLET, DELAYED RELEASE ORAL at 05:39

## 2017-08-07 RX ADMIN — HEPARIN SODIUM 5000 UNIT(S): 5000 INJECTION INTRAVENOUS; SUBCUTANEOUS at 05:31

## 2017-08-07 RX ADMIN — Medication 125 MILLIGRAM(S): at 21:13

## 2017-08-07 RX ADMIN — Medication 1 SPRAY(S): at 05:31

## 2017-08-07 RX ADMIN — Medication 2: at 23:34

## 2017-08-07 RX ADMIN — Medication 3 MILLILITER(S): at 19:44

## 2017-08-07 RX ADMIN — CEFTRIAXONE 100 GRAM(S): 500 INJECTION, POWDER, FOR SOLUTION INTRAMUSCULAR; INTRAVENOUS at 23:34

## 2017-08-07 RX ADMIN — Medication 1 MILLIGRAM(S): at 11:28

## 2017-08-07 RX ADMIN — Medication 3 MILLILITER(S): at 03:21

## 2017-08-07 RX ADMIN — Medication 81 MILLIGRAM(S): at 11:28

## 2017-08-07 RX ADMIN — Medication 1: at 16:47

## 2017-08-07 RX ADMIN — Medication 50 MILLIGRAM(S): at 13:35

## 2017-08-07 RX ADMIN — SEVELAMER CARBONATE 400 MILLIGRAM(S): 2400 POWDER, FOR SUSPENSION ORAL at 13:35

## 2017-08-07 RX ADMIN — Medication 40 MILLIGRAM(S): at 05:31

## 2017-08-07 RX ADMIN — Medication 50 MILLIGRAM(S): at 21:12

## 2017-08-07 RX ADMIN — Medication 10 MILLIGRAM(S): at 05:31

## 2017-08-07 RX ADMIN — Medication 50 MILLIGRAM(S): at 05:31

## 2017-08-07 RX ADMIN — CALCITRIOL 0.5 MICROGRAM(S): 0.5 CAPSULE ORAL at 11:28

## 2017-08-07 RX ADMIN — SEVELAMER CARBONATE 400 MILLIGRAM(S): 2400 POWDER, FOR SUSPENSION ORAL at 09:09

## 2017-08-07 RX ADMIN — Medication 125 MILLIGRAM(S): at 02:02

## 2017-08-07 RX ADMIN — Medication 125 MILLIGRAM(S): at 15:06

## 2017-08-07 NOTE — PROGRESS NOTE ADULT - SUBJECTIVE AND OBJECTIVE BOX
NEPHROLOGY INTERVAL HPI/OVERNIGHT EVENTS:    Examined   Volume overloaded dyspnea    MEDICATIONS  (STANDING):  aspirin enteric coated 81 milliGRAM(s) Oral daily  sevelamer hydrochloride 400 milliGRAM(s) Oral three times a day with meals  pantoprazole    Tablet 40 milliGRAM(s) Oral before breakfast  calcitriol   Capsule 0.5 MICROGram(s) Oral daily  ergocalciferol 62985 Unit(s) Oral every week  folic acid 1 milliGRAM(s) Oral daily  insulin lispro (HumaLOG) corrective regimen sliding scale   SubCutaneous three times a day before meals  dextrose 5%. 1000 milliLiter(s) (50 mL/Hr) IV Continuous <Continuous>  dextrose 50% Injectable 12.5 Gram(s) IV Push once  dextrose 50% Injectable 25 Gram(s) IV Push once  dextrose 50% Injectable 25 Gram(s) IV Push once  heparin  Injectable 5000 Unit(s) SubCutaneous every 12 hours  ALBUTerol/ipratropium for Nebulization 3 milliLiter(s) Nebulizer every 6 hours  calcium acetate 667 milliGRAM(s) Oral three times a day with meals  epoetin raina Injectable 76470 Unit(s) IV Push <User Schedule>  vancomycin    Solution 125 milliGRAM(s) Oral every 6 hours  cefTRIAXone   IVPB 2 Gram(s) IV Intermittent every 24 hours  amLODIPine   Tablet 10 milliGRAM(s) Oral daily  predniSONE   Tablet 40 milliGRAM(s) Oral daily  hydrALAZINE 50 milliGRAM(s) Oral every 8 hours    MEDICATIONS  (PRN):  dextrose Gel 1 Dose(s) Oral once PRN Blood Glucose LESS THAN 70 milliGRAM(s)/deciliter  glucagon  Injectable 1 milliGRAM(s) IntraMuscular once PRN Glucose LESS THAN 70 milligrams/deciliter  ALBUTerol    0.083%. 2.5 milliGRAM(s) Nebulizer once PRN sputum induction  cyclobenzaprine 5 milliGRAM(s) Oral three times a day PRN Muscle Spasm  traMADol 25 milliGRAM(s) Oral once PRN Moderate Pain (4 - 6)  hydrALAZINE Injectable 10 milliGRAM(s) IV Push every 8 hours PRN SBP over 150      Allergies    Mushrooms (Hives (Mild))  No Known Drug Allergies    Intolerances        Vital Signs Last 24 Hrs  T(C): 36.4 (07 Aug 2017 07:13), Max: 36.8 (06 Aug 2017 15:30)  T(F): 97.5 (07 Aug 2017 07:13), Max: 98.2 (06 Aug 2017 15:30)  HR: 103 (07 Aug 2017 07:13) (70 - 106)  BP: 157/84 (07 Aug 2017 07:13) (157/84 - 180/95)  BP(mean): --  RR: 18 (07 Aug 2017 07:13) (18 - 22)  SpO2: 96% (07 Aug 2017 07:13) (95% - 100%)  Daily     Daily Weight in k (07 Aug 2017 08:55)    PHYSICAL EXAM:  HEENT: normocephalic and atraumatic  NECK: Supple.  LUNGS: B/L wheezing  HEART: Regular rate and rhythm   ABDOMEN: Soft, nontender, and nondistended.  Positive bowel sounds.    EXTREMITIES: With B/L edema.    LABS:                        8.1    9.2   )-----------( 279      ( 07 Aug 2017 06:22 )             25.2     08-07    130<L>  |  90<L>  |  45.0<H>  ----------------------------<  132<H>  4.1   |  23.0  |  6.00<H>    Ca    9.1      07 Aug 2017 06:22                  RADIOLOGY & ADDITIONAL TESTS:

## 2017-08-07 NOTE — PROGRESS NOTE ADULT - ASSESSMENT
Patient is a 36 y/o female with ESRD diagnosed in 1/17 s/p left UE AVF on hemodialysis on M-W-F, right chest wall permacath, HTN, DM, obesity, HCAP one month ago, comes into the hospital due to shortness of breath since 2 -3 days ago. CXR shows right middle lobe infiltrates , left side pleural effusion improved from previous admission; confirmed with ct chest. Patient was dialyzed yesterday.  Also with hemoglobin of 7.6 today, transfused for symptomatic anemia yesterday.She is admitted for diarrhea, cdiff positive. Incidentally, found to have + blood cx with serratia; concern for now needing to get the PC out b/c this is a recurrence of bacteremia     HD 7 (8/7/2017)-      Rocephin Antibiotic day # 7;   Nephrology consult note appreciated;  ESRD on HD (TTS schedule) ;  ID f/u noted, patient has positive Blood Cx for Serratia marcescen, patient on Antibiotics, source could be HD Permacath;      Patient had HD yesterday;    Patient is responding well to treatment of Cdiff colitis with PO vanco;      There are plans to superficialize AVF after infectious process resolves, Vascular surgery on board, ID notes patient currently not toxic and responding well to treatment;      8/1/2017 pos BC for Serratia;    6/12/2017 pos BC for Serratia;

## 2017-08-07 NOTE — PROGRESS NOTE ADULT - PROBLEM SELECTOR PLAN 5
RESOLVED on last admission. NO RECURRENCE   O2 by a nasal canula to keep O2 between 90-93 %.   ct chest done, shows improvement from previous CT  Wheezing improved - likely has an Underlying OHS/DALTON

## 2017-08-07 NOTE — PROGRESS NOTE ADULT - ASSESSMENT
ESRD had additionalHD session on sat but is on MWF schedule will HD today to put back   on schedule and for volume overload and dyspnea aim for 3-3.5 L as tolerated via permcath  ID: + Blood Cx  Serratia maikelpedrito (8/1) on Abx source could be HD cath.   Had + Blood cx same organism 6/12- afebrile white count not elevated does not look toxic its reasonable to  treat with abx for now- can exchange permcath at later date after acute infectious process resolves  will d/w pt primary vasc sx Dr Zamorano.  Noted vasc sx plans to superficialize AVF after infectious process resolves  Cdiff colitis on PO vanco feeling better

## 2017-08-07 NOTE — PROGRESS NOTE ADULT - PROBLEM SELECTOR PLAN 7
note well controlled   -resumed home meds - given respiratory wheezing, hold off of labetalol at this time - resume norvasc and hydralazine (increased dose to 50mg with prn)   echo noted

## 2017-08-07 NOTE — PROGRESS NOTE ADULT - PROBLEM SELECTOR PLAN 1
-s/p HD yesterday;    -Rocephin 2 gr x 24 hr (day #5)  - BCx from 8/4 are negative to date   - Ultimate goal is to evaluate permacath as source of bacteriemia + for serratia mercences, previous BCs for 6/12 previous hospitalization were positive for the same Serratia;   -patient does not want to get PC out if it means that she will be getting a temp catheter in her groin.   -Consults feel patient currently not toxic, responding well to treatmetn, superficiliazing AVF is not off table, pending Vascular note;    - nephrology & id both recommend exchange permacath - however primary vascular surgeon does not think that the PC is the source and prefers to salvage the PC and tx with abx. -s/p HD on 8/5/17;   net= -2500cc  -Rocephin 2 gr x 24 hr (day #7)  - BCx from 8/4 are negative to date   - Ultimate goal is to evaluate permacath as source of bacteriemia + for serratia mercences, previous BCs for 6/12 previous hospitalization were positive for the same Serratia;   -patient does not want to get PC out if it means that she will be getting a temp catheter in her groin.   -Consults feel patient currently not toxic, responding well to treatmetn, superficiliazing AVF is not off table, pending Vascular note;    - nephrology & id both recommend exchange permacath - however primary vascular surgeon does not think that the PC is the source and prefers to salvage the PC and tx with abx.

## 2017-08-07 NOTE — PROGRESS NOTE ADULT - SUBJECTIVE AND OBJECTIVE BOX
CC: Diarrhea and shortness of breath    Patient seen and examined at bedside.  No acute events overnight.     She is having cough w/ clear secretions, no fever over night, she denies any diarrhea or abdominal pain . She states that she felt SOB and needs O2. She was wheezing on exam throughout all lung fields, she got HD yesterday. She refuse tx w/ steroid and didn't want to take it. However, again states that her SOB is improved. She still has the left lower posterior calf pain and states that it is 10/10 and cramping in sensation. No further diarrhea       Cardiac monitor reviewed:     ROS  General- denies any fever/chills  Resp-  sob and cough resolving  GI- denies any abodminal pain or diarrhea  - denies any dysuria/hematuria    Vital Signs Last 24 Hrs  T(C): 36.6 (05 Aug 2017 23:20), Max: 36.9 (05 Aug 2017 17:40)  T(F): 97.8 (05 Aug 2017 23:20), Max: 98.5 (05 Aug 2017 17:40)  HR: 100 (06 Aug 2017 05:47) (92 - 100)  BP: 150/84 (06 Aug 2017 05:47) (150/84 - 189/96)  BP(mean): --  RR: 20 (05 Aug 2017 23:20) (18 - 20)  SpO2: 98% (06 Aug 2017 03:18) (18% - 98%)      I&O's Summary    05 Aug 2017 07:01  -  06 Aug 2017 07:00  --------------------------------------------------------  IN: 600 mL / OUT: 3100 mL / NET: -2500 mL      PE  GENERAL: NAD, well-groomed, well-developed  HEAD:  Atraumatic, Normocephalic  EYES: EOMI, PERRLA, conjunctiva and sclera clear  ENMT:  Moist mucous membranes, Good dentition, No lesions  NECK: Supple, No JVD, Normal thyroid. right chest HD catheter in place  LUNG: inspiratory and expiratory wheezing bilaterally; bilateral  rales,  HEART: Regular rate and rhythm; No murmurs, rubs, or gallops  ABDOMEN: Soft, Nontender, Nondistended; Bowel sounds present  EXTREMITIES:  2+ Peripheral Pulses, No clubbing, cyanosis, or edema, LUE AVF placement + thrill palpable  LYMPH: No lymphadenopathy noted  NERVOUS SYSTEM:  Alert & Oriented X3, Good concentration; Motor Strength 5/5 B/L upper and lower extremities  SKIN: No rashes or lesions , no hematomas, capillary refills less than 2 seconds    Lab Results:                          7.7    8.0   )-----------( 288      ( 05 Aug 2017 06:56 )             24.3   08-05    133<L>  |  93<L>  |  44.0<H>  ----------------------------<  121<H>  3.9   |  24.0  |  6.02<H>    Ca    8.5<L>      05 Aug 2017 06:56        MICROBIOLOGY/CULTURES:  Blood cx: serratia m. Repeat blood cx in lab     RADIOLOGY RESULTS:    RADIOLOGY:   < from: CT Chest No Cont (08.01.17 @ 22:46) >  LUNGS AND LARGE AIRWAYS: Decreased bilateral patchy airspace opacities,   probably pneumonia.  PLEURA: No pleural effusion.  VESSELS: Right-sided central venous catheter with tip terminating in the   superior cavoatrial junction.   HEART: Heart size is normal. No pericardial effusion.  MEDIASTINUM AND SHANITA: Persistent mediastinal and hilar adenopathy.  CHEST WALL AND LOWER NECK: Within normal limits.  VISUALIZED UPPER ABDOMEN: Trace perihepatic ascites.  BONES: Within normal limits.    IMPRESSION:   1.  Since June 13, 2017, decreased bilateral patchy airspace opacities,   probably pneumonia.  2.  Persistent mediastinal and hilar adenopathy.     < end of copied text >      MEDICATIONS  (STANDING):  aspirin enteric coated 81 milliGRAM(s) Oral daily  sevelamer hydrochloride 400 milliGRAM(s) Oral three times a day with meals  pantoprazole    Tablet 40 milliGRAM(s) Oral before breakfast  calcitriol   Capsule 0.5 MICROGram(s) Oral daily  ergocalciferol 26540 Unit(s) Oral every week  folic acid 1 milliGRAM(s) Oral daily  insulin lispro (HumaLOG) corrective regimen sliding scale   SubCutaneous three times a day before meals  dextrose 5%. 1000 milliLiter(s) (50 mL/Hr) IV Continuous <Continuous>  dextrose 50% Injectable 12.5 Gram(s) IV Push once  dextrose 50% Injectable 25 Gram(s) IV Push once  dextrose 50% Injectable 25 Gram(s) IV Push once  heparin  Injectable 5000 Unit(s) SubCutaneous every 12 hours  ALBUTerol/ipratropium for Nebulization 3 milliLiter(s) Nebulizer every 6 hours  calcium acetate 667 milliGRAM(s) Oral three times a day with meals  epoetin raina Injectable 55216 Unit(s) IV Push <User Schedule>  vancomycin    Solution 125 milliGRAM(s) Oral every 6 hours  cefTRIAXone   IVPB 2 Gram(s) IV Intermittent every 24 hours    MEDICATIONS  (PRN):  dextrose Gel 1 Dose(s) Oral once PRN Blood Glucose LESS THAN 70 milliGRAM(s)/deciliter  glucagon  Injectable 1 milliGRAM(s) IntraMuscular once PRN Glucose LESS THAN 70 milligrams/deciliter  labetalol 200 milliGRAM(s) Oral three times a day PRN give if systolic blood pressure is above 160 mmHg systolic  hydrALAZINE 25 milliGRAM(s) Oral every 8 hours PRN give if sBP > 160 mmHg  amLODIPine   Tablet 10 milliGRAM(s) Oral daily PRN give if sBP above 160 mmHG systolic  ALBUTerol    0.083%. 2.5 milliGRAM(s) Nebulizer once PRN sputum induction CC: Diarrhea and shortness of breath    Patient seen and examined at bedside.  No acute events overnight.     She is having cough w/ clear secretions, no fever over night, she denies any diarrhea or abdominal pain . She states that she felt SOB and needs O2. She was wheezing on exam throughout all lung fields, she got HD yesterday. She refuse tx w/ steroid and didn't want to take it. However, again states that her SOB is improved. She still has the left lower posterior calf pain and states that it is 10/10 and cramping in sensation. No further diarrhea       Cardiac monitor reviewed:     ROS  General- denies any fever/chills  Resp-  sob and cough resolving  GI- denies any abodminal pain or diarrhea  - denies any dysuria/hematuria    Vital Signs Last 24 Hrs  T(C): 36.3 (06 Aug 2017 23:40), Max: 36.8 (06 Aug 2017 15:30)  T(F): 97.3 (06 Aug 2017 23:40), Max: 98.2 (06 Aug 2017 15:30)  HR: 106 (07 Aug 2017 05:22) (70 - 106)  BP: 168/90 (07 Aug 2017 05:22) (157/85 - 180/95)  Hypertensive  RR: 18 (06 Aug 2017 23:40) (18 - 22)  SpO2: 99% (07 Aug 2017 05:22) (18% - 100%)      I&O's Summary    05 Aug 2017 07:01  -  06 Aug 2017 07:00  --------------------------------------------------------  IN: 600 mL / OUT: 3100 mL / NET: -2500 mL    Weight 121kg before HD on 8/5/17  ;    post HD weight not recorded;  no weight recorded since;        HD on 8/5/17     Intake =600cc  Output =3100cc  Net= -2500cc       PE  GENERAL: NAD, well-groomed, well-developed  HEAD:  Atraumatic, Normocephalic  EYES: EOMI, PERRLA, conjunctiva and sclera clear  ENMT:  Moist mucous membranes, Good dentition, No lesions  NECK: Supple, No JVD, Normal thyroid. right chest HD catheter in place  LUNG: inspiratory and expiratory wheezing bilaterally; bilateral  rales,  HEART: Regular rate and rhythm; No murmurs, rubs, or gallops  ABDOMEN: Soft, Nontender, Nondistended; Bowel sounds present  EXTREMITIES:  2+ Peripheral Pulses, No clubbing, cyanosis, or edema, LUE AVF placement + thrill palpable  LYMPH: No lymphadenopathy noted  NERVOUS SYSTEM:  Alert & Oriented X3, Good concentration; Motor Strength 5/5 B/L upper and lower extremities  SKIN: No rashes or lesions , no hematomas, capillary refills less than 2 seconds    Lab Results:                          7.7    8.0   )-----------( 288      ( 05 Aug 2017 06:56 )             24.3   08-05    133<L>  |  93<L>  |  44.0<H>  ----------------------------<  121<H>  3.9   |  24.0  |  6.02<H>    Ca    8.5<L>      05 Aug 2017 06:56        MICROBIOLOGY/CULTURES:  Blood cx: serratia m. Repeat blood cx in lab     RADIOLOGY RESULTS:    RADIOLOGY:   < from: CT Chest No Cont (08.01.17 @ 22:46) >  LUNGS AND LARGE AIRWAYS: Decreased bilateral patchy airspace opacities,   probably pneumonia.  PLEURA: No pleural effusion.  VESSELS: Right-sided central venous catheter with tip terminating in the   superior cavoatrial junction.   HEART: Heart size is normal. No pericardial effusion.  MEDIASTINUM AND SHANITA: Persistent mediastinal and hilar adenopathy.  CHEST WALL AND LOWER NECK: Within normal limits.  VISUALIZED UPPER ABDOMEN: Trace perihepatic ascites.  BONES: Within normal limits.    IMPRESSION:   1.  Since June 13, 2017, decreased bilateral patchy airspace opacities,   probably pneumonia.  2.  Persistent mediastinal and hilar adenopathy.     < end of copied text >      MEDICATIONS  (STANDING):  aspirin enteric coated 81 milliGRAM(s) Oral daily  sevelamer hydrochloride 400 milliGRAM(s) Oral three times a day with meals  pantoprazole    Tablet 40 milliGRAM(s) Oral before breakfast  calcitriol   Capsule 0.5 MICROGram(s) Oral daily  ergocalciferol 24840 Unit(s) Oral every week  folic acid 1 milliGRAM(s) Oral daily  insulin lispro (HumaLOG) corrective regimen sliding scale   SubCutaneous three times a day before meals  dextrose 5%. 1000 milliLiter(s) (50 mL/Hr) IV Continuous <Continuous>  dextrose 50% Injectable 12.5 Gram(s) IV Push once  dextrose 50% Injectable 25 Gram(s) IV Push once  dextrose 50% Injectable 25 Gram(s) IV Push once  heparin  Injectable 5000 Unit(s) SubCutaneous every 12 hours  ALBUTerol/ipratropium for Nebulization 3 milliLiter(s) Nebulizer every 6 hours  calcium acetate 667 milliGRAM(s) Oral three times a day with meals  epoetin raina Injectable 35039 Unit(s) IV Push <User Schedule>  vancomycin    Solution 125 milliGRAM(s) Oral every 6 hours  cefTRIAXone   IVPB 2 Gram(s) IV Intermittent every 24 hours  amLODIPine   Tablet 10 milliGRAM(s) Oral daily  predniSONE   Tablet 40 milliGRAM(s) Oral daily  hydrALAZINE 50 milliGRAM(s) Oral every 8 hours    MEDICATIONS  (PRN):  dextrose Gel 1 Dose(s) Oral once PRN Blood Glucose LESS THAN 70 milliGRAM(s)/deciliter  glucagon  Injectable 1 milliGRAM(s) IntraMuscular once PRN Glucose LESS THAN 70 milligrams/deciliter  ALBUTerol    0.083%. 2.5 milliGRAM(s) Nebulizer once PRN sputum induction  cyclobenzaprine 5 milliGRAM(s) Oral three times a day PRN Muscle Spasm  traMADol 25 milliGRAM(s) Oral once PRN Moderate Pain (4 - 6)  hydrALAZINE Injectable 10 milliGRAM(s) IV Push every 8 hours PRN SBP over 150

## 2017-08-07 NOTE — PROGRESS NOTE ADULT - SUBJECTIVE AND OBJECTIVE BOX
ID FOLLOW UP    RECURRENT SERRATIA BACTEREMIA FROM CATHETER      SIMILAR ORGANISM LAST ADMIT      BLOOD C/S NEG    PE NEG    NEEDS CATHETER OUT ASAP AND DEFER NEW ONE FOR SEVERAL WEEKS - NO OTHER OPTION TO GETTING PERMACATH OUT

## 2017-08-07 NOTE — PROGRESS NOTE ADULT - PROBLEM SELECTOR PLAN 4
xray and US duplex negative   Likely muscle cramping.  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

## 2017-08-07 NOTE — DIETITIAN INITIAL EVALUATION ADULT. - NS AS NUTRI INTERV ED CONTENT
Purpose of the nutrition education/Pt reports following Renal diet; education received upon prior admission. declined further education at this time.

## 2017-08-08 ENCOUNTER — MOBILE ON CALL (OUTPATIENT)
Age: 37
End: 2017-08-08

## 2017-08-08 DIAGNOSIS — R78.81 BACTEREMIA: ICD-10-CM

## 2017-08-08 DIAGNOSIS — Z01.811 ENCOUNTER FOR PREPROCEDURAL RESPIRATORY EXAMINATION: ICD-10-CM

## 2017-08-08 DIAGNOSIS — R59.1 GENERALIZED ENLARGED LYMPH NODES: ICD-10-CM

## 2017-08-08 DIAGNOSIS — E66.01 MORBID (SEVERE) OBESITY DUE TO EXCESS CALORIES: ICD-10-CM

## 2017-08-08 DIAGNOSIS — R91.8 OTHER NONSPECIFIC ABNORMAL FINDING OF LUNG FIELD: ICD-10-CM

## 2017-08-08 LAB
ALBUMIN SERPL ELPH-MCNC: 3.4 G/DL — SIGNIFICANT CHANGE UP (ref 3.3–5.2)
ALP SERPL-CCNC: 118 U/L — SIGNIFICANT CHANGE UP (ref 40–120)
ALT FLD-CCNC: 13 U/L — SIGNIFICANT CHANGE UP
ANION GAP SERPL CALC-SCNC: 17 MMOL/L — SIGNIFICANT CHANGE UP (ref 5–17)
ANION GAP SERPL CALC-SCNC: 21 MMOL/L — HIGH (ref 5–17)
AST SERPL-CCNC: 10 U/L — SIGNIFICANT CHANGE UP
BASOPHILS # BLD AUTO: 0 K/UL — SIGNIFICANT CHANGE UP (ref 0–0.2)
BASOPHILS NFR BLD AUTO: 0.3 % — SIGNIFICANT CHANGE UP (ref 0–2)
BILIRUB SERPL-MCNC: 0.3 MG/DL — LOW (ref 0.4–2)
BUN SERPL-MCNC: 42 MG/DL — HIGH (ref 8–20)
BUN SERPL-MCNC: 62 MG/DL — HIGH (ref 8–20)
CALCIUM SERPL-MCNC: 9.2 MG/DL — SIGNIFICANT CHANGE UP (ref 8.6–10.2)
CALCIUM SERPL-MCNC: 9.3 MG/DL — SIGNIFICANT CHANGE UP (ref 8.6–10.2)
CHLORIDE SERPL-SCNC: 87 MMOL/L — LOW (ref 98–107)
CHLORIDE SERPL-SCNC: 90 MMOL/L — LOW (ref 98–107)
CO2 SERPL-SCNC: 21 MMOL/L — LOW (ref 22–29)
CO2 SERPL-SCNC: 23 MMOL/L — SIGNIFICANT CHANGE UP (ref 22–29)
CREAT SERPL-MCNC: 5.41 MG/DL — HIGH (ref 0.5–1.3)
CREAT SERPL-MCNC: 8.41 MG/DL — HIGH (ref 0.5–1.3)
EOSINOPHIL # BLD AUTO: 0 K/UL — SIGNIFICANT CHANGE UP (ref 0–0.5)
EOSINOPHIL NFR BLD AUTO: 0.3 % — SIGNIFICANT CHANGE UP (ref 0–6)
GLUCOSE SERPL-MCNC: 187 MG/DL — HIGH (ref 70–115)
GLUCOSE SERPL-MCNC: 277 MG/DL — HIGH (ref 70–115)
HCT VFR BLD CALC: 23.9 % — LOW (ref 37–47)
HGB BLD-MCNC: 7.8 G/DL — LOW (ref 12–16)
LYMPHOCYTES # BLD AUTO: 1.2 K/UL — SIGNIFICANT CHANGE UP (ref 1–4.8)
LYMPHOCYTES # BLD AUTO: 12.7 % — LOW (ref 20–55)
MCHC RBC-ENTMCNC: 25.3 PG — LOW (ref 27–31)
MCHC RBC-ENTMCNC: 32.6 G/DL — SIGNIFICANT CHANGE UP (ref 32–36)
MCV RBC AUTO: 77.6 FL — LOW (ref 81–99)
MONOCYTES # BLD AUTO: 0.9 K/UL — HIGH (ref 0–0.8)
MONOCYTES NFR BLD AUTO: 9.3 % — SIGNIFICANT CHANGE UP (ref 3–10)
NEUTROPHILS # BLD AUTO: 7.2 K/UL — SIGNIFICANT CHANGE UP (ref 1.8–8)
NEUTROPHILS NFR BLD AUTO: 76.7 % — HIGH (ref 37–73)
PLATELET # BLD AUTO: 284 K/UL — SIGNIFICANT CHANGE UP (ref 150–400)
POTASSIUM SERPL-MCNC: 4.3 MMOL/L — SIGNIFICANT CHANGE UP (ref 3.5–5.3)
POTASSIUM SERPL-MCNC: 4.6 MMOL/L — SIGNIFICANT CHANGE UP (ref 3.5–5.3)
POTASSIUM SERPL-SCNC: 4.3 MMOL/L — SIGNIFICANT CHANGE UP (ref 3.5–5.3)
POTASSIUM SERPL-SCNC: 4.6 MMOL/L — SIGNIFICANT CHANGE UP (ref 3.5–5.3)
PROT SERPL-MCNC: 8.2 G/DL — SIGNIFICANT CHANGE UP (ref 6.6–8.7)
RBC # BLD: 3.08 M/UL — LOW (ref 4.4–5.2)
RBC # FLD: 17.4 % — HIGH (ref 11–15.6)
SODIUM SERPL-SCNC: 129 MMOL/L — LOW (ref 135–145)
SODIUM SERPL-SCNC: 130 MMOL/L — LOW (ref 135–145)
TOTAL HEM COMP BLD-ACNC: 52 U/ML — SIGNIFICANT CHANGE UP (ref 42–60)
WBC # BLD: 9.4 K/UL — SIGNIFICANT CHANGE UP (ref 4.8–10.8)
WBC # FLD AUTO: 9.4 K/UL — SIGNIFICANT CHANGE UP (ref 4.8–10.8)

## 2017-08-08 PROCEDURE — 99233 SBSQ HOSP IP/OBS HIGH 50: CPT

## 2017-08-08 RX ADMIN — Medication 3: at 21:50

## 2017-08-08 RX ADMIN — SEVELAMER CARBONATE 400 MILLIGRAM(S): 2400 POWDER, FOR SUSPENSION ORAL at 17:25

## 2017-08-08 RX ADMIN — AMLODIPINE BESYLATE 10 MILLIGRAM(S): 2.5 TABLET ORAL at 06:21

## 2017-08-08 RX ADMIN — Medication 125 MILLIGRAM(S): at 12:04

## 2017-08-08 RX ADMIN — Medication 50 MILLIGRAM(S): at 15:30

## 2017-08-08 RX ADMIN — PANTOPRAZOLE SODIUM 40 MILLIGRAM(S): 20 TABLET, DELAYED RELEASE ORAL at 06:21

## 2017-08-08 RX ADMIN — SEVELAMER CARBONATE 400 MILLIGRAM(S): 2400 POWDER, FOR SUSPENSION ORAL at 12:03

## 2017-08-08 RX ADMIN — Medication: at 12:03

## 2017-08-08 RX ADMIN — Medication 125 MILLIGRAM(S): at 21:51

## 2017-08-08 RX ADMIN — Medication 10 MILLIGRAM(S): at 10:28

## 2017-08-08 RX ADMIN — CALCITRIOL 0.5 MICROGRAM(S): 0.5 CAPSULE ORAL at 12:09

## 2017-08-08 RX ADMIN — Medication 125 MILLIGRAM(S): at 15:31

## 2017-08-08 RX ADMIN — Medication 1: at 12:07

## 2017-08-08 RX ADMIN — Medication 50 MILLIGRAM(S): at 21:50

## 2017-08-08 RX ADMIN — Medication 125 MILLIGRAM(S): at 02:18

## 2017-08-08 RX ADMIN — Medication 81 MILLIGRAM(S): at 12:09

## 2017-08-08 RX ADMIN — Medication 1 MILLIGRAM(S): at 12:09

## 2017-08-08 RX ADMIN — Medication 3 MILLILITER(S): at 16:12

## 2017-08-08 RX ADMIN — ERYTHROPOIETIN 12000 UNIT(S): 10000 INJECTION, SOLUTION INTRAVENOUS; SUBCUTANEOUS at 10:22

## 2017-08-08 RX ADMIN — SEVELAMER CARBONATE 400 MILLIGRAM(S): 2400 POWDER, FOR SUSPENSION ORAL at 12:09

## 2017-08-08 RX ADMIN — Medication 1: at 17:22

## 2017-08-08 RX ADMIN — Medication 40 MILLIGRAM(S): at 06:20

## 2017-08-08 RX ADMIN — CEFTRIAXONE 100 GRAM(S): 500 INJECTION, POWDER, FOR SOLUTION INTRAMUSCULAR; INTRAVENOUS at 21:51

## 2017-08-08 RX ADMIN — Medication 50 MILLIGRAM(S): at 06:21

## 2017-08-08 RX ADMIN — Medication 3 MILLILITER(S): at 02:16

## 2017-08-08 RX ADMIN — Medication 3 MILLILITER(S): at 19:45

## 2017-08-08 NOTE — CHART NOTE - NSCHARTNOTEFT_GEN_A_CORE
PGY2 addendum    Patient's low Na, Urine Osm, urine Na, Serum osm ordered;    9:21 pm labs  Tf=189, K=4.6, Cl=90, Bun=42, Creat=5.41 PGY2 addendum    Patient's low Na, Urine Osm, urine Na, Serum osm ordered;    9:21 pm labs  Ne=075, K=4.6, Cl=90, Bun=42, Creat=5.41  Creatinine is trending down;    Cl is fluctuating in same level;  BUN/Cr (S) ratio < 10 (fraction) indicates renal causes.  Pending workup; AG=17, very mildly elevated;

## 2017-08-08 NOTE — CONSULT NOTE ADULT - SUBJECTIVE AND OBJECTIVE BOX
PULMONARY CONSULT NOTE      EARLINE JIMENEZ-[id number]    Patient is a 37y old  Female who presents with a chief complaint of Diarrhea and shortness of breath (05 Aug 2017 10:07)  38 y/o female with ESRD diagnosed in 1/17 s/p left UE AVF on hemodialysis on M-W-F, right chest wall permacath, HTN, DM tx w/ insulin, obesity, HCAP a month ago, comes into the hospital due to  shortness of breath since 2 -3 days ago. The pt has shortness of breast on her base line but over the past 3 days her shortness of breath has increase and it's accompanied by dry cough, she complain of chill but no fever. At her base line  sometimes use O2 at home by nasal canula No sick contacts no recent travel . The pt was recently discharge from [facility] for HCAP and she got 7 days of Cefepime and she also completed a 7 days of Levaquin outpatient. Pt denies orthopnea and paroxymal dyspnea  but she said that never sleep flat because she "she doesnt feel right when she lies flat". She has swelling in both leg but now are the same and not different from her base line. Pt denies chest pain,  denies hx of clots .   Pt on the previous admission had left pleural effusion and had a chest tube place to drained her fluid,her shortness of breast resolved after got her fluid removed, she denies to miss any of her dialysis appointments .   Pt denies chest pain, no palpitations.  Pt also complain of several nonbloody diarrhea since today watery, she states uncountable,    she denies nausea of vomiting, no abdominal pain , not hx of Cdiff infection    HISTORY OF PRESENT ILLNESS:    MEDICATIONS  (STANDING):  aspirin enteric coated 81 milliGRAM(s) Oral daily  sevelamer hydrochloride 400 milliGRAM(s) Oral three times a day with meals  pantoprazole    Tablet 40 milliGRAM(s) Oral before breakfast  calcitriol   Capsule 0.5 MICROGram(s) Oral daily  ergocalciferol 37392 Unit(s) Oral every week  folic acid 1 milliGRAM(s) Oral daily  dextrose 5%. 1000 milliLiter(s) (50 mL/Hr) IV Continuous <Continuous>  dextrose 50% Injectable 12.5 Gram(s) IV Push once  dextrose 50% Injectable 25 Gram(s) IV Push once  dextrose 50% Injectable 25 Gram(s) IV Push once  heparin  Injectable 5000 Unit(s) SubCutaneous every 12 hours  ALBUTerol/ipratropium for Nebulization 3 milliLiter(s) Nebulizer every 6 hours  calcium acetate 667 milliGRAM(s) Oral three times a day with meals  epoetin raina Injectable 49393 Unit(s) IV Push <User Schedule>  vancomycin    Solution 125 milliGRAM(s) Oral every 6 hours  cefTRIAXone   IVPB 2 Gram(s) IV Intermittent every 24 hours  amLODIPine   Tablet 10 milliGRAM(s) Oral daily  predniSONE   Tablet 40 milliGRAM(s) Oral daily  hydrALAZINE 50 milliGRAM(s) Oral every 8 hours  insulin lispro (HumaLOG) corrective regimen sliding scale   SubCutaneous Before meals and at bedtime      MEDICATIONS  (PRN):  dextrose Gel 1 Dose(s) Oral once PRN Blood Glucose LESS THAN 70 milliGRAM(s)/deciliter  glucagon  Injectable 1 milliGRAM(s) IntraMuscular once PRN Glucose LESS THAN 70 milligrams/deciliter  ALBUTerol    0.083%. 2.5 milliGRAM(s) Nebulizer once PRN sputum induction  cyclobenzaprine 5 milliGRAM(s) Oral three times a day PRN Muscle Spasm  traMADol 25 milliGRAM(s) Oral once PRN Moderate Pain (4 - 6)  hydrALAZINE Injectable 10 milliGRAM(s) IV Push every 8 hours PRN SBP over 150      Allergies    Mushrooms (Hives (Mild))  No Known Drug Allergies    Intolerances        PAST MEDICAL & SURGICAL HISTORY:  End stage renal disease  Hypertension  Diabetes  AVF (arteriovenous fistula)  Vascular dialysis catheter in place      FAMILY HISTORY:      SOCIAL HISTORY  Smoking History:     REVIEW OF SYSTEMS:    CONSTITUTIONAL:  No fevers, chills, sweats    HEENT:  Eyes:  No diplopia or blurred vision. ENT:  No earache, sore throat or runny nose.    CARDIOVASCULAR:  No pressure, squeezing, tightness, or heaviness about the chest; no palpitations.    RESPIRATORY:  No cough, shortness of breath, PND or orthopnea. Mild SOBOE    GASTROINTESTINAL:  No abdominal pain, nausea, vomiting or diarrhea.    GENITOURINARY:  No dysuria, frequency or urgency.    NEUROLOGIC:  No paresthesias, fasciculations, seizures or weakness.    PSYCHIATRIC:  No disorder of thought or mood.    Vital Signs Last 24 Hrs  T(C): 36.5 (08 Aug 2017 08:10), Max: 37.1 (07 Aug 2017 15:19)  T(F): 97.7 (08 Aug 2017 08:10), Max: 98.7 (07 Aug 2017 15:19)  HR: 101 (08 Aug 2017 07:25) (100 - 104)  BP: 163/86 (08 Aug 2017 07:25) (147/74 - 163/86)  BP(mean): --  RR: 18 (08 Aug 2017 07:25) (18 - 18)  SpO2: 95% (08 Aug 2017 07:25) (95% - 98%)    PHYSICAL EXAMINATION:    GENERAL: The patient is a well-developed, well-nourished _____in no apparent distress.     HEENT: Head is normocephalic and atraumatic. Extraocular muscles are intact. Mucous membranes are moist.     NECK: Supple.     LUNGS: Clear to auscultation without wheezing, rales, or rhonchi. Respirations unlabored    HEART: Regular rate and rhythm without murmur.    ABDOMEN: Soft, nontender, and nondistended.  No hepatosplenomegaly is noted.    EXTREMITIES: Without any cyanosis, clubbing, rash, lesions or edema.    NEUROLOGIC: Grossly intact.      LABS:                        7.8    9.4   )-----------( 284      ( 08 Aug 2017 06:17 )             23.9     08-08    129<L>  |  87<L>  |  62.0<H>  ----------------------------<  187<H>  4.3   |  21.0<L>  |  8.41<H>    Ca    9.2      08 Aug 2017 06:17    TPro  8.2  /  Alb  3.4  /  TBili  0.3<L>  /  DBili  x   /  AST  10  /  ALT  13  /  AlkPhos  118  08-08                        MICROBIOLOGY:    RADIOLOGY & ADDITIONAL STUDIES: PULMONARY CONSULT NOTE      EARLINE JIMENEZ-[id number]    Patient is a 37y old  Female who presents with a chief complaint of Diarrhea and shortness of breath (05 Aug 2017 10:07)  38 y/o female with ESRD diagnosed in 1/17 s/p left UE AVF on hemodialysis on M-W-F, right chest wall permacath, HTN, DM tx w/ insulin, obesity, HCAP a month ago, comes into the hospital due to  shortness of breath since 2 -3 days ago. The pt has shortness of breast on her base line but over the past 3 days her shortness of breath has increase and it's accompanied by dry cough, she complain of chill but no fever. At her base line  sometimes use O2 at home by nasal canula No sick contacts no recent travel . The pt was recently discharge from [facility] for HCAP and she got 7 days of Cefepime and she also completed a 7 days of Levaquin outpatient. Pt denies orthopnea and paroxymal dyspnea  but she said that never sleep flat because she "she doesnt feel right when she lies flat". She has swelling in both leg but now are the same and not different from her base line. Pt denies chest pain,  denies hx of clots .   Pt on the previous admission had left pleural effusion and had a chest tube place to drained her fluid,her shortness of breast resolved after got her fluid removed, she denies to miss any of her dialysis appointments .   Pt denies chest pain, no palpitations.  Pt also complain of several nonbloody diarrhea since today watery, she states uncountable,    she denies nausea of vomiting, no abdominal pain , not hx of Cdiff infection    HISTORY OF PRESENT ILLNESS:  former smoker  denies hx asthma  currently on dialysis  resting comfortably  denies any dyspnea, cough or sputum  no fever, chills, chest pain reported  for catheter removal    MEDICATIONS  (STANDING):  aspirin enteric coated 81 milliGRAM(s) Oral daily  sevelamer hydrochloride 400 milliGRAM(s) Oral three times a day with meals  pantoprazole    Tablet 40 milliGRAM(s) Oral before breakfast  calcitriol   Capsule 0.5 MICROGram(s) Oral daily  ergocalciferol 53593 Unit(s) Oral every week  folic acid 1 milliGRAM(s) Oral daily  dextrose 5%. 1000 milliLiter(s) (50 mL/Hr) IV Continuous <Continuous>  dextrose 50% Injectable 12.5 Gram(s) IV Push once  dextrose 50% Injectable 25 Gram(s) IV Push once  dextrose 50% Injectable 25 Gram(s) IV Push once  heparin  Injectable 5000 Unit(s) SubCutaneous every 12 hours  ALBUTerol/ipratropium for Nebulization 3 milliLiter(s) Nebulizer every 6 hours  calcium acetate 667 milliGRAM(s) Oral three times a day with meals  epoetin raina Injectable 42132 Unit(s) IV Push <User Schedule>  vancomycin    Solution 125 milliGRAM(s) Oral every 6 hours  cefTRIAXone   IVPB 2 Gram(s) IV Intermittent every 24 hours  amLODIPine   Tablet 10 milliGRAM(s) Oral daily  predniSONE   Tablet 40 milliGRAM(s) Oral daily  hydrALAZINE 50 milliGRAM(s) Oral every 8 hours  insulin lispro (HumaLOG) corrective regimen sliding scale   SubCutaneous Before meals and at bedtime      MEDICATIONS  (PRN):  dextrose Gel 1 Dose(s) Oral once PRN Blood Glucose LESS THAN 70 milliGRAM(s)/deciliter  glucagon  Injectable 1 milliGRAM(s) IntraMuscular once PRN Glucose LESS THAN 70 milligrams/deciliter  ALBUTerol    0.083%. 2.5 milliGRAM(s) Nebulizer once PRN sputum induction  cyclobenzaprine 5 milliGRAM(s) Oral three times a day PRN Muscle Spasm  traMADol 25 milliGRAM(s) Oral once PRN Moderate Pain (4 - 6)  hydrALAZINE Injectable 10 milliGRAM(s) IV Push every 8 hours PRN SBP over 150      Allergies    Mushrooms (Hives (Mild))  No Known Drug Allergies    Intolerances        PAST MEDICAL & SURGICAL HISTORY:  End stage renal disease  Hypertension  Diabetes  AVF (arteriovenous fistula)  Vascular dialysis catheter in place      FAMILY HISTORY:      SOCIAL HISTORY  Smoking History:     REVIEW OF SYSTEMS:    CONSTITUTIONAL:  No fevers, chills, sweats    HEENT:  Eyes:  No diplopia or blurred vision. ENT:  No earache, sore throat or runny nose.    CARDIOVASCULAR:  No pressure, squeezing, tightness, or heaviness about the chest; no palpitations.    RESPIRATORY:  No cough, shortness of breath,     GASTROINTESTINAL:  No abdominal pain, nausea, vomiting or diarrhea.    GENITOURINARY:  No dysuria, frequency or urgency.    NEUROLOGIC:  No paresthesias, fasciculations, seizures or weakness.    PSYCHIATRIC:  No disorder of thought or mood.    Vital Signs Last 24 Hrs  T(C): 36.5 (08 Aug 2017 08:10), Max: 37.1 (07 Aug 2017 15:19)  T(F): 97.7 (08 Aug 2017 08:10), Max: 98.7 (07 Aug 2017 15:19)  HR: 101 (08 Aug 2017 07:25) (100 - 104)  BP: 163/86 (08 Aug 2017 07:25) (147/74 - 163/86)  BP(mean): --  RR: 18 (08 Aug 2017 07:25) (18 - 18)  SpO2: 95% (08 Aug 2017 07:25) (95% - 98%)    PHYSICAL EXAMINATION:    GENERAL: The patient is a well-developed, well-nourished in no apparent distress.     HEENT: Head is normocephalic and atraumatic. Extraocular muscles are intact. Mucous membranes are moist.     NECK: Supple.     LUNGS: good air entry bilaterally clear    HEART: Regular rate and rhythm without murmur.    ABDOMEN: Soft, nontender, and nondistended.  No hepatosplenomegaly is noted.    EXTREMITIES: Without any cyanosis, clubbing, rash, lesions or edema.    NEUROLOGIC: Grossly intact.      LABS:                        7.8    9.4   )-----------( 284      ( 08 Aug 2017 06:17 )             23.9     08-08    129<L>  |  87<L>  |  62.0<H>  ----------------------------<  187<H>  4.3   |  21.0<L>  |  8.41<H>    Ca    9.2      08 Aug 2017 06:17    TPro  8.2  /  Alb  3.4  /  TBili  0.3<L>  /  DBili  x   /  AST  10  /  ALT  13  /  AlkPhos  118  08-08                        MICROBIOLOGY:    RADIOLOGY & ADDITIONAL STUDIES:  CXR and CT scan reviewed

## 2017-08-08 NOTE — PROGRESS NOTE ADULT - SUBJECTIVE AND OBJECTIVE BOX
NEPHROLOGY INTERVAL HPI/OVERNIGHT EVENTS:  No new events.    MEDICATIONS  (STANDING):  aspirin enteric coated 81 milliGRAM(s) Oral daily  sevelamer hydrochloride 400 milliGRAM(s) Oral three times a day with meals  pantoprazole    Tablet 40 milliGRAM(s) Oral before breakfast  calcitriol   Capsule 0.5 MICROGram(s) Oral daily  ergocalciferol 66821 Unit(s) Oral every week  folic acid 1 milliGRAM(s) Oral daily  insulin lispro (HumaLOG) corrective regimen sliding scale   SubCutaneous three times a day before meals  dextrose 5%. 1000 milliLiter(s) (50 mL/Hr) IV Continuous <Continuous>  dextrose 50% Injectable 12.5 Gram(s) IV Push once  dextrose 50% Injectable 25 Gram(s) IV Push once  dextrose 50% Injectable 25 Gram(s) IV Push once  heparin  Injectable 5000 Unit(s) SubCutaneous every 12 hours  ALBUTerol/ipratropium for Nebulization 3 milliLiter(s) Nebulizer every 6 hours  calcium acetate 667 milliGRAM(s) Oral three times a day with meals  epoetin raina Injectable 42001 Unit(s) IV Push <User Schedule>  vancomycin    Solution 125 milliGRAM(s) Oral every 6 hours  cefTRIAXone   IVPB 2 Gram(s) IV Intermittent every 24 hours  amLODIPine   Tablet 10 milliGRAM(s) Oral daily  predniSONE   Tablet 40 milliGRAM(s) Oral daily  hydrALAZINE 50 milliGRAM(s) Oral every 8 hours    MEDICATIONS  (PRN):  dextrose Gel 1 Dose(s) Oral once PRN Blood Glucose LESS THAN 70 milliGRAM(s)/deciliter  glucagon  Injectable 1 milliGRAM(s) IntraMuscular once PRN Glucose LESS THAN 70 milligrams/deciliter  ALBUTerol    0.083%. 2.5 milliGRAM(s) Nebulizer once PRN sputum induction  cyclobenzaprine 5 milliGRAM(s) Oral three times a day PRN Muscle Spasm  traMADol 25 milliGRAM(s) Oral once PRN Moderate Pain (4 - 6)  hydrALAZINE Injectable 10 milliGRAM(s) IV Push every 8 hours PRN SBP over 150      Allergies    Mushrooms (Hives (Mild))  No Known Drug Allergies    Intolerances        Vital Signs Last 24 Hrs  T(C): 36.4 (07 Aug 2017 07:13), Max: 36.8 (06 Aug 2017 15:30)  T(F): 97.5 (07 Aug 2017 07:13), Max: 98.2 (06 Aug 2017 15:30)  HR: 103 (07 Aug 2017 07:13) (70 - 106)  BP: 157/84 (07 Aug 2017 07:13) (157/84 - 180/95)  BP(mean): --  RR: 18 (07 Aug 2017 07:13) (18 - 22)  SpO2: 96% (07 Aug 2017 07:13) (95% - 100%)  Daily     Daily Weight in k (07 Aug 2017 08:55)    PHYSICAL EXAM:  HEENT: normocephalic and atraumatic  NECK: Supple, right permacath  LUNGS: nasal 02 , no wheezes at present  HEART: Regular rate and rhythm   ABDOMEN: Soft, nontender, and nondistended.  Positive bowel sounds.    EXTREMITIES:  edema better    LABS:     129<L>  |  87<L>  |  62.0<H>  ----------------------------<  187<H>  4.3   |  21.0<L>  |  8.41<H>    Ca    9.2      08 Aug 2017 06:17    TPro  8.2  /  Alb  3.4  /  TBili  0.3<L>  /  DBili  x   /  AST  10  /  ALT  13  /  AlkPhos  118                            8.1    9.2   )-----------( 279      ( 07 Aug 2017 06:22 )             25.2         130<L>  |  90<L>  |  45.0<H>  ----------------------------<  132<H>  4.1   |  23.0  |  6.00<H>    Ca    9.1      07 Aug 2017 06:22                  RADIOLOGY & ADDITIONAL TESTS:

## 2017-08-08 NOTE — PROGRESS NOTE ADULT - ATTENDING COMMENTS
optimizing for OR  to superficialed the AV fistula  pt refusing to have catheter removed optimizing for OR to superficialize the AV fistula  pt refusing to have current HD catheter removed. Discussed with pt risks of not having catheter removed, including but not limited to recurrent bacteremia, bone infection, heart valve infection, septic embolic events etc.

## 2017-08-08 NOTE — PROGRESS NOTE ADULT - ASSESSMENT
Patient is a 36 y/o female with ESRD diagnosed in 1/17 s/p left UE AVF on hemodialysis on M-W-F, right chest wall permacath, HTN, DM, obesity, HCAP one month ago, comes into the hospital due to shortness of breath since 2 -3 days ago. CXR shows right middle lobe infiltrates , left side pleural effusion improved from previous admission; confirmed with ct chest. Patient was dialyzed yesterday.  Also with hemoglobin of 7.6 today, transfused for symptomatic anemia yesterday.  She is admitted for diarrhea, cdiff positive. Incidentally, found to have + blood cx with serratia; concern for now needing to get the PC out b/c this is a recurrence of bacteremia     HD 7 (8/7/2017)-      Rocephin Antibiotic day # 8;   Nephrology consult note appreciated;  ESRD on HD (TTS schedule) ; HD Planned for Today; ID f/u noted, patient has positive Blood Cx for Serratia marcescen, patient on Antibiotics, source could be HD Permacath;      Patients stooling has normalized, PO Vancomycin (day 7 po Vanc) is treating her cdiff colitis;      There are plans to superficialize AVF after infectious process resolves, Vascular surgery on board;  ID recommends replacement of Permacath now;  Nephro feels patient can be treated, removal delayed until infectious process has resolved;    Nutrition consult recommends folate and Natrovite;      8/1/2017 pos BC for Serratia;    6/12/2017 pos BC for Serratia;

## 2017-08-08 NOTE — PROGRESS NOTE ADULT - SUBJECTIVE AND OBJECTIVE BOX
[facility] [place] OLLIE GOLDMAN MD FACP   CELSO MORALEZ MD  Diplomates American Board of Internal Medicine and Infectious Diseases      SORAYA JIMENEZ  [id number]  37y    INTERVAL HPI/OVERNIGHT EVENTS:  AFEBRILE ON ABS    Vital Signs Last 24 Hrs  T(C): 36.5 (08 Aug 2017 08:10), Max: 37.1 (07 Aug 2017 15:19)  T(F): 97.7 (08 Aug 2017 08:10), Max: 98.7 (07 Aug 2017 15:19)  HR: 101 (08 Aug 2017 07:25) (100 - 104)  BP: 163/86 (08 Aug 2017 07:25) (147/74 - 163/86)  BP(mean): --  RR: 18 (08 Aug 2017 07:25) (18 - 18)  SpO2: 95% (08 Aug 2017 07:25) (95% - 98%)    ANTIBIOTICS  epoetin raina Injectable 59415 Unit(s) IV Push <User Schedule>  vancomycin    Solution 125 milliGRAM(s) Oral every 6 hours  cefTRIAXone   IVPB 2 Gram(s) IV Intermittent every 24 hours      Allergies    Mushrooms (Hives (Mild))  No Known Drug Allergies    Intolerances        REVIEW OF SYSTEMS:NEG    PHYSICAL EXAM:  Vital Signs Last 24 Hrs  T(C): 36.5 (08 Aug 2017 08:10), Max: 37.1 (07 Aug 2017 15:19)  T(F): 97.7 (08 Aug 2017 08:10), Max: 98.7 (07 Aug 2017 15:19)  HR: 101 (08 Aug 2017 07:25) (100 - 104)  BP: 163/86 (08 Aug 2017 07:25) (147/74 - 163/86)  BP(mean): --  RR: 18 (08 Aug 2017 07:25) (18 - 18)  SpO2: 95% (08 Aug 2017 07:25) (95% - 98%)      GEN: NAD, pleasant  HEENT: normocephalic and atraumatic. EOMI. WILDA. Moist mucosa. Clear Posterior pharynx.  NECK: Supple. No carotid bruits.  No lymphadenopathy or thyromegaly.  LUNGS: Clear to auscultation.  HEART: Regular rate and rhythm without murmur.  ABDOMEN: Soft, nontender, and nondistended.  Positive bowel sounds.  No hepatosplenomegaly was noted.  EXTREMITIES: Without any cyanosis, clubbing, rash, lesions or edema.  NEUROLOGIC: Cranial nerves II through XII are grossly intact.  MUSCULOSKELETAL:  SKIN: No ulceration or induration present    LABS:                        7.8    9.4   )-----------( 284      ( 08 Aug 2017 06:17 )             23.9       08-08    129<L>  |  87<L>  |  62.0<H>  ----------------------------<  187<H>  4.3   |  21.0<L>  |  8.41<H>    Ca    9.2      08 Aug 2017 06:17    TPro  8.2  /  Alb  3.4  /  TBili  0.3<L>  /  DBili  x   /  AST  10  /  ALT  13  /  AlkPhos  118  08-08 08-08 @ 06:17  hct 23.9 % hgb 7.8 g/dL    08-08 @ 06:17  plat 284 K/uL wbc 9.4 K/uL    08-07 @ 06:22  hct 25.2 % hgb 8.1 g/dL    08-07 @ 06:22  plat 279 K/uL wbc 9.2 K/uL      08-08-17  creat 8.41 mg/dL gfr 6 mL/min/1.73M2 bun 62.0 mg/dL k 4.3 mmol/L  08-07-17  creat 6.00 mg/dL gfr 10 mL/min/1.73M2 bun 45.0 mg/dL k 4.1 mmol/L      MICROBIOLOGY:        RADIOLOGY & ADDITIONAL STUDIES:          GUICHO GOLDMAN MD FACP

## 2017-08-08 NOTE — PROGRESS NOTE ADULT - SUBJECTIVE AND OBJECTIVE BOX
CC: Diarrhea and shortness of breath    Patient seen and examined at bedside.  No acute events overnight.     Patient's cough has reduced, still productive, but less copious amounts; afebrile.  Patient has a baseline sob, that responds well to oxygen therapy;    stools have solidified, no further pain with defecation;  calf pain is resolving;      shows insignificant desaturations with poor pleth;  patient saturating well on oxygen overnight;     ROS  General- denies any fever/chills  Resp-  dyspnea and cough resolving  GI- denies any abodminal pain or diarrhea  - denies any dysuria/hematuria    Vital Signs Last 24 Hrs  T(C): 36.7 (08 Aug 2017 00:47), Max: 37.1 (07 Aug 2017 15:19)  T(F): 98.1 (08 Aug 2017 00:47), Max: 98.7 (07 Aug 2017 15:19)  Afebrile  HR: 104 (08 Aug 2017 00:47) (102 - 104)    Tachycardic  BP: 147/74 (07 Aug 2017 15:19) (147/74 - 157/84)   Hypertensive  RR: 18 (08 Aug 2017 00:47) (18 - 18)  SpO2: 97% (08 Aug 2017 00:47) (96% - 98%)          Daily Weight in k (07 Aug 2017 08:55)    I&O's Summary  2017  O=7740  M=1916  Net= -7480   Net negative    8800cc/121kg/24hr=  3     UO>0.5, supra optimal UO;        Weight 121kg before HD on 17  post HD weight not recorded;  no weight recorded since;  (nurse notified to start recording daily weights)      HD on 17     Intake =600cc  Output =3100cc  Net= -2500cc     HD on 17   [id number]=834  Veuxjq=5800  Net= -2500    PE  GENERAL: Adult female laying in bed in NAD, well-groomed, well-developed  HEENT:  Atraumatic, Normocephalic, EOMI, PERRLA, conjunctiva and sclera clear, Moist mucous membranes,  NECK: Supple, No JVD, No supraclavicular/submandibular lymphadenopathy Normal thyroid enlargment. chest HD catheter in place on the rightl  LUNG: predominantly expiratory wheezing bilaterally; reduction in rales; no rhonchi;   HEART: S1, S2, RRR, No murmurs, rubs, or gallops  ABDOMEN: Soft, Nontender, Nondistended; Bowel sounds present, Normoactive  EXTREMITIES:  1+ Peripheral Pulses, No clubbing, cyanosis, or edema, LUE AVF placement + thrill palpable, bruit heard on auscultation  NERVOUS SYSTEM:  Alert & Oriented X3, Good concentration; Motor Strength 5/5 B/L upper and lower extremities  SKIN: No rashes or lesions , no hematomas, capillary refills less than 2 seconds;  no skin tenting, good skin turgorm;      Lab Results:  CBC Full  -  ( 07 Aug 2017 06:22 )  WBC Count : 9.2 K/uL  Hemoglobin : 8.1 g/dL  Hematocrit : 25.2 %  Platelet Count - Automated : 279 K/uL  Mean Cell Volume : 78.3 fl  Mean Cell Hemoglobin : 25.2 pg  Mean Cell Hemoglobin Concentration : 32.1 g/dL  Auto Neutrophil % : 66.0 %  Auto Lymphocyte % : 21.0 %  Auto Monocyte % : 7.0 %  Auto Eosinophil % : 3.0 %               8.1    9.2   )-----------( 279      ( 07 Aug 2017 06:22 )             25.2     08-07    130<L>  |  90<L>  |  45.0<H>  ----------------------------<  132<H>  4.1   |  23.0  |  6.00<H>    Ca    9.1      07 Aug 2017 06:22                 MICROBIOLOGY/CULTURES:  Blood cx: serratia m. Repeat blood cx in lab     RADIOLOGY:    CT Chest No Cont    17   1.  Since 2017, decreased bilateral patchy airspace opacities,   probably pneumonia.  2.  Persistent mediastinal and hilar adenopathy.     MEDICATIONS  (STANDING):  aspirin enteric coated 81 milliGRAM(s) Oral daily  sevelamer hydrochloride 400 milliGRAM(s) Oral three times a day with meals  pantoprazole    Tablet 40 milliGRAM(s) Oral before breakfast  calcitriol   Capsule 0.5 MICROGram(s) Oral daily  ergocalciferol 98248 Unit(s) Oral every week  folic acid 1 milliGRAM(s) Oral daily  dextrose 5%. 1000 milliLiter(s) (50 mL/Hr) IV Continuous <Continuous>  dextrose 50% Injectable 12.5 Gram(s) IV Push once  dextrose 50% Injectable 25 Gram(s) IV Push once  dextrose 50% Injectable 25 Gram(s) IV Push once  heparin  Injectable 5000 Unit(s) SubCutaneous every 12 hours  ALBUTerol/ipratropium for Nebulization 3 milliLiter(s) Nebulizer every 6 hours  calcium acetate 667 milliGRAM(s) Oral three times a day with meals  epoetin raina Injectable 26705 Unit(s) IV Push <User Schedule>  vancomycin    Solution 125 milliGRAM(s) Oral every 6 hours  cefTRIAXone   IVPB 2 Gram(s) IV Intermittent every 24 hours  amLODIPine   Tablet 10 milliGRAM(s) Oral daily  predniSONE   Tablet 40 milliGRAM(s) Oral daily  hydrALAZINE 50 milliGRAM(s) Oral every 8 hours  insulin lispro (HumaLOG) corrective regimen sliding scale   SubCutaneous Before meals and at bedtime    MEDICATIONS  (PRN):  dextrose Gel 1 Dose(s) Oral once PRN Blood Glucose LESS THAN 70 milliGRAM(s)/deciliter  glucagon  Injectable 1 milliGRAM(s) IntraMuscular once PRN Glucose LESS THAN 70 milligrams/deciliter  ALBUTerol    0.083%. 2.5 milliGRAM(s) Nebulizer once PRN sputum induction  cyclobenzaprine 5 milliGRAM(s) Oral three times a day PRN Muscle Spasm  traMADol 25 milliGRAM(s) Oral once PRN Moderate Pain (4 - 6)  hydrALAZINE Injectable 10 milliGRAM(s) IV Push every 8 hours PRN SBP over 150      CAPILLARY BLOOD GLUCOSE  242 (07 Aug 2017 23:00)  171 (07 Aug 2017 16:49)  170 (07 Aug 2017 11:30)  138 (07 Aug 2017 07:47)    138/     170/          171/      242  8am/    11:30am/  5pm/     11pm

## 2017-08-08 NOTE — PROGRESS NOTE ADULT - PROBLEM SELECTOR PLAN 1
-s/p HD on 8/5/17;   net= -2500cc  -Rocephin 2 gr x 24 hr (day #8)  - BCx from 8/4 are negative to date   - Ultimate goal is to evaluate permacath as source of bacteriemia + for serratia mercences, previous BCs for 6/12 previous hospitalization were positive for the same Serratia;   -patient does not want to get PC out if it means that she will be getting a temp catheter in her groin.   -Consults feel patient currently not toxic, responding well to treatmetn, superficiliazing AVF is not off table, pending Vascular note;    - nephrology & id both recommend exchange permacath - however primary vascular surgeon does not think that the PC is the source and prefers to salvage the PC and tx with abx.

## 2017-08-08 NOTE — PROGRESS NOTE ADULT - ASSESSMENT
RECURRENT SERRATIA SEPSIS - CATHETER RELATED  UNABLE TO CURE WITH ABS - ОЛЕГ WITH RECURRENCE AND SECOND EPISODE  NEEDS CATHETER OUT ASAP AND WILL NOT TX ON ABS WITHOUT CATHETER REMOVAL - FUTILE AND EXPOSES PATIENT TO POSS THIRD BACTEREMIA AND COMPLICATIONS OF THAT    NO OTHER ALTERNATIVES    D/W PT AND ATTENDING AND RENAL

## 2017-08-08 NOTE — CONSULT NOTE ADULT - ASSESSMENT
Recurrent serratia sepsis for catheter removal, ESRD on HD, c diff positive  Currently doing well without any significant pulmonary complaints  Obesity, CT scan with mostly improved some new nodules, ANCA neg, ? septic emboli, underlying stable hilar/ mediastinal adenopathy ? sarcoid  probable DALTON component, sleep study not done yet, mild MISSY noted, echo normal  No pulmonary contra indications to procedure  mild increased risk of post op pulmonary complications, risk /benefit favors  albuteral neb q 6 hrs  incentive spirometry, dvt prophylaxis  empiric cpap 12 cm if any significant desaturation post operatively and contact pulmonary  needs pulmonary follow up out pt Recurrent serratia sepsis for catheter removal, ESRD on HD, c diff positive  Currently doing well without any significant pulmonary complaints  Obesity, CT scan with mostly improved some new nodules, ANCA neg, ? septic emboli, underlying stable hilar/ mediastinal adenopathy ? sarcoid  probable DALTON component, sleep study not done yet, mild MISSY noted, echo normal  d/c prednisone, no bronchospasm on exam no hx asthma per pt, suspect previous dyspnea was related to fluid overload/sepsis now resolved  No pulmonary contra indications to procedure  mild increased risk of post op pulmonary complications, risk /benefit favors  albuteral neb q 6 hrs  incentive spirometry, dvt prophylaxis  empiric cpap 12 cm if any significant desaturation post operatively and contact pulmonary  needs pulmonary follow up out pt

## 2017-08-08 NOTE — PROGRESS NOTE ADULT - PROBLEM SELECTOR PLAN 3
Likely 2/2 aocd. Normocytic. 2/2 ESRD   1 unit of packed RBC was transfused  on July 3    Hb today =7, slowly trending down, likely transfuse if Hb <7  c/w epogen, folate  anemia w/up noted   follow of Hb w/ cbc daily

## 2017-08-09 DIAGNOSIS — Z01.810 ENCOUNTER FOR PREPROCEDURAL CARDIOVASCULAR EXAMINATION: ICD-10-CM

## 2017-08-09 LAB
ALBUMIN SERPL ELPH-MCNC: 3.6 G/DL — SIGNIFICANT CHANGE UP (ref 3.3–5.2)
ALP SERPL-CCNC: 115 U/L — SIGNIFICANT CHANGE UP (ref 40–120)
ALT FLD-CCNC: 12 U/L — SIGNIFICANT CHANGE UP
ANION GAP SERPL CALC-SCNC: 18 MMOL/L — HIGH (ref 5–17)
APTT BLD: 28.4 SEC — SIGNIFICANT CHANGE UP (ref 27.5–37.4)
AST SERPL-CCNC: 10 U/L — SIGNIFICANT CHANGE UP
BASOPHILS # BLD AUTO: 0 K/UL — SIGNIFICANT CHANGE UP (ref 0–0.2)
BASOPHILS NFR BLD AUTO: 0.2 % — SIGNIFICANT CHANGE UP (ref 0–2)
BILIRUB SERPL-MCNC: 0.4 MG/DL — SIGNIFICANT CHANGE UP (ref 0.4–2)
BUN SERPL-MCNC: 50 MG/DL — HIGH (ref 8–20)
CALCIUM SERPL-MCNC: 9.1 MG/DL — SIGNIFICANT CHANGE UP (ref 8.6–10.2)
CHLORIDE SERPL-SCNC: 90 MMOL/L — LOW (ref 98–107)
CHOLEST SERPL-MCNC: 123 MG/DL — SIGNIFICANT CHANGE UP (ref 110–199)
CO2 SERPL-SCNC: 23 MMOL/L — SIGNIFICANT CHANGE UP (ref 22–29)
CREAT SERPL-MCNC: 5.92 MG/DL — HIGH (ref 0.5–1.3)
CULTURE RESULTS: SIGNIFICANT CHANGE UP
CULTURE RESULTS: SIGNIFICANT CHANGE UP
EOSINOPHIL # BLD AUTO: 0 K/UL — SIGNIFICANT CHANGE UP (ref 0–0.5)
EOSINOPHIL NFR BLD AUTO: 0.2 % — SIGNIFICANT CHANGE UP (ref 0–6)
GLUCOSE SERPL-MCNC: 189 MG/DL — HIGH (ref 70–115)
HCT VFR BLD CALC: 26.1 % — LOW (ref 37–47)
HDLC SERPL-MCNC: 45 MG/DL — LOW
HGB BLD-MCNC: 8.4 G/DL — LOW (ref 12–16)
INR BLD: 1.19 RATIO — HIGH (ref 0.88–1.16)
LIPID PNL WITH DIRECT LDL SERPL: 63 MG/DL — SIGNIFICANT CHANGE UP
LYMPHOCYTES # BLD AUTO: 1.9 K/UL — SIGNIFICANT CHANGE UP (ref 1–4.8)
LYMPHOCYTES # BLD AUTO: 15.8 % — LOW (ref 20–55)
MAGNESIUM SERPL-MCNC: 2.1 MG/DL — SIGNIFICANT CHANGE UP (ref 1.6–2.6)
MCHC RBC-ENTMCNC: 25.1 PG — LOW (ref 27–31)
MCHC RBC-ENTMCNC: 32.2 G/DL — SIGNIFICANT CHANGE UP (ref 32–36)
MCV RBC AUTO: 78.1 FL — LOW (ref 81–99)
MONOCYTES # BLD AUTO: 1.1 K/UL — HIGH (ref 0–0.8)
MONOCYTES NFR BLD AUTO: 8.8 % — SIGNIFICANT CHANGE UP (ref 3–10)
NEUTROPHILS # BLD AUTO: 8.9 K/UL — HIGH (ref 1.8–8)
NEUTROPHILS NFR BLD AUTO: 73.2 % — HIGH (ref 37–73)
OSMOLALITY SERPL: 302 MOS/KG — HIGH (ref 275–300)
PHOSPHATE SERPL-MCNC: 4.1 MG/DL — SIGNIFICANT CHANGE UP (ref 2.4–4.7)
PLATELET # BLD AUTO: 335 K/UL — SIGNIFICANT CHANGE UP (ref 150–400)
POTASSIUM SERPL-MCNC: 4.1 MMOL/L — SIGNIFICANT CHANGE UP (ref 3.5–5.3)
POTASSIUM SERPL-SCNC: 4.1 MMOL/L — SIGNIFICANT CHANGE UP (ref 3.5–5.3)
PROT SERPL-MCNC: 8.4 G/DL — SIGNIFICANT CHANGE UP (ref 6.6–8.7)
PROTHROM AB SERPL-ACNC: 13.1 SEC — HIGH (ref 9.8–12.7)
RBC # BLD: 3.34 M/UL — LOW (ref 4.4–5.2)
RBC # FLD: 17.5 % — HIGH (ref 11–15.6)
SODIUM SERPL-SCNC: 131 MMOL/L — LOW (ref 135–145)
SPECIMEN SOURCE: SIGNIFICANT CHANGE UP
SPECIMEN SOURCE: SIGNIFICANT CHANGE UP
TOTAL CHOLESTEROL/HDL RATIO MEASUREMENT: 3 RATIO — LOW (ref 3.3–7.1)
TRIGL SERPL-MCNC: 73 MG/DL — SIGNIFICANT CHANGE UP (ref 10–200)
WBC # BLD: 12.2 K/UL — HIGH (ref 4.8–10.8)
WBC # FLD AUTO: 12.2 K/UL — HIGH (ref 4.8–10.8)

## 2017-08-09 PROCEDURE — 99233 SBSQ HOSP IP/OBS HIGH 50: CPT

## 2017-08-09 RX ADMIN — Medication 81 MILLIGRAM(S): at 13:23

## 2017-08-09 RX ADMIN — CEFTRIAXONE 100 GRAM(S): 500 INJECTION, POWDER, FOR SOLUTION INTRAMUSCULAR; INTRAVENOUS at 22:15

## 2017-08-09 RX ADMIN — Medication 1: at 17:09

## 2017-08-09 RX ADMIN — Medication 3 MILLILITER(S): at 21:06

## 2017-08-09 RX ADMIN — SEVELAMER CARBONATE 400 MILLIGRAM(S): 2400 POWDER, FOR SUSPENSION ORAL at 08:34

## 2017-08-09 RX ADMIN — CALCITRIOL 0.5 MICROGRAM(S): 0.5 CAPSULE ORAL at 13:22

## 2017-08-09 RX ADMIN — Medication 3 MILLILITER(S): at 09:17

## 2017-08-09 RX ADMIN — Medication 2: at 22:11

## 2017-08-09 RX ADMIN — Medication 50 MILLIGRAM(S): at 13:23

## 2017-08-09 RX ADMIN — Medication 3 MILLILITER(S): at 03:16

## 2017-08-09 RX ADMIN — ERGOCALCIFEROL 50000 UNIT(S): 1.25 CAPSULE ORAL at 17:07

## 2017-08-09 RX ADMIN — SEVELAMER CARBONATE 400 MILLIGRAM(S): 2400 POWDER, FOR SUSPENSION ORAL at 17:10

## 2017-08-09 RX ADMIN — Medication 1 MILLIGRAM(S): at 13:22

## 2017-08-09 RX ADMIN — PANTOPRAZOLE SODIUM 40 MILLIGRAM(S): 20 TABLET, DELAYED RELEASE ORAL at 06:20

## 2017-08-09 RX ADMIN — Medication 10 MILLIGRAM(S): at 00:52

## 2017-08-09 RX ADMIN — Medication 50 MILLIGRAM(S): at 22:19

## 2017-08-09 RX ADMIN — Medication 1: at 13:25

## 2017-08-09 RX ADMIN — Medication 125 MILLIGRAM(S): at 08:35

## 2017-08-09 RX ADMIN — Medication 125 MILLIGRAM(S): at 13:50

## 2017-08-09 RX ADMIN — Medication 125 MILLIGRAM(S): at 22:19

## 2017-08-09 RX ADMIN — Medication 3 MILLILITER(S): at 15:07

## 2017-08-09 RX ADMIN — Medication 125 MILLIGRAM(S): at 02:58

## 2017-08-09 RX ADMIN — SEVELAMER CARBONATE 400 MILLIGRAM(S): 2400 POWDER, FOR SUSPENSION ORAL at 13:22

## 2017-08-09 RX ADMIN — Medication 50 MILLIGRAM(S): at 06:20

## 2017-08-09 RX ADMIN — AMLODIPINE BESYLATE 10 MILLIGRAM(S): 2.5 TABLET ORAL at 06:20

## 2017-08-09 NOTE — PROGRESS NOTE ADULT - ATTENDING COMMENTS
optimized by pulmonary  optimized by medicine for OR AVF adjustment optimized by pulmonary  optimized by medicine for OR AVF adjustment  pt advised that by refusing the removal of the catheter can and will results in multiple complications including but not limited to death. risks, benefits and alternatives given

## 2017-08-09 NOTE — PROGRESS NOTE ADULT - SUBJECTIVE AND OBJECTIVE BOX
CC: Diarrhea and shortness of breath    Patient seen and examined at bedside.  No acute events overnight.     Patient's cough has reduced, still productive, but less copious amounts; afebrile.  Patient has a baseline sob, that responds well to oxygen therapy;    stools have solidified, no further pain with defecation;  calf pain is resolving;      no desaturations on ;      ROS  General- denies any fever/chills  Resp-  dyspnea and cough resolving  GI- denies any abodminal pain or diarrhea  - denies any dysuria/hematuria    Vital Signs Last 24 Hrs  T(C): 36.1 (09 Aug 2017 01:27), Max: 36.6 (08 Aug 2017 11:00)  T(F): 97 (09 Aug 2017 01:27), Max: 97.8 (08 Aug 2017 11:00)  HR: 88 (09 Aug 2017 03:19) (88 - 112)  BP: 152/62 (09 Aug 2017 01:20) (152/62 - 170/70)   Hypertensive     RR: 17 (09 Aug 2017 01:27) (17 - 18)  SpO2: 98% (09 Aug 2017 03:19) (95% - 100%)        Daily Weight in k.6 (08 Aug 2017 11:00)    Daily Weight in k (07 Aug 2017 08:55)        - @ 07:01  -  -09 @ 05:28  --------------------------------------------------------  IN: 360 mL / OUT: 3600 mL / NET: -3240 mL      JC=7441kq/119.6kg/24hr= 1.25 cc/kg/hr   UO>0.5, supraoptimal      Weight 121kg before HD on 17  post HD weight not recorded;  no weight recorded since;  (nurse notified to start recording daily weights)      HD on 17     Intake =600cc  Output =3100cc  Net= -2500cc     HD on 17   crfklb=117  Qbijqc=9199  Net= -2500    PE  GENERAL: Adult female laying in bed in NAD,  HEENT:  EOMI, PERRLA, conjunctiva and sclera clear, Moist mucous membranes,  NECK: Supple, No JVD, No supraclavicular/submandibular lymphadenopathy. chest HD catheter in place on the right  LUNG: predominantly expiratory wheezing bilaterally; reduction in rales; no rhonchi;   HEART: S1, S2, RRR, No murmurs, rubs, or gallops  ABDOMEN: Soft, Nontender, Nondistended; Bowel sounds present, Normoactive  EXTREMITIES:   No clubbing, cyanosis, or edema, LUE AVF placement + thrill palpable, bruit heard on auscultation  NERVOUS SYSTEM:  Alert & Oriented X3, Good concentration; Motor Strength 5/5 B/L upper and lower extremities  SKIN: No rashes or lesions, capillary refills less than 2 seconds;  no skin tenting, good skin turgor      Lab Results:    CBC Full  -  ( 08 Aug 2017 06:17 )  WBC Count : 9.4 K/uL  Hemoglobin : 7.8 g/dL  Hematocrit : 23.9 %  Platelet Count - Automated : 284 K/uL  Mean Cell Volume : 77.6 fl  Mean Cell Hemoglobin : 25.3 pg  Mean Cell Hemoglobin Concentration : 32.6 g/dL  Auto Neutrophil # : 7.2 K/uL  Auto Lymphocyte # : 1.2 K/uL  Auto Monocyte # : 0.9 K/uL  Auto Eosinophil # : 0.0 K/uL  Auto Basophil # : 0.0 K/uL  Auto Neutrophil % : 76.7 %  Auto Lymphocyte % : 12.7 %  Auto Monocyte % : 9.3 %  Auto Eosinophil % : 0.3 %  Auto Basophil % : 0.3 %                          7.8    9.4   )-----------( 284      ( 08 Aug 2017 06:17 )             23.9     08-08    130<L>  |  90<L>  |  42.0<H>  ----------------------------<  277<H>  4.6   |  23.0  |  5.41<H>    Ca    9.3      08 Aug 2017 21:21    TPro  8.2  /  Alb  3.4  /  TBili  0.3<L>  /  DBili  x   /  AST  10  /  ALT  13  /  AlkPhos  118  08-08    LIVER FUNCTIONS - ( 08 Aug 2017 06:17 )  Alb: 3.4 g/dL / Pro: 8.2 g/dL / ALK PHOS: 118 U/L / ALT: 13 U/L / AST: 10 U/L / GGT: x             MEDICATIONS  (STANDING):  aspirin enteric coated 81 milliGRAM(s) Oral daily  sevelamer hydrochloride 400 milliGRAM(s) Oral three times a day with meals  pantoprazole    Tablet 40 milliGRAM(s) Oral before breakfast  calcitriol   Capsule 0.5 MICROGram(s) Oral daily  ergocalciferol 25316 Unit(s) Oral every week  folic acid 1 milliGRAM(s) Oral daily  dextrose 5%. 1000 milliLiter(s) (50 mL/Hr) IV Continuous <Continuous>  dextrose 50% Injectable 12.5 Gram(s) IV Push once  dextrose 50% Injectable 25 Gram(s) IV Push once  dextrose 50% Injectable 25 Gram(s) IV Push once  heparin  Injectable 5000 Unit(s) SubCutaneous every 12 hours  ALBUTerol/ipratropium for Nebulization 3 milliLiter(s) Nebulizer every 6 hours  calcium acetate 667 milliGRAM(s) Oral three times a day with meals  epoetin raina Injectable 56369 Unit(s) IV Push <User Schedule>  vancomycin    Solution 125 milliGRAM(s) Oral every 6 hours  cefTRIAXone   IVPB 2 Gram(s) IV Intermittent every 24 hours  amLODIPine   Tablet 10 milliGRAM(s) Oral daily  hydrALAZINE 50 milliGRAM(s) Oral every 8 hours  insulin lispro (HumaLOG) corrective regimen sliding scale   SubCutaneous Before meals and at bedtime    MEDICATIONS  (PRN):  dextrose Gel 1 Dose(s) Oral once PRN Blood Glucose LESS THAN 70 milliGRAM(s)/deciliter  glucagon  Injectable 1 milliGRAM(s) IntraMuscular once PRN Glucose LESS THAN 70 milligrams/deciliter  ALBUTerol    0.083%. 2.5 milliGRAM(s) Nebulizer once PRN sputum induction  cyclobenzaprine 5 milliGRAM(s) Oral three times a day PRN Muscle Spasm  traMADol 25 milliGRAM(s) Oral once PRN Moderate Pain (4 - 6)  hydrALAZINE Injectable 10 milliGRAM(s) IV Push every 8 hours PRN SBP over 150

## 2017-08-09 NOTE — PROGRESS NOTE ADULT - SUBJECTIVE AND OBJECTIVE BOX
NEPHROLOGY INTERVAL HPI/OVERNIGHT EVENTS:    Examined earlier  HD today    MEDICATIONS  (STANDING):  aspirin enteric coated 81 milliGRAM(s) Oral daily  sevelamer hydrochloride 400 milliGRAM(s) Oral three times a day with meals  pantoprazole    Tablet 40 milliGRAM(s) Oral before breakfast  calcitriol   Capsule 0.5 MICROGram(s) Oral daily  ergocalciferol 28455 Unit(s) Oral every week  folic acid 1 milliGRAM(s) Oral daily  dextrose 5%. 1000 milliLiter(s) (50 mL/Hr) IV Continuous <Continuous>  dextrose 50% Injectable 12.5 Gram(s) IV Push once  dextrose 50% Injectable 25 Gram(s) IV Push once  dextrose 50% Injectable 25 Gram(s) IV Push once  heparin  Injectable 5000 Unit(s) SubCutaneous every 12 hours  ALBUTerol/ipratropium for Nebulization 3 milliLiter(s) Nebulizer every 6 hours  calcium acetate 667 milliGRAM(s) Oral three times a day with meals  epoetin raina Injectable 22118 Unit(s) IV Push <User Schedule>  vancomycin    Solution 125 milliGRAM(s) Oral every 6 hours  cefTRIAXone   IVPB 2 Gram(s) IV Intermittent every 24 hours  amLODIPine   Tablet 10 milliGRAM(s) Oral daily  hydrALAZINE 50 milliGRAM(s) Oral every 8 hours  insulin lispro (HumaLOG) corrective regimen sliding scale   SubCutaneous Before meals and at bedtime    MEDICATIONS  (PRN):  dextrose Gel 1 Dose(s) Oral once PRN Blood Glucose LESS THAN 70 milliGRAM(s)/deciliter  glucagon  Injectable 1 milliGRAM(s) IntraMuscular once PRN Glucose LESS THAN 70 milligrams/deciliter  ALBUTerol    0.083%. 2.5 milliGRAM(s) Nebulizer once PRN sputum induction  cyclobenzaprine 5 milliGRAM(s) Oral three times a day PRN Muscle Spasm  traMADol 25 milliGRAM(s) Oral once PRN Moderate Pain (4 - 6)  hydrALAZINE Injectable 10 milliGRAM(s) IV Push every 8 hours PRN SBP over 150      Allergies    Mushrooms (Hives (Mild))  No Known Drug Allergies    Intolerances        Vital Signs Last 24 Hrs  T(C): 36.6 (09 Aug 2017 09:08), Max: 36.6 (09 Aug 2017 09:08)  T(F): 97.9 (09 Aug 2017 09:08), Max: 97.9 (09 Aug 2017 09:08)  HR: 109 (09 Aug 2017 09:08) (88 - 112)  BP: 155/77 (09 Aug 2017 09:08) (152/62 - 170/70)  BP(mean): --  RR: 18 (09 Aug 2017 09:08) (17 - 18)  SpO2: 96% (09 Aug 2017 09:20) (96% - 98%)  Daily     Daily Weight in k.6 (09 Aug 2017 09:08)    PHYSICAL EXAM:  HEENT: normocephalic and atraumatic  NECK: Supple.  LUNGS: B/L wheezing  HEART: Regular rate and rhythm   ABDOMEN: Soft, nontender, and nondistended.  Positive bowel sounds.    EXTREMITIES: With B/L edema.  LABS:                        8.4    12.2  )-----------( 335      ( 09 Aug 2017 06:21 )             26.1     08-    131<L>  |  90<L>  |  50.0<H>  ----------------------------<  189<H>  4.1   |  23.0  |  5.92<H>    Ca    9.1      09 Aug 2017 06:20  Phos  4.1       Mg     2.1         TPro  8.4  /  Alb  3.6  /  TBili  0.4  /  DBili  x   /  AST  10  /  ALT  12  /  AlkPhos  115      PT/INR - ( 09 Aug 2017 06:19 )   PT: 13.1 sec;   INR: 1.19 ratio         PTT - ( 09 Aug 2017 06:19 )  PTT:28.4 sec    Magnesium, Serum: 2.1 mg/dL ( @ 06:20)  Phosphorus Level, Serum: 4.1 mg/dL ( @ 06:20)          RADIOLOGY & ADDITIONAL TESTS: NEPHROLOGY INTERVAL HPI/OVERNIGHT EVENTS:    Examined earlier  HD tommorow    MEDICATIONS  (STANDING):  aspirin enteric coated 81 milliGRAM(s) Oral daily  sevelamer hydrochloride 400 milliGRAM(s) Oral three times a day with meals  pantoprazole    Tablet 40 milliGRAM(s) Oral before breakfast  calcitriol   Capsule 0.5 MICROGram(s) Oral daily  ergocalciferol 23985 Unit(s) Oral every week  folic acid 1 milliGRAM(s) Oral daily  dextrose 5%. 1000 milliLiter(s) (50 mL/Hr) IV Continuous <Continuous>  dextrose 50% Injectable 12.5 Gram(s) IV Push once  dextrose 50% Injectable 25 Gram(s) IV Push once  dextrose 50% Injectable 25 Gram(s) IV Push once  heparin  Injectable 5000 Unit(s) SubCutaneous every 12 hours  ALBUTerol/ipratropium for Nebulization 3 milliLiter(s) Nebulizer every 6 hours  calcium acetate 667 milliGRAM(s) Oral three times a day with meals  epoetin raina Injectable 38350 Unit(s) IV Push <User Schedule>  vancomycin    Solution 125 milliGRAM(s) Oral every 6 hours  cefTRIAXone   IVPB 2 Gram(s) IV Intermittent every 24 hours  amLODIPine   Tablet 10 milliGRAM(s) Oral daily  hydrALAZINE 50 milliGRAM(s) Oral every 8 hours  insulin lispro (HumaLOG) corrective regimen sliding scale   SubCutaneous Before meals and at bedtime    MEDICATIONS  (PRN):  dextrose Gel 1 Dose(s) Oral once PRN Blood Glucose LESS THAN 70 milliGRAM(s)/deciliter  glucagon  Injectable 1 milliGRAM(s) IntraMuscular once PRN Glucose LESS THAN 70 milligrams/deciliter  ALBUTerol    0.083%. 2.5 milliGRAM(s) Nebulizer once PRN sputum induction  cyclobenzaprine 5 milliGRAM(s) Oral three times a day PRN Muscle Spasm  traMADol 25 milliGRAM(s) Oral once PRN Moderate Pain (4 - 6)  hydrALAZINE Injectable 10 milliGRAM(s) IV Push every 8 hours PRN SBP over 150      Allergies    Mushrooms (Hives (Mild))  No Known Drug Allergies    Intolerances        Vital Signs Last 24 Hrs  T(C): 36.6 (09 Aug 2017 09:08), Max: 36.6 (09 Aug 2017 09:08)  T(F): 97.9 (09 Aug 2017 09:08), Max: 97.9 (09 Aug 2017 09:08)  HR: 109 (09 Aug 2017 09:08) (88 - 112)  BP: 155/77 (09 Aug 2017 09:08) (152/62 - 170/70)  BP(mean): --  RR: 18 (09 Aug 2017 09:08) (17 - 18)  SpO2: 96% (09 Aug 2017 09:20) (96% - 98%)  Daily     Daily Weight in k.6 (09 Aug 2017 09:08)    PHYSICAL EXAM:  HEENT: normocephalic and atraumatic  NECK: Supple.  LUNGS: B/L wheezing  HEART: Regular rate and rhythm   ABDOMEN: Soft, nontender, and nondistended.  Positive bowel sounds.    EXTREMITIES: With B/L edema.  LABS:                        8.4    12.2  )-----------( 335      ( 09 Aug 2017 06:21 )             26.1         131<L>  |  90<L>  |  50.0<H>  ----------------------------<  189<H>  4.1   |  23.0  |  5.92<H>    Ca    9.1      09 Aug 2017 06:20  Phos  4.1       Mg     2.1         TPro  8.4  /  Alb  3.6  /  TBili  0.4  /  DBili  x   /  AST  10  /  ALT  12  /  AlkPhos  115      PT/INR - ( 09 Aug 2017 06:19 )   PT: 13.1 sec;   INR: 1.19 ratio         PTT - ( 09 Aug 2017 06:19 )  PTT:28.4 sec    Magnesium, Serum: 2.1 mg/dL ( @ 06:20)  Phosphorus Level, Serum: 4.1 mg/dL ( @ 06:20)          RADIOLOGY & ADDITIONAL TESTS:

## 2017-08-09 NOTE — PROGRESS NOTE ADULT - ASSESSMENT
ESRD on MWF schedule will HD today via permcath  ID: + Blood Cx  Serratia marcperry (8/1) source could be HD cath.   Had + Blood cx same organism 6/12- afebrile white count not elevated does not look toxic but second infection w this organism  Noted permcath exchange this friday with AVF superficialization ESRD on TTS schedule will HD vega via permcath  ID: + Blood Cx  Serratia marcandreecepedrito (8/1) source could be HD cath.   Had + Blood cx same organism 6/12- afebrile white count not elevated does not look toxic but second infection w this organism  Noted permcath exchange this friday with AVF superficialization

## 2017-08-09 NOTE — PROGRESS NOTE ADULT - ASSESSMENT
Patient is a 38 y/o female with ESRD diagnosed in 1/17 s/p left UE AVF on hemodialysis on M-W-F, right chest wall permacath, HTN, DM, obesity, HCAP one month ago, comes into the hospital due to shortness of breath since 2 -3 days ago. CXR shows right middle lobe infiltrates , left side pleural effusion improved from previous admission; confirmed with ct chest. Patient was dialyzed yesterday.  Also with hemoglobin of 7.6 today, transfused for symptomatic anemia yesterday.  She is admitted for diarrhea, cdiff positive. Incidentally, found to have + blood cx with serratia; concern for now needing to get the PC out b/c this is a recurrence of bacteremia     HD 9 (8/9/2017)-      Rocephin Antibiotic day # 9;   Nephrology consult note appreciated;  ESRD on HD (TTS schedule) ; HD Planned for Today; ID f/u noted, patient has positive Blood Cx for Serratia marcescen, patient on Antibiotics, source could be HD Permacath;      Patients stooling has normalized, PO Vancomycin (day 8 po Vanc) is treating her cdiff colitis;      There are plans to superficialize AVF after infectious process resolves, Vascular surgery on board;  ID recommends replacement of Permacath now;  Patient still hesitant about procedure and wants to talk with vascular surgery;       8/1/2017 pos BC for Serratia;    6/12/2017 pos BC for Serratia;

## 2017-08-09 NOTE — CONSULT NOTE ADULT - PROBLEM SELECTOR RECOMMENDATION 9
Scheduled for moderate risk surgery. CAD risk factors present. Patient is predominantly sedentary.   Ordered Pharmacologic NST for tomorrow AM (Dr. Ibrahim to supervise test)
Will D/C All antibiotics  Start PO Vancomycin 125mg q 6hours  monitor diarrhea  monitor fevers

## 2017-08-09 NOTE — PROGRESS NOTE ADULT - PROBLEM SELECTOR PLAN 6
Patient developed metabolic acidodis, hi ag, that has since resolved;    hemodialysis as schedule   epoetin injection once a week  continue sevelamer  cincalcet, continue calcium acetated  AV fistula present but deep, d/w Vascular NP - needs superficialization prior to use   permacth in place with no erythema

## 2017-08-09 NOTE — CONSULT NOTE ADULT - SUBJECTIVE AND OBJECTIVE BOX
Patient is a 37y old  Female who presents with a chief complaint of Diarrhea and shortness of breath (05 Aug 2017 10:07)      HPI:  36 y/o female with ESRD diagnosed in  s/p left UE AVF on hemodialysis on , right chest wall permacath, HTN, DM tx w/ insulin, obesity, HCAP a month ago, comes into the hospital due to  shortness of breath since 2 -3 days ago. The pt has shortness of breast on her base line but over the past 3 days her shortness of breath has increase and it's accompanied by dry cough, she complain of chill but no fever. At her base line  sometimes use O2 at home by nasal canula No sick contacts no recent travel . The pt was recently discharge from [facility] for HCAP and she got 7 days of Cefepime and she also completed a 7 days of Levaquin outpatient. Pt denies orthopnea and paroxymal dyspnea  but she said that never sleep flat because she "she doesnt feel right when she lies flat". She has swelling in both leg but now are the same and not different from her base line. Pt denies chest pain,  denies hx of clots .   Found to have Gram neg bacteremia.   Planned AV fistula revision as inpatient.     PAST MEDICAL & SURGICAL HISTORY:  End stage renal disease  Hypertension  Diabetes  AVF (arteriovenous fistula)  Vascular dialysis catheter in place      PREVIOUS DIAGNOSTIC TESTING:      ECHO  EF 65%, no significant valvular disease        Allergies    Mushrooms (Hives (Mild))  No Known Drug Allergies    Intolerances        MEDICATIONS  (STANDING):  aspirin enteric coated 81 milliGRAM(s) Oral daily  sevelamer hydrochloride 400 milliGRAM(s) Oral three times a day with meals  pantoprazole    Tablet 40 milliGRAM(s) Oral before breakfast  calcitriol   Capsule 0.5 MICROGram(s) Oral daily  ergocalciferol 17921 Unit(s) Oral every week  folic acid 1 milliGRAM(s) Oral daily  dextrose 5%. 1000 milliLiter(s) (50 mL/Hr) IV Continuous <Continuous>  dextrose 50% Injectable 12.5 Gram(s) IV Push once  dextrose 50% Injectable 25 Gram(s) IV Push once  dextrose 50% Injectable 25 Gram(s) IV Push once  heparin  Injectable 5000 Unit(s) SubCutaneous every 12 hours  ALBUTerol/ipratropium for Nebulization 3 milliLiter(s) Nebulizer every 6 hours  calcium acetate 667 milliGRAM(s) Oral three times a day with meals  epoetin raina Injectable 26391 Unit(s) IV Push <User Schedule>  vancomycin    Solution 125 milliGRAM(s) Oral every 6 hours  cefTRIAXone   IVPB 2 Gram(s) IV Intermittent every 24 hours  amLODIPine   Tablet 10 milliGRAM(s) Oral daily  hydrALAZINE 50 milliGRAM(s) Oral every 8 hours  insulin lispro (HumaLOG) corrective regimen sliding scale   SubCutaneous Before meals and at bedtime    MEDICATIONS  (PRN):  dextrose Gel 1 Dose(s) Oral once PRN Blood Glucose LESS THAN 70 milliGRAM(s)/deciliter  glucagon  Injectable 1 milliGRAM(s) IntraMuscular once PRN Glucose LESS THAN 70 milligrams/deciliter  ALBUTerol    0.083%. 2.5 milliGRAM(s) Nebulizer once PRN sputum induction  cyclobenzaprine 5 milliGRAM(s) Oral three times a day PRN Muscle Spasm  traMADol 25 milliGRAM(s) Oral once PRN Moderate Pain (4 - 6)  hydrALAZINE Injectable 10 milliGRAM(s) IV Push every 8 hours PRN SBP over 150      FAMILY HISTORY: no pertinent hx      SOCIAL HISTORY: denies tobacco use      REVIEW OF SYSTEMS:  CONSTITUTIONAL: + fever, weight loss, + fatigue  EYES: No eye pain, visual disturbances, or discharge  ENMT:  No difficulty hearing, tinnitus, vertigo; No sinus or throat pain  NECK: No pain or stiffness  RESPIRATORY: No cough, wheezing, chills or hemoptysis; + Shortness of Breath  CARDIOVASCULAR: No chest pain, palpitations, passing out, dizziness, or leg swelling  GASTROINTESTINAL: No abdominal or epigastric pain. No nausea, vomiting, or hematemesis; + diarrhea no constipation. No melena or hematochezia.  GENITOURINARY: No dysuria, frequency, hematuria, or incontinence  NEUROLOGICAL: No headaches, memory loss, loss of strength, numbness, or tremors  SKIN: No itching, burning, rashes, or lesions   LYMPH Nodes: No enlarged glands  ENDOCRINE: No heat or cold intolerance; No hair loss  MUSCULOSKELETAL: No joint pain or swelling; No muscle, back, or extremity pain  PSYCHIATRIC: No depression, anxiety, mood swings, or difficulty sleeping  HEME/LYMPH: No easy bruising, or bleeding gums  ALLERY AND IMMUNOLOGIC: No hives or eczema	    Vital Signs Last 24 Hrs  T(C): 36.1 (09 Aug 2017 16:56), Max: 36.6 (09 Aug 2017 09:08)  T(F): 96.9 (09 Aug 2017 16:56), Max: 97.9 (09 Aug 2017 09:08)  HR: 109 (09 Aug 2017 16:56) (88 - 112)  BP: 160/90 (09 Aug 2017 16:56) (152/62 - 170/70)  BP(mean): --  RR: 18 (09 Aug 2017 16:56) (17 - 18)  SpO2: 95% (09 Aug 2017 16:56) (94% - 98%)    Daily     Daily Weight in k.6 (09 Aug 2017 09:08)    I&O's Detail    08 Aug 2017 07:01  -  09 Aug 2017 07:00  --------------------------------------------------------  IN:    Oral Fluid: 360 mL  Total IN: 360 mL    OUT:    Other: 3600 mL  Total OUT: 3600 mL    Total NET: -3240 mL      09 Aug 2017 07:01  -  09 Aug 2017 18:49  --------------------------------------------------------  IN:    Oral Fluid: 720 mL  Total IN: 720 mL    OUT:  Total OUT: 0 mL    Total NET: 720 mL          PHYSICAL EXAM:  Appearance: Normal, well nourished	  HEENT:   Normal oral mucosa, PERRL, EOMI, sclera non-icteric	  Lymphatic: No cervical lymphadenopathy  Cardiovascular: Normal S1 S2, No JVD, No cardiac murmurs, No carotid bruits, No peripheral edema  Respiratory: Lungs clear to auscultation	  Psychiatry: A & O x 3, Mood & affect appropriate  Gastrointestinal:  Soft, Non-tender, + BS, no bruits	  Skin: No rashes, No ecchymoses, No cyanosis  Neurologic: Grossly non-focal with full strength in all four extremities  Extremities: Normal range of motion, No clubbing, cyanosis or edema  Vascular: Peripheral pulses palpable 2+ bilaterally      INTERPRETATION OF TELEMETRY: NSR      LABS:                        8.4    12.2  )-----------( 335      ( 09 Aug 2017 06:21 )             26.1         131<L>  |  90<L>  |  50.0<H>  ----------------------------<  189<H>  4.1   |  23.0  |  5.92<H>    Ca    9.1      09 Aug 2017 06:20  Phos  4.1       Mg     2.1         TPro  8.4  /  Alb  3.6  /  TBili  0.4  /  DBili  x   /  AST  10  /  ALT  12  /  AlkPhos  115          PT/INR - ( 09 Aug 2017 06:19 )   PT: 13.1 sec;   INR: 1.19 ratio         PTT - ( 09 Aug 2017 06:19 )  PTT:28.4 sec    I&O's Summary    08 Aug 2017 07:  -  09 Aug 2017 07:00  --------------------------------------------------------  IN: 360 mL / OUT: 3600 mL / NET: -3240 mL    09 Aug 2017 07:  -  09 Aug 2017 18:49  --------------------------------------------------------  IN: 720 mL / OUT: 0 mL / NET: 720 mL      BNP  RADIOLOGY & ADDITIONAL STUDIES:

## 2017-08-09 NOTE — PROGRESS NOTE ADULT - SUBJECTIVE AND OBJECTIVE BOX
Patient is a 37y old  Female who presents with a chief complaint of Diarrhea and shortness of breath (05 Aug 2017 10:07)  Had serratia bacteremia with bld cx that are now negative  She has been optimized from medical perspective and as per primary is able to have L AVF superficialization  ID requesting removal of permcath and new tunneled cath or temp line until AVF can be accessed. Pt refusing temp cath    PAST MEDICAL & SURGICAL HISTORY:  End stage renal disease  Hypertension  Diabetes  AVF (arteriovenous fistula)  Vascular dialysis catheter in place    No Known Drug Allergies    Mushrooms (Hives (Mild))      MEDICATIONS  (STANDING):  aspirin enteric coated 81 milliGRAM(s) Oral daily  sevelamer hydrochloride 400 milliGRAM(s) Oral three times a day with meals  pantoprazole    Tablet 40 milliGRAM(s) Oral before breakfast  calcitriol   Capsule 0.5 MICROGram(s) Oral daily  ergocalciferol 40480 Unit(s) Oral every week  folic acid 1 milliGRAM(s) Oral daily  dextrose 5%. 1000 milliLiter(s) (50 mL/Hr) IV Continuous <Continuous>  dextrose 50% Injectable 12.5 Gram(s) IV Push once  dextrose 50% Injectable 25 Gram(s) IV Push once  dextrose 50% Injectable 25 Gram(s) IV Push once  heparin  Injectable 5000 Unit(s) SubCutaneous every 12 hours  ALBUTerol/ipratropium for Nebulization 3 milliLiter(s) Nebulizer every 6 hours  calcium acetate 667 milliGRAM(s) Oral three times a day with meals  epoetin raina Injectable 61062 Unit(s) IV Push <User Schedule>  vancomycin    Solution 125 milliGRAM(s) Oral every 6 hours  cefTRIAXone   IVPB 2 Gram(s) IV Intermittent every 24 hours  amLODIPine   Tablet 10 milliGRAM(s) Oral daily  hydrALAZINE 50 milliGRAM(s) Oral every 8 hours  insulin lispro (HumaLOG) corrective regimen sliding scale   SubCutaneous Before meals and at bedtime    MEDICATIONS  (PRN):  dextrose Gel 1 Dose(s) Oral once PRN Blood Glucose LESS THAN 70 milliGRAM(s)/deciliter  glucagon  Injectable 1 milliGRAM(s) IntraMuscular once PRN Glucose LESS THAN 70 milligrams/deciliter  ALBUTerol    0.083%. 2.5 milliGRAM(s) Nebulizer once PRN sputum induction  cyclobenzaprine 5 milliGRAM(s) Oral three times a day PRN Muscle Spasm  traMADol 25 milliGRAM(s) Oral once PRN Moderate Pain (4 - 6)  hydrALAZINE Injectable 10 milliGRAM(s) IV Push every 8 hours PRN SBP over 150          Vital Signs Last 24 Hrs  T(C): 36.6 (09 Aug 2017 09:08), Max: 36.6 (08 Aug 2017 11:00)  T(F): 97.9 (09 Aug 2017 09:08), Max: 97.9 (09 Aug 2017 09:08)  HR: 109 (09 Aug 2017 09:08) (88 - 112)  BP: 155/77 (09 Aug 2017 09:08) (152/62 - 170/70)  BP(mean): --  RR: 18 (09 Aug 2017 09:08) (17 - 18)  SpO2: 96% (09 Aug 2017 09:20) (96% - 100%)  I&O's Detail    08 Aug 2017 07:01  -  09 Aug 2017 07:00  --------------------------------------------------------  IN:    Oral Fluid: 360 mL  Total IN: 360 mL    OUT:    Other: 3600 mL  Total OUT: 3600 mL    Total NET: -3240 mL      09 Aug 2017 07:01  -  09 Aug 2017 10:46  --------------------------------------------------------  IN:    Oral Fluid: 240 mL  Total IN: 240 mL    OUT:  Total OUT: 0 mL    Total NET: 240 mL      Physical Exam:  General: NAD, resting comfortably in bed. R IJ tunneled cath insitu with no erythema or drainage noted  Extremities: L AVF with + bruit, faint thrill(sec to depth)      LABS:                        8.4    12.2  )-----------( 335      ( 09 Aug 2017 06:21 )             26.1     08-09    131<L>  |  90<L>  |  50.0<H>  ----------------------------<  189<H>  4.1   |  23.0  |  5.92<H>    Ca    9.1      09 Aug 2017 06:20  Phos  4.1     08-09  Mg     2.1     08-09    TPro  8.4  /  Alb  3.6  /  TBili  0.4  /  DBili  x   /  AST  10  /  ALT  12  /  AlkPhos  115  08-09    PT/INR - ( 09 Aug 2017 06:19 )   PT: 13.1 sec;   INR: 1.19 ratio    PTT - ( 09 Aug 2017 06:19 )  PTT:28.4 sec  CAPILLARY BLOOD GLUCOSE  85 (09 Aug 2017 08:32)  286 (08 Aug 2017 22:00)  193 (08 Aug 2017 17:20)    RECENT CULTURES:    Bld cx: 8/4, 8/5 negative    Assessment and Plan: 37y Female with Pneumonia, serratia bacteremia, ESRD on HD via permcath and mature L AVF which cannot be accessed sec to depth.  If cleared will plan for OR FRIDAY 8/11 for superficialization and catheter exchange  As discussed with Dr Bangura will readdress potential placement of L IJ permcath placement and removal of R IJ  Pt could potentially be discharged following procedure if medically ready for dc  Discussed with team

## 2017-08-09 NOTE — PROGRESS NOTE ADULT - SUBJECTIVE AND OBJECTIVE BOX
[facility] [place] OLLIE GOLDMAN MD FACP   CELSO MORALEZ MD  Diplomates American Board of Internal Medicine and Infectious Diseases      SORAYA JIMENEZ  [id number]  37y    INTERVAL HPI/OVERNIGHT EVENTS:  AFEBRILE ON ABS  NO POS C.S NOW    Vital Signs Last 24 Hrs  T(C): 36.5 (08 Aug 2017 08:10), Max: 37.1 (07 Aug 2017 15:19)  T(F): 97.7 (08 Aug 2017 08:10), Max: 98.7 (07 Aug 2017 15:19)  HR: 101 (08 Aug 2017 07:25) (100 - 104)  BP: 163/86 (08 Aug 2017 07:25) (147/74 - 163/86)  BP(mean): --  RR: 18 (08 Aug 2017 07:25) (18 - 18)  SpO2: 95% (08 Aug 2017 07:25) (95% - 98%)    ANTIBIOTICS  epoetin raina Injectable 58181 Unit(s) IV Push <User Schedule>  vancomycin    Solution 125 milliGRAM(s) Oral every 6 hours  cefTRIAXone   IVPB 2 Gram(s) IV Intermittent every 24 hours      Allergies    Mushrooms (Hives (Mild))  No Known Drug Allergies    Intolerances        REVIEW OF SYSTEMS:NEG    PHYSICAL EXAM:  Vital Signs Last 24 Hrs  T(C): 36.5 (08 Aug 2017 08:10), Max: 37.1 (07 Aug 2017 15:19)  T(F): 97.7 (08 Aug 2017 08:10), Max: 98.7 (07 Aug 2017 15:19)  HR: 101 (08 Aug 2017 07:25) (100 - 104)  BP: 163/86 (08 Aug 2017 07:25) (147/74 - 163/86)  BP(mean): --  RR: 18 (08 Aug 2017 07:25) (18 - 18)  SpO2: 95% (08 Aug 2017 07:25) (95% - 98%)      GEN: NAD, pleasant  HEENT: normocephalic and atraumatic. EOMI. WILDA. Moist mucosa. Clear Posterior pharynx.  NECK: Supple. No carotid bruits.  No lymphadenopathy or thyromegaly.  LUNGS: Clear to auscultation.  HEART: Regular rate and rhythm without murmur.  ABDOMEN: Soft, nontender, and nondistended.  Positive bowel sounds.  No hepatosplenomegaly was noted.  EXTREMITIES: Without any cyanosis, clubbing, rash, lesions or edema.  NEUROLOGIC: Cranial nerves II through XII are grossly intact.  MUSCULOSKELETAL:  SKIN: No ulceration or induration present    LABS:                        7.8    9.4   )-----------( 284      ( 08 Aug 2017 06:17 )             23.9       08-08    129<L>  |  87<L>  |  62.0<H>  ----------------------------<  187<H>  4.3   |  21.0<L>  |  8.41<H>    Ca    9.2      08 Aug 2017 06:17    TPro  8.2  /  Alb  3.4  /  TBili  0.3<L>  /  DBili  x   /  AST  10  /  ALT  13  /  AlkPhos  118  08-08 08-08 @ 06:17  hct 23.9 % hgb 7.8 g/dL    08-08 @ 06:17  plat 284 K/uL wbc 9.4 K/uL    08-07 @ 06:22  hct 25.2 % hgb 8.1 g/dL    08-07 @ 06:22  plat 279 K/uL wbc 9.2 K/uL      08-08-17  creat 8.41 mg/dL gfr 6 mL/min/1.73M2 bun 62.0 mg/dL k 4.3 mmol/L  08-07-17  creat 6.00 mg/dL gfr 10 mL/min/1.73M2 bun 45.0 mg/dL k 4.1 mmol/L      MICROBIOLOGY:        RADIOLOGY & ADDITIONAL STUDIES:          GUICHO GOLDMAN MD FACP

## 2017-08-10 LAB
ALBUMIN SERPL ELPH-MCNC: 3.3 G/DL — SIGNIFICANT CHANGE UP (ref 3.3–5.2)
ALP SERPL-CCNC: 98 U/L — SIGNIFICANT CHANGE UP (ref 40–120)
ALT FLD-CCNC: 14 U/L — SIGNIFICANT CHANGE UP
ANION GAP SERPL CALC-SCNC: 18 MMOL/L — HIGH (ref 5–17)
AST SERPL-CCNC: 11 U/L — SIGNIFICANT CHANGE UP
BASOPHILS # BLD AUTO: 0 K/UL — SIGNIFICANT CHANGE UP (ref 0–0.2)
BASOPHILS NFR BLD AUTO: 0.5 % — SIGNIFICANT CHANGE UP (ref 0–2)
BILIRUB SERPL-MCNC: 0.4 MG/DL — SIGNIFICANT CHANGE UP (ref 0.4–2)
BLD GP AB SCN SERPL QL: SIGNIFICANT CHANGE UP
BUN SERPL-MCNC: 67 MG/DL — HIGH (ref 8–20)
CALCIUM SERPL-MCNC: 9 MG/DL — SIGNIFICANT CHANGE UP (ref 8.6–10.2)
CHLORIDE SERPL-SCNC: 90 MMOL/L — LOW (ref 98–107)
CO2 SERPL-SCNC: 21 MMOL/L — LOW (ref 22–29)
CREAT SERPL-MCNC: 7.58 MG/DL — HIGH (ref 0.5–1.3)
CULTURE RESULTS: SIGNIFICANT CHANGE UP
CULTURE RESULTS: SIGNIFICANT CHANGE UP
EOSINOPHIL # BLD AUTO: 0.3 K/UL — SIGNIFICANT CHANGE UP (ref 0–0.5)
EOSINOPHIL NFR BLD AUTO: 2.7 % — SIGNIFICANT CHANGE UP (ref 0–6)
GLUCOSE SERPL-MCNC: 134 MG/DL — HIGH (ref 70–115)
HCT VFR BLD CALC: 25 % — LOW (ref 37–47)
HGB BLD-MCNC: 8.3 G/DL — LOW (ref 12–16)
LYMPHOCYTES # BLD AUTO: 1.6 K/UL — SIGNIFICANT CHANGE UP (ref 1–4.8)
LYMPHOCYTES # BLD AUTO: 14.7 % — LOW (ref 20–55)
MCHC RBC-ENTMCNC: 25.9 PG — LOW (ref 27–31)
MCHC RBC-ENTMCNC: 33.2 G/DL — SIGNIFICANT CHANGE UP (ref 32–36)
MCV RBC AUTO: 77.9 FL — LOW (ref 81–99)
MONOCYTES # BLD AUTO: 1.1 K/UL — HIGH (ref 0–0.8)
MONOCYTES NFR BLD AUTO: 10.1 % — HIGH (ref 3–10)
NEUTROPHILS # BLD AUTO: 7.8 K/UL — SIGNIFICANT CHANGE UP (ref 1.8–8)
NEUTROPHILS NFR BLD AUTO: 70.4 % — SIGNIFICANT CHANGE UP (ref 37–73)
OSMOLALITY SERPL: 307 MOS/KG — HIGH (ref 275–300)
PLATELET # BLD AUTO: 335 K/UL — SIGNIFICANT CHANGE UP (ref 150–400)
POTASSIUM SERPL-MCNC: 4.4 MMOL/L — SIGNIFICANT CHANGE UP (ref 3.5–5.3)
POTASSIUM SERPL-SCNC: 4.4 MMOL/L — SIGNIFICANT CHANGE UP (ref 3.5–5.3)
PROT SERPL-MCNC: 7.6 G/DL — SIGNIFICANT CHANGE UP (ref 6.6–8.7)
RBC # BLD: 3.21 M/UL — LOW (ref 4.4–5.2)
RBC # FLD: 17.6 % — HIGH (ref 11–15.6)
SODIUM SERPL-SCNC: 129 MMOL/L — LOW (ref 135–145)
SPECIMEN SOURCE: SIGNIFICANT CHANGE UP
SPECIMEN SOURCE: SIGNIFICANT CHANGE UP
TYPE + AB SCN PNL BLD: SIGNIFICANT CHANGE UP
WBC # BLD: 11 K/UL — HIGH (ref 4.8–10.8)
WBC # FLD AUTO: 11 K/UL — HIGH (ref 4.8–10.8)

## 2017-08-10 PROCEDURE — 99233 SBSQ HOSP IP/OBS HIGH 50: CPT

## 2017-08-10 RX ORDER — ACETAMINOPHEN 500 MG
650 TABLET ORAL ONCE
Qty: 0 | Refills: 0 | Status: COMPLETED | OUTPATIENT
Start: 2017-08-10 | End: 2017-08-10

## 2017-08-10 RX ORDER — CEFTRIAXONE 500 MG/1
2 INJECTION, POWDER, FOR SOLUTION INTRAMUSCULAR; INTRAVENOUS ONCE
Qty: 0 | Refills: 0 | Status: COMPLETED | OUTPATIENT
Start: 2017-08-10 | End: 2017-08-13

## 2017-08-10 RX ADMIN — Medication 3 MILLILITER(S): at 20:52

## 2017-08-10 RX ADMIN — CEFTRIAXONE 100 GRAM(S): 500 INJECTION, POWDER, FOR SOLUTION INTRAMUSCULAR; INTRAVENOUS at 22:32

## 2017-08-10 RX ADMIN — Medication 2: at 22:33

## 2017-08-10 RX ADMIN — Medication 10 MILLIGRAM(S): at 00:54

## 2017-08-10 RX ADMIN — Medication 3 MILLILITER(S): at 16:11

## 2017-08-10 RX ADMIN — Medication 3 MILLILITER(S): at 03:35

## 2017-08-10 RX ADMIN — Medication 125 MILLIGRAM(S): at 21:00

## 2017-08-10 RX ADMIN — AMLODIPINE BESYLATE 10 MILLIGRAM(S): 2.5 TABLET ORAL at 05:50

## 2017-08-10 RX ADMIN — Medication 125 MILLIGRAM(S): at 15:15

## 2017-08-10 RX ADMIN — PANTOPRAZOLE SODIUM 40 MILLIGRAM(S): 20 TABLET, DELAYED RELEASE ORAL at 05:50

## 2017-08-10 RX ADMIN — Medication 1 MILLIGRAM(S): at 15:14

## 2017-08-10 RX ADMIN — ERYTHROPOIETIN 12000 UNIT(S): 10000 INJECTION, SOLUTION INTRAVENOUS; SUBCUTANEOUS at 10:11

## 2017-08-10 RX ADMIN — Medication 125 MILLIGRAM(S): at 02:18

## 2017-08-10 RX ADMIN — Medication 50 MILLIGRAM(S): at 05:50

## 2017-08-10 RX ADMIN — SEVELAMER CARBONATE 400 MILLIGRAM(S): 2400 POWDER, FOR SUSPENSION ORAL at 16:50

## 2017-08-10 RX ADMIN — Medication 50 MILLIGRAM(S): at 15:21

## 2017-08-10 RX ADMIN — CALCITRIOL 0.5 MICROGRAM(S): 0.5 CAPSULE ORAL at 15:14

## 2017-08-10 RX ADMIN — Medication 50 MILLIGRAM(S): at 22:33

## 2017-08-10 RX ADMIN — Medication 81 MILLIGRAM(S): at 15:14

## 2017-08-10 RX ADMIN — Medication 650 MILLIGRAM(S): at 08:14

## 2017-08-10 NOTE — PROGRESS NOTE ADULT - PROBLEM SELECTOR PLAN 3
Likely 2/2 aocd. Normocytic. 2/2 ESRD   1 unit of packed RBC was transfused  on July 3    Hb today =7, slowly trending down, likely transfuse if Hb <7  c/w epogen, folate  anemia w/up noted   follow of Hb w/ cbc daily Likely 2/2 aocd. Normocytic. 2/2 ESRD   1 unit of packed RBC was transfused  on July 3    Hb today =8.3,  likely transfuse if Hb <7  c/w epogen, folate  anemia w/up noted   follow of Hb w/ cbc daily

## 2017-08-10 NOTE — PROGRESS NOTE ADULT - ASSESSMENT
ESRD on TTS schedule will HD today  via permcath  ID: + Blood Cx  Serratia marcperry (8/1) source could be HD cath.   Had + Blood cx same organism 6/12- afebrile white count not elevated does not look toxic but second infection w this organism  Noted permcath exchange this friday with AVF superficialization appreciate vasc sx assistance

## 2017-08-10 NOTE — PROGRESS NOTE ADULT - PROBLEM SELECTOR PLAN 8
- insulin sliding scale  -diabetic diet - insulin sliding scale  -diabetic diet  onitor Blood glucose

## 2017-08-10 NOTE — PROGRESS NOTE ADULT - SUBJECTIVE AND OBJECTIVE BOX
The patient is a 37y old  female who presents with a chief complaint of Diarrhea and shortness of  breath.  The shortness of breath started in 2017. She is scheduled to have a LEFT   ARTERIOVENOUS SUPERFICIALIZATION, AND RIGHT PERMCATH EXCHANGE.  (05 Aug 2017 10:07)      PAST MEDICAL HISTORY:  End stage renal disease  Hypertension  Diabetes since she was 28 years old.  B.M.I. = 49 = Super Obese    PAST SURGICAL HISTORY:  AVF (arteriovenous fistula) in the left upper arm.  Vascular dialysis catheter in place      MEDICATIONS  (STANDING):  aspirin enteric coated 81 milliGRAM(s) Oral daily  sevelamer hydrochloride 400 milliGRAM(s) Oral three times a day with meals  pantoprazole    Tablet 40 milliGRAM(s) Oral before breakfast  calcitriol   Capsule 0.5 MICROGram(s) Oral daily  ergocalciferol 31461 Unit(s) Oral every week  folic acid 1 milliGRAM(s) Oral daily  dextrose 5%. 1000 milliLiter(s) (50 mL/Hr) IV Continuous <Continuous>  dextrose 50% Injectable 12.5 Gram(s) IV Push once  dextrose 50% Injectable 25 Gram(s) IV Push once  dextrose 50% Injectable 25 Gram(s) IV Push once  heparin  Injectable 5000 Unit(s) SubCutaneous every 12 hours  ALBUTerol/ipratropium for Nebulization 3 milliLiter(s) Nebulizer every 6 hours  calcium acetate 667 milliGRAM(s) Oral three times a day with meals  epoetin raina Injectable 18136 Unit(s) IV Push <User Schedule>  vancomycin    Solution 125 milliGRAM(s) Oral every 6 hours  cefTRIAXone   IVPB 2 Gram(s) IV Intermittent every 24 hours  amLODIPine   Tablet 10 milliGRAM(s) Oral daily  hydrALAZINE 50 milliGRAM(s) Oral every 8 hours  insulin lispro (HumaLOG) corrective regimen sliding scale   SubCutaneous Before meals and at bedtime  cefTRIAXone   IVPB 2 Gram(s) IV Intermittent once    MEDICATIONS  (PRN):  dextrose Gel 1 Dose(s) Oral once PRN Blood Glucose LESS THAN 70 milliGRAM(s)/deciliter  glucagon  Injectable 1 milliGRAM(s) IntraMuscular once PRN Glucose LESS THAN 70 milligrams/deciliter  ALBUTerol    0.083%. 2.5 milliGRAM(s) Nebulizer once PRN sputum induction  cyclobenzaprine 5 milliGRAM(s) Oral three times a day PRN Muscle Spasm  hydrALAZINE Injectable 10 milliGRAM(s) IV Push every 8 hours PRN SBP over 150      Allergies:  Mushrooms (Hives (Mild)                   No Known Drug Allergies        SOCIAL HISTORY:  The patient does not drink alcohol and she quit smoking in 2016                                 after smoking 1/2 ppd x 12 years.  She occasionally smoked marijuana in                                 the past.                      8.3    11.0  )-----------( 335      ( 10 Aug 2017 08:07 )             25.0       PT/INR - ( 09 Aug 2017 06:19 )   PT: 13.1 sec;   INR: 1.19 ratio         PTT - ( 09 Aug 2017 06:19 )  PTT:28.4 sec    08-10    129<L>  |  90<L>  |  67.0<H>  ----------------------------<  134<H>  4.4   |  21.0<L>  |  7.58<H>    Ca    9.0      10 Aug 2017 08:07  Phos  4.1     08-09  Mg     2.1     08-09    TPro  7.6  /  Alb  3.3  /  TBili  0.4  /  DBili  x   /  AST  11  /  ALT  14  /  AlkPhos  98  08-10    [id number]  Normal sinus rhythm  Left atrial enlargement  Nonspecific T wave abnormality  Abnormal ECG    TRANSTHORACIC ECHO:  2017   Summary:   1. Normal left ventricular internal cavity size.   2. Normal global left ventricular systolic function.   3. Left ventricular ejection fraction, by visual estimation, is 60 to   65%.   4. Normal right ventricular size and function.   5. Thickening of the anterior and posterior mitral valveleaflets.   6. Sclerotic aortic valve with normal opening.   7. Trace mitral valve regurgitation.   8. Normal pulmonary artery pressure.   9. The main pulmonary artery is normal in size.    CHEST X-RAY 2017  IMPRESSION:  Small bilateral lower lobe infiltrates. Cardiomegaly.      TYPE OF TEST: Rest/Stress Pharmacologic  8/10/2017  IMPRESSIONS:  normal study myocardial perfusion  normal LVEF  normal ECG    ASA # =  3   Mallampati # = 2  (Teeth intact)

## 2017-08-10 NOTE — PROGRESS NOTE ADULT - PROBLEM SELECTOR PLAN 6
Patient developed metabolic acidodis, hi ag, that has since resolved;    hemodialysis as schedule   epoetin injection once a week  continue sevelamer  cincalcet, continue calcium acetated  AV fistula present but deep, d/w Vascular NP - needs superficialization prior to use   permacth in place with no erythema Patient developed metabolic acidodis, hi ag, that has since resolved;    hemodialysis as schedule   epoetin injection once a week  continue sevelamer  cincalcet, continue calcium acetated  AV fistula present but deep, d/w Vascular NP - needs superficialization prior to use   permacth in place with no erythema. will be done on 08/11/2017 pending Cardio clearance today  Permacath recommended to be removed due to possibly being source of Serratia infection, per ID recommendation, Will be done on 08/10/17 by Vascular surgey  Cardio will do pharmacologic  NST

## 2017-08-10 NOTE — PROGRESS NOTE ADULT - SUBJECTIVE AND OBJECTIVE BOX
CC: Diarrhea and shortness of breath    Patient seen and examined at bedside.  No acute events overnight.     Patient's cough has reduced, still productive, but less copious amounts; afebrile.  Patient has a baseline sob, that responds well to oxygen therapy;    stools have solidified, no further pain with defecation;  calf pain is resolving;      no desaturations on ; stills mentions intermittent SOB. HD Today    ROS  General- denies any fever/chills  Resp-  dyspnea and cough resolving  GI- denies any abodminal pain or diarrhea  - denies any dysuria/hematuria    Vital Signs Last 24 Hrs    Vital Signs Last 24 Hrs  T(C): 36.1 (09 Aug 2017 23:27), Max: 36.6 (09 Aug 2017 09:08)  T(F): 97 (09 Aug 2017 23:27), Max: 97.9 (09 Aug 2017 09:08)  HR: 106 (10 Aug 2017 05:46) (97 - 109)  BP: 162/94 (10 Aug 2017 05:46) (152/78 - 178/93)  BP(mean): --  RR: 17 (10 Aug 2017 05:46) (17 - 18)  SpO2: 96% (10 Aug 2017 03:36) (94% - 99%)      Daily Weight in k.6 (08 Aug 2017 11:00)    Daily Weight in k (07 Aug 2017 08:55)          08-08 @ 07:01  -  08-09 @ 05:28  --------------------------------------------------------  IN: 360 mL / OUT: 3600 mL / NET: -3240 mL      TD=5571ro/119.6kg/24hr= 1.25 cc/kg/hr   UO>0.5, supraoptimal      Weight 121kg before HD on 17  post HD weight not recorded;  no weight recorded since;  (nurse notified to start recording daily weights)      HD on 17     Intake =600cc  Output =3100cc  Net= -2500cc     HD on 17   [id number]  [id number]=[id number]  Net= -2500    PE  GENERAL: Adult female laying in bed in NAD,  HEENT:  EOMI, PERRLA, conjunctiva and sclera clear, Moist mucous membranes,  NECK: Supple, No JVD, No supraclavicular/submandibular lymphadenopathy. chest HD catheter in place on the right  LUNG: predominantly expiratory wheezing bilaterally; reduction in rales; no rhonchi;   HEART: S1, S2, RRR, No murmurs, rubs, or gallops  ABDOMEN: Soft, Nontender, Nondistended; Bowel sounds present, Normoactive  EXTREMITIES:   No clubbing, cyanosis, or edema, LUE AVF placement + thrill palpable, bruit heard on auscultation  NERVOUS SYSTEM:  Alert & Oriented X3, Good concentration; Motor Strength 5/5 B/L upper and lower extremities  SKIN: No rashes or lesions, capillary refills less than 2 seconds;  no skin tenting, good skin turgor      Lab Results:               CBC Full  -  ( 09 Aug 2017 06:21 )  WBC Count : 12.2 K/uL  Hemoglobin : 8.4 g/dL  Hematocrit : 26.1 %  Platelet Count - Automated : 335 K/uL  Mean Cell Volume : 78.1 fl  Mean Cell Hemoglobin : 25.1 pg  Mean Cell Hemoglobin Concentration : 32.2 g/dL  Auto Neutrophil # : 8.9 K/uL  Auto Lymphocyte # : 1.9 K/uL  Auto Monocyte # : 1.1 K/uL  Auto Eosinophil # : 0.0 K/uL  Auto Basophil # : 0.0 K/uL  Auto Neutrophil % : 73.2 %  Auto Lymphocyte % : 15.8 %  Auto Monocyte % : 8.8 %  Auto Eosinophil % : 0.2 %  Auto Basophil % : 0.2 %          131<L>  |  90<L>  |  50.0<H>  ----------------------------<  189<H>  4.1   |  23.0  |  5.92<H>    Ca    9.1      09 Aug 2017 06:20  Phos  4.1       Mg     2.1         TPro  8.4  /  Alb  3.6  /  TBili  0.4  /  DBili  x   /  AST  10  /  ALT  12  /  AlkPhos  115          LIVER FUNCTIONS - ( 09 Aug 2017 06:20 )  Alb: 3.6 g/dL / Pro: 8.4 g/dL / ALK PHOS: 115 U/L / ALT: 12 U/L / AST: 10 U/L / GGT: x                     MEDICATIONS  (STANDING):  aspirin enteric coated 81 milliGRAM(s) Oral daily  sevelamer hydrochloride 400 milliGRAM(s) Oral three times a day with meals  pantoprazole    Tablet 40 milliGRAM(s) Oral before breakfast  calcitriol   Capsule 0.5 MICROGram(s) Oral daily  ergocalciferol 29591 Unit(s) Oral every week  folic acid 1 milliGRAM(s) Oral daily  dextrose 5%. 1000 milliLiter(s) (50 mL/Hr) IV Continuous <Continuous>  dextrose 50% Injectable 12.5 Gram(s) IV Push once  dextrose 50% Injectable 25 Gram(s) IV Push once  dextrose 50% Injectable 25 Gram(s) IV Push once  heparin  Injectable 5000 Unit(s) SubCutaneous every 12 hours  ALBUTerol/ipratropium for Nebulization 3 milliLiter(s) Nebulizer every 6 hours  calcium acetate 667 milliGRAM(s) Oral three times a day with meals  epoetin raina Injectable 61497 Unit(s) IV Push <User Schedule>  vancomycin    Solution 125 milliGRAM(s) Oral every 6 hours  cefTRIAXone   IVPB 2 Gram(s) IV Intermittent every 24 hours  amLODIPine   Tablet 10 milliGRAM(s) Oral daily  hydrALAZINE 50 milliGRAM(s) Oral every 8 hours  insulin lispro (HumaLOG) corrective regimen sliding scale   SubCutaneous Before meals and at bedtime      MEDICATIONS  (PRN):  dextrose Gel 1 Dose(s) Oral once PRN Blood Glucose LESS THAN 70 milliGRAM(s)/deciliter  glucagon  Injectable 1 milliGRAM(s) IntraMuscular once PRN Glucose LESS THAN 70 milligrams/deciliter  ALBUTerol    0.083%. 2.5 milliGRAM(s) Nebulizer once PRN sputum induction  cyclobenzaprine 5 milliGRAM(s) Oral three times a day PRN Muscle Spasm  hydrALAZINE Injectable 10 milliGRAM(s) IV Push every 8 hours PRN SBP over 150

## 2017-08-10 NOTE — PROGRESS NOTE ADULT - ASSESSMENT
Patient is a 38 y/o female with ESRD diagnosed in 1/17 s/p left UE AVF on hemodialysis on M-W-F, right chest wall permacath, HTN, DM, obesity, HCAP one month ago, comes into the hospital due to shortness of breath since 2 -3 days ago. CXR shows right middle lobe infiltrates , left side pleural effusion improved from previous admission; confirmed with ct chest. Patient was dialyzed yesterday.  Also with hemoglobin of 7.6 today, transfused for symptomatic anemia yesterday.  She is admitted for diarrhea, cdiff positive. Incidentally, found to have + blood cx with serratia; concern for now needing to get the PC out b/c this is a recurrence of bacteremia     HD 10 (8/10/2017)-      Rocephin Antibiotic day # 10;   Nephrology consult note appreciated;  ESRD on HD (TTS schedule) ; HD Planned for Today; ID f/u noted, patient has positive Blood Cx for Serratia marcandreecen, patient on Antibiotics, source could be HD Permacath;      Patients stooling has normalized, PO Vancomycin (day 9 po Vanc) is treating her cdiff colitis;      There are plans to superficiale AVF and Permacath replacement tomorrow, Vascular surgery: Dr Tobar on [facility]. CRISTAL Grubbs spoke to pt  Cardiology: Dr Cortés saw the pt, consul appreciated. he will do pharmacologic NST today for surgery clearance due to pt's risck factors        8/1/2017 pos BC for Serratia;    6/12/2017 pos BC for Serratia;

## 2017-08-10 NOTE — PROGRESS NOTE ADULT - SUBJECTIVE AND OBJECTIVE BOX
Patient is a 37y old  Female who p/w dyspnea         PAST MEDICAL & SURGICAL HISTORY:  End stage renal disease  Hypertension  Diabetes  AVF (arteriovenous fistula)  Vascular dialysis catheter in place        CHEST CT PULMONARY ANGIO with IV Contrast:  FINDINGS:  MEDICATIONS  (STANDING):  aspirin enteric coated 81 milliGRAM(s) Oral daily  sevelamer hydrochloride 400 milliGRAM(s) Oral three times a day with meals  pantoprazole    Tablet 40 milliGRAM(s) Oral before breakfast  calcitriol   Capsule 0.5 MICROGram(s) Oral daily  ergocalciferol 69180 Unit(s) Oral every week  folic acid 1 milliGRAM(s) Oral daily  dextrose 5%. 1000 milliLiter(s) (50 mL/Hr) IV Continuous <Continuous>  dextrose 50% Injectable 12.5 Gram(s) IV Push once  dextrose 50% Injectable 25 Gram(s) IV Push once  dextrose 50% Injectable 25 Gram(s) IV Push once  heparin  Injectable 5000 Unit(s) SubCutaneous every 12 hours  ALBUTerol/ipratropium for Nebulization 3 milliLiter(s) Nebulizer every 6 hours  calcium acetate 667 milliGRAM(s) Oral three times a day with meals  epoetin raina Injectable 46204 Unit(s) IV Push <User Schedule>  vancomycin    Solution 125 milliGRAM(s) Oral every 6 hours  cefTRIAXone   IVPB 2 Gram(s) IV Intermittent every 24 hours  amLODIPine   Tablet 10 milliGRAM(s) Oral daily  hydrALAZINE 50 milliGRAM(s) Oral every 8 hours  insulin lispro (HumaLOG) corrective regimen sliding scale   SubCutaneous Before meals and at bedtime  cefTRIAXone   IVPB 2 Gram(s) IV Intermittent once    MEDICATIONS  (PRN):  dextrose Gel 1 Dose(s) Oral once PRN Blood Glucose LESS THAN 70 milliGRAM(s)/deciliter  glucagon  Injectable 1 milliGRAM(s) IntraMuscular once PRN Glucose LESS THAN 70 milligrams/deciliter  ALBUTerol    0.083%. 2.5 milliGRAM(s) Nebulizer once PRN sputum induction  cyclobenzaprine 5 milliGRAM(s) Oral three times a day PRN Muscle Spasm  hydrALAZINE Injectable 10 milliGRAM(s) IV Push every 8 hours PRN SBP over 150      Vital Signs Last 24 Hrs  T(C): 37.1 (10 Aug 2017 11:38), Max: 37.1 (10 Aug 2017 11:38)  T(F): 98.8 (10 Aug 2017 11:38), Max: 98.8 (10 Aug 2017 11:38)  HR: 102 (10 Aug 2017 11:38) (97 - 109)  BP: 149/80 (10 Aug 2017 11:38) (146/88 - 178/93)  BP(mean): --  RR: 18 (10 Aug 2017 11:38) (17 - 18)  SpO2: 92% (10 Aug 2017 11:38) (92% - 99%)    PHYSICAL EXAM:    *pt in nuclear lab           I&O's Detail    09 Aug 2017 07:01  -  10 Aug 2017 07:00  --------------------------------------------------------  IN:    Oral Fluid: 720 mL  Total IN: 720 mL    OUT:  Total OUT: 0 mL    Total NET: 720 mL      10 Aug 2017 07:01  -  10 Aug 2017 12:58  --------------------------------------------------------  IN:  Total IN: 0 mL    OUT:    Other: 2900 mL  Total OUT: 2900 mL    Total NET: -2900 mL          LABS:                        8.3    11.0  )-----------( 335      ( 10 Aug 2017 08:07 )             25.0     08-10    129<L>  |  90<L>  |  67.0<H>  ----------------------------<  134<H>  4.4   |  21.0<L>  |  7.58<H>    Ca    9.0      10 Aug 2017 08:07  Phos  4.1     08-09  Mg     2.1     08-09    TPro  7.6  /  Alb  3.3  /  TBili  0.4  /  DBili  x   /  AST  11  /  ALT  14  /  AlkPhos  98  08-10        PT/INR - ( 09 Aug 2017 06:19 )   PT: 13.1 sec;   INR: 1.19 ratio         PTT - ( 09 Aug 2017 06:19 )  PTT:28.4 sec    BNP  I&O's Detail    09 Aug 2017 07:  -  10 Aug 2017 07:00  --------------------------------------------------------  IN:    Oral Fluid: 720 mL  Total IN: 720 mL    OUT:  Total OUT: 0 mL    Total NET: 720 mL      10 Aug 2017 07:  -  10 Aug 2017 12:58  --------------------------------------------------------  IN:  Total IN: 0 mL    OUT:    Other: 2900 mL  Total OUT: 2900 mL    Total NET: -2900 mL        Daily     Daily Weight in k.1 (10 Aug 2017 11:38)    RADIOLOGY & ADDITIONAL STUDIES:

## 2017-08-10 NOTE — PROGRESS NOTE ADULT - SUBJECTIVE AND OBJECTIVE BOX
PRE OPERATIVE NOTE    Pre-op Diagnosis: ESRD on HD with L AVF  Procedure: L AVF superficialization  Surgeon: Dr Yusuf    Consent to be obtained by MD in periop holding area  PAST MEDICAL & SURGICAL HISTORY:  End stage renal disease  Hypertension  Diabetes  AVF (arteriovenous fistula)  Vascular dialysis catheter in place    Allergies: No Known Drug Allergies    Intolerances: Mushrooms (Hives (Mild))        Daily Weight in k.1 (10 Aug 2017 11:38)  Vital Signs Last 24 Hrs  T(C): 37.1 (10 Aug 2017 11:38), Max: 37.1 (10 Aug 2017 11:38)  T(F): 98.8 (10 Aug 2017 11:38), Max: 98.8 (10 Aug 2017 11:38)  HR: 102 (10 Aug 2017 11:38) (97 - 109)  BP: 149/80 (10 Aug 2017 11:38) (146/88 - 178/93)  BP(mean): --  RR: 18 (10 Aug 2017 11:38) (17 - 18)  SpO2: 92% (10 Aug 2017 11:38) (92% - 99%)    MEDICATIONS  (STANDING):  aspirin enteric coated 81 milliGRAM(s) Oral daily  sevelamer hydrochloride 400 milliGRAM(s) Oral three times a day with meals  pantoprazole    Tablet 40 milliGRAM(s) Oral before breakfast  calcitriol   Capsule 0.5 MICROGram(s) Oral daily  ergocalciferol 39781 Unit(s) Oral every week  folic acid 1 milliGRAM(s) Oral daily  dextrose 5%. 1000 milliLiter(s) (50 mL/Hr) IV Continuous <Continuous>  dextrose 50% Injectable 12.5 Gram(s) IV Push once  dextrose 50% Injectable 25 Gram(s) IV Push once  dextrose 50% Injectable 25 Gram(s) IV Push once  heparin  Injectable 5000 Unit(s) SubCutaneous every 12 hours  ALBUTerol/ipratropium for Nebulization 3 milliLiter(s) Nebulizer every 6 hours  calcium acetate 667 milliGRAM(s) Oral three times a day with meals  epoetin raina Injectable 14585 Unit(s) IV Push <User Schedule>  vancomycin    Solution 125 milliGRAM(s) Oral every 6 hours  cefTRIAXone   IVPB 2 Gram(s) IV Intermittent every 24 hours  amLODIPine   Tablet 10 milliGRAM(s) Oral daily  hydrALAZINE 50 milliGRAM(s) Oral every 8 hours  insulin lispro (HumaLOG) corrective regimen sliding scale   SubCutaneous Before meals and at bedtime  cefTRIAXone   IVPB 2 Gram(s) IV Intermittent once    MEDICATIONS  (PRN):  dextrose Gel 1 Dose(s) Oral once PRN Blood Glucose LESS THAN 70 milliGRAM(s)/deciliter  glucagon  Injectable 1 milliGRAM(s) IntraMuscular once PRN Glucose LESS THAN 70 milligrams/deciliter  ALBUTerol    0.083%. 2.5 milliGRAM(s) Nebulizer once PRN sputum induction  cyclobenzaprine 5 milliGRAM(s) Oral three times a day PRN Muscle Spasm  hydrALAZINE Injectable 10 milliGRAM(s) IV Push every 8 hours PRN SBP over 150                            8.3    11.0  )-----------( 335      ( 10 Aug 2017 08:07 )             25.0     08-10    129<L>  |  90<L>  |  67.0<H>  ----------------------------<  134<H>  4.4   |  21.0<L>  |  7.58<H>    Ca    9.0      10 Aug 2017 08:07  Phos  4.1     08-09  Mg     2.1     08-09    TPro  7.6  /  Alb  3.3  /  TBili  0.4  /  DBili  x   /  AST  11  /  ALT  14  /  AlkPhos  98  08-10    PT/INR - ( 09 Aug 2017 06:19 )   PT: 13.1 sec;   INR: 1.19 ratio         PTT - ( 09 Aug 2017 06:19 )  PTT:28.4 sec  CAPILLARY BLOOD GLUCOSE  122 (10 Aug 2017 09:17)        Type & Screen: ordered  CXR: on chart      A/P: 37y Female planned for above procedure  NPO past midnight, except medications  vancomycin    Solution  cefTRIAXone   IVPB  amLODIPine   Tablet  hydrALAZINE  hydrALAZINE Injectable PRN  cefTRIAXone   IVPB    Blood on hold, 2 Units  Periop antibiotics ordered  HD today PRE OPERATIVE NOTE    Pre-op Diagnosis: ESRD on HD with L AVF  Procedure: L AVF superficialization/exchange of tunneled HD cath  Surgeon: Dr Yusuf    Consent to be obtained by MD in periop holding area  PAST MEDICAL & SURGICAL HISTORY:  End stage renal disease  Hypertension  Diabetes  AVF (arteriovenous fistula)  Vascular dialysis catheter in place    Allergies: No Known Drug Allergies    Intolerances: Mushrooms (Hives (Mild))        Daily Weight in k.1 (10 Aug 2017 11:38)  Vital Signs Last 24 Hrs  T(C): 37.1 (10 Aug 2017 11:38), Max: 37.1 (10 Aug 2017 11:38)  T(F): 98.8 (10 Aug 2017 11:38), Max: 98.8 (10 Aug 2017 11:38)  HR: 102 (10 Aug 2017 11:38) (97 - 109)  BP: 149/80 (10 Aug 2017 11:38) (146/88 - 178/93)  BP(mean): --  RR: 18 (10 Aug 2017 11:38) (17 - 18)  SpO2: 92% (10 Aug 2017 11:38) (92% - 99%)    MEDICATIONS  (STANDING):  aspirin enteric coated 81 milliGRAM(s) Oral daily  sevelamer hydrochloride 400 milliGRAM(s) Oral three times a day with meals  pantoprazole    Tablet 40 milliGRAM(s) Oral before breakfast  calcitriol   Capsule 0.5 MICROGram(s) Oral daily  ergocalciferol 06772 Unit(s) Oral every week  folic acid 1 milliGRAM(s) Oral daily  dextrose 5%. 1000 milliLiter(s) (50 mL/Hr) IV Continuous <Continuous>  dextrose 50% Injectable 12.5 Gram(s) IV Push once  dextrose 50% Injectable 25 Gram(s) IV Push once  dextrose 50% Injectable 25 Gram(s) IV Push once  heparin  Injectable 5000 Unit(s) SubCutaneous every 12 hours  ALBUTerol/ipratropium for Nebulization 3 milliLiter(s) Nebulizer every 6 hours  calcium acetate 667 milliGRAM(s) Oral three times a day with meals  epoetin raina Injectable 58970 Unit(s) IV Push <User Schedule>  vancomycin    Solution 125 milliGRAM(s) Oral every 6 hours  cefTRIAXone   IVPB 2 Gram(s) IV Intermittent every 24 hours  amLODIPine   Tablet 10 milliGRAM(s) Oral daily  hydrALAZINE 50 milliGRAM(s) Oral every 8 hours  insulin lispro (HumaLOG) corrective regimen sliding scale   SubCutaneous Before meals and at bedtime  cefTRIAXone   IVPB 2 Gram(s) IV Intermittent once    MEDICATIONS  (PRN):  dextrose Gel 1 Dose(s) Oral once PRN Blood Glucose LESS THAN 70 milliGRAM(s)/deciliter  glucagon  Injectable 1 milliGRAM(s) IntraMuscular once PRN Glucose LESS THAN 70 milligrams/deciliter  ALBUTerol    0.083%. 2.5 milliGRAM(s) Nebulizer once PRN sputum induction  cyclobenzaprine 5 milliGRAM(s) Oral three times a day PRN Muscle Spasm  hydrALAZINE Injectable 10 milliGRAM(s) IV Push every 8 hours PRN SBP over 150                            8.3    11.0  )-----------( 335      ( 10 Aug 2017 08:07 )             25.0     08-10    129<L>  |  90<L>  |  67.0<H>  ----------------------------<  134<H>  4.4   |  21.0<L>  |  7.58<H>    Ca    9.0      10 Aug 2017 08:07  Phos  4.1     08-09  Mg     2.1     08-09    TPro  7.6  /  Alb  3.3  /  TBili  0.4  /  DBili  x   /  AST  11  /  ALT  14  /  AlkPhos  98  08-10    PT/INR - ( 09 Aug 2017 06:19 )   PT: 13.1 sec;   INR: 1.19 ratio         PTT - ( 09 Aug 2017 06:19 )  PTT:28.4 sec  CAPILLARY BLOOD GLUCOSE  122 (10 Aug 2017 09:17)        Type & Screen: ordered  CXR: on chart      A/P: 37y Female planned for above procedure  NPO past midnight, except medications  vancomycin    Solution  cefTRIAXone   IVPB  amLODIPine   Tablet  hydrALAZINE  hydrALAZINE Injectable PRN  cefTRIAXone   IVPB  Await stress test and cardiac clearance  Blood on hold, 2 Units  Periop antibiotics ordered  HD today

## 2017-08-10 NOTE — PROGRESS NOTE ADULT - SUBJECTIVE AND OBJECTIVE BOX
1. Nuclear stress test is normal  2. Patient is cleared from a cardiac perspective for AV fistula revision

## 2017-08-10 NOTE — PROGRESS NOTE ADULT - SUBJECTIVE AND OBJECTIVE BOX
NEPHROLOGY INTERVAL HPI/OVERNIGHT EVENTS:    Examined earlier  HD vega    MEDICATIONS  (STANDING):  aspirin enteric coated 81 milliGRAM(s) Oral daily  sevelamer hydrochloride 400 milliGRAM(s) Oral three times a day with meals  pantoprazole    Tablet 40 milliGRAM(s) Oral before breakfast  calcitriol   Capsule 0.5 MICROGram(s) Oral daily  ergocalciferol 06644 Unit(s) Oral every week  folic acid 1 milliGRAM(s) Oral daily  dextrose 5%. 1000 milliLiter(s) (50 mL/Hr) IV Continuous <Continuous>  dextrose 50% Injectable 12.5 Gram(s) IV Push once  dextrose 50% Injectable 25 Gram(s) IV Push once  dextrose 50% Injectable 25 Gram(s) IV Push once  heparin  Injectable 5000 Unit(s) SubCutaneous every 12 hours  ALBUTerol/ipratropium for Nebulization 3 milliLiter(s) Nebulizer every 6 hours  calcium acetate 667 milliGRAM(s) Oral three times a day with meals  epoetin raina Injectable 04795 Unit(s) IV Push <User Schedule>  vancomycin    Solution 125 milliGRAM(s) Oral every 6 hours  cefTRIAXone   IVPB 2 Gram(s) IV Intermittent every 24 hours  amLODIPine   Tablet 10 milliGRAM(s) Oral daily  hydrALAZINE 50 milliGRAM(s) Oral every 8 hours  insulin lispro (HumaLOG) corrective regimen sliding scale   SubCutaneous Before meals and at bedtime  cefTRIAXone   IVPB 2 Gram(s) IV Intermittent once    MEDICATIONS  (PRN):  dextrose Gel 1 Dose(s) Oral once PRN Blood Glucose LESS THAN 70 milliGRAM(s)/deciliter  glucagon  Injectable 1 milliGRAM(s) IntraMuscular once PRN Glucose LESS THAN 70 milligrams/deciliter  ALBUTerol    0.083%. 2.5 milliGRAM(s) Nebulizer once PRN sputum induction  cyclobenzaprine 5 milliGRAM(s) Oral three times a day PRN Muscle Spasm  hydrALAZINE Injectable 10 milliGRAM(s) IV Push every 8 hours PRN SBP over 150      Allergies    Mushrooms (Hives (Mild))  No Known Drug Allergies    Intolerances        Vital Signs Last 24 Hrs  T(C): 37.1 (10 Aug 2017 11:38), Max: 37.1 (10 Aug 2017 11:38)  T(F): 98.8 (10 Aug 2017 11:38), Max: 98.8 (10 Aug 2017 11:38)  HR: 102 (10 Aug 2017 11:38) (97 - 109)  BP: 149/80 (10 Aug 2017 11:38) (146/88 - 178/93)  BP(mean): --  RR: 18 (10 Aug 2017 11:38) (17 - 18)  SpO2: 92% (10 Aug 2017 11:38) (92% - 99%)  Daily     Daily Weight in k.1 (10 Aug 2017 11:38)    PHYSICAL EXAM:  HEENT: normocephalic and atraumatic  NECK: Supple.  LUNGS: B/L wheezing  HEART: Regular rate and rhythm   ABDOMEN: Soft, nontender, and nondistended.  Positive bowel sounds.    EXTREMITIES: With B/L edema.      LABS:                        8.3    11.0  )-----------( 335      ( 10 Aug 2017 08:07 )             25.0     08-10    129<L>  |  90<L>  |  67.0<H>  ----------------------------<  134<H>  4.4   |  21.0<L>  |  7.58<H>    Ca    9.0      10 Aug 2017 08:07  Phos  4.1     08-09  Mg     2.1     08-09    TPro  7.6  /  Alb  3.3  /  TBili  0.4  /  DBili  x   /  AST  11  /  ALT  14  /  AlkPhos  98  08-10    PT/INR - ( 09 Aug 2017 06:19 )   PT: 13.1 sec;   INR: 1.19 ratio         PTT - ( 09 Aug 2017 06:19 )  PTT:28.4 sec            RADIOLOGY & ADDITIONAL TESTS:

## 2017-08-10 NOTE — PROGRESS NOTE ADULT - PROBLEM SELECTOR PLAN 1
-Rocephin 2 gr x 24 hr (day #10)  - BCx from 8/4 are negative to date   - Ultimate goal is to evaluate permacath as source of bacteriemia + for serratia mercences, previous BCs for 6/12 previous hospitalization were positive for the same Serratia;   -patient does not want to get PC out if it means that she will be getting a temp catheter in her groin.   -Consults feel patient currently not toxic, responding well to treatmetn, superficiliazing AVF is not off table, pending Vascular note;    - nephrology & id both recommend exchange permacath - however primary vascular surgeon does not think that the PC is the source and prefers to salvage the PC and tx with abx. -Rocephin 2 gr x 24 hr (day #10)  - BCx from 8/4 are negative to date   - Ultimate goal is to evaluate permacath as source of bacteriemia + for serratia mercences, previous BCs for 6/12 previous hospitalization were positive for the same Serratia;   -patient does not want to get PC out if it means that she will be getting a temp catheter in her groin.   -Consults feel patient currently not toxic, responding well to treatmetn, superficiliazing AVF tomorrow and oermacath replacementh pending Cardio clearance,   - nephrology & id both recommend exchange permacath - however primary vascular surgeon does not think that the PC is the source and prefers to salvage the PC and tx with abx.

## 2017-08-11 LAB
ANION GAP SERPL CALC-SCNC: 15 MMOL/L — SIGNIFICANT CHANGE UP (ref 5–17)
APTT BLD: 29.9 SEC — SIGNIFICANT CHANGE UP (ref 27.5–37.4)
BUN SERPL-MCNC: 42 MG/DL — HIGH (ref 8–20)
CALCIUM SERPL-MCNC: 8.8 MG/DL — SIGNIFICANT CHANGE UP (ref 8.6–10.2)
CHLORIDE SERPL-SCNC: 96 MMOL/L — LOW (ref 98–107)
CO2 SERPL-SCNC: 24 MMOL/L — SIGNIFICANT CHANGE UP (ref 22–29)
CREAT SERPL-MCNC: 5.64 MG/DL — HIGH (ref 0.5–1.3)
GLUCOSE SERPL-MCNC: 128 MG/DL — HIGH (ref 70–115)
HCG SERPL-ACNC: <2 MIU/ML — SIGNIFICANT CHANGE UP
HCT VFR BLD CALC: 25.7 % — LOW (ref 37–47)
HGB BLD-MCNC: 8 G/DL — LOW (ref 12–16)
INR BLD: 1.32 RATIO — HIGH (ref 0.88–1.16)
MCHC RBC-ENTMCNC: 24.9 PG — LOW (ref 27–31)
MCHC RBC-ENTMCNC: 31.1 G/DL — LOW (ref 32–36)
MCV RBC AUTO: 80.1 FL — LOW (ref 81–99)
PLATELET # BLD AUTO: 358 K/UL — SIGNIFICANT CHANGE UP (ref 150–400)
POTASSIUM SERPL-MCNC: 4.6 MMOL/L — SIGNIFICANT CHANGE UP (ref 3.5–5.3)
POTASSIUM SERPL-SCNC: 4.6 MMOL/L — SIGNIFICANT CHANGE UP (ref 3.5–5.3)
PROTHROM AB SERPL-ACNC: 14.6 SEC — HIGH (ref 9.8–12.7)
RBC # BLD: 3.21 M/UL — LOW (ref 4.4–5.2)
RBC # FLD: 18 % — HIGH (ref 11–15.6)
SODIUM SERPL-SCNC: 135 MMOL/L — SIGNIFICANT CHANGE UP (ref 135–145)
WBC # BLD: 10.1 K/UL — SIGNIFICANT CHANGE UP (ref 4.8–10.8)
WBC # FLD AUTO: 10.1 K/UL — SIGNIFICANT CHANGE UP (ref 4.8–10.8)

## 2017-08-11 PROCEDURE — 36581 REPLACE TUNNELED CV CATH: CPT

## 2017-08-11 PROCEDURE — 99233 SBSQ HOSP IP/OBS HIGH 50: CPT

## 2017-08-11 RX ORDER — SODIUM CHLORIDE 9 MG/ML
1000 INJECTION INTRAMUSCULAR; INTRAVENOUS; SUBCUTANEOUS
Qty: 0 | Refills: 0 | Status: DISCONTINUED | OUTPATIENT
Start: 2017-08-11 | End: 2017-08-11

## 2017-08-11 RX ORDER — FENTANYL CITRATE 50 UG/ML
50 INJECTION INTRAVENOUS
Qty: 0 | Refills: 0 | Status: DISCONTINUED | OUTPATIENT
Start: 2017-08-11 | End: 2017-08-11

## 2017-08-11 RX ORDER — ERYTHROPOIETIN 10000 [IU]/ML
10000 INJECTION, SOLUTION INTRAVENOUS; SUBCUTANEOUS
Qty: 0 | Refills: 0 | Status: DISCONTINUED | OUTPATIENT
Start: 2017-08-11 | End: 2017-08-12

## 2017-08-11 RX ORDER — ONDANSETRON 8 MG/1
4 TABLET, FILM COATED ORAL ONCE
Qty: 0 | Refills: 0 | Status: DISCONTINUED | OUTPATIENT
Start: 2017-08-11 | End: 2017-08-11

## 2017-08-11 RX ADMIN — AMLODIPINE BESYLATE 10 MILLIGRAM(S): 2.5 TABLET ORAL at 06:15

## 2017-08-11 RX ADMIN — FENTANYL CITRATE 50 MICROGRAM(S): 50 INJECTION INTRAVENOUS at 12:00

## 2017-08-11 RX ADMIN — Medication 50 MILLIGRAM(S): at 06:15

## 2017-08-11 RX ADMIN — Medication 3 MILLILITER(S): at 20:23

## 2017-08-11 RX ADMIN — Medication 1: at 21:54

## 2017-08-11 RX ADMIN — SEVELAMER CARBONATE 400 MILLIGRAM(S): 2400 POWDER, FOR SUSPENSION ORAL at 17:08

## 2017-08-11 RX ADMIN — SODIUM CHLORIDE 30 MILLILITER(S): 9 INJECTION INTRAMUSCULAR; INTRAVENOUS; SUBCUTANEOUS at 11:42

## 2017-08-11 RX ADMIN — Medication 50 MILLIGRAM(S): at 21:51

## 2017-08-11 RX ADMIN — Medication 50 MILLIGRAM(S): at 13:52

## 2017-08-11 RX ADMIN — Medication 3 MILLILITER(S): at 03:26

## 2017-08-11 RX ADMIN — Medication 3 MILLILITER(S): at 16:59

## 2017-08-11 RX ADMIN — Medication 3 MILLILITER(S): at 08:59

## 2017-08-11 RX ADMIN — FENTANYL CITRATE 50 MICROGRAM(S): 50 INJECTION INTRAVENOUS at 12:23

## 2017-08-11 RX ADMIN — Medication 81 MILLIGRAM(S): at 13:52

## 2017-08-11 RX ADMIN — CEFTRIAXONE 100 GRAM(S): 500 INJECTION, POWDER, FOR SOLUTION INTRAMUSCULAR; INTRAVENOUS at 21:51

## 2017-08-11 RX ADMIN — Medication 10 MILLIGRAM(S): at 16:21

## 2017-08-11 RX ADMIN — Medication 125 MILLIGRAM(S): at 20:09

## 2017-08-11 RX ADMIN — PANTOPRAZOLE SODIUM 40 MILLIGRAM(S): 20 TABLET, DELAYED RELEASE ORAL at 06:15

## 2017-08-11 RX ADMIN — Medication 125 MILLIGRAM(S): at 02:17

## 2017-08-11 RX ADMIN — Medication 1 MILLIGRAM(S): at 13:52

## 2017-08-11 RX ADMIN — Medication 125 MILLIGRAM(S): at 13:53

## 2017-08-11 RX ADMIN — SEVELAMER CARBONATE 400 MILLIGRAM(S): 2400 POWDER, FOR SUSPENSION ORAL at 13:52

## 2017-08-11 RX ADMIN — CALCITRIOL 0.5 MICROGRAM(S): 0.5 CAPSULE ORAL at 13:52

## 2017-08-11 NOTE — PROGRESS NOTE ADULT - PROBLEM SELECTOR PLAN 2
cdiff +,  on vanco oral q6 by ID  (day #9)  oral hydration  contact isolation  off cdiff +,  on vanco oral q6 by ID  (day #10)  BM are solid; contact isolation  off

## 2017-08-11 NOTE — PROGRESS NOTE ADULT - SUBJECTIVE AND OBJECTIVE BOX
CC: Diarrhea and shortness of breath    Patient seen and examined at bedside.  No acute events overnight.     Patient's cough has reduced, still productive, but less copious amounts; afebrile.  Patient has a baseline sob, that responds well to oxygen therapy;    stools have solidified, no further pain with defecation;  calf pain is resolving;      no desaturations on ; stills mentions intermittent SOB. HD Today    ROS  General- denies any fever/chills  Resp-  dyspnea and cough resolving  GI- denies any abodminal pain or diarrhea  - denies any dysuria/hematuria  ICU Vital Signs Last 24 Hrs  T(C): 36.9 (10 Aug 2017 23:39), Max: 37.1 (10 Aug 2017 11:38)  T(F): 98.5 (10 Aug 2017 23:39), Max: 98.8 (10 Aug 2017 11:38)  HR: 107 (11 Aug 2017 07:00) (100 - 109)  BP: 144/68 (11 Aug 2017 06:12) (144/68 - 155/90)  BP(mean): --  ABP: --  ABP(mean): --  RR: 19 (11 Aug 2017 07:00) (17 - 19)  SpO2: 95% (11 Aug 2017 07:00) (92% - 99%)        Daily Weight in k.6 (08 Aug 2017 11:00)    Daily Weight in k (07 Aug 2017 08:55)          08-08 @ 07:01  -  08-09 @ 05:28  --------------------------------------------------------  IN: 360 mL / OUT: 3600 mL / NET: -3240 mL      EL=3866mh/119.6kg/24hr= 1.25 cc/kg/hr   UO>0.5, supraoptimal      Weight 121kg before HD on 17  post HD weight not recorded;  no weight recorded since;  (nurse notified to start recording daily weights)      HD on 17     Intake =600cc  Output =3100cc  Net= -2500cc     HD on 17   ulwtnq=045  Nifups=5479  Net= -2500    PE  GENERAL: Adult female laying in bed in NAD,  HEENT:  EOMI, PERRLA, conjunctiva and sclera clear, Moist mucous membranes,  NECK: Supple, No JVD, No supraclavicular/submandibular lymphadenopathy. chest HD catheter in place on the right  LUNG: predominantly expiratory wheezing bilaterally; reduction in rales; no rhonchi;   HEART: S1, S2, RRR, No murmurs, rubs, or gallops  ABDOMEN: Soft, Nontender, Nondistended; Bowel sounds present, Normoactive  EXTREMITIES:   No clubbing, cyanosis, or edema, LUE AVF placement + thrill palpable, bruit heard on auscultation  NERVOUS SYSTEM:  Alert & Oriented X3, Good concentration; Motor Strength 5/5 B/L upper and lower extremities  SKIN: No rashes or lesions, capillary refills less than 2 seconds;  no skin tenting, good skin turgor      Lab Results:    Lab Results:  CBC  CBC Full  -  ( 11 Aug 2017 06:19 )  WBC Count : 10.1 K/uL  Hemoglobin : 8.0 g/dL  Hematocrit : 25.7 %  Platelet Count - Automated : 358 K/uL  Mean Cell Volume : 80.1 fl  Mean Cell Hemoglobin : 24.9 pg  Mean Cell Hemoglobin Concentration : 31.1 g/dL  Auto Neutrophil # : x  Auto Lymphocyte # : x  Auto Monocyte # : x  Auto Eosinophil # : x  Auto Basophil # : x  Auto Neutrophil % : x  Auto Lymphocyte % : x  Auto Monocyte % : x  Auto Eosinophil % : x  Auto Basophil % : x    .		Differential:	[] Automated		[] Manual  Chemistry                        8.0    10.1  )-----------( 358      ( 11 Aug 2017 06:19 )             25.7     08-11    135  |  96<L>  |  42.0<H>  ----------------------------<  128<H>  4.6   |  24.0  |  5.64<H>    Ca    8.8      11 Aug 2017 06:19    TPro  7.6  /  Alb  3.3  /  TBili  0.4  /  DBili  x   /  AST  11  /  ALT  14  /  AlkPhos  98  08-10    LIVER FUNCTIONS - ( 10 Aug 2017 08:07 )  Alb: 3.3 g/dL / Pro: 7.6 g/dL / ALK PHOS: 98 U/L / ALT: 14 U/L / AST: 11 U/L / GGT: x           PT/INR - ( 11 Aug 2017 06:19 )   PT: 14.6 sec;   INR: 1.32 ratio         PTT - ( 11 Aug 2017 06:19 )  PTT:29.9 sec          MICROBIOLOGY/CULTURES:      RADIOLOGY RESULTS:             CBC Full  -  ( 09 Aug 2017 06:21 )  WBC Count : 12.2 K/uL  Hemoglobin : 8.4 g/dL  Hematocrit : 26.1 %  Platelet Count - Automated : 335 K/uL  Mean Cell Volume : 78.1 fl  Mean Cell Hemoglobin : 25.1 pg  Mean Cell Hemoglobin Concentration : 32.2 g/dL  Auto Neutrophil # : 8.9 K/uL  Auto Lymphocyte # : 1.9 K/uL  Auto Monocyte # : 1.1 K/uL  Auto Eosinophil # : 0.0 K/uL  Auto Basophil # : 0.0 K/uL  Auto Neutrophil % : 73.2 %  Auto Lymphocyte % : 15.8 %  Auto Monocyte % : 8.8 %  Auto Eosinophil % : 0.2 %  Auto Basophil % : 0.2 %          131<L>  |  90<L>  |  50.0<H>  ----------------------------<  189<H>  4.1   |  23.0  |  5.92<H>    Ca    9.1      09 Aug 2017 06:20  Phos  4.1     -  Mg     2.1         TPro  8.4  /  Alb  3.6  /  TBili  0.4  /  DBili  x   /  AST  10  /  ALT  12  /  AlkPhos  115  08-09        LIVER FUNCTIONS - ( 09 Aug 2017 06:20 )  Alb: 3.6 g/dL / Pro: 8.4 g/dL / ALK PHOS: 115 U/L / ALT: 12 U/L / AST: 10 U/L / GGT: x                     MEDICATIONS  (STANDING):  aspirin enteric coated 81 milliGRAM(s) Oral daily  sevelamer hydrochloride 400 milliGRAM(s) Oral three times a day with meals  pantoprazole    Tablet 40 milliGRAM(s) Oral before breakfast  calcitriol   Capsule 0.5 MICROGram(s) Oral daily  ergocalciferol 72875 Unit(s) Oral every week  folic acid 1 milliGRAM(s) Oral daily  dextrose 5%. 1000 milliLiter(s) (50 mL/Hr) IV Continuous <Continuous>  dextrose 50% Injectable 12.5 Gram(s) IV Push once  dextrose 50% Injectable 25 Gram(s) IV Push once  dextrose 50% Injectable 25 Gram(s) IV Push once  heparin  Injectable 5000 Unit(s) SubCutaneous every 12 hours  ALBUTerol/ipratropium for Nebulization 3 milliLiter(s) Nebulizer every 6 hours  calcium acetate 667 milliGRAM(s) Oral three times a day with meals  epoetin raina Injectable 84390 Unit(s) IV Push <User Schedule>  vancomycin    Solution 125 milliGRAM(s) Oral every 6 hours  cefTRIAXone   IVPB 2 Gram(s) IV Intermittent every 24 hours  amLODIPine   Tablet 10 milliGRAM(s) Oral daily  hydrALAZINE 50 milliGRAM(s) Oral every 8 hours  insulin lispro (HumaLOG) corrective regimen sliding scale   SubCutaneous Before meals and at bedtime      MEDICATIONS  (PRN):  dextrose Gel 1 Dose(s) Oral once PRN Blood Glucose LESS THAN 70 milliGRAM(s)/deciliter  glucagon  Injectable 1 milliGRAM(s) IntraMuscular once PRN Glucose LESS THAN 70 milligrams/deciliter  ALBUTerol    0.083%. 2.5 milliGRAM(s) Nebulizer once PRN sputum induction  cyclobenzaprine 5 milliGRAM(s) Oral three times a day PRN Muscle Spasm  hydrALAZINE Injectable 10 milliGRAM(s) IV Push every 8 hours PRN SBP over 150 CC: Diarrhea and shortness of breath    Patient seen and examined at bedside.  No acute events overnight.     Patient's cough has reduced, still productive, but less copious amounts; afebrile.  Patient has a baseline sob, that responds well to oxygen therapy;    stools have solidified, no further pain with defecation;  calf pain is resolving;      no desaturations on ;     ROS  General- denies any fever/chills  Resp-  dyspnea and cough resolving  GI- denies any abodminal pain or diarrhea  - denies any dysuria/hematuria  ICU Vital Signs Last 24 Hrs    Vital Signs Last 24 Hrs  T(C): 37 (11 Aug 2017 08:40), Max: 37.1 (10 Aug 2017 11:38)  T(F): 98.6 (11 Aug 2017 08:40), Max: 98.8 (10 Aug 2017 11:38)  HR: 110 (11 Aug 2017 08:40) (100 - 110)  BP: 155/88 (11 Aug 2017 08:40) (144/68 - 155/90)  BP(mean): --  RR: 18 (11 Aug 2017 08:40) (17 - 19)  SpO2: 96% (11 Aug 2017 08:40) (92% - 99%)      Daily Weight in k.1 (11 Aug 2017 07:40)  Daily Weight in k.6 (08 Aug 2017 11:00)  Daily Weight in k (07 Aug 2017 08:55)  Weight 121kg before HD on 17  post HD weight not recorded;  no weight recorded since;  (nurse notified to start recording daily weights)      HD on 17     Intake =600cc  Output =3100cc  Net= -2500cc     HD on 17   zskeoc=296  Bgamja=4954  Net= -2500    PE  GENERAL: Adult female laying in bed in NAD,  HEENT:  EOMI, PERRLA, conjunctiva and sclera clear, Moist mucous membranes,  NECK: Supple, No JVD, No supraclavicular/submandibular lymphadenopathy. chest HD catheter in place on the right  LUNG: predominantly expiratory wheezing bilaterally; reduction in rales; no rhonchi;   HEART: S1, S2, RRR, No murmurs, rubs, or gallops  ABDOMEN: soft, NT, ND Bowel sounds present, Normoactive  EXTREMITIES:   No clubbing, cyanosis, or edema, LUE AVF placement + thrill palpable, bruit heard on auscultation  NERVOUS SYSTEM:  Alert & Oriented X3,  Motor Strength 5/5 B/L upper and lower extremities  SKIN: capillary refills<2sec;  no skin tenting, good skin turgor      Lab Results:    Lab Results:  CBC  CBC Full  -  ( 11 Aug 2017 06:19 )  WBC Count : 10.1 K/uL  Hemoglobin : 8.0 g/dL  Hematocrit : 25.7 %  Platelet Count - Automated : 358 K/uL  Mean Cell Volume : 80.1 fl  Mean Cell Hemoglobin : 24.9 pg  Mean Cell Hemoglobin Concentration : 31.1 g/dL  Auto Neutrophil # : x  Auto Lymphocyte # : x  Auto Monocyte # : x  Auto Eosinophil # : x  Auto Basophil # : x  Auto Neutrophil % : x  Auto Lymphocyte % : x  Auto Monocyte % : x  Auto Eosinophil % : x  Auto Basophil % : x    .		Differential:	[] Automated		[] Manual  Chemistry                        8.0    10.1  )-----------( 358      ( 11 Aug 2017 06:19 )             25.7     08-11    135  |  96<L>  |  42.0<H>  ----------------------------<  128<H>  4.6   |  24.0  |  5.64<H>    Ca    8.8      11 Aug 2017 06:19    TPro  7.6  /  Alb  3.3  /  TBili  0.4  /  DBili  x   /  AST  11  /  ALT  14  /  AlkPhos  98  08-10    LIVER FUNCTIONS - ( 10 Aug 2017 08:07 )  Alb: 3.3 g/dL / Pro: 7.6 g/dL / ALK PHOS: 98 U/L / ALT: 14 U/L / AST: 11 U/L / GGT: x           PT/INR - ( 11 Aug 2017 06:19 )   PT: 14.6 sec;   INR: 1.32 ratio         PTT - ( 11 Aug 2017 06:19 )  PTT:29.9 sec          MICROBIOLOGY/CULTURES:      RADIOLOGY RESULTS:             CBC Full  -  ( 09 Aug 2017 06:21 )  WBC Count : 12.2 K/uL  Hemoglobin : 8.4 g/dL  Hematocrit : 26.1 %  Platelet Count - Automated : 335 K/uL  Mean Cell Volume : 78.1 fl  Mean Cell Hemoglobin : 25.1 pg  Mean Cell Hemoglobin Concentration : 32.2 g/dL  Auto Neutrophil # : 8.9 K/uL  Auto Lymphocyte # : 1.9 K/uL  Auto Monocyte # : 1.1 K/uL  Auto Eosinophil # : 0.0 K/uL  Auto Basophil # : 0.0 K/uL  Auto Neutrophil % : 73.2 %  Auto Lymphocyte % : 15.8 %  Auto Monocyte % : 8.8 %  Auto Eosinophil % : 0.2 %  Auto Basophil % : 0.2 %    MEDICATIONS  (STANDING):  aspirin enteric coated 81 milliGRAM(s) Oral daily  sevelamer hydrochloride 400 milliGRAM(s) Oral three times a day with meals  pantoprazole    Tablet 40 milliGRAM(s) Oral before breakfast  calcitriol   Capsule 0.5 MICROGram(s) Oral daily  ergocalciferol 32590 Unit(s) Oral every week  folic acid 1 milliGRAM(s) Oral daily  dextrose 5%. 1000 milliLiter(s) (50 mL/Hr) IV Continuous <Continuous>  dextrose 50% Injectable 12.5 Gram(s) IV Push once  dextrose 50% Injectable 25 Gram(s) IV Push once  dextrose 50% Injectable 25 Gram(s) IV Push once  ALBUTerol/ipratropium for Nebulization 3 milliLiter(s) Nebulizer every 6 hours  calcium acetate 667 milliGRAM(s) Oral three times a day with meals  epoetin raina Injectable 74273 Unit(s) IV Push <User Schedule>  vancomycin    Solution 125 milliGRAM(s) Oral every 6 hours  cefTRIAXone   IVPB 2 Gram(s) IV Intermittent every 24 hours  amLODIPine   Tablet 10 milliGRAM(s) Oral daily  hydrALAZINE 50 milliGRAM(s) Oral every 8 hours  insulin lispro (HumaLOG) corrective regimen sliding scale   SubCutaneous Before meals and at bedtime  cefTRIAXone   IVPB 2 Gram(s) IV Intermittent once    MEDICATIONS  (PRN):  dextrose Gel 1 Dose(s) Oral once PRN Blood Glucose LESS THAN 70 milliGRAM(s)/deciliter  glucagon  Injectable 1 milliGRAM(s) IntraMuscular once PRN Glucose LESS THAN 70 milligrams/deciliter  ALBUTerol    0.083%. 2.5 milliGRAM(s) Nebulizer once PRN sputum induction  cyclobenzaprine 5 milliGRAM(s) Oral three times a day PRN Muscle Spasm  hydrALAZINE Injectable 10 milliGRAM(s) IV Push every 8 hours PRN SBP over 150    08-09    131<L>  |  90<L>  |  50.0<H>  ----------------------------<  189<H>  4.1   |  23.0  |  5.92<H>    Ca    9.1      09 Aug 2017 06:20  Phos  4.1       Mg     2.1         TPro  8.4  /  Alb  3.6  /  TBili  0.4  /  DBili  x   /  AST  10  /  ALT  12  /  AlkPhos  115          LIVER FUNCTIONS - ( 09 Aug 2017 06:20 )  Alb: 3.6 g/dL / Pro: 8.4 g/dL / ALK PHOS: 115 U/L / ALT: 12 U/L / AST: 10 U/L / GGT: x CC: Diarrhea and shortness of breath    Patient seen and examined at bedside.  No acute events overnight.     Patient's cough has reduced, still productive, but less copious amounts; afebrile.  Patient has a baseline sob, that responds well to oxygen therapy;    stools have solidified, no further pain with defecation;  calf pain is resolving;      no desaturations overnight    ROS  General- denies any fever/chills  Resp-  dyspnea and cough resolving  GI- denies any abodminal pain or diarrhea  - denies any dysuria/hematuria    Vital Signs Last 24 Hrs  T(C): 37 (11 Aug 2017 08:40), Max: 37.1 (10 Aug 2017 11:38)  T(F): 98.6 (11 Aug 2017 08:40), Max: 98.8 (10 Aug 2017 11:38)  HR: 110 (11 Aug 2017 08:40) (100 - 110)  BP: 155/88 (11 Aug 2017 08:40) (144/68 - 155/90)  BP(mean): --  RR: 18 (11 Aug 2017 08:40) (17 - 19)  SpO2: 96% (11 Aug 2017 08:40) (92% - 99%)      Daily Weight in k.1 (11 Aug 2017 07:40)  Daily Weight in k.6 (08 Aug 2017 11:00)  Daily Weight in k (07 Aug 2017 08:55)  Weight 121kg before HD on 17  post HD weight not recorded;  no weight recorded since;  (nurse notified to start recording daily weights)      HD on 17     Intake =600cc  Output =3100cc  Net= -2500cc     HD on 17   nkajij=520  Edcgbl=0823  Net= -2500    PE  GENERAL: Adult female laying in bed in NAD,  HEENT:  EOMI, PERRLA, conjunctiva and sclera clear, Moist mucous membranes,  NECK: Supple, No JVD, No supraclavicular/submandibular lymphadenopathy. chest HD catheter in place on the right  LUNG: predominantly expiratory wheezing bilaterally; reduction in rales; no rhonchi;   HEART: S1, S2, RRR, No murmurs, rubs, or gallops  ABDOMEN: soft, NT, ND Bowel sounds present, Normoactive  EXTREMITIES:   No clubbing, cyanosis, or edema, LUE AVF placement + thrill palpable, bruit heard on auscultation  NERVOUS SYSTEM:  Alert & Oriented X3,  Motor Strength 5/5 B/L upper and lower extremities  SKIN: capillary refills<2sec;  no skin tenting, good skin turgor      Lab Results:    Lab Results:  CBC  CBC Full  -  ( 11 Aug 2017 06:19 )  WBC Count : 10.1 K/uL  Hemoglobin : 8.0 g/dL  Hematocrit : 25.7 %  Platelet Count - Automated : 358 K/uL  Mean Cell Volume : 80.1 fl  Mean Cell Hemoglobin : 24.9 pg  Mean Cell Hemoglobin Concentration : 31.1 g/dL                        8.0    10.1  )-----------( 358      ( 11 Aug 2017 06:19 )             25.7     08-11    135  |  96<L>  |  42.0<H>  ----------------------------<  128<H>  4.6   |  24.0  |  5.64<H>    Ca    8.8      11 Aug 2017 06:19                   CBC Full  -  ( 09 Aug 2017 06:21 )  WBC Count : 12.2 K/uL  Hemoglobin : 8.4 g/dL  Hematocrit : 26.1 %  Platelet Count - Automated : 335 K/uL  Mean Cell Volume : 78.1 fl  Mean Cell Hemoglobin : 25.1 pg  Mean Cell Hemoglobin Concentration : 32.2 g/dL  Auto Neutrophil # : 8.9 K/uL  Auto Lymphocyte # : 1.9 K/uL  Auto Monocyte # : 1.1 K/uL  Auto Eosinophil # : 0.0 K/uL  Auto Basophil # : 0.0 K/uL  Auto Neutrophil % : 73.2 %  Auto Lymphocyte % : 15.8 %  Auto Monocyte % : 8.8 %  Auto Eosinophil % : 0.2 %  Auto Basophil % : 0.2 %    MEDICATIONS  (STANDING):  aspirin enteric coated 81 milliGRAM(s) Oral daily  sevelamer hydrochloride 400 milliGRAM(s) Oral three times a day with meals  pantoprazole    Tablet 40 milliGRAM(s) Oral before breakfast  calcitriol   Capsule 0.5 MICROGram(s) Oral daily  ergocalciferol 21195 Unit(s) Oral every week  folic acid 1 milliGRAM(s) Oral daily  dextrose 5%. 1000 milliLiter(s) (50 mL/Hr) IV Continuous <Continuous>  dextrose 50% Injectable 12.5 Gram(s) IV Push once  dextrose 50% Injectable 25 Gram(s) IV Push once  dextrose 50% Injectable 25 Gram(s) IV Push once  ALBUTerol/ipratropium for Nebulization 3 milliLiter(s) Nebulizer every 6 hours  calcium acetate 667 milliGRAM(s) Oral three times a day with meals  epoetin raina Injectable 87140 Unit(s) IV Push <User Schedule>  vancomycin    Solution 125 milliGRAM(s) Oral every 6 hours  cefTRIAXone   IVPB 2 Gram(s) IV Intermittent every 24 hours  amLODIPine   Tablet 10 milliGRAM(s) Oral daily  hydrALAZINE 50 milliGRAM(s) Oral every 8 hours  insulin lispro (HumaLOG) corrective regimen sliding scale   SubCutaneous Before meals and at bedtime  cefTRIAXone   IVPB 2 Gram(s) IV Intermittent once    MEDICATIONS  (PRN):  dextrose Gel 1 Dose(s) Oral once PRN Blood Glucose LESS THAN 70 milliGRAM(s)/deciliter  glucagon  Injectable 1 milliGRAM(s) IntraMuscular once PRN Glucose LESS THAN 70 milligrams/deciliter  ALBUTerol    0.083%. 2.5 milliGRAM(s) Nebulizer once PRN sputum induction  cyclobenzaprine 5 milliGRAM(s) Oral three times a day PRN Muscle Spasm  hydrALAZINE Injectable 10 milliGRAM(s) IV Push every 8 hours PRN SBP over 150        131<L>  |  90<L>  |  50.0<H>  ----------------------------<  189<H>  4.1   |  23.0  |  5.92<H>    Ca    9.1      09 Aug 2017 06:20  Phos  4.1       Mg     2.1         TPro  8.4  /  Alb  3.6  /  TBili  0.4  /  DBili  x   /  AST  10  /  ALT  12  /  AlkPhos  115          LIVER FUNCTIONS - ( 09 Aug 2017 06:20 )  Alb: 3.6 g/dL / Pro: 8.4 g/dL / ALK PHOS: 115 U/L / ALT: 12 U/L / AST: 10 U/L / GGT: x               CAPILLARY BLOOD GLUCOSE  131 (11 Aug 2017 07:40)  207 (10 Aug 2017 22:30)  121 (10 Aug 2017 16:01)    2 units ss at 10:30pm

## 2017-08-11 NOTE — PROGRESS NOTE ADULT - ASSESSMENT
Patient is a 38 y/o female with ESRD diagnosed in 1/17 s/p left UE AVF on hemodialysis on M-W-F, right chest wall permacath, HTN, DM, obesity, HCAP one month ago, comes into the hospital due to shortness of breath since 2 -3 days ago. CXR shows right middle lobe infiltrates , left side pleural effusion improved from previous admission; confirmed with ct chest. Patient was dialyzed yesterday.  Also with hemoglobin of 7.6 today, transfused for symptomatic anemia yesterday.  She is admitted for diarrhea, cdiff positive. Incidentally, found to have + blood cx with serratia; concern for now needing to get the PC out b/c this is a recurrence of bacteremia     HD 10 (8/10/2017)-      Rocephin Antibiotic day # 10;   Nephrology consult note appreciated;  ESRD on HD (TTS schedule) ; HD Planned for Today; ID f/u noted, patient has positive Blood Cx for Serratia marcandreecen, patient on Antibiotics, source could be HD Permacath;      Patients stooling has normalized, PO Vancomycin (day 9 po Vanc) is treating her cdiff colitis;      There are plans to superficiale AVF and Permacath replacement tomorrow, Vascular surgery: Dr Tobar on [facility]. CRISTAL Grubbs spoke to pt  Cardiology: Dr Cortés saw the pt, consul appreciated. he will do pharmacologic NST today for surgery clearance due to pt's risck factors        8/1/2017 pos BC for Serratia;    6/12/2017 pos BC for Serratia; Patient is a 38 y/o female with ESRD diagnosed in 1/17 s/p left UE AVF on hemodialysis on M-W-F, right chest wall permacath, HTN, DM, obesity, HCAP one month ago, comes into the hospital due to shortness of breath since 2 -3 days ago. CXR shows right middle lobe infiltrates , left side pleural effusion improved from previous admission; confirmed with ct chest. Patient was dialyzed yesterday.  Also with hemoglobin of 7.6 today, transfused for symptomatic anemia yesterday.  She is admitted for diarrhea, cdiff positive. Incidentally, found to have + blood cx with serratia; concern for now needing to get the PC out b/c this is a recurrence of bacteremia     8/11/2017   Patient will get AV fistula revision today;      HD 10 (8/10/2017)-      Rocephin Antibiotic day # 10;   Nephrology consult note appreciated;  ESRD on HD (TTS schedule) ; HD Planned for Today; ID f/u noted, patient has positive Blood Cx for Serratia marcescen, patient on Antibiotics, source could be HD Permacath;      Patients stooling has normalized, PO Vancomycin (day 9 po Vanc) is treating her cdiff colitis;      There are plans to superficiale AVF and Permacath replacement tomorrow, Vascular surgery: Dr Tobar on [facility]. CRISTAL Grubbs spoke to pt  Cardiology: Dr Cortés saw the pt, consul appreciated. he will do pharmacologic NST today for surgery clearance due to pt's risck factors        8/1/2017 pos BC for Serratia;    6/12/2017 pos BC for Serratia; Patient is a 36 y/o female with ESRD diagnosed in 1/17 s/p left UE AVF on hemodialysis on M-W-F, right chest wall permacath, HTN, DM, obesity, HCAP one month ago, comes into the hospital due to shortness of breath since 2 -3 days ago. CXR shows right middle lobe infiltrates , left side pleural effusion improved from previous admission; confirmed with ct chest. Patient was dialyzed yesterday.  Also with hemoglobin of 7.6 today, transfused for symptomatic anemia yesterday.  She is admitted for diarrhea, cdiff positive. Incidentally, found to have + blood cx with serratia; concern for now needing to get the PC out b/c this is a recurrence of bacteremia     8/11/2017   Patient will get AV fistula revision today; patient passed cardiac stress test;   diet is npo for the procedure;     8/1/2017 pos BC for Serratia;    6/12/2017 pos BC for Serratia;

## 2017-08-11 NOTE — PROGRESS NOTE ADULT - PROBLEM SELECTOR PLAN 1
-Rocephin 2 gr x 24 hr (day #10)  - BCx from 8/4 are negative to date   - Ultimate goal is to evaluate permacath as source of bacteriemia + for serratia mercences, previous BCs for 6/12 previous hospitalization were positive for the same Serratia;   -patient does not want to get PC out if it means that she will be getting a temp catheter in her groin.   -Consults feel patient currently not toxic, responding well to treatmetn, superficiliazing AVF tomorrow and oermacath replacementh pending Cardio clearance,   - nephrology & id both recommend exchange permacath - however primary vascular surgeon does not think that the PC is the source and prefers to salvage the PC and tx with abx. -Rocephin 2 gr x 24 hr (day #11)  - BCx from 8/4 are negative to date   - Ultimate goal is to evaluate permacath as source of bacteriemia + for serratia mercences, previous BCs for 6/12 previous hospitalization were positive for the same Serratia;   -patient does not want to get PC out if it means that she will be getting a temp catheter in her groin.   -Consults feel patient currently not toxic, responding well to treatmetn, superficiliazing AVF tomorrow and oermacath replacementh pending Cardio clearance,   - nephrology & id both recommend exchange permacath - however primary vascular surgeon does not think that the PC is the source and prefers to salvage the PC and tx with abx. -Patient will get superficialization procedure today;  remove permacath;     Rocephin 2 gr x 24 hr (day #11)  - BCx from 8/4 are negative to date   - Ultimate goal is to evaluate permacath as source of bacteriemia + for serratia mercences, previous BCs for 6/12 previous hospitalization were positive for the same Serratia;   -patient does not want to get PC out if it means that she will be getting a temp catheter in her groin.   -Consults feel patient currently not toxic, responding well to treatmetn, superficiliazing AVF tomorrow and oermacath replacementh pending Cardio clearance,   - nephrology & id both recommend exchange permacath - however primary vascular surgeon does not think that the PC is the source and prefers to salvage the PC and tx with abx.

## 2017-08-11 NOTE — PROGRESS NOTE ADULT - PROBLEM SELECTOR PLAN 8
- insulin sliding scale  -diabetic diet  onitor Blood glucose f/s predominantly in the 100's;   - insulin sliding scale  -diabetic diet  onitor Blood glucose

## 2017-08-11 NOTE — BRIEF OPERATIVE NOTE - OPERATION/FINDINGS
infected permacath was extracted, disposed of. LUE arteriovenous fistula was evaluated for position via ultrasound and was found to be of adequate position, no intervention was required at this time.

## 2017-08-11 NOTE — PROGRESS NOTE ADULT - PROBLEM SELECTOR PLAN 6
Patient developed metabolic acidodis, hi ag, that has since resolved;    hemodialysis as schedule   epoetin injection once a week  continue sevelamer  cincalcet, continue calcium acetated  AV fistula present but deep, d/w Vascular NP - needs superficialization prior to use   permacth in place with no erythema. will be done on 08/11/2017 pending Cardio clearance today  Permacath recommended to be removed due to possibly being source of Serratia infection, per ID recommendation, Will be done on 08/10/17 by Vascular surgey  Cardio will do pharmacologic  NST Patient developed metabolic acidosis, hi ag, that has since resolved;    hemodialysis as schedule   epoetin injection once a week  continue sevelamer  cincalcet, continue calcium acetated  AV fistula present but deep, d/w Vascular NP - needs superficialization prior to use   permacth in place with no erythema. will be done on 08/11/2017 pending Cardio clearance today  Permacath recommended to be removed due to possibly being source of Serratia infection, per ID recommendation, Will be done on 08/10/17 by Vascular surgey  Cardio will do pharmacologic  NST

## 2017-08-11 NOTE — PROGRESS NOTE ADULT - SUBJECTIVE AND OBJECTIVE BOX
Patient is a 37y old  Female who presents with a chief complaint of Diarrhea and shortness of breath (05 Aug 2017 10:07)        PAST MEDICAL & SURGICAL HISTORY:  End stage renal disease  Hypertension  Diabetes  AVF (arteriovenous fistula)  Vascular dialysis catheter in place    CHEST CT PULMONARY ANGIO with IV Contrast:  FINDINGS:  MEDICATIONS  (STANDING):  aspirin enteric coated 81 milliGRAM(s) Oral daily  sevelamer hydrochloride 400 milliGRAM(s) Oral three times a day with meals  pantoprazole    Tablet 40 milliGRAM(s) Oral before breakfast  calcitriol   Capsule 0.5 MICROGram(s) Oral daily  ergocalciferol 18360 Unit(s) Oral every week  folic acid 1 milliGRAM(s) Oral daily  dextrose 5%. 1000 milliLiter(s) (50 mL/Hr) IV Continuous <Continuous>  dextrose 50% Injectable 12.5 Gram(s) IV Push once  dextrose 50% Injectable 25 Gram(s) IV Push once  dextrose 50% Injectable 25 Gram(s) IV Push once  ALBUTerol/ipratropium for Nebulization 3 milliLiter(s) Nebulizer every 6 hours  calcium acetate 667 milliGRAM(s) Oral three times a day with meals  epoetin raina Injectable 36212 Unit(s) IV Push <User Schedule>  vancomycin    Solution 125 milliGRAM(s) Oral every 6 hours  cefTRIAXone   IVPB 2 Gram(s) IV Intermittent every 24 hours  amLODIPine   Tablet 10 milliGRAM(s) Oral daily  hydrALAZINE 50 milliGRAM(s) Oral every 8 hours  insulin lispro (HumaLOG) corrective regimen sliding scale   SubCutaneous Before meals and at bedtime  cefTRIAXone   IVPB 2 Gram(s) IV Intermittent once  sodium chloride 0.9%. 1000 milliLiter(s) (30 mL/Hr) IV Continuous <Continuous>    MEDICATIONS  (PRN):  dextrose Gel 1 Dose(s) Oral once PRN Blood Glucose LESS THAN 70 milliGRAM(s)/deciliter  glucagon  Injectable 1 milliGRAM(s) IntraMuscular once PRN Glucose LESS THAN 70 milligrams/deciliter  ALBUTerol    0.083%. 2.5 milliGRAM(s) Nebulizer once PRN sputum induction  cyclobenzaprine 5 milliGRAM(s) Oral three times a day PRN Muscle Spasm  hydrALAZINE Injectable 10 milliGRAM(s) IV Push every 8 hours PRN SBP over 150  fentaNYL    Injectable 50 MICROGram(s) IV Push every 5 minutes PRN Severe Pain  ondansetron Injectable 4 milliGRAM(s) IV Push once PRN Nausea and/or Vomiting          Vital Signs Last 24 Hrs  T(C): 36.8 (11 Aug 2017 12:07), Max: 37 (10 Aug 2017 15:44)  T(F): 98.2 (11 Aug 2017 12:07), Max: 98.6 (10 Aug 2017 15:44)  HR: 104 (11 Aug 2017 12:22) (100 - 110)  BP: 158/98 (11 Aug 2017 12:07) (144/68 - 177/80)  BP(mean): 112 (11 Aug 2017 12:07) (90 - 112)  RR: 16 (11 Aug 2017 12:22) (12 - 24)  SpO2: 95% (11 Aug 2017 12:) (92% - 99%)    PHYSICAL EXAM:    GENERAL: In no apparent distress, well nourished, and hydrated.  HEAD:  Atraumatic, Normocephalic  EYES: EOMI, PERRLA, conjunctiva and sclera clear  ENMT: No tonsillar erythema, exudates, or enlargement; Moist mucous membranes, Good dentition, No lesions  NECK: Supple and normal thyroid.  No JVD or carotid bruit.  Carotid pulse is 2+ bilaterally.  HEART: Regular rate and rhythm; No murmurs, rubs, or gallops.  PULMONARY: Clear to auscultation and perfusion.  No rales, wheezing, or rhonchi bilaterally.  ABDOMEN: Soft, Nontender, Nondistended; Bowel sounds present  EXTREMITIES:  2+ Peripheral Pulses, No clubbing, cyanosis, or edema  LYMPH: No lymphadenopathy noted  NEUROLOGICAL: Grossly nonfocal      I&O's Detail    10 Aug 2017 07:01  -  11 Aug 2017 07:00  --------------------------------------------------------  IN:  Total IN: 0 mL    OUT:    Other: 2900 mL  Total OUT: 2900 mL    Total NET: -2900 mL          LABS:                        8.0    10.1  )-----------( 358      ( 11 Aug 2017 06:19 )             25.7     08-11    135  |  96<L>  |  42.0<H>  ----------------------------<  128<H>  4.6   |  24.0  |  5.64<H>    Ca    8.8      11 Aug 2017 06:19    TPro  7.6  /  Alb  3.3  /  TBili  0.4  /  DBili  x   /  AST  11  /  ALT  14  /  AlkPhos  98  08-10        PT/INR - ( 11 Aug 2017 06:19 )   PT: 14.6 sec;   INR: 1.32 ratio         PTT - ( 11 Aug 2017 06:19 )  PTT:29.9 sec    BNP  I&O's Detail    10 Aug 2017 07:01  -  11 Aug 2017 07:00  --------------------------------------------------------  IN:  Total IN: 0 mL    OUT:    Other: 2900 mL  Total OUT: 2900 mL    Total NET: -2900 mL        Daily     Daily Weight in k.1 (11 Aug 2017 07:40)    RADIOLOGY & ADDITIONAL STUDIES:

## 2017-08-11 NOTE — PROGRESS NOTE ADULT - PROBLEM SELECTOR PLAN 3
Likely 2/2 aocd. Normocytic. 2/2 ESRD   1 unit of packed RBC was transfused  on July 3    Hb today =8.3,  likely transfuse if Hb <7  c/w epogen, folate  anemia w/up noted   follow of Hb w/ cbc daily

## 2017-08-12 ENCOUNTER — TRANSCRIPTION ENCOUNTER (OUTPATIENT)
Age: 37
End: 2017-08-12

## 2017-08-12 LAB
ANION GAP SERPL CALC-SCNC: 18 MMOL/L — HIGH (ref 5–17)
BASOPHILS # BLD AUTO: 0 K/UL — SIGNIFICANT CHANGE UP (ref 0–0.2)
BASOPHILS NFR BLD AUTO: 0.4 % — SIGNIFICANT CHANGE UP (ref 0–2)
BUN SERPL-MCNC: 58 MG/DL — HIGH (ref 8–20)
CALCIUM SERPL-MCNC: 9.2 MG/DL — SIGNIFICANT CHANGE UP (ref 8.6–10.2)
CHLORIDE SERPL-SCNC: 93 MMOL/L — LOW (ref 98–107)
CO2 SERPL-SCNC: 21 MMOL/L — LOW (ref 22–29)
CREAT SERPL-MCNC: 7.46 MG/DL — HIGH (ref 0.5–1.3)
EOSINOPHIL # BLD AUTO: 0.3 K/UL — SIGNIFICANT CHANGE UP (ref 0–0.5)
EOSINOPHIL NFR BLD AUTO: 2.4 % — SIGNIFICANT CHANGE UP (ref 0–6)
FERRITIN SERPL-MCNC: 342.6 NG/ML — HIGH (ref 11–306)
GLUCOSE SERPL-MCNC: 159 MG/DL — HIGH (ref 70–115)
HCT VFR BLD CALC: 25.2 % — LOW (ref 37–47)
HGB BLD-MCNC: 8 G/DL — LOW (ref 12–16)
IRON SATN MFR SERPL: 17 % — SIGNIFICANT CHANGE UP (ref 14–50)
IRON SATN MFR SERPL: 43 UG/DL — SIGNIFICANT CHANGE UP (ref 37–145)
IRON SATN MFR SERPL: 43 UG/DL — SIGNIFICANT CHANGE UP (ref 37–145)
LYMPHOCYTES # BLD AUTO: 1.6 K/UL — SIGNIFICANT CHANGE UP (ref 1–4.8)
LYMPHOCYTES # BLD AUTO: 15.1 % — LOW (ref 20–55)
MCHC RBC-ENTMCNC: 25.4 PG — LOW (ref 27–31)
MCHC RBC-ENTMCNC: 31.7 G/DL — LOW (ref 32–36)
MCV RBC AUTO: 80 FL — LOW (ref 81–99)
MONOCYTES # BLD AUTO: 0.8 K/UL — SIGNIFICANT CHANGE UP (ref 0–0.8)
MONOCYTES NFR BLD AUTO: 7.6 % — SIGNIFICANT CHANGE UP (ref 3–10)
NEUTROPHILS # BLD AUTO: 7.8 K/UL — SIGNIFICANT CHANGE UP (ref 1.8–8)
NEUTROPHILS NFR BLD AUTO: 73.5 % — HIGH (ref 37–73)
PLATELET # BLD AUTO: 307 K/UL — SIGNIFICANT CHANGE UP (ref 150–400)
POTASSIUM SERPL-MCNC: 4.8 MMOL/L — SIGNIFICANT CHANGE UP (ref 3.5–5.3)
POTASSIUM SERPL-SCNC: 4.8 MMOL/L — SIGNIFICANT CHANGE UP (ref 3.5–5.3)
RBC # BLD: 3.15 M/UL — LOW (ref 4.4–5.2)
RBC # FLD: 18.3 % — HIGH (ref 11–15.6)
SODIUM SERPL-SCNC: 132 MMOL/L — LOW (ref 135–145)
TIBC SERPL-MCNC: 249 UG/DL — SIGNIFICANT CHANGE UP (ref 220–430)
TRANSFERRIN SERPL-MCNC: 174 MG/DL — LOW (ref 192–382)
TSH SERPL-MCNC: 3.05 UIU/ML — SIGNIFICANT CHANGE UP (ref 0.27–4.2)
WBC # BLD: 10.6 K/UL — SIGNIFICANT CHANGE UP (ref 4.8–10.8)
WBC # FLD AUTO: 10.6 K/UL — SIGNIFICANT CHANGE UP (ref 4.8–10.8)

## 2017-08-12 PROCEDURE — 99233 SBSQ HOSP IP/OBS HIGH 50: CPT

## 2017-08-12 PROCEDURE — 71010: CPT | Mod: 26

## 2017-08-12 PROCEDURE — 93010 ELECTROCARDIOGRAM REPORT: CPT

## 2017-08-12 RX ORDER — ERYTHROPOIETIN 10000 [IU]/ML
12000 INJECTION, SOLUTION INTRAVENOUS; SUBCUTANEOUS
Qty: 0 | Refills: 0 | Status: DISCONTINUED | OUTPATIENT
Start: 2017-08-12 | End: 2017-08-14

## 2017-08-12 RX ORDER — ACETAMINOPHEN 500 MG
650 TABLET ORAL ONCE
Qty: 0 | Refills: 0 | Status: COMPLETED | OUTPATIENT
Start: 2017-08-12 | End: 2017-08-12

## 2017-08-12 RX ORDER — VERAPAMIL HCL 240 MG
120 CAPSULE, EXTENDED RELEASE PELLETS 24 HR ORAL EVERY 12 HOURS
Qty: 0 | Refills: 0 | Status: DISCONTINUED | OUTPATIENT
Start: 2017-08-12 | End: 2017-08-12

## 2017-08-12 RX ORDER — IRON SUCROSE 20 MG/ML
100 INJECTION, SOLUTION INTRAVENOUS ONCE
Qty: 0 | Refills: 0 | Status: COMPLETED | OUTPATIENT
Start: 2017-08-12 | End: 2017-08-12

## 2017-08-12 RX ORDER — IPRATROPIUM/ALBUTEROL SULFATE 18-103MCG
3 AEROSOL WITH ADAPTER (GRAM) INHALATION EVERY 6 HOURS
Qty: 0 | Refills: 0 | Status: DISCONTINUED | OUTPATIENT
Start: 2017-08-12 | End: 2017-08-12

## 2017-08-12 RX ORDER — ALBUTEROL 90 UG/1
2.5 AEROSOL, METERED ORAL
Qty: 0 | Refills: 0 | Status: DISCONTINUED | OUTPATIENT
Start: 2017-08-12 | End: 2017-08-14

## 2017-08-12 RX ORDER — ERYTHROPOIETIN 10000 [IU]/ML
10000 INJECTION, SOLUTION INTRAVENOUS; SUBCUTANEOUS
Qty: 0 | Refills: 0 | Status: DISCONTINUED | OUTPATIENT
Start: 2017-08-12 | End: 2017-08-12

## 2017-08-12 RX ORDER — HEPARIN SODIUM 5000 [USP'U]/ML
5000 INJECTION INTRAVENOUS; SUBCUTANEOUS EVERY 12 HOURS
Qty: 0 | Refills: 0 | Status: DISCONTINUED | OUTPATIENT
Start: 2017-08-12 | End: 2017-08-14

## 2017-08-12 RX ORDER — VERAPAMIL HCL 240 MG
80 CAPSULE, EXTENDED RELEASE PELLETS 24 HR ORAL EVERY 8 HOURS
Qty: 0 | Refills: 0 | Status: DISCONTINUED | OUTPATIENT
Start: 2017-08-12 | End: 2017-08-14

## 2017-08-12 RX ADMIN — Medication 80 MILLIGRAM(S): at 12:30

## 2017-08-12 RX ADMIN — Medication 1: at 22:29

## 2017-08-12 RX ADMIN — Medication 125 MILLIGRAM(S): at 22:30

## 2017-08-12 RX ADMIN — Medication 650 MILLIGRAM(S): at 15:36

## 2017-08-12 RX ADMIN — Medication 50 MILLIGRAM(S): at 22:29

## 2017-08-12 RX ADMIN — SEVELAMER CARBONATE 400 MILLIGRAM(S): 2400 POWDER, FOR SUSPENSION ORAL at 17:22

## 2017-08-12 RX ADMIN — Medication 80 MILLIGRAM(S): at 22:29

## 2017-08-12 RX ADMIN — SEVELAMER CARBONATE 400 MILLIGRAM(S): 2400 POWDER, FOR SUSPENSION ORAL at 12:30

## 2017-08-12 RX ADMIN — Medication 3 MILLILITER(S): at 08:33

## 2017-08-12 RX ADMIN — Medication 81 MILLIGRAM(S): at 15:11

## 2017-08-12 RX ADMIN — Medication 1: at 17:19

## 2017-08-12 RX ADMIN — Medication 50 MILLIGRAM(S): at 05:50

## 2017-08-12 RX ADMIN — Medication 125 MILLIGRAM(S): at 17:22

## 2017-08-12 RX ADMIN — ERYTHROPOIETIN 12000 UNIT(S): 10000 INJECTION, SOLUTION INTRAVENOUS; SUBCUTANEOUS at 11:58

## 2017-08-12 RX ADMIN — Medication 50 MILLIGRAM(S): at 13:42

## 2017-08-12 RX ADMIN — Medication 1: at 08:14

## 2017-08-12 RX ADMIN — AMLODIPINE BESYLATE 10 MILLIGRAM(S): 2.5 TABLET ORAL at 05:50

## 2017-08-12 RX ADMIN — CEFTRIAXONE 100 GRAM(S): 500 INJECTION, POWDER, FOR SOLUTION INTRAMUSCULAR; INTRAVENOUS at 22:30

## 2017-08-12 RX ADMIN — Medication 3 MILLILITER(S): at 14:13

## 2017-08-12 RX ADMIN — Medication 1 MILLIGRAM(S): at 15:11

## 2017-08-12 RX ADMIN — Medication 10 MILLIGRAM(S): at 08:18

## 2017-08-12 RX ADMIN — Medication 125 MILLIGRAM(S): at 02:39

## 2017-08-12 RX ADMIN — Medication 125 MILLIGRAM(S): at 08:16

## 2017-08-12 RX ADMIN — PANTOPRAZOLE SODIUM 40 MILLIGRAM(S): 20 TABLET, DELAYED RELEASE ORAL at 05:50

## 2017-08-12 RX ADMIN — IRON SUCROSE 100 MILLIGRAM(S): 20 INJECTION, SOLUTION INTRAVENOUS at 11:58

## 2017-08-12 RX ADMIN — SEVELAMER CARBONATE 400 MILLIGRAM(S): 2400 POWDER, FOR SUSPENSION ORAL at 08:17

## 2017-08-12 RX ADMIN — Medication 3 MILLILITER(S): at 20:17

## 2017-08-12 RX ADMIN — Medication 3 MILLILITER(S): at 03:06

## 2017-08-12 RX ADMIN — CALCITRIOL 0.5 MICROGRAM(S): 0.5 CAPSULE ORAL at 15:11

## 2017-08-12 RX ADMIN — Medication 667 MILLIGRAM(S): at 14:32

## 2017-08-12 NOTE — PROGRESS NOTE ADULT - ATTENDING COMMENTS
Agree w NP note above. S/p permcath exchange yesterday, for suspected source of infection/bacteremia. Was originally planned for cephalic vein superficialization, but on table duplex in OR prior to incision, vein approx 5mm deep and thrill palpable, thus revision not needed.  Plan:  Will attempt access of AVF today. If successful, may be able to subsequently removed tunneled catheter

## 2017-08-12 NOTE — PROGRESS NOTE ADULT - ASSESSMENT
Patient is a 38 y/o female with ESRD diagnosed in 1/17 s/p left UE AVF on hemodialysis on M-W-F, right chest wall permacath, HTN, DM, obesity, HCAP one month ago, comes into the hospital due to shortness of breath since 2 -3 days ago. CXR shows right middle lobe infiltrates , left side pleural effusion improved from previous admission; confirmed with ct chest. Patient was dialyzed yesterday.  Also with hemoglobin of 7.6 today, transfused for symptomatic anemia yesterday.  She is admitted for diarrhea, cdiff positive. Incidentally, found to have + blood cx with serratia; concern for now needing to get the PC out b/c this is a recurrence of bacteremia     8/11/2017   Patient will get AV fistula revision today; patient passed cardiac stress test;   diet is npo for the procedure;     8/1/2017 pos BC for Serratia;    6/12/2017 pos BC for Serratia;

## 2017-08-12 NOTE — PROGRESS NOTE ADULT - SUBJECTIVE AND OBJECTIVE BOX
CC: Diarrhea and shortness of breath    Patient seen and examined at bedside.  No acute events overnight.     Patient's cough has reduced, still productive, but less copious amounts; afebrile.  Patient has a baseline sob, that responds well to oxygen therapy;    stools have solidified, no further pain with defecation;  calf pain is resolving;      no desaturations overnight    Vital Signs Last 24 Hrs  T(C): 36.7 (11 Aug 2017 23:58), Max: 37.2 (11 Aug 2017 13:03)  T(F): 98 (11 Aug 2017 23:58), Max: 99 (11 Aug 2017 13:03)  HR: 112 (11 Aug 2017 23:58) (100 - 112)  BP: 154/68 (11 Aug 2017 23:58) (144/68 - 177/80)  BP(mean): 112 (11 Aug 2017 12:07) (90 - 112)  ABP: --  ABP(mean): --  RR: 20 (11 Aug 2017 23:58) (12 - 24)  SpO2: 97% (11 Aug 2017 23:58) (90% - 98%)      ROS  General- denies any fever/chills  Resp-  dyspnea and cough resolving  GI- denies any abodminal pain or diarrhea  - denies any dysuria/hematuria      Daily Weight in k.1 (11 Aug 2017 07:40)  Daily Weight in k.6 (08 Aug 2017 11:00)  Daily Weight in k (07 Aug 2017 08:55)  Weight 121kg before HD on 17  post HD weight not recorded;  no weight recorded since;  (nurse notified to start recording daily weights)      HD on 17     Intake =600cc  Output =3100cc  Net= -2500cc     HD on 17   pcrlkg=116  Tggbio=0503  Net= -2500    PE  GENERAL: Adult female laying in bed in NAD,  HEENT:  EOMI, PERRLA, conjunctiva and sclera clear, Moist mucous membranes,  NECK: Supple, No JVD, No supraclavicular/submandibular lymphadenopathy. chest HD catheter in place on the right  LUNG: predominantly expiratory wheezing bilaterally; reduction in rales; no rhonchi;   HEART: S1, S2, RRR, No murmurs, rubs, or gallops  ABDOMEN: soft, NT, ND Bowel sounds present, Normoactive  EXTREMITIES:   No clubbing, cyanosis, or edema, LUE AVF placement + thrill palpable, bruit heard on auscultation  NERVOUS SYSTEM:  Alert & Oriented X3,  Motor Strength 5/5 B/L upper and lower extremities  SKIN: capillary refills<2sec;  no skin tenting, good skin turgor      Lab Results:                          8.0    10.1  )-----------( 358      ( 11 Aug 2017 06:19 )             25.7     11 Aug 2017 06:19    135    |  96     |  42.0   ----------------------------<  128    4.6     |  24.0   |  5.64     Ca    8.8        11 Aug 2017 06:19    TPro  7.6    /  Alb  3.3    /  TBili  0.4    /  DBili  x      /  AST  11     /  ALT  14     /  AlkPhos  98     10 Aug 2017 08:07    LIVER FUNCTIONS - ( 10 Aug 2017 08:07 )  Alb: 3.3 g/dL / Pro: 7.6 g/dL / ALK PHOS: 98 U/L / ALT: 14 U/L / AST: 11 U/L / GGT: x           PT/INR - ( 11 Aug 2017 06:19 )   PT: 14.6 sec;   INR: 1.32 ratio         PTT - ( 11 Aug 2017 06:19 )  PTT:29.9 sec  CAPILLARY BLOOD GLUCOSE  152 (11 Aug 2017 21:48)  144 (11 Aug 2017 17:03)  125 (11 Aug 2017 13:51)  141 (11 Aug 2017 11:37)  131 (11 Aug 2017 07:40)              Auto Basophil % : 0.2 %    MEDICATIONS  (STANDING):  aspirin enteric coated 81 milliGRAM(s) Oral daily  sevelamer hydrochloride 400 milliGRAM(s) Oral three times a day with meals  pantoprazole    Tablet 40 milliGRAM(s) Oral before breakfast  calcitriol   Capsule 0.5 MICROGram(s) Oral daily  ergocalciferol 14380 Unit(s) Oral every week  folic acid 1 milliGRAM(s) Oral daily  dextrose 5%. 1000 milliLiter(s) (50 mL/Hr) IV Continuous <Continuous>  dextrose 50% Injectable 12.5 Gram(s) IV Push once  dextrose 50% Injectable 25 Gram(s) IV Push once  dextrose 50% Injectable 25 Gram(s) IV Push once  ALBUTerol/ipratropium for Nebulization 3 milliLiter(s) Nebulizer every 6 hours  calcium acetate 667 milliGRAM(s) Oral three times a day with meals  epoetin raina Injectable 06836 Unit(s) IV Push <User Schedule>  vancomycin    Solution 125 milliGRAM(s) Oral every 6 hours  cefTRIAXone   IVPB 2 Gram(s) IV Intermittent every 24 hours  amLODIPine   Tablet 10 milliGRAM(s) Oral daily  hydrALAZINE 50 milliGRAM(s) Oral every 8 hours  insulin lispro (HumaLOG) corrective regimen sliding scale   SubCutaneous Before meals and at bedtime  cefTRIAXone   IVPB 2 Gram(s) IV Intermittent once  epoetin raina Injectable 25876 Unit(s) IV Push <User Schedule>    MEDICATIONS  (PRN):  dextrose Gel 1 Dose(s) Oral once PRN Blood Glucose LESS THAN 70 milliGRAM(s)/deciliter  glucagon  Injectable 1 milliGRAM(s) IntraMuscular once PRN Glucose LESS THAN 70 milligrams/deciliter  ALBUTerol    0.083%. 2.5 milliGRAM(s) Nebulizer once PRN sputum induction  cyclobenzaprine 5 milliGRAM(s) Oral three times a day PRN Muscle Spasm  hydrALAZINE Injectable 10 milliGRAM(s) IV Push every 8 hours PRN SBP over 150          131<L>  |  90<L>  |  50.0<H>  ----------------------------<  189<H>  4.1   |  23.0  |  5.92<H>    Ca    9.1      09 Aug 2017 06:20  Phos  4.1       Mg     2.1         TPro  8.4  /  Alb  3.6  /  TBili  0.4  /  DBili  x   /  AST  10  /  ALT  12  /  AlkPhos  115          LIVER FUNCTIONS - ( 09 Aug 2017 06:20 )  Alb: 3.6 g/dL / Pro: 8.4 g/dL / ALK PHOS: 115 U/L / ALT: 12 U/L / AST: 10 U/L / GGT: x               CAPILLARY BLOOD GLUCOSE  131 (11 Aug 2017 07:40)  207 (10 Aug 2017 22:30)  121 (10 Aug 2017 16:01)    2 units ss at 10:30pm CC: Diarrhea and shortness of breath    Patient seen and examined at bedside.  No acute events overnight. Patient had a hard time sleeping.     Patient exhibiting mild SOB of breath.stools have solidified, no further pain with defecation;  calf pain is resolving;      no desaturations overnight    Vital Signs Last 24 Hrs  T(C): 36.7 (11 Aug 2017 23:58), Max: 37.2 (11 Aug 2017 13:03)  T(F): 98 (11 Aug 2017 23:58), Max: 99 (11 Aug 2017 13:03)  HR: 112 (12 Aug 2017 05:48) (100 - 112)  BP: 126/64 (12 Aug 2017 05:48) (126/64 - 177/80)  BP(mean): 112 (11 Aug 2017 12:07) (90 - 112)  ABP: --  ABP(mean): --  RR: 19 (12 Aug 2017 05:48) (12 - 24)  SpO2: 93% (12 Aug 2017 05:48) (90% - 98%)        Review of Systems:   CONSTITUTIONAL: No fever, chills, weight loss, or fatigue  EYES: No eye pain, visual disturbances, or discharge  ENMT:  No difficulty hearing, tinnitus, vertigo; No sinus or throat pain  NECK: No pain or stiffness  RESPIRATORY: wheezing, or shortness of breath  CARDIOVASCULAR: No chest pain, palpitations, dizziness, or leg swelling  GASTROINTESTINAL: No abdominal or epigastric pain. No nausea, vomiting, diarrhea.   GENITOURINARY: No dysuria, no hematuria  NEUROLOGICAL: No changes in strength/sensation   SKIN: No itching, burning, rashes, or lesions   MUSCULOSKELETAL: No joint pain or swelling; No muscle, back, or extremity pain            Daily Weight in k.1 (11 Aug 2017 07:40)  Daily Weight in k.6 (08 Aug 2017 11:00)  Daily Weight in k (07 Aug 2017 08:55)  Weight 121kg before HD on 17  post HD weight not recorded;  no weight recorded since;  (nurse notified to start recording daily weights)      HD on 17     Intake =600cc  Output =3100cc  Net= -2500cc     HD on 17   hiytpq=996  Lrufyl=1699  Net= -2500    PE  GENERAL: Adult female laying in bed in non acute distress.   NECK: Supple, No JVD, No supraclavicular/submandibular lymphadenopathy. chest HD catheter in place on the right  LUNG: predominantly expiratory wheezing bilaterally; no rales nor ronchi  HEART: S1, S2, RRR, murmur auscultated in Pulmonic region  ABDOMEN: soft, NT, ND Bowel sounds present, Normoactive  EXTREMITIES:   No clubbing, cyanosis,  edema, LUE AVF placement + thrill palpable, bruit heard on auscultation. Mild edema bi-lateral low extremities   NERVOUS SYSTEM:  Alert & Oriented X3,    SKIN: capillary refills<2sec;  no skin tenting, good skin turgor      Lab Results:                          8.0    10.1  )-----------( 358      ( 11 Aug 2017 06:19 )             25.7     11 Aug 2017 06:19    135    |  96     |  42.0   ----------------------------<  128    4.6     |  24.0   |  5.64     Ca    8.8        11 Aug 2017 06:19    TPro  7.6    /  Alb  3.3    /  TBili  0.4    /  DBili  x      /  AST  11     /  ALT  14     /  AlkPhos  98     10 Aug 2017 08:07    LIVER FUNCTIONS - ( 10 Aug 2017 08:07 )  Alb: 3.3 g/dL / Pro: 7.6 g/dL / ALK PHOS: 98 U/L / ALT: 14 U/L / AST: 11 U/L / GGT: x           PT/INR - ( 11 Aug 2017 06:19 )   PT: 14.6 sec;   INR: 1.32 ratio         PTT - ( 11 Aug 2017 06:19 )  PTT:29.9 sec  CAPILLARY BLOOD GLUCOSE  152 (11 Aug 2017 21:48)  144 (11 Aug 2017 17:03)  125 (11 Aug 2017 13:51)  141 (11 Aug 2017 11:37)  131 (11 Aug 2017 07:40)              Auto Basophil % : 0.2 %    MEDICATIONS  (STANDING):  aspirin enteric coated 81 milliGRAM(s) Oral daily  sevelamer hydrochloride 400 milliGRAM(s) Oral three times a day with meals  pantoprazole    Tablet 40 milliGRAM(s) Oral before breakfast  calcitriol   Capsule 0.5 MICROGram(s) Oral daily  ergocalciferol 02927 Unit(s) Oral every week  folic acid 1 milliGRAM(s) Oral daily  dextrose 5%. 1000 milliLiter(s) (50 mL/Hr) IV Continuous <Continuous>  dextrose 50% Injectable 12.5 Gram(s) IV Push once  dextrose 50% Injectable 25 Gram(s) IV Push once  dextrose 50% Injectable 25 Gram(s) IV Push once  ALBUTerol/ipratropium for Nebulization 3 milliLiter(s) Nebulizer every 6 hours  calcium acetate 667 milliGRAM(s) Oral three times a day with meals  epoetin raina Injectable 59409 Unit(s) IV Push <User Schedule>  vancomycin    Solution 125 milliGRAM(s) Oral every 6 hours  cefTRIAXone   IVPB 2 Gram(s) IV Intermittent every 24 hours  amLODIPine   Tablet 10 milliGRAM(s) Oral daily  hydrALAZINE 50 milliGRAM(s) Oral every 8 hours  insulin lispro (HumaLOG) corrective regimen sliding scale   SubCutaneous Before meals and at bedtime  cefTRIAXone   IVPB 2 Gram(s) IV Intermittent once  epoetin raina Injectable 70541 Unit(s) IV Push <User Schedule>    MEDICATIONS  (PRN):  dextrose Gel 1 Dose(s) Oral once PRN Blood Glucose LESS THAN 70 milliGRAM(s)/deciliter  glucagon  Injectable 1 milliGRAM(s) IntraMuscular once PRN Glucose LESS THAN 70 milligrams/deciliter  ALBUTerol    0.083%. 2.5 milliGRAM(s) Nebulizer once PRN sputum induction  cyclobenzaprine 5 milliGRAM(s) Oral three times a day PRN Muscle Spasm  hydrALAZINE Injectable 10 milliGRAM(s) IV Push every 8 hours PRN SBP over 150          131<L>  |  90<L>  |  50.0<H>  ----------------------------<  189<H>  4.1   |  23.0  |  5.92<H>    Ca    9.1      09 Aug 2017 06:20  Phos  4.1       Mg     2.1         TPro  8.4  /  Alb  3.6  /  TBili  0.4  /  DBili  x   /  AST  10  /  ALT  12  /  AlkPhos  115          LIVER FUNCTIONS - ( 09 Aug 2017 06:20 )  Alb: 3.6 g/dL / Pro: 8.4 g/dL / ALK PHOS: 115 U/L / ALT: 12 U/L / AST: 10 U/L / GGT: x               CAPILLARY BLOOD GLUCOSE  131 (11 Aug 2017 07:40)  207 (10 Aug 2017 22:30)  121 (10 Aug 2017 16:01)    2 units ss at 10:30pm

## 2017-08-12 NOTE — PROGRESS NOTE ADULT - PROBLEM SELECTOR PROBLEM 8
Diabetes
Preventive measure
Preventive measure
Diabetes

## 2017-08-12 NOTE — PROGRESS NOTE ADULT - ATTENDING COMMENTS
Pt seen and examined with FMR  A&P reviewed.  PNA with Serratia etiology - treated  Bacteremia due to above - resolved, cont gentamicin with HD as recommended by ID  Cdiff - florastor + po vanco while on abx  ESRD on HD via AVF and HD catheter - plan to be removed  HTN - resolved with fluid removal with HD  VTE proph - UFH

## 2017-08-12 NOTE — PROGRESS NOTE ADULT - SUBJECTIVE AND OBJECTIVE BOX
SORAYA JIMENEZ is a 37y Female with HPI:  36 y/o female with ESRD diagnosed in 1/17 s/p left UE AVF on hemodialysis on M-W-F, right chest wall permacath, HTN, DM tx w/ insulin, obesity, HCAP a month ago, comes into the hospital due to  shortness of breath since 2 -3 days ago. The pt has shortness of breast on her base line but over the past 3 days her shortness of breath has increase and it's accompanied by dry cough, she complain of chill but no fever. At her base line  sometimes use O2 at home by nasal canula No sick contacts no recent travel . The pt was recently discharge from [facility] for HCAP and she got 7 days of Cefepime and she also completed a 7 days of Levaquin outpatient. PT READMITTTED  8/1  PT WITH POS BLOOD CX SERRATIA ON 8/1   C DIFF 8/2      Allergies:  Mushrooms (Hives (Mild))  No Known Drug Allergies      Medications:  aspirin enteric coated 81 milliGRAM(s) Oral daily  sevelamer hydrochloride 400 milliGRAM(s) Oral three times a day with meals  pantoprazole    Tablet 40 milliGRAM(s) Oral before breakfast  calcitriol   Capsule 0.5 MICROGram(s) Oral daily  ergocalciferol 59003 Unit(s) Oral every week  folic acid 1 milliGRAM(s) Oral daily  dextrose 5%. 1000 milliLiter(s) IV Continuous <Continuous>  dextrose Gel 1 Dose(s) Oral once PRN  dextrose 50% Injectable 12.5 Gram(s) IV Push once  dextrose 50% Injectable 25 Gram(s) IV Push once  dextrose 50% Injectable 25 Gram(s) IV Push once  glucagon  Injectable 1 milliGRAM(s) IntraMuscular once PRN  ALBUTerol/ipratropium for Nebulization 3 milliLiter(s) Nebulizer every 6 hours  calcium acetate 667 milliGRAM(s) Oral three times a day with meals  vancomycin    Solution 125 milliGRAM(s) Oral every 6 hours  cefTRIAXone   IVPB 2 Gram(s) IV Intermittent every 24 hours  cyclobenzaprine 5 milliGRAM(s) Oral three times a day PRN  hydrALAZINE 50 milliGRAM(s) Oral every 8 hours  hydrALAZINE Injectable 10 milliGRAM(s) IV Push every 8 hours PRN  insulin lispro (HumaLOG) corrective regimen sliding scale   SubCutaneous Before meals and at bedtime  cefTRIAXone   IVPB 2 Gram(s) IV Intermittent once  epoetin raina Injectable 55210 Unit(s) IV Push <User Schedule>  heparin  Injectable 5000 Unit(s) SubCutaneous every 12 hours  ALBUTerol    0.083% 2.5 milliGRAM(s) Nebulizer every 3 hours PRN  verapamil 80 milliGRAM(s) Oral every 8 hours      ANTIBIOTICS: ROCEPHIN AND ORAL VANCO        Review of Systems: - Negative except as mentioned above     Physical Exam:  ICU Vital Signs Last 24 Hrs  T(C): 36.7 (12 Aug 2017 13:45), Max: 37 (11 Aug 2017 15:05)  T(F): 98 (12 Aug 2017 13:45), Max: 98.6 (11 Aug 2017 15:05)  HR: 105 (12 Aug 2017 13:45) (101 - 113)  BP: 152/82 (12 Aug 2017 13:45) (126/64 - 182/93)  BP(mean): --  ABP: --  ABP(mean): --  RR: 18 (12 Aug 2017 13:45) (18 - 20)  SpO2: 95% (12 Aug 2017 13:45) (93% - 97%)    GEN: NAD, pleasant  HEENT: normocephalic and atraumatic. EOMI. WILDA...  NECK: Supple. No carotid bruits.  No lymphadenopathy or thyromegaly.  LUNGS: Clear to auscultation.  HEART: Regular rate and rhythm without murmur.  ABDOMEN: Soft, nontender, and nondistended.  Positive bowel sounds.  No hepatosplenomegaly was noted.  NO REBOUND NO GUARDING  EXTREMITIES: FISTULA  LEFT ARM  NEUROLOGIC: Cranial nerves II through XII are grossly intact.    SKIN: No ulceration or induration present.      Labs:    08-12    132<L>  |  93<L>  |  58.0<H>  ----------------------------<  159<H>  4.8   |  21.0<L>  |  7.46<H>    Ca    9.2      12 Aug 2017 07:40                            8.0    10.6  )-----------( 307      ( 12 Aug 2017 07:40 )             25.2       PT/INR - ( 11 Aug 2017 06:19 )   PT: 14.6 sec;   INR: 1.32 ratio         PTT - ( 11 Aug 2017 06:19 )  PTT:29.9 sec          CAPILLARY BLOOD GLUCOSE  136 (12 Aug 2017 12:00)  168 (12 Aug 2017 08:09)  152 (11 Aug 2017 21:48)  144 (11 Aug 2017 17:03)

## 2017-08-12 NOTE — PROGRESS NOTE ADULT - PROBLEM SELECTOR PLAN 4
xray and US duplex negative   Likely muscle cramping.  Flexeril has not changed much, will try tramadol   This is likely calciphylaxis related pain vs muscle strain/sprain f/s predominantly in the 100's;   - insulin sliding scale  -diabetic diet  onitor Blood glucose

## 2017-08-12 NOTE — PROGRESS NOTE ADULT - ASSESSMENT
38 y/o female with ESRD diagnosed in 1/17 s/p left UE AVF on hemodialysis on M-W-F, right chest wall permacath, HTN, DM tx w/ insulin, obesity, HCAP a month ago, comes into the hospital due to  shortness of breath since 2 -3 days ago. The pt has shortness of breast on her base line but over the past 3 days her shortness of breath has increase and it's accompanied by dry cough, she complain of chill but no fever. At her base line  sometimes use O2 at home by nasal canula No sick contacts no recent travel . The pt was recently discharge from [facility] for HCAP and she got 7 days of Cefepime and she also completed a 7 days of Levaquin outpatient. PT READMITTTED  8/1  PT WITH POS BLOOD CX SERRATIA ON 8/1   C DIFF 8/2  ON ROCEPHIN AND ORAL VANCO  PT HAD CATHETER REMOVED YESTERDAY AND NEW ONE PLACED  CATH TIP   CX NOT AVAILABLE   PT PLAN TRIP TO [place] WOULD RECOMMEND  CONTINUE  ABX FOR SERRATIA  WOULD LIKE TO AVOID LEVAQUIN IN FACE OF C DIFF    SUGGEST GENTAMICIN 100MG  ON DIALYSIS DAYS X 2WEEKS  AND  CONTINUE ORAL VANCO WHILE ON ABX

## 2017-08-12 NOTE — PROGRESS NOTE ADULT - PROBLEM SELECTOR PLAN 3
Likely 2/2 aocd. Normocytic. 2/2 ESRD   1 unit of packed RBC was transfused  on July 3    Hb today =8.3,  likely transfuse if Hb <7  c/w epogen, folate  anemia w/up noted   follow of Hb w/ cbc daily note well controlled   -resumed home meds - given respiratory wheezing, hold off of labetalol at this time - resume norvasc and hydralazine (increased dose to 50mg with prn)   echo noted

## 2017-08-12 NOTE — PROGRESS NOTE ADULT - PROBLEM SELECTOR PROBLEM 9
Preventive measure

## 2017-08-12 NOTE — PROGRESS NOTE ADULT - PROBLEM SELECTOR PLAN 9
heparin 5000 bid

## 2017-08-12 NOTE — PROGRESS NOTE ADULT - SUBJECTIVE AND OBJECTIVE BOX
INTERVAL HPI/OVERNIGHT EVENTS: Going for dialysis today, Dr. Farmer spoke with dialysis to attempt to access the         SUBJECTIVE:  Flatus: [ ] YES [ ] NO             Bowel Movement: [ ] YES [ ] NO  Pain (0-10):            Pain Control Adequate: [ ] YES [ ] NO  Nausea: [ ] YES [ ] NO            Vomiting: [ ] YES [ ] NO  Diarrhea: [ ] YES [ ] NO         Constipation: [ ] YES [ ] NO     Chest Pain: [ ] YES [ ] NO    SOB:  [ ] YES [ ] NO    MEDICATIONS  (STANDING):  aspirin enteric coated 81 milliGRAM(s) Oral daily  sevelamer hydrochloride 400 milliGRAM(s) Oral three times a day with meals  pantoprazole    Tablet 40 milliGRAM(s) Oral before breakfast  calcitriol   Capsule 0.5 MICROGram(s) Oral daily  ergocalciferol 34011 Unit(s) Oral every week  folic acid 1 milliGRAM(s) Oral daily  dextrose 5%. 1000 milliLiter(s) (50 mL/Hr) IV Continuous <Continuous>  dextrose 50% Injectable 12.5 Gram(s) IV Push once  dextrose 50% Injectable 25 Gram(s) IV Push once  dextrose 50% Injectable 25 Gram(s) IV Push once  ALBUTerol/ipratropium for Nebulization 3 milliLiter(s) Nebulizer every 6 hours  calcium acetate 667 milliGRAM(s) Oral three times a day with meals  epoetin raina Injectable 49704 Unit(s) IV Push <User Schedule>  vancomycin    Solution 125 milliGRAM(s) Oral every 6 hours  cefTRIAXone   IVPB 2 Gram(s) IV Intermittent every 24 hours  amLODIPine   Tablet 10 milliGRAM(s) Oral daily  hydrALAZINE 50 milliGRAM(s) Oral every 8 hours  insulin lispro (HumaLOG) corrective regimen sliding scale   SubCutaneous Before meals and at bedtime  cefTRIAXone   IVPB 2 Gram(s) IV Intermittent once  epoetin raina Injectable 52636 Unit(s) IV Push <User Schedule>    MEDICATIONS  (PRN):  dextrose Gel 1 Dose(s) Oral once PRN Blood Glucose LESS THAN 70 milliGRAM(s)/deciliter  glucagon  Injectable 1 milliGRAM(s) IntraMuscular once PRN Glucose LESS THAN 70 milligrams/deciliter  ALBUTerol    0.083%. 2.5 milliGRAM(s) Nebulizer once PRN sputum induction  cyclobenzaprine 5 milliGRAM(s) Oral three times a day PRN Muscle Spasm  hydrALAZINE Injectable 10 milliGRAM(s) IV Push every 8 hours PRN SBP over 150  ALBUTerol/ipratropium for Nebulization 3 milliLiter(s) Nebulizer every 6 hours PRN Shortness of Breath and/or Wheezing      Vital Signs Last 24 Hrs  T(C): 36.4 (12 Aug 2017 07:49), Max: 37.2 (11 Aug 2017 13:03)  T(F): 97.5 (12 Aug 2017 07:49), Max: 99 (11 Aug 2017 13:03)  HR: 113 (12 Aug 2017 07:49) (100 - 113)  BP: 160/80 (12 Aug 2017 08:09) (126/64 - 182/93)  BP(mean): 112 (11 Aug 2017 12:07) (90 - 112)  RR: 20 (12 Aug 2017 07:49) (12 - 24)  SpO2: 94% (12 Aug 2017 07:49) (90% - 98%)    PHYSICAL EXAM:      Constitutional:    Respiratory:    Cardiovascular:    Gastrointestinal:    Genitourinary:    Extremities:    Vascular:    Neurological:    Skin:    Lymph Nodes:    Musculoskeletal:    Psychiatric:        I&O's Detail      LABS:                        8.0    10.6  )-----------( 307      ( 12 Aug 2017 07:40 )             25.2     08-12    132<L>  |  93<L>  |  58.0<H>  ----------------------------<  159<H>  4.8   |  21.0<L>  |  7.46<H>    Ca    9.2      12 Aug 2017 07:40      PT/INR - ( 11 Aug 2017 06:19 )   PT: 14.6 sec;   INR: 1.32 ratio         PTT - ( 11 Aug 2017 06:19 )  PTT:29.9 sec      RADIOLOGY & ADDITIONAL STUDIES: INTERVAL HPI/OVERNIGHT EVENTS: Going for dialysis today, Dr. Farmer spoke with dialysis to attempt to access the fistula for the first time today, we will leave the perma cath in place incase there are any issues with the fistula. We spoke with the patient in regards to removing the permacath likely after her vacation in [place].         SUBJECTIVE:  Flatus: [ ] YES [ ] NO             Bowel Movement: [ ] YES [ ] NO  Pain (0-10):            Pain Control Adequate: [ ] YES [ ] NO  Nausea: [ ] YES [ ] NO            Vomiting: [ ] YES [ ] NO  Diarrhea: [ ] YES [ ] NO         Constipation: [ ] YES [ ] NO     Chest Pain: [ ] YES [ ] NO    SOB:  [ ] YES [ ] NO    MEDICATIONS  (STANDING):  aspirin enteric coated 81 milliGRAM(s) Oral daily  sevelamer hydrochloride 400 milliGRAM(s) Oral three times a day with meals  pantoprazole    Tablet 40 milliGRAM(s) Oral before breakfast  calcitriol   Capsule 0.5 MICROGram(s) Oral daily  ergocalciferol 06393 Unit(s) Oral every week  folic acid 1 milliGRAM(s) Oral daily  dextrose 5%. 1000 milliLiter(s) (50 mL/Hr) IV Continuous <Continuous>  dextrose 50% Injectable 12.5 Gram(s) IV Push once  dextrose 50% Injectable 25 Gram(s) IV Push once  dextrose 50% Injectable 25 Gram(s) IV Push once  ALBUTerol/ipratropium for Nebulization 3 milliLiter(s) Nebulizer every 6 hours  calcium acetate 667 milliGRAM(s) Oral three times a day with meals  epoetin raina Injectable 54477 Unit(s) IV Push <User Schedule>  vancomycin    Solution 125 milliGRAM(s) Oral every 6 hours  cefTRIAXone   IVPB 2 Gram(s) IV Intermittent every 24 hours  amLODIPine   Tablet 10 milliGRAM(s) Oral daily  hydrALAZINE 50 milliGRAM(s) Oral every 8 hours  insulin lispro (HumaLOG) corrective regimen sliding scale   SubCutaneous Before meals and at bedtime  cefTRIAXone   IVPB 2 Gram(s) IV Intermittent once  epoetin raina Injectable 62939 Unit(s) IV Push <User Schedule>    MEDICATIONS  (PRN):  dextrose Gel 1 Dose(s) Oral once PRN Blood Glucose LESS THAN 70 milliGRAM(s)/deciliter  glucagon  Injectable 1 milliGRAM(s) IntraMuscular once PRN Glucose LESS THAN 70 milligrams/deciliter  ALBUTerol    0.083%. 2.5 milliGRAM(s) Nebulizer once PRN sputum induction  cyclobenzaprine 5 milliGRAM(s) Oral three times a day PRN Muscle Spasm  hydrALAZINE Injectable 10 milliGRAM(s) IV Push every 8 hours PRN SBP over 150  ALBUTerol/ipratropium for Nebulization 3 milliLiter(s) Nebulizer every 6 hours PRN Shortness of Breath and/or Wheezing      Vital Signs Last 24 Hrs  T(C): 36.4 (12 Aug 2017 07:49), Max: 37.2 (11 Aug 2017 13:03)  T(F): 97.5 (12 Aug 2017 07:49), Max: 99 (11 Aug 2017 13:03)  HR: 113 (12 Aug 2017 07:49) (100 - 113)  BP: 160/80 (12 Aug 2017 08:09) (126/64 - 182/93)  BP(mean): 112 (11 Aug 2017 12:07) (90 - 112)  RR: 20 (12 Aug 2017 07:49) (12 - 24)  SpO2: 94% (12 Aug 2017 07:49) (90% - 98%)    PHYSICAL EXAM:      Constitutional: in good spirits    Respiratory: in no respiratory distress, supplemental     Cardiovascular:    Gastrointestinal:    Genitourinary:    Extremities:    Vascular:    Neurological:    Skin:    Lymph Nodes:    Musculoskeletal:    Psychiatric:        I&O's Detail      LABS:                        8.0    10.6  )-----------( 307      ( 12 Aug 2017 07:40 )             25.2     08-12    132<L>  |  93<L>  |  58.0<H>  ----------------------------<  159<H>  4.8   |  21.0<L>  |  7.46<H>    Ca    9.2      12 Aug 2017 07:40      PT/INR - ( 11 Aug 2017 06:19 )   PT: 14.6 sec;   INR: 1.32 ratio         PTT - ( 11 Aug 2017 06:19 )  PTT:29.9 sec      RADIOLOGY & ADDITIONAL STUDIES: INTERVAL HPI/OVERNIGHT EVENTS: Going for dialysis today, Dr. Farmer spoke with dialysis to attempt to access the fistula for the first time today, we will leave the perma cath in place incase there are any issues with the fistula. We spoke with the patient in regards to removing the permacath likely after her vacation in [place].             MEDICATIONS  (STANDING):  aspirin enteric coated 81 milliGRAM(s) Oral daily  sevelamer hydrochloride 400 milliGRAM(s) Oral three times a day with meals  pantoprazole    Tablet 40 milliGRAM(s) Oral before breakfast  calcitriol   Capsule 0.5 MICROGram(s) Oral daily  ergocalciferol 78819 Unit(s) Oral every week  folic acid 1 milliGRAM(s) Oral daily  dextrose 5%. 1000 milliLiter(s) (50 mL/Hr) IV Continuous <Continuous>  dextrose 50% Injectable 12.5 Gram(s) IV Push once  dextrose 50% Injectable 25 Gram(s) IV Push once  dextrose 50% Injectable 25 Gram(s) IV Push once  ALBUTerol/ipratropium for Nebulization 3 milliLiter(s) Nebulizer every 6 hours  calcium acetate 667 milliGRAM(s) Oral three times a day with meals  epoetin raina Injectable 29769 Unit(s) IV Push <User Schedule>  vancomycin    Solution 125 milliGRAM(s) Oral every 6 hours  cefTRIAXone   IVPB 2 Gram(s) IV Intermittent every 24 hours  amLODIPine   Tablet 10 milliGRAM(s) Oral daily  hydrALAZINE 50 milliGRAM(s) Oral every 8 hours  insulin lispro (HumaLOG) corrective regimen sliding scale   SubCutaneous Before meals and at bedtime  cefTRIAXone   IVPB 2 Gram(s) IV Intermittent once  epoetin raina Injectable 45467 Unit(s) IV Push <User Schedule>    MEDICATIONS  (PRN):  dextrose Gel 1 Dose(s) Oral once PRN Blood Glucose LESS THAN 70 milliGRAM(s)/deciliter  glucagon  Injectable 1 milliGRAM(s) IntraMuscular once PRN Glucose LESS THAN 70 milligrams/deciliter  ALBUTerol    0.083%. 2.5 milliGRAM(s) Nebulizer once PRN sputum induction  cyclobenzaprine 5 milliGRAM(s) Oral three times a day PRN Muscle Spasm  hydrALAZINE Injectable 10 milliGRAM(s) IV Push every 8 hours PRN SBP over 150  ALBUTerol/ipratropium for Nebulization 3 milliLiter(s) Nebulizer every 6 hours PRN Shortness of Breath and/or Wheezing      Vital Signs Last 24 Hrs  T(C): 36.4 (12 Aug 2017 07:49), Max: 37.2 (11 Aug 2017 13:03)  T(F): 97.5 (12 Aug 2017 07:49), Max: 99 (11 Aug 2017 13:03)  HR: 113 (12 Aug 2017 07:49) (100 - 113)  BP: 160/80 (12 Aug 2017 08:09) (126/64 - 182/93)  BP(mean): 112 (11 Aug 2017 12:07) (90 - 112)  RR: 20 (12 Aug 2017 07:49) (12 - 24)  SpO2: 94% (12 Aug 2017 07:49) (90% - 98%)    PHYSICAL EXAM:      Constitutional: in good spirits    Respiratory: in no respiratory distress, supplemental O2 as needed    Cardiovascular: no issues VS WNL    Gastrointestinal: tolerating a diet    Extremities: moves all extremities at baseline     Vascular: Fistula well outlined with marking pen, going to dialysis today will try to access the fistula for the first time,     Neurological: A&Ox3    Skin: warm, dry and no rashes      I&O's Detail      LABS:                        8.0    10.6  )-----------( 307      ( 12 Aug 2017 07:40 )             25.2     08-12    132<L>  |  93<L>  |  58.0<H>  ----------------------------<  159<H>  4.8   |  21.0<L>  |  7.46<H>    Ca    9.2      12 Aug 2017 07:40      PT/INR - ( 11 Aug 2017 06:19 )   PT: 14.6 sec;   INR: 1.32 ratio         PTT - ( 11 Aug 2017 06:19 )  PTT:29.9 sec      RADIOLOGY & ADDITIONAL STUDIES:

## 2017-08-12 NOTE — PROGRESS NOTE ADULT - PROBLEM SELECTOR PLAN 1
-Patient will get superficialization procedure today;  remove permacath;     Rocephin 2 gr x 24 hr (day #11)  - BCx from 8/4 are negative to date   - Ultimate goal is to evaluate permacath as source of bacteriemia + for serratia mercences, previous BCs for 6/12 previous hospitalization were positive for the same Serratia;   -patient does not want to get PC out if it means that she will be getting a temp catheter in her groin.   -Consults feel patient currently not toxic, responding well to treatmetn, superficiliazing AVF tomorrow and oermacath replacementh pending Cardio clearance,   - nephrology & id both recommend exchange permacath - however primary vascular surgeon does not think that the PC is the source and prefers to salvage the PC and tx with abx. c/w epogen, folate  anemia w/up noted   follow of Hb w/ cbc daily

## 2017-08-12 NOTE — PROGRESS NOTE ADULT - SUBJECTIVE AND OBJECTIVE BOX
Patient was seen and evaluated on dialysis.   No c/o CP SOB NV  no F/C  no swelling but c/o pain L foot/lower leg    T(C): 36.4 (08-12-17 @ 07:49), Max: 37.2 (08-11-17 @ 13:03)  HR: 113 (08-12-17 @ 07:49) (101 - 113)  BP: 160/80 (08-12-17 @ 08:09) (126/64 - 182/93)  Wt(kg): --  PE ;  NAD  lungs - CTA  CV gr 1 murmer,  No gallop or rub  Abd : soft, NT BS +, No masses  Ext- No edema  Neuro : Grossly intact, moving extremities ; better with warm compresses                            8.0    10.6  )-----------( 307      ( 12 Aug 2017 07:40 )             25.2        08-12    132<L>  |  93<L>  |  58.0<H>  ----------------------------<  159<H>  4.8   |  21.0<L>  |  7.46<H>    Ca    9.2      12 Aug 2017 07:40        MEDICATIONS  (STANDING):  aspirin enteric coated  sevelamer hydrochloride  pantoprazole    Tablet  calcitriol   Capsule  ergocalciferol  folic acid  dextrose 5%.  dextrose Gel PRN  dextrose 50% Injectable  dextrose 50% Injectable  dextrose 50% Injectable  glucagon  Injectable PRN  ALBUTerol/ipratropium for Nebulization  calcium acetate  vancomycin    Solution  cefTRIAXone   IVPB  cyclobenzaprine PRN  hydrALAZINE  hydrALAZINE Injectable PRN  insulin lispro (HumaLOG) corrective regimen sliding scale  cefTRIAXone   IVPB  epoetin raina Injectable  heparin  Injectable  ALBUTerol    0.083% PRN  verapamil SR      Patient stable  Mike HD easily  BP/HR high - change Norvasc for Verapamil  Continue

## 2017-08-12 NOTE — PROGRESS NOTE ADULT - PROBLEM SELECTOR PLAN 2
cdiff +,  on vanco oral q6 by ID  (day #10)  BM are solid; contact isolation  off Patient developed metabolic acidosis, hi ag, that has since resolved;    hemodialysis as schedule   epoetin injection once a week  continue sevelamer  cincalcet, continue calcium acetated  AV fistula present but deep, d/w Vascular NP - needs superficialization prior to use   permacth in place with no erythema. will be done on 08/11/2017 pending Cardio clearance today  Permacath recommended to be removed due to possibly being source of Serratia infection, per ID recommendation, Will be done on 08/10/17 by Vascular surgey  Cardio will do pharmacologic  NST

## 2017-08-12 NOTE — PROGRESS NOTE ADULT - PROBLEM SELECTOR PLAN 1
going to dialysis today they will attempt to access the fistula for the first time, perma-cath will remain in place incase their are issues with the fistula. Patient is going on vacation to [place] will f/u in the office and will take out the permacath there

## 2017-08-12 NOTE — PROGRESS NOTE ADULT - PROBLEM SELECTOR PLAN 6
Patient developed metabolic acidosis, hi ag, that has since resolved;    hemodialysis as schedule   epoetin injection once a week  continue sevelamer  cincalcet, continue calcium acetated  AV fistula present but deep, d/w Vascular NP - needs superficialization prior to use   permacth in place with no erythema. will be done on 08/11/2017 pending Cardio clearance today  Permacath recommended to be removed due to possibly being source of Serratia infection, per ID recommendation, Will be done on 08/10/17 by Vascular surgey  Cardio will do pharmacologic  NST

## 2017-08-13 DIAGNOSIS — A04.7 ENTEROCOLITIS DUE TO CLOSTRIDIUM DIFFICILE: ICD-10-CM

## 2017-08-13 DIAGNOSIS — R78.81 BACTEREMIA: ICD-10-CM

## 2017-08-13 LAB
ANION GAP SERPL CALC-SCNC: 16 MMOL/L — SIGNIFICANT CHANGE UP (ref 5–17)
BUN SERPL-MCNC: 39 MG/DL — HIGH (ref 8–20)
CALCIUM SERPL-MCNC: 9.3 MG/DL — SIGNIFICANT CHANGE UP (ref 8.6–10.2)
CHLORIDE SERPL-SCNC: 93 MMOL/L — LOW (ref 98–107)
CO2 SERPL-SCNC: 25 MMOL/L — SIGNIFICANT CHANGE UP (ref 22–29)
CREAT SERPL-MCNC: 5.98 MG/DL — HIGH (ref 0.5–1.3)
GLUCOSE SERPL-MCNC: 120 MG/DL — HIGH (ref 70–115)
HCT VFR BLD CALC: 24.7 % — LOW (ref 37–47)
HGB BLD-MCNC: 8.1 G/DL — LOW (ref 12–16)
MCHC RBC-ENTMCNC: 26.2 PG — LOW (ref 27–31)
MCHC RBC-ENTMCNC: 32.8 G/DL — SIGNIFICANT CHANGE UP (ref 32–36)
MCV RBC AUTO: 79.9 FL — LOW (ref 81–99)
PLATELET # BLD AUTO: 303 K/UL — SIGNIFICANT CHANGE UP (ref 150–400)
POTASSIUM SERPL-MCNC: 4.5 MMOL/L — SIGNIFICANT CHANGE UP (ref 3.5–5.3)
POTASSIUM SERPL-SCNC: 4.5 MMOL/L — SIGNIFICANT CHANGE UP (ref 3.5–5.3)
RBC # BLD: 3.09 M/UL — LOW (ref 4.4–5.2)
RBC # FLD: 18.4 % — HIGH (ref 11–15.6)
SODIUM SERPL-SCNC: 134 MMOL/L — LOW (ref 135–145)
WBC # BLD: 9.6 K/UL — SIGNIFICANT CHANGE UP (ref 4.8–10.8)
WBC # FLD AUTO: 9.6 K/UL — SIGNIFICANT CHANGE UP (ref 4.8–10.8)

## 2017-08-13 PROCEDURE — 99233 SBSQ HOSP IP/OBS HIGH 50: CPT

## 2017-08-13 RX ORDER — GENTAMICIN SULFATE 40 MG/ML
100 VIAL (ML) INJECTION
Qty: 0 | Refills: 0 | Status: COMPLETED | OUTPATIENT
Start: 2017-08-14 | End: 2017-08-14

## 2017-08-13 RX ORDER — ACETAMINOPHEN 500 MG
650 TABLET ORAL ONCE
Qty: 0 | Refills: 0 | Status: COMPLETED | OUTPATIENT
Start: 2017-08-13 | End: 2017-08-13

## 2017-08-13 RX ADMIN — Medication 125 MILLIGRAM(S): at 20:43

## 2017-08-13 RX ADMIN — Medication 50 MILLIGRAM(S): at 06:02

## 2017-08-13 RX ADMIN — Medication 650 MILLIGRAM(S): at 01:54

## 2017-08-13 RX ADMIN — Medication 1: at 17:16

## 2017-08-13 RX ADMIN — Medication 125 MILLIGRAM(S): at 07:58

## 2017-08-13 RX ADMIN — Medication 3 MILLILITER(S): at 20:18

## 2017-08-13 RX ADMIN — Medication 50 MILLIGRAM(S): at 21:19

## 2017-08-13 RX ADMIN — Medication 3 MILLILITER(S): at 02:10

## 2017-08-13 RX ADMIN — Medication 125 MILLIGRAM(S): at 15:33

## 2017-08-13 RX ADMIN — Medication 81 MILLIGRAM(S): at 12:16

## 2017-08-13 RX ADMIN — Medication 650 MILLIGRAM(S): at 12:15

## 2017-08-13 RX ADMIN — CALCITRIOL 0.5 MICROGRAM(S): 0.5 CAPSULE ORAL at 12:15

## 2017-08-13 RX ADMIN — Medication 1 MILLIGRAM(S): at 12:15

## 2017-08-13 RX ADMIN — CEFTRIAXONE 100 GRAM(S): 500 INJECTION, POWDER, FOR SOLUTION INTRAMUSCULAR; INTRAVENOUS at 21:19

## 2017-08-13 RX ADMIN — SEVELAMER CARBONATE 400 MILLIGRAM(S): 2400 POWDER, FOR SUSPENSION ORAL at 12:17

## 2017-08-13 RX ADMIN — SEVELAMER CARBONATE 400 MILLIGRAM(S): 2400 POWDER, FOR SUSPENSION ORAL at 07:58

## 2017-08-13 RX ADMIN — Medication 50 MILLIGRAM(S): at 15:34

## 2017-08-13 RX ADMIN — Medication 80 MILLIGRAM(S): at 06:02

## 2017-08-13 RX ADMIN — PANTOPRAZOLE SODIUM 40 MILLIGRAM(S): 20 TABLET, DELAYED RELEASE ORAL at 06:02

## 2017-08-13 RX ADMIN — Medication 80 MILLIGRAM(S): at 12:17

## 2017-08-13 RX ADMIN — Medication 125 MILLIGRAM(S): at 01:54

## 2017-08-13 RX ADMIN — CEFTRIAXONE 100 GRAM(S): 500 INJECTION, POWDER, FOR SOLUTION INTRAMUSCULAR; INTRAVENOUS at 22:23

## 2017-08-13 RX ADMIN — Medication 3 MILLILITER(S): at 15:37

## 2017-08-13 RX ADMIN — SEVELAMER CARBONATE 400 MILLIGRAM(S): 2400 POWDER, FOR SUSPENSION ORAL at 17:16

## 2017-08-13 RX ADMIN — Medication 3 MILLILITER(S): at 08:56

## 2017-08-13 RX ADMIN — Medication 650 MILLIGRAM(S): at 03:00

## 2017-08-13 RX ADMIN — Medication 80 MILLIGRAM(S): at 21:19

## 2017-08-13 NOTE — PROGRESS NOTE ADULT - PROBLEM SELECTOR PLAN 6
Hb a1c stable at 6.4, f/s predominantly in the 100's;   - insulin sliding scale  -diabetic diet  -monitor Blood glucose

## 2017-08-13 NOTE — PROGRESS NOTE ADULT - ASSESSMENT
Patient is a 36 y/o female with ESRD diagnosed in 1/17 s/p left UE AVF on hemodialysis on M-W-F, right chest wall permacath, HTN, DM, obesity, HCAP one month ago, comes into the hospital due to shortness of breath since 2 -3 days ago. CXR shows right middle lobe infiltrates , left side pleural effusion improved from previous admission; confirmed with ct chest. .  Also with hemoglobin of 7.6 on admission, transfused for symptomatic anemia yesterday.  She is admitted for diarrhea, cdiff positive. Incidentally, found to have + blood cx with serratia; concern for now needing to get the PC out b/c this is a recurrence of bacteremia.  Patient is s/p permacath replacement and revision of LUE AVF.     8/11/2017   Patient will get AV fistula revision today; patient passed cardiac stress test;   diet is npo for the procedure;     8/1/2017 pos BC for Serratia;    6/12/2017 pos BC for Serratia; Patient is a 38 y/o female with ESRD diagnosed in 1/17 s/p left UE AVF on hemodialysis on M-W-F, right chest wall permacath, HTN, DM, obesity, HCAP one month ago, comes into the hospital due to shortness of breath since 2 -3 days ago. CXR shows right middle lobe infiltrates , left side pleural effusion improved from previous admission; confirmed with ct chest. .  Also with hemoglobin of 7.6 on admission, transfused for symptomatic anemia yesterday.  She is admitted for diarrhea, cdiff positive. Incidentally, found to have + blood cx with serratia; concern for now needing to get the PC out b/c this is a recurrence of bacteremia.  Patient is s/p permacath replacement and revision of LUE AVF, B Cx clear 8/5. Nephro, ID, vascular, and Cardiology on-board recs appreciated. DC planning likely for discharge on abx after next dialysis    8/11/2017   Patient received AV fistula revision and replacement of R chest wall permacath; patient passed cardiac stress test;    8/1/2017 pos BC for Serratia;    6/12/2017 pos BC for Serratia; Patient is a 36 y/o female with ESRD diagnosed in 1/17 s/p left UE AVF on hemodialysis on M-W-F, right chest wall permacath, HTN, DM, obesity, HCAP one month ago, comes into the hospital due to shortness of breath since 2 -3 days ago. CXR shows right middle lobe infiltrates , left side pleural effusion improved from previous admission; confirmed with ct chest. .  Also with hemoglobin of 7.6 on admission, transfused for symptomatic anemia yesterday.  She is admitted for diarrhea, cdiff positive. Incidentally, found to have + blood cx with serratia; concern for now needing to get the PC out b/c this is a recurrence of bacteremia.  Patient is s/p permacath replacement and revision of LUE AVF, B Cx clear 8/5. Nephro, ID, vascular, and Cardiology on-board recs appreciated. DC planning likely for discharge on abx after next dialysis    Update 8/13: Patient to go for Fistulogram tomorrow 8/14 in the AM, spoke with Vascular and patient may continue on heparin for the morning, patient is NPO after midnight. Patient to go for dialysis after Fistulogram and then DC tomorrow following that. Patient to be discharged on Gentamycin 100 mg on Dialysis days only, for 2 weeks, and also Vanco should be continued for 2 weeks while the patient is still on abx.   In the AM 8/14 please ask patient to contact her dialysis center in [place], please relay this information to the  so that the patient can continue dialysis in [place] and receive Gentamycin on those days.      8/11/2017   Patient received AV fistula revision and replacement of R chest wall permacath; patient passed cardiac stress test;    8/1/2017 pos BC for Serratia;    6/12/2017 pos BC for Serratia; Patient is a 38 y/o female with ESRD diagnosed in 1/17 s/p left UE AVF on hemodialysis on M-W-F, right chest wall permacath, HTN, DM, obesity, HCAP one month ago, comes into the hospital due to shortness of breath since 2 -3 days ago. CXR shows right middle lobe infiltrates , left side pleural effusion improved from previous admission; confirmed with ct chest. .  Also with hemoglobin of 7.6 on admission, transfused for symptomatic anemia yesterday.  She is admitted for diarrhea, cdiff positive. Incidentally, found to have + blood cx with serratia; concern for now needing to get the PC out b/c this is a recurrence of bacteremia.  Patient is s/p permacath replacement and revision of LUE AVF, B Cx clear 8/5. Nephro, ID, vascular, and Cardiology on-board recs appreciated. DC planning likely for discharge on abx after next dialysis    8/11/2017   Patient received AV fistula revision and replacement of R chest wall permacath; patient passed cardiac stress test;    8/1/2017 pos BC for Serratia;    6/12/2017 pos BC for Serratia;      Update 8/13: Patient to go for Fistulogram tomorrow 8/14 in the AM, spoke with Vascular and patient may continue on heparin for the morning, patient is NPO after midnight. Patient to go for dialysis after Fistulogram and then DC tomorrow following that. Patient to be discharged on Gentamycin 100 mg on Dialysis days only, for 2 weeks, and also Vanco should be continued for 2 weeks while the patient is still on abx.   In the AM 8/14 please ask patient to contact her dialysis center in [place], please relay this information to the  so that the patient can continue dialysis in [place] and receive Gentamycin on those days. Once this is confirmed, please update med rec in DC summary. Patient is a 38 y/o female with ESRD diagnosed in 1/17 s/p left UE AVF on hemodialysis on M-W-F, right chest wall permacath, HTN, DM, obesity, HCAP one month ago, comes into the hospital due to shortness of breath since 2 -3 days ago. CXR shows right middle lobe infiltrates , left side pleural effusion improved from previous admission; confirmed with ct chest. .  Also with hemoglobin of 7.6 on admission, transfused for symptomatic anemia yesterday.  She is admitted for diarrhea, cdiff positive. Incidentally, found to have + blood cx with serratia; concern for now needing to get the PC out b/c this is a recurrence of bacteremia.  Patient is s/p permacath replacement and revision of LUE AVF, B Cx clear 8/5. Nephro, ID, vascular, and Cardiology on-board recs appreciated. DC planning likely for discharge on abx after next dialysis    8/11/2017   Patient received AV fistula revision and replacement of R chest wall permacath; patient passed cardiac stress test;    8/1/2017 pos BC for Serratia;    6/12/2017 pos BC for Serratia;      Update 8/13: Patient to go for Fistulogram tomorrow 8/14 in the AM, spoke with Vascular and patient may continue on heparin for the morning, patient is NPO after midnight. Patient to go for dialysis after Fistulogram and then DC tomorrow following that. Patient to be discharged on Gentamycin 100 mg on Dialysis days only, for 2 weeks, and also Vanco should be continued for 2 weeks while the patient is still on abx.   In the AM 8/14 please ask patient to contact her dialysis center in [place], please relay this information to the  so that the patient can continue dialysis in [place] and receive Gentamycin on those days. Also, patient was switched from Norvasc to verapamil for BP control in the hospital, please confirm BP medications for discharge with attending. Once this is confirmed, please update med rec in DC summary, outpatient med rec has been confirmed with pharmacist.

## 2017-08-13 NOTE — PROGRESS NOTE ADULT - SUBJECTIVE AND OBJECTIVE BOX
Patient seen and examined    Feels fine  no c/o CP SOB NV   No swelling feet      Vital Signs Last 24 Hrs  T(C): 37 (08-13-17 @ 20:45), Max: 37 (08-13-17 @ 20:45)  T(F): 98.6 (08-13-17 @ 20:45), Max: 98.6 (08-13-17 @ 20:45)  HR: 100 (08-13-17 @ 20:45) (90 - 102)  BP: 150/98 (08-13-17 @ 20:45) (140/74 - 158/70)  BP(mean): --  RR: 19 (08-13-17 @ 20:45) (18 - 20)  SpO2: 98% (08-13-17 @ 20:45) (92% - 98%)    Chest   clear  CV   no murmur  Abd   soft, NT BS+  Extr   No edema  Neuro  grossly iintact motor        13 Aug 2017 07:09    134    |  93     |  39.0   ----------------------------<  120    4.5     |  25.0   |  5.98     Ca    9.3        13 Aug 2017 07:09                            8.1    9.6   )-----------( 303      ( 13 Aug 2017 07:09 )             24.7         planning to go to [place]  d/w Dr. Small  will need Abx for Serratia  Also had C diff recently- nned to be careful with Abx  patient doesn't know the name of unit in [place]  will need to call SW at [place] and find out re unit in [place]  HD am so that she can get her next dose of Abx here

## 2017-08-13 NOTE — PROGRESS NOTE ADULT - SUBJECTIVE AND OBJECTIVE BOX
CC: Diarrhea and shortness of breath    Patient seen and examined at bedside.  No acute events overnight. Patient had a hard time sleeping.     Patient exhibiting mild SOB of breath.stools have solidified, no further pain with defecation;  calf pain is resolving;      no desaturations overnight    ICU Vital Signs Last 24 Hrs  T(C): 36.8 (12 Aug 2017 23:39), Max: 36.9 (12 Aug 2017 15:30)  T(F): 98.3 (12 Aug 2017 23:39), Max: 98.4 (12 Aug 2017 15:30)  HR: 90 (13 Aug 2017 06:12) (90 - 113)  BP: 153/82 (13 Aug 2017 06:12) (147/78 - 182/93)  RR: 20 (12 Aug 2017 23:39) (18 - 20)  SpO2: 93% (12 Aug 2017 23:39) (92% - 95%)      Review of Systems: CONSTITUTIONAL: No fever, chills, weight loss, or fatigue  ENMT:  No sinus or throat pain  NECK: No pain or stiffness  RESPIRATORY: wheezing, or shortness of breath  CARDIOVASCULAR: No chest pain, palpitations, dizziness, or leg swelling  GASTROINTESTINAL: No abdominal or epigastric pain. No nausea, vomiting, diarrhea.   GENITOURINARY: No dysuria, no hematuria  NEUROLOGICAL: No changes in strength/sensation   SKIN: No itching, burning, rashes, or lesions     Daily    Daily   Daily Weight in k.1 (11 Aug 2017 07:40)  Daily Weight in k.6 (08 Aug 2017 11:00)  Daily Weight in k (07 Aug 2017 08:55)  Weight 121kg before HD on 17  post HD weight not recorded;  no weight recorded since;  (nurse notified to start recording daily weights)      HD on 17     Intake =600cc  Output =3100cc  Net= -2500cc     HD on 17   ezvmon=202  [id number]=6140  Net= -2500    PE  GENERAL: Adult female laying in bed in non acute distress.   NECK: Supple, No JVD, No supraclavicular/submandibular lymphadenopathy. chest HD catheter in place on the right  LUNG: predominantly expiratory wheezing bilaterally; no rales nor ronchi  HEART: S1, S2, RRR, murmur auscultated in Pulmonic region  ABDOMEN: soft, NT, ND Bowel sounds present, Normoactive  EXTREMITIES:   No clubbing, cyanosis,  edema, LUE AVF placement + thrill palpable, bruit heard on auscultation. Mild edema bi-lateral low extremities   NERVOUS SYSTEM:  Alert & Oriented X3,    SKIN: capillary refills<2sec;  no skin tenting, good skin turgor      Lab Results:                        8.0    10.6  )-----------( 307      ( 12 Aug 2017 07:40 )             25.2     08-    132<L>  |  93<L>  |  58.0<H>  ----------------------------<  159<H>  4.8   |  21.0<L>  |  7.46<H>    Ca    9.2      12 Aug 2017 07:40    TPro  7.6    /  Alb  3.3    /  TBili  0.4    /  DBili  x      /  AST  11     /  ALT  14     /  AlkPhos  98     10 Aug 2017 08:07    LIVER FUNCTIONS - ( 10 Aug 2017 08:07 )  Alb: 3.3 g/dL / Pro: 7.6 g/dL / ALK PHOS: 98 U/L / ALT: 14 U/L / AST: 11 U/L / GGT: x             PT/INR - ( 11 Aug 2017 06:19 )   PT: 14.6 sec;   INR: 1.32 ratio      PTT - ( 11 Aug 2017 06:19 )  PTT:29.9 sec    CAPILLARY BLOOD GLUCOSE  154 (12 Aug 2017 20:22)  136 (12 Aug 2017 12:00)  168 (12 Aug 2017 08:09)    MEDICATIONS  (STANDING):  aspirin enteric coated 81 milliGRAM(s) Oral daily  sevelamer hydrochloride 400 milliGRAM(s) Oral three times a day with meals  pantoprazole    Tablet 40 milliGRAM(s) Oral before breakfast  calcitriol   Capsule 0.5 MICROGram(s) Oral daily  ergocalciferol 15028 Unit(s) Oral every week  folic acid 1 milliGRAM(s) Oral daily  dextrose 5%. 1000 milliLiter(s) (50 mL/Hr) IV Continuous <Continuous>  dextrose 50% Injectable 12.5 Gram(s) IV Push once  dextrose 50% Injectable 25 Gram(s) IV Push once  dextrose 50% Injectable 25 Gram(s) IV Push once  ALBUTerol/ipratropium for Nebulization 3 milliLiter(s) Nebulizer every 6 hours  calcium acetate 667 milliGRAM(s) Oral three times a day with meals  vancomycin    Solution 125 milliGRAM(s) Oral every 6 hours  cefTRIAXone   IVPB 2 Gram(s) IV Intermittent every 24 hours  hydrALAZINE 50 milliGRAM(s) Oral every 8 hours  insulin lispro (HumaLOG) corrective regimen sliding scale   SubCutaneous Before meals and at bedtime  cefTRIAXone   IVPB 2 Gram(s) IV Intermittent once  epoetin raina Injectable 78055 Unit(s) IV Push <User Schedule>  heparin  Injectable 5000 Unit(s) SubCutaneous every 12 hours  verapamil 80 milliGRAM(s) Oral every 8 hours    MEDICATIONS  (PRN):  dextrose Gel 1 Dose(s) Oral once PRN Blood Glucose LESS THAN 70 milliGRAM(s)/deciliter  glucagon  Injectable 1 milliGRAM(s) IntraMuscular once PRN Glucose LESS THAN 70 milligrams/deciliter  cyclobenzaprine 5 milliGRAM(s) Oral three times a day PRN Muscle Spasm  hydrALAZINE Injectable 10 milliGRAM(s) IV Push every 8 hours PRN SBP over 150  ALBUTerol    0.083% 2.5 milliGRAM(s) Nebulizer every 3 hours PRN Shortness of Breath and/or Wheezing CC: Diarrhea and shortness of breath    Patient seen and examined at bedside.  No acute events overnight. Patient had a hard time sleeping.     Patient exhibiting mild SOB of breath on exertion. Stools have solidified last BM this AM, no further pain with defecation and no diarrhea;  calf pain is resolving;      no desaturations overnight    ICU Vital Signs Last 24 Hrs  T(C): 36.8 (12 Aug 2017 23:39), Max: 36.9 (12 Aug 2017 15:30)  T(F): 98.3 (12 Aug 2017 23:39), Max: 98.4 (12 Aug 2017 15:30)  HR: 90 (13 Aug 2017 06:12) (90 - 113)  BP: 153/82 (13 Aug 2017 06:12) (147/78 - 182/93)  RR: 20 (12 Aug 2017 23:39) (18 - 20)  SpO2: 93% (12 Aug 2017 23:39) (92% - 95%)       Review of Systems: CONSTITUTIONAL: No fever, chills, weight loss, or fatigue  ENMT:  No sinus or throat pain  NECK: No pain or stiffness  RESPIRATORY: wheezing, or shortness of breath  CARDIOVASCULAR: No chest pain, palpitations, dizziness, or leg swelling  GASTROINTESTINAL: No abdominal or epigastric pain. No nausea, vomiting, diarrhea.   GENITOURINARY: No dysuria, no hematuria  NEUROLOGICAL: No changes in strength/sensation   SKIN: No itching, burning, rashes, or lesions       Daily   Daily Weight in k.1 (11 Aug 2017 07:40)  Daily Weight in k.6 (08 Aug 2017 11:00)  Daily Weight in k (07 Aug 2017 08:55)  Weight 121kg before HD on 17  post HD weight not recorded;  no weight recorded since;  (nurse notified to start recording daily weights)    HD :  Net: -3000 cc    HD on 17     Intake =600cc  Output =3100cc  Net= -2500cc     HD on 17   akhicy=446  Hoocfw=9800  Net= -2500    PE  GENERAL: Adult female laying in bed in non acute distress.   NECK: Supple, No JVD, No supraclavicular/submandibular lymphadenopathy. chest HD catheter in place on the right, no erythema/drainage around site  LUNG: Mild rhonchi b/l; no rales or wheezing appreciated  HEART: S1, S2, RRR, murmur auscultated in Pulmonic region  ABDOMEN: soft, NT, ND Bowel sounds present, Normoactive  EXTREMITIES:   No clubbing, cyanosis,  edema, LUE AVF placement + thrill palpable, bruit heard on auscultation. no edema appreciated  NERVOUS SYSTEM:  Alert & Oriented X3,    SKIN: capillary refills<2sec;  no skin tenting, good skin turgor      Lab Results:                                   8.1    9.6   )-----------( 303      ( 13 Aug 2017 07:09 )             24.7   08    134<L>  |  93<L>  |  39.0<H>  ----------------------------<  120<H>  4.5   |  25.0  |  5.98<H>    Ca    9.3      13 Aug 2017 07:09    TPro  7.6    /  Alb  3.3    /  TBili  0.4    /  DBili  x      /  AST  11     /  ALT  14     /  AlkPhos  98     10 Aug 2017 08:07    LIVER FUNCTIONS - ( 10 Aug 2017 08:07 )  Alb: 3.3 g/dL / Pro: 7.6 g/dL / ALK PHOS: 98 U/L / ALT: 14 U/L / AST: 11 U/L / GGT: x             PT/INR - ( 11 Aug 2017 06:19 )   PT: 14.6 sec;   INR: 1.32 ratio      PTT - ( 11 Aug 2017 06:19 )  PTT:29.9 sec    CAPILLARY BLOOD GLUCOSE  154 (12 Aug 2017 20:22)  136 (12 Aug 2017 12:00)  168 (12 Aug 2017 08:09)    3U on ISS     MEDICATIONS  (STANDING):  aspirin enteric coated 81 milliGRAM(s) Oral daily  sevelamer hydrochloride 400 milliGRAM(s) Oral three times a day with meals  pantoprazole    Tablet 40 milliGRAM(s) Oral before breakfast  calcitriol   Capsule 0.5 MICROGram(s) Oral daily  ergocalciferol 62090 Unit(s) Oral every week  folic acid 1 milliGRAM(s) Oral daily  dextrose 5%. 1000 milliLiter(s) (50 mL/Hr) IV Continuous <Continuous>  dextrose 50% Injectable 12.5 Gram(s) IV Push once  dextrose 50% Injectable 25 Gram(s) IV Push once  dextrose 50% Injectable 25 Gram(s) IV Push once  ALBUTerol/ipratropium for Nebulization 3 milliLiter(s) Nebulizer every 6 hours  calcium acetate 667 milliGRAM(s) Oral three times a day with meals  vancomycin    Solution 125 milliGRAM(s) Oral every 6 hours  cefTRIAXone   IVPB 2 Gram(s) IV Intermittent every 24 hours  hydrALAZINE 50 milliGRAM(s) Oral every 8 hours  insulin lispro (HumaLOG) corrective regimen sliding scale   SubCutaneous Before meals and at bedtime  cefTRIAXone   IVPB 2 Gram(s) IV Intermittent once  epoetin raina Injectable 22481 Unit(s) IV Push <User Schedule>  heparin  Injectable 5000 Unit(s) SubCutaneous every 12 hours  verapamil 80 milliGRAM(s) Oral every 8 hours    MEDICATIONS  (PRN):  dextrose Gel 1 Dose(s) Oral once PRN Blood Glucose LESS THAN 70 milliGRAM(s)/deciliter  glucagon  Injectable 1 milliGRAM(s) IntraMuscular once PRN Glucose LESS THAN 70 milligrams/deciliter  cyclobenzaprine 5 milliGRAM(s) Oral three times a day PRN Muscle Spasm  hydrALAZINE Injectable 10 milliGRAM(s) IV Push every 8 hours PRN SBP over 150  ALBUTerol    0.083% 2.5 milliGRAM(s) Nebulizer every 3 hours PRN Shortness of Breath and/or Wheezing

## 2017-08-13 NOTE — PROGRESS NOTE ADULT - PROBLEM SELECTOR PLAN 3
PT WITH POS BLOOD CX SERRATIA ON 8/1 and  C DIFF 8/2  currently ON ROCEPHIN AND ORAL VANCO  - per ID: CONTINUE  ABX FOR SERRATIA,  SUGGEST GENTAMICIN 100MG  ON DIALYSIS DAYS X 2WEEKS AND  CONTINUE ORAL VANCO WHILE ON ABX PT WITH POS BLOOD CX SERRATIA ON 8/1     currently ON ROCEPHIN AND ORAL VANCO  - per ID: CONTINUE  ABX FOR SERRATIA,  SUGGEST GENTAMICIN 100MG  ON DIALYSIS DAYS X 2WEEKS AND  CONTINUE ORAL VANCO WHILE ON ABX

## 2017-08-13 NOTE — PROGRESS NOTE ADULT - PROBLEM SELECTOR PLAN 4
note well controlled   -resumed home meds - given respiratory wheezing, hold off of labetalol at this time - - - change norvasc to verapamil per nephro   - c/w  hydralazine (increased dose to 50mg with prn)   echo noted - patient w +ve C DIFF 8/2, no more diarrhea  -  c/w Vanco 125 PO q6hrs

## 2017-08-13 NOTE — PROGRESS NOTE ADULT - ATTENDING COMMENTS
Pt seen and examined with FMR.  A&P reviewed.  Feels better, BP better controlled.  Plan for fistulogram in am, HD with gentamycin and DC afterwards with clarification of HD site in [place]

## 2017-08-13 NOTE — PROGRESS NOTE ADULT - PROBLEM SELECTOR PLAN 5
f/s predominantly in the 100's;   - insulin sliding scale  -diabetic diet  onitor Blood glucose note well controlled   -resumed home meds - given respiratory wheezing, hold off of labetalol at this time - - - change norvasc to verapamil per nephro   - c/w  hydralazine (increased dose to 50mg with prn)   echo noted

## 2017-08-14 VITALS
OXYGEN SATURATION: 94 % | TEMPERATURE: 98 F | HEART RATE: 105 BPM | DIASTOLIC BLOOD PRESSURE: 70 MMHG | SYSTOLIC BLOOD PRESSURE: 140 MMHG | RESPIRATION RATE: 17 BRPM

## 2017-08-14 LAB
ANION GAP SERPL CALC-SCNC: 13 MMOL/L — SIGNIFICANT CHANGE UP (ref 5–17)
ANISOCYTOSIS BLD QL: SLIGHT — SIGNIFICANT CHANGE UP
APTT BLD: 25.8 SEC — LOW (ref 27.5–37.4)
BASOPHILS # BLD AUTO: 0 K/UL — SIGNIFICANT CHANGE UP (ref 0–0.2)
BASOPHILS NFR BLD AUTO: 1 % — SIGNIFICANT CHANGE UP (ref 0–2)
BUN SERPL-MCNC: <2 MG/DL — LOW (ref 8–20)
CALCIUM SERPL-MCNC: 8.5 MG/DL — LOW (ref 8.6–10.2)
CHLORIDE SERPL-SCNC: 97 MMOL/L — LOW (ref 98–107)
CO2 SERPL-SCNC: 30 MMOL/L — HIGH (ref 22–29)
CREAT SERPL-MCNC: 0.83 MG/DL — SIGNIFICANT CHANGE UP (ref 0.5–1.3)
EOSINOPHIL # BLD AUTO: 0.5 K/UL — SIGNIFICANT CHANGE UP (ref 0–0.5)
EOSINOPHIL NFR BLD AUTO: 5 % — SIGNIFICANT CHANGE UP (ref 0–5)
GLUCOSE SERPL-MCNC: 100 MG/DL — SIGNIFICANT CHANGE UP (ref 70–115)
HCT VFR BLD CALC: 26 % — LOW (ref 37–47)
HGB BLD-MCNC: 8.3 G/DL — LOW (ref 12–16)
HYPOCHROMIA BLD QL: SLIGHT — SIGNIFICANT CHANGE UP
INR BLD: 1.28 RATIO — HIGH (ref 0.88–1.16)
LYMPHOCYTES # BLD AUTO: 1.5 K/UL — SIGNIFICANT CHANGE UP (ref 1–4.8)
LYMPHOCYTES # BLD AUTO: 16 % — LOW (ref 20–55)
MACROCYTES BLD QL: SLIGHT — SIGNIFICANT CHANGE UP
MCHC RBC-ENTMCNC: 25.6 PG — LOW (ref 27–31)
MCHC RBC-ENTMCNC: 31.9 G/DL — LOW (ref 32–36)
MCV RBC AUTO: 80.2 FL — LOW (ref 81–99)
MICROCYTES BLD QL: SLIGHT — SIGNIFICANT CHANGE UP
MONOCYTES # BLD AUTO: 0.6 K/UL — SIGNIFICANT CHANGE UP (ref 0–0.8)
MONOCYTES NFR BLD AUTO: 7 % — SIGNIFICANT CHANGE UP (ref 3–10)
NEUTROPHILS # BLD AUTO: 6.7 K/UL — SIGNIFICANT CHANGE UP (ref 1.8–8)
NEUTROPHILS NFR BLD AUTO: 69 % — SIGNIFICANT CHANGE UP (ref 37–73)
PLAT MORPH BLD: NORMAL — SIGNIFICANT CHANGE UP
PLATELET # BLD AUTO: 254 K/UL — SIGNIFICANT CHANGE UP (ref 150–400)
POIKILOCYTOSIS BLD QL AUTO: SLIGHT — SIGNIFICANT CHANGE UP
POLYCHROMASIA BLD QL SMEAR: SLIGHT — SIGNIFICANT CHANGE UP
POTASSIUM SERPL-MCNC: 3.1 MMOL/L — LOW (ref 3.5–5.3)
POTASSIUM SERPL-SCNC: 3.1 MMOL/L — LOW (ref 3.5–5.3)
PROTHROM AB SERPL-ACNC: 14.1 SEC — HIGH (ref 9.8–12.7)
RBC # BLD: 3.24 M/UL — LOW (ref 4.4–5.2)
RBC # FLD: 18.5 % — HIGH (ref 11–15.6)
RBC BLD AUTO: ABNORMAL
SODIUM SERPL-SCNC: 140 MMOL/L — SIGNIFICANT CHANGE UP (ref 135–145)
VARIANT LYMPHS # BLD: 2 % — SIGNIFICANT CHANGE UP (ref 0–6)
WBC # BLD: 9.3 K/UL — SIGNIFICANT CHANGE UP (ref 4.8–10.8)
WBC # FLD AUTO: 9.3 K/UL — SIGNIFICANT CHANGE UP (ref 4.8–10.8)

## 2017-08-14 PROCEDURE — 36901 INTRO CATH DIALYSIS CIRCUIT: CPT | Mod: 78

## 2017-08-14 PROCEDURE — 99152 MOD SED SAME PHYS/QHP 5/>YRS: CPT

## 2017-08-14 PROCEDURE — 99239 HOSP IP/OBS DSCHRG MGMT >30: CPT

## 2017-08-14 PROCEDURE — 99153 MOD SED SAME PHYS/QHP EA: CPT

## 2017-08-14 RX ORDER — VERAPAMIL HCL 240 MG
1 CAPSULE, EXTENDED RELEASE PELLETS 24 HR ORAL
Qty: 90 | Refills: 0 | OUTPATIENT
Start: 2017-08-14 | End: 2017-09-13

## 2017-08-14 RX ORDER — INSULIN LISPRO 100/ML
0 VIAL (ML) SUBCUTANEOUS
Qty: 0 | Refills: 0 | COMMUNITY

## 2017-08-14 RX ORDER — SEVELAMER CARBONATE 2400 MG/1
2 POWDER, FOR SUSPENSION ORAL
Qty: 180 | Refills: 0 | OUTPATIENT
Start: 2017-08-14 | End: 2017-09-13

## 2017-08-14 RX ORDER — SEVELAMER CARBONATE 2400 MG/1
1 POWDER, FOR SUSPENSION ORAL
Qty: 0 | Refills: 0 | COMMUNITY
Start: 2017-08-14 | End: 2017-09-13

## 2017-08-14 RX ORDER — INSULIN LISPRO 100/ML
3 VIAL (ML) SUBCUTANEOUS
Qty: 1 | Refills: 0 | OUTPATIENT
Start: 2017-08-14 | End: 2017-09-13

## 2017-08-14 RX ORDER — HYDRALAZINE HCL 50 MG
1 TABLET ORAL
Qty: 90 | Refills: 0 | OUTPATIENT
Start: 2017-08-14 | End: 2017-09-13

## 2017-08-14 RX ORDER — ERGOCALCIFEROL 1.25 MG/1
1 CAPSULE ORAL
Qty: 4 | Refills: 0 | OUTPATIENT
Start: 2017-08-14 | End: 2017-09-13

## 2017-08-14 RX ORDER — ASPIRIN/CALCIUM CARB/MAGNESIUM 324 MG
1 TABLET ORAL
Qty: 30 | Refills: 0 | OUTPATIENT
Start: 2017-08-14 | End: 2017-09-13

## 2017-08-14 RX ORDER — ACETAMINOPHEN 500 MG
650 TABLET ORAL ONCE
Qty: 0 | Refills: 0 | Status: COMPLETED | OUTPATIENT
Start: 2017-08-14 | End: 2017-08-14

## 2017-08-14 RX ORDER — IPRATROPIUM/ALBUTEROL SULFATE 18-103MCG
3 AEROSOL WITH ADAPTER (GRAM) INHALATION
Qty: 90 | Refills: 0 | OUTPATIENT
Start: 2017-08-14 | End: 2017-09-13

## 2017-08-14 RX ORDER — ALBUTEROL 90 UG/1
3 AEROSOL, METERED ORAL
Qty: 90 | Refills: 0 | OUTPATIENT
Start: 2017-08-14 | End: 2017-09-13

## 2017-08-14 RX ORDER — ERGOCALCIFEROL 1.25 MG/1
1 CAPSULE ORAL
Qty: 0 | Refills: 0 | COMMUNITY
Start: 2017-08-14 | End: 2017-09-13

## 2017-08-14 RX ORDER — GENTAMICIN SULFATE 40 MG/ML
100 VIAL (ML) INJECTION
Qty: 2 | Refills: 0 | OUTPATIENT
Start: 2017-08-14 | End: 2017-08-16

## 2017-08-14 RX ORDER — VANCOMYCIN HCL 1 G
2.5 VIAL (EA) INTRAVENOUS
Qty: 120 | Refills: 0 | OUTPATIENT
Start: 2017-08-14 | End: 2017-08-26

## 2017-08-14 RX ORDER — LORATADINE 10 MG/1
1 TABLET ORAL
Qty: 30 | Refills: 0 | OUTPATIENT
Start: 2017-08-14 | End: 2017-09-13

## 2017-08-14 RX ADMIN — Medication 1 MILLIGRAM(S): at 13:46

## 2017-08-14 RX ADMIN — Medication 80 MILLIGRAM(S): at 05:57

## 2017-08-14 RX ADMIN — CALCITRIOL 0.5 MICROGRAM(S): 0.5 CAPSULE ORAL at 15:26

## 2017-08-14 RX ADMIN — Medication 650 MILLIGRAM(S): at 01:41

## 2017-08-14 RX ADMIN — ERYTHROPOIETIN 12000 UNIT(S): 10000 INJECTION, SOLUTION INTRAVENOUS; SUBCUTANEOUS at 10:29

## 2017-08-14 RX ADMIN — Medication 50 MILLIGRAM(S): at 12:52

## 2017-08-14 RX ADMIN — Medication 100 MILLIGRAM(S): at 14:17

## 2017-08-14 RX ADMIN — ALBUTEROL 2.5 MILLIGRAM(S): 90 AEROSOL, METERED ORAL at 11:27

## 2017-08-14 RX ADMIN — Medication 125 MILLIGRAM(S): at 02:01

## 2017-08-14 RX ADMIN — Medication 81 MILLIGRAM(S): at 13:46

## 2017-08-14 RX ADMIN — Medication 50 MILLIGRAM(S): at 05:57

## 2017-08-14 RX ADMIN — Medication 80 MILLIGRAM(S): at 13:47

## 2017-08-14 RX ADMIN — Medication 3 MILLILITER(S): at 15:52

## 2017-08-14 RX ADMIN — Medication 650 MILLIGRAM(S): at 02:30

## 2017-08-14 RX ADMIN — Medication 3 MILLILITER(S): at 02:12

## 2017-08-14 RX ADMIN — Medication 125 MILLIGRAM(S): at 15:26

## 2017-08-14 NOTE — PROGRESS NOTE ADULT - SUBJECTIVE AND OBJECTIVE BOX
NEPHROLOGY INTERVAL HPI/OVERNIGHT EVENTS:    Examined on HD   tolerating ok    MEDICATIONS  (STANDING):  aspirin enteric coated 81 milliGRAM(s) Oral daily  sevelamer hydrochloride 400 milliGRAM(s) Oral three times a day with meals  pantoprazole    Tablet 40 milliGRAM(s) Oral before breakfast  calcitriol   Capsule 0.5 MICROGram(s) Oral daily  ergocalciferol 79667 Unit(s) Oral every week  folic acid 1 milliGRAM(s) Oral daily  dextrose 5%. 1000 milliLiter(s) (50 mL/Hr) IV Continuous <Continuous>  dextrose 50% Injectable 12.5 Gram(s) IV Push once  dextrose 50% Injectable 25 Gram(s) IV Push once  dextrose 50% Injectable 25 Gram(s) IV Push once  ALBUTerol/ipratropium for Nebulization 3 milliLiter(s) Nebulizer every 6 hours  calcium acetate 667 milliGRAM(s) Oral three times a day with meals  vancomycin    Solution 125 milliGRAM(s) Oral every 6 hours  cefTRIAXone   IVPB 2 Gram(s) IV Intermittent every 24 hours  hydrALAZINE 50 milliGRAM(s) Oral every 8 hours  insulin lispro (HumaLOG) corrective regimen sliding scale   SubCutaneous Before meals and at bedtime  epoetin raina Injectable 80229 Unit(s) IV Push <User Schedule>  heparin  Injectable 5000 Unit(s) SubCutaneous every 12 hours  verapamil 80 milliGRAM(s) Oral every 8 hours  gentamicin   IVPB 100 milliGRAM(s) IV Intermittent every 72 hours    MEDICATIONS  (PRN):  dextrose Gel 1 Dose(s) Oral once PRN Blood Glucose LESS THAN 70 milliGRAM(s)/deciliter  glucagon  Injectable 1 milliGRAM(s) IntraMuscular once PRN Glucose LESS THAN 70 milligrams/deciliter  cyclobenzaprine 5 milliGRAM(s) Oral three times a day PRN Muscle Spasm  hydrALAZINE Injectable 10 milliGRAM(s) IV Push every 8 hours PRN SBP over 150  ALBUTerol    0.083% 2.5 milliGRAM(s) Nebulizer every 3 hours PRN Shortness of Breath and/or Wheezing      Allergies    Mushrooms (Hives (Mild))  No Known Drug Allergies    Intolerances        Vital Signs Last 24 Hrs  T(C): 36.4 (14 Aug 2017 09:03), Max: 37 (13 Aug 2017 20:45)  T(F): 97.5 (14 Aug 2017 09:03), Max: 98.6 (13 Aug 2017 20:45)  HR: 96 (14 Aug 2017 09:03) (92 - 102)  BP: 158/84 (14 Aug 2017 09:03) (140/74 - 158/84)  BP(mean): --  RR: 18 (14 Aug 2017 09:03) (18 - 19)  SpO2: 96% (14 Aug 2017 05:00) (95% - 98%)  Daily     Daily     PHYSICAL EXAM:  HEENT: normocephalic and atraumatic  NECK: Supple.  LUNGS: B/L wheezing  HEART: Regular rate and rhythm   ABDOMEN: Soft, nontender, and nondistended.  Positive bowel sounds.    EXTREMITIES: With B/L edema.      LABS:                        8.1    9.6   )-----------( 303      ( 13 Aug 2017 07:09 )             24.7     08-13    134<L>  |  93<L>  |  39.0<H>  ----------------------------<  120<H>  4.5   |  25.0  |  5.98<H>    Ca    9.3      13 Aug 2017 07:09                  RADIOLOGY & ADDITIONAL TESTS:

## 2017-08-14 NOTE — PROGRESS NOTE ADULT - PROBLEM SELECTOR PROBLEM 7
Diabetes
Diabetes
Hypertension
Preventive measure
Preventive measure

## 2017-08-14 NOTE — PROGRESS NOTE ADULT - ASSESSMENT
Patient is a 36 y/o female with ESRD diagnosed in 1/17 s/p left UE AVF on hemodialysis on M-W-F, right chest wall permacath, HTN, DM, obesity, HCAP one month ago, comes into the hospital due to shortness of breath since 2 -3 days ago. CXR shows right middle lobe infiltrates , left side pleural effusion improved from previous admission; confirmed with ct chest. .  Also with hemoglobin of 7.6 on admission, transfused for symptomatic anemia yesterday.  She is admitted for diarrhea, cdiff positive. Incidentally, found to have + blood cx with serratia; concern for now needing to get the PC out b/c this is a recurrence of bacteremia.  Patient is s/p permacath replacement and revision of LUE AVF, B Cx clear 8/5. Nephro, ID, vascular, and Cardiology on-board recs appreciated. DC planning likely for discharge on abx after next dialysis    8/11/2017   Patient received AV fistula revision and replacement of R chest wall permacath; patient passed cardiac stress test;    8/1/2017 pos BC for Serratia;    6/12/2017 pos BC for Serratia;      Update 8/13: Patient to go for Fistulogram Today, patient may continue on heparin for the morning. as per Vascular PA, patient is NPO after midnight.   Patient will do Fistulogram, then Hemodialysis, then will be discharged.    She will go on Gentamycin 100 mg on Dialysis days only, for 2 weeks, and also Vanco should be continued for 2 weeks while the patient is still on abx.   Patient will contact local dialysis center this AM to find out where she will get HD in [place], this will be relayed to , so the patient can be set up to get Gentamycin on HD days.    Patient bp meds have been changed from Norvasc to Verapamil while in this hospital, need to determine optimal bp med to discharge on.  Pharmacy needs to be contacted to get updated meds.

## 2017-08-14 NOTE — PROGRESS NOTE ADULT - SUBJECTIVE AND OBJECTIVE BOX
CC: Diarrhea and shortness of breath    Patient seen and examined at bedside.  No acute events overnight. Patient had a hard time sleeping.     Patient exhibiting mild SOB of breath on exertion. Stools have solidified last BM this AM, no further pain with defecation and no diarrhea;  calf pain is resolving;      no desaturations overnight      Vital Signs Last 24 Hrs  T(C): 36.7 (14 Aug 2017 05:00), Max: 37 (13 Aug 2017 20:45)  T(F): 98 (14 Aug 2017 05:00), Max: 98.6 (13 Aug 2017 20:45)  HR: 98 (14 Aug 2017 05:00) (92 - 102)  BP: 146/70 (14 Aug 2017 05:00) (140/74 - 158/70)  BP(mean): --  RR: 18 (14 Aug 2017 05:00) (18 - 19)  SpO2: 96% (14 Aug 2017 05:00) (92% - 98%)      Review of Systems: CONSTITUTIONAL: No fever, chills  ENMT:  No throat pain  NECK: No pain or stiffness  RESPIRATORY: wheezing, or shortness of breath  CARDIOVASCULAR: No chest pain, palpitations,   GASTROINTESTINAL: No abdominal or epigastric pain. No nausea   GENITOURINARY: No dysuria, no hematuria  NEUROLOGICAL: No changes in strength/sensation   SKIN: No itching, burning, rashes, or lesions       Daily   Daily Weight in k.1 (11 Aug 2017 07:40)  Daily Weight in k.6 (08 Aug 2017 11:00)  Daily Weight in k (07 Aug 2017 08:55)  Weight 121kg before HD on 17  post HD weight not recorded;  no weight recorded since;  (nurse notified to start recording daily weights)       @ 07:  -   @ 07:00  --------------------------------------------------------  IN: 1020 mL / OUT: 0 mL / NET: 1020 mL        PE  GENERAL: Adult female laying in bed in no acute distress.   NECK: Supple, No JVD, No supraclavicular/submandibular lymphadenopathy.   chest HD catheter in place on the right, no erythema/drainage around site  LUNG: Mild crackles b/l; no rales or wheezing appreciated  HEART: S1, S2, RRR, no rubs or gallops  ABDOMEN: soft, NT, ND Bowel sounds present, Normoactive  EXTREMITIES:   No clubbing, cyanosis,  edema, LUE AVF placement + thrill palpable, bruit heard on auscultation. no edema appreciated  NERVOUS SYSTEM:  Alert & Oriented X3,    SKIN: capillary refills<2sec;  no skin tenting, good skin turgor      Lab Results:                                   8.1    9.6   )-----------( 303      ( 13 Aug 2017 07:09 )             24.7   08-    134<L>  |  93<L>  |  39.0<H>  ----------------------------<  120<H>  4.5   |  25.0  |  5.98<H>    Ca    9.3      13 Aug 2017 07:09    TPro  7.6    /  Alb  3.3    /  TBili  0.4    /  DBili  x      /  AST  11     /  ALT  14     /  AlkPhos  98     10 Aug 2017 08:07    LIVER FUNCTIONS - ( 10 Aug 2017 08:07 )  Alb: 3.3 g/dL / Pro: 7.6 g/dL / ALK PHOS: 98 U/L / ALT: 14 U/L / AST: 11 U/L / GGT: x             PT/INR - ( 11 Aug 2017 06:19 )   PT: 14.6 sec;   INR: 1.32 ratio      PTT - ( 11 Aug 2017 06:19 )  PTT:29.9 sec      CAPILLARY BLOOD GLUCOSE  123 (13 Aug 2017 21:01)  137 (13 Aug 2017 12:11)  114 (13 Aug 2017 07:56)    MEDICATIONS  (STANDING):  aspirin enteric coated 81 milliGRAM(s) Oral daily  sevelamer hydrochloride 400 milliGRAM(s) Oral three times a day with meals  pantoprazole    Tablet 40 milliGRAM(s) Oral before breakfast  calcitriol   Capsule 0.5 MICROGram(s) Oral daily  ergocalciferol 58263 Unit(s) Oral every week  folic acid 1 milliGRAM(s) Oral daily  dextrose 5%. 1000 milliLiter(s) (50 mL/Hr) IV Continuous <Continuous>  dextrose 50% Injectable 12.5 Gram(s) IV Push once  dextrose 50% Injectable 25 Gram(s) IV Push once  dextrose 50% Injectable 25 Gram(s) IV Push once  ALBUTerol/ipratropium for Nebulization 3 milliLiter(s) Nebulizer every 6 hours  calcium acetate 667 milliGRAM(s) Oral three times a day with meals  vancomycin    Solution 125 milliGRAM(s) Oral every 6 hours  cefTRIAXone   IVPB 2 Gram(s) IV Intermittent every 24 hours  hydrALAZINE 50 milliGRAM(s) Oral every 8 hours  insulin lispro (HumaLOG) corrective regimen sliding scale   SubCutaneous Before meals and at bedtime  epoetin raina Injectable 52780 Unit(s) IV Push <User Schedule>  heparin  Injectable 5000 Unit(s) SubCutaneous every 12 hours  verapamil 80 milliGRAM(s) Oral every 8 hours  gentamicin   IVPB 100 milliGRAM(s) IV Intermittent every 72 hours    MEDICATIONS  (PRN):  dextrose Gel 1 Dose(s) Oral once PRN Blood Glucose LESS THAN 70 milliGRAM(s)/deciliter  glucagon  Injectable 1 milliGRAM(s) IntraMuscular once PRN Glucose LESS THAN 70 milligrams/deciliter  cyclobenzaprine 5 milliGRAM(s) Oral three times a day PRN Muscle Spasm  hydrALAZINE Injectable 10 milliGRAM(s) IV Push every 8 hours PRN SBP over 150  ALBUTerol    0.083% 2.5 milliGRAM(s) Nebulizer every 3 hours PRN Shortness of Breath and/or Wheezing

## 2017-08-14 NOTE — PROGRESS NOTE ADULT - PROBLEM SELECTOR PLAN 3
Afebrile, hemodylnamically stable;  continue antibiotic regimin;    PT WITH POS BLOOD CX SERRATIA ON 8/1     currently ON ROCEPHIN AND ORAL VANCO  - per ID: CONTINUE  ABX FOR SERRATIA,  SUGGEST GENTAMICIN 100MG  ON DIALYSIS DAYS X 2WEEKS AND  CONTINUE ORAL VANCO WHILE ON ABX

## 2017-08-14 NOTE — PROGRESS NOTE ADULT - ASSESSMENT
ESRD HD today  via permcath HD today  ID: + Blood Cx  Serratia marcandreecen (8/1) source could be HD cath now s/p catheter exchange.   Had + Blood cx same organism 6/12- afebrile white count not elevated does not look toxic but second infection w this organism   AVF superficialization plan stopped as good bruit thrill appreciated but on sat 8/12 difficulty with cannulation using permcath for HD

## 2017-08-14 NOTE — PROGRESS NOTE ADULT - PROBLEM SELECTOR PLAN 1
Hemoglobin has been stable in the 8 range;  continue supplemental treatment;      c/w epogen, folate  anemia w/up noted   follow of Hb w/ cbc daily

## 2017-08-14 NOTE — CHART NOTE - NSCHARTNOTEFT_GEN_A_CORE
Pt went for AV fistulogram this AM, AVF is not functioning properly. As per attending, please use permacath for dialysis. Dispo as per primary team, please follow-up with Dr. Farmer outpatient next week. Thank you.

## 2017-08-14 NOTE — PROGRESS NOTE ADULT - PROBLEM SELECTOR PLAN 2
Patient will get HD today, likely d/c  later today; Patient will contact local dialysis center this AM to find out where she will get HD in [place], this will be relayed to , so the patient can be set up to get Gentamycin on HD days.        Patient developed metabolic acidosis, has since resolved;    hemodialysis as scheduled   epoetin injection once a week  continue sevelamer  cincalcet, continue calcium acetated   pt is s/p permacath replacement + revision of LUE AVF (8/11/17)  Per vascular - permacath to remain in place incase of issues w fistula, pt to f/u as outpatient for removal of permacath at a later date, following her vacation

## 2017-08-14 NOTE — PROGRESS NOTE ADULT - PROBLEM SELECTOR PLAN 5
note well controlled   -resumed home meds - given respiratory wheezing, hold off of labetalol at this time - - - change norvasc to verapamil per nephro   - c/w  hydralazine (increased dose to 50mg with prn)   echo noted

## 2017-08-18 ENCOUNTER — TRANSCRIPTION ENCOUNTER (OUTPATIENT)
Age: 37
End: 2017-08-18

## 2017-08-24 ENCOUNTER — APPOINTMENT (OUTPATIENT)
Dept: ULTRASOUND IMAGING | Facility: CLINIC | Age: 37
End: 2017-08-24
Payer: MEDICARE

## 2017-08-24 ENCOUNTER — OUTPATIENT (OUTPATIENT)
Dept: OUTPATIENT SERVICES | Facility: HOSPITAL | Age: 37
LOS: 1 days | End: 2017-08-24

## 2017-08-24 DIAGNOSIS — Z00.8 ENCOUNTER FOR OTHER GENERAL EXAMINATION: ICD-10-CM

## 2017-08-24 DIAGNOSIS — Z99.2 DEPENDENCE ON RENAL DIALYSIS: Chronic | ICD-10-CM

## 2017-08-24 DIAGNOSIS — I77.0 ARTERIOVENOUS FISTULA, ACQUIRED: Chronic | ICD-10-CM

## 2017-08-24 PROCEDURE — 93971 EXTREMITY STUDY: CPT | Mod: 26,LT

## 2017-08-31 ENCOUNTER — TRANSCRIPTION ENCOUNTER (OUTPATIENT)
Age: 37
End: 2017-08-31

## 2017-09-01 PROCEDURE — G9001: CPT

## 2017-09-07 ENCOUNTER — APPOINTMENT (OUTPATIENT)
Dept: VASCULAR SURGERY | Facility: CLINIC | Age: 37
End: 2017-09-07
Payer: MEDICARE

## 2017-09-07 VITALS
HEART RATE: 93 BPM | TEMPERATURE: 98.9 F | HEIGHT: 62 IN | OXYGEN SATURATION: 91 % | RESPIRATION RATE: 15 BRPM | SYSTOLIC BLOOD PRESSURE: 158 MMHG | DIASTOLIC BLOOD PRESSURE: 82 MMHG

## 2017-09-07 PROCEDURE — 99214 OFFICE O/P EST MOD 30 MIN: CPT

## 2017-09-21 ENCOUNTER — APPOINTMENT (OUTPATIENT)
Dept: VASCULAR SURGERY | Facility: CLINIC | Age: 37
End: 2017-09-21
Payer: MEDICARE

## 2017-09-21 VITALS
RESPIRATION RATE: 16 BRPM | SYSTOLIC BLOOD PRESSURE: 182 MMHG | TEMPERATURE: 98.7 F | DIASTOLIC BLOOD PRESSURE: 106 MMHG | HEART RATE: 91 BPM | OXYGEN SATURATION: 92 %

## 2017-09-21 DIAGNOSIS — Z87.448 PERSONAL HISTORY OF OTHER DISEASES OF URINARY SYSTEM: ICD-10-CM

## 2017-09-21 DIAGNOSIS — Z86.39 PERSONAL HISTORY OF OTHER ENDOCRINE, NUTRITIONAL AND METABOLIC DISEASE: ICD-10-CM

## 2017-09-21 DIAGNOSIS — Z83.3 FAMILY HISTORY OF DIABETES MELLITUS: ICD-10-CM

## 2017-09-21 DIAGNOSIS — Z86.2 PERSONAL HISTORY OF DISEASES OF THE BLOOD AND BLOOD-FORMING ORGANS AND CERTAIN DISORDERS INVOLVING THE IMMUNE MECHANISM: ICD-10-CM

## 2017-09-21 PROCEDURE — 36589 REMOVAL TUNNELED CV CATH: CPT

## 2017-09-21 PROCEDURE — 99214 OFFICE O/P EST MOD 30 MIN: CPT | Mod: 25

## 2017-09-21 NOTE — ED PROVIDER NOTE - NS ED MD TWO NIGHTS YN
Needs appointment for hemoptysis.  Call and schedule for Monday  Tell him to stop aspirin and plavix.  Already has Chest X Ray and CT scan   Yes

## 2017-09-28 ENCOUNTER — INPATIENT (INPATIENT)
Facility: HOSPITAL | Age: 37
LOS: 3 days | Discharge: HOME | DRG: 682 | End: 2017-10-02
Attending: HOSPITALIST | Admitting: HOSPITALIST
Payer: MEDICARE

## 2017-09-28 VITALS
HEART RATE: 97 BPM | OXYGEN SATURATION: 89 % | RESPIRATION RATE: 18 BRPM | SYSTOLIC BLOOD PRESSURE: 169 MMHG | DIASTOLIC BLOOD PRESSURE: 93 MMHG | TEMPERATURE: 98 F | HEIGHT: 62 IN | WEIGHT: 266.98 LBS

## 2017-09-28 DIAGNOSIS — N18.6 END STAGE RENAL DISEASE: ICD-10-CM

## 2017-09-28 DIAGNOSIS — I10 ESSENTIAL (PRIMARY) HYPERTENSION: ICD-10-CM

## 2017-09-28 DIAGNOSIS — D64.9 ANEMIA, UNSPECIFIED: ICD-10-CM

## 2017-09-28 DIAGNOSIS — I77.0 ARTERIOVENOUS FISTULA, ACQUIRED: Chronic | ICD-10-CM

## 2017-09-28 DIAGNOSIS — Z99.2 DEPENDENCE ON RENAL DIALYSIS: Chronic | ICD-10-CM

## 2017-09-28 DIAGNOSIS — R07.9 CHEST PAIN, UNSPECIFIED: ICD-10-CM

## 2017-09-28 DIAGNOSIS — Z29.9 ENCOUNTER FOR PROPHYLACTIC MEASURES, UNSPECIFIED: ICD-10-CM

## 2017-09-28 DIAGNOSIS — E66.01 MORBID (SEVERE) OBESITY DUE TO EXCESS CALORIES: ICD-10-CM

## 2017-09-28 DIAGNOSIS — E11.9 TYPE 2 DIABETES MELLITUS WITHOUT COMPLICATIONS: ICD-10-CM

## 2017-09-28 LAB
ALBUMIN SERPL ELPH-MCNC: 3.5 G/DL — SIGNIFICANT CHANGE UP (ref 3.3–5.2)
ALP SERPL-CCNC: 106 U/L — SIGNIFICANT CHANGE UP (ref 40–120)
ALT FLD-CCNC: 14 U/L — SIGNIFICANT CHANGE UP
ANION GAP SERPL CALC-SCNC: 16 MMOL/L — SIGNIFICANT CHANGE UP (ref 5–17)
AST SERPL-CCNC: 21 U/L — SIGNIFICANT CHANGE UP
BILIRUB SERPL-MCNC: 0.9 MG/DL — SIGNIFICANT CHANGE UP (ref 0.4–2)
BLD GP AB SCN SERPL QL: SIGNIFICANT CHANGE UP
BUN SERPL-MCNC: 62 MG/DL — HIGH (ref 8–20)
CALCIUM SERPL-MCNC: 9.2 MG/DL — SIGNIFICANT CHANGE UP (ref 8.6–10.2)
CHLORIDE SERPL-SCNC: 93 MMOL/L — LOW (ref 98–107)
CO2 SERPL-SCNC: 25 MMOL/L — SIGNIFICANT CHANGE UP (ref 22–29)
CREAT SERPL-MCNC: 8.45 MG/DL — HIGH (ref 0.5–1.3)
GLUCOSE SERPL-MCNC: 154 MG/DL — HIGH (ref 70–115)
HCT VFR BLD CALC: 21.9 % — LOW (ref 37–47)
HGB BLD-MCNC: 6.9 G/DL — CRITICAL LOW (ref 12–16)
LACTATE BLDV-MCNC: 1.6 MMOL/L — SIGNIFICANT CHANGE UP (ref 0.5–2)
MCHC RBC-ENTMCNC: 25.4 PG — LOW (ref 27–31)
MCHC RBC-ENTMCNC: 31.5 G/DL — LOW (ref 32–36)
MCV RBC AUTO: 80.5 FL — LOW (ref 81–99)
NT-PROBNP SERPL-SCNC: HIGH PG/ML (ref 0–300)
PLATELET # BLD AUTO: 232 K/UL — SIGNIFICANT CHANGE UP (ref 150–400)
POTASSIUM SERPL-MCNC: 5.8 MMOL/L — HIGH (ref 3.5–5.3)
POTASSIUM SERPL-SCNC: 5.8 MMOL/L — HIGH (ref 3.5–5.3)
PROT SERPL-MCNC: 8.2 G/DL — SIGNIFICANT CHANGE UP (ref 6.6–8.7)
RBC # BLD: 2.72 M/UL — LOW (ref 4.4–5.2)
RBC # FLD: 22 % — HIGH (ref 11–15.6)
SODIUM SERPL-SCNC: 134 MMOL/L — LOW (ref 135–145)
TROPONIN T SERPL-MCNC: 0.34 NG/ML — HIGH (ref 0–0.06)
TYPE + AB SCN PNL BLD: SIGNIFICANT CHANGE UP
WBC # BLD: 8.1 K/UL — SIGNIFICANT CHANGE UP (ref 4.8–10.8)
WBC # FLD AUTO: 8.1 K/UL — SIGNIFICANT CHANGE UP (ref 4.8–10.8)

## 2017-09-28 PROCEDURE — 93970 EXTREMITY STUDY: CPT | Mod: 26

## 2017-09-28 PROCEDURE — 99285 EMERGENCY DEPT VISIT HI MDM: CPT

## 2017-09-28 PROCEDURE — 71020: CPT | Mod: 26

## 2017-09-28 PROCEDURE — 93010 ELECTROCARDIOGRAM REPORT: CPT

## 2017-09-28 RX ORDER — VERAPAMIL HCL 240 MG
80 CAPSULE, EXTENDED RELEASE PELLETS 24 HR ORAL EVERY 8 HOURS
Qty: 0 | Refills: 0 | Status: DISCONTINUED | OUTPATIENT
Start: 2017-09-28 | End: 2017-09-28

## 2017-09-28 RX ORDER — ERYTHROPOIETIN 10000 [IU]/ML
10000 INJECTION, SOLUTION INTRAVENOUS; SUBCUTANEOUS ONCE
Qty: 0 | Refills: 0 | Status: COMPLETED | OUTPATIENT
Start: 2017-09-28 | End: 2017-09-28

## 2017-09-28 RX ORDER — HEPARIN SODIUM 5000 [USP'U]/ML
5000 INJECTION INTRAVENOUS; SUBCUTANEOUS EVERY 8 HOURS
Qty: 0 | Refills: 0 | Status: DISCONTINUED | OUTPATIENT
Start: 2017-09-28 | End: 2017-10-02

## 2017-09-28 RX ORDER — VERAPAMIL HCL 240 MG
80 CAPSULE, EXTENDED RELEASE PELLETS 24 HR ORAL EVERY 8 HOURS
Qty: 0 | Refills: 0 | Status: DISCONTINUED | OUTPATIENT
Start: 2017-09-28 | End: 2017-10-02

## 2017-09-28 RX ORDER — HYDRALAZINE HCL 50 MG
10 TABLET ORAL ONCE
Qty: 0 | Refills: 0 | Status: COMPLETED | OUTPATIENT
Start: 2017-09-28 | End: 2017-09-28

## 2017-09-28 RX ORDER — FOLIC ACID 0.8 MG
1 TABLET ORAL DAILY
Qty: 0 | Refills: 0 | Status: DISCONTINUED | OUTPATIENT
Start: 2017-09-28 | End: 2017-10-02

## 2017-09-28 RX ORDER — HYDRALAZINE HCL 50 MG
5 TABLET ORAL EVERY 6 HOURS
Qty: 0 | Refills: 0 | Status: DISCONTINUED | OUTPATIENT
Start: 2017-09-28 | End: 2017-10-02

## 2017-09-28 RX ADMIN — Medication 10 MILLIGRAM(S): at 23:35

## 2017-09-28 RX ADMIN — ERYTHROPOIETIN 10000 UNIT(S): 10000 INJECTION, SOLUTION INTRAVENOUS; SUBCUTANEOUS at 21:20

## 2017-09-28 NOTE — ED PROVIDER NOTE - CONSTITUTIONAL, MLM
ill appearing, well nourished, awake, alert, oriented to person, place, time/situation and in no apparent distress. normal...

## 2017-09-28 NOTE — ED PROVIDER NOTE - OBJECTIVE STATEMENT
Pt is a 37 year old female with PMH of HTN, DM and ESRD on dialysis since January presents to the ED c/o cough and congestion. Pt had port placed with numerous infections in the past 6 months which was removed, new port placed in Aug 2017. Removed on Sept 21. Pt reports that her URI symptoms are consistent with past infectious episodes, and states they feel similar. Was last dialyzed on Monday. Has fistula in left arm with + palpable thrill. Renal was consulted here. Denies fever, dizziness, h/a, CP, palpitation, urinary/bowel incontinence, calf tenderness, recent travel or recent sick contacts.

## 2017-09-28 NOTE — H&P ADULT - HISTORY OF PRESENT ILLNESS
38 y/o female with ESRD diagnosed in 1/17 s/p left UE AVF on hemodialysis on M-W-F, right chest wall permacath, HTN, DM tx w/ insulin, obesity, HCAP 2 months ago, comes into the hospital due to chest pain    FULL NOTE TO FOLLOW 36 y/o female with ESRD diagnosed in 1/17 s/p left UE AVF on hemodialysis on M-W-F, right chest wall permacath, HTN, DM tx w/ insulin, obesity, HCAP 2 months ago, comes into the hospital due to chest pain.  Of note, patient missed her dialysis yesterday and has increasing leg edema    FULL NOTE TO FOLLOW 36 y/o female with ESRD diagnosed in 1/17 s/p left UE AVF on hemodialysis on M-W-F, HTN, DM tx w/ insulin, obesity, HCAP 2 months ago, comes into the hospital due to nasal and chest congestion. Patient states that she had been having cough and nasal congestion for about 1 week prior to getting her R chest port removed on 9/21 along with increasing swelling of her right leg over this time. Patient further was unable to get dialysis yesterday due to swollen left arm and was told to return to dialysis center today. However this morning patient state that she woke up with increased nasal and chest congestion which made her come to the ED. Patent further admits to having 1 episode of vomiting yesterday of clear liquid. She otherwise denies any HA, current n/v, f/c, chest or abdominal pain, dysuria, constipation, diarrhea. sick contacts, or recent travel.

## 2017-09-28 NOTE — H&P ADULT - NSHPREVIEWOFSYSTEMS_GEN_ALL_CORE
She otherwise denies any HA, current n/v, f/c, chest ro abdominal pain, dysuria, constipation or diarrhea.

## 2017-09-28 NOTE — CONSULT NOTE ADULT - SUBJECTIVE AND OBJECTIVE BOX
HPI: Missed HD yesterday. CRF 5 ON HD M-W-F. Noted  increasing leg edema as well. No fever  or chills.      PAST MEDICAL & SURGICAL HISTORY:  Pneumonia  End stage renal disease  Hypertension  Diabetes  AVF (arteriovenous fistula)  Vascular dialysis catheter in place  Pleural Effusion    HEALTH ISSUES - PROBLEM Dx:          Allergies; Denies med allergy    Mushrooms (Hives (Mild))  No Known Drug Allergies    Intolerances        FAMILY HISTORY: DM, hypertension      MEDICATIONS  (STANDING):    MEDICATIONS  (PRN):      ICU Vital Signs Last 24 Hrs  T(C): 36.4 (28 Sep 2017 14:57), Max: 36.4 (28 Sep 2017 14:57)  T(F): 97.5 (28 Sep 2017 14:57), Max: 97.5 (28 Sep 2017 14:57)  HR: 97 (28 Sep 2017 14:57) (97 - 97)  BP: 169/93 (28 Sep 2017 14:57) (169/93 - 169/93)  BP(mean): --  ABP: --  ABP(mean): --  RR: 18 (28 Sep 2017 14:57) (18 - 18)  SpO2: 89% (28 Sep 2017 14:57) (89% - 89%)      I&O's Summary                            6.9    8.1   )-----------( 232      ( 28 Sep 2017 18:06 )             21.9       09-28    134<L>  |  93<L>  |  62.0<H>  ----------------------------<  154<H>  5.8<H>   |  25.0  |  8.45<H>    Ca    9.2      28 Sep 2017 18:06    TPro  8.2  /  Alb  3.5  /  TBili  0.9  /  DBili  x   /  AST  21  /  ALT  14  /  AlkPhos  106  09-28      PHYSICAL EXAM:      Constitutional: oriented    Eyes: wnl    ENMT: nasal 02    Neck: scar right    Breasts: wnl    Back: not tender    Respiratory: no wheezes, clear    Cardiovascular: Regular rate, no gallop    Gastrointestinal: not tender, obese    Genitourinary: neg    Rectal: declined    Extremities: left upper arm access with bruit, bilateral leg  edema    Vascular: wnl    Neurological: wnl    Skin: no rash    Lymph Nodes: wnl    Musculoskeletal: edema    Psychiatric: wnl

## 2017-09-28 NOTE — H&P ADULT - NSHPLABSRESULTS_GEN_ALL_CORE
6.9    8.1   )-----------( 232      ( 28 Sep 2017 18:06 )             21.9   09-28    134<L>  |  93<L>  |  62.0<H>  ----------------------------<  154<H>  5.8<H>   |  25.0  |  8.45<H>    Ca    9.2      28 Sep 2017 18:06    TPro  8.2  /  Alb  3.5  /  TBili  0.9  /  DBili  x   /  AST  21  /  ALT  14  /  AlkPhos  106  09-28    Imaging:

## 2017-09-28 NOTE — H&P ADULT - PROBLEM SELECTOR PLAN 2
- Urgent Dialysis, as per Dr. Hu (Nephrology), recs appreciated  - - Urgent Dialysis, as per Dr. Hu (Nephrology), recs appreciated  - Patient also scheduled for routine dialysis Friday  - Difficulty noted when placing needle in L AVF by nursing staff, will obtain duplex US to check for AVF patency  - vascular consult, will f/u recs in AM  -

## 2017-09-28 NOTE — H&P ADULT - ASSESSMENT
38 y/o female with ESRD diagnosed in 1/17 s/p left UE AVF on hemodialysis on M-W-F, right chest wall permacath, HTN, DM tx w/ insulin, obesity, HCAP 2 months ago, comes into the hospital due to chest pain - possibly 2/2 fluid overload status after missing HD yesterday, will r/o ACS 38 y/o female with ESRD diagnosed in 1/17 s/p left UE AVF on hemodialysis on M-W-F, right chest wall permacath, HTN, DM tx w/ insulin, obesity, HCAP 2 months ago, comes into the hospital due to chest pain - possibly 2/2 fluid overload status after missing HD yesterday, will r/o ACS    Admit to telemetry  Renal diet  Ambulate as tolerated 38 y/o female with ESRD diagnosed in 1/17 s/p left UE AVF on hemodialysis on M-W-F, right chest wall permacath, HTN, DM tx w/ insulin, obesity, comes into the hospital due to chest congestion, possibly 2/2 fluid overload status after missing HD yesterday, HTN urgency 2/2 possible non-compliance, and symptomatic anemia.  Admit to telemetry  Diabetic/Renal diet  Ambulate as tolerated

## 2017-09-28 NOTE — ED ADULT NURSE REASSESSMENT NOTE - NS ED NURSE REASSESS COMMENT FT1
Assumed patient care from CARLEY Hendrickson at 1930, charting as noted. As per RN, patient still at US. Received patient at 2000. Patient on O2 @ 2 lpm via nc, nsr on cm. Patient a&ox3, able to make needs known. Patient  denies any pain or discomfort. Left av fistula + bruit/thrills, right ac iv site without redness or swelling, patent. Plan of care and wait time explained, patient verbalized understanding. Patient not in respiratory distress. To continue to monitor. Dialysis nurse notified. Patient will be going to dialysis then 4 tower after session.

## 2017-09-28 NOTE — CONSULT NOTE ADULT - SUBJECTIVE AND OBJECTIVE BOX
37 year old female s/p AVF from January 2017 by Dr. Farmer, attempted dialysis yesterday and could not complete dialysis due to high pressures in fistula and swelling to LUE. Patient is now coming to [facility] for evaluation. Family medicine requesting consult while patient in dialysis for high venous pressures in L AVF.     On exam, hypertensive otherwise VSS  NAD, A&Ox3  CVS: S1, S2  Chest: BS BL   Abd: non tender  Ext: LUE with AVF palpable thrill     A/P: L AVF with probable thrombus  -Will request and follow up results of sonogram for AM  -possible fistulogram planning  -DW Dr. Gimenez

## 2017-09-28 NOTE — H&P ADULT - PROBLEM SELECTOR PLAN 1
- possibly 2/2 fluid overload status after missing HD yesterday  - EKG  - Trop 0.34, will Trend Trop - chest congestion possibly 2/2 fluid overload status after missing HD yesterday  - EKG w NSR, non-specific T wave changes, no ST changes, patient w/o chest pain at the moment  Trop 0.34, will tend trops - chest congestion possibly 2/2 fluid overload status after missing HD yesterday  - EKG w NSR, non-specific T wave changes, no ST changes, patient w/o chest pain at the moment  - Trop 0.34 likely 2/2 to decreased renal clearance and fluid overload, will trend trops  - TTE in the AM

## 2017-09-28 NOTE — H&P ADULT - PROBLEM SELECTOR PLAN 3
- will c/w home meds - pt w hb of 6.9, drop from baseline of mid 8s  - possibly 2/2 to hemodilution after not getting dialysis  - transfuse 1 pRBCs w current dialysis - pt w hb of 6.9, drop from baseline of mid 8s  - possibly 2/2 to hemodilution after not getting dialysis  - transfuse 1 pRBCs w current dialysis  - Procrit  - Folic a - pt w hb of 6.9, drop from baseline of mid 8s  - possibly 2/2 to hemodilution after not getting dialysis  - transfuse 1 pRBCs w current dialysis  - Procrit  - Folic acid and Nephrovitamins - likely 2/2 fluid overload  - pt BP currently 200/106, w HR 71, pt w/o complaints  - will give hydralazine 10mg IV push now  - Verapamil 80 mg PO [id number]  - will c/w home meds, pt previously currently on Verapamil and Valsartan, per patient valsartan replaced Hydralazine, need to confirm doses w pharmacy as patient is unsure  - hydralazine 5mg IV push q6 hours, PRN for SBP>160 for now

## 2017-09-28 NOTE — INPATIENT CERTIFICATION FOR MEDICARE PATIENTS - RISKS OF ADVERSE EVENTS
Concern for renal deterioration/Concern for cardiopulmonary deterioration/Concern for delay in diagnosis and treatment

## 2017-09-28 NOTE — H&P ADULT - NSHPPHYSICALEXAM_GEN_ALL_CORE
NAD resting in bed receiving dialysis via Left AVF  NCAT, EOMI, decreased vision in left eye (chronic 2/2 to DM), PERRL, moist mucous membranes, normal conjunctivae ICU Vital Signs Last 24 Hrs  T(C): 36.7 (28 Sep 2017 20:00), Max: 36.7 (28 Sep 2017 20:00)  T(F): 98.1 (28 Sep 2017 20:00), Max: 98.1 (28 Sep 2017 20:00)  HR: 71 (28 Sep 2017 20:00) (71 - 97)  BP: 150/70 (28 Sep 2017 20:00) (150/70 - 169/93)  RR: 19 (28 Sep 2017 20:00) (18 - 19)  SpO2: 98% (28 Sep 2017 20:00) (89% - 98%)    Morbidly obese female, NAD resting in bed receiving dialysis via Left AVF  NCAT, EOMI, decreased vision in left eye (chronic 2/2 to DM), PERRL, moist mucous membranes, normal conjunctivae  Neck supple, no JVD appreciated  CTA in b/l upper lung fields, mild crackles in b/l lower lug fields, no wheezing or rhonchi appreciated  RRR, S1 S2, no m/r/g  Abdomen soft NT, ND, +BS b/l  Extremities: L AVF in place no edema or erythema around site, 1+ pitting edema on RLE, no cyanosis, rashes  Neuro: AAO x3 ICU Vital Signs Last 24 Hrs  T(C): 36.7 (28 Sep 2017 20:00), Max: 36.7 (28 Sep 2017 20:00)  T(F): 98.1 (28 Sep 2017 20:00), Max: 98.1 (28 Sep 2017 20:00)  HR: 71 (28 Sep 2017 20:00) (71 - 97)  BP: 150/70 (28 Sep 2017 20:00) (150/70 - 169/93)  RR: 19 (28 Sep 2017 20:00) (18 - 19)  SpO2: 98% (28 Sep 2017 20:00) (89% - 98%)    Morbidly obese female, NAD resting in bed receiving dialysis via Left AVF  NCAT, EOMI, decreased vision in left eye (chronic 2/2 to DM), PERRL, moist mucous membranes, normal conjunctivae  Neck supple, no JVD appreciated  CTA in b/l upper lung fields, mild crackles in b/l lower lung fields, no wheezing or rhonchi appreciated  RRR, S1 S2, no m/r/g  Abdomen soft NT, ND, +BS b/l  Extremities: L AVF in place no edema or erythema around site, +palpable thrill, 1+ pitting edema on RLE, no cyanosis, rashes  Neuro: AAO x3

## 2017-09-28 NOTE — H&P ADULT - PROBLEM SELECTOR PLAN 4
- pt BP currently 200/106, w HR 71, pt w/o complaints  - will give hydralazine 10mg IV push now  - hydralazine 5mg IV push q6 hours, PRN for SBP>160  - will c/w home meds, pt previously currently on Verapamil and Valsartan, per patient valsartan replaced Hydralazine, need to confirm doses w pharmacy as patient is unsure - likely 2/2 fluid overload  - pt BP currently 200/106, w HR 71, pt w/o complaints  - will give hydralazine 10mg IV push now  - hydralazine 5mg IV push q6 hours, PRN for SBP>160  - will c/w home meds, pt previously currently on Verapamil and Valsartan, per patient valsartan replaced Hydralazine, need to confirm doses w pharmacy as patient is unsure - likely 2/2 fluid overload  - pt BP currently 200/106, w HR 71, pt w/o complaints  - will give hydralazine 10mg IV push now  - Verapamil 80 mg PO [id number]  - will c/w home meds, pt previously currently on Verapamil and Valsartan, per patient valsartan replaced Hydralazine, need to confirm doses w pharmacy as patient is unsure  - hydralazine 5mg IV push q6 hours, PRN for SBP>160 for now - pt w hb of 6.9, drop from baseline of mid 8s  - possibly 2/2 to hemodilution after not getting dialysis  - transfuse 1 pRBCs w current dialysis  - Procrit  - Folic acid and Nephrovitamins

## 2017-09-28 NOTE — H&P ADULT - NSHPOUTPATIENTPROVIDERS_GEN_ALL_CORE
Nephrologist Angel Montiel  [facility]   Vascular Dr Hellen Rodgers Nephrologist Dr Hu, Angel  PCP Dr. Sanchez, [address] , [place] [address], [phone]  Vascular Dr Hellen Rodgers

## 2017-09-29 DIAGNOSIS — R09.81 NASAL CONGESTION: ICD-10-CM

## 2017-09-29 LAB
ANION GAP SERPL CALC-SCNC: 15 MMOL/L — SIGNIFICANT CHANGE UP (ref 5–17)
ANISOCYTOSIS BLD QL: SIGNIFICANT CHANGE UP
BASOPHILS # BLD AUTO: 0.1 K/UL — SIGNIFICANT CHANGE UP (ref 0–0.2)
BUN SERPL-MCNC: 44 MG/DL — HIGH (ref 8–20)
CALCIUM SERPL-MCNC: 8.8 MG/DL — SIGNIFICANT CHANGE UP (ref 8.6–10.2)
CHLORIDE SERPL-SCNC: 98 MMOL/L — SIGNIFICANT CHANGE UP (ref 98–107)
CO2 SERPL-SCNC: 25 MMOL/L — SIGNIFICANT CHANGE UP (ref 22–29)
CREAT SERPL-MCNC: 6.16 MG/DL — HIGH (ref 0.5–1.3)
DACRYOCYTES BLD QL SMEAR: SLIGHT — SIGNIFICANT CHANGE UP
ELLIPTOCYTES BLD QL SMEAR: SLIGHT — SIGNIFICANT CHANGE UP
EOSINOPHIL # BLD AUTO: 0.3 K/UL — SIGNIFICANT CHANGE UP (ref 0–0.5)
EOSINOPHIL NFR BLD AUTO: 3 % — SIGNIFICANT CHANGE UP (ref 0–5)
GLUCOSE SERPL-MCNC: 132 MG/DL — HIGH (ref 70–115)
HCOV NL63 RNA SPEC QL NAA+PROBE: DETECTED
HCT VFR BLD CALC: 23.6 % — LOW (ref 37–47)
HCT VFR BLD CALC: 24.3 % — LOW (ref 37–47)
HGB BLD-MCNC: 7.5 G/DL — LOW (ref 12–16)
HGB BLD-MCNC: 7.8 G/DL — LOW (ref 12–16)
HYPOCHROMIA BLD QL: SLIGHT — SIGNIFICANT CHANGE UP
LYMPHOCYTES # BLD AUTO: 1 K/UL — SIGNIFICANT CHANGE UP (ref 1–4.8)
LYMPHOCYTES # BLD AUTO: 12 % — LOW (ref 20–55)
MCHC RBC-ENTMCNC: 25.8 PG — LOW (ref 27–31)
MCHC RBC-ENTMCNC: 32.1 G/DL — SIGNIFICANT CHANGE UP (ref 32–36)
MCV RBC AUTO: 80.5 FL — LOW (ref 81–99)
MONOCYTES # BLD AUTO: 1 K/UL — HIGH (ref 0–0.8)
MONOCYTES NFR BLD AUTO: 11 % — HIGH (ref 3–10)
NEUTROPHILS # BLD AUTO: 6.1 K/UL — SIGNIFICANT CHANGE UP (ref 1.8–8)
NEUTROPHILS NFR BLD AUTO: 69 % — SIGNIFICANT CHANGE UP (ref 37–73)
NEUTS BAND # BLD: 5 % — SIGNIFICANT CHANGE UP (ref 0–8)
NRBC # BLD: 1 /100 — HIGH (ref 0–0)
OVALOCYTES BLD QL SMEAR: SLIGHT — SIGNIFICANT CHANGE UP
PLAT MORPH BLD: NORMAL — SIGNIFICANT CHANGE UP
PLATELET # BLD AUTO: 240 K/UL — SIGNIFICANT CHANGE UP (ref 150–400)
POIKILOCYTOSIS BLD QL AUTO: SLIGHT — SIGNIFICANT CHANGE UP
POLYCHROMASIA BLD QL SMEAR: SLIGHT — SIGNIFICANT CHANGE UP
POTASSIUM SERPL-MCNC: 5.1 MMOL/L — SIGNIFICANT CHANGE UP (ref 3.5–5.3)
POTASSIUM SERPL-SCNC: 5.1 MMOL/L — SIGNIFICANT CHANGE UP (ref 3.5–5.3)
RAPID RVP RESULT: DETECTED
RBC # BLD: 3.02 M/UL — LOW (ref 4.4–5.2)
RBC # FLD: 21.6 % — HIGH (ref 11–15.6)
RBC BLD AUTO: ABNORMAL
SCHISTOCYTES BLD QL AUTO: SLIGHT — SIGNIFICANT CHANGE UP
SODIUM SERPL-SCNC: 138 MMOL/L — SIGNIFICANT CHANGE UP (ref 135–145)
TARGETS BLD QL SMEAR: SLIGHT — SIGNIFICANT CHANGE UP
TROPONIN T SERPL-MCNC: 0.3 NG/ML — HIGH (ref 0–0.06)
WBC # BLD: 8.6 K/UL — SIGNIFICANT CHANGE UP (ref 4.8–10.8)
WBC # FLD AUTO: 8.6 K/UL — SIGNIFICANT CHANGE UP (ref 4.8–10.8)

## 2017-09-29 PROCEDURE — 93990 DOPPLER FLOW TESTING: CPT | Mod: 26

## 2017-09-29 PROCEDURE — 99233 SBSQ HOSP IP/OBS HIGH 50: CPT | Mod: GC

## 2017-09-29 RX ORDER — IPRATROPIUM/ALBUTEROL SULFATE 18-103MCG
3 AEROSOL WITH ADAPTER (GRAM) INHALATION EVERY 6 HOURS
Qty: 0 | Refills: 0 | Status: DISCONTINUED | OUTPATIENT
Start: 2017-09-29 | End: 2017-10-02

## 2017-09-29 RX ORDER — DEXTROSE 50 % IN WATER 50 %
12.5 SYRINGE (ML) INTRAVENOUS ONCE
Qty: 0 | Refills: 0 | Status: DISCONTINUED | OUTPATIENT
Start: 2017-09-29 | End: 2017-10-02

## 2017-09-29 RX ORDER — DEXTROSE 50 % IN WATER 50 %
25 SYRINGE (ML) INTRAVENOUS ONCE
Qty: 0 | Refills: 0 | Status: DISCONTINUED | OUTPATIENT
Start: 2017-09-29 | End: 2017-10-02

## 2017-09-29 RX ORDER — INFLUENZA VIRUS VACCINE 15; 15; 15; 15 UG/.5ML; UG/.5ML; UG/.5ML; UG/.5ML
0.5 SUSPENSION INTRAMUSCULAR ONCE
Qty: 0 | Refills: 0 | Status: COMPLETED | OUTPATIENT
Start: 2017-09-29 | End: 2017-09-29

## 2017-09-29 RX ORDER — INSULIN LISPRO 100/ML
VIAL (ML) SUBCUTANEOUS
Qty: 0 | Refills: 0 | Status: DISCONTINUED | OUTPATIENT
Start: 2017-09-29 | End: 2017-10-02

## 2017-09-29 RX ORDER — IPRATROPIUM/ALBUTEROL SULFATE 18-103MCG
3 AEROSOL WITH ADAPTER (GRAM) INHALATION EVERY 4 HOURS
Qty: 0 | Refills: 0 | Status: DISCONTINUED | OUTPATIENT
Start: 2017-09-29 | End: 2017-10-02

## 2017-09-29 RX ORDER — ASPIRIN/CALCIUM CARB/MAGNESIUM 324 MG
81 TABLET ORAL DAILY
Qty: 0 | Refills: 0 | Status: DISCONTINUED | OUTPATIENT
Start: 2017-09-29 | End: 2017-10-02

## 2017-09-29 RX ORDER — ACETAMINOPHEN 500 MG
650 TABLET ORAL ONCE
Qty: 0 | Refills: 0 | Status: COMPLETED | OUTPATIENT
Start: 2017-09-29 | End: 2017-09-30

## 2017-09-29 RX ORDER — GLUCAGON INJECTION, SOLUTION 0.5 MG/.1ML
1 INJECTION, SOLUTION SUBCUTANEOUS ONCE
Qty: 0 | Refills: 0 | Status: DISCONTINUED | OUTPATIENT
Start: 2017-09-29 | End: 2017-10-02

## 2017-09-29 RX ORDER — SEVELAMER CARBONATE 2400 MG/1
800 POWDER, FOR SUSPENSION ORAL DAILY
Qty: 0 | Refills: 0 | Status: DISCONTINUED | OUTPATIENT
Start: 2017-09-29 | End: 2017-10-02

## 2017-09-29 RX ORDER — VALSARTAN 80 MG/1
160 TABLET ORAL DAILY
Qty: 0 | Refills: 0 | Status: DISCONTINUED | OUTPATIENT
Start: 2017-09-29 | End: 2017-10-02

## 2017-09-29 RX ORDER — FLUTICASONE PROPIONATE 50 MCG
1 SPRAY, SUSPENSION NASAL
Qty: 0 | Refills: 0 | Status: DISCONTINUED | OUTPATIENT
Start: 2017-09-29 | End: 2017-09-30

## 2017-09-29 RX ORDER — DEXTROSE 50 % IN WATER 50 %
1 SYRINGE (ML) INTRAVENOUS ONCE
Qty: 0 | Refills: 0 | Status: DISCONTINUED | OUTPATIENT
Start: 2017-09-29 | End: 2017-10-02

## 2017-09-29 RX ORDER — SODIUM CHLORIDE 9 MG/ML
1000 INJECTION, SOLUTION INTRAVENOUS
Qty: 0 | Refills: 0 | Status: DISCONTINUED | OUTPATIENT
Start: 2017-09-29 | End: 2017-10-02

## 2017-09-29 RX ORDER — VERAPAMIL HCL 240 MG
80 CAPSULE, EXTENDED RELEASE PELLETS 24 HR ORAL ONCE
Qty: 0 | Refills: 0 | Status: COMPLETED | OUTPATIENT
Start: 2017-09-29 | End: 2017-09-29

## 2017-09-29 RX ORDER — SIMVASTATIN 20 MG/1
5 TABLET, FILM COATED ORAL AT BEDTIME
Qty: 0 | Refills: 0 | Status: DISCONTINUED | OUTPATIENT
Start: 2017-09-29 | End: 2017-10-02

## 2017-09-29 RX ADMIN — Medication 80 MILLIGRAM(S): at 06:28

## 2017-09-29 RX ADMIN — HEPARIN SODIUM 5000 UNIT(S): 5000 INJECTION INTRAVENOUS; SUBCUTANEOUS at 21:42

## 2017-09-29 RX ADMIN — Medication 0.2 MILLIGRAM(S): at 00:49

## 2017-09-29 RX ADMIN — Medication 3 MILLILITER(S): at 04:46

## 2017-09-29 RX ADMIN — Medication 3 MILLILITER(S): at 20:19

## 2017-09-29 RX ADMIN — Medication 80 MILLIGRAM(S): at 13:33

## 2017-09-29 RX ADMIN — Medication 80 MILLIGRAM(S): at 21:42

## 2017-09-29 RX ADMIN — Medication 1 SPRAY(S): at 21:41

## 2017-09-29 RX ADMIN — Medication 5 MILLIGRAM(S): at 18:05

## 2017-09-29 RX ADMIN — Medication 3 MILLILITER(S): at 11:44

## 2017-09-29 RX ADMIN — Medication 1 MILLIGRAM(S): at 13:33

## 2017-09-29 RX ADMIN — SIMVASTATIN 5 MILLIGRAM(S): 20 TABLET, FILM COATED ORAL at 21:43

## 2017-09-29 RX ADMIN — Medication 80 MILLIGRAM(S): at 00:22

## 2017-09-29 NOTE — PROGRESS NOTE ADULT - SUBJECTIVE AND OBJECTIVE BOX
NEPHROLOGY INTERVAL HPI/OVERNIGHT EVENTS:  pt still with some SOB  tolerated HD yesterday    MEDICATIONS  (STANDING):  folic acid 1 milliGRAM(s) Oral daily  heparin  Injectable 5000 Unit(s) SubCutaneous every 8 hours  verapamil 80 milliGRAM(s) Oral every 8 hours  insulin lispro (HumaLOG) corrective regimen sliding scale   SubCutaneous three times a day before meals  dextrose 5%. 1000 milliLiter(s) (50 mL/Hr) IV Continuous <Continuous>  dextrose 50% Injectable 12.5 Gram(s) IV Push once  dextrose 50% Injectable 25 Gram(s) IV Push once  dextrose 50% Injectable 25 Gram(s) IV Push once  influenza   Vaccine 0.5 milliLiter(s) IntraMuscular once    MEDICATIONS  (PRN):  hydrALAZINE Injectable 5 milliGRAM(s) IV Push every 6 hours PRN SBP>160 or DBP>100  dextrose Gel 1 Dose(s) Oral once PRN Blood Glucose LESS THAN 70 milliGRAM(s)/deciliter  glucagon  Injectable 1 milliGRAM(s) IntraMuscular once PRN Glucose LESS THAN 70 milligrams/deciliter  ALBUTerol/ipratropium for Nebulization 3 milliLiter(s) Nebulizer every 6 hours PRN Shortness of Breath and/or Wheezing      Allergies    Mushrooms (Hives (Mild))  No Known Drug Allergies    Intolerances        Vital Signs Last 24 Hrs  T(C): 36.5 (29 Sep 2017 06:21), Max: 37 (29 Sep 2017 02:44)  T(F): 97.7 (29 Sep 2017 06:21), Max: 98.6 (29 Sep 2017 02:44)  HR: 93 (29 Sep 2017 06:21) (71 - 98)  BP: 166/77 (29 Sep 2017 06:21) (150/70 - 188/96)  BP(mean): --  RR: 18 (29 Sep 2017 06:21) (18 - 20)  SpO2: 98% (29 Sep 2017 06:21) (89% - 99%)    PHYSICAL EXAM:  TPro  8.2  /  Alb  3.5  /  TBili  0.9  /  DBili  x   /  AST  21  /  ALT  14  /  AlkPhos  106  09-28      Neck: no jvd, no nodes  Respiratory: diminished BS at bases  Cardiovascular: no rub  Ext: + edema  Neuro: AAO X 3    LABS:                        7.8    8.6   )-----------( 240      ( 29 Sep 2017 08:26 )             24.3     09-29    138  |  98  |  44.0<H>  ----------------------------<  132<H>  5.1   |  25.0  |  6.16<H>    Ca    8.8      29 Sep 2017 08:26    TPro  8.2  /  Alb  3.5  /  TBili  0.9  /  DBili  x   /  AST  21  /  ALT  14  /  AlkPhos  106  09-28            RADIOLOGY & ADDITIONAL TESTS:  < from: Xray Chest 2 Views PA/Lat (09.28.17 @ 17:19) >   EXAM:  CHEST P.A. LATERAL                          PROCEDURE DATE:  09/28/2017          INTERPRETATION:  TECHNIQUE: 2 views of the chest.    COMPARISON: 8/12/2017    CLINICAL HISTORY: Chest pain.     FINDINGS:    Frontal and lateral views of the chest demonstrates mild CHF. The   cardiomediastinal silhouette is enlarged. No acute osseous abnormalities.   Overlying EKG leads and wires are noted.    IMPRESSION:    Cardiomegaly. Mild CHF.    < end of copied text >

## 2017-09-29 NOTE — PROGRESS NOTE ADULT - PROBLEM SELECTOR PLAN 5
- Humalog Sliding scale  - accuchecks:   - 8/17 A1c 6.4 - pt w hb of 6.9, drop from baseline of mid 8s  - possibly 2/2 to hemodilution after not getting dialysis  - s/p 1 unit pRBCs , repeat h/h pending. Patient will likely need another unit as she is also SOB more likely from URI but nonetheless has sob and is anemic   - c/w Procrit (inquired from renal about starting this)  - c/w Folic acid and Nephro vitamins

## 2017-09-29 NOTE — PROGRESS NOTE ADULT - PROBLEM SELECTOR PLAN 1
- chest congestion possibly 2/2 fluid overload status after missing HD  - EKG w NSR, non-specific T wave changes, no ST changes, patient w/o chest pain at the moment  - Trop 0.34 likely 2/2 to decreased renal clearance and fluid overload. Patient refuse blood draws, with obtain during dialysis to trend trops.  - TTE pending likely 2/2 URI. Blood cx are in lab   Flonase for now   RVP ordered  No fevers at this time

## 2017-09-29 NOTE — PROGRESS NOTE ADULT - ASSESSMENT
36 y/o female with ESRD diagnosed in 1/17 on hemodialysis  M-W-F,  HTN, DM tx w/ insulin, mobid obesity, comes into the hospital due to chest congestion, possibly 2/2 fluid overload status after missing HD on Wednesday. Also found to have hypertensive urgency likely  2/2 fluid overload vs non-compliance,  and noted symptomatic anemia, s/p 1 unit pRBC. 38 y/o female with ESRD diagnosed in 1/17 on hemodialysis  M-W-F,  HTN, DM tx w/ insulin, mobid obesity, prior hospitalizations with bacteremia x 2 with Serratia, PC in right side x 2 which has since been taken out and a LUE AVF which was pending superficialization as of the last hospital stay comes into the hospital due to chest congestion, possibly 2/2 fluid overload status after missing HD on Wednesday. Also found to have hypertensive urgency likely  2/2 fluid overload vs non-compliance,  and noted symptomatic anemia, s/p 1 unit pRBC.

## 2017-09-29 NOTE — PROGRESS NOTE ADULT - SUBJECTIVE AND OBJECTIVE BOX
Patient is a 37y old  Female who presents with a chief complaint of Chest and nasal congestion (28 Sep 2017 21:02)  Had difficulty at HD with high venous pressures  Pt reports fistula infiltrated on Tues at outpt HD center    PAST MEDICAL & SURGICAL HISTORY:  Pneumonia  End stage renal disease  Hypertension  Diabetes  AVF (arteriovenous fistula)  Vascular dialysis catheter in place    MEDICATIONS  (STANDING):  folic acid 1 milliGRAM(s) Oral daily  heparin  Injectable 5000 Unit(s) SubCutaneous every 8 hours  verapamil 80 milliGRAM(s) Oral every 8 hours  insulin lispro (HumaLOG) corrective regimen sliding scale   SubCutaneous three times a day before meals  dextrose 5%. 1000 milliLiter(s) (50 mL/Hr) IV Continuous <Continuous>  dextrose 50% Injectable 12.5 Gram(s) IV Push once  dextrose 50% Injectable 25 Gram(s) IV Push once  dextrose 50% Injectable 25 Gram(s) IV Push once  influenza   Vaccine 0.5 milliLiter(s) IntraMuscular once    MEDICATIONS  (PRN):  hydrALAZINE Injectable 5 milliGRAM(s) IV Push every 6 hours PRN SBP>160 or DBP>100  dextrose Gel 1 Dose(s) Oral once PRN Blood Glucose LESS THAN 70 milliGRAM(s)/deciliter  glucagon  Injectable 1 milliGRAM(s) IntraMuscular once PRN Glucose LESS THAN 70 milligrams/deciliter  ALBUTerol/ipratropium for Nebulization 3 milliLiter(s) Nebulizer every 6 hours PRN Shortness of Breath and/or Wheezing      Allergies:  Mushrooms (Hives (Mild))  No Known Drug Allergies    Vital Signs Last 24 Hrs  T(C): 36.5 (29 Sep 2017 06:21), Max: 37 (29 Sep 2017 02:44)  T(F): 97.7 (29 Sep 2017 06:21), Max: 98.6 (29 Sep 2017 02:44)  HR: 93 (29 Sep 2017 06:21) (71 - 98)  BP: 166/77 (29 Sep 2017 06:21) (150/70 - 188/96)  BP(mean): --  RR: 18 (29 Sep 2017 06:21) (18 - 20)  SpO2: 98% (29 Sep 2017 06:21) (89% - 99%)  I&O's Detail    28 Sep 2017 07:01  -  29 Sep 2017 07:00  --------------------------------------------------------  IN:    Oral Fluid: 360 mL  Total IN: 360 mL    OUT:    Other: 3000 mL  Total OUT: 3000 mL    Total NET: -2640 mL    Physical Exam:  General: NAD, resting comfortably in bed  Extremities: LUE with mild edema and ecchymosis. AVF with + bruit and thrill palp at anastamosis only. + radial pulse, motor and sensory L hand intact      LABS:                        7.8    8.6   )-----------( 240      ( 29 Sep 2017 08:26 )             24.3     09-29    138  |  98  |  44.0<H>  ----------------------------<  132<H>  5.1   |  25.0  |  6.16<H>    Ca    8.8      29 Sep 2017 08:26    TPro  8.2  /  Alb  3.5  /  TBili  0.9  /  DBili  x   /  AST  21  /  ALT  14  /  AlkPhos  106  09-28      CAPILLARY BLOOD GLUCOSE  113 (28 Sep 2017 22:00)        Radiology and Additional Studies:    Assessment and Plan: 37y Female End-stage renal disease a/w cough and SOB noted with high venous pressure L AVF  Will obtain duplex of AVF to assess for stenosis  Recent fistulagram performed however may have some reactive hyperplasia and require repeat fistulagram  Can cont to use AVF  Will follow  Discussed with Dr Yusuf

## 2017-09-29 NOTE — PROGRESS NOTE ADULT - SUBJECTIVE AND OBJECTIVE BOX
Resident Progress Note  Patient is a 37y old  Female who presents with a chief complaint of Chest and nasal congestion (28 Sep 2017 21:02)  Patient found to be anemia, s/p 1 unit prbc and dialysis for fluid overload.      INTERVAL/OVERNIGHT EVENTS: Pt on hospital day 2, seen and examined bedside this morning.   Tolerating po diet, Ambulating without difficulty, Last Bowel Movement yesterday    Review Of systems: +cough. Denies chest pain, sob, palpitations, n/v  All other review of systems negative unless as specified about.    Vital Signs Last 24 Hrs  T(C): 36.5 (29 Sep 2017 06:21), Max: 37 (29 Sep 2017 02:44)  T(F): 97.7 (29 Sep 2017 06:21), Max: 98.6 (29 Sep 2017 02:44)  HR: 93 (29 Sep 2017 06:21) (71 - 98)  BP: 166/77 (29 Sep 2017 06:21) (150/70 - 188/96)  RR: 18 (29 Sep 2017 06:21) (18 - 20)  SpO2: 98% (29 Sep 2017 06:21) (89% - 99%)      28 Sep 2017 07:01  -  29 Sep 2017 06:48  --------------------------------------------------------  IN: 360 mL / OUT: 3000 mL / NET: -2640 mL        PHYSICAL EXAM:  General: Young adult female, obese, NAD, well-groomed, well-developed.  HEENT: NC AT PERRLA, EOMI, moist mucous membranes.  Neck: Supple  Respiratory: No respiratory distress, Clear to auscultation bilaterally, no wheezing, rales, or rhonchi.On supplemental oxygen by NC.  Cardiovascular: RRR, no murmurs, rubs, or gallops.  Gastrointestinal: Obese, BS+, soft, non-tender, non-distended  Extremities: +1 pedal edema on L, +2 on RLE  Vascular: 2+ peripheral pulses.  Neurological: A/O x 3, no focal deficits.  Psychiatric: Normal mood, normal affect.  Musculoskeletal: 5/5 strength in bilateral upper and lower extremities.  Skin: No rashes.    HEALTH ISSUES - PROBLEM Dx:  Morbid obesity  Anemia  Diabetes  Hypertension  End stage renal disease  Chest pain, unspecified    HEALTH ISSUES - R/O PROBLEM Dx:    MEDICATIONS  (STANDING):  folic acid 1 milliGRAM(s) Oral daily  heparin  Injectable 5000 Unit(s) SubCutaneous every 8 hours  verapamil 80 milliGRAM(s) Oral every 8 hours  insulin lispro (HumaLOG) corrective regimen sliding scale   SubCutaneous three times a day before meals  dextrose 5%. 1000 milliLiter(s) (50 mL/Hr) IV Continuous <Continuous>  dextrose 50% Injectable 12.5 Gram(s) IV Push once  dextrose 50% Injectable 25 Gram(s) IV Push once  dextrose 50% Injectable 25 Gram(s) IV Push once  influenza   Vaccine 0.5 milliLiter(s) IntraMuscular once    MEDICATIONS  (PRN):  hydrALAZINE Injectable 5 milliGRAM(s) IV Push every 6 hours PRN SBP>160 or DBP>100  dextrose Gel 1 Dose(s) Oral once PRN Blood Glucose LESS THAN 70 milliGRAM(s)/deciliter  glucagon  Injectable 1 milliGRAM(s) IntraMuscular once PRN Glucose LESS THAN 70 milligrams/deciliter  ALBUTerol/ipratropium for Nebulization 3 milliLiter(s) Nebulizer every 6 hours PRN Shortness of Breath and/or Wheezing      LABS:                        6.9    8.1   )-----------( 232      ( 28 Sep 2017 18:06 )             21.9     09-28    134<L>  |  93<L>  |  62.0<H>  ----------------------------<  154<H>  5.8<H>   |  25.0  |  8.45<H>    Ca    9.2      28 Sep 2017 18:06    TPro  8.2  /  Alb  3.5  /  TBili  0.9  /  DBili  x   /  AST  21  /  ALT  14  /  AlkPhos  106  09-28 Patient is a 37y old  Female who presents with a chief complaint of Chest and nasal congestion (28 Sep 2017 21:02)    Patient seen and examined at bedside. No acute distress and no acute overnight events. States that she has nasal congestion, cough with yellowish sputum and no fevers/chills. Patient found to be anemic, s/p 1 unit prbc and dialysis for fluid overload. Tolerating po diet, Ambulating without difficulty, Last Bowel Movement yesterday    ROS: No chest pain, palpitations,fevers/chills, abdominal pain, n/v, diarrhea/constipation, dysuria or increased urinary frequency.,    TELE: no events thus far (recently admitted)     Vital Signs Last 24 Hrs  T(C): 36.5 (29 Sep 2017 06:21), Max: 37 (29 Sep 2017 02:44)  T(F): 97.7 (29 Sep 2017 06:21), Max: 98.6 (29 Sep 2017 02:44)  HR: 93 (29 Sep 2017 06:21) (71 - 98)  BP: 166/77 (29 Sep 2017 06:21) (150/70 - 188/96)  RR: 18 (29 Sep 2017 06:21) (18 - 20)  SpO2: 98% (29 Sep 2017 06:21) (89% - 99%)      28 Sep 2017 07:01  -  29 Sep 2017 06:48  --------------------------------------------------------  IN: 360 mL / OUT: 3000 mL / NET: -2640 mL      PHYSICAL EXAM:  General: Young adult female, obese, NAD, well-groomed, well-developed.  HEENT: NC AT PERRLA, EOMI, moist mucous membranes.  Respiratory:bibasilar crackles on exam  Cardiovascular: RRR, no murmurs, rubs, or gallops.  Gastrointestinal: Obese, BS+, soft, non-tender, non-distended  Extremities: +1 pedal edema on L, +2 on RLE  Vascular: 2+ peripheral pulses.  Neurological: A/O x 3, no focal deficits.  Musculoskeletal: 5/5 strength in bilateral upper and lower extremities.    MEDICATIONS  (STANDING):  folic acid 1 milliGRAM(s) Oral daily  heparin  Injectable 5000 Unit(s) SubCutaneous every 8 hours  verapamil 80 milliGRAM(s) Oral every 8 hours  insulin lispro (HumaLOG) corrective regimen sliding scale   SubCutaneous three times a day before meals  dextrose 5%. 1000 milliLiter(s) (50 mL/Hr) IV Continuous <Continuous>  dextrose 50% Injectable 12.5 Gram(s) IV Push once  dextrose 50% Injectable 25 Gram(s) IV Push once  dextrose 50% Injectable 25 Gram(s) IV Push once  influenza   Vaccine 0.5 milliLiter(s) IntraMuscular once    MEDICATIONS  (PRN):  hydrALAZINE Injectable 5 milliGRAM(s) IV Push every 6 hours PRN SBP>160 or DBP>100  dextrose Gel 1 Dose(s) Oral once PRN Blood Glucose LESS THAN 70 milliGRAM(s)/deciliter  glucagon  Injectable 1 milliGRAM(s) IntraMuscular once PRN Glucose LESS THAN 70 milligrams/deciliter  ALBUTerol/ipratropium for Nebulization 3 milliLiter(s) Nebulizer every 6 hours PRN Shortness of Breath and/or Wheezing      LABS:                        6.9    8.1   )-----------( 232      ( 28 Sep 2017 18:06 )             21.9     09-28    134<L>  |  93<L>  |  62.0<H>  ----------------------------<  154<H>  5.8<H>   |  25.0  |  8.45<H>    Ca    9.2      28 Sep 2017 18:06    TPro  8.2  /  Alb  3.5  /  TBili  0.9  /  DBili  x   /  AST  21  /  ALT  14  /  AlkPhos  106  09-28

## 2017-09-29 NOTE — PROGRESS NOTE ADULT - PROBLEM SELECTOR PLAN 2
- Had urgent dialysis yesterday   - Patient also scheduled for routine dialysis today  - Difficulty noted when placing needle in L AVF by nursing staff, duplex US to check for AVF patency pending  Renal and Vascular input appreciated - chest congestion possibly 2/2 fluid overload status after missing HD  - EKG w NSR, non-specific T wave changes, no ST changes, patient w/o chest pain at the moment  - Trop 0.34 likely 2/2 to decreased renal clearance and fluid overload. Patient refuse blood draws, with obtain during dialysis to trend trops.  -patient has a normal stress test in august 2017  -aspirin added, low dose statin also (noted lipid panel ordered - however, this patient has htn, dm and esrd - would benefit from low dose)

## 2017-09-29 NOTE — PROGRESS NOTE ADULT - ASSESSMENT
ESRD: +PVC (missed HD)  - additional HD today for fluid removal and to resume outpatient MWF schedule    Anemia: add NEELAM at HD  - check Fe stores  - PRBCs if needed    RO: check phos and PTH

## 2017-09-29 NOTE — PROGRESS NOTE ADULT - PROBLEM SELECTOR PLAN 3
- likely 2/2 fluid overload  - SBP overnignt 150-170, gradually trending down from admission  - c/w Verapamil 80 mg PO [id number]  - patient unsure of home BP meds, will confirm with pharmacy in community today  - hydralazine 5mg IV push q6 hours, PRN for SBP>160 - Had urgent dialysis yesterday   - Patient also scheduled for routine dialysis today  - Difficulty noted when placing needle in L AVF by nursing staff, duplex US to check for AVF patency noted by Vascular and asked to c/w using this AVF for HD   renal replacement meds as above

## 2017-09-29 NOTE — PROGRESS NOTE ADULT - PROBLEM SELECTOR PLAN 4
- pt w hb of 6.9, drop from baseline of mid 8s  - possibly 2/2 to hemodilution after not getting dialysis  - s/p 1 unit pRBCs , repeat h/h pending  - c/w Procrit  - c/w Folic acid and Nephro vitamins - likely 2/2 fluid overload and missed HD. Likely to trend down with HD   - SBP overnignt 150-170, gradually trending down from admission  - c/w Verapamil 80 mg PO [id number]  - patient unsure of home BP meds, will confirm with pharmacy in community today  - hydralazine 5mg IV push q6 hours, PRN for SBP>160

## 2017-09-30 LAB
ALBUMIN SERPL ELPH-MCNC: 3.5 G/DL — SIGNIFICANT CHANGE UP (ref 3.3–5.2)
ALP SERPL-CCNC: 107 U/L — SIGNIFICANT CHANGE UP (ref 40–120)
ALT FLD-CCNC: 12 U/L — SIGNIFICANT CHANGE UP
ANION GAP SERPL CALC-SCNC: 15 MMOL/L — SIGNIFICANT CHANGE UP (ref 5–17)
ANISOCYTOSIS BLD QL: SLIGHT — SIGNIFICANT CHANGE UP
AST SERPL-CCNC: 13 U/L — SIGNIFICANT CHANGE UP
BILIRUB SERPL-MCNC: 1.4 MG/DL — SIGNIFICANT CHANGE UP (ref 0.4–2)
BUN SERPL-MCNC: 37 MG/DL — HIGH (ref 8–20)
CALCIUM SERPL-MCNC: 9.2 MG/DL — SIGNIFICANT CHANGE UP (ref 8.6–10.2)
CALCIUM SERPL-MCNC: 9.4 MG/DL — SIGNIFICANT CHANGE UP (ref 8.4–10.5)
CHLORIDE SERPL-SCNC: 96 MMOL/L — LOW (ref 98–107)
CO2 SERPL-SCNC: 27 MMOL/L — SIGNIFICANT CHANGE UP (ref 22–29)
CREAT SERPL-MCNC: 5.19 MG/DL — HIGH (ref 0.5–1.3)
ELLIPTOCYTES BLD QL SMEAR: SLIGHT — SIGNIFICANT CHANGE UP
EOSINOPHIL NFR BLD AUTO: 2 % — SIGNIFICANT CHANGE UP (ref 0–5)
GLUCOSE SERPL-MCNC: 110 MG/DL — SIGNIFICANT CHANGE UP (ref 70–115)
HCT VFR BLD CALC: 27.8 % — LOW (ref 37–47)
HGB BLD-MCNC: 8.9 G/DL — LOW (ref 12–16)
HYPOCHROMIA BLD QL: SLIGHT — SIGNIFICANT CHANGE UP
LYMPHOCYTES # BLD AUTO: 17 % — LOW (ref 20–55)
MACROCYTES BLD QL: SLIGHT — SIGNIFICANT CHANGE UP
MAGNESIUM SERPL-MCNC: 2.2 MG/DL — SIGNIFICANT CHANGE UP (ref 1.6–2.6)
MCHC RBC-ENTMCNC: 26.4 PG — LOW (ref 27–31)
MCHC RBC-ENTMCNC: 32 G/DL — SIGNIFICANT CHANGE UP (ref 32–36)
MCV RBC AUTO: 82.5 FL — SIGNIFICANT CHANGE UP (ref 81–99)
MICROCYTES BLD QL: SLIGHT — SIGNIFICANT CHANGE UP
MONOCYTES NFR BLD AUTO: 8 % — SIGNIFICANT CHANGE UP (ref 3–10)
NEUTROPHILS NFR BLD AUTO: 71 % — SIGNIFICANT CHANGE UP (ref 37–73)
NEUTS BAND # BLD: 1 % — SIGNIFICANT CHANGE UP (ref 0–8)
OVALOCYTES BLD QL SMEAR: SLIGHT — SIGNIFICANT CHANGE UP
PHOSPHATE SERPL-MCNC: 4.4 MG/DL — SIGNIFICANT CHANGE UP (ref 2.4–4.7)
PLAT MORPH BLD: NORMAL — SIGNIFICANT CHANGE UP
PLATELET # BLD AUTO: 250 K/UL — SIGNIFICANT CHANGE UP (ref 150–400)
POIKILOCYTOSIS BLD QL AUTO: SLIGHT — SIGNIFICANT CHANGE UP
POTASSIUM SERPL-MCNC: 4.7 MMOL/L — SIGNIFICANT CHANGE UP (ref 3.5–5.3)
POTASSIUM SERPL-SCNC: 4.7 MMOL/L — SIGNIFICANT CHANGE UP (ref 3.5–5.3)
PROT SERPL-MCNC: 7.9 G/DL — SIGNIFICANT CHANGE UP (ref 6.6–8.7)
RBC # BLD: 3.37 M/UL — LOW (ref 4.4–5.2)
RBC # FLD: 20.6 % — HIGH (ref 11–15.6)
RBC BLD AUTO: ABNORMAL
SODIUM SERPL-SCNC: 138 MMOL/L — SIGNIFICANT CHANGE UP (ref 135–145)
VARIANT LYMPHS # BLD: 1 % — SIGNIFICANT CHANGE UP (ref 0–6)
WBC # BLD: 8.2 K/UL — SIGNIFICANT CHANGE UP (ref 4.8–10.8)
WBC # FLD AUTO: 8.2 K/UL — SIGNIFICANT CHANGE UP (ref 4.8–10.8)

## 2017-09-30 PROCEDURE — 99233 SBSQ HOSP IP/OBS HIGH 50: CPT | Mod: GC

## 2017-09-30 RX ORDER — ACETAMINOPHEN 500 MG
650 TABLET ORAL EVERY 6 HOURS
Qty: 0 | Refills: 0 | Status: DISCONTINUED | OUTPATIENT
Start: 2017-09-30 | End: 2017-10-02

## 2017-09-30 RX ORDER — FLUTICASONE PROPIONATE 50 MCG
2 SPRAY, SUSPENSION NASAL
Qty: 0 | Refills: 0 | Status: DISCONTINUED | OUTPATIENT
Start: 2017-09-30 | End: 2017-10-02

## 2017-09-30 RX ADMIN — Medication 3 MILLILITER(S): at 08:40

## 2017-09-30 RX ADMIN — Medication 5 MILLIGRAM(S): at 00:03

## 2017-09-30 RX ADMIN — Medication 650 MILLIGRAM(S): at 02:27

## 2017-09-30 RX ADMIN — Medication 1 SPRAY(S): at 06:44

## 2017-09-30 RX ADMIN — HEPARIN SODIUM 5000 UNIT(S): 5000 INJECTION INTRAVENOUS; SUBCUTANEOUS at 13:43

## 2017-09-30 RX ADMIN — Medication 3 MILLILITER(S): at 12:32

## 2017-09-30 RX ADMIN — Medication 1 TABLET(S): at 13:41

## 2017-09-30 RX ADMIN — Medication 3 MILLILITER(S): at 16:10

## 2017-09-30 RX ADMIN — Medication 2 SPRAY(S): at 18:32

## 2017-09-30 RX ADMIN — Medication 80 MILLIGRAM(S): at 13:42

## 2017-09-30 RX ADMIN — Medication 3 MILLILITER(S): at 00:46

## 2017-09-30 RX ADMIN — Medication 650 MILLIGRAM(S): at 08:45

## 2017-09-30 RX ADMIN — Medication 81 MILLIGRAM(S): at 13:42

## 2017-09-30 RX ADMIN — Medication 80 MILLIGRAM(S): at 06:44

## 2017-09-30 RX ADMIN — SEVELAMER CARBONATE 800 MILLIGRAM(S): 2400 POWDER, FOR SUSPENSION ORAL at 13:41

## 2017-09-30 RX ADMIN — Medication 5 MILLIGRAM(S): at 06:45

## 2017-09-30 RX ADMIN — Medication 1 MILLIGRAM(S): at 13:43

## 2017-09-30 RX ADMIN — Medication 3 MILLILITER(S): at 20:40

## 2017-09-30 RX ADMIN — SIMVASTATIN 5 MILLIGRAM(S): 20 TABLET, FILM COATED ORAL at 22:12

## 2017-09-30 RX ADMIN — Medication 650 MILLIGRAM(S): at 00:00

## 2017-09-30 RX ADMIN — Medication 80 MILLIGRAM(S): at 22:12

## 2017-09-30 NOTE — PROGRESS NOTE ADULT - SUBJECTIVE AND OBJECTIVE BOX
Patient is a 37y old  Female who presents with a chief complaint of Chest and nasal congestion (28 Sep 2017 21:02)      INTERVAL HPI/OVERNIGHT EVENTS:  37y  Female    ANTIMICROBIAL:    CARDIOVASCULAR:  hydrALAZINE Injectable 5 milliGRAM(s) IV Push every 6 hours PRN  verapamil 80 milliGRAM(s) Oral every 8 hours  valsartan 160 milliGRAM(s) Oral daily    PULMONARY:  ALBUTerol/ipratropium for Nebulization 3 milliLiter(s) Nebulizer every 6 hours PRN  ALBUTerol/ipratropium for Nebulization 3 milliLiter(s) Nebulizer every 4 hours    NEUROLOGIC:  acetaminophen   Tablet 650 milliGRAM(s) Oral every 6 hours PRN    ONCOLOGIC:    HEMATOLOGIC:  heparin  Injectable 5000 Unit(s) SubCutaneous every 8 hours  aspirin enteric coated 81 milliGRAM(s) Oral daily    GATROINTESTINAL:     MEDS:    ENDO/METABOLIC:  insulin lispro (HumaLOG) corrective regimen sliding scale   SubCutaneous three times a day before meals  dextrose Gel 1 Dose(s) Oral once PRN  dextrose 50% Injectable 12.5 Gram(s) IV Push once  dextrose 50% Injectable 25 Gram(s) IV Push once  dextrose 50% Injectable 25 Gram(s) IV Push once  glucagon  Injectable 1 milliGRAM(s) IntraMuscular once PRN  simvastatin 5 milliGRAM(s) Oral at bedtime    IV FLUID/NUTRITION:  folic acid 1 milliGRAM(s) Oral daily  dextrose 5%. 1000 milliLiter(s) IV Continuous <Continuous>  Nephro-franki 1 Tablet(s) Oral daily    TOPICAL:  fluticasone propionate 50 MICROgram(s)/spray Nasal Spray 2 Spray(s) Both Nostrils two times a day    IMMUNOLOGIC & OTHER  influenza   Vaccine 0.5 milliLiter(s) IntraMuscular once  sevelamer hydrochloride 800 milliGRAM(s) Oral daily      Allergies    Mushrooms (Hives (Mild))  No Known Drug Allergies    Intolerances        Vital Signs Last 24 Hrs  T(C): 36.6 (30 Sep 2017 19:45), Max: 37.1 (30 Sep 2017 10:44)  T(F): 97.8 (30 Sep 2017 19:45), Max: 98.7 (30 Sep 2017 10:44)  HR: 91 (30 Sep 2017 19:45) (80 - 98)  BP: 140/80 (30 Sep 2017 19:45) (140/80 - 178/88)  BP(mean): --  RR: 18 (30 Sep 2017 19:45) (16 - 18)  SpO2: 95% (30 Sep 2017 19:45) (95% - 99%)      Daily     Daily   I&O's Detail    29 Sep 2017 07:01  -  30 Sep 2017 07:00  --------------------------------------------------------  IN:    Packed Red Blood Cells: 375 mL  Total IN: 375 mL    OUT:    Other: 2500 mL  Total OUT: 2500 mL    Total NET: -2125 mL      30 Sep 2017 07:01  -  30 Sep 2017 23:15  --------------------------------------------------------  IN:    Oral Fluid: 240 mL  Total IN: 240 mL    OUT:  Total OUT: 0 mL    Total NET: 240 mL    REVIEW OF SYSTEMS:    Noted above      PHYSICAL EXAM:    GENERAL: NAD, well-groomed, well-developed  HEAD:  Atraumatic, Normocephalic  EYES: EOMI, PERRLA, conjunctiva and sclera clear  ENMT: No tonsillar erythema, exudates, or enlargement; Moist mucous membranes, Good dentition, No lesions  NECK: Supple, No JVD, Normal thyroid  NERVOUS SYSTEM:  Alert & Oriented X3, Good concentration; Motor Strength 5/5 B/L upper and lower extremities; DTRs 2+ intact and symmetric  CHEST/LUNG: Clear to percussion bilaterally; No rales, rhonchi, wheezing, or rubs  HEART: Regular rate and rhythm; No murmurs, rubs, or gallops  ABDOMEN: Soft, Nontender, Nondistended; Bowel sounds present  EXTREMITIES:  2+ Peripheral Pulses, No clubbing, cyanosis, or edema  LYMPH: No lymphadenopathy noted  RECTAL: No masses, prostate normal size and smooth, Guiac negative   BREAST: No palpatble masses, skin no lesions no retractions, no discharages. adenexa no palpable masses noted   GYN: uterus normal size, adenexa no palpable masses, no CMT, no uterine discharge   SKIN: No rashes or lesions      LABS:                        8.9    8.2   )-----------( 250      ( 30 Sep 2017 07:57 )             27.8     CBC Full  -  ( 30 Sep 2017 07:57 )  WBC Count : 8.2 K/uL  Hemoglobin : 8.9 g/dL  Hematocrit : 27.8 %  Platelet Count - Automated : 250 K/uL  Mean Cell Volume : 82.5 fl  Mean Cell Hemoglobin : 26.4 pg  Mean Cell Hemoglobin Concentration : 32.0 g/dL  Auto Neutrophil # : x  Auto Lymphocyte # : x  Auto Monocyte # : x  Auto Eosinophil # : x  Auto Basophil # : x  Auto Neutrophil % : 71.0 %  Auto Lymphocyte % : 17.0 %  Auto Monocyte % : 8.0 %  Auto Eosinophil % : 2.0 %  Auto Basophil % : x    09-30    138  |  96<L>  |  37.0<H>  ----------------------------<  110  4.7   |  27.0  |  5.19<H>    Ca    9.2      30 Sep 2017 07:57  Phos  4.4     09-30  Mg     2.2     09-30    TPro  7.9  /  Alb  3.5  /  TBili  1.4  /  DBili  x   /  AST  13  /  ALT  12  /  AlkPhos  107  09-30        Hemoglobin A1C, Whole Blood: 6.4 % (08-02 @ 06:10)  Hemoglobin A1C, Whole Blood: 7.3 % (04-05 @ 07:09)  Hemoglobin A1C, Whole Blood: 8.0 % (01-27 @ 06:57)      Serum Pro-Brain Natriuretic Peptide: 37191 pg/mL (09-28 @ 18:05)      Albumin, Serum: 3.5 g/dL (09-30 @ 07:57)    LIVER FUNCTIONS - ( 30 Sep 2017 07:57 )  Alb: 3.5 g/dL / Pro: 7.9 g/dL / ALK PHOS: 107 U/L / ALT: 12 U/L / AST: 13 U/L / GGT: x             RADIOLOGY & ADDITIONAL TESTS:    MEDICATIONS Patient is a 37y old  Female who presents with a chief complaint of Chest and nasal congestion (28 Sep 2017 21:02)      INTERVAL HPI/OVERNIGHT EVENTS:  37y  Female seen and examined at bedside. Pt is c/o chest congestion, productive cough with yellowish phlegm, ambulating, tolerating PO intake, voiding, + bowel movement. Denies nausea, vomiting, diarrhea, constipation, chest pain, palpitations, fevers/chills, abdominal pain, n/v, dysuria or increased urinary frequency.        Vital Signs Last 24 Hrs  T(C): 36.6 (30 Sep 2017 19:45), Max: 37.1 (30 Sep 2017 10:44)  T(F): 97.8 (30 Sep 2017 19:45), Max: 98.7 (30 Sep 2017 10:44)  HR: 91 (30 Sep 2017 19:45) (80 - 98)  BP: 140/80 (30 Sep 2017 19:45) (140/80 - 178/88)  BP(mean): --  RR: 18 (30 Sep 2017 19:45) (16 - 18)  SpO2: 95% (30 Sep 2017 19:45) (95% - 99%)      Daily     Daily   I&O's Detail    29 Sep 2017 07:01  -  30 Sep 2017 07:00  --------------------------------------------------------  IN:    Packed Red Blood Cells: 375 mL  Total IN: 375 mL    OUT:    Other: 2500 mL  Total OUT: 2500 mL    Total NET: -2125 mL      30 Sep 2017 07:01  -  30 Sep 2017 23:15  --------------------------------------------------------  IN:    Oral Fluid: 240 mL  Total IN: 240 mL    OUT:  Total OUT: 0 mL    Total NET: 240 mL    REVIEW OF SYSTEMS:    Noted above      PHYSICAL EXAM:    General: Young adult female, obese, NAD, well-groomed, well-developed.  HEENT: NC AT PERRLA, EOMI, moist mucous membranes.  Respiratory:bibasilar crackles on exam  Cardiovascular: RRR, no murmurs, rubs, or gallops.  Gastrointestinal: Obese, BS+, soft, non-tender, non-distended  Extremities: +1 pedal edema on L, +2 on RLE  Vascular: 2+ peripheral pulses.  Neurological: A/O x 3, no focal deficits.  Musculoskeletal: 5/5 strength in bilateral upper and lower extremities.      LABS:                        8.9    8.2   )-----------( 250      ( 30 Sep 2017 07:57 )             27.8       09-30    138  |  96<L>  |  37.0<H>  ----------------------------<  110  4.7   |  27.0  |  5.19<H>    Ca    9.2      30 Sep 2017 07:57  Phos  4.4     09-30  Mg     2.2     09-30    TPro  7.9  /  Alb  3.5  /  TBili  1.4  /  DBili  x   /  AST  13  /  ALT  12  /  AlkPhos  107  09-30        Hemoglobin A1C, Whole Blood: 6.4 % (08-02 @ 06:10)  Hemoglobin A1C, Whole Blood: 7.3 % (04-05 @ 07:09)  Hemoglobin A1C, Whole Blood: 8.0 % (01-27 @ 06:57)      Serum Pro-Brain Natriuretic Peptide: 22313 pg/mL (09-28 @ 18:05)      Albumin, Serum: 3.5 g/dL (09-30 @ 07:57)    LIVER FUNCTIONS - ( 30 Sep 2017 07:57 )  Alb: 3.5 g/dL / Pro: 7.9 g/dL / ALK PHOS: 107 U/L / ALT: 12 U/L / AST: 13 U/L / GGT: x             RADIOLOGY & ADDITIONAL TESTS:    MEDICATIONS    ANTIMICROBIAL:    CARDIOVASCULAR:  hydrALAZINE Injectable 5 milliGRAM(s) IV Push every 6 hours PRN  verapamil 80 milliGRAM(s) Oral every 8 hours  valsartan 160 milliGRAM(s) Oral daily    PULMONARY:  ALBUTerol/ipratropium for Nebulization 3 milliLiter(s) Nebulizer every 6 hours PRN  ALBUTerol/ipratropium for Nebulization 3 milliLiter(s) Nebulizer every 4 hours    NEUROLOGIC:  acetaminophen   Tablet 650 milliGRAM(s) Oral every 6 hours PRN    ONCOLOGIC:    HEMATOLOGIC:  heparin  Injectable 5000 Unit(s) SubCutaneous every 8 hours  aspirin enteric coated 81 milliGRAM(s) Oral daily    GATROINTESTINAL:     MEDS:    ENDO/METABOLIC:  insulin lispro (HumaLOG) corrective regimen sliding scale   SubCutaneous three times a day before meals  dextrose Gel 1 Dose(s) Oral once PRN  dextrose 50% Injectable 12.5 Gram(s) IV Push once  dextrose 50% Injectable 25 Gram(s) IV Push once  dextrose 50% Injectable 25 Gram(s) IV Push once  glucagon  Injectable 1 milliGRAM(s) IntraMuscular once PRN  simvastatin 5 milliGRAM(s) Oral at bedtime    IV FLUID/NUTRITION:  folic acid 1 milliGRAM(s) Oral daily  dextrose 5%. 1000 milliLiter(s) IV Continuous <Continuous>  Nephro-franki 1 Tablet(s) Oral daily    TOPICAL:  fluticasone propionate 50 MICROgram(s)/spray Nasal Spray 2 Spray(s) Both Nostrils two times a day    IMMUNOLOGIC & OTHER  influenza   Vaccine 0.5 milliLiter(s) IntraMuscular once  sevelamer hydrochloride 800 milliGRAM(s) Oral daily    Allergies    Mushrooms (Hives (Mild))  No Known Drug Allergies    Intolerances Patient is a 37y old  Female who presents with a chief complaint of Chest and nasal congestion (28 Sep 2017 21:02)      INTERVAL HPI/OVERNIGHT EVENTS:  37y  Female seen and examined at bedside. Pt is c/o chest congestion, productive cough with yellowish phlegm, ambulating, tolerating PO intake, voiding, + bowel movement. Denies nausea, vomiting, diarrhea, constipation, chest pain, palpitations, fevers/chills, abdominal pain, n/v, dysuria or increased urinary frequency.        Vital Signs Last 24 Hrs  T(C): 36.6 (30 Sep 2017 19:45), Max: 37.1 (30 Sep 2017 10:44)  T(F): 97.8 (30 Sep 2017 19:45), Max: 98.7 (30 Sep 2017 10:44)  HR: 91 (30 Sep 2017 19:45) (80 - 98)  BP: 140/80 (30 Sep 2017 19:45) (140/80 - 178/88)  BP(mean): --  RR: 18 (30 Sep 2017 19:45) (16 - 18)  SpO2: 95% (30 Sep 2017 19:45) (95% - 99%)      Daily     Daily   I&O's Detail    29 Sep 2017 07:01  -  30 Sep 2017 07:00  --------------------------------------------------------  IN:    Packed Red Blood Cells: 375 mL  Total IN: 375 mL    OUT:    Other: 2500 mL  Total OUT: 2500 mL    Total NET: -2125 mL      30 Sep 2017 07:01  -  30 Sep 2017 23:15  --------------------------------------------------------  IN:    Oral Fluid: 240 mL  Total IN: 240 mL    OUT:  Total OUT: 0 mL    Total NET: 240 mL    REVIEW OF SYSTEMS:    Noted above      PHYSICAL EXAM:    General: Young adult female, obese, NAD, well-groomed, well-developed.  HEENT: NC AT PERRLA, EOMI, moist mucous membranes. nasal congestion noted  Respiratory:bibasilar crackles on exam. on supplemental o2  Cardiovascular: RRR, no murmurs, rubs, or gallops.  Gastrointestinal: Obese, BS+, soft, non-tender, non-distended  Extremities: +1 pedal edema on L, +2 on RLE  Vascular: 2+ peripheral pulses.  Neurological: A/O x 3, no focal deficits.  Musculoskeletal: 5/5 strength in bilateral upper and lower extremities.      LABS:                        8.9    8.2   )-----------( 250      ( 30 Sep 2017 07:57 )             27.8       09-30    138  |  96<L>  |  37.0<H>  ----------------------------<  110  4.7   |  27.0  |  5.19<H>    Ca    9.2      30 Sep 2017 07:57  Phos  4.4     09-30  Mg     2.2     09-30    TPro  7.9  /  Alb  3.5  /  TBili  1.4  /  DBili  x   /  AST  13  /  ALT  12  /  AlkPhos  107  09-30        Hemoglobin A1C, Whole Blood: 6.4 % (08-02 @ 06:10)  Hemoglobin A1C, Whole Blood: 7.3 % (04-05 @ 07:09)  Hemoglobin A1C, Whole Blood: 8.0 % (01-27 @ 06:57)      Serum Pro-Brain Natriuretic Peptide: 16944 pg/mL (09-28 @ 18:05)      Albumin, Serum: 3.5 g/dL (09-30 @ 07:57)    LIVER FUNCTIONS - ( 30 Sep 2017 07:57 )  Alb: 3.5 g/dL / Pro: 7.9 g/dL / ALK PHOS: 107 U/L / ALT: 12 U/L / AST: 13 U/L / GGT: x             RADIOLOGY & ADDITIONAL TESTS:    MEDICATIONS    ANTIMICROBIAL:    CARDIOVASCULAR:  hydrALAZINE Injectable 5 milliGRAM(s) IV Push every 6 hours PRN  verapamil 80 milliGRAM(s) Oral every 8 hours  valsartan 160 milliGRAM(s) Oral daily    PULMONARY:  ALBUTerol/ipratropium for Nebulization 3 milliLiter(s) Nebulizer every 6 hours PRN  ALBUTerol/ipratropium for Nebulization 3 milliLiter(s) Nebulizer every 4 hours    NEUROLOGIC:  acetaminophen   Tablet 650 milliGRAM(s) Oral every 6 hours PRN    ONCOLOGIC:    HEMATOLOGIC:  heparin  Injectable 5000 Unit(s) SubCutaneous every 8 hours  aspirin enteric coated 81 milliGRAM(s) Oral daily    GATROINTESTINAL:     MEDS:    ENDO/METABOLIC:  insulin lispro (HumaLOG) corrective regimen sliding scale   SubCutaneous three times a day before meals  dextrose Gel 1 Dose(s) Oral once PRN  dextrose 50% Injectable 12.5 Gram(s) IV Push once  dextrose 50% Injectable 25 Gram(s) IV Push once  dextrose 50% Injectable 25 Gram(s) IV Push once  glucagon  Injectable 1 milliGRAM(s) IntraMuscular once PRN  simvastatin 5 milliGRAM(s) Oral at bedtime    IV FLUID/NUTRITION:  folic acid 1 milliGRAM(s) Oral daily  dextrose 5%. 1000 milliLiter(s) IV Continuous <Continuous>  Nephro-franki 1 Tablet(s) Oral daily    TOPICAL:  fluticasone propionate 50 MICROgram(s)/spray Nasal Spray 2 Spray(s) Both Nostrils two times a day    IMMUNOLOGIC & OTHER  influenza   Vaccine 0.5 milliLiter(s) IntraMuscular once  sevelamer hydrochloride 800 milliGRAM(s) Oral daily    Allergies    Mushrooms (Hives (Mild))  No Known Drug Allergies    Intolerances

## 2017-09-30 NOTE — CHART NOTE - NSCHARTNOTEFT_GEN_A_CORE
Alerted by nurse that patient's RVP panel resulted and its positive.  Droplet precaution order placed.  will provide Supportive therapy

## 2017-09-30 NOTE — PROGRESS NOTE ADULT - PROBLEM SELECTOR PLAN 2
- chest congestion possibly 2/2 fluid overload status after missing HD  - EKG w NSR, non-specific T wave changes, no ST changes, patient w/o chest pain at the moment  - Trop 0.34 likely 2/2 to decreased renal clearance and fluid overload. Patient refuse blood draws, with obtain during dialysis to trend trops.  -patient has a normal stress test in august 2017  -aspirin added, low dose statin also (noted lipid panel ordered - however, this patient has htn, dm and esrd - would benefit from low dose)  Pt is Corona virus +, pt has persistent productive cough, CP likely 2/2 congestion from common cold.

## 2017-09-30 NOTE — PROGRESS NOTE ADULT - PROBLEM SELECTOR PLAN 3
- Had urgent dialysis yesterday   - Patient also scheduled for routine dialysis today  - Difficulty noted when placing needle in L AVF by nursing staff, duplex US to check for AVF patency noted by Vascular and asked to c/w using this AVF for HD   renal replacement meds as above  LLeft Upper ext fistula not functioning properly - chest congestion possibly 2/2 fluid overload status after missing HD  - EKG w NSR, non-specific T wave changes, no ST changes, patient w/o chest pain at the moment  - Trop 0.34 likely 2/2 to decreased renal clearance and fluid overload. Patient refuse blood draws, with obtain during dialysis to trend trops.  -patient has a normal stress test in august 2017  -aspirin added, low dose statin also (noted lipid panel ordered - however, this patient has htn, dm and esrd - would benefit from low dose)  resolved

## 2017-09-30 NOTE — PROGRESS NOTE ADULT - SUBJECTIVE AND OBJECTIVE BOX
Patient seen and examined    Feels better; still has nasal congestion  no c/o CP  NV and SOB less  No swelling feet    Vital Signs Last 24 Hrs  T(C): 36.4 (09-30-17 @ 17:29), Max: 37.1 (09-30-17 @ 10:44)  T(F): 97.6 (09-30-17 @ 17:29), Max: 98.7 (09-30-17 @ 10:44)  HR: 80 (09-30-17 @ 17:29) (80 - 98)  BP: 159/77 (09-30-17 @ 17:29) (150/87 - 179/96)  BP(mean): --  RR: 18 (09-30-17 @ 17:29) (16 - 20)  SpO2: 98% (09-30-17 @ 17:29) (95% - 99%)    Chest   clear Sinus - NT  CV   no murmur  Abd   soft, NT BS+  Extr   No edema  Neuro  grossly iintact motor    30 Sep 2017 07:57    138    |  96     |  37.0   ----------------------------<  110    4.7     |  27.0   |  5.19     Ca    9.2        30 Sep 2017 07:57  Phos  4.4       30 Sep 2017 07:57  Mg     2.2       30 Sep 2017 07:57    TPro  7.9    /  Alb  3.5    /  TBili  1.4    /  DBili  x      /  AST  13     /  ALT  12     /  AlkPhos  107    30 Sep 2017 07:57                          8.9    8.2   )-----------( 250      ( 30 Sep 2017 07:57 )             27.8     Patient overall stable  Labs and Meds reviewed    Continue same treatment  HD Monday

## 2017-09-30 NOTE — PROGRESS NOTE ADULT - PROBLEM SELECTOR PLAN 4
- likely 2/2 fluid overload and missed HD. Likely to trend down with HD   - SBP overnignt 150-170, gradually trending down from admission  - c/w Verapamil 80 mg PO [id number]  - patient unsure of home BP meds, will confirm with pharmacy in community today  - hydralazine 5mg IV push q6 hours, PRN for SBP>160  Pt has been refusing rx, will document as it occurs. - Had urgent dialysis yesterday   - Patient also scheduled for routine dialysis today  - Difficulty noted when placing needle in L AVF by nursing staff, duplex US to check for AVF patency noted by Vascular and asked to c/w using this AVF for HD   renal replacement meds as above  LLeft Upper ext fistula not functioning properly  - fu vasc sx to see if any intervention planned to optimize the fistula function  - if not pt stable for dc home with outpt fu

## 2017-09-30 NOTE — PROGRESS NOTE ADULT - PROBLEM SELECTOR PLAN 1
likely 2/2 URI. Blood cx are in lab   Flonase for now   RVP ordered was Coronavirus + . Congestion likely 2/2 common cold. confirmed with viral testing on this admission  - flonase 2 sprays bid  - encoruage po intake  - vitamin c

## 2017-09-30 NOTE — PROGRESS NOTE ADULT - PROBLEM SELECTOR PLAN 5
- pt w hb of 6.9, drop from baseline of mid 8s  - possibly 2/2 to hemodilution after not getting dialysis  - s/p 1 unit pRBCs , repeat h/h pending. Patient will likely need another unit as she is also SOB more likely from URI but nonetheless has sob and is anemic   - c/w Procrit (inquired from renal about starting this)  - c/w Folic acid and Nephro vitamins - likely 2/2 fluid overload and missed HD. Likely to trend down with HD   - SBP overnignt 150-170, gradually trending down from admission  - c/w Verapamil 80 mg PO [id number]  - patient unsure of home BP meds, will confirm with pharmacy in community today  - hydralazine 5mg IV push q6 hours, PRN for SBP>160  Pt has been refusing rx, will document as it occurs.

## 2017-09-30 NOTE — PROGRESS NOTE ADULT - ASSESSMENT
38 y/o female with ESRD diagnosed in 1/17 on hemodialysis  M-W-F,  HTN, DM tx w/ insulin, mobid obesity, prior hospitalizations with bacteremia x 2 with Serratia, PC in right side x 2 which has since been taken out and a LUE AVF which was pending superficialization as of the last hospital stay comes into the hospital due to chest congestion, possibly 2/2 fluid overload status after missing HD on Wednesday. Also found to have hypertensive urgency likely  2/2 fluid overload vs non-compliance,  and noted symptomatic anemia, s/p 1 unit pRBC. Pt is tentatively scheduled for fistulogram on 10/02/2017. Pt has been refusing medications. Will document as it occurs.

## 2017-09-30 NOTE — PROGRESS NOTE ADULT - PROBLEM SELECTOR PLAN 6
- Humalog Sliding scale  - lashell  - 8/17 A1c 6.4 - pt w hb of 6.9, drop from baseline of mid 8s  - possibly 2/2 to hemodilution after not getting dialysis  - s/p 1 unit pRBCs , repeat h/h pending. Patient will likely need another unit as she is also SOB more likely from URI but nonetheless has sob and is anemic   - sp 1dose of procrit 9/28, discuss continuation with nephro  - c/w Folic acid and Nephro vitamins

## 2017-09-30 NOTE — PROGRESS NOTE ADULT - SUBJECTIVE AND OBJECTIVE BOX
SORAYA BARBARA    [id number]    History:     Vascular follow up.  Left avf, concerns for access issues.  AVF duplex on 9/28 exhibits patent fistula.  Patient reports last Hemodialysis session as successful.  Reports cough, runny nose, currently on droplet precautions for the Flu.    Vital Signs Last 24 Hrs  T(C): 37.1 (30 Sep 2017 10:44), Max: 37.1 (30 Sep 2017 10:44)  T(F): 98.7 (30 Sep 2017 10:44), Max: 98.7 (30 Sep 2017 10:44)  HR: 96 (30 Sep 2017 10:44) (90 - 100)  BP: 176/97 (30 Sep 2017 10:44) (150/87 - 179/96)  BP(mean): --  RR: 18 (30 Sep 2017 10:44) (16 - 20)  SpO2: 98% (30 Sep 2017 10:44) (95% - 99%)  I&O's Summary    29 Sep 2017 07:01  -  30 Sep 2017 07:00  --------------------------------------------------------  IN: 375 mL / OUT: 2500 mL / NET: -2125 mL    30 Sep 2017 07:01  -  30 Sep 2017 11:50  --------------------------------------------------------  IN: 240 mL / OUT: 0 mL / NET: 240 mL                              8.9    8.2   )-----------( 250      ( 30 Sep 2017 07:57 )             27.8     09-30    138  |  96<L>  |  37.0<H>  ----------------------------<  110  4.7   |  27.0  |  5.19<H>    Ca    9.2      30 Sep 2017 07:57  Phos  4.4     09-30  Mg     2.2     09-30    TPro  7.9  /  Alb  3.5  /  TBili  1.4  /  DBili  x   /  AST  13  /  ALT  12  /  AlkPhos  107  09-30      MEDICATIONS  (STANDING):  folic acid 1 milliGRAM(s) Oral daily  heparin  Injectable 5000 Unit(s) SubCutaneous every 8 hours  verapamil 80 milliGRAM(s) Oral every 8 hours  insulin lispro (HumaLOG) corrective regimen sliding scale   SubCutaneous three times a day before meals  dextrose 5%. 1000 milliLiter(s) (50 mL/Hr) IV Continuous <Continuous>  dextrose 50% Injectable 12.5 Gram(s) IV Push once  dextrose 50% Injectable 25 Gram(s) IV Push once  dextrose 50% Injectable 25 Gram(s) IV Push once  influenza   Vaccine 0.5 milliLiter(s) IntraMuscular once  ALBUTerol/ipratropium for Nebulization 3 milliLiter(s) Nebulizer every 4 hours  sevelamer hydrochloride 800 milliGRAM(s) Oral daily  valsartan 160 milliGRAM(s) Oral daily  aspirin enteric coated 81 milliGRAM(s) Oral daily  simvastatin 5 milliGRAM(s) Oral at bedtime  Nephro-franki 1 Tablet(s) Oral daily  fluticasone propionate 50 MICROgram(s)/spray Nasal Spray 2 Spray(s) Both Nostrils two times a day    MEDICATIONS  (PRN):  hydrALAZINE Injectable 5 milliGRAM(s) IV Push every 6 hours PRN SBP>160 or DBP>100  dextrose Gel 1 Dose(s) Oral once PRN Blood Glucose LESS THAN 70 milliGRAM(s)/deciliter  glucagon  Injectable 1 milliGRAM(s) IntraMuscular once PRN Glucose LESS THAN 70 milligrams/deciliter  ALBUTerol/ipratropium for Nebulization 3 milliLiter(s) Nebulizer every 6 hours PRN Shortness of Breath and/or Wheezing  acetaminophen   Tablet 650 milliGRAM(s) Oral every 6 hours PRN pain/fever        Physical exam:      Left upper ext with excellent thrill at AVf site.  extremity is warm  no gross motor /sensory deficits  good  strength    Assessment:    Patent Left AVF    •   Plan:   • Continue to access as needed.  will report findings to Attending, may still require a fistulogram for confirmation

## 2017-10-01 DIAGNOSIS — B34.2 CORONAVIRUS INFECTION, UNSPECIFIED: ICD-10-CM

## 2017-10-01 LAB — PTH-INTACT FLD-MCNC: 119 PG/ML — HIGH (ref 15–65)

## 2017-10-01 PROCEDURE — 99233 SBSQ HOSP IP/OBS HIGH 50: CPT | Mod: GC

## 2017-10-01 RX ORDER — ASCORBIC ACID 60 MG
500 TABLET,CHEWABLE ORAL DAILY
Qty: 0 | Refills: 0 | Status: DISCONTINUED | OUTPATIENT
Start: 2017-10-01 | End: 2017-10-02

## 2017-10-01 RX ADMIN — Medication 80 MILLIGRAM(S): at 13:03

## 2017-10-01 RX ADMIN — Medication 3 MILLILITER(S): at 00:15

## 2017-10-01 RX ADMIN — Medication 3 MILLILITER(S): at 23:52

## 2017-10-01 RX ADMIN — SIMVASTATIN 5 MILLIGRAM(S): 20 TABLET, FILM COATED ORAL at 22:40

## 2017-10-01 RX ADMIN — Medication 80 MILLIGRAM(S): at 06:46

## 2017-10-01 RX ADMIN — HEPARIN SODIUM 5000 UNIT(S): 5000 INJECTION INTRAVENOUS; SUBCUTANEOUS at 06:45

## 2017-10-01 RX ADMIN — Medication 3 MILLILITER(S): at 08:03

## 2017-10-01 RX ADMIN — Medication 3 MILLILITER(S): at 21:02

## 2017-10-01 RX ADMIN — Medication 81 MILLIGRAM(S): at 11:25

## 2017-10-01 RX ADMIN — Medication 3 MILLILITER(S): at 03:30

## 2017-10-01 RX ADMIN — Medication 1 MILLIGRAM(S): at 11:25

## 2017-10-01 RX ADMIN — Medication 2 SPRAY(S): at 17:39

## 2017-10-01 RX ADMIN — Medication 1 TABLET(S): at 11:26

## 2017-10-01 RX ADMIN — Medication 500 MILLIGRAM(S): at 11:26

## 2017-10-01 RX ADMIN — Medication 650 MILLIGRAM(S): at 00:04

## 2017-10-01 RX ADMIN — HEPARIN SODIUM 5000 UNIT(S): 5000 INJECTION INTRAVENOUS; SUBCUTANEOUS at 22:40

## 2017-10-01 RX ADMIN — SEVELAMER CARBONATE 800 MILLIGRAM(S): 2400 POWDER, FOR SUSPENSION ORAL at 11:25

## 2017-10-01 RX ADMIN — Medication 3 MILLILITER(S): at 12:50

## 2017-10-01 RX ADMIN — Medication 80 MILLIGRAM(S): at 22:40

## 2017-10-01 RX ADMIN — Medication 3 MILLILITER(S): at 16:08

## 2017-10-01 RX ADMIN — VALSARTAN 160 MILLIGRAM(S): 80 TABLET ORAL at 06:46

## 2017-10-01 RX ADMIN — Medication 2 SPRAY(S): at 06:45

## 2017-10-01 NOTE — PROGRESS NOTE ADULT - ASSESSMENT
36 y/o female with ESRD diagnosed in 1/17 on hemodialysis  M-W-F,  HTN, DM tx w/ insulin, mobid obesity, prior hospitalizations with bacteremia x 2 with Serratia, PC in right side x 2 which has since been taken out and a LUE AVF which was pending superficialization as of the last hospital stay comes into the hospital due to chest congestion, possibly 2/2 fluid overload status after missing HD on Wednesday. Also found to have hypertensive urgency likely  2/2 fluid overload vs non-compliance,  and noted symptomatic anemia, s/p 1 unit pRBC. 36 y/o female with ESRD diagnosed in 1/17 on hemodialysis  M-W-F,  HTN, DM tx w/ insulin, mobid obesity, prior hospitalizations with bacteremia x 2 with Serratia, PC in right side x 2 which has since been taken out and a LUE AVF which was pending superficialization as of the last hospital stay comes into the hospital due to chest congestion. Thought to be multifactorial in nature from both fluid overload status after missing HD on Wednesday and congestion from coronavirus confirmed in hospital.     On admission she was also found to have hypertensive urgency likely  2/2 fluid overload from HD non-compliance and noted symptomatic acute on chronic anemia of chronic kidney disease, s/p 1 unit pRBC. Well tolerated.

## 2017-10-01 NOTE — PROGRESS NOTE ADULT - PROBLEM SELECTOR PLAN 1
likely 2/2 URI. Blood cx are in lab   Flonase for now   RVP ordered  No fevers at this time likely 2/2 URI. Blood cx are in lab   Flonase for now   RVP positive for Coronovirus, supportive care, f/u as outpatient  Afebrile confirmed inpatient with testing  improving  bld cx pending, unlikely to be pos  - cont supportive care  -cont flonase

## 2017-10-01 NOTE — PROGRESS NOTE ADULT - PROBLEM SELECTOR PLAN 5
- pt w hb of 6.9, drop from baseline of mid 8s  - possibly 2/2 to hemodilution after not getting dialysis  - s/p 1 unit pRBCs , repeat h/h pending. Patient will likely need another unit as she is also SOB more likely from URI but nonetheless has sob and is anemic   - c/w Procrit (inquired from renal about starting this)  - c/w Folic acid and Nephro vitamins - pt w hb of 8.9 (9/30), trending up from 7/29 (6.9)  - possibly 2/2 to hemodilution after not getting dialysis  - s/p 1 unit pRBCs , repeat h/h pending. Patient will likely need another unit as she is also SOB more likely from URI but nonetheless has sob and is anemic   - c/w Procrit (inquired from renal about starting this)  - c/w Folic acid and Nephro vitamins - likely 2/2 fluid overload and missed HD. Likely to trend down with HD   - c/w Verapamil 80 mg PO [id number]  - hydralazine 5mg IV push q6 hours, PRN for SBP>160 - possibly 2/2 to hemodilution after not getting dialysis  - s/p 1 unit pRBCs   - c/w Procrit (inquired from renal about starting this)  - c/w Folic acid and Nephro vitamins

## 2017-10-01 NOTE — PROGRESS NOTE ADULT - SUBJECTIVE AND OBJECTIVE BOX
Patient seen and examined    Feels better; SOB less; no nasal congestion at present  still on NCO but feels can come off  uses O2 at home during exertion  no c/o CP F/C NV   No swelling feet    Vital Signs Last 24 Hrs  T(C): 36.6 (10-01-17 @ 16:05), Max: 36.6 (09-30-17 @ 19:45)  T(F): 97.8 (10-01-17 @ 16:05), Max: 97.8 (09-30-17 @ 19:45)  HR: 95 (10-01-17 @ 16:05) (80 - 95)  BP: 140/80 (10-01-17 @ 16:05) (134/76 - 162/78)  BP(mean): --  RR: 18 (10-01-17 @ 16:05) (18 - 18)  SpO2: 96% (10-01-17 @ 16:05) (95% - 98%)    Chest   clear  CV   no murmur  Abd   soft, NT BS+  Extr   No edema  Neuro  grossly iintact motor      30 Sep 2017 07:57    138    |  96     |  37.0   ----------------------------<  110    4.7     |  27.0   |  5.19     Ca    9.2        30 Sep 2017 07:57  Phos  4.4       30 Sep 2017 07:57  Mg     2.2       30 Sep 2017 07:57    TPro  7.9    /  Alb  3.5    /  TBili  1.4    /  DBili  x      /  AST  13     /  ALT  12     /  AlkPhos  107    30 Sep 2017 07:57                          8.9    8.2   )-----------( 250      ( 30 Sep 2017 07:57 )             27.8       ESRD - HD MWF; Labs on Hd  check iron panel also  Procrit

## 2017-10-01 NOTE — PROGRESS NOTE ADULT - PROBLEM SELECTOR PLAN 4
- likely 2/2 fluid overload and missed HD. Likely to trend down with HD   - SBP overnignt 150-170, gradually trending down from admission  - c/w Verapamil 80 mg PO [id number]  - patient unsure of home BP meds, will confirm with pharmacy in community today  - hydralazine 5mg IV push q6 hours, PRN for SBP>160 - likely 2/2 fluid overload and missed HD. Likely to trend down with HD   - c/w Verapamil 80 mg PO [id number]  - hydralazine 5mg IV push q6 hours, PRN for SBP>160 - Patient had routine dialysis yesterday  - Difficulty noted when placing needle in L AVF by nursing staff, duplex US to check for AVF patency noted by Vascular and asked to c/w using this AVF for HD   renal replacement meds as above  -Vascular surgery possible fistulogram, will follow  -post vasc sx intervention, pt stable for dc home - likely 2/2 fluid overload and missed HD. Likely to trend down with HD   - c/w Verapamil 80 mg PO [id number]  - hydralazine 5mg IV push q6 hours, PRN for SBP>160

## 2017-10-01 NOTE — PROGRESS NOTE ADULT - PROBLEM SELECTOR PLAN 3
- Had urgent dialysis yesterday   - Patient also scheduled for routine dialysis today  - Difficulty noted when placing needle in L AVF by nursing staff, duplex US to check for AVF patency noted by Vascular and asked to c/w using this AVF for HD   renal replacement meds as above - Patient had routine dialysis yesterday  - Difficulty noted when placing needle in L AVF by nursing staff, duplex US to check for AVF patency noted by Vascular and asked to c/w using this AVF for HD   renal replacement meds as above  -Vascular surgery possible fistulogram - chest congestion possibly 2/2 fluid overload status after missing HD and URI  - resolved  - EKG w NSR, non-specific T wave changes, no ST changes, patient w/o chest pain at the moment  - Trop 0.34 likely 2/2 to decreased renal clearance and fluid overload. Patient refuse blood draws, with obtain during dialysis to trend trops.  -patient has a normal stress test in august 2017  -aspirin added, low dose statin also - Patient had routine dialysis yesterday  - Difficulty noted when placing needle in L AVF by nursing staff, duplex US to check for AVF patency noted by Vascular and asked to c/w using this AVF for HD   renal replacement meds as above  -Vascular surgery possible fistulogram, will follow  -post vasc sx intervention, pt stable for dc home

## 2017-10-01 NOTE — PROGRESS NOTE ADULT - SUBJECTIVE AND OBJECTIVE BOX
Patient is a 37y old  Female who presents with a chief complaint of Chest and nasal congestion     Patient seen and examined at bedside. No acute distress and no acute overnight events. Patient states that she feels much better. Still has cough with yellowish sputum.   Tolerating po diet, Ambulating without difficulty, Last Bowel Movement yesterday    Monitor: no events overnight    ROS: No chest pain, palpitations,fevers/chills, abdominal pain, n/v, diarrhea/constipation, dysuria or increased urinary frequency.,    Vital Signs Last 24 Hrs  T(C): 36.6 (01 Oct 2017 06:40), Max: 37.1 (30 Sep 2017 10:44)  T(F): 97.8 (01 Oct 2017 06:40), Max: 98.7 (30 Sep 2017 10:44)  HR: 95 (01 Oct 2017 06:40) (80 - 96)  BP: 134/76 (01 Oct 2017 06:40) (134/76 - 176/97)  RR: 18 (01 Oct 2017 06:40) (18 - 18)  SpO2: 96% (01 Oct 2017 06:40) (95% - 98%)        PHYSICAL EXAM:  General: Young adult female, obese, NAD, well-groomed, well-developed.  HEENT: NC AT PERRLA, EOMI, moist mucous membranes.  Respiratory: bibasilar crackles on exam  Cardiovascular: RRR, no murmurs, rubs, or gallops.  Gastrointestinal: Obese, BS+, soft, non-tender, non-distended  Neurological: A/O x 3, no focal deficits.  Musculoskeletal: 5/5 strength in bilateral upper and lower extremities.    MEDICATIONS  (STANDING):  folic acid 1 milliGRAM(s) Oral daily  heparin  Injectable 5000 Unit(s) SubCutaneous every 8 hours  verapamil 80 milliGRAM(s) Oral every 8 hours  insulin lispro (HumaLOG) corrective regimen sliding scale   SubCutaneous three times a day before meals  dextrose 5%. 1000 milliLiter(s) (50 mL/Hr) IV Continuous <Continuous>  dextrose 50% Injectable 12.5 Gram(s) IV Push once  dextrose 50% Injectable 25 Gram(s) IV Push once  dextrose 50% Injectable 25 Gram(s) IV Push once  influenza   Vaccine 0.5 milliLiter(s) IntraMuscular once  ALBUTerol/ipratropium for Nebulization 3 milliLiter(s) Nebulizer every 4 hours  sevelamer hydrochloride 800 milliGRAM(s) Oral daily  valsartan 160 milliGRAM(s) Oral daily  aspirin enteric coated 81 milliGRAM(s) Oral daily  simvastatin 5 milliGRAM(s) Oral at bedtime  Nephro-franki 1 Tablet(s) Oral daily  fluticasone propionate 50 MICROgram(s)/spray Nasal Spray 2 Spray(s) Both Nostrils two times a day    MEDICATIONS  (PRN):  hydrALAZINE Injectable 5 milliGRAM(s) IV Push every 6 hours PRN SBP>160 or DBP>100  dextrose Gel 1 Dose(s) Oral once PRN Blood Glucose LESS THAN 70 milliGRAM(s)/deciliter  glucagon  Injectable 1 milliGRAM(s) IntraMuscular once PRN Glucose LESS THAN 70 milligrams/deciliter  ALBUTerol/ipratropium for Nebulization 3 milliLiter(s) Nebulizer every 6 hours PRN Shortness of Breath and/or Wheezing  acetaminophen   Tablet 650 milliGRAM(s) Oral every 6 hours PRN pain/fever        LABS:                                   8.9    8.2   )-----------( 250      ( 30 Sep 2017 07:57 )             27.8       09-30    138  |  96<L>  |  37.0<H>  ----------------------------<  110  4.7   |  27.0  |  5.19<H>    Ca    9.2      30 Sep 2017 07:57  Phos  4.4     09-30  Mg     2.2     09-30    TPro  7.9  /  Alb  3.5  /  TBili  1.4  /  DBili  x   /  AST  13  /  ALT  12  /  AlkPhos  107  09-30      Ca    9.2      28 Sep 2017 18:06    TPro  8.2  /  Alb  3.5  /  TBili  0.9  /  DBili  x   /  AST  21  /  ALT  14  /  AlkPhos  106  09-28

## 2017-10-01 NOTE — PROGRESS NOTE ADULT - PROBLEM SELECTOR PLAN 6
- Humalog Sliding scale  - accuchecks: 97  - 8/17 A1c 6.4 - Humalog Sliding scale  - accuchecks: 123, 117, 167, 145  - 8/17 A1c 6.4 - possibly 2/2 to hemodilution after not getting dialysis  - s/p 1 unit pRBCs   - c/w Procrit (inquired from renal about starting this)  - c/w Folic acid and Nephro vitamins - 8/17 A1c 6.4  - Humalog Sliding scale

## 2017-10-01 NOTE — PROGRESS NOTE ADULT - PROBLEM SELECTOR PLAN 2
- chest congestion possibly 2/2 fluid overload status after missing HD  - EKG w NSR, non-specific T wave changes, no ST changes, patient w/o chest pain at the moment  - Trop 0.34 likely 2/2 to decreased renal clearance and fluid overload. Patient refuse blood draws, with obtain during dialysis to trend trops.  -patient has a normal stress test in august 2017  -aspirin added, low dose statin also (noted lipid panel ordered - however, this patient has htn, dm and esrd - would benefit from low dose) - chest congestion possibly 2/2 fluid overload status after missing HD and URI  - resolved  - EKG w NSR, non-specific T wave changes, no ST changes, patient w/o chest pain at the moment  - Trop 0.34 likely 2/2 to decreased renal clearance and fluid overload. Patient refuse blood draws, with obtain during dialysis to trend trops.  -patient has a normal stress test in august 2017  -aspirin added, low dose statin also

## 2017-10-02 ENCOUNTER — TRANSCRIPTION ENCOUNTER (OUTPATIENT)
Age: 37
End: 2017-10-02

## 2017-10-02 VITALS
RESPIRATION RATE: 18 BRPM | TEMPERATURE: 98 F | OXYGEN SATURATION: 99 % | HEART RATE: 99 BPM | DIASTOLIC BLOOD PRESSURE: 72 MMHG | SYSTOLIC BLOOD PRESSURE: 142 MMHG

## 2017-10-02 LAB
ALBUMIN SERPL ELPH-MCNC: 3.6 G/DL — SIGNIFICANT CHANGE UP (ref 3.3–5.2)
ALP SERPL-CCNC: 119 U/L — SIGNIFICANT CHANGE UP (ref 40–120)
ALT FLD-CCNC: 15 U/L — SIGNIFICANT CHANGE UP
ANION GAP SERPL CALC-SCNC: 18 MMOL/L — HIGH (ref 5–17)
AST SERPL-CCNC: 18 U/L — SIGNIFICANT CHANGE UP
BILIRUB SERPL-MCNC: 0.9 MG/DL — SIGNIFICANT CHANGE UP (ref 0.4–2)
BUN SERPL-MCNC: 71 MG/DL — HIGH (ref 8–20)
CALCIUM SERPL-MCNC: 8.9 MG/DL — SIGNIFICANT CHANGE UP (ref 8.6–10.2)
CHLORIDE SERPL-SCNC: 92 MMOL/L — LOW (ref 98–107)
CO2 SERPL-SCNC: 22 MMOL/L — SIGNIFICANT CHANGE UP (ref 22–29)
CREAT SERPL-MCNC: 7.95 MG/DL — HIGH (ref 0.5–1.3)
FERRITIN SERPL-MCNC: 459 NG/ML — HIGH (ref 11–306)
GLUCOSE SERPL-MCNC: 134 MG/DL — HIGH (ref 70–115)
HCT VFR BLD CALC: 26.8 % — LOW (ref 37–47)
HGB BLD-MCNC: 8.6 G/DL — LOW (ref 12–16)
IRON SATN MFR SERPL: 16 % — SIGNIFICANT CHANGE UP (ref 14–50)
IRON SATN MFR SERPL: 42 UG/DL — SIGNIFICANT CHANGE UP (ref 37–145)
MCHC RBC-ENTMCNC: 26.4 PG — LOW (ref 27–31)
MCHC RBC-ENTMCNC: 32.1 G/DL — SIGNIFICANT CHANGE UP (ref 32–36)
MCV RBC AUTO: 82.2 FL — SIGNIFICANT CHANGE UP (ref 81–99)
PLATELET # BLD AUTO: 259 K/UL — SIGNIFICANT CHANGE UP (ref 150–400)
POTASSIUM SERPL-MCNC: 5.2 MMOL/L — SIGNIFICANT CHANGE UP (ref 3.5–5.3)
POTASSIUM SERPL-SCNC: 5.2 MMOL/L — SIGNIFICANT CHANGE UP (ref 3.5–5.3)
PROT SERPL-MCNC: 7.8 G/DL — SIGNIFICANT CHANGE UP (ref 6.6–8.7)
RBC # BLD: 3.26 M/UL — LOW (ref 4.4–5.2)
RBC # FLD: 21.3 % — HIGH (ref 11–15.6)
SODIUM SERPL-SCNC: 132 MMOL/L — LOW (ref 135–145)
TIBC SERPL-MCNC: 265 UG/DL — SIGNIFICANT CHANGE UP (ref 220–430)
TRANSFERRIN SERPL-MCNC: 185 MG/DL — LOW (ref 192–382)
WBC # BLD: 10 K/UL — SIGNIFICANT CHANGE UP (ref 4.8–10.8)
WBC # FLD AUTO: 10 K/UL — SIGNIFICANT CHANGE UP (ref 4.8–10.8)

## 2017-10-02 PROCEDURE — 82728 ASSAY OF FERRITIN: CPT

## 2017-10-02 PROCEDURE — 80053 COMPREHEN METABOLIC PANEL: CPT

## 2017-10-02 PROCEDURE — 36430 TRANSFUSION BLD/BLD COMPNT: CPT

## 2017-10-02 PROCEDURE — 85018 HEMOGLOBIN: CPT

## 2017-10-02 PROCEDURE — 84100 ASSAY OF PHOSPHORUS: CPT

## 2017-10-02 PROCEDURE — 87581 M.PNEUMON DNA AMP PROBE: CPT

## 2017-10-02 PROCEDURE — 83735 ASSAY OF MAGNESIUM: CPT

## 2017-10-02 PROCEDURE — 83880 ASSAY OF NATRIURETIC PEPTIDE: CPT

## 2017-10-02 PROCEDURE — 86922 COMPATIBILITY TEST ANTIGLOB: CPT

## 2017-10-02 PROCEDURE — 80048 BASIC METABOLIC PNL TOTAL CA: CPT

## 2017-10-02 PROCEDURE — 99285 EMERGENCY DEPT VISIT HI MDM: CPT | Mod: 25

## 2017-10-02 PROCEDURE — 99261: CPT

## 2017-10-02 PROCEDURE — 83605 ASSAY OF LACTIC ACID: CPT

## 2017-10-02 PROCEDURE — 96374 THER/PROPH/DIAG INJ IV PUSH: CPT

## 2017-10-02 PROCEDURE — 83970 ASSAY OF PARATHORMONE: CPT

## 2017-10-02 PROCEDURE — 93990 DOPPLER FLOW TESTING: CPT

## 2017-10-02 PROCEDURE — 93970 EXTREMITY STUDY: CPT

## 2017-10-02 PROCEDURE — 86850 RBC ANTIBODY SCREEN: CPT

## 2017-10-02 PROCEDURE — 86900 BLOOD TYPING SEROLOGIC ABO: CPT

## 2017-10-02 PROCEDURE — 93005 ELECTROCARDIOGRAM TRACING: CPT

## 2017-10-02 PROCEDURE — 99239 HOSP IP/OBS DSCHRG MGMT >30: CPT

## 2017-10-02 PROCEDURE — 86901 BLOOD TYPING SEROLOGIC RH(D): CPT

## 2017-10-02 PROCEDURE — 87798 DETECT AGENT NOS DNA AMP: CPT

## 2017-10-02 PROCEDURE — 87040 BLOOD CULTURE FOR BACTERIA: CPT

## 2017-10-02 PROCEDURE — 36415 COLL VENOUS BLD VENIPUNCTURE: CPT

## 2017-10-02 PROCEDURE — 85027 COMPLETE CBC AUTOMATED: CPT

## 2017-10-02 PROCEDURE — 87633 RESP VIRUS 12-25 TARGETS: CPT

## 2017-10-02 PROCEDURE — 71046 X-RAY EXAM CHEST 2 VIEWS: CPT

## 2017-10-02 PROCEDURE — 84484 ASSAY OF TROPONIN QUANT: CPT

## 2017-10-02 PROCEDURE — 83550 IRON BINDING TEST: CPT

## 2017-10-02 PROCEDURE — 84466 ASSAY OF TRANSFERRIN: CPT

## 2017-10-02 PROCEDURE — 87486 CHLMYD PNEUM DNA AMP PROBE: CPT

## 2017-10-02 PROCEDURE — 94640 AIRWAY INHALATION TREATMENT: CPT

## 2017-10-02 PROCEDURE — P9016: CPT

## 2017-10-02 PROCEDURE — 82310 ASSAY OF CALCIUM: CPT

## 2017-10-02 RX ORDER — VERAPAMIL HCL 240 MG
20 CAPSULE, EXTENDED RELEASE PELLETS 24 HR ORAL ONCE
Qty: 0 | Refills: 0 | Status: DISCONTINUED | OUTPATIENT
Start: 2017-10-02 | End: 2017-10-02

## 2017-10-02 RX ORDER — SIMVASTATIN 20 MG/1
1 TABLET, FILM COATED ORAL
Qty: 30 | Refills: 0 | OUTPATIENT
Start: 2017-10-02 | End: 2017-11-01

## 2017-10-02 RX ORDER — VERAPAMIL HCL 240 MG
40 CAPSULE, EXTENDED RELEASE PELLETS 24 HR ORAL ONCE
Qty: 0 | Refills: 0 | Status: DISCONTINUED | OUTPATIENT
Start: 2017-10-02 | End: 2017-10-02

## 2017-10-02 RX ORDER — ERYTHROPOIETIN 10000 [IU]/ML
2000 INJECTION, SOLUTION INTRAVENOUS; SUBCUTANEOUS
Qty: 0 | Refills: 0 | Status: DISCONTINUED | OUTPATIENT
Start: 2017-10-02 | End: 2017-10-02

## 2017-10-02 RX ORDER — ASCORBIC ACID 60 MG
1 TABLET,CHEWABLE ORAL
Qty: 30 | Refills: 0 | OUTPATIENT
Start: 2017-10-02 | End: 2017-11-01

## 2017-10-02 RX ORDER — ASPIRIN/CALCIUM CARB/MAGNESIUM 324 MG
1 TABLET ORAL
Qty: 0 | Refills: 0 | COMMUNITY
Start: 2017-10-02

## 2017-10-02 RX ORDER — FLUTICASONE PROPIONATE 50 MCG
2 SPRAY, SUSPENSION NASAL
Qty: 1 | Refills: 0 | OUTPATIENT
Start: 2017-10-02 | End: 2017-11-01

## 2017-10-02 RX ADMIN — Medication 81 MILLIGRAM(S): at 11:21

## 2017-10-02 RX ADMIN — Medication 1 TABLET(S): at 11:21

## 2017-10-02 RX ADMIN — Medication 2 SPRAY(S): at 18:11

## 2017-10-02 RX ADMIN — VALSARTAN 160 MILLIGRAM(S): 80 TABLET ORAL at 05:43

## 2017-10-02 RX ADMIN — Medication 3 MILLILITER(S): at 03:24

## 2017-10-02 RX ADMIN — Medication 1 MILLIGRAM(S): at 11:21

## 2017-10-02 RX ADMIN — Medication 80 MILLIGRAM(S): at 13:25

## 2017-10-02 RX ADMIN — SEVELAMER CARBONATE 800 MILLIGRAM(S): 2400 POWDER, FOR SUSPENSION ORAL at 11:21

## 2017-10-02 RX ADMIN — Medication 500 MILLIGRAM(S): at 11:21

## 2017-10-02 RX ADMIN — Medication 0.1 MILLIGRAM(S): at 17:53

## 2017-10-02 RX ADMIN — Medication 80 MILLIGRAM(S): at 05:43

## 2017-10-02 RX ADMIN — Medication 3 MILLILITER(S): at 12:22

## 2017-10-02 RX ADMIN — HEPARIN SODIUM 5000 UNIT(S): 5000 INJECTION INTRAVENOUS; SUBCUTANEOUS at 13:25

## 2017-10-02 RX ADMIN — Medication 3 MILLILITER(S): at 08:32

## 2017-10-02 RX ADMIN — Medication 5 MILLIGRAM(S): at 16:57

## 2017-10-02 RX ADMIN — Medication 2 SPRAY(S): at 05:44

## 2017-10-02 RX ADMIN — ERYTHROPOIETIN 2000 UNIT(S): 10000 INJECTION, SOLUTION INTRAVENOUS; SUBCUTANEOUS at 14:34

## 2017-10-02 NOTE — PROGRESS NOTE ADULT - PROBLEM SELECTOR PLAN 2
- chest congestion possibly 2/2 fluid overload status after missing HD and URI  - resolved  - EKG w NSR, non-specific T wave changes, no ST changes, patient w/o chest pain at the moment  - Trop 0.34 likely 2/2 to decreased renal clearance and fluid overload. Patient refuse blood draws, with obtain during dialysis to trend trops.  -patient has a normal stress test in august 2017  -aspirin added, low dose statin also  -denies CP (10/02/2017) - chest congestion possibly 2/2 fluid overload status after missing HD and URI  - resolved  - EKG w NSR, non-specific T wave changes, no ST changes, patient w/o chest pain at the moment  - Trop 0.34 likely 2/2 to decreased renal clearance and fluid overload. Patient refuse blood draws, with obtain during dialysis to trend trops.  -patient has a normal stress test in august 2017  -aspirin added, low dose statin also

## 2017-10-02 NOTE — PROGRESS NOTE ADULT - ASSESSMENT
36 y/o female with ESRD diagnosed in 1/17 on hemodialysis  M-W-F,  HTN, DM tx w/ insulin, mobid obesity, prior hospitalizations with bacteremia x 2 with Serratia, PC in right side x 2 which has since been taken out and a LUE AVF which was pending superficialization as of the last hospital stay comes into the hospital due to chest congestion. Thought to be multifactorial in nature from both fluid overload status after missing HD on Wednesday and congestion from coronavirus confirmed in hospital.     On admission she was also found to have hypertensive urgency likely  2/2 fluid overload from HD non-compliance and noted symptomatic acute on chronic anemia of chronic kidney disease, s/p 1 unit pRBC. Well tolerated.

## 2017-10-02 NOTE — DISCHARGE NOTE ADULT - PLAN OF CARE
You had chest congestion and productive cough which prompted a respiratory viral panel to test for viruses that may cause your Upper Respiratory Infection. You panel was (+) for Coronarivus, which causes the common cold. Your symptoms of cough, phlegm and congestion will resolve as you get better. Blood pressure has been controlled with anti-hypertension medication during your hospital stay. Please continue your current medication regimen as prescribed. Please follow up with your Primary Care Physician in 1-2 weeks for a post-hospitalization visit. Your Diabetes has been controlled with insulin adjusted to your finger sticks blood sugar level. Please continue to take your medications as directed and keep a log of your blood sugar levels. Follow up with your Primary Care Physician in 1-2 weeks for a post-hospitalization visit. consistent hemodialysis Please Please continue to make and follow through with appointments for hemodialysis. Initially there was concern that your AV fistula was not functioning properly but after being assessed by Vascular surgery it was determined that your AV fistula is patent and there is currently no need to intervene surgically at this time. The AV fistula can be re-evaluated in the future should any issues arise.

## 2017-10-02 NOTE — DISCHARGE NOTE ADULT - ADDITIONAL INSTRUCTIONS
See your Primary Medical Doctor in 1-2 weeks for a post- hospitalization visit. Continue hemodialysis as instructed (M, W, F).

## 2017-10-02 NOTE — DISCHARGE NOTE ADULT - HOSPITAL COURSE
36 y/o female with ESRD diagnosed in 1/17 on hemodialysis  M-W-F,  HTN, DM tx w/ insulin, mobid obesity, prior hospitalizations with bacteremia x 2 with Serratia, PC in right side x 2 which has since been taken out and a LUE AVF which was pending superficialization as of the last hospital stay comes into the hospital due to chest congestion. Thought to be multifactorial in nature from both fluid overload status after missing HD on Wednesday and congestion from coronavirus confirmed in hospital.     On admission she was also found to have hypertensive urgency likely  2/2 fluid overload from HD non-compliance and noted symptomatic acute on chronic anemia of chronic kidney disease, s/p 1 unit pRBC. Well tolerated. Pt currently is much improved, clinically stable and optimized for discharge. 38 y/o female with ESRD diagnosed in 1/17 on hemodialysis  M-W-F,  HTN, DM tx w/ insulin, mobid obesity, prior hospitalizations with bacteremia x 2 with Serratia, PC in right side x 2 which has since been taken out and a LUE AVF which was pending superficialization as of the last hospital stay comes into the hospital due to chest congestion. Thought to be multifactorial in nature from both fluid overload status after missing HD on Wednesday and congestion from coronavirus confirmed in hospital.     On admission she was also found to have hypertensive urgency likely 2/2 fluid overload from HD non-compliance and noted symptomatic acute on chronic anemia of chronic kidney disease, s/p 1 unit pRBC. Well tolerated. Pt currently is much improved, clinically stable and optimized for discharge.    All electrolyte abnormalities were monitored carefully and repleted as necessary during this hospitalization. At the time of discharge patient was hemodynamically stable and amenable to all terms of discharge. The patient has received verbal instructions from myself regarding discharge plans.     Length of Discharge: 45MIN

## 2017-10-02 NOTE — PROGRESS NOTE ADULT - PROBLEM/PLAN-5
DISPLAY PLAN FREE TEXT
lifting/work related

## 2017-10-02 NOTE — DISCHARGE NOTE ADULT - CARE PROVIDER_API CALL
Angel Hu), Internal Medicine; Nephrology  [address] [facility] [address]  [place], [place] [id number]  Phone: [phone]  Fax: [phone]    Martin Duron), Surgery; Vascular Surgery  [address] [address]  Phone: [phone]  Fax: [phone]

## 2017-10-02 NOTE — PROGRESS NOTE ADULT - PROBLEM SELECTOR PLAN 4
- likely 2/2 fluid overload and missed HD. Likely to trend down with HD   - c/w Verapamil 80 mg PO [id number]  - hydralazine 5mg IV push q6 hours, PRN for SBP>160  BP: 155/85 (02 Oct 2017 05:00) (140/80 - 162/78). Will continue to monitor - c/w Verapamil 80 mg PO [id number], on it per nephro at last admission  - hydralazine 5mg IV push q6 hours, PRN for SBP>160, has not been needed  dc home  bp check in office as outpt

## 2017-10-02 NOTE — PROGRESS NOTE ADULT - SUBJECTIVE AND OBJECTIVE BOX
Patient is a 37y old  Female who presents with a chief complaint of Chest and nasal congestion (28 Sep 2017 21:02)      INTERVAL HPI/OVERNIGHT EVENTS:  37y  Female, seen and examined at bedside, no acute events overnight. Pt is ambulating, tolerating PO intake, voiding, + bowel movement, feeling much improved stating she is ready to go home. Pt reports SOB on exertion. Pt denies fever, chills, productive cough, SOB at rest, diarrhea/constipation, dysuria, urinary frequency, edema. Nothing on tele-monitor overnight. Pt has refused Heparin SQ this AM.       Vital Signs Last 24 Hrs  T(C): 36.8 (02 Oct 2017 05:00), Max: 36.8 (01 Oct 2017 22:29)  T(F): 98.2 (02 Oct 2017 05:00), Max: 98.2 (01 Oct 2017 22:29)  HR: 98 (02 Oct 2017 05:00) (88 - 98)  BP: 155/85 (02 Oct 2017 05:00) (140/80 - 162/78)  BP(mean): --  RR: 18 (02 Oct 2017 05:00) (18 - 18)  SpO2: 92% (02 Oct 2017 05:00) (92% - 96%)      Daily     Daily   I&O's Detail    01 Oct 2017 07:01  -  02 Oct 2017 07:00  --------------------------------------------------------  IN:    Oral Fluid: 480 mL  Total IN: 480 mL    OUT:  Total OUT: 0 mL    Total NET: 480 mL    REVIEW OF SYSTEMS:    Noted above      PHYSICAL EXAM:    GENERAL: NAD, obese, well-groomed, well-developed  HEAD:  Atraumatic, Normocephalic  EYES: EOMI, PERRLA, conjunctiva and sclera clear  ENMT: No tonsillar erythema, exudates, or enlargement; Moist mucous membranes  NECK: Supple, No JVD, Normal thyroid  NERVOUS SYSTEM:  Alert & Oriented X3, Good concentration; Motor Strength 5/5 B/L upper and lower extremities  CHEST/LUNG: Clear to percussion bilaterally; No rales, rhonchi, wheezing, or rubs  HEART: Regular rate and rhythm; No murmurs, rubs, or gallops  ABDOMEN: Soft, obese, Nontender, Nondistended; Bowel sounds present  EXTREMITIES:  2+ Peripheral Pulses, No clubbing, cyanosis, or edema  SKIN: No rashes or lesions      LABS:                        8.9    8.2   )-----------( 250      ( 30 Sep 2017 07:57 )             27.8     09-30    138  |  96<L>  |  37.0<H>  ----------------------------<  110  4.7   |  27.0  |  5.19<H>    Ca    9.2      30 Sep 2017 07:57  Phos  4.4     09-30  Mg     2.2     09-30    TPro  7.9  /  Alb  3.5  /  TBili  1.4  /  DBili  x   /  AST  13  /  ALT  12  /  AlkPhos  107  09-30        Hemoglobin A1C, Whole Blood: 6.4 % (08-02 @ 06:10)  Hemoglobin A1C, Whole Blood: 7.3 % (04-05 @ 07:09)  Hemoglobin A1C, Whole Blood: 8.0 % (01-27 @ 06:57)      LIVER FUNCTIONS - ( 30 Sep 2017 07:57 )  Alb: 3.5 g/dL / Pro: 7.9 g/dL / ALK PHOS: 107 U/L / ALT: 12 U/L / AST: 13 U/L / GGT: x             RADIOLOGY & ADDITIONAL TESTS:    MEDICATIONS    CARDIOVASCULAR:  hydrALAZINE Injectable 5 milliGRAM(s) IV Push every 6 hours PRN  verapamil 80 milliGRAM(s) Oral every 8 hours  valsartan 160 milliGRAM(s) Oral daily    PULMONARY:  ALBUTerol/ipratropium for Nebulization 3 milliLiter(s) Nebulizer every 6 hours PRN  ALBUTerol/ipratropium for Nebulization 3 milliLiter(s) Nebulizer every 4 hours    NEUROLOGIC:  acetaminophen   Tablet 650 milliGRAM(s) Oral every 6 hours PRN    ONCOLOGIC:    HEMATOLOGIC:  heparin  Injectable 5000 Unit(s) SubCutaneous every 8 hours  aspirin enteric coated 81 milliGRAM(s) Oral daily    GATROINTESTINAL:     MEDS:    ENDO/METABOLIC:  insulin lispro (HumaLOG) corrective regimen sliding scale   SubCutaneous three times a day before meals  dextrose Gel 1 Dose(s) Oral once PRN  dextrose 50% Injectable 12.5 Gram(s) IV Push once  dextrose 50% Injectable 25 Gram(s) IV Push once  dextrose 50% Injectable 25 Gram(s) IV Push once  glucagon  Injectable 1 milliGRAM(s) IntraMuscular once PRN  simvastatin 5 milliGRAM(s) Oral at bedtime    IV FLUID/NUTRITION:  folic acid 1 milliGRAM(s) Oral daily  dextrose 5%. 1000 milliLiter(s) IV Continuous <Continuous>  Nephro-franki 1 Tablet(s) Oral daily  ascorbic acid 500 milliGRAM(s) Oral daily    TOPICAL:  fluticasone propionate 50 MICROgram(s)/spray Nasal Spray 2 Spray(s) Both Nostrils two times a day    IMMUNOLOGIC & OTHER  influenza   Vaccine 0.5 milliLiter(s) IntraMuscular once  sevelamer hydrochloride 800 milliGRAM(s) Oral daily      Allergies    Mushrooms (Hives (Mild))  No Known Drug Allergies Patient is a 37y old  Female who presents with a chief complaint of Chest and nasal congestion (28 Sep 2017 21:02)      INTERVAL HPI/OVERNIGHT EVENTS:  37y  Female, seen and examined at bedside, no acute events overnight. Pt is ambulating, tolerating PO intake, voiding, + bowel movement, feeling much improved stating she is ready to go home. Pt reports SOB on exertion. Pt denies fever, chills, productive cough, SOB at rest, diarrhea/constipation, dysuria, urinary frequency, edema. Nothing on tele-monitor overnight. Pt has refused Heparin SQ this AM.       Vital Signs Last 24 Hrs  T(C): 36.8 (02 Oct 2017 05:00), Max: 36.8 (01 Oct 2017 22:29)  T(F): 98.2 (02 Oct 2017 05:00), Max: 98.2 (01 Oct 2017 22:29)  HR: 98 (02 Oct 2017 05:00) (88 - 98)  BP: 155/85 (02 Oct 2017 05:00) (140/80 - 162/78)  BP(mean): --  RR: 18 (02 Oct 2017 05:00) (18 - 18)  SpO2: 92% (02 Oct 2017 05:00) (92% - 96%)      Daily     Daily   I&O's Detail    01 Oct 2017 07:01  -  02 Oct 2017 07:00  --------------------------------------------------------  IN:    Oral Fluid: 480 mL  Total IN: 480 mL    OUT:  Total OUT: 0 mL    Total NET: 480 mL    REVIEW OF SYSTEMS:    Noted above      PHYSICAL EXAM:    GENERAL: NAD, obese, well-groomed, well-developed  HEAD:  Atraumatic, Normocephalic  EYES: EOMI, PERRLA, conjunctiva and sclera clear  ENMT: No tonsillar erythema, exudates, or enlargement; Moist mucous membranes  NECK: Supple, No JVD, Normal thyroid  NERVOUS SYSTEM:  Alert & Oriented X3, Good concentration; Motor Strength 5/5 B/L upper and lower extremities  CHEST/LUNG: Clear to percussion bilaterally; No rales, rhonchi, wheezing, or rubs. no on supplemental o2  HEART: Regular rate and rhythm; No murmurs, rubs, or gallops  ABDOMEN: Soft, obese, Nontender, Nondistended; Bowel sounds present  EXTREMITIES:  2+ Peripheral Pulses, No clubbing, cyanosis, or edema  SKIN: No rashes or lesions      LABS:                        8.9    8.2   )-----------( 250      ( 30 Sep 2017 07:57 )             27.8     09-30    138  |  96<L>  |  37.0<H>  ----------------------------<  110  4.7   |  27.0  |  5.19<H>    Ca    9.2      30 Sep 2017 07:57  Phos  4.4     09-30  Mg     2.2     09-30    TPro  7.9  /  Alb  3.5  /  TBili  1.4  /  DBili  x   /  AST  13  /  ALT  12  /  AlkPhos  107  09-30        Hemoglobin A1C, Whole Blood: 6.4 % (08-02 @ 06:10)  Hemoglobin A1C, Whole Blood: 7.3 % (04-05 @ 07:09)  Hemoglobin A1C, Whole Blood: 8.0 % (01-27 @ 06:57)      LIVER FUNCTIONS - ( 30 Sep 2017 07:57 )  Alb: 3.5 g/dL / Pro: 7.9 g/dL / ALK PHOS: 107 U/L / ALT: 12 U/L / AST: 13 U/L / GGT: x             RADIOLOGY & ADDITIONAL TESTS:    MEDICATIONS    CARDIOVASCULAR:  hydrALAZINE Injectable 5 milliGRAM(s) IV Push every 6 hours PRN  verapamil 80 milliGRAM(s) Oral every 8 hours  valsartan 160 milliGRAM(s) Oral daily    PULMONARY:  ALBUTerol/ipratropium for Nebulization 3 milliLiter(s) Nebulizer every 6 hours PRN  ALBUTerol/ipratropium for Nebulization 3 milliLiter(s) Nebulizer every 4 hours    NEUROLOGIC:  acetaminophen   Tablet 650 milliGRAM(s) Oral every 6 hours PRN    ONCOLOGIC:    HEMATOLOGIC:  heparin  Injectable 5000 Unit(s) SubCutaneous every 8 hours  aspirin enteric coated 81 milliGRAM(s) Oral daily    GATROINTESTINAL:     MEDS:    ENDO/METABOLIC:  insulin lispro (HumaLOG) corrective regimen sliding scale   SubCutaneous three times a day before meals  dextrose Gel 1 Dose(s) Oral once PRN  dextrose 50% Injectable 12.5 Gram(s) IV Push once  dextrose 50% Injectable 25 Gram(s) IV Push once  dextrose 50% Injectable 25 Gram(s) IV Push once  glucagon  Injectable 1 milliGRAM(s) IntraMuscular once PRN  simvastatin 5 milliGRAM(s) Oral at bedtime    IV FLUID/NUTRITION:  folic acid 1 milliGRAM(s) Oral daily  dextrose 5%. 1000 milliLiter(s) IV Continuous <Continuous>  Nephro-franki 1 Tablet(s) Oral daily  ascorbic acid 500 milliGRAM(s) Oral daily    TOPICAL:  fluticasone propionate 50 MICROgram(s)/spray Nasal Spray 2 Spray(s) Both Nostrils two times a day    IMMUNOLOGIC & OTHER  influenza   Vaccine 0.5 milliLiter(s) IntraMuscular once  sevelamer hydrochloride 800 milliGRAM(s) Oral daily      Allergies    Mushrooms (Hives (Mild))  No Known Drug Allergies

## 2017-10-02 NOTE — PROGRESS NOTE ADULT - PROBLEM SELECTOR PLAN 6
- 8/17 A1c 6.4  - Humalog Sliding scale - 8/17 A1c 6.4  -well controlled (<150)  - Humalog Sliding scale

## 2017-10-02 NOTE — DISCHARGE NOTE ADULT - PATIENT PORTAL LINK FT
“You can access the [facility] Patient Portal, offered by [facility], by registering with the following website: [email or web address]”

## 2017-10-02 NOTE — PROGRESS NOTE ADULT - PROBLEM SELECTOR PLAN 1
confirmed inpatient with testing  improving  bld cx pending, unlikely to be pos  - cont supportive care  -cont flonase  Much improved, denies SOB, cough, congestion confirmed inpatient with testing  improving  bld cx pending, unlikely to be pos  - cont supportive care  -cont flonase  Much improved, denies SOB, cough, congestion. Blood cx (-) confirmed inpatient with testing  improving  bld cx pending, unlikely to be pos  - cont supportive care  -cont flonase  Much improved, denies SOB, cough, congestion. Blood cx (-)  stable for dc home today  outpt fu with pmd

## 2017-10-02 NOTE — PROGRESS NOTE ADULT - SUBJECTIVE AND OBJECTIVE BOX
NEPHROLOGY INTERVAL HPI/OVERNIGHT EVENTS:  pt resting when seen earlier  no acute distress    MEDICATIONS  (STANDING):  folic acid 1 milliGRAM(s) Oral daily  heparin  Injectable 5000 Unit(s) SubCutaneous every 8 hours  verapamil 80 milliGRAM(s) Oral every 8 hours  insulin lispro (HumaLOG) corrective regimen sliding scale   SubCutaneous three times a day before meals  dextrose 5%. 1000 milliLiter(s) (50 mL/Hr) IV Continuous <Continuous>  dextrose 50% Injectable 12.5 Gram(s) IV Push once  dextrose 50% Injectable 25 Gram(s) IV Push once  dextrose 50% Injectable 25 Gram(s) IV Push once  influenza   Vaccine 0.5 milliLiter(s) IntraMuscular once  ALBUTerol/ipratropium for Nebulization 3 milliLiter(s) Nebulizer every 4 hours  sevelamer hydrochloride 800 milliGRAM(s) Oral daily  valsartan 160 milliGRAM(s) Oral daily  aspirin enteric coated 81 milliGRAM(s) Oral daily  simvastatin 5 milliGRAM(s) Oral at bedtime  Nephro-franki 1 Tablet(s) Oral daily  fluticasone propionate 50 MICROgram(s)/spray Nasal Spray 2 Spray(s) Both Nostrils two times a day  ascorbic acid 500 milliGRAM(s) Oral daily    MEDICATIONS  (PRN):  hydrALAZINE Injectable 5 milliGRAM(s) IV Push every 6 hours PRN SBP>160 or DBP>100  dextrose Gel 1 Dose(s) Oral once PRN Blood Glucose LESS THAN 70 milliGRAM(s)/deciliter  glucagon  Injectable 1 milliGRAM(s) IntraMuscular once PRN Glucose LESS THAN 70 milligrams/deciliter  ALBUTerol/ipratropium for Nebulization 3 milliLiter(s) Nebulizer every 6 hours PRN Shortness of Breath and/or Wheezing  acetaminophen   Tablet 650 milliGRAM(s) Oral every 6 hours PRN pain/fever      Allergies    Mushrooms (Hives (Mild))  No Known Drug Allergies          Vital Signs Last 24 Hrs  T(C): 36.8 (02 Oct 2017 05:00), Max: 36.8 (01 Oct 2017 22:29)  T(F): 98.2 (02 Oct 2017 05:00), Max: 98.2 (01 Oct 2017 22:29)  HR: 98 (02 Oct 2017 05:00) (88 - 98)  BP: 155/85 (02 Oct 2017 05:00) (140/80 - 162/78)  BP(mean): --  RR: 18 (02 Oct 2017 05:00) (18 - 18)  SpO2: 92% (02 Oct 2017 05:00) (92% - 96%)    PHYSICAL EXAM:  Neck: no jvd, no nodes  Respiratory: diminished BS at bases  Cardiovascular: no rub  Ext: + edema  Neuro: AAO X 3    LABS:  Phosphorus Level, Serum: 4.4 mg/dL (09.30.17 @ 07:57)    Hemoglobin: 8.9 g/dL (09.30.17 @ 07:57)    Potassium, Serum: 4.7 mmol/L (09.30.17 @ 07:57)    Intact PTH: 119: PTH METHOD: Roche  Guide for Interpretation of PTH and Calcium Results                           Calcium             PTH                           MG/DL               PG/ML  Normal                   8.4-10.5            15-65  Primary  [id number]: ism      >10.5               >50  Non-PTH Hypercalcemia    >10.5               0-20  Hypoparathroidism        <8.4                0-20  Pseudohypoparathyroid    <8.4                >50  This is intended as a guide only. Factors such as sunlight [id number]: re,  Vitamin D status and renal function should be evaluated along with  clinical presentation. pg/mL (09.30.17 @ 21:27)                        RADIOLOGY & ADDITIONAL TESTS:

## 2017-10-02 NOTE — DISCHARGE NOTE ADULT - CARE PLAN
Principal Discharge DX:	End stage renal disease  Secondary Diagnosis:	Coronavirus infection  Secondary Diagnosis:	Essential hypertension  Secondary Diagnosis:	Diabetes  Secondary Diagnosis:	Vascular dialysis catheter in place Principal Discharge DX:	End stage renal disease  Goal:	consistent hemodialysis  Instructions for follow-up, activity and diet:	Please  Secondary Diagnosis:	Coronavirus infection  Instructions for follow-up, activity and diet:	You had chest congestion and productive cough which prompted a respiratory viral panel to test for viruses that may cause your Upper Respiratory Infection. You panel was (+) for Coronarivus, which causes the common cold. Your symptoms of cough, phlegm and congestion will resolve as you get better.  Secondary Diagnosis:	Essential hypertension  Instructions for follow-up, activity and diet:	Blood pressure has been controlled with anti-hypertension medication during your hospital stay. Please continue your current medication regimen as prescribed. Please follow up with your Primary Care Physician in 1-2 weeks for a post-hospitalization visit.  Secondary Diagnosis:	Diabetes  Instructions for follow-up, activity and diet:	Your Diabetes has been controlled with insulin adjusted to your finger sticks blood sugar level. Please continue to take your medications as directed and keep a log of your blood sugar levels. Follow up with your Primary Care Physician in 1-2 weeks for a post-hospitalization visit.  Secondary Diagnosis:	Vascular dialysis catheter in place Principal Discharge DX:	End stage renal disease  Goal:	consistent hemodialysis  Instructions for follow-up, activity and diet:	Please continue to make and follow through with appointments for hemodialysis.  Secondary Diagnosis:	Coronavirus infection  Instructions for follow-up, activity and diet:	You had chest congestion and productive cough which prompted a respiratory viral panel to test for viruses that may cause your Upper Respiratory Infection. You panel was (+) for Coronarivus, which causes the common cold. Your symptoms of cough, phlegm and congestion will resolve as you get better.  Secondary Diagnosis:	Essential hypertension  Instructions for follow-up, activity and diet:	Blood pressure has been controlled with anti-hypertension medication during your hospital stay. Please continue your current medication regimen as prescribed. Please follow up with your Primary Care Physician in 1-2 weeks for a post-hospitalization visit.  Secondary Diagnosis:	Diabetes  Instructions for follow-up, activity and diet:	Your Diabetes has been controlled with insulin adjusted to your finger sticks blood sugar level. Please continue to take your medications as directed and keep a log of your blood sugar levels. Follow up with your Primary Care Physician in 1-2 weeks for a post-hospitalization visit.  Secondary Diagnosis:	Vascular dialysis catheter in place  Instructions for follow-up, activity and diet:	Initially there was concern that your AV fistula was not functioning properly but after being assessed by Vascular surgery it was determined that your AV fistula is patent and there is currently no need to intervene surgically at this time. The AV fistula can be re-evaluated in the future should any issues arise.

## 2017-10-02 NOTE — PROGRESS NOTE ADULT - PROBLEM SELECTOR PLAN 5
- possibly 2/2 to hemodilution after not getting dialysis  - s/p 1 unit pRBCs   - c/w Procrit (inquired from renal about starting this)  - c/w Folic acid and Nephro vitamins  Improved, 7.9 --> 8.5 (09/30) - possibly 2/2 to hemodilution after not getting dialysis  - s/p 1 unit pRBCs   - c/w Procrit (inquired from renal about starting this)  - c/w Folic acid and Nephro vitamins  Improved, 7.9 --> 8.5 (09/30)  check iron studies as outpt and fu nephro

## 2017-10-02 NOTE — PROGRESS NOTE ADULT - PROBLEM SELECTOR PLAN 3
- Patient had routine dialysis yesterday  - Difficulty noted when placing needle in L AVF by nursing staff, duplex US to check for AVF patency noted by Vascular and asked to c/w using this AVF for HD   renal replacement meds as above  -Vascular surgery possible fistulogram, will follow  -post vasc sx intervention, pt stable for dc home Patient had routine dialysis yesterday, tolerated well  renal replacement meds as above  initially with concerns about fistula efficiency, no intervention planned  dc home  outpt nephro fu

## 2017-10-02 NOTE — PROGRESS NOTE ADULT - ATTENDING COMMENTS
Patient seen and examined at the bedside. Agree with the above history, physical, assessment, and plan with the necessary amendments/elaborations already made above.    No further inpatient level of care needed. Pt to go home and continue supportive care for coronavirus. Outpt fu with nephro for continued HD, no interventions to fistula access needed at this time. Fe deficiency blake as outpatient.
Patient seen and examined at the bedside. Agree with the above history, physical, assessment, and plan with the necessary amendments/elaborations already made above.
Note addended where needed. Plan discussed with patient at bedside. She is in agreement with tx plan. c/w tele. d/w Renal - patient to get procrit. Next HD is on monday - if needed - can transfuse one more unit on monday
Patient seen and examined at the bedside. Agree with the above history, physical, assessment, and plan with the necessary amendments/elaborations already made above.     Pending fistula eval from vascular sx. If inpatient intervention is required, will keep her to have any necessary procedures done. Otherwise from medical perspective she is stable for discharge and can have resolution of coronavirus as an outpt.

## 2017-10-02 NOTE — PROGRESS NOTE ADULT - ASSESSMENT
ESRD: +PVC; in part due to missing dialysis  - HD today for fluid removal     Anemia: added NEELAM at HD  - check Fe stores  - PRBCs if needed    RO:  phos and PTH OK

## 2017-10-02 NOTE — PROGRESS NOTE ADULT - NSHPATTENDINGPLANDISCUSS_GEN_ALL_CORE
the patient.  All imaging and results of lab/other studies reviewed by me. All questions answered to the satisfaction of the patient. At this time he/she agrees with the current plan of therapy.
the patient.  All imaging and results of lab/other studies reviewed by me. All questions answered to the satisfaction of the patient. At this time he agrees with the current plan of therapy.
the patient.  All imaging and results of lab/other studies reviewed by me. All questions answered to the satisfaction of the patient. At this time she agrees with the current plan of therapy.

## 2017-10-02 NOTE — PROGRESS NOTE ADULT - PROBLEM SELECTOR PLAN 8
- Heparin Subq 5000 U q8 for dvt ppx - Heparin Subq 5000 U q8 for dvt ppx  Pt refused today's AM dose. As per nurse, citing that she only takes it once/day

## 2017-10-02 NOTE — DISCHARGE NOTE ADULT - MEDICATION SUMMARY - MEDICATIONS TO TAKE
I will START or STAY ON the medications listed below when I get home from the hospital:    aspirin 81 mg oral delayed release tablet  -- 1 tab(s) by mouth once a day  -- Indication: For Preventive measure    valsartan 160 mg oral tablet  -- 1 tab(s) by mouth once a day  -- Indication: For Hypertension    verapamil 80 mg oral tablet  -- 1 tab(s) by mouth every 8 hours  -- Indication: For Hypertension    HumaLOG KwikPen 100 units/mL subcutaneous solution  -- 3 milliliter(s) subcutaneous prn  -- Do not drink alcoholic beverages when taking this medication.  It is very important that you take or use this exactly as directed.  Do not skip doses or discontinue unless directed by your doctor.  Keep in refrigerator.  Do not freeze.    -- Indication: For Diabetes    loratadine 10 mg oral tablet  -- 1 tab(s) by mouth once a day  -- Indication: For Preventive measure    Zocor 10 mg oral tablet  -- 1 tab(s) by mouth once a day (at bedtime)  -- Indication: For Preventive measure    albuterol 0.63 mg/3 mL (0.021%) inhalation solution  -- 3 milliliter(s) inhaled prn  -- For inhalation only.  It is very important that you take or use this exactly as directed.  Do not skip doses or discontinue unless directed by your doctor.  Obtain medical advice before taking any non-prescription drugs as some may affect the action of this medication.    -- Indication: For End-stage renal disease    albuterol-ipratropium 2.5 mg-0.5 mg/3 mL inhalation solution  -- 3 milliliter(s) inhaled every 6 hours  -- Indication: For End-stage renal disease    epoetin raina  -- 29254 unit(s) intravenous 3 times a week  as per renal  -- Indication: For End-stage renal disease    fluticasone 50 mcg/inh nasal spray  -- 2 spray(s) into nose 2 times a day  -- Indication: For Nasal congestion    calcium acetate 667 mg oral capsule  -- 1 cap(s) by mouth 3 times a day with meals  -- Indication: For End-stage renal disease    sevelamer hydrochloride 800 mg oral tablet  -- 2 tab(s) by mouth 3 times a day (with meals)  -- Indication: For End-stage renal disease    pantoprazole 40 mg oral delayed release tablet  -- 1 tab(s) by mouth once a day (before a meal)  -- Indication: For Preventive measure    folic acid 1 mg oral tablet  -- 1 tab(s) by mouth once a day  -- Indication: For Preventive measure    ergocalciferol 50,000 intl units (1.25 mg) oral capsule  -- 1 cap(s) by mouth once a week  -- Indication: For End-stage renal disease

## 2017-10-10 ENCOUNTER — OUTPATIENT (OUTPATIENT)
Dept: OUTPATIENT SERVICES | Facility: HOSPITAL | Age: 37
LOS: 1 days | End: 2017-10-10
Payer: MEDICARE

## 2017-10-10 DIAGNOSIS — Z99.2 DEPENDENCE ON RENAL DIALYSIS: Chronic | ICD-10-CM

## 2017-10-10 DIAGNOSIS — I77.0 ARTERIOVENOUS FISTULA, ACQUIRED: Chronic | ICD-10-CM

## 2017-10-10 DIAGNOSIS — G47.33 OBSTRUCTIVE SLEEP APNEA (ADULT) (PEDIATRIC): ICD-10-CM

## 2017-10-10 PROCEDURE — 95810 POLYSOM 6/> YRS 4/> PARAM: CPT | Mod: 26

## 2017-10-10 PROCEDURE — 95810 POLYSOM 6/> YRS 4/> PARAM: CPT

## 2017-10-11 PROCEDURE — 86706 HEP B SURFACE ANTIBODY: CPT

## 2017-10-11 PROCEDURE — 83735 ASSAY OF MAGNESIUM: CPT

## 2017-10-11 PROCEDURE — 86705 HEP B CORE ANTIBODY IGM: CPT

## 2017-10-11 PROCEDURE — 86704 HEP B CORE ANTIBODY TOTAL: CPT

## 2017-10-11 PROCEDURE — 87040 BLOOD CULTURE FOR BACTERIA: CPT

## 2017-10-11 PROCEDURE — 87486 CHLMYD PNEUM DNA AMP PROBE: CPT

## 2017-10-11 PROCEDURE — 76882 US LMTD JT/FCL EVL NVASC XTR: CPT

## 2017-10-11 PROCEDURE — 83605 ASSAY OF LACTIC ACID: CPT

## 2017-10-11 PROCEDURE — 87581 M.PNEUMON DNA AMP PROBE: CPT

## 2017-10-11 PROCEDURE — 71250 CT THORAX DX C-: CPT

## 2017-10-11 PROCEDURE — 86901 BLOOD TYPING SEROLOGIC RH(D): CPT

## 2017-10-11 PROCEDURE — 87798 DETECT AGENT NOS DNA AMP: CPT

## 2017-10-11 PROCEDURE — 82728 ASSAY OF FERRITIN: CPT

## 2017-10-11 PROCEDURE — 86803 HEPATITIS C AB TEST: CPT

## 2017-10-11 PROCEDURE — 94760 N-INVAS EAR/PLS OXIMETRY 1: CPT

## 2017-10-11 PROCEDURE — 84466 ASSAY OF TRANSFERRIN: CPT

## 2017-10-11 PROCEDURE — 80048 BASIC METABOLIC PNL TOTAL CA: CPT

## 2017-10-11 PROCEDURE — 81001 URINALYSIS AUTO W/SCOPE: CPT

## 2017-10-11 PROCEDURE — 99261: CPT

## 2017-10-11 PROCEDURE — 83550 IRON BINDING TEST: CPT

## 2017-10-11 PROCEDURE — 96374 THER/PROPH/DIAG INJ IV PUSH: CPT

## 2017-10-11 PROCEDURE — 81025 URINE PREGNANCY TEST: CPT

## 2017-10-11 PROCEDURE — 84100 ASSAY OF PHOSPHORUS: CPT

## 2017-10-11 PROCEDURE — 87340 HEPATITIS B SURFACE AG IA: CPT

## 2017-10-11 PROCEDURE — 94640 AIRWAY INHALATION TREATMENT: CPT

## 2017-10-11 PROCEDURE — 87186 SC STD MICRODIL/AGAR DIL: CPT

## 2017-10-11 PROCEDURE — 72131 CT LUMBAR SPINE W/O DYE: CPT

## 2017-10-11 PROCEDURE — 71045 X-RAY EXAM CHEST 1 VIEW: CPT

## 2017-10-11 PROCEDURE — 85027 COMPLETE CBC AUTOMATED: CPT

## 2017-10-11 PROCEDURE — 99285 EMERGENCY DEPT VISIT HI MDM: CPT | Mod: 25

## 2017-10-11 PROCEDURE — 87633 RESP VIRUS 12-25 TARGETS: CPT

## 2017-10-11 PROCEDURE — 96375 TX/PRO/DX INJ NEW DRUG ADDON: CPT

## 2017-10-11 PROCEDURE — 86850 RBC ANTIBODY SCREEN: CPT

## 2017-10-11 PROCEDURE — 86900 BLOOD TYPING SEROLOGIC ABO: CPT

## 2017-10-11 PROCEDURE — 80053 COMPREHEN METABOLIC PANEL: CPT

## 2017-10-11 PROCEDURE — 87086 URINE CULTURE/COLONY COUNT: CPT

## 2017-10-11 PROCEDURE — 36415 COLL VENOUS BLD VENIPUNCTURE: CPT

## 2017-10-11 PROCEDURE — 93005 ELECTROCARDIOGRAM TRACING: CPT

## 2017-10-19 PROBLEM — J18.9 PNEUMONIA, UNSPECIFIED ORGANISM: Chronic | Status: ACTIVE | Noted: 2017-09-28

## 2017-10-19 PROCEDURE — 83930 ASSAY OF BLOOD OSMOLALITY: CPT

## 2017-10-19 PROCEDURE — 93017 CV STRESS TEST TRACING ONLY: CPT

## 2017-10-19 PROCEDURE — 87186 SC STD MICRODIL/AGAR DIL: CPT

## 2017-10-19 PROCEDURE — C1725: CPT

## 2017-10-19 PROCEDURE — 84145 PROCALCITONIN (PCT): CPT

## 2017-10-19 PROCEDURE — 82272 OCCULT BLD FECES 1-3 TESTS: CPT

## 2017-10-19 PROCEDURE — 76000 FLUOROSCOPY <1 HR PHYS/QHP: CPT

## 2017-10-19 PROCEDURE — 80202 ASSAY OF VANCOMYCIN: CPT

## 2017-10-19 PROCEDURE — 71045 X-RAY EXAM CHEST 1 VIEW: CPT

## 2017-10-19 PROCEDURE — 99261: CPT

## 2017-10-19 PROCEDURE — 75710 ARTERY X-RAYS ARM/LEG: CPT

## 2017-10-19 PROCEDURE — 93005 ELECTROCARDIOGRAM TRACING: CPT

## 2017-10-19 PROCEDURE — 85027 COMPLETE CBC AUTOMATED: CPT

## 2017-10-19 PROCEDURE — 87070 CULTURE OTHR SPECIMN AEROBIC: CPT

## 2017-10-19 PROCEDURE — 94640 AIRWAY INHALATION TREATMENT: CPT

## 2017-10-19 PROCEDURE — 83605 ASSAY OF LACTIC ACID: CPT

## 2017-10-19 PROCEDURE — 71046 X-RAY EXAM CHEST 2 VIEWS: CPT

## 2017-10-19 PROCEDURE — 83036 HEMOGLOBIN GLYCOSYLATED A1C: CPT

## 2017-10-19 PROCEDURE — 99285 EMERGENCY DEPT VISIT HI MDM: CPT | Mod: 25

## 2017-10-19 PROCEDURE — 86922 COMPATIBILITY TEST ANTIGLOB: CPT

## 2017-10-19 PROCEDURE — P9016: CPT

## 2017-10-19 PROCEDURE — 93306 TTE W/DOPPLER COMPLETE: CPT

## 2017-10-19 PROCEDURE — 87340 HEPATITIS B SURFACE AG IA: CPT

## 2017-10-19 PROCEDURE — 86038 ANTINUCLEAR ANTIBODIES: CPT

## 2017-10-19 PROCEDURE — 85610 PROTHROMBIN TIME: CPT

## 2017-10-19 PROCEDURE — 86850 RBC ANTIBODY SCREEN: CPT

## 2017-10-19 PROCEDURE — C1750: CPT

## 2017-10-19 PROCEDURE — 86901 BLOOD TYPING SEROLOGIC RH(D): CPT

## 2017-10-19 PROCEDURE — 86706 HEP B SURFACE ANTIBODY: CPT

## 2017-10-19 PROCEDURE — 78452 HT MUSCLE IMAGE SPECT MULT: CPT

## 2017-10-19 PROCEDURE — 36430 TRANSFUSION BLD/BLD COMPNT: CPT

## 2017-10-19 PROCEDURE — 83615 LACTATE (LD) (LDH) ENZYME: CPT

## 2017-10-19 PROCEDURE — 83540 ASSAY OF IRON: CPT

## 2017-10-19 PROCEDURE — 80061 LIPID PANEL: CPT

## 2017-10-19 PROCEDURE — 86160 COMPLEMENT ANTIGEN: CPT

## 2017-10-19 PROCEDURE — 93970 EXTREMITY STUDY: CPT

## 2017-10-19 PROCEDURE — 87040 BLOOD CULTURE FOR BACTERIA: CPT

## 2017-10-19 PROCEDURE — 85730 THROMBOPLASTIN TIME PARTIAL: CPT

## 2017-10-19 PROCEDURE — 86036 ANCA SCREEN EACH ANTIBODY: CPT

## 2017-10-19 PROCEDURE — 87493 C DIFF AMPLIFIED PROBE: CPT

## 2017-10-19 PROCEDURE — 82728 ASSAY OF FERRITIN: CPT

## 2017-10-19 PROCEDURE — 84466 ASSAY OF TRANSFERRIN: CPT

## 2017-10-19 PROCEDURE — 76942 ECHO GUIDE FOR BIOPSY: CPT

## 2017-10-19 PROCEDURE — 85045 AUTOMATED RETICULOCYTE COUNT: CPT

## 2017-10-19 PROCEDURE — 36415 COLL VENOUS BLD VENIPUNCTURE: CPT

## 2017-10-19 PROCEDURE — 84702 CHORIONIC GONADOTROPIN TEST: CPT

## 2017-10-19 PROCEDURE — 86803 HEPATITIS C AB TEST: CPT

## 2017-10-19 PROCEDURE — 86920 COMPATIBILITY TEST SPIN: CPT

## 2017-10-19 PROCEDURE — 73560 X-RAY EXAM OF KNEE 1 OR 2: CPT

## 2017-10-19 PROCEDURE — 83550 IRON BINDING TEST: CPT

## 2017-10-19 PROCEDURE — 80053 COMPREHEN METABOLIC PANEL: CPT

## 2017-10-19 PROCEDURE — 80048 BASIC METABOLIC PNL TOTAL CA: CPT

## 2017-10-19 PROCEDURE — 86900 BLOOD TYPING SEROLOGIC ABO: CPT

## 2017-10-19 PROCEDURE — 86704 HEP B CORE ANTIBODY TOTAL: CPT

## 2017-10-19 PROCEDURE — 84100 ASSAY OF PHOSPHORUS: CPT

## 2017-10-19 PROCEDURE — 83735 ASSAY OF MAGNESIUM: CPT

## 2017-10-19 PROCEDURE — 84443 ASSAY THYROID STIM HORMONE: CPT

## 2017-10-19 PROCEDURE — 71250 CT THORAX DX C-: CPT

## 2017-10-19 PROCEDURE — 86162 COMPLEMENT TOTAL (CH50): CPT

## 2017-10-19 PROCEDURE — 87150 DNA/RNA AMPLIFIED PROBE: CPT

## 2017-10-19 PROCEDURE — C1726: CPT

## 2017-10-19 PROCEDURE — A9500: CPT

## 2017-10-19 NOTE — PROGRESS NOTE ADULT - PROBLEM/PLAN-2
DISPLAY PLAN FREE TEXT
28.8

## 2017-10-31 ENCOUNTER — APPOINTMENT (OUTPATIENT)
Dept: PULMONOLOGY | Facility: CLINIC | Age: 37
End: 2017-10-31
Payer: MEDICARE

## 2017-10-31 VITALS — SYSTOLIC BLOOD PRESSURE: 176 MMHG | DIASTOLIC BLOOD PRESSURE: 94 MMHG

## 2017-10-31 VITALS — BODY MASS INDEX: 48.65 KG/M2 | WEIGHT: 266 LBS

## 2017-10-31 VITALS — HEART RATE: 103 BPM | OXYGEN SATURATION: 93 %

## 2017-10-31 PROCEDURE — 99214 OFFICE O/P EST MOD 30 MIN: CPT | Mod: 25

## 2017-10-31 PROCEDURE — 94010 BREATHING CAPACITY TEST: CPT

## 2017-10-31 RX ORDER — CARVEDILOL 12.5 MG/1
12.5 TABLET, FILM COATED ORAL
Qty: 180 | Refills: 0 | Status: DISCONTINUED | COMMUNITY
Start: 2017-05-26 | End: 2017-10-31

## 2017-10-31 RX ORDER — AMLODIPINE BESYLATE 10 MG/1
10 TABLET ORAL
Qty: 30 | Refills: 0 | Status: DISCONTINUED | COMMUNITY
Start: 2017-02-06 | End: 2017-10-31

## 2017-10-31 RX ORDER — CALCITRIOL 0.5 UG/1
0.5 CAPSULE, LIQUID FILLED ORAL
Qty: 30 | Refills: 0 | Status: DISCONTINUED | COMMUNITY
Start: 2017-02-06 | End: 2017-10-31

## 2017-11-03 NOTE — ASU PATIENT PROFILE, ADULT - VISION (WITH CORRECTIVE LENSES IF THE PATIENT USUALLY WEARS THEM):
left eye light only/Severely impaired: cannot locate objects without hearing or touching them or patient nonresponsive.

## 2017-11-06 ENCOUNTER — INPATIENT (INPATIENT)
Facility: HOSPITAL | Age: 37
LOS: 2 days | Discharge: HOME | DRG: 208 | End: 2017-11-09
Attending: HOSPITALIST | Admitting: INTERNAL MEDICINE
Payer: MEDICARE

## 2017-11-06 ENCOUNTER — OUTPATIENT (OUTPATIENT)
Dept: OUTPATIENT SERVICES | Facility: HOSPITAL | Age: 37
LOS: 1 days | End: 2017-11-06
Payer: MEDICARE

## 2017-11-06 VITALS
DIASTOLIC BLOOD PRESSURE: 77 MMHG | RESPIRATION RATE: 18 BRPM | HEART RATE: 82 BPM | OXYGEN SATURATION: 100 % | SYSTOLIC BLOOD PRESSURE: 123 MMHG

## 2017-11-06 VITALS
OXYGEN SATURATION: 100 % | HEIGHT: 62 IN | WEIGHT: 260.15 LBS | DIASTOLIC BLOOD PRESSURE: 88 MMHG | HEART RATE: 78 BPM | RESPIRATION RATE: 11 BRPM | SYSTOLIC BLOOD PRESSURE: 117 MMHG | TEMPERATURE: 98 F

## 2017-11-06 VITALS
OXYGEN SATURATION: 99 % | TEMPERATURE: 97 F | HEIGHT: 62 IN | HEART RATE: 74 BPM | SYSTOLIC BLOOD PRESSURE: 130 MMHG | WEIGHT: 262.13 LBS | RESPIRATION RATE: 23 BRPM | DIASTOLIC BLOOD PRESSURE: 77 MMHG

## 2017-11-06 DIAGNOSIS — I77.0 ARTERIOVENOUS FISTULA, ACQUIRED: Chronic | ICD-10-CM

## 2017-11-06 DIAGNOSIS — N18.6 END STAGE RENAL DISEASE: ICD-10-CM

## 2017-11-06 DIAGNOSIS — E11.9 TYPE 2 DIABETES MELLITUS WITHOUT COMPLICATIONS: ICD-10-CM

## 2017-11-06 DIAGNOSIS — J96.01 ACUTE RESPIRATORY FAILURE WITH HYPOXIA: ICD-10-CM

## 2017-11-06 DIAGNOSIS — J81.0 ACUTE PULMONARY EDEMA: ICD-10-CM

## 2017-11-06 DIAGNOSIS — Z99.2 DEPENDENCE ON RENAL DIALYSIS: Chronic | ICD-10-CM

## 2017-11-06 DIAGNOSIS — E11.3519 TYPE 2 DIABETES MELLITUS WITH PROLIFERATIVE DIABETIC RETINOPATHY WITH MACULAR EDEMA, UNSPECIFIED EYE: ICD-10-CM

## 2017-11-06 DIAGNOSIS — J96.00 ACUTE RESPIRATORY FAILURE, UNSPECIFIED WHETHER WITH HYPOXIA OR HYPERCAPNIA: ICD-10-CM

## 2017-11-06 DIAGNOSIS — I10 ESSENTIAL (PRIMARY) HYPERTENSION: ICD-10-CM

## 2017-11-06 LAB
ALBUMIN SERPL ELPH-MCNC: 3 G/DL — LOW (ref 3.3–5)
ALBUMIN SERPL ELPH-MCNC: 3.4 G/DL — SIGNIFICANT CHANGE UP (ref 3.3–5.2)
ALP SERPL-CCNC: 120 U/L — SIGNIFICANT CHANGE UP (ref 40–120)
ALP SERPL-CCNC: 128 U/L — HIGH (ref 30–120)
ALT FLD-CCNC: 20 U/L — SIGNIFICANT CHANGE UP
ALT FLD-CCNC: 26 U/L DA — SIGNIFICANT CHANGE UP (ref 10–60)
ANION GAP SERPL CALC-SCNC: 16 MMOL/L — SIGNIFICANT CHANGE UP (ref 5–17)
ANION GAP SERPL CALC-SCNC: 17 MMOL/L — SIGNIFICANT CHANGE UP (ref 5–17)
ANION GAP SERPL CALC-SCNC: 23 MMOL/L — HIGH (ref 5–17)
ANISOCYTOSIS BLD QL: SLIGHT — SIGNIFICANT CHANGE UP
APTT BLD: 32.8 SEC — SIGNIFICANT CHANGE UP (ref 27.5–37.4)
AST SERPL-CCNC: 17 U/L — SIGNIFICANT CHANGE UP
AST SERPL-CCNC: 21 U/L — SIGNIFICANT CHANGE UP (ref 10–40)
BASE EXCESS BLDA CALC-SCNC: -5.9 MMOL/L — LOW (ref -2–2)
BILIRUB SERPL-MCNC: 0.7 MG/DL — SIGNIFICANT CHANGE UP (ref 0.4–2)
BILIRUB SERPL-MCNC: 0.9 MG/DL — SIGNIFICANT CHANGE UP (ref 0.2–1.2)
BLOOD GAS COMMENTS: SIGNIFICANT CHANGE UP
BLOOD GAS SOURCE: SIGNIFICANT CHANGE UP
BUN SERPL-MCNC: 101 MG/DL — HIGH (ref 8–20)
BUN SERPL-MCNC: 105 MG/DL — HIGH (ref 7–23)
BUN SERPL-MCNC: 106 MG/DL — HIGH (ref 7–23)
CALCIUM SERPL-MCNC: 9 MG/DL — SIGNIFICANT CHANGE UP (ref 8.4–10.5)
CALCIUM SERPL-MCNC: 9.1 MG/DL — SIGNIFICANT CHANGE UP (ref 8.4–10.5)
CALCIUM SERPL-MCNC: 9.1 MG/DL — SIGNIFICANT CHANGE UP (ref 8.6–10.2)
CHLORIDE SERPL-SCNC: 94 MMOL/L — LOW (ref 98–107)
CHLORIDE SERPL-SCNC: 96 MMOL/L — SIGNIFICANT CHANGE UP (ref 96–108)
CHLORIDE SERPL-SCNC: 97 MMOL/L — SIGNIFICANT CHANGE UP (ref 96–108)
CO2 SERPL-SCNC: 20 MMOL/L — LOW (ref 22–29)
CO2 SERPL-SCNC: 21 MMOL/L — LOW (ref 22–31)
CO2 SERPL-SCNC: 23 MMOL/L — SIGNIFICANT CHANGE UP (ref 22–31)
CREAT SERPL-MCNC: 9.27 MG/DL — HIGH (ref 0.5–1.3)
CREAT SERPL-MCNC: 9.57 MG/DL — HIGH (ref 0.5–1.3)
CREAT SERPL-MCNC: 9.9 MG/DL — HIGH (ref 0.5–1.3)
DACRYOCYTES BLD QL SMEAR: SLIGHT — SIGNIFICANT CHANGE UP
ELLIPTOCYTES BLD QL SMEAR: SLIGHT — SIGNIFICANT CHANGE UP
EOSINOPHIL NFR BLD AUTO: 1 % — SIGNIFICANT CHANGE UP (ref 0–5)
GAS PNL BLDA: SIGNIFICANT CHANGE UP
GLUCOSE BLDC GLUCOMTR-MCNC: 130 MG/DL — HIGH (ref 70–99)
GLUCOSE SERPL-MCNC: 108 MG/DL — HIGH (ref 70–99)
GLUCOSE SERPL-MCNC: 131 MG/DL — HIGH (ref 70–99)
GLUCOSE SERPL-MCNC: 94 MG/DL — SIGNIFICANT CHANGE UP (ref 70–115)
HCG SERPL-ACNC: <1 MIU/ML — SIGNIFICANT CHANGE UP
HCO3 BLDA-SCNC: 19 MMOL/L — LOW (ref 21–29)
HCT VFR BLD CALC: 25.7 % — LOW (ref 37–47)
HGB BLD-MCNC: 8.3 G/DL — LOW (ref 12–16)
HOROWITZ INDEX BLDA+IHG-RTO: 60 — SIGNIFICANT CHANGE UP
HYPOCHROMIA BLD QL: SLIGHT — SIGNIFICANT CHANGE UP
INR BLD: 1.43 RATIO — HIGH (ref 0.88–1.16)
LYMPHOCYTES # BLD AUTO: 16 % — LOW (ref 20–55)
MACROCYTES BLD QL: SLIGHT — SIGNIFICANT CHANGE UP
MAGNESIUM SERPL-MCNC: 2.8 MG/DL — HIGH (ref 1.6–2.6)
MCHC RBC-ENTMCNC: 26 PG — LOW (ref 27–31)
MCHC RBC-ENTMCNC: 32.3 G/DL — SIGNIFICANT CHANGE UP (ref 32–36)
MCV RBC AUTO: 80.6 FL — LOW (ref 81–99)
MICROCYTES BLD QL: SLIGHT — SIGNIFICANT CHANGE UP
MONOCYTES NFR BLD AUTO: 4 % — SIGNIFICANT CHANGE UP (ref 3–10)
NEUTROPHILS NFR BLD AUTO: 79 % — HIGH (ref 37–73)
OVALOCYTES BLD QL SMEAR: SLIGHT — SIGNIFICANT CHANGE UP
PCO2 BLDA: 40 MMHG — SIGNIFICANT CHANGE UP (ref 32–46)
PH BLD: 7.3 — LOW (ref 7.35–7.45)
PLAT MORPH BLD: NORMAL — SIGNIFICANT CHANGE UP
PLATELET # BLD AUTO: 164 K/UL — SIGNIFICANT CHANGE UP (ref 150–400)
PO2 BLDA: 66 MMHG — LOW (ref 74–108)
POIKILOCYTOSIS BLD QL AUTO: SLIGHT — SIGNIFICANT CHANGE UP
POLYCHROMASIA BLD QL SMEAR: SLIGHT — SIGNIFICANT CHANGE UP
POTASSIUM SERPL-MCNC: 5.1 MMOL/L — SIGNIFICANT CHANGE UP (ref 3.5–5.3)
POTASSIUM SERPL-MCNC: 5.4 MMOL/L — HIGH (ref 3.5–5.3)
POTASSIUM SERPL-MCNC: 5.4 MMOL/L — HIGH (ref 3.5–5.3)
POTASSIUM SERPL-SCNC: 5.1 MMOL/L — SIGNIFICANT CHANGE UP (ref 3.5–5.3)
POTASSIUM SERPL-SCNC: 5.4 MMOL/L — HIGH (ref 3.5–5.3)
POTASSIUM SERPL-SCNC: 5.4 MMOL/L — HIGH (ref 3.5–5.3)
PROT SERPL-MCNC: 7.6 G/DL — SIGNIFICANT CHANGE UP (ref 6.6–8.7)
PROT SERPL-MCNC: 8.1 G/DL — SIGNIFICANT CHANGE UP (ref 6–8.3)
PROTHROM AB SERPL-ACNC: 15.8 SEC — HIGH (ref 9.8–12.7)
RBC # BLD: 3.19 M/UL — LOW (ref 4.4–5.2)
RBC # FLD: 20.4 % — HIGH (ref 11–15.6)
RBC BLD AUTO: ABNORMAL
SAO2 % BLDA: 87 % — LOW (ref 92–96)
SCHISTOCYTES BLD QL AUTO: SLIGHT — SIGNIFICANT CHANGE UP
SODIUM SERPL-SCNC: 135 MMOL/L — SIGNIFICANT CHANGE UP (ref 135–145)
SODIUM SERPL-SCNC: 135 MMOL/L — SIGNIFICANT CHANGE UP (ref 135–145)
SODIUM SERPL-SCNC: 137 MMOL/L — SIGNIFICANT CHANGE UP (ref 135–145)
WBC # BLD: 9 K/UL — SIGNIFICANT CHANGE UP (ref 4.8–10.8)
WBC # FLD AUTO: 9 K/UL — SIGNIFICANT CHANGE UP (ref 4.8–10.8)

## 2017-11-06 PROCEDURE — 93010 ELECTROCARDIOGRAM REPORT: CPT

## 2017-11-06 PROCEDURE — 71010: CPT | Mod: 26

## 2017-11-06 PROCEDURE — 71045 X-RAY EXAM CHEST 1 VIEW: CPT

## 2017-11-06 PROCEDURE — 84702 CHORIONIC GONADOTROPIN TEST: CPT

## 2017-11-06 PROCEDURE — 80048 BASIC METABOLIC PNL TOTAL CA: CPT

## 2017-11-06 PROCEDURE — 36415 COLL VENOUS BLD VENIPUNCTURE: CPT

## 2017-11-06 PROCEDURE — 93005 ELECTROCARDIOGRAM TRACING: CPT

## 2017-11-06 PROCEDURE — 67040 LASER TREATMENT OF RETINA: CPT | Mod: 74,LT

## 2017-11-06 PROCEDURE — 99291 CRITICAL CARE FIRST HOUR: CPT

## 2017-11-06 PROCEDURE — 80053 COMPREHEN METABOLIC PANEL: CPT

## 2017-11-06 PROCEDURE — 71010: CPT | Mod: 26,77

## 2017-11-06 PROCEDURE — 82803 BLOOD GASES ANY COMBINATION: CPT

## 2017-11-06 PROCEDURE — 82962 GLUCOSE BLOOD TEST: CPT

## 2017-11-06 RX ORDER — PANTOPRAZOLE SODIUM 20 MG/1
40 TABLET, DELAYED RELEASE ORAL DAILY
Qty: 0 | Refills: 0 | Status: DISCONTINUED | OUTPATIENT
Start: 2017-11-06 | End: 2017-11-07

## 2017-11-06 RX ORDER — DOPAMINE HYDROCHLORIDE 40 MG/ML
5 INJECTION, SOLUTION, CONCENTRATE INTRAVENOUS
Qty: 400 | Refills: 0 | Status: DISCONTINUED | OUTPATIENT
Start: 2017-11-06 | End: 2017-11-21

## 2017-11-06 RX ORDER — SODIUM CHLORIDE 9 MG/ML
1000 INJECTION INTRAMUSCULAR; INTRAVENOUS; SUBCUTANEOUS
Qty: 0 | Refills: 0 | Status: DISCONTINUED | OUTPATIENT
Start: 2017-11-06 | End: 2017-11-06

## 2017-11-06 RX ORDER — ERYTHROPOIETIN 10000 [IU]/ML
10000 INJECTION, SOLUTION INTRAVENOUS; SUBCUTANEOUS ONCE
Qty: 0 | Refills: 0 | Status: COMPLETED | OUTPATIENT
Start: 2017-11-06 | End: 2017-11-06

## 2017-11-06 RX ORDER — ASPIRIN/CALCIUM CARB/MAGNESIUM 324 MG
81 TABLET ORAL DAILY
Qty: 0 | Refills: 0 | Status: DISCONTINUED | OUTPATIENT
Start: 2017-11-06 | End: 2017-11-09

## 2017-11-06 RX ORDER — CHLORHEXIDINE GLUCONATE 213 G/1000ML
15 SOLUTION TOPICAL
Qty: 0 | Refills: 0 | Status: DISCONTINUED | OUTPATIENT
Start: 2017-11-06 | End: 2017-11-07

## 2017-11-06 RX ORDER — DEXMEDETOMIDINE HYDROCHLORIDE IN 0.9% SODIUM CHLORIDE 4 UG/ML
0.7 INJECTION INTRAVENOUS
Qty: 200 | Refills: 0 | Status: DISCONTINUED | OUTPATIENT
Start: 2017-11-06 | End: 2017-11-07

## 2017-11-06 RX ORDER — PROPOFOL 10 MG/ML
100 INJECTION, EMULSION INTRAVENOUS
Qty: 1000 | Refills: 0 | Status: DISCONTINUED | OUTPATIENT
Start: 2017-11-06 | End: 2017-11-21

## 2017-11-06 RX ORDER — CALCIUM ACETATE 667 MG
1 TABLET ORAL
Qty: 0 | Refills: 0 | COMMUNITY

## 2017-11-06 RX ADMIN — PROPOFOL 70.8 MICROGRAM(S)/KG/MIN: 10 INJECTION, EMULSION INTRAVENOUS at 13:02

## 2017-11-06 RX ADMIN — CHLORHEXIDINE GLUCONATE 15 MILLILITER(S): 213 SOLUTION TOPICAL at 19:38

## 2017-11-06 RX ADMIN — DOPAMINE HYDROCHLORIDE 22.12 MICROGRAM(S)/KG/MIN: 40 INJECTION, SOLUTION, CONCENTRATE INTRAVENOUS at 13:03

## 2017-11-06 RX ADMIN — DEXMEDETOMIDINE HYDROCHLORIDE IN 0.9% SODIUM CHLORIDE 20.81 MICROGRAM(S)/KG/HR: 4 INJECTION INTRAVENOUS at 22:07

## 2017-11-06 RX ADMIN — SODIUM CHLORIDE 40 MILLILITER(S): 9 INJECTION INTRAMUSCULAR; INTRAVENOUS; SUBCUTANEOUS at 13:01

## 2017-11-06 RX ADMIN — ERYTHROPOIETIN 10000 UNIT(S): 10000 INJECTION, SOLUTION INTRAVENOUS; SUBCUTANEOUS at 19:44

## 2017-11-06 NOTE — H&P ADULT - ATTENDING COMMENTS
I have seen and evaluated the patient and agree with the assessment and plan with following additions:      - acute respiratory failure after induction secondary to sedation and fluid overload dialysis tonight  - Change sedation to precedex lighten and plan for SBT after dialysis with possible extubation

## 2017-11-06 NOTE — H&P ADULT - RS GEN PE MLT RESP DETAILS PC
diminished breath sounds, L/Diminished at bases B/L/breath sounds equal/diminished breath sounds, R/good air movement

## 2017-11-06 NOTE — CONSULT NOTE ADULT - ASSESSMENT
ESRD - S/P Resp arrest during induction sedation for L eye procedure  Hemodynamics improved   Will Schedule HD for today ( see orders )   mother signed consent for HD     Anemia - epogen with HD     D/W ICU team . Will follow

## 2017-11-06 NOTE — H&P ADULT - HISTORY OF PRESENT ILLNESS
Pt is a 37 YOM with DM, morbid obesity, HTN, recent diagnosed ESRD on HD since January 2017.  Pt was at [facility] today for an elective cataract 36 yo F with PMH significant for DM T2, morbid obesity, ESRD on HD (AV Fistula Left arm), HTN, PNA, DALTON presents intubated s/p hypotension and desaturation on anesthesia induction for elective surgery (Left vitrectomy for non-clearing vitreous hemorrhage) at [facility].  Pt on Propofol drip (50 mcg/kg/min) and received a prior Dopamine drip to help control BP.  Last dialysis was Fri 11/4/17, was supposed to receive HD on Sat 11/5/17 also but did not because they were unable to cannulate her fistula.

## 2017-11-06 NOTE — CONSULT NOTE ADULT - SUBJECTIVE AND OBJECTIVE BOX
Patient is a 37y old  Female who presents with a chief complaint of mother states pt was" to have  elective Lt eye surgery, during anesthesia pt became lo b/p" (2017 17:36)      HPI:  38 yo F with PMH significant for DM T2, morbid obesity, ESRD on HD (AV Fistula Left arm), HTN, PNA, DALTON presents intubated s/p hypotension and desaturation on anesthesia induction for elective surgery (Left vitrectomy for non-clearing vitreous hemorrhage) at [facility].  Pt on Propofol drip (50 mcg/kg/min) and received a prior Dopamine drip to help control BP.  Last dialysis was 17, was supposed to receive HD on Sat 11/5/17 also but did not because they were unable to cannulate her fistula. (2017 16:06)    Above noted . Family at bedside   Hemodynamics better       PAST MEDICAL & SURGICAL HISTORY:  Vitreous hemorrhage of left eye  Class 3 obesity due to excess calories with serious comorbidity in adult, unspecified BMI  DALTON (obstructive sleep apnea)  Pneumonia  End stage renal disease  Hypertension  Diabetes  AVF (arteriovenous fistula)  Vascular dialysis catheter in place      FAMILY HISTORY:  No pertinent family history in first degree relatives      Social History:    MEDICATIONS  (STANDING):  aspirin enteric coated 81 milliGRAM(s) Oral daily  chlorhexidine 0.12% Liquid 15 milliLiter(s) Swish and Spit two times a day  dexmedetomidine Infusion 0.7 MICROgram(s)/kG/Hr (20.808 mL/Hr) IV Continuous <Continuous>  pantoprazole  Injectable 40 milliGRAM(s) IV Push daily    MEDICATIONS  (PRN):      Allergies    Mushrooms (Hives (Mild))  No Known Drug Allergies    Intolerances        Vital Signs Last 24 Hrs  T(C): 36.1 (2017 15:33), Max: 36.4 (2017 07:45)  T(F): 97 (2017 15:33), Max: 97.6 (2017 07:45)  HR: 71 (2017 17:00) (66 - 90)  BP: 119/83 (2017 17:00) (69/58 - 140/79)  BP(mean): 98 (2017 17:00) (98 - 99)  RR: 20 (2017 17:00) (10 - 27)  SpO2: 100% (2017 17:00) (91% - 100%)  Daily Height in cm: 157.48 (2017 15:33)    Daily Weight in k.9 (2017 15:33)  I&O's Detail    I&O's Summary      PHYSICAL EXAM:    GENERAL: orally intubated   HEAD:  No edema   NECK: Supple, No JVD,   CHEST/LUNG: EAE , No wheeze   HEART: Regular rate and rhythm; No rub   ABDOMEN: Soft, Nontender, Nondistended;   EXTREMITIES:  L AVF w/ thrill       LABS:                        8.3    9.0   )-----------( 164      ( 2017 16:20 )             25.7         137  |  94<L>  |  101.0<H>  ----------------------------<  94  5.1   |  20.0<L>  |  9.90<H>    Ca    9.1      2017 16:20  Mg     2.8         TPro  7.6  /  Alb  3.4  /  TBili  0.7  /  DBili  x   /  AST  17  /  ALT  20  /  AlkPhos  120      PT/INR - ( 2017 16:20 )   PT: 15.8 sec;   INR: 1.43 ratio         PTT - ( 2017 16:20 )  PTT:32.8 sec    Magnesium, Serum: 2.8 mg/dL ( @ 16:20)    ABG - ( 2017 11:05 )  pH: x     /  pCO2: 40    /  pO2: 66    / HCO3: 19    / Base Excess: -5.9  /  SaO2: 87          PROCEDURE DATE:  2017          INTERPRETATION:  History: ET tube    Portable AP radiograph of the chest is performed. comparison is made to   study of earlier in the day.    ET tube is again seen with the tip above the noemy. The heart is   enlarged. There is improved aeration in the right upper lung. There is   mild patchy density at the lung bases.    Impression: ET tube in good position. Improved aeration of the right   upper lobe.  Patchy bibasilar densities                  RADIOLOGY & ADDITIONAL TESTS:

## 2017-11-06 NOTE — H&P ADULT - ASSESSMENT
36 yo F with hypoxic acute resp failure s/p anesthesia induction requiring mechanical ventilation; ESRD with Volume overload requiring emergent HD

## 2017-11-06 NOTE — PATIENT PROFILE ADULT. - PRO ARRIVE FROM
home Irritable bowel syndrome with constipation and diarrhea    MS (multiple sclerosis)    Parkinsons disease

## 2017-11-06 NOTE — H&P ADULT - MENTAL STATUS
Pt currently Propofol sedation, weaning off now, will have to re-eval neuro status once fully off Propofol

## 2017-11-06 NOTE — H&P ADULT - PMH
Class 3 obesity due to excess calories with serious comorbidity in adult, unspecified BMI    Diabetes    End stage renal disease    Hypertension    DALTON (obstructive sleep apnea)    Pneumonia    Vitreous hemorrhage of left eye

## 2017-11-06 NOTE — CONSULT NOTE ADULT - SUBJECTIVE AND OBJECTIVE BOX
PULMONARY/CRITICAL CARE        Patient is a 37y old  Female who presents with a chief complaint of desaturated during induction for eye surgery Was intubated, difficult oxygenating initially, had low bp when propofol first given, improved. Now sedated on vent. No pressors now, but had few does Tulio earlier.   Pt was supposed to have hemodialysis yesterday, but missed it. Last HD 3 days ago.  Apparently recently told of Sleep apnea.  Pt on O2 since recent pneumonia  BRIEF HOSPITAL COURSE: ***    Events last 24 hours: ***    PAST MEDICAL & SURGICAL HISTORY:  Pneumonia  End stage renal disease  Hypertension  Diabetes  AVF (arteriovenous fistula)  Vascular dialysis catheter in place    Allergies    Mushrooms (Hives (Mild))  No Known Drug Allergies    Intolerances      FAMILY HISTORY:      Review of Systems:  CONSTITUTIONAL: No fever, chills, or fatigue  EYES: No eye pain, visual disturbances, or discharge  ENMT:  No difficulty hearing, tinnitus, vertigo; No sinus or throat pain  NECK: No pain or stiffness  RESPIRATORY: No cough, wheezing, chills or hemoptysis; No shortness of breath  CARDIOVASCULAR: No chest pain, palpitations, dizziness, or leg swelling  GASTROINTESTINAL: No abdominal or epigastric pain. No nausea, vomiting, or hematemesis; No diarrhea or constipation. No melena or hematochezia.  GENITOURINARY: No dysuria, frequency, hematuria, or incontinence  NEUROLOGICAL: No headaches, memory loss, loss of strength, numbness, or tremors  SKIN: No itching, burning, rashes, or lesions   MUSCULOSKELETAL: No joint pain or swelling; No muscle, back, or extremity pain  PSYCHIATRIC: No depression, anxiety, mood swings, or difficulty sleeping      Medications:                                  ICU Vital Signs Last 24 Hrs  T(C): 36.4 (06 Nov 2017 07:45), Max: 36.4 (06 Nov 2017 07:45)  T(F): 97.6 (06 Nov 2017 07:45), Max: 97.6 (06 Nov 2017 07:45)  HR: 70 (06 Nov 2017 10:40) (70 - 78)  BP: 110/79 (06 Nov 2017 10:40) (99/71 - 125/78)  BP(mean): --  ABP: --  ABP(mean): --  RR: 17 (06 Nov 2017 10:40) (11 - 19)  SpO2: 97% (06 Nov 2017 10:40) (97% - 100%)    Vital Signs Last 24 Hrs  T(C): 36.4 (06 Nov 2017 07:45), Max: 36.4 (06 Nov 2017 07:45)  T(F): 97.6 (06 Nov 2017 07:45), Max: 97.6 (06 Nov 2017 07:45)  HR: 70 (06 Nov 2017 10:40) (70 - 78)  BP: 110/79 (06 Nov 2017 10:40) (99/71 - 125/78)  BP(mean): --  RR: 17 (06 Nov 2017 10:40) (11 - 19)  SpO2: 97% (06 Nov 2017 10:40) (97% - 100%)        I&O's Detail        LABS:    11-06    135  |  96  |  106<H>  ----------------------------<  131<H>  5.4<H>   |  23  |  9.57<H>    Ca    9.1      06 Nov 2017 07:37            CAPILLARY BLOOD GLUCOSE  130 (06 Nov 2017 07:45)      POCT Blood Glucose.: 130 mg/dL (06 Nov 2017 07:25)        CULTURES:      Physical Examination:    General: No acute distress.  Obese female, intubated, sedated on vent    HEENT: Pupils equal, reactive to light.  Symmetric.    PULM: Few rhonchi bilat : good excursion    CVS: Regular rate and rhythm, no murmurs, rubs, or gallops    ABD: Soft, nondistended, nontender, normoactive bowel sounds, no masses    EXT: No edema, nontender    SKIN: Warm and well perfused, no rashes noted.    NEURO: Alert, oriented, interactive, nonfocal    RADIOLOGY: ***    CRITICAL CARE TIME SPENT: ***

## 2017-11-06 NOTE — CHART NOTE - NSCHARTNOTEFT_GEN_A_CORE
BP improved on low dose dopamine, and Propofol shut off.  Oxygenating adequately. CXR showed some RUL atelectasis   Pt. accepted for transfer [place] [facility].

## 2017-11-06 NOTE — CONSULT NOTE ADULT - ASSESSMENT
Pt desatted on induction, intubated. Transient low bp. Missed dialysis, may have increased lung water, as well as DALTON and preexisting lung disease.

## 2017-11-06 NOTE — H&P ADULT - PROBLEM SELECTOR PLAN 1
-Full ventilatory support   -Wean FiO2 as tolerated to maintain O2 Sat > 92%   -Emergent HD arranged tonight for Volume removal   -After HD, wean from ventilator as tolerated

## 2017-11-07 DIAGNOSIS — I10 ESSENTIAL (PRIMARY) HYPERTENSION: ICD-10-CM

## 2017-11-07 DIAGNOSIS — H43.12 VITREOUS HEMORRHAGE, LEFT EYE: ICD-10-CM

## 2017-11-07 DIAGNOSIS — G47.33 OBSTRUCTIVE SLEEP APNEA (ADULT) (PEDIATRIC): ICD-10-CM

## 2017-11-07 DIAGNOSIS — E11.22 TYPE 2 DIABETES MELLITUS WITH DIABETIC CHRONIC KIDNEY DISEASE: ICD-10-CM

## 2017-11-07 DIAGNOSIS — J96.11 CHRONIC RESPIRATORY FAILURE WITH HYPOXIA: ICD-10-CM

## 2017-11-07 LAB
ANION GAP SERPL CALC-SCNC: 18 MMOL/L — HIGH (ref 5–17)
BUN SERPL-MCNC: 57 MG/DL — HIGH (ref 8–20)
CALCIUM SERPL-MCNC: 8.7 MG/DL — SIGNIFICANT CHANGE UP (ref 8.6–10.2)
CHLORIDE SERPL-SCNC: 92 MMOL/L — LOW (ref 98–107)
CO2 SERPL-SCNC: 24 MMOL/L — SIGNIFICANT CHANGE UP (ref 22–29)
CREAT SERPL-MCNC: 6.35 MG/DL — HIGH (ref 0.5–1.3)
GLUCOSE BLDC GLUCOMTR-MCNC: 146 MG/DL — HIGH (ref 70–99)
GLUCOSE BLDC GLUCOMTR-MCNC: 160 MG/DL — HIGH (ref 70–99)
GLUCOSE SERPL-MCNC: 110 MG/DL — SIGNIFICANT CHANGE UP (ref 70–115)
HBA1C BLD-MCNC: 5.3 % — SIGNIFICANT CHANGE UP (ref 4–5.6)
POTASSIUM SERPL-MCNC: 5.1 MMOL/L — SIGNIFICANT CHANGE UP (ref 3.5–5.3)
POTASSIUM SERPL-SCNC: 5.1 MMOL/L — SIGNIFICANT CHANGE UP (ref 3.5–5.3)
SODIUM SERPL-SCNC: 134 MMOL/L — LOW (ref 135–145)

## 2017-11-07 PROCEDURE — 99233 SBSQ HOSP IP/OBS HIGH 50: CPT

## 2017-11-07 RX ORDER — PROPOFOL 10 MG/ML
10 INJECTION, EMULSION INTRAVENOUS
Qty: 1000 | Refills: 0 | Status: DISCONTINUED | OUTPATIENT
Start: 2017-11-06 | End: 2017-11-07

## 2017-11-07 RX ORDER — DEXTROSE 50 % IN WATER 50 %
25 SYRINGE (ML) INTRAVENOUS ONCE
Qty: 0 | Refills: 0 | Status: DISCONTINUED | OUTPATIENT
Start: 2017-11-07 | End: 2017-11-09

## 2017-11-07 RX ORDER — VERAPAMIL HCL 240 MG
80 CAPSULE, EXTENDED RELEASE PELLETS 24 HR ORAL THREE TIMES A DAY
Qty: 0 | Refills: 0 | Status: DISCONTINUED | OUTPATIENT
Start: 2017-11-07 | End: 2017-11-09

## 2017-11-07 RX ORDER — SEVELAMER CARBONATE 2400 MG/1
800 POWDER, FOR SUSPENSION ORAL
Qty: 0 | Refills: 0 | Status: DISCONTINUED | OUTPATIENT
Start: 2017-11-07 | End: 2017-11-09

## 2017-11-07 RX ORDER — AMLODIPINE BESYLATE 2.5 MG/1
10 TABLET ORAL DAILY
Qty: 0 | Refills: 0 | Status: DISCONTINUED | OUTPATIENT
Start: 2017-11-07 | End: 2017-11-09

## 2017-11-07 RX ORDER — HYDRALAZINE HCL 50 MG
10 TABLET ORAL ONCE
Qty: 0 | Refills: 0 | Status: COMPLETED | OUTPATIENT
Start: 2017-11-07 | End: 2017-11-07

## 2017-11-07 RX ORDER — VALSARTAN 80 MG/1
160 TABLET ORAL DAILY
Qty: 0 | Refills: 0 | Status: DISCONTINUED | OUTPATIENT
Start: 2017-11-07 | End: 2017-11-09

## 2017-11-07 RX ORDER — FOLIC ACID 0.8 MG
1 TABLET ORAL DAILY
Qty: 0 | Refills: 0 | Status: DISCONTINUED | OUTPATIENT
Start: 2017-11-07 | End: 2017-11-09

## 2017-11-07 RX ORDER — DEXTROSE 50 % IN WATER 50 %
12.5 SYRINGE (ML) INTRAVENOUS ONCE
Qty: 0 | Refills: 0 | Status: DISCONTINUED | OUTPATIENT
Start: 2017-11-07 | End: 2017-11-09

## 2017-11-07 RX ORDER — INSULIN LISPRO 100/ML
VIAL (ML) SUBCUTANEOUS
Qty: 0 | Refills: 0 | Status: DISCONTINUED | OUTPATIENT
Start: 2017-11-07 | End: 2017-11-09

## 2017-11-07 RX ORDER — PROPOFOL 10 MG/ML
10 INJECTION, EMULSION INTRAVENOUS
Qty: 1000 | Refills: 0 | Status: DISCONTINUED | OUTPATIENT
Start: 2017-11-07 | End: 2017-11-07

## 2017-11-07 RX ORDER — PANTOPRAZOLE SODIUM 20 MG/1
40 TABLET, DELAYED RELEASE ORAL
Qty: 0 | Refills: 0 | Status: DISCONTINUED | OUTPATIENT
Start: 2017-11-07 | End: 2017-11-09

## 2017-11-07 RX ORDER — SODIUM CHLORIDE 9 MG/ML
1000 INJECTION, SOLUTION INTRAVENOUS
Qty: 0 | Refills: 0 | Status: DISCONTINUED | OUTPATIENT
Start: 2017-11-07 | End: 2017-11-09

## 2017-11-07 RX ORDER — LORATADINE 10 MG/1
10 TABLET ORAL DAILY
Qty: 0 | Refills: 0 | Status: DISCONTINUED | OUTPATIENT
Start: 2017-11-07 | End: 2017-11-09

## 2017-11-07 RX ORDER — ERYTHROPOIETIN 10000 [IU]/ML
10000 INJECTION, SOLUTION INTRAVENOUS; SUBCUTANEOUS
Qty: 0 | Refills: 0 | Status: DISCONTINUED | OUTPATIENT
Start: 2017-11-07 | End: 2017-11-09

## 2017-11-07 RX ORDER — FOLIC ACID 0.8 MG
5 TABLET ORAL DAILY
Qty: 0 | Refills: 0 | Status: DISCONTINUED | OUTPATIENT
Start: 2017-11-07 | End: 2017-11-07

## 2017-11-07 RX ORDER — HEPARIN SODIUM 5000 [USP'U]/ML
5000 INJECTION INTRAVENOUS; SUBCUTANEOUS EVERY 8 HOURS
Qty: 0 | Refills: 0 | Status: DISCONTINUED | OUTPATIENT
Start: 2017-11-07 | End: 2017-11-09

## 2017-11-07 RX ORDER — DEXTROSE 50 % IN WATER 50 %
1 SYRINGE (ML) INTRAVENOUS ONCE
Qty: 0 | Refills: 0 | Status: DISCONTINUED | OUTPATIENT
Start: 2017-11-07 | End: 2017-11-09

## 2017-11-07 RX ORDER — ALBUTEROL 90 UG/1
2 AEROSOL, METERED ORAL EVERY 6 HOURS
Qty: 0 | Refills: 0 | Status: DISCONTINUED | OUTPATIENT
Start: 2017-11-07 | End: 2017-11-09

## 2017-11-07 RX ORDER — GLUCAGON INJECTION, SOLUTION 0.5 MG/.1ML
1 INJECTION, SOLUTION SUBCUTANEOUS ONCE
Qty: 0 | Refills: 0 | Status: DISCONTINUED | OUTPATIENT
Start: 2017-11-07 | End: 2017-11-09

## 2017-11-07 RX ADMIN — DEXMEDETOMIDINE HYDROCHLORIDE IN 0.9% SODIUM CHLORIDE 20.81 MICROGRAM(S)/KG/HR: 4 INJECTION INTRAVENOUS at 00:41

## 2017-11-07 RX ADMIN — PANTOPRAZOLE SODIUM 40 MILLIGRAM(S): 20 TABLET, DELAYED RELEASE ORAL at 13:42

## 2017-11-07 RX ADMIN — Medication 10 MILLIGRAM(S): at 06:30

## 2017-11-07 RX ADMIN — Medication 1 MILLIGRAM(S): at 13:32

## 2017-11-07 RX ADMIN — Medication 80 MILLIGRAM(S): at 13:33

## 2017-11-07 RX ADMIN — DEXMEDETOMIDINE HYDROCHLORIDE IN 0.9% SODIUM CHLORIDE 20.81 MICROGRAM(S)/KG/HR: 4 INJECTION INTRAVENOUS at 01:59

## 2017-11-07 RX ADMIN — CHLORHEXIDINE GLUCONATE 15 MILLILITER(S): 213 SOLUTION TOPICAL at 05:52

## 2017-11-07 RX ADMIN — AMLODIPINE BESYLATE 10 MILLIGRAM(S): 2.5 TABLET ORAL at 13:32

## 2017-11-07 RX ADMIN — HEPARIN SODIUM 5000 UNIT(S): 5000 INJECTION INTRAVENOUS; SUBCUTANEOUS at 13:42

## 2017-11-07 RX ADMIN — LORATADINE 10 MILLIGRAM(S): 10 TABLET ORAL at 13:32

## 2017-11-07 RX ADMIN — DEXMEDETOMIDINE HYDROCHLORIDE IN 0.9% SODIUM CHLORIDE 20.81 MICROGRAM(S)/KG/HR: 4 INJECTION INTRAVENOUS at 04:08

## 2017-11-07 RX ADMIN — SEVELAMER CARBONATE 800 MILLIGRAM(S): 2400 POWDER, FOR SUSPENSION ORAL at 16:54

## 2017-11-07 RX ADMIN — Medication 80 MILLIGRAM(S): at 22:06

## 2017-11-07 RX ADMIN — Medication 10 MILLIGRAM(S): at 04:05

## 2017-11-07 RX ADMIN — SEVELAMER CARBONATE 800 MILLIGRAM(S): 2400 POWDER, FOR SUSPENSION ORAL at 13:33

## 2017-11-07 RX ADMIN — VALSARTAN 160 MILLIGRAM(S): 80 TABLET ORAL at 16:54

## 2017-11-07 RX ADMIN — Medication 81 MILLIGRAM(S): at 13:32

## 2017-11-07 NOTE — PROGRESS NOTE ADULT - PROBLEM SELECTOR PLAN 4
sp AVF, follows MWF scheduled  sp emergent session on 11/6 due to volume overload after mission 11/5 session  nephro following  will undergo dialysis again 11/8

## 2017-11-07 NOTE — PROGRESS NOTE ADULT - SUBJECTIVE AND OBJECTIVE BOX
NEPHROLOGY INTERVAL HPI/OVERNIGHT EVENTS:    (-) 2.5 Kg with HD yesterday   Extubated   OOB to chair   No stridor   No new events     MEDICATIONS  (STANDING):  amLODIPine   Tablet 10 milliGRAM(s) Oral daily  aspirin enteric coated 81 milliGRAM(s) Oral daily  dextrose 5%. 1000 milliLiter(s) (50 mL/Hr) IV Continuous <Continuous>  dextrose 50% Injectable 12.5 Gram(s) IV Push once  dextrose 50% Injectable 25 Gram(s) IV Push once  dextrose 50% Injectable 25 Gram(s) IV Push once  folic acid 1 milliGRAM(s) Oral daily  heparin  Injectable 5000 Unit(s) SubCutaneous every 8 hours  insulin lispro (HumaLOG) corrective regimen sliding scale   SubCutaneous three times a day before meals  loratadine 10 milliGRAM(s) Oral daily  pantoprazole    Tablet 40 milliGRAM(s) Oral before breakfast  sevelamer hydrochloride 800 milliGRAM(s) Oral three times a day with meals  valsartan 160 milliGRAM(s) Oral daily  verapamil 80 milliGRAM(s) Oral three times a day    MEDICATIONS  (PRN):  ALBUTerol    90 MICROgram(s) HFA Inhaler 2 Puff(s) Inhalation every 6 hours PRN Shortness of Breath and/or Wheezing  dextrose Gel 1 Dose(s) Oral once PRN Blood Glucose LESS THAN 70 milliGRAM(s)/deciLiter  glucagon  Injectable 1 milliGRAM(s) IntraMuscular once PRN Glucose <70 milliGRAM(s)/deciLiter      Allergies    Mushrooms (Hives (Mild))  No Known Drug Allergies    Intolerances        Vital Signs Last 24 Hrs  T(C): 36.7 (2017 07:27), Max: 36.8 (2017 04:34)  T(F): 98 (2017 07:27), Max: 98.2 (2017 04:34)  HR: 103 (2017 11:00) (68 - 103)  BP: 182/100 (2017 11:00) (119/83 - 188/107)  BP(mean): 134 (2017 11:00) (93 - 141)  RR: 21 (2017 11:00) (0 - 27)  SpO2: 100% (2017 11:00) (95% - 100%)  Daily Height in cm: 157.48 (2017 15:33)    Daily Weight in k.5 (2017 05:18)  I&O's Detail    2017 07:  -  2017 07:00  --------------------------------------------------------  IN:    dexmedetomidine Infusion: 156.8 mL    propofol Infusion: 57 mL  Total IN: 213.8 mL    OUT:    Other: 2500 mL  Total OUT: 2500 mL    Total NET: -2286.2 mL      2017 07:  -  2017 12:50  --------------------------------------------------------  IN:  Total IN: 0 mL    OUT:  Total OUT: 0 mL    Total NET: 0 mL        I&O's Summary    2017 07:  -  2017 07:00  --------------------------------------------------------  IN: 213.8 mL / OUT: 2500 mL / NET: -2286.2 mL    2017 07:  -  2017 12:50  --------------------------------------------------------  IN: 0 mL / OUT: 0 mL / NET: 0 mL        PHYSICAL EXAM:    GENERAL: NAD,   HEAD:  anicteric , no edema   NECK: Supple, No JVD, No stridor   CHEST/LUNG: Clear to percussion bilaterally; No rales, rhonchi, wheezing, or rubs  HEART: Regular rate and rhythm; No rub   ABDOMEN: Soft, Nontender, Nondistended;   EXTREMITIES:  min edema , L access w/ thrill         LABS:                        8.3    9.0   )-----------( 164      ( 2017 16:20 )             25.7         134<L>  |  92<L>  |  57.0<H>  ----------------------------<  110  5.1   |  24.0  |  6.35<H>    Ca    8.7      2017 06:38  Mg     2.8         TPro  7.6  /  Alb  3.4  /  TBili  0.7  /  DBili  x   /  AST  17  /  ALT  20  /  AlkPhos  120      PT/INR - ( 2017 16:20 )   PT: 15.8 sec;   INR: 1.43 ratio         PTT - ( 2017 16:20 )  PTT:32.8 sec    Magnesium, Serum: 2.8 mg/dL ( @ 16:20)    ABG - ( 2017 18:02 )  pH: 7.34  /  pCO2: 42    /  pO2: 112   / HCO3: 22    / Base Excess: -3.1  /  SaO2: 98                    RADIOLOGY & ADDITIONAL TESTS:

## 2017-11-07 NOTE — PROGRESS NOTE ADULT - PROBLEM SELECTOR PLAN 8
goal A1c <7, last one from 09/2017 6.4, controlled  pending repeat A1c  ISS + FS ACHS  carb consistent diet

## 2017-11-07 NOTE — PROGRESS NOTE ADULT - PROBLEM SELECTOR PLAN 5
complicated by failure to heal  was undergoing vitrectomy when resp failure presented itself  no plans for intervention at this time  may fu as outpt

## 2017-11-07 NOTE — PROGRESS NOTE ADULT - PROBLEM SELECTOR PLAN 7
goal BP <140/90  complicated by hypotension requiring pressors on arrival, resolved  now back on antihypertensives, will follow and titrate to goal

## 2017-11-07 NOTE — PROGRESS NOTE ADULT - PROBLEM SELECTOR PLAN 6
no cpap machine at home yet, still waiting for one  advised to use machine daily at home  until it arrives recommend continuing NC

## 2017-11-07 NOTE — PROGRESS NOTE ADULT - SUBJECTIVE AND OBJECTIVE BOX
CHIEF COMPLAINT:     Patient seen and examined at the bedside. No acute overnight events. - yesterday. Complaining of - this AM. Denies fever/chills, headache, lightheadedness, dizziness, chest pain, palpitations, shortness of breath, cough, abd pain, nausea/vomiting/diarrhea, muscle pain.      =========================================================================================  T(C): 36.7 (17 @ 07:27), Max: 36.8 (17 @ 04:34)  HR: 103 (17 @ 11:00) (68 - 103)  BP: 182/100 (17 @ 11:00) (119/83 - 188/107)  RR: 21 (17 @ 11:00) (0 - 27)  SpO2: 100% (17 @ 11:00) (95% - 100%)    PHYSICAL EXAM.    GEN - appears age appropriate. well nourished. pleasant. no distress.   HEENT - NCAT, EOMI, CLAUDIA  RESP - CTA BL, no wheeze/stridor/rhonchi/crackles. not on supplemental O2. able to speak in full sentences without distress.   CARDIO - NS1S2, RRR. No murmurs/rubs/gallops.  ABD - Soft/Non tender/Non distended. Normal BS x4 quadrants. no guarding/rebound tenderness.  Ext - No JO.  MSK - BL 5/5 strength on upper and lower extremities.   Neuro - AAOx3. cn 2-12 grossly intact  Psych - normal affect  Skin - c/d/i. no rashes/lesions    Mode: CPAP with PS  FiO2: 30  PEEP: 5  PS: 5  MAP: 8    I&O's Summary    2017 07:01  -  2017 07:00  --------------------------------------------------------  IN: 213.8 mL / OUT: 2500 mL / NET: -2286.2 mL    2017 07:01  -  2017 13:35  --------------------------------------------------------  IN: 0 mL / OUT: 0 mL / NET: 0 mL      Daily Height in cm: 157.48 (2017 15:33)    Daily Weight in k.5 (2017 05:18)    =========================================================================================  LABS.        134<L>  |  92<L>  |  57.0<H>  ----------------------------<  110  5.1   |  24.0  |  6.35<H>    Ca    8.7      2017 06:38  Mg     2.8         TPro  7.6  /  Alb  3.4  /  TBili  0.7  /  DBili  x   /  AST  17  /  ALT  20  /  AlkPhos  120                            8.3    9.0   )-----------( 164      ( 2017 16:20 )             25.7     LIVER FUNCTIONS - ( 2017 16:20 )  Alb: 3.4 g/dL / Pro: 7.6 g/dL / ALK PHOS: 120 U/L / ALT: 20 U/L / AST: 17 U/L / GGT: x           PT/INR - ( 2017 16:20 )   PT: 15.8 sec;   INR: 1.43 ratio         PTT - ( 2017 16:20 )  PTT:32.8 sec        ABG - ( 2017 18:02 )  pH: 7.34  /  pCO2: 42    /  pO2: 112   / HCO3: 22    / Base Excess: -3.1  /  SaO2: 98                  =========================================================================================  IMAGING.     =========================================================================================    MEDICATIONS  (STANDING):  amLODIPine   Tablet 10 milliGRAM(s) Oral daily  aspirin enteric coated 81 milliGRAM(s) Oral daily  dextrose 5%. 1000 milliLiter(s) (50 mL/Hr) IV Continuous <Continuous>  dextrose 50% Injectable 12.5 Gram(s) IV Push once  dextrose 50% Injectable 25 Gram(s) IV Push once  dextrose 50% Injectable 25 Gram(s) IV Push once  epoetin raina Injectable 25139 Unit(s) IV Push <User Schedule>  folic acid 1 milliGRAM(s) Oral daily  heparin  Injectable 5000 Unit(s) SubCutaneous every 8 hours  insulin lispro (HumaLOG) corrective regimen sliding scale   SubCutaneous three times a day before meals  loratadine 10 milliGRAM(s) Oral daily  pantoprazole    Tablet 40 milliGRAM(s) Oral before breakfast  sevelamer hydrochloride 800 milliGRAM(s) Oral three times a day with meals  valsartan 160 milliGRAM(s) Oral daily  verapamil 80 milliGRAM(s) Oral three times a day    MEDICATIONS  (PRN):  ALBUTerol    90 MICROgram(s) HFA Inhaler 2 Puff(s) Inhalation every 6 hours PRN Shortness of Breath and/or Wheezing  dextrose Gel 1 Dose(s) Oral once PRN Blood Glucose LESS THAN 70 milliGRAM(s)/deciLiter  glucagon  Injectable 1 milliGRAM(s) IntraMuscular once PRN Glucose <70 milliGRAM(s)/deciLiter CHIEF COMPLAINT: resp failure    Patient seen and examined at the bedside. No acute overnight events. Extubated this AM. Complaining of dry eyes this AM. Denies fever/chills, headache, lightheadedness, dizziness, chest pain, palpitations, shortness of breath, cough, abd pain, nausea/vomiting/diarrhea, muscle pain.      =========================================================================================  T(C): 36.7 (17 @ 07:27), Max: 36.8 (17 @ 04:34)  HR: 103 (17 @ 11:00) (68 - 103)  BP: 182/100 (17 @ 11:00) (119/83 - 188/107)  RR: 21 (17 @ 11:00) (0 - 27)  SpO2: 100% (17 @ 11:00) (95% - 100%)    PHYSICAL EXAM.    GEN - appears age appropriate. obese. pleasant. no distress.   HEENT - NCAT, EOMI, CLAUDIA. BL red eyes with tearing  RESP - CTA BL, no wheeze/stridor/rhonchi/crackles. KB throughout not on supplemental O2. able to speak in full sentences without distress.   CARDIO - NS1S2, RRR. No murmurs/rubs/gallops.  ABD - Soft/Non tender/Non distended. Normal BS x4 quadrants. no guarding/rebound tenderness.  Ext - No JO.  MSK - BL 5/5 strength on upper and lower extremities.   Neuro - AAOx3. cn 2-12 grossly intact  Psych - normal affect  Skin - c/d/i. no rashes/lesions    Mode: CPAP with PS  FiO2: 30  PEEP: 5  PS: 5  MAP: 8    I&O's Summary    2017 07:01  -  2017 07:00  --------------------------------------------------------  IN: 213.8 mL / OUT: 2500 mL / NET: -2286.2 mL    2017 07:01  -  2017 13:35  --------------------------------------------------------  IN: 0 mL / OUT: 0 mL / NET: 0 mL      Daily Height in cm: 157.48 (2017 15:33)    Daily Weight in k.5 (2017 05:18)    =========================================================================================  LABS.        134<L>  |  92<L>  |  57.0<H>  ----------------------------<  110  5.1   |  24.0  |  6.35<H>    Ca    8.7      2017 06:38  Mg     2.8         TPro  7.6  /  Alb  3.4  /  TBili  0.7  /  DBili  x   /  AST  17  /  ALT  20  /  AlkPhos  120                            8.3    9.0   )-----------( 164      ( 2017 16:20 )             25.7     LIVER FUNCTIONS - ( 2017 16:20 )  Alb: 3.4 g/dL / Pro: 7.6 g/dL / ALK PHOS: 120 U/L / ALT: 20 U/L / AST: 17 U/L / GGT: x           PT/INR - ( 2017 16:20 )   PT: 15.8 sec;   INR: 1.43 ratio         PTT - ( 2017 16:20 )  PTT:32.8 sec        ABG - ( 2017 18:02 )  pH: 7.34  /  pCO2: 42    /  pO2: 112   / HCO3: 22    / Base Excess: -3.1  /  SaO2: 98                  =========================================================================================  IMAGING.     =========================================================================================    MEDICATIONS  (STANDING):  amLODIPine   Tablet 10 milliGRAM(s) Oral daily  aspirin enteric coated 81 milliGRAM(s) Oral daily  dextrose 5%. 1000 milliLiter(s) (50 mL/Hr) IV Continuous <Continuous>  dextrose 50% Injectable 12.5 Gram(s) IV Push once  dextrose 50% Injectable 25 Gram(s) IV Push once  dextrose 50% Injectable 25 Gram(s) IV Push once  epoetin raina Injectable 07316 Unit(s) IV Push <User Schedule>  folic acid 1 milliGRAM(s) Oral daily  heparin  Injectable 5000 Unit(s) SubCutaneous every 8 hours  insulin lispro (HumaLOG) corrective regimen sliding scale   SubCutaneous three times a day before meals  loratadine 10 milliGRAM(s) Oral daily  pantoprazole    Tablet 40 milliGRAM(s) Oral before breakfast  sevelamer hydrochloride 800 milliGRAM(s) Oral three times a day with meals  valsartan 160 milliGRAM(s) Oral daily  verapamil 80 milliGRAM(s) Oral three times a day    MEDICATIONS  (PRN):  ALBUTerol    90 MICROgram(s) HFA Inhaler 2 Puff(s) Inhalation every 6 hours PRN Shortness of Breath and/or Wheezing  dextrose Gel 1 Dose(s) Oral once PRN Blood Glucose LESS THAN 70 milliGRAM(s)/deciLiter  glucagon  Injectable 1 milliGRAM(s) IntraMuscular once PRN Glucose <70 milliGRAM(s)/deciLiter

## 2017-11-08 ENCOUNTER — TRANSCRIPTION ENCOUNTER (OUTPATIENT)
Age: 37
End: 2017-11-08

## 2017-11-08 LAB
ANION GAP SERPL CALC-SCNC: 19 MMOL/L — HIGH (ref 5–17)
BUN SERPL-MCNC: 83 MG/DL — HIGH (ref 8–20)
CALCIUM SERPL-MCNC: 8.5 MG/DL — LOW (ref 8.6–10.2)
CHLORIDE SERPL-SCNC: 90 MMOL/L — LOW (ref 98–107)
CO2 SERPL-SCNC: 23 MMOL/L — SIGNIFICANT CHANGE UP (ref 22–29)
CREAT SERPL-MCNC: 7.85 MG/DL — HIGH (ref 0.5–1.3)
GLUCOSE BLDC GLUCOMTR-MCNC: 111 MG/DL — HIGH (ref 70–99)
GLUCOSE BLDC GLUCOMTR-MCNC: 131 MG/DL — HIGH (ref 70–99)
GLUCOSE BLDC GLUCOMTR-MCNC: 139 MG/DL — HIGH (ref 70–99)
GLUCOSE BLDC GLUCOMTR-MCNC: 98 MG/DL — SIGNIFICANT CHANGE UP (ref 70–99)
GLUCOSE SERPL-MCNC: 105 MG/DL — SIGNIFICANT CHANGE UP (ref 70–115)
HCT VFR BLD CALC: 26.3 % — LOW (ref 37–47)
HGB BLD-MCNC: 8.5 G/DL — LOW (ref 12–16)
MCHC RBC-ENTMCNC: 26.2 PG — LOW (ref 27–31)
MCHC RBC-ENTMCNC: 32.3 G/DL — SIGNIFICANT CHANGE UP (ref 32–36)
MCV RBC AUTO: 80.9 FL — LOW (ref 81–99)
PHOSPHATE SERPL-MCNC: 7.6 MG/DL — HIGH (ref 2.4–4.7)
PLATELET # BLD AUTO: 212 K/UL — SIGNIFICANT CHANGE UP (ref 150–400)
POTASSIUM SERPL-MCNC: 5.3 MMOL/L — SIGNIFICANT CHANGE UP (ref 3.5–5.3)
POTASSIUM SERPL-SCNC: 5.3 MMOL/L — SIGNIFICANT CHANGE UP (ref 3.5–5.3)
RBC # BLD: 3.25 M/UL — LOW (ref 4.4–5.2)
RBC # FLD: 20.5 % — HIGH (ref 11–15.6)
SODIUM SERPL-SCNC: 132 MMOL/L — LOW (ref 135–145)
WBC # BLD: 7.6 K/UL — SIGNIFICANT CHANGE UP (ref 4.8–10.8)
WBC # FLD AUTO: 7.6 K/UL — SIGNIFICANT CHANGE UP (ref 4.8–10.8)

## 2017-11-08 PROCEDURE — 99233 SBSQ HOSP IP/OBS HIGH 50: CPT

## 2017-11-08 RX ORDER — ERYTHROPOIETIN 10000 [IU]/ML
10000 INJECTION, SOLUTION INTRAVENOUS; SUBCUTANEOUS ONCE
Qty: 0 | Refills: 0 | Status: COMPLETED | OUTPATIENT
Start: 2017-11-08 | End: 2017-11-08

## 2017-11-08 RX ORDER — SEVELAMER CARBONATE 2400 MG/1
1 POWDER, FOR SUSPENSION ORAL
Qty: 0 | Refills: 0 | COMMUNITY
Start: 2017-11-08

## 2017-11-08 RX ADMIN — SEVELAMER CARBONATE 800 MILLIGRAM(S): 2400 POWDER, FOR SUSPENSION ORAL at 12:21

## 2017-11-08 RX ADMIN — SEVELAMER CARBONATE 800 MILLIGRAM(S): 2400 POWDER, FOR SUSPENSION ORAL at 09:00

## 2017-11-08 RX ADMIN — ERYTHROPOIETIN 10000 UNIT(S): 10000 INJECTION, SOLUTION INTRAVENOUS; SUBCUTANEOUS at 18:11

## 2017-11-08 RX ADMIN — VALSARTAN 160 MILLIGRAM(S): 80 TABLET ORAL at 06:31

## 2017-11-08 RX ADMIN — HEPARIN SODIUM 5000 UNIT(S): 5000 INJECTION INTRAVENOUS; SUBCUTANEOUS at 06:31

## 2017-11-08 RX ADMIN — Medication 80 MILLIGRAM(S): at 06:31

## 2017-11-08 RX ADMIN — Medication 1 MILLIGRAM(S): at 12:21

## 2017-11-08 RX ADMIN — AMLODIPINE BESYLATE 10 MILLIGRAM(S): 2.5 TABLET ORAL at 06:31

## 2017-11-08 RX ADMIN — LORATADINE 10 MILLIGRAM(S): 10 TABLET ORAL at 13:22

## 2017-11-08 RX ADMIN — PANTOPRAZOLE SODIUM 40 MILLIGRAM(S): 20 TABLET, DELAYED RELEASE ORAL at 06:31

## 2017-11-08 RX ADMIN — Medication 81 MILLIGRAM(S): at 12:21

## 2017-11-08 RX ADMIN — Medication 80 MILLIGRAM(S): at 23:18

## 2017-11-08 NOTE — PROGRESS NOTE ADULT - SUBJECTIVE AND OBJECTIVE BOX
Patient is a 37y old  Female who presents with a chief complaint of mother states pt was" to have  elective Lt eye surgery, during anesthesia pt became lo b/p" (06 Nov 2017 17:36)      BRIEF HOSPITAL COURSE: 37 yof with ESRD, acute respiratory failure after induction for eye surgery transferred for fluid overload and dialysis    Events last 24 hours: extubated feeling better    PAST MEDICAL & SURGICAL HISTORY:  Vitreous hemorrhage of left eye  Class 3 obesity due to excess calories with serious comorbidity in adult, unspecified BMI  DALTON (obstructive sleep apnea)  Pneumonia  End stage renal disease  Hypertension  Diabetes  AVF (arteriovenous fistula)  Vascular dialysis catheter in place      Review of Systems:  CONSTITUTIONAL: No fever, chills, or fatigue  EYES: No eye pain, visual disturbances, or discharge  ENMT:  No difficulty hearing, tinnitus, vertigo; No sinus or throat pain  NECK: No pain or stiffness  RESPIRATORY: No cough, wheezing, chills or hemoptysis; No shortness of breath  CARDIOVASCULAR: No chest pain, palpitations, dizziness, or leg swelling  GASTROINTESTINAL: No abdominal or epigastric pain. No nausea, vomiting, or hematemesis; No diarrhea or constipation. No melena or hematochezia.  GENITOURINARY: No dysuria, frequency, hematuria, or incontinence  NEUROLOGICAL: No headaches, memory loss, loss of strength, numbness, or tremors  SKIN: No itching, burning, rashes, or lesions   MUSCULOSKELETAL: No joint pain or swelling; No muscle, back, or extremity pain  PSYCHIATRIC: No depression, anxiety, mood swings, or difficulty sleeping      Medications:    amLODIPine   Tablet 10 milliGRAM(s) Oral daily  valsartan 160 milliGRAM(s) Oral daily  verapamil 80 milliGRAM(s) Oral three times a day    ALBUTerol    90 MICROgram(s) HFA Inhaler 2 Puff(s) Inhalation every 6 hours PRN  loratadine 10 milliGRAM(s) Oral daily        aspirin enteric coated 81 milliGRAM(s) Oral daily  heparin  Injectable 5000 Unit(s) SubCutaneous every 8 hours    pantoprazole    Tablet 40 milliGRAM(s) Oral before breakfast      dextrose 50% Injectable 12.5 Gram(s) IV Push once  dextrose 50% Injectable 25 Gram(s) IV Push once  dextrose 50% Injectable 25 Gram(s) IV Push once  dextrose Gel 1 Dose(s) Oral once PRN  glucagon  Injectable 1 milliGRAM(s) IntraMuscular once PRN  insulin lispro (HumaLOG) corrective regimen sliding scale   SubCutaneous three times a day before meals    dextrose 5%. 1000 milliLiter(s) IV Continuous <Continuous>  folic acid 1 milliGRAM(s) Oral daily    epoetin raina Injectable 47927 Unit(s) IV Push <User Schedule>    artificial  tears Solution 1 Drop(s) Both EYES every 4 hours PRN    sevelamer hydrochloride 800 milliGRAM(s) Oral three times a day with meals      Mode: CPAP with PS  FiO2: 30  PEEP: 5  PS: 5  MAP: 8      ICU Vital Signs Last 24 Hrs  T(C): 35.9 (07 Nov 2017 21:59), Max: 36.8 (07 Nov 2017 04:34)  T(F): 96.7 (07 Nov 2017 21:59), Max: 98.2 (07 Nov 2017 04:34)  HR: 90 (07 Nov 2017 21:59) (76 - 109)  BP: 138/87 (07 Nov 2017 21:59) (130/83 - 188/107)  BP(mean): 110 (07 Nov 2017 17:00) (110 - 141)  ABP: --  ABP(mean): --  RR: 26 (07 Nov 2017 17:00) (0 - 29)  SpO2: 97% (07 Nov 2017 21:59) (95% - 100%)      ABG - ( 06 Nov 2017 18:02 )  pH: 7.34  /  pCO2: 42    /  pO2: 112   / HCO3: 22    / Base Excess: -3.1  /  SaO2: 98                  I&O's Detail    06 Nov 2017 07:01  -  07 Nov 2017 07:00  --------------------------------------------------------  IN:    dexmedetomidine Infusion: 156.8 mL    propofol Infusion: 57 mL  Total IN: 213.8 mL    OUT:    Other: 2500 mL  Total OUT: 2500 mL    Total NET: -2286.2 mL      07 Nov 2017 07:01  -  08 Nov 2017 00:03  --------------------------------------------------------  IN:  Total IN: 0 mL    OUT:  Total OUT: 0 mL    Total NET: 0 mL            LABS:                        8.3    9.0   )-----------( 164      ( 06 Nov 2017 16:20 )             25.7     11-07    134<L>  |  92<L>  |  57.0<H>  ----------------------------<  110  5.1   |  24.0  |  6.35<H>    Ca    8.7      07 Nov 2017 06:38  Mg     2.8     11-06    TPro  7.6  /  Alb  3.4  /  TBili  0.7  /  DBili  x   /  AST  17  /  ALT  20  /  AlkPhos  120  11-06          CAPILLARY BLOOD GLUCOSE  160 (07 Nov 2017 21:59)      POCT Blood Glucose.: 160 mg/dL (07 Nov 2017 22:03)    PT/INR - ( 06 Nov 2017 16:20 )   PT: 15.8 sec;   INR: 1.43 ratio         PTT - ( 06 Nov 2017 16:20 )  PTT:32.8 sec    CULTURES:      Physical Examination:    General: No acute distress.  Alert, oriented, interactive, nonfocal    HEENT: Pupils equal, reactive to light.  Symmetric.    PULM: Clear to auscultation bilaterally, no significant sputum production    CVS: Regular rate and rhythm, no murmurs, rubs, or gallops    ABD: Soft, nondistended, nontender, normoactive bowel sounds, no masses    EXT: No edema, nontender    SKIN: Warm and well perfused, no rashes noted. Note this note is for 11/7/2017    Patient is a 37y old  Female who presents with a chief complaint of mother states pt was" to have  elective Lt eye surgery, during anesthesia pt became lo b/p" (06 Nov 2017 17:36)      BRIEF HOSPITAL COURSE: 37 yof with ESRD, acute respiratory failure after induction for eye surgery transferred for fluid overload and dialysis    Events last 24 hours: extubated feeling better    PAST MEDICAL & SURGICAL HISTORY:  Vitreous hemorrhage of left eye  Class 3 obesity due to excess calories with serious comorbidity in adult, unspecified BMI  DALTON (obstructive sleep apnea)  Pneumonia  End stage renal disease  Hypertension  Diabetes  AVF (arteriovenous fistula)  Vascular dialysis catheter in place      Review of Systems:  CONSTITUTIONAL: No fever, chills, or fatigue  EYES: No eye pain, visual disturbances, or discharge  ENMT:  No difficulty hearing, tinnitus, vertigo; No sinus or throat pain  NECK: No pain or stiffness  RESPIRATORY: No cough, wheezing, chills or hemoptysis; No shortness of breath  CARDIOVASCULAR: No chest pain, palpitations, dizziness, or leg swelling  GASTROINTESTINAL: No abdominal or epigastric pain. No nausea, vomiting, or hematemesis; No diarrhea or constipation. No melena or hematochezia.  GENITOURINARY: No dysuria, frequency, hematuria, or incontinence  NEUROLOGICAL: No headaches, memory loss, loss of strength, numbness, or tremors  SKIN: No itching, burning, rashes, or lesions   MUSCULOSKELETAL: No joint pain or swelling; No muscle, back, or extremity pain  PSYCHIATRIC: No depression, anxiety, mood swings, or difficulty sleeping      Medications:    amLODIPine   Tablet 10 milliGRAM(s) Oral daily  valsartan 160 milliGRAM(s) Oral daily  verapamil 80 milliGRAM(s) Oral three times a day    ALBUTerol    90 MICROgram(s) HFA Inhaler 2 Puff(s) Inhalation every 6 hours PRN  loratadine 10 milliGRAM(s) Oral daily        aspirin enteric coated 81 milliGRAM(s) Oral daily  heparin  Injectable 5000 Unit(s) SubCutaneous every 8 hours    pantoprazole    Tablet 40 milliGRAM(s) Oral before breakfast      dextrose 50% Injectable 12.5 Gram(s) IV Push once  dextrose 50% Injectable 25 Gram(s) IV Push once  dextrose 50% Injectable 25 Gram(s) IV Push once  dextrose Gel 1 Dose(s) Oral once PRN  glucagon  Injectable 1 milliGRAM(s) IntraMuscular once PRN  insulin lispro (HumaLOG) corrective regimen sliding scale   SubCutaneous three times a day before meals    dextrose 5%. 1000 milliLiter(s) IV Continuous <Continuous>  folic acid 1 milliGRAM(s) Oral daily    epoetin raina Injectable 13519 Unit(s) IV Push <User Schedule>    artificial  tears Solution 1 Drop(s) Both EYES every 4 hours PRN    sevelamer hydrochloride 800 milliGRAM(s) Oral three times a day with meals      Mode: CPAP with PS  FiO2: 30  PEEP: 5  PS: 5  MAP: 8      ICU Vital Signs Last 24 Hrs  T(C): 35.9 (07 Nov 2017 21:59), Max: 36.8 (07 Nov 2017 04:34)  T(F): 96.7 (07 Nov 2017 21:59), Max: 98.2 (07 Nov 2017 04:34)  HR: 90 (07 Nov 2017 21:59) (76 - 109)  BP: 138/87 (07 Nov 2017 21:59) (130/83 - 188/107)  BP(mean): 110 (07 Nov 2017 17:00) (110 - 141)  ABP: --  ABP(mean): --  RR: 26 (07 Nov 2017 17:00) (0 - 29)  SpO2: 97% (07 Nov 2017 21:59) (95% - 100%)      ABG - ( 06 Nov 2017 18:02 )  pH: 7.34  /  pCO2: 42    /  pO2: 112   / HCO3: 22    / Base Excess: -3.1  /  SaO2: 98                  I&O's Detail    06 Nov 2017 07:01  -  07 Nov 2017 07:00  --------------------------------------------------------  IN:    dexmedetomidine Infusion: 156.8 mL    propofol Infusion: 57 mL  Total IN: 213.8 mL    OUT:    Other: 2500 mL  Total OUT: 2500 mL    Total NET: -2286.2 mL      07 Nov 2017 07:01  -  08 Nov 2017 00:03  --------------------------------------------------------  IN:  Total IN: 0 mL    OUT:  Total OUT: 0 mL    Total NET: 0 mL            LABS:                        8.3    9.0   )-----------( 164      ( 06 Nov 2017 16:20 )             25.7     11-07    134<L>  |  92<L>  |  57.0<H>  ----------------------------<  110  5.1   |  24.0  |  6.35<H>    Ca    8.7      07 Nov 2017 06:38  Mg     2.8     11-06    TPro  7.6  /  Alb  3.4  /  TBili  0.7  /  DBili  x   /  AST  17  /  ALT  20  /  AlkPhos  120  11-06          CAPILLARY BLOOD GLUCOSE  160 (07 Nov 2017 21:59)      POCT Blood Glucose.: 160 mg/dL (07 Nov 2017 22:03)    PT/INR - ( 06 Nov 2017 16:20 )   PT: 15.8 sec;   INR: 1.43 ratio         PTT - ( 06 Nov 2017 16:20 )  PTT:32.8 sec    CULTURES:      Physical Examination:    General: No acute distress.  Alert, oriented, interactive, nonfocal    HEENT: Pupils equal, reactive to light.  Symmetric.    PULM: Clear to auscultation bilaterally, no significant sputum production    CVS: Regular rate and rhythm, no murmurs, rubs, or gallops    ABD: Soft, nondistended, nontender, normoactive bowel sounds, no masses    EXT: No edema, nontender    SKIN: Warm and well perfused, no rashes noted.

## 2017-11-08 NOTE — DISCHARGE NOTE ADULT - HOSPITAL COURSE
37yoF with PMH DM2, ESRD on HD via AVF (MWF), HTN, Morbid obesity, DALTON suspected underlying lung disease presenting with acute respiratory failure + hypotension during anesthesia induction for elective Lt vitrectomy for non clearing vitreous hemorrhage requiring intubation at [facility]. Pt sent to [facility] for HD and continued management of hypotension requiring pressor support. Noted to improve after emergent HD on 11/6 and thought to have had element of volume overload after missing HD as outpt on 11/5 causing respiratory failure. SP Extubation without complication on 11/7. Downgraded to GMF on 11/7. Continued to do well on floors back on home amount of supplemental o2 of 2-4L at all times. Advised to have her CPAP machine delivered for nocturnal use. Continued HD as scheduled. Nephrology consultation appreciated.     All electrolyte abnormalities were monitored carefully and repleted as necessary during this hospitalization. At the time of discharge patient was hemodynamically stable and amenable to all terms of discharge. The patient has received verbal instructions from myself regarding discharge plans.     Length of Discharge: 45MIN 37yoF with PMH DM2, ESRD on HD via AVF (MWF), HTN, Morbid obesity, DALTON suspected underlying lung disease presenting with acute respiratory failure + hypotension during anesthesia induction for elective Lt vitrectomy for non clearing vitreous hemorrhage requiring intubation at [facility]. Pt sent to [facility] for HD and continued management of hypotension requiring pressor support. Noted to improve after emergent HD on 11/6 and thought to have had element of volume overload after missing HD as outpt on 11/5 causing respiratory failure. SP Extubation without complication on 11/7. Downgraded to GMF on 11/7. Continued to do well on floors back on home amount of supplemental o2 of 2-4L at all times. Advised to have her CPAP machine delivered as per plan as outpatient for nocturnal use. Continued HD as scheduled, last one received was on 11/8. She is planned for her next one on 11/10 - states HD center will be closed and that she is planned to go the next day which is 11/11. Nephrology consultation appreciated.     All electrolyte abnormalities were monitored carefully and repleted as necessary during this hospitalization. At the time of discharge patient was hemodynamically stable and amenable to all terms of discharge. The patient has received verbal instructions from myself regarding discharge plans.   Total time coordinating this discharge was 40 minutes.

## 2017-11-08 NOTE — DISCHARGE NOTE ADULT - CARE PLAN
Principal Discharge DX:	Acute respiratory failure with hypoxia  Goal:	resolved  Instructions for follow-up, activity and diet:	Continue using supplemental oxygen at your home settings at all times during the day. You should also use it at night until you receive your CPAP machine. We recommend that you see a Pulmonologist to have testing on your lungs done to check your lung function and see if you have any other conditions affecting your breathing such as COPD or Asthma.  Secondary Diagnosis:	Chronic respiratory failure with hypoxia  Goal:	control  Instructions for follow-up, activity and diet:	as above  Secondary Diagnosis:	Type 2 diabetes mellitus with chronic kidney disease, with long-term current use of insulin, unspecified CKD stage  Goal:	A1c < 7. Yours is very well controlled at 5.3  Instructions for follow-up, activity and diet:	Follow a low carb low sugar diet. Continue to take all antidiabetic medications/insulin as prescribed. Follow up with your Primary Care Doctor regularly for blood sugar/A1c checks. Be sure to see an eye doctor and foot doctor on an annual basis.  Secondary Diagnosis:	ESRD (end stage renal disease) on dialysis  Goal:	control  Instructions for follow-up, activity and diet:	Continue following a renal diet as advised by your Nephrologist. If you have been started on any medications for your kidney function, be sure to take them regularly. Be sure to follow up and have dialysis as scheduled, if it very important that you do not miss sessions! If something comes up and you feel that you cannot make a session, please alert your nephrologist immediately.  Secondary Diagnosis:	Essential hypertension  Goal:	BP <140/90  Instructions for follow-up, activity and diet:	Be sure to follow a low salt diet. If you have been prescribed antihypertensive medications to control your blood pressure, be sure to take them every day as prescribed and do not miss and doses, the medications do not work if they are not taken consistently. Follow up with your Primary Care Doctor and have your Blood Pressure checked.  Secondary Diagnosis:	DALTON (obstructive sleep apnea)  Goal:	control  Instructions for follow-up, activity and diet:	as above  Secondary Diagnosis:	Vitreous hemorrhage of left eye  Goal:	control  Instructions for follow-up, activity and diet:	Be sure to follow up with your Eye doctor when you leave the hospital. Principal Discharge DX:	Acute respiratory failure with hypoxia  Goal:	resolved  Instructions for follow-up, activity and diet:	Continue using supplemental oxygen at your home settings at all times during the day. You should also use it at night until you receive your CPAP machine. We recommend that you see a Pulmonologist to have testing on your lungs done to check your lung function and see if you have any other conditions affecting your breathing such as COPD or Asthma.  Please f/up with your lung doctor to get your planned cpap machine at home  Secondary Diagnosis:	Chronic respiratory failure with hypoxia  Goal:	control  Instructions for follow-up, activity and diet:	as above  Secondary Diagnosis:	Type 2 diabetes mellitus with chronic kidney disease, with long-term current use of insulin, unspecified CKD stage  Goal:	A1c < 7. Yours is very well controlled at 5.3  Instructions for follow-up, activity and diet:	Follow a low carb low sugar diet. Continue to take all antidiabetic medications/insulin as prescribed. Follow up with your Primary Care Doctor regularly for blood sugar/A1c checks. Be sure to see an eye doctor and foot doctor on an annual basis.  Secondary Diagnosis:	ESRD (end stage renal disease) on dialysis  Goal:	control  Instructions for follow-up, activity and diet:	Continue following a renal diet as advised by your Nephrologist. If you have been started on any medications for your kidney function, be sure to take them regularly. Be sure to follow up and have dialysis as scheduled, if it very important that you do not miss sessions! If something comes up and you feel that you cannot make a session, please alert your nephrologist immediately.  Secondary Diagnosis:	Essential hypertension  Goal:	BP <140/90  Instructions for follow-up, activity and diet:	Be sure to follow a low salt diet. If you have been prescribed antihypertensive medications to control your blood pressure, be sure to take them every day as prescribed and do not miss and doses, the medications do not work if they are not taken consistently. Follow up with your Primary Care Doctor and have your Blood Pressure checked.  Please check your Blood pressure at home and if the top number is always over 150 or if the bottom number is always over 100, please call your doctor  Secondary Diagnosis:	DALTON (obstructive sleep apnea)  Goal:	control  Instructions for follow-up, activity and diet:	as above  Secondary Diagnosis:	Vitreous hemorrhage of left eye  Goal:	control  Instructions for follow-up, activity and diet:	Be sure to follow up with your Eye doctor when you leave the hospital.

## 2017-11-08 NOTE — PROGRESS NOTE ADULT - SUBJECTIVE AND OBJECTIVE BOX
NEPHROLOGY INTERVAL HPI/OVERNIGHT EVENTS:    Examined earlier  HD today    MEDICATIONS  (STANDING):  amLODIPine   Tablet 10 milliGRAM(s) Oral daily  aspirin enteric coated 81 milliGRAM(s) Oral daily  dextrose 5%. 1000 milliLiter(s) (50 mL/Hr) IV Continuous <Continuous>  dextrose 50% Injectable 12.5 Gram(s) IV Push once  dextrose 50% Injectable 25 Gram(s) IV Push once  dextrose 50% Injectable 25 Gram(s) IV Push once  epoetin raina Injectable 73600 Unit(s) IV Push <User Schedule>  folic acid 1 milliGRAM(s) Oral daily  heparin  Injectable 5000 Unit(s) SubCutaneous every 8 hours  insulin lispro (HumaLOG) corrective regimen sliding scale   SubCutaneous three times a day before meals  loratadine 10 milliGRAM(s) Oral daily  pantoprazole    Tablet 40 milliGRAM(s) Oral before breakfast  sevelamer hydrochloride 800 milliGRAM(s) Oral three times a day with meals  valsartan 160 milliGRAM(s) Oral daily  verapamil 80 milliGRAM(s) Oral three times a day    MEDICATIONS  (PRN):  ALBUTerol    90 MICROgram(s) HFA Inhaler 2 Puff(s) Inhalation every 6 hours PRN Shortness of Breath and/or Wheezing  artificial  tears Solution 1 Drop(s) Both EYES every 4 hours PRN Dry Eyes  dextrose Gel 1 Dose(s) Oral once PRN Blood Glucose LESS THAN 70 milliGRAM(s)/deciLiter  glucagon  Injectable 1 milliGRAM(s) IntraMuscular once PRN Glucose <70 milliGRAM(s)/deciLiter      Allergies    Mushrooms (Hives (Mild))  No Known Drug Allergies    Intolerances        Vital Signs Last 24 Hrs  T(C): 36.6 (2017 09:40), Max: 36.6 (2017 17:00)  T(F): 97.8 (2017 09:40), Max: 97.8 (2017 17:00)  HR: 89 (2017 13:45) (80 - 98)  BP: 121/81 (2017 13:45) (103/76 - 151/84)  BP(mean): 110 (2017 17:00) (110 - 112)  RR: 18 (2017 13:45) (18 - 26)  SpO2: 99% (2017 04:10) (97% - 99%)  Daily     Daily Weight in k.8 (2017 13:30)    PHYSICAL EXAM:  GENERAL: NAD,   HEAD:  anicteric , no edema   NECK: Supple, No JVD, No stridor   CHEST/LUNG: Clear to percussion bilaterally; No rales, rhonchi, wheezing, or rubs  HEART: Regular rate and rhythm; No rub   ABDOMEN: Soft, Nontender, Nondistended;   EXTREMITIES:  min edema , L access w/ thrill     LABS:                        8.3    9.0   )-----------( 164      ( 2017 16:20 )             25.7     11-07    134<L>  |  92<L>  |  57.0<H>  ----------------------------<  110  5.1   |  24.0  |  6.35<H>    Ca    8.7      2017 06:38  Mg     2.8     11-    TPro  7.6  /  Alb  3.4  /  TBili  0.7  /  DBili  x   /  AST  17  /  ALT  20  /  AlkPhos  120  11-06    PT/INR - ( 2017 16:20 )   PT: 15.8 sec;   INR: 1.43 ratio         PTT - ( 2017 16:20 )  PTT:32.8 sec      ABG - ( 2017 18:02 )  pH: 7.34  /  pCO2: 42    /  pO2: 112   / HCO3: 22    / Base Excess: -3.1  /  SaO2: 98                    RADIOLOGY & ADDITIONAL TESTS:

## 2017-11-08 NOTE — DIETITIAN INITIAL EVALUATION ADULT. - OTHER INFO
Pt reports fair to good po intake at meals.  Pt states that she normally consumes 2-3 meals daily and with fair compliance to meal plan.  Discussed dash/tlc, cons cho meal plan in detail with pt.  Pt very receptive, literature provided.

## 2017-11-08 NOTE — DISCHARGE NOTE ADULT - MEDICATION SUMMARY - MEDICATIONS TO TAKE
I will START or STAY ON the medications listed below when I get home from the hospital:    aspirin 81 mg oral delayed release tablet  -- 1 tab(s) by mouth once a day  -- Indication: For prevention    valsartan 160 mg oral tablet  -- 1 tab(s) by mouth once a day  -- Indication: For Essential hypertension    verapamil 80 mg oral tablet  -- 1 tab(s) by mouth every 8 hours  -- Indication: For Essential hypertension    HumaLOG KwikPen 100 units/mL subcutaneous solution  -- 3 milliliter(s) subcutaneous prn  -- Do not drink alcoholic beverages when taking this medication.  It is very important that you take or use this exactly as directed.  Do not skip doses or discontinue unless directed by your doctor.  Keep in refrigerator.  Do not freeze.    -- Indication: For Type 2 diabetes mellitus with chronic kidney disease on chronic dialysis, unspecified long term insulin use status    loratadine 10 mg oral tablet  -- 1 tab(s) by mouth once a day  -- Indication: For Allergy    albuterol 2.5 mg/3 mL (0.083%) inhalation solution  -- 3 milliliter(s) inhaled every 6 hours, As Needed  -- Indication: For Chronic respiratory failure with hypoxia    amLODIPine 10 mg oral tablet  -- 1 tab(s) by mouth once a day  -- Indication: For Essential hypertension    epoetin raina  -- 74553 unit(s) intravenous 3 times a week  as per renal  -- Indication: For ESRD (end stage renal disease) on dialysis    Refresh ophthalmic solution  -- 1 drop(s) to each affected eye every 4 hours, As needed, Dry Eyes  -- Indication: For Eye care    sevelamer hydrochloride 800 mg oral tablet  -- 1 tab(s) by mouth 3 times a day (with meals)  -- Indication: For ESRD (end stage renal disease) on dialysis    pantoprazole 40 mg oral delayed release tablet  -- 1 tab(s) by mouth once a day (before a meal)  -- Indication: For reflux    folic acid 1 mg oral tablet  -- 1 tab(s) by mouth once a day  -- Indication: For ESRD (end stage renal disease) on dialysis    ergocalciferol 50,000 intl units (1.25 mg) oral capsule  -- 1 cap(s) by mouth once a day (after a meal)  -- Indication: For ESRD (end stage renal disease) on dialysis I will START or STAY ON the medications listed below when I get home from the hospital:    aspirin 81 mg oral delayed release tablet  -- 1 tab(s) by mouth once a day  -- Indication: For Prophylactic measure    valsartan 160 mg oral tablet  -- 1 tab(s) by mouth once a day  -- Indication: For hypertension    verapamil 80 mg oral tablet  -- 1 tab(s) by mouth every 8 hours  -- Indication: For hypertension    HumaLOG KwikPen 100 units/mL subcutaneous solution  -- 3 milliliter(s) subcutaneous prn  -- Do not drink alcoholic beverages when taking this medication.  It is very important that you take or use this exactly as directed.  Do not skip doses or discontinue unless directed by your doctor.  Keep in refrigerator.  Do not freeze.    -- Indication: For diabetes     loratadine 10 mg oral tablet  -- 1 tab(s) by mouth once a day  -- Indication: For Allergy    albuterol 2.5 mg/3 mL (0.083%) inhalation solution  -- 3 milliliter(s) inhaled every 6 hours, As Needed  -- Indication: For shortness of breath    amLODIPine 10 mg oral tablet  -- 1 tab(s) by mouth once a day  -- Indication: For hypertension    epoetin raina  -- 65651 unit(s) intravenous 3 times a week  as per renal  -- Indication: For Anemia     Refresh ophthalmic solution  -- 1 drop(s) to each affected eye every 4 hours, As needed, Dry Eyes  -- Indication: For dry eyes     sevelamer hydrochloride 800 mg oral tablet  -- 1 tab(s) by mouth 3 times a day (with meals)  -- Indication: For ESRD (end stage renal disease) on dialysis    pantoprazole 40 mg oral delayed release tablet  -- 1 tab(s) by mouth once a day (before a meal)  -- Indication: For Prophylactic measure    folic acid 1 mg oral tablet  -- 1 tab(s) by mouth once a day  -- Indication: For Anemia    ergocalciferol 50,000 intl units (1.25 mg) oral capsule  -- 1 cap(s) by mouth once a day (after a meal)  -- Indication: For Vitamin deficiency

## 2017-11-08 NOTE — PROGRESS NOTE ADULT - NSHPATTENDINGPLANDISCUSS_GEN_ALL_CORE
the patient.  All imaging and results of lab/other studies reviewed by me. All questions answered to the satisfaction of the patient. At this time they agree with the current plan of therapy.
the patient.  All imaging and results of lab/other studies reviewed by me. All questions answered to the satisfaction of the patient. At this time they agree with the current plan of therapy.

## 2017-11-08 NOTE — DISCHARGE NOTE ADULT - SECONDARY DIAGNOSIS.
Chronic respiratory failure with hypoxia Type 2 diabetes mellitus with chronic kidney disease, with long-term current use of insulin, unspecified CKD stage ESRD (end stage renal disease) on dialysis Essential hypertension DALTON (obstructive sleep apnea) Vitreous hemorrhage of left eye

## 2017-11-08 NOTE — CHART NOTE - NSCHARTNOTEFT_GEN_A_CORE
Aware pt with hx T2DM, seems to be well managed with current hgba1c 5.3%.  Pt reports monitoring BG 2x daily and takes Humalog sliding scale insulin before meals- when needed.  Pt states her BG has been within normal range and has not needed insulin more recently.  Pt consumes 2 meals/day most of the time and denies hypoglycemia.  Reviewed a1c results, diabetes disease process and potential long term complications.  Discussed importance of cons cho meal plan using plate method and importance of consuming 3 meals daily and portion control.  Discussed s/s and treatment for hypoglycemia.  Also reviewed importance of physical activity as tolerated and moderate wt loss.  Pt very receptive to information provided and verbalized understanding.  Literature provided.  RD CDE to remain available.

## 2017-11-08 NOTE — DISCHARGE NOTE ADULT - ADDITIONAL INSTRUCTIONS
-Follow up with Primary Care Doctor within 1 week  -Follow up for scheduled Dialysis sessions  -Follow up with Pulmonology within 2 - 3 weeks -Follow up with Primary Care Doctor within 1 week (dr Pulido in [place])  -Follow up for scheduled Dialysis sessions  -Follow up with Pulmonology within 2 - 3 weeks

## 2017-11-08 NOTE — DISCHARGE NOTE ADULT - PATIENT PORTAL LINK FT
“You can access the Follow[facility] Patient Portal, offered by [facility], by registering with the following website: [email or web address]”

## 2017-11-08 NOTE — DISCHARGE NOTE ADULT - CARE PROVIDER_API CALL
Primary Care Doctor,   Phone: (   )    -  Fax: (   )    -    Sen Dominguez), Critical Care Medicine; Internal Medicine; Pulmonary Disease  [address]  [place] [address]  Phone: [phone]  Fax: [phone]    Dilan Martin (DO), Medicine  [address] [place] [address]  Phone: [phone]  Fax: [phone]

## 2017-11-08 NOTE — DISCHARGE NOTE ADULT - CARE PROVIDERS DIRECT ADDRESSES
,DirectAddress_Unknown,[email or web address],DirectAddress_Unknown

## 2017-11-08 NOTE — DISCHARGE NOTE ADULT - OTHER SIGNIFICANT FINDINGS
37yoF with PMH DM2, ESRD on HD via AVF (MWF), HTN, Morbid obesity, DALTON suspected underlying lung disease presenting with acute respiratory failure + hypotension during anesthesia induction for elective Lt vitrectomy for non clearing vitreous hemorrhage requiring intubation at [facility]. Pt sent to [facility] for HD and continued management of hypotension requiring pressor support. Noted to improve after emergent HD on 11/6 and thought to have had element of volume overload after missing HD as outpt on 11/5 causing respiratory failure. SP Extubation without complication on 11/7. Downgraded to GMF on 11/7. Continued to do well on floors back on home amount of supplemental o2 of 2-4L at all times. Advised to have her CPAP machine delivered for nocturnal use. Continued HD as scheduled. Nephrology consultation appreciated.     All electrolyte abnormalities were monitored carefully and repleted as necessary during this hospitalization. At the time of discharge patient was hemodynamically stable and amenable to all terms of discharge. The patient has received verbal instructions from myself regarding discharge plans.     Length of Discharge: 45MIN 8.5    7.6   )-----------( 212      ( 08 Nov 2017 17:36 )             26.3   11-08    132<L>  |  90<L>  |  83.0<H>  ----------------------------<  105  5.3   |  23.0  |  7.85<H>    Ca    8.5<L>      08 Nov 2017 17:36  Phos  7.6     11-08    < from: Xray Chest 1 View AP- PORTABLE-Urgent (11.06.17 @ 10:45) >  Technique: Portable AP chest film.    Comparison: Most recent prior chest x-ray examinationfrom 9/28/2017.    Findings: The patient is status post endotracheal tube placement in good   position.    There is a hazy right upper lobe opacity which likely reflects   atelectasis. The left lung is clear. The heart size is magnified on this   AP projection. The visualized osseous structures are unremarkable.    IMPRESSION: Intubated with right upper lobe atelectasis.    < end of copied text >    EXTUBATED AND CLINICALLY IMPROVED SINCE PRIOR XRAY WAS TAKEN

## 2017-11-08 NOTE — DISCHARGE NOTE ADULT - PLAN OF CARE
resolved Continue using supplemental oxygen at your home settings at all times during the day. You should also use it at night until you receive your CPAP machine. We recommend that you see a Pulmonologist to have testing on your lungs done to check your lung function and see if you have any other conditions affecting your breathing such as COPD or Asthma. control as above A1c < 7. Yours is very well controlled at 5.3 Follow a low carb low sugar diet. Continue to take all antidiabetic medications/insulin as prescribed. Follow up with your Primary Care Doctor regularly for blood sugar/A1c checks. Be sure to see an eye doctor and foot doctor on an annual basis. Continue following a renal diet as advised by your Nephrologist. If you have been started on any medications for your kidney function, be sure to take them regularly. Be sure to follow up and have dialysis as scheduled, if it very important that you do not miss sessions! If something comes up and you feel that you cannot make a session, please alert your nephrologist immediately. BP <140/90 Be sure to follow a low salt diet. If you have been prescribed antihypertensive medications to control your blood pressure, be sure to take them every day as prescribed and do not miss and doses, the medications do not work if they are not taken consistently. Follow up with your Primary Care Doctor and have your Blood Pressure checked. Be sure to follow up with your Eye doctor when you leave the hospital. Continue using supplemental oxygen at your home settings at all times during the day. You should also use it at night until you receive your CPAP machine. We recommend that you see a Pulmonologist to have testing on your lungs done to check your lung function and see if you have any other conditions affecting your breathing such as COPD or Asthma.  Please f/up with your lung doctor to get your planned cpap machine at home Be sure to follow a low salt diet. If you have been prescribed antihypertensive medications to control your blood pressure, be sure to take them every day as prescribed and do not miss and doses, the medications do not work if they are not taken consistently. Follow up with your Primary Care Doctor and have your Blood Pressure checked.  Please check your Blood pressure at home and if the top number is always over 150 or if the bottom number is always over 100, please call your doctor

## 2017-11-08 NOTE — PROGRESS NOTE ADULT - PROBLEM SELECTOR PLAN 4
sp AVF, follows MWF scheduled  sp emergent session on 11/6 due to volume overload after mission 11/5 session  nephro following  will undergo dialysis again 11/8 then dc home

## 2017-11-08 NOTE — PROGRESS NOTE ADULT - SUBJECTIVE AND OBJECTIVE BOX
CHIEF COMPLAINT: resp failure    Patient seen and examined at the bedside. No acute overnight events. Still breathing well. Complaining of nothing this AM. Denies fever/chills, headache, lightheadedness, dizziness, chest pain, palpitations, shortness of breath, cough, abd pain, nausea/vomiting/diarrhea, muscle pain.      =========================================================================================    PHYSICAL EXAM.    GEN - appears age appropriate. obese. pleasant. no distress.   HEENT - NCAT, EOMI, CLAUDIA. BL red eyes with tearing  RESP - CTA BL, no wheeze/stridor/rhonchi/crackles. on supplemental O2. able to speak in full sentences without distress.   CARDIO - NS1S2, RRR. No murmurs/rubs/gallops.  ABD - Soft/Non tender/Non distended. Normal BS x4 quadrants. no guarding/rebound tenderness.  Ext - No JO.  MSK - BL 5/5 strength on upper and lower extremities.   Neuro - AAOx3. cn 2-12 grossly intact  Psych - normal affect  Skin - c/d/i. no rashes/lesions  ================================      VITAL SIGNS.    Vital Signs Last 24 Hrs  T(C): 36.6 (08 Nov 2017 09:40), Max: 36.6 (07 Nov 2017 17:00)  T(F): 97.8 (08 Nov 2017 09:40), Max: 97.8 (07 Nov 2017 17:00)  HR: 80 (08 Nov 2017 09:40) (80 - 106)  BP: 103/76 (08 Nov 2017 09:40) (103/76 - 185/97)  BP(mean): 110 (07 Nov 2017 17:00) (110 - 132)  RR: 20 (08 Nov 2017 09:40) (17 - 26)  SpO2: 99% (08 Nov 2017 04:10) (97% - 100%)    =================================================    LABS.                          8.3    9.0   )-----------( 164      ( 06 Nov 2017 16:20 )             25.7     11-07    134<L>  |  92<L>  |  57.0<H>  ----------------------------<  110  5.1   |  24.0  |  6.35<H>    Ca    8.7      07 Nov 2017 06:38  Mg     2.8     11-06    TPro  7.6  /  Alb  3.4  /  TBili  0.7  /  DBili  x   /  AST  17  /  ALT  20  /  AlkPhos  120  11-06    LIVER FUNCTIONS - ( 06 Nov 2017 16:20 )  Alb: 3.4 g/dL / Pro: 7.6 g/dL / ALK PHOS: 120 U/L / ALT: 20 U/L / AST: 17 U/L / GGT: x           PT/INR - ( 06 Nov 2017 16:20 )   PT: 15.8 sec;   INR: 1.43 ratio         PTT - ( 06 Nov 2017 16:20 )  PTT:32.8 sec  I&O's Summary    07 Nov 2017 07:01  -  08 Nov 2017 07:00  --------------------------------------------------------  IN: 0 mL / OUT: 0 mL / NET: 0 mL          ================================================    IMAGING.      ================================================    MEDICATIONS  (STANDING):  amLODIPine   Tablet 10 milliGRAM(s) Oral daily  aspirin enteric coated 81 milliGRAM(s) Oral daily  dextrose 5%. 1000 milliLiter(s) (50 mL/Hr) IV Continuous <Continuous>  dextrose 50% Injectable 12.5 Gram(s) IV Push once  dextrose 50% Injectable 25 Gram(s) IV Push once  dextrose 50% Injectable 25 Gram(s) IV Push once  epoetin raina Injectable 12725 Unit(s) IV Push <User Schedule>  folic acid 1 milliGRAM(s) Oral daily  heparin  Injectable 5000 Unit(s) SubCutaneous every 8 hours  insulin lispro (HumaLOG) corrective regimen sliding scale   SubCutaneous three times a day before meals  loratadine 10 milliGRAM(s) Oral daily  pantoprazole    Tablet 40 milliGRAM(s) Oral before breakfast  sevelamer hydrochloride 800 milliGRAM(s) Oral three times a day with meals  valsartan 160 milliGRAM(s) Oral daily  verapamil 80 milliGRAM(s) Oral three times a day    MEDICATIONS  (PRN):  ALBUTerol    90 MICROgram(s) HFA Inhaler 2 Puff(s) Inhalation every 6 hours PRN Shortness of Breath and/or Wheezing  artificial  tears Solution 1 Drop(s) Both EYES every 4 hours PRN Dry Eyes  dextrose Gel 1 Dose(s) Oral once PRN Blood Glucose LESS THAN 70 milliGRAM(s)/deciLiter  glucagon  Injectable 1 milliGRAM(s) IntraMuscular once PRN Glucose <70 milliGRAM(s)/deciLiter

## 2017-11-08 NOTE — DISCHARGE NOTE ADULT - PROVIDER TOKENS
FREE:[LAST:[Primary Care Doctor],PHONE:[(   )    -],FAX:[(   )    -]],TOKEN:'[id number]',TOKEN:'[id number]'

## 2017-11-09 VITALS — TEMPERATURE: 99 F | SYSTOLIC BLOOD PRESSURE: 138 MMHG | HEART RATE: 87 BPM | DIASTOLIC BLOOD PRESSURE: 98 MMHG

## 2017-11-09 DIAGNOSIS — Z29.9 ENCOUNTER FOR PROPHYLACTIC MEASURES, UNSPECIFIED: ICD-10-CM

## 2017-11-09 DIAGNOSIS — D64.9 ANEMIA, UNSPECIFIED: ICD-10-CM

## 2017-11-09 LAB
GLUCOSE BLDC GLUCOMTR-MCNC: 132 MG/DL — HIGH (ref 70–99)
GLUCOSE BLDC GLUCOMTR-MCNC: 96 MG/DL — SIGNIFICANT CHANGE UP (ref 70–99)

## 2017-11-09 PROCEDURE — 99239 HOSP IP/OBS DSCHRG MGMT >30: CPT

## 2017-11-09 RX ADMIN — SEVELAMER CARBONATE 800 MILLIGRAM(S): 2400 POWDER, FOR SUSPENSION ORAL at 08:03

## 2017-11-09 RX ADMIN — PANTOPRAZOLE SODIUM 40 MILLIGRAM(S): 20 TABLET, DELAYED RELEASE ORAL at 06:23

## 2017-11-09 RX ADMIN — LORATADINE 10 MILLIGRAM(S): 10 TABLET ORAL at 11:23

## 2017-11-09 RX ADMIN — Medication 80 MILLIGRAM(S): at 06:23

## 2017-11-09 RX ADMIN — Medication 81 MILLIGRAM(S): at 11:22

## 2017-11-09 RX ADMIN — Medication 1 MILLIGRAM(S): at 11:22

## 2017-11-09 RX ADMIN — AMLODIPINE BESYLATE 10 MILLIGRAM(S): 2.5 TABLET ORAL at 06:23

## 2017-11-09 RX ADMIN — VALSARTAN 160 MILLIGRAM(S): 80 TABLET ORAL at 06:23

## 2017-11-09 RX ADMIN — HEPARIN SODIUM 5000 UNIT(S): 5000 INJECTION INTRAVENOUS; SUBCUTANEOUS at 06:23

## 2017-11-09 NOTE — PROGRESS NOTE ADULT - ATTENDING COMMENTS
Planning discharge home today. No additional needs.
Plan d/w patient and CM. Please see discharge papers for details. Patient is a high risk re-admission, has had multiple for pna, sob, missed HD and bacteremia in the last year. Encouraged to follow up as OP with PCP and PUlmonary as well as her routine HD schedule

## 2017-11-09 NOTE — PROGRESS NOTE ADULT - PROBLEM SELECTOR PLAN 3
present on admission  improved sp HD  cont HD per nephro, next session today  will order repeat CXR to fu after HD session today, if improved with dc home
present on admission  improved sp HD  cont HD per nephro  will order repeat CXR to fu after HD session on 11/8
resolved on O2 at home
sp AVF, follows MWF scheduled  sp emergent session on 11/6 due to volume overload after mission 11/5 session  nephro following  s/p HD on 11/8 and then plan to resume normal schedule on d/c

## 2017-11-09 NOTE — PROGRESS NOTE ADULT - PROBLEM SELECTOR PROBLEM 3
Acute pulmonary edema
Acute pulmonary edema
Acute respiratory failure with hypoxia
ESRD (end stage renal disease) on dialysis

## 2017-11-09 NOTE — PROGRESS NOTE ADULT - ASSESSMENT
37yoF with PMH DM2, ESRD on HD via AVF (MWF), HTN, Morbid obesity, DALTON suspected underlying lung disease presenting with acute respiratory failure + hypotension during anesthesia induction for elective Lt vitrectomy for non clearing vitreous hemorrhage requiring intubation at [facility]. Pt sent to [facility] for HD and continued management of hypotension requiring pressor support. Noted to improve after emergent HD on 11/6 and thought to have had element of volume overload after missing HD as outpt on 11/5 causing respiratory failure. SP Extubation without complication on 11/7. Downgraded to GMF on 11/7.
37yoF with PMH DM2, ESRD on HD via AVF (MWF), HTN, Morbid obesity, DALTON suspected underlying lung disease presenting with acute respiratory failure + hypotension during anesthesia induction for elective Lt vitrectomy for non clearing vitreous hemorrhage requiring intubation at [facility]. Pt sent to [facility] for HD and continued management of hypotension requiring pressor support. Noted to improve after emergent HD on 11/6 and thought to have had element of volume overload after missing HD as outpt on 11/5 causing respiratory failure. SP Extubation without complication on 11/7. Downgraded to GMF on 11/7.
37yoF with PMH DM2, ESRD on HD via AVF (MWF), HTN, Morbid obesity, DALTON suspected underlying lung disease presenting with acute respiratory failure + hypotension during anesthesia induction for elective Lt vitrectomy for non clearing vitreous hemorrhage requiring intubation at [facility]. Pt sent to [facility] for HD and continued management of hypotension requiring pressor support. Noted to improve after emergent HD on 11/6 and thought to have had element of volume overload after missing HD as outpt on 11/5 causing respiratory failure. SP Extubation without complication on 11/7. Downgraded to GMF on 11/7. She has remained stable, is oxygenating at her baseline with a clear lung exam and is ready to d/c home with her OP f/up
ESRD - HD today on a MWF schedule    Anemia - Epogen with HD     HTN - Now that extubated , her usual meds have been resumed . Watch trend in BP     RO - Follow phos with resumption of binders
ESRD - HD tomorrow     Anemia - Epogen with HD     HTN - Now that extubated , her usual meds have been resumed . Watch trend in BP     RO - Follow phos with resumption of binders
stable for transfer to floor hospitalist accepted

## 2017-11-09 NOTE — PROGRESS NOTE ADULT - PROBLEM SELECTOR PROBLEM 1
Acute respiratory failure with hypoxia
Essential hypertension

## 2017-11-09 NOTE — PROGRESS NOTE ADULT - SUBJECTIVE AND OBJECTIVE BOX
CHIEF COMPLAINT: resp failure    Patient seen and examined at the bedside. No acute overnight events. Still breathing well. Has no complaints today. States that she was ordered a cpap machine from the Outpatient and is planned to have that delivered to home, will not stay for us to order anything for her. + cough, yellowish phlegm noted. No fevers/chills    ROS: No chest pain, palpitations, sob, light headedness/dizziness, fevers/chills, abdominal pain, n/v, diarrhea/constipation, dysuria or increased urinary frequency.,    Tele: SR, desat to 87% (has home O2 at home)     Vital Signs Last 24 Hrs  T(C): 36.7 (09 Nov 2017 05:00), Max: 36.7 (09 Nov 2017 05:00)  T(F): 98.1 (09 Nov 2017 05:00), Max: 98.1 (09 Nov 2017 05:00)  HR: 89 (09 Nov 2017 05:00) (88 - 103)  BP: 138/98 (09 Nov 2017 11:04) (121/81 - 155/78)  BP(mean): --  RR: 20 (09 Nov 2017 05:00) (18 - 20)  SpO2: 93% (09 Nov 2017 04:30) (93% - 99%)  c/s 139-96    PHYSICAL EXAM.    GEN - appears age appropriate. obese. pleasant. no distress.   HEENT - NCAT, EOMI, CLAUDIA. BL red eyes with tearing  RESP - CTA BL, no wheeze/stridor/rhonchi/crackles. on supplemental O2. able to speak in full sentences without distress.   CARDIO - NS1S2, RRR. No murmurs/rubs/gallops.  ABD - Soft/Non tender/Non distended. Normal BS x4 quadrants. no guarding/rebound tenderness.  Ext - No c/c/e. LUE aVF with thrill noted. Right chest scar from prior PC noted   MSK - BL 5/5 strength on upper and lower extremities.   Skin - c/d/i. no rashes/lesions      LABS.                        8.5    7.6   )-----------( 212      ( 08 Nov 2017 17:36 )             26.3   11-08    132<L>  |  90<L>  |  83.0<H>  ----------------------------<  105  5.3   |  23.0  |  7.85<H>    Ca    8.5<L>      08 Nov 2017 17:36  Phos  7.6     11-08    < from: Xray Chest 1 View AP/PA. (11.06.17 @ 17:14) >  Impression: ET tube in good position. Improved aeration of the right   upper lobe.  Patchy bibasilar densities    < end of copied text >

## 2017-11-09 NOTE — PROGRESS NOTE ADULT - PROBLEM SELECTOR PLAN 2
suspected to have underlying pulmonary pathology, no confirmed dx at the moment  acute worsening resolved  on 2-4L at baseline at home 24hrs a day  pending cpap machine in the home, advised to follow up with PMD  titrate o2 to 90-95%
SSI
suspected to have underlying pulmonary pathology, no confirmed dx at the moment  acute worsening now resolved  on 2-4L at baseline at home 24hrs a day  pending cpap machine in the home  titrate o2 to 90-95%
suspected to have underlying pulmonary pathology, no confirmed dx at the moment  on 2-4L at baseline at home 24hrs a day  pending cpap machine in the home ordered by her Pulm MD as OP, advised to follow up with PMD  titrate o2 to 90-95%

## 2017-11-09 NOTE — PROGRESS NOTE ADULT - PROBLEM SELECTOR PROBLEM 2
Chronic respiratory failure with hypoxia
Type 2 diabetes mellitus with chronic kidney disease on chronic dialysis, unspecified long term insulin use status

## 2017-11-09 NOTE — PROGRESS NOTE ADULT - PROBLEM SELECTOR PLAN 1
acute on chronic incident secondary to use of anesthesia in periop period  also complicated by concurring volume overload due to missed dialysis, evidenced by presence of pulm edema  sp intubation @ [facility], extubated @ [facility] on 11/7 without incident  now back to supplemental o2 at home level, acute failure resolved
acute on chronic incident secondary to use of anesthesia in periop period  also complicated by concurring volume overload due to missed dialysis, evidenced by presence of pulm edema  sp intubation @ [facility], extubated @ [facility] on 11/7 without incident  now back to supplemental o2, acute failure resolved
acute on chronic incident secondary to use of anesthesia in periop period  also complicated by concurring volume overload due to missed dialysis, evidenced by presence of pulm edema  sp intubation @ [facility], extubated @ [facility] on 11/7 without incident  now back to supplemental o2 at home level, acute failure resolved  Improved clinically and ready for d/c to home
restart home meds
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
## 2017-11-30 ENCOUNTER — APPOINTMENT (OUTPATIENT)
Dept: PULMONOLOGY | Facility: CLINIC | Age: 37
End: 2017-11-30
Payer: MEDICARE

## 2017-11-30 VITALS — BODY MASS INDEX: 50.39 KG/M2 | WEIGHT: 275.5 LBS | SYSTOLIC BLOOD PRESSURE: 128 MMHG | DIASTOLIC BLOOD PRESSURE: 84 MMHG

## 2017-11-30 VITALS — HEART RATE: 92 BPM | OXYGEN SATURATION: 93 %

## 2017-11-30 DIAGNOSIS — G47.33 OBSTRUCTIVE SLEEP APNEA (ADULT) (PEDIATRIC): ICD-10-CM

## 2017-11-30 DIAGNOSIS — R06.09 OTHER FORMS OF DYSPNEA: ICD-10-CM

## 2017-11-30 PROCEDURE — 99214 OFFICE O/P EST MOD 30 MIN: CPT

## 2017-11-30 RX ORDER — HYDRALAZINE HYDROCHLORIDE 25 MG/1
25 TABLET ORAL
Qty: 90 | Refills: 0 | Status: ACTIVE | COMMUNITY
Start: 2017-08-14

## 2017-11-30 RX ORDER — SEVELAMER CARBONATE 800 MG/1
800 TABLET, FILM COATED ORAL
Qty: 180 | Refills: 0 | Status: DISCONTINUED | COMMUNITY
Start: 2017-08-14

## 2017-11-30 RX ORDER — FLUTICASONE PROPIONATE 50 UG/1
50 SPRAY, METERED NASAL
Qty: 16 | Refills: 0 | Status: ACTIVE | COMMUNITY
Start: 2017-10-02

## 2017-11-30 RX ORDER — CLONIDINE HYDROCHLORIDE 0.1 MG/1
0.1 TABLET ORAL
Qty: 180 | Refills: 0 | Status: ACTIVE | COMMUNITY
Start: 2017-11-16

## 2017-11-30 RX ORDER — CIPROFLOXACIN 3 MG/ML
0.3 SOLUTION OPHTHALMIC
Qty: 5 | Refills: 0 | Status: DISCONTINUED | COMMUNITY
Start: 2017-09-25

## 2017-11-30 RX ORDER — SIMVASTATIN 10 MG/1
10 TABLET, FILM COATED ORAL
Qty: 30 | Refills: 0 | Status: ACTIVE | COMMUNITY
Start: 2017-10-02

## 2017-11-30 RX ORDER — VERAPAMIL HYDROCHLORIDE 80 MG/1
80 TABLET ORAL
Refills: 0 | Status: DISCONTINUED | COMMUNITY
End: 2017-11-30

## 2017-11-30 RX ORDER — AMLODIPINE BESYLATE 5 MG/1
5 TABLET ORAL
Qty: 30 | Refills: 0 | Status: DISCONTINUED | COMMUNITY
Start: 2017-11-03

## 2017-11-30 RX ORDER — PREDNISOLONE ACETATE 10 MG/ML
1 SUSPENSION/ DROPS OPHTHALMIC
Qty: 10 | Refills: 0 | Status: ACTIVE | COMMUNITY
Start: 2017-09-25

## 2017-11-30 RX ORDER — DEXTROSE 70 G/100ML
70 INJECTION, SOLUTION INTRAVENOUS
Qty: 2000 | Refills: 0 | Status: DISCONTINUED | COMMUNITY
Start: 2017-07-27

## 2017-11-30 RX ORDER — VANCOMYCIN HYDROCHLORIDE 250 MG/1
250 CAPSULE ORAL
Qty: 40 | Refills: 0 | Status: DISCONTINUED | COMMUNITY
Start: 2017-08-14

## 2017-12-13 NOTE — ASU PATIENT PROFILE, ADULT - PSH
AVF (arteriovenous fistula)    Elective surgery  Left eye retina surgery  Vascular dialysis catheter in place

## 2017-12-14 ENCOUNTER — TRANSCRIPTION ENCOUNTER (OUTPATIENT)
Age: 37
End: 2017-12-14

## 2017-12-14 ENCOUNTER — OUTPATIENT (OUTPATIENT)
Dept: OUTPATIENT SERVICES | Facility: HOSPITAL | Age: 37
LOS: 1 days | End: 2017-12-14
Payer: MEDICARE

## 2017-12-14 VITALS
DIASTOLIC BLOOD PRESSURE: 86 MMHG | SYSTOLIC BLOOD PRESSURE: 160 MMHG | OXYGEN SATURATION: 95 % | RESPIRATION RATE: 16 BRPM | HEART RATE: 85 BPM

## 2017-12-14 VITALS
OXYGEN SATURATION: 92 % | TEMPERATURE: 98 F | WEIGHT: 260.59 LBS | SYSTOLIC BLOOD PRESSURE: 193 MMHG | HEART RATE: 92 BPM | HEIGHT: 63 IN | RESPIRATION RATE: 22 BRPM | DIASTOLIC BLOOD PRESSURE: 99 MMHG

## 2017-12-14 DIAGNOSIS — Z41.9 ENCOUNTER FOR PROCEDURE FOR PURPOSES OTHER THAN REMEDYING HEALTH STATE, UNSPECIFIED: Chronic | ICD-10-CM

## 2017-12-14 DIAGNOSIS — I77.0 ARTERIOVENOUS FISTULA, ACQUIRED: Chronic | ICD-10-CM

## 2017-12-14 DIAGNOSIS — Z99.2 DEPENDENCE ON RENAL DIALYSIS: Chronic | ICD-10-CM

## 2017-12-14 DIAGNOSIS — E11.3519 TYPE 2 DIABETES MELLITUS WITH PROLIFERATIVE DIABETIC RETINOPATHY WITH MACULAR EDEMA, UNSPECIFIED EYE: ICD-10-CM

## 2017-12-14 LAB
ANION GAP SERPL CALC-SCNC: 7 MMOL/L — SIGNIFICANT CHANGE UP (ref 5–17)
BUN SERPL-MCNC: 42 MG/DL — HIGH (ref 7–23)
CALCIUM SERPL-MCNC: 9.5 MG/DL — SIGNIFICANT CHANGE UP (ref 8.4–10.5)
CHLORIDE SERPL-SCNC: 98 MMOL/L — SIGNIFICANT CHANGE UP (ref 96–108)
CO2 SERPL-SCNC: 30 MMOL/L — SIGNIFICANT CHANGE UP (ref 22–31)
CREAT SERPL-MCNC: 6.35 MG/DL — HIGH (ref 0.5–1.3)
GLUCOSE BLDC GLUCOMTR-MCNC: 86 MG/DL — SIGNIFICANT CHANGE UP (ref 70–99)
GLUCOSE BLDC GLUCOMTR-MCNC: 95 MG/DL — SIGNIFICANT CHANGE UP (ref 70–99)
GLUCOSE SERPL-MCNC: 105 MG/DL — HIGH (ref 70–99)
HCG SERPL-ACNC: 1 MIU/ML — SIGNIFICANT CHANGE UP
POTASSIUM SERPL-MCNC: 4.2 MMOL/L — SIGNIFICANT CHANGE UP (ref 3.5–5.3)
POTASSIUM SERPL-SCNC: 4.2 MMOL/L — SIGNIFICANT CHANGE UP (ref 3.5–5.3)
SODIUM SERPL-SCNC: 135 MMOL/L — SIGNIFICANT CHANGE UP (ref 135–145)

## 2017-12-14 PROCEDURE — 84702 CHORIONIC GONADOTROPIN TEST: CPT

## 2017-12-14 PROCEDURE — C1889: CPT

## 2017-12-14 PROCEDURE — 67113 REPAIR RETINAL DETACH CPLX: CPT | Mod: LT

## 2017-12-14 PROCEDURE — 82962 GLUCOSE BLOOD TEST: CPT

## 2017-12-14 PROCEDURE — 80048 BASIC METABOLIC PNL TOTAL CA: CPT

## 2017-12-14 PROCEDURE — C1814: CPT

## 2017-12-14 NOTE — ASU DISCHARGE PLAN (ADULT/PEDIATRIC). - NOTIFY
Swelling that continues/Bleeding that does not stop/Pain not relieved by Medications/Fever greater than 101/Persistent Nausea and Vomiting

## 2017-12-14 NOTE — ASU DISCHARGE PLAN (ADULT/PEDIATRIC). - PT EDUC
Eye shield with instructions , sunglasses and eye kit given to patient./other (specify) Implant card (specify)/other (specify)/Slava gas injection card, Gas bubble instructions reviewed with good understanding ,gas bubble bracelet on pt. Eye shield with instructions , sunglasses and eye kit given to patient.

## 2017-12-14 NOTE — ASU PREOP CHECKLIST - 1.
Patient oxygen dependent Removal of cataract with IOL implant left eye liters, and baseline sa02 92%

## 2017-12-18 PROCEDURE — 83880 ASSAY OF NATRIURETIC PEPTIDE: CPT

## 2017-12-18 PROCEDURE — 93005 ELECTROCARDIOGRAM TRACING: CPT

## 2017-12-18 PROCEDURE — 84436 ASSAY OF TOTAL THYROXINE: CPT

## 2017-12-18 PROCEDURE — 82306 VITAMIN D 25 HYDROXY: CPT

## 2017-12-18 PROCEDURE — C1750: CPT

## 2017-12-18 PROCEDURE — 99261: CPT

## 2017-12-18 PROCEDURE — 82728 ASSAY OF FERRITIN: CPT

## 2017-12-18 PROCEDURE — 84480 ASSAY TRIIODOTHYRONINE (T3): CPT

## 2017-12-18 PROCEDURE — 83036 HEMOGLOBIN GLYCOSYLATED A1C: CPT

## 2017-12-18 PROCEDURE — 85027 COMPLETE CBC AUTOMATED: CPT

## 2017-12-18 PROCEDURE — 82272 OCCULT BLD FECES 1-3 TESTS: CPT

## 2017-12-18 PROCEDURE — 85730 THROMBOPLASTIN TIME PARTIAL: CPT

## 2017-12-18 PROCEDURE — 86706 HEP B SURFACE ANTIBODY: CPT

## 2017-12-18 PROCEDURE — 84484 ASSAY OF TROPONIN QUANT: CPT

## 2017-12-18 PROCEDURE — 96375 TX/PRO/DX INJ NEW DRUG ADDON: CPT

## 2017-12-18 PROCEDURE — 86704 HEP B CORE ANTIBODY TOTAL: CPT

## 2017-12-18 PROCEDURE — 86901 BLOOD TYPING SEROLOGIC RH(D): CPT

## 2017-12-18 PROCEDURE — 82553 CREATINE MB FRACTION: CPT

## 2017-12-18 PROCEDURE — 77001 FLUOROGUIDE FOR VEIN DEVICE: CPT

## 2017-12-18 PROCEDURE — 83970 ASSAY OF PARATHORMONE: CPT

## 2017-12-18 PROCEDURE — 84466 ASSAY OF TRANSFERRIN: CPT

## 2017-12-18 PROCEDURE — 86920 COMPATIBILITY TEST SPIN: CPT

## 2017-12-18 PROCEDURE — 86803 HEPATITIS C AB TEST: CPT

## 2017-12-18 PROCEDURE — 36415 COLL VENOUS BLD VENIPUNCTURE: CPT

## 2017-12-18 PROCEDURE — 76942 ECHO GUIDE FOR BIOPSY: CPT

## 2017-12-18 PROCEDURE — 86709 HEPATITIS A IGM ANTIBODY: CPT

## 2017-12-18 PROCEDURE — 87340 HEPATITIS B SURFACE AG IA: CPT

## 2017-12-18 PROCEDURE — 82310 ASSAY OF CALCIUM: CPT

## 2017-12-18 PROCEDURE — 84300 ASSAY OF URINE SODIUM: CPT

## 2017-12-18 PROCEDURE — 80053 COMPREHEN METABOLIC PANEL: CPT

## 2017-12-18 PROCEDURE — 86900 BLOOD TYPING SEROLOGIC ABO: CPT

## 2017-12-18 PROCEDURE — 96374 THER/PROPH/DIAG INJ IV PUSH: CPT

## 2017-12-18 PROCEDURE — 80061 LIPID PANEL: CPT

## 2017-12-18 PROCEDURE — 99284 EMERGENCY DEPT VISIT MOD MDM: CPT | Mod: 25

## 2017-12-18 PROCEDURE — 84702 CHORIONIC GONADOTROPIN TEST: CPT

## 2017-12-18 PROCEDURE — 83550 IRON BINDING TEST: CPT

## 2017-12-18 PROCEDURE — 83935 ASSAY OF URINE OSMOLALITY: CPT

## 2017-12-18 PROCEDURE — 84443 ASSAY THYROID STIM HORMONE: CPT

## 2017-12-18 PROCEDURE — 81001 URINALYSIS AUTO W/SCOPE: CPT

## 2017-12-18 PROCEDURE — 88329 PATH CONSLTJ DRG SURG: CPT

## 2017-12-18 PROCEDURE — 80048 BASIC METABOLIC PNL TOTAL CA: CPT

## 2017-12-18 PROCEDURE — 93306 TTE W/DOPPLER COMPLETE: CPT

## 2017-12-18 PROCEDURE — 36430 TRANSFUSION BLD/BLD COMPNT: CPT

## 2017-12-18 PROCEDURE — 80074 ACUTE HEPATITIS PANEL: CPT

## 2017-12-18 PROCEDURE — 85379 FIBRIN DEGRADATION QUANT: CPT

## 2017-12-18 PROCEDURE — 76775 US EXAM ABDO BACK WALL LIM: CPT

## 2017-12-18 PROCEDURE — G0365: CPT

## 2017-12-18 PROCEDURE — C1769: CPT

## 2017-12-18 PROCEDURE — 86705 HEP B CORE ANTIBODY IGM: CPT

## 2017-12-18 PROCEDURE — 85610 PROTHROMBIN TIME: CPT

## 2017-12-18 PROCEDURE — 77012 CT SCAN FOR NEEDLE BIOPSY: CPT

## 2017-12-18 PROCEDURE — 82550 ASSAY OF CK (CPK): CPT

## 2017-12-18 PROCEDURE — 80069 RENAL FUNCTION PANEL: CPT

## 2017-12-18 PROCEDURE — 84156 ASSAY OF PROTEIN URINE: CPT

## 2017-12-18 PROCEDURE — 83735 ASSAY OF MAGNESIUM: CPT

## 2017-12-18 PROCEDURE — 84460 ALANINE AMINO (ALT) (SGPT): CPT

## 2017-12-18 PROCEDURE — P9016: CPT

## 2017-12-18 PROCEDURE — 71045 X-RAY EXAM CHEST 1 VIEW: CPT

## 2017-12-18 PROCEDURE — 86850 RBC ANTIBODY SCREEN: CPT

## 2017-12-18 PROCEDURE — 84100 ASSAY OF PHOSPHORUS: CPT

## 2017-12-19 PROCEDURE — 84132 ASSAY OF SERUM POTASSIUM: CPT

## 2017-12-19 PROCEDURE — 93005 ELECTROCARDIOGRAM TRACING: CPT

## 2017-12-19 PROCEDURE — 99231 SBSQ HOSP IP/OBS SF/LOW 25: CPT

## 2017-12-19 PROCEDURE — 84100 ASSAY OF PHOSPHORUS: CPT

## 2017-12-19 PROCEDURE — 94003 VENT MGMT INPAT SUBQ DAY: CPT

## 2017-12-19 PROCEDURE — 82803 BLOOD GASES ANY COMBINATION: CPT

## 2017-12-19 PROCEDURE — 83605 ASSAY OF LACTIC ACID: CPT

## 2017-12-19 PROCEDURE — 80048 BASIC METABOLIC PNL TOTAL CA: CPT

## 2017-12-19 PROCEDURE — 80053 COMPREHEN METABOLIC PANEL: CPT

## 2017-12-19 PROCEDURE — 85610 PROTHROMBIN TIME: CPT

## 2017-12-19 PROCEDURE — 82330 ASSAY OF CALCIUM: CPT

## 2017-12-19 PROCEDURE — 94002 VENT MGMT INPAT INIT DAY: CPT

## 2017-12-19 PROCEDURE — 82435 ASSAY OF BLOOD CHLORIDE: CPT

## 2017-12-19 PROCEDURE — 83036 HEMOGLOBIN GLYCOSYLATED A1C: CPT

## 2017-12-19 PROCEDURE — 85730 THROMBOPLASTIN TIME PARTIAL: CPT

## 2017-12-19 PROCEDURE — 84295 ASSAY OF SERUM SODIUM: CPT

## 2017-12-19 PROCEDURE — 82962 GLUCOSE BLOOD TEST: CPT

## 2017-12-19 PROCEDURE — 82947 ASSAY GLUCOSE BLOOD QUANT: CPT

## 2017-12-19 PROCEDURE — 85014 HEMATOCRIT: CPT

## 2017-12-19 PROCEDURE — 85027 COMPLETE CBC AUTOMATED: CPT

## 2017-12-19 PROCEDURE — 71045 X-RAY EXAM CHEST 1 VIEW: CPT

## 2017-12-19 PROCEDURE — 36415 COLL VENOUS BLD VENIPUNCTURE: CPT

## 2017-12-19 PROCEDURE — 99221 1ST HOSP IP/OBS SF/LOW 40: CPT

## 2017-12-19 PROCEDURE — 83735 ASSAY OF MAGNESIUM: CPT

## 2017-12-19 PROCEDURE — 99261: CPT

## 2018-01-21 NOTE — PROGRESS NOTE ADULT - ASSESSMENT
DM with CRF 5,Anemia, Hypertension

## 2018-01-29 ENCOUNTER — INPATIENT (INPATIENT)
Facility: HOSPITAL | Age: 38
LOS: 17 days | Discharge: HOME | DRG: 602 | End: 2018-02-16
Attending: HOSPITALIST | Admitting: HOSPITALIST
Payer: MEDICARE

## 2018-01-29 VITALS
TEMPERATURE: 97 F | HEART RATE: 96 BPM | RESPIRATION RATE: 16 BRPM | SYSTOLIC BLOOD PRESSURE: 169 MMHG | WEIGHT: 259.93 LBS | HEIGHT: 63 IN | DIASTOLIC BLOOD PRESSURE: 91 MMHG | OXYGEN SATURATION: 95 %

## 2018-01-29 DIAGNOSIS — Z99.2 DEPENDENCE ON RENAL DIALYSIS: Chronic | ICD-10-CM

## 2018-01-29 DIAGNOSIS — E87.70 FLUID OVERLOAD, UNSPECIFIED: ICD-10-CM

## 2018-01-29 DIAGNOSIS — I77.0 ARTERIOVENOUS FISTULA, ACQUIRED: Chronic | ICD-10-CM

## 2018-01-29 DIAGNOSIS — Z41.9 ENCOUNTER FOR PROCEDURE FOR PURPOSES OTHER THAN REMEDYING HEALTH STATE, UNSPECIFIED: Chronic | ICD-10-CM

## 2018-01-29 LAB
ANION GAP SERPL CALC-SCNC: 22 MMOL/L — HIGH (ref 5–17)
APTT BLD: 30.1 SEC — SIGNIFICANT CHANGE UP (ref 27.5–37.4)
BUN SERPL-MCNC: 65 MG/DL — HIGH (ref 8–20)
CALCIUM SERPL-MCNC: 9.6 MG/DL — SIGNIFICANT CHANGE UP (ref 8.6–10.2)
CHLORIDE SERPL-SCNC: 91 MMOL/L — LOW (ref 98–107)
CO2 SERPL-SCNC: 23 MMOL/L — SIGNIFICANT CHANGE UP (ref 22–29)
CREAT SERPL-MCNC: 7.88 MG/DL — HIGH (ref 0.5–1.3)
GLUCOSE SERPL-MCNC: 124 MG/DL — HIGH (ref 70–115)
HCG SERPL-ACNC: <2 MIU/ML — SIGNIFICANT CHANGE UP
HCT VFR BLD CALC: 24.6 % — LOW (ref 37–47)
HGB BLD-MCNC: 7.6 G/DL — LOW (ref 12–16)
INR BLD: 1.51 RATIO — HIGH (ref 0.88–1.16)
MCHC RBC-ENTMCNC: 25.6 PG — LOW (ref 27–31)
MCHC RBC-ENTMCNC: 30.9 G/DL — LOW (ref 32–36)
MCV RBC AUTO: 82.8 FL — SIGNIFICANT CHANGE UP (ref 81–99)
NT-PROBNP SERPL-SCNC: HIGH PG/ML (ref 0–300)
PLATELET # BLD AUTO: 269 K/UL — SIGNIFICANT CHANGE UP (ref 150–400)
POTASSIUM SERPL-MCNC: 5.4 MMOL/L — HIGH (ref 3.5–5.3)
POTASSIUM SERPL-SCNC: 5.4 MMOL/L — HIGH (ref 3.5–5.3)
PROTHROM AB SERPL-ACNC: 16.8 SEC — HIGH (ref 9.8–12.7)
RBC # BLD: 2.97 M/UL — LOW (ref 4.4–5.2)
RBC # FLD: 19.5 % — HIGH (ref 11–15.6)
SODIUM SERPL-SCNC: 136 MMOL/L — SIGNIFICANT CHANGE UP (ref 135–145)
WBC # BLD: 8.6 K/UL — SIGNIFICANT CHANGE UP (ref 4.8–10.8)
WBC # FLD AUTO: 8.6 K/UL — SIGNIFICANT CHANGE UP (ref 4.8–10.8)

## 2018-01-29 PROCEDURE — 99284 EMERGENCY DEPT VISIT MOD MDM: CPT | Mod: 25

## 2018-01-29 RX ORDER — LABETALOL HCL 100 MG
20 TABLET ORAL ONCE
Qty: 0 | Refills: 0 | Status: COMPLETED | OUTPATIENT
Start: 2018-01-29 | End: 2018-01-29

## 2018-01-29 RX ORDER — LABETALOL HCL 100 MG
10 TABLET ORAL ONCE
Qty: 0 | Refills: 0 | Status: COMPLETED | OUTPATIENT
Start: 2018-01-29 | End: 2018-01-29

## 2018-01-29 RX ORDER — NIFEDIPINE 30 MG
30 TABLET, EXTENDED RELEASE 24 HR ORAL ONCE
Qty: 0 | Refills: 0 | Status: COMPLETED | OUTPATIENT
Start: 2018-01-29 | End: 2018-01-29

## 2018-01-29 RX ORDER — SODIUM CHLORIDE 9 MG/ML
3 INJECTION INTRAMUSCULAR; INTRAVENOUS; SUBCUTANEOUS ONCE
Qty: 0 | Refills: 0 | Status: COMPLETED | OUTPATIENT
Start: 2018-01-29 | End: 2018-01-29

## 2018-01-29 RX ORDER — PIPERACILLIN AND TAZOBACTAM 4; .5 G/20ML; G/20ML
3.38 INJECTION, POWDER, LYOPHILIZED, FOR SOLUTION INTRAVENOUS EVERY 12 HOURS
Qty: 0 | Refills: 0 | Status: DISCONTINUED | OUTPATIENT
Start: 2018-01-29 | End: 2018-01-30

## 2018-01-29 RX ORDER — VANCOMYCIN HCL 1 G
1000 VIAL (EA) INTRAVENOUS ONCE
Qty: 0 | Refills: 0 | Status: COMPLETED | OUTPATIENT
Start: 2018-01-29 | End: 2018-01-29

## 2018-01-29 RX ADMIN — SODIUM CHLORIDE 3 MILLILITER(S): 9 INJECTION INTRAMUSCULAR; INTRAVENOUS; SUBCUTANEOUS at 20:40

## 2018-01-29 RX ADMIN — Medication 250 MILLIGRAM(S): at 21:23

## 2018-01-29 RX ADMIN — Medication 20 MILLIGRAM(S): at 21:23

## 2018-01-29 NOTE — ED PROVIDER NOTE - PROGRESS NOTE DETAILS
case discussed with Dr. Martin who stated pt is essentially noncompliant and likely needs dialysis Jena called and results obtained. negative DVT: possible venous pulsations. possibly consistent with right sided heart failure

## 2018-01-29 NOTE — CONSULT NOTE ADULT - SUBJECTIVE AND OBJECTIVE BOX
Patient is a 38y old  Female who presents with a chief complaint of right leg pain . + Esrd , missed HD in [facility]. + right leg pain and swelling x several days ----> worsening . Leg doppler 1-25-19 no DVT   Currently , No SOB or CP   	      PAST MEDICAL & SURGICAL HISTORY:  Vitreous hemorrhage of left eye  Class 3 obesity due to excess calories with serious comorbidity in adult, unspecified BMI  DALTON (obstructive sleep apnea)  Pneumonia  End stage renal disease  Hypertension  Diabetes  Elective surgery: Left eye retina surgery  AVF (arteriovenous fistula)  Vascular dialysis catheter in place      FAMILY HISTORY:  No pertinent family history in first degree relatives      Social History:    MEDICATIONS  (STANDING):  sodium chloride 0.9% lock flush 3 milliLiter(s) IV Push once    MEDICATIONS  (PRN):      Allergies    Mushrooms (Hives (Mild))  No Known Drug Allergies    Intolerances        Vital Signs Last 24 Hrs  T(C): 36.3 (29 Jan 2018 16:28), Max: 36.3 (29 Jan 2018 16:28)  T(F): 97.3 (29 Jan 2018 16:28), Max: 97.3 (29 Jan 2018 16:28)  HR: 96 (29 Jan 2018 16:28) (96 - 96)  BP: 169/91 (29 Jan 2018 16:28) (169/91 - 169/91)  BP(mean): --  RR: 16 (29 Jan 2018 16:28) (16 - 16)  SpO2: 95% (29 Jan 2018 16:28) (95% - 95%)  Daily Height in cm: 160.02 (29 Jan 2018 16:28)    Daily   I&O's Detail    I&O's Summary      PHYSICAL EXAM:    GENERAL: NAD,   HEAD:  Atraumatic, No face edema   NECK: Supple, No JVD,   CHEST/LUNG: Clear / EAE .   HEART: Regular rate and rhythm; No rub . No gallop   ABDOMEN: Soft, Nontender, Nondistended; Bowel sounds present  EXTREMITIES:  ++ edema right leg . + tenderness ,. No erythema . ? palpable subcutaneous plaques         LABS:    Pending                       RADIOLOGY & ADDITIONAL TESTS:

## 2018-01-29 NOTE — ED PROVIDER NOTE - MEDICAL DECISION MAKING DETAILS
dialysis pt with leg swelling most likely due to fluid overload. dialysis pt with leg swelling most likely due to fluid overload.  SEEN BY DR STEVE AKHTAR TO ADMIT

## 2018-01-29 NOTE — ED PROVIDER NOTE - CARE PLAN
Principal Discharge DX:	Hypervolemia, unspecified hypervolemia type  Secondary Diagnosis:	Hypertensive emergency  Secondary Diagnosis:	Non compliance w medication regimen

## 2018-01-29 NOTE — ED ADULT NURSE NOTE - CHPI ED SYMPTOMS NEG
no fever/no vomiting/no dizziness/no chills/no nausea/no tingling/no numbness/no decreased eating/drinking

## 2018-01-29 NOTE — CONSULT NOTE ADULT - ASSESSMENT
ESRD - Await ER labs , otherwise will set up HD for tomorrow     HTN - resume home meds     Anemia - Await ER Hgb , Epogen with HD     right leg swelling   outpatient doppler , No DVT   Will check CT leg   ? calciphylaxis / Cellulitis   Add vanco and Zosyn ----> assess response   vascular  surgery evaluation in am ( Dr Greg Munguia is her Vascular )

## 2018-01-29 NOTE — ED PROVIDER NOTE - OBJECTIVE STATEMENT
39 y/o F dialysis pt with hx of multiple medical illnesses including renal failure, HTN, diabetes, sleep apnea, and morbid obesity presents to ED c/o right leg pain x 1 month. Pt saw doctor for leg pain and was sent for US which came back negative. Pt states her leg still hurts and PMD told her it might be cellulitis. She is due for dialysis tonight.

## 2018-01-29 NOTE — ED ADULT NURSE NOTE - OBJECTIVE STATEMENT
Patient arrived to the ED from home, patient states that for the last few weeks she has been having pain and swelling to her right lower leg. Patient states that she saw her renal MD and was send for an outpatient US. Patient states that she was told there was no blood clot. She has pitting edema noted to her right calf and her calf is hard to the touch. Patient denies any fevers or chills. She denies any injury to her leg. NAD noted.

## 2018-01-29 NOTE — ED ADULT TRIAGE NOTE - CHIEF COMPLAINT QUOTE
pt presents to ED RLE pain/swelling x few days. denies SOB. denies recent injury/trauma to area, pt had negative sonogram to LE. pt on 2L02 via NC. denies SOB,. pt dialysis pt.

## 2018-01-30 DIAGNOSIS — E87.5 HYPERKALEMIA: ICD-10-CM

## 2018-01-30 DIAGNOSIS — I16.0 HYPERTENSIVE URGENCY: ICD-10-CM

## 2018-01-30 DIAGNOSIS — N18.6 END STAGE RENAL DISEASE: ICD-10-CM

## 2018-01-30 DIAGNOSIS — E11.8 TYPE 2 DIABETES MELLITUS WITH UNSPECIFIED COMPLICATIONS: ICD-10-CM

## 2018-01-30 DIAGNOSIS — L03.115 CELLULITIS OF RIGHT LOWER LIMB: ICD-10-CM

## 2018-01-30 LAB
ANION GAP SERPL CALC-SCNC: 21 MMOL/L — HIGH (ref 5–17)
BUN SERPL-MCNC: 71 MG/DL — HIGH (ref 8–20)
CALCIUM SERPL-MCNC: 9.6 MG/DL — SIGNIFICANT CHANGE UP (ref 8.6–10.2)
CHLORIDE SERPL-SCNC: 92 MMOL/L — LOW (ref 98–107)
CO2 SERPL-SCNC: 24 MMOL/L — SIGNIFICANT CHANGE UP (ref 22–29)
CREAT SERPL-MCNC: 8.76 MG/DL — HIGH (ref 0.5–1.3)
GLUCOSE BLDC GLUCOMTR-MCNC: 111 MG/DL — HIGH (ref 70–99)
GLUCOSE BLDC GLUCOMTR-MCNC: 120 MG/DL — HIGH (ref 70–99)
GLUCOSE BLDC GLUCOMTR-MCNC: 133 MG/DL — HIGH (ref 70–99)
GLUCOSE BLDC GLUCOMTR-MCNC: 169 MG/DL — HIGH (ref 70–99)
GLUCOSE SERPL-MCNC: 104 MG/DL — SIGNIFICANT CHANGE UP (ref 70–115)
HBV SURFACE AG SER-ACNC: SIGNIFICANT CHANGE UP
HCT VFR BLD CALC: 23.5 % — LOW (ref 37–47)
HGB BLD-MCNC: 7.3 G/DL — LOW (ref 12–16)
MCHC RBC-ENTMCNC: 25.5 PG — LOW (ref 27–31)
MCHC RBC-ENTMCNC: 31.1 G/DL — LOW (ref 32–36)
MCV RBC AUTO: 82.2 FL — SIGNIFICANT CHANGE UP (ref 81–99)
PLATELET # BLD AUTO: 265 K/UL — SIGNIFICANT CHANGE UP (ref 150–400)
POTASSIUM SERPL-MCNC: 4.7 MMOL/L — SIGNIFICANT CHANGE UP (ref 3.5–5.3)
POTASSIUM SERPL-SCNC: 4.7 MMOL/L — SIGNIFICANT CHANGE UP (ref 3.5–5.3)
RBC # BLD: 2.86 M/UL — LOW (ref 4.4–5.2)
RBC # FLD: 19.2 % — HIGH (ref 11–15.6)
SODIUM SERPL-SCNC: 137 MMOL/L — SIGNIFICANT CHANGE UP (ref 135–145)
WBC # BLD: 8.4 K/UL — SIGNIFICANT CHANGE UP (ref 4.8–10.8)
WBC # FLD AUTO: 8.4 K/UL — SIGNIFICANT CHANGE UP (ref 4.8–10.8)

## 2018-01-30 PROCEDURE — 93306 TTE W/DOPPLER COMPLETE: CPT | Mod: 26

## 2018-01-30 RX ORDER — VANCOMYCIN HCL 1 G
750 VIAL (EA) INTRAVENOUS
Qty: 0 | Refills: 0 | Status: DISCONTINUED | OUTPATIENT
Start: 2018-01-30 | End: 2018-01-30

## 2018-01-30 RX ORDER — MORPHINE SULFATE 50 MG/1
2 CAPSULE, EXTENDED RELEASE ORAL ONCE
Qty: 0 | Refills: 0 | Status: DISCONTINUED | OUTPATIENT
Start: 2018-01-30 | End: 2018-01-30

## 2018-01-30 RX ORDER — OXYCODONE AND ACETAMINOPHEN 5; 325 MG/1; MG/1
2 TABLET ORAL EVERY 6 HOURS
Qty: 0 | Refills: 0 | Status: DISCONTINUED | OUTPATIENT
Start: 2018-01-30 | End: 2018-02-06

## 2018-01-30 RX ORDER — PANTOPRAZOLE SODIUM 20 MG/1
40 TABLET, DELAYED RELEASE ORAL
Qty: 0 | Refills: 0 | Status: DISCONTINUED | OUTPATIENT
Start: 2018-01-30 | End: 2018-02-16

## 2018-01-30 RX ORDER — NITROGLYCERIN 6.5 MG
0.5 CAPSULE, EXTENDED RELEASE ORAL
Qty: 0 | Refills: 0 | Status: DISCONTINUED | OUTPATIENT
Start: 2018-01-30 | End: 2018-02-16

## 2018-01-30 RX ORDER — ERYTHROPOIETIN 10000 [IU]/ML
10000 INJECTION, SOLUTION INTRAVENOUS; SUBCUTANEOUS
Qty: 0 | Refills: 0 | Status: DISCONTINUED | OUTPATIENT
Start: 2018-01-30 | End: 2018-02-06

## 2018-01-30 RX ORDER — SEVELAMER CARBONATE 2400 MG/1
800 POWDER, FOR SUSPENSION ORAL
Qty: 0 | Refills: 0 | Status: DISCONTINUED | OUTPATIENT
Start: 2018-01-30 | End: 2018-02-01

## 2018-01-30 RX ORDER — ASPIRIN/CALCIUM CARB/MAGNESIUM 324 MG
81 TABLET ORAL DAILY
Qty: 0 | Refills: 0 | Status: DISCONTINUED | OUTPATIENT
Start: 2018-01-30 | End: 2018-02-16

## 2018-01-30 RX ORDER — FOLIC ACID 0.8 MG
1 TABLET ORAL DAILY
Qty: 0 | Refills: 0 | Status: DISCONTINUED | OUTPATIENT
Start: 2018-01-30 | End: 2018-02-16

## 2018-01-30 RX ORDER — ACETAMINOPHEN 500 MG
650 TABLET ORAL EVERY 6 HOURS
Qty: 0 | Refills: 0 | Status: DISCONTINUED | OUTPATIENT
Start: 2018-01-30 | End: 2018-02-16

## 2018-01-30 RX ORDER — PIPERACILLIN AND TAZOBACTAM 4; .5 G/20ML; G/20ML
INJECTION, POWDER, LYOPHILIZED, FOR SOLUTION INTRAVENOUS
Qty: 0 | Refills: 0 | Status: DISCONTINUED | OUTPATIENT
Start: 2018-01-30 | End: 2018-01-30

## 2018-01-30 RX ORDER — GLUCAGON INJECTION, SOLUTION 0.5 MG/.1ML
1 INJECTION, SOLUTION SUBCUTANEOUS ONCE
Qty: 0 | Refills: 0 | Status: DISCONTINUED | OUTPATIENT
Start: 2018-01-30 | End: 2018-02-16

## 2018-01-30 RX ORDER — ERGOCALCIFEROL 1.25 MG/1
50000 CAPSULE ORAL
Qty: 0 | Refills: 0 | Status: DISCONTINUED | OUTPATIENT
Start: 2018-01-30 | End: 2018-02-16

## 2018-01-30 RX ORDER — DEXTROSE 50 % IN WATER 50 %
1 SYRINGE (ML) INTRAVENOUS ONCE
Qty: 0 | Refills: 0 | Status: DISCONTINUED | OUTPATIENT
Start: 2018-01-30 | End: 2018-02-16

## 2018-01-30 RX ORDER — SODIUM CHLORIDE 9 MG/ML
1000 INJECTION, SOLUTION INTRAVENOUS
Qty: 0 | Refills: 0 | Status: DISCONTINUED | OUTPATIENT
Start: 2018-01-30 | End: 2018-02-16

## 2018-01-30 RX ORDER — ENOXAPARIN SODIUM 100 MG/ML
30 INJECTION SUBCUTANEOUS DAILY
Qty: 0 | Refills: 0 | Status: DISCONTINUED | OUTPATIENT
Start: 2018-01-30 | End: 2018-02-16

## 2018-01-30 RX ORDER — VALSARTAN 80 MG/1
160 TABLET ORAL DAILY
Qty: 0 | Refills: 0 | Status: DISCONTINUED | OUTPATIENT
Start: 2018-01-30 | End: 2018-02-03

## 2018-01-30 RX ORDER — DEXTROSE 50 % IN WATER 50 %
25 SYRINGE (ML) INTRAVENOUS ONCE
Qty: 0 | Refills: 0 | Status: DISCONTINUED | OUTPATIENT
Start: 2018-01-30 | End: 2018-02-16

## 2018-01-30 RX ORDER — LABETALOL HCL 100 MG
1 TABLET ORAL
Qty: 0 | Refills: 0 | COMMUNITY

## 2018-01-30 RX ORDER — CEFEPIME 1 G/1
1000 INJECTION, POWDER, FOR SOLUTION INTRAMUSCULAR; INTRAVENOUS
Qty: 0 | Refills: 0 | Status: DISCONTINUED | OUTPATIENT
Start: 2018-01-31 | End: 2018-01-31

## 2018-01-30 RX ORDER — LORATADINE 10 MG/1
10 TABLET ORAL DAILY
Qty: 0 | Refills: 0 | Status: DISCONTINUED | OUTPATIENT
Start: 2018-01-30 | End: 2018-02-16

## 2018-01-30 RX ORDER — AMLODIPINE BESYLATE 2.5 MG/1
1 TABLET ORAL
Qty: 0 | Refills: 0 | COMMUNITY

## 2018-01-30 RX ORDER — LACTOBACILLUS ACIDOPHILUS 100MM CELL
1 CAPSULE ORAL DAILY
Qty: 0 | Refills: 0 | Status: DISCONTINUED | OUTPATIENT
Start: 2018-01-30 | End: 2018-02-03

## 2018-01-30 RX ORDER — NIFEDIPINE 30 MG
30 TABLET, EXTENDED RELEASE 24 HR ORAL DAILY
Qty: 0 | Refills: 0 | Status: DISCONTINUED | OUTPATIENT
Start: 2018-01-30 | End: 2018-02-07

## 2018-01-30 RX ORDER — LABETALOL HCL 100 MG
200 TABLET ORAL THREE TIMES A DAY
Qty: 0 | Refills: 0 | Status: DISCONTINUED | OUTPATIENT
Start: 2018-01-30 | End: 2018-02-03

## 2018-01-30 RX ORDER — VANCOMYCIN HCL 1 G
750 VIAL (EA) INTRAVENOUS ONCE
Qty: 0 | Refills: 0 | Status: COMPLETED | OUTPATIENT
Start: 2018-01-30 | End: 2018-01-30

## 2018-01-30 RX ORDER — INSULIN LISPRO 100/ML
VIAL (ML) SUBCUTANEOUS
Qty: 0 | Refills: 0 | Status: DISCONTINUED | OUTPATIENT
Start: 2018-01-30 | End: 2018-02-16

## 2018-01-30 RX ORDER — ALBUTEROL 90 UG/1
3 AEROSOL, METERED ORAL
Qty: 0 | Refills: 0 | COMMUNITY

## 2018-01-30 RX ORDER — DEXTROSE 50 % IN WATER 50 %
12.5 SYRINGE (ML) INTRAVENOUS ONCE
Qty: 0 | Refills: 0 | Status: DISCONTINUED | OUTPATIENT
Start: 2018-01-30 | End: 2018-02-16

## 2018-01-30 RX ADMIN — Medication 200 MILLIGRAM(S): at 05:38

## 2018-01-30 RX ADMIN — Medication 30 MILLIGRAM(S): at 05:38

## 2018-01-30 RX ADMIN — PANTOPRAZOLE SODIUM 40 MILLIGRAM(S): 20 TABLET, DELAYED RELEASE ORAL at 06:29

## 2018-01-30 RX ADMIN — ENOXAPARIN SODIUM 30 MILLIGRAM(S): 100 INJECTION SUBCUTANEOUS at 12:33

## 2018-01-30 RX ADMIN — MORPHINE SULFATE 2 MILLIGRAM(S): 50 CAPSULE, EXTENDED RELEASE ORAL at 04:22

## 2018-01-30 RX ADMIN — MORPHINE SULFATE 2 MILLIGRAM(S): 50 CAPSULE, EXTENDED RELEASE ORAL at 02:54

## 2018-01-30 RX ADMIN — OXYCODONE AND ACETAMINOPHEN 2 TABLET(S): 5; 325 TABLET ORAL at 20:22

## 2018-01-30 RX ADMIN — Medication 200 MILLIGRAM(S): at 12:29

## 2018-01-30 RX ADMIN — PIPERACILLIN AND TAZOBACTAM 25 GRAM(S): 4; .5 INJECTION, POWDER, LYOPHILIZED, FOR SOLUTION INTRAVENOUS at 06:27

## 2018-01-30 RX ADMIN — Medication 1 MILLIGRAM(S): at 12:29

## 2018-01-30 RX ADMIN — Medication 1 TABLET(S): at 12:34

## 2018-01-30 RX ADMIN — Medication 0.5 INCH(S): at 03:55

## 2018-01-30 RX ADMIN — ERGOCALCIFEROL 50000 UNIT(S): 1.25 CAPSULE ORAL at 15:32

## 2018-01-30 RX ADMIN — LORATADINE 10 MILLIGRAM(S): 10 TABLET ORAL at 15:32

## 2018-01-30 RX ADMIN — ERYTHROPOIETIN 10000 UNIT(S): 10000 INJECTION, SOLUTION INTRAVENOUS; SUBCUTANEOUS at 10:39

## 2018-01-30 RX ADMIN — Medication 150 MILLIGRAM(S): at 17:48

## 2018-01-30 RX ADMIN — Medication 200 MILLIGRAM(S): at 21:48

## 2018-01-30 RX ADMIN — Medication 0.5 INCH(S): at 15:23

## 2018-01-30 RX ADMIN — Medication 81 MILLIGRAM(S): at 12:32

## 2018-01-30 RX ADMIN — VALSARTAN 160 MILLIGRAM(S): 80 TABLET ORAL at 05:37

## 2018-01-30 RX ADMIN — Medication 30 MILLIGRAM(S): at 00:13

## 2018-01-30 RX ADMIN — OXYCODONE AND ACETAMINOPHEN 2 TABLET(S): 5; 325 TABLET ORAL at 15:31

## 2018-01-30 NOTE — H&P ADULT - MS EXT PE MLT D E PC
no clubbing/right calf swelling, induration and tenderness. Enlarged, freely mobile non-tender right  inguinal lymph nodes/no cyanosis

## 2018-01-30 NOTE — PROGRESS NOTE ADULT - ASSESSMENT
ESRD - [place] HD easily  Swelling LEs - s/p Leg doppler; results pending  Cellulitis RLE - ID, Dr. Shields called  Anemia  - Add Iron studies, check stool OB; Procrit

## 2018-01-30 NOTE — H&P ADULT - PMH
Class 3 obesity due to excess calories with serious comorbidity in adult, unspecified BMI    Clostridium difficile diarrhea    Diabetes    End stage renal disease    Hypertension    DALTON (obstructive sleep apnea)    Pneumonia    Vitreous hemorrhage of left eye

## 2018-01-30 NOTE — H&P ADULT - HISTORY OF PRESENT ILLNESS
39 y/o female with ESRD on HD, DM II, HTN, came to the ER because of pain and swelling in the right calf area. no fever. occasional chills. swelling started about 1 month ago and gradually increased in size although patient stopped amlodipine to avoid increasing peripheral edema. Patient cannot take Clonidine for left foot pain developes with clonidine intake. outpatient lower ext doppler excluded DVT. S. potassium: 5.4.

## 2018-01-30 NOTE — PROGRESS NOTE ADULT - SUBJECTIVE AND OBJECTIVE BOX
Patient: SORAYA JIMENEZ [id number] 38y Female                 Internal Medicine Hospitalist Progress Note - Dr. Mane Hilton  Chief Complaint: Patient is a 38y old  Female who presents with a chief complaint of mother states pt was" to have  elective Lt eye surgery, during anesthesia pt became lo b/p" (2018 04:57)    HPI:  37 y/o female with ESRD on HD, DM II, HTN, came to the ER because of pain and swelling in the right calf area, worsening.  Outpatient LE doppler showed no DVT.  Serum K 5.4.    Started on abx for LE cellulitis.  Seen by nephrology.    This am, reports significant improvement in LE edema and pain.  Able to ambulate.  Denies fever / chills.  Seen at HD.  No SOB / CP /Palp.  No additional complaints.     ____________________PHYSICAL EXAM:  Vitals reviewed as indicated below  GENERAL:  NAD Alert and Oriented x 3   HEENT: NCAT  CARDIOVASCULAR:  S1, S2  LUNGS: CTAB  ABDOMEN:  soft, (-) tenderness, (-) distension, (+) bowel sounds, (-) guarding, (-) rebound (-) rigidity  EXTREMITIES:  no cyanosis / clubbing + RLE edema and warmth.  No erythema.    ____________________      BACKGROUND:  HEALTH ISSUES - PROBLEM Dx:  Type 2 diabetes mellitus with complication, with long-term current use of insulin: Type 2 diabetes mellitus with complication, with long-term current use of insulin  ESRD on hemodialysis: ESRD on hemodialysis  Hyperkalemia: Hyperkalemia  Hypertensive urgency: Hypertensive urgency  Cellulitis of right lower extremity: Cellulitis of right lower extremity        Allergies    Mushrooms (Hives (Mild))  No Known Drug Allergies    Intolerances      PAST MEDICAL & SURGICAL HISTORY:  Clostridium difficile diarrhea  Vitreous hemorrhage of left eye  Class 3 obesity due to excess calories with serious comorbidity in adult, unspecified BMI  DALTON (obstructive sleep apnea)  Pneumonia  End stage renal disease  Hypertension  Diabetes  Elective surgery: Left eye retina surgery  AVF (arteriovenous fistula)  Vascular dialysis catheter in place        VITALS:  Vital Signs Last 24 Hrs  T(C): 37.1 (2018 07:40), Max: 37.3 (2018 20:35)  T(F): 98.7 (2018 07:40), Max: 99.1 (2018 20:35)  HR: 74 (2018 07:40) (74 - 96)  BP: 159/83 (2018 07:40) (159/83 - 239/111)  BP(mean): --  RR: 18 (2018 07:40) (16 - 20)  SpO2: 100% (2018 07:40) (95% - 100%) Daily Height in cm: 160.02 (2018 16:28)    Daily Weight in k.5 (2018 07:40)  CAPILLARY BLOOD GLUCOSE      POCT Blood Glucose.: 120 mg/dL (2018 08:13)    I&O's Summary        LABS:                        7.6    8.6   )-----------( 269      ( 2018 21:02 )             24.6     01-30    137  |  92<L>  |  71.0<H>  ----------------------------<  104  4.7   |  24.0  |  8.76<H>    Ca    9.6      2018 09:18      PT/INR - ( 2018 21:02 )   PT: 16.8 sec;   INR: 1.51 ratio         PTT - ( 2018 21:02 )  PTT:30.1 sec              MEDICATIONS:  MEDICATIONS  (STANDING):  aspirin enteric coated 81 milliGRAM(s) Oral daily  dextrose 5%. 1000 milliLiter(s) (50 mL/Hr) IV Continuous <Continuous>  dextrose 50% Injectable 12.5 Gram(s) IV Push once  dextrose 50% Injectable 25 Gram(s) IV Push once  dextrose 50% Injectable 25 Gram(s) IV Push once  enoxaparin Injectable 30 milliGRAM(s) SubCutaneous daily  epoetin raina Injectable 25148 Unit(s) IV Push <User Schedule>  ergocalciferol 26659 Unit(s) Oral every week  folic acid 1 milliGRAM(s) Oral daily  insulin lispro (HumaLOG) corrective regimen sliding scale   SubCutaneous three times a day before meals  labetalol 200 milliGRAM(s) Oral three times a day  lactobacillus acidophilus 1 Tablet(s) Oral daily  loratadine 10 milliGRAM(s) Oral daily  NIFEdipine XL 30 milliGRAM(s) Oral daily  pantoprazole    Tablet 40 milliGRAM(s) Oral before breakfast  sevelamer hydrochloride 800 milliGRAM(s) Oral three times a day with meals  valsartan 160 milliGRAM(s) Oral daily    MEDICATIONS  (PRN):  artificial  tears Solution 1 Drop(s) Both EYES every 4 hours PRN Dry Eyes  dextrose Gel 1 Dose(s) Oral once PRN Blood Glucose LESS THAN 70 milliGRAM(s)/deciliter  glucagon  Injectable 1 milliGRAM(s) IntraMuscular once PRN Glucose LESS THAN 70 milligrams/deciliter  nitroglycerin    2% Ointment 0.5 Inch(s) Transdermal two times a day PRN SBP>180

## 2018-01-30 NOTE — PROGRESS NOTE ADULT - ASSESSMENT
39 y/o female with right leg cellulitis, Hyperkalemia, ESRD on HD, hypertensive urgency, DM II. 39 y/o female with right leg cellulitis, Hyperkalemia, ESRD on HD, hypertensive urgency, DM II.    > Anemia of chronic disease - on HD.  Monitor CBC.  Check Fe profile.  see below.

## 2018-01-30 NOTE — PROGRESS NOTE ADULT - SUBJECTIVE AND OBJECTIVE BOX
Patient was seen and evaluated on dialysis.   No c/o CP SOB NV  no F/C  Has pain/ swelling LE alissa RLE  has freq itch and scratches often    T(C): 36.4 (01-30-18 @ 11:10), Max: 37.3 (01-29-18 @ 20:35)  HR: 85 (01-30-18 @ 11:10) (74 - 96)  BP: 160/78 (01-30-18 @ 11:10) (159/83 - 239/111)  Wt(kg): --  PE ;  NAD  lungs - CTA  CV gr 1 murmer,  No gallop or rub  Abd : soft, NT BS +, No masses  Ext- + edema; R leg lower 1/2 tender, dark, firm   Neuro : Grossly intact, moving extremities                             7.3    8.4   )-----------( 265      ( 30 Jan 2018 12:16 )             23.5        01-30    137  |  92<L>  |  71.0<H>  ----------------------------<  104  4.7   |  24.0  |  8.76<H>    Ca    9.6      30 Jan 2018 09:18        MEDICATIONS  (STANDING):  artificial  tears Solution PRN  aspirin enteric coated  dextrose 5%.  dextrose 50% Injectable  dextrose 50% Injectable  dextrose 50% Injectable  dextrose Gel PRN  enoxaparin Injectable  epoetin raina Injectable  ergocalciferol  folic acid  glucagon  Injectable PRN  insulin lispro (HumaLOG) corrective regimen sliding scale  labetalol  lactobacillus acidophilus  loratadine  NIFEdipine XL  nitroglycerin    2% Ointment PRN  pantoprazole    Tablet  sevelamer hydrochloride  valsartan  vancomycin  IVPB      Patient stable  Mike HD easily  Continue

## 2018-01-31 LAB
FOLATE SERPL-MCNC: 7 NG/ML — SIGNIFICANT CHANGE UP (ref 4–16)
GLUCOSE BLDC GLUCOMTR-MCNC: 113 MG/DL — HIGH (ref 70–99)
GLUCOSE BLDC GLUCOMTR-MCNC: 128 MG/DL — HIGH (ref 70–99)
GLUCOSE BLDC GLUCOMTR-MCNC: 133 MG/DL — HIGH (ref 70–99)
GLUCOSE BLDC GLUCOMTR-MCNC: 164 MG/DL — HIGH (ref 70–99)

## 2018-01-31 PROCEDURE — 73701 CT LOWER EXTREMITY W/DYE: CPT | Mod: 26,RT

## 2018-01-31 PROCEDURE — 93971 EXTREMITY STUDY: CPT | Mod: 26,RT

## 2018-01-31 PROCEDURE — 99223 1ST HOSP IP/OBS HIGH 75: CPT

## 2018-01-31 PROCEDURE — 99233 SBSQ HOSP IP/OBS HIGH 50: CPT

## 2018-01-31 RX ADMIN — Medication 1 MILLIGRAM(S): at 13:34

## 2018-01-31 RX ADMIN — OXYCODONE AND ACETAMINOPHEN 2 TABLET(S): 5; 325 TABLET ORAL at 20:58

## 2018-01-31 RX ADMIN — PANTOPRAZOLE SODIUM 40 MILLIGRAM(S): 20 TABLET, DELAYED RELEASE ORAL at 05:34

## 2018-01-31 RX ADMIN — VALSARTAN 160 MILLIGRAM(S): 80 TABLET ORAL at 05:34

## 2018-01-31 RX ADMIN — Medication 81 MILLIGRAM(S): at 13:34

## 2018-01-31 RX ADMIN — LORATADINE 10 MILLIGRAM(S): 10 TABLET ORAL at 13:35

## 2018-01-31 RX ADMIN — Medication 1 TABLET(S): at 13:34

## 2018-01-31 RX ADMIN — OXYCODONE AND ACETAMINOPHEN 2 TABLET(S): 5; 325 TABLET ORAL at 22:00

## 2018-01-31 RX ADMIN — Medication 30 MILLIGRAM(S): at 05:34

## 2018-01-31 RX ADMIN — Medication 200 MILLIGRAM(S): at 13:34

## 2018-01-31 RX ADMIN — ENOXAPARIN SODIUM 30 MILLIGRAM(S): 100 INJECTION SUBCUTANEOUS at 13:35

## 2018-01-31 NOTE — PROGRESS NOTE ADULT - ASSESSMENT
ESRD - HD after CT of leg , She had full HD yesterday    Anemia - CBC in am   Continue  Epogen with HD   Iron stores in am   Transfuse as needed for Hgb < 7     HTN - Better on meds   preserved EF on recent ECHO    R leg pain and swelling   UF with HD   Cefepime / Vanco  Vanco level in am   ? Collection , ? Calciphylaxis ---> Awaits CT of leg   BHCG negative   Check CPK    RO - Renagel   Phos in am

## 2018-01-31 NOTE — CONSULT NOTE ADULT - SUBJECTIVE AND OBJECTIVE BOX
[facility] [place] OLLIE GOLDMAN MD FACP   CELSO CELIS MD  Diplomates American Board of Internal Medicine and Infecctious Diseases  [phone]  [phone] YESICA [id number]      HPI:  39 y/o female with ESRD on HD, DM II, HTN, came to the ER because of pain and swelling in the right calf area. no fever. occasional chills. swelling started about 1 month ago and gradually increased in size although patient stopped amlodipine to avoid increasing peripheral edema. Patient cannot take Clonidine for left foot pain developes with clonidine intake. outpatient lower ext doppler excluded DVT. S. potassium: 5.4.   PT DENIES FEVERS OR CHILLS  CT SCAN OF LEG NO obvious infection   REPORTED OUTPT DUPLEX NEG  ASKED TO EVALUATE FROM ID STANDPOINT     PAST MEDICAL & SURGICAL HISTORY:  Clostridium difficile diarrhea  Vitreous hemorrhage of left eye  Class 3 obesity due to excess calories with serious comorbidity in adult, unspecified BMI  DALTON (obstructive sleep apnea)  Pneumonia  End stage renal disease  Hypertension  Diabetes  Elective surgery: Left eye retina surgery  AVF (arteriovenous fistula)  Vascular dialysis catheter in place      ANTIBIOTICS  cefepime  IVPB 1000 milliGRAM(s) IV Intermittent <User Schedule>      Allergies    Mushrooms (Hives (Mild))  No Known Drug Allergies    Intolerances        SOCIAL HISTORY:    FAMILY HISTORY:  No pertinent family history in first degree relatives      Vital Signs Last 24 Hrs  T(C): 36.9 (31 Jan 2018 07:37), Max: 36.9 (31 Jan 2018 07:37)  T(F): 98.5 (31 Jan 2018 07:37), Max: 98.5 (31 Jan 2018 07:37)  HR: 77 (31 Jan 2018 07:37) (77 - 82)  BP: 145/81 (31 Jan 2018 07:37) (132/80 - 145/81)  BP(mean): --  RR: 18 (31 Jan 2018 07:37) (18 - 18)  SpO2: 98% (31 Jan 2018 07:37) (98% - 98%)  Drug Dosing Weight  Height (cm): 160.02 (29 Jan 2018 16:28)  Weight (kg): 117.9 (29 Jan 2018 16:28)  BMI (kg/m2): 46 (29 Jan 2018 16:28)  BSA (m2): 2.16 (29 Jan 2018 16:28)      REVIEW OF SYSTEMS:    CONSTITUTIONAL:  As per HPI.    HEENT:  Eyes:  No diplopia or blurred vision. ENT:  No earache, sore throat or runny nose.    CARDIOVASCULAR:  No pressure, squeezing, strangling, tightness, heaviness or aching about the chest, neck, axilla or epigastrium.    RESPIRATORY:  No cough, shortness of breath, PND or orthopnea.    GASTROINTESTINAL:  No nausea, vomiting or diarrhea.    GENITOURINARY:  No dysuria, frequency or urgency.    MUSCULOSKELETAL:  As per HPI.    SKIN:  No change in skin, hair or nails.    NEUROLOGIC:  No paresthesias, fasciculations, seizures or weakness.                  PHYSICAL EXAMINATION:    GENERAL: The patient is a well-developed, well-nourished _____in no apparent distress. ___ is alert and oriented x3.    VITAL SIGNS: T(C): 36.9 (01-31-18 @ 07:37), Max: 36.9 (01-31-18 @ 07:37)  HR: 77 (01-31-18 @ 07:37) (77 - 82)  BP: 145/81 (01-31-18 @ 07:37) (132/80 - 145/81)  RR: 18 (01-31-18 @ 07:37) (18 - 18)  SpO2: 98% (01-31-18 @ 07:37) (98% - 98%)  Wt(kg): --    HEENT: Head is normocephalic and atraumatic.  ANICTERIC  NECK: Supple. No carotid bruits.  No lymphadenopathy or thyromegaly.    LUNGS:COARSE BREATH SOUNDS    HEART: Regular rate and rhythm without murmur.    ABDOMEN: Soft, nontender, and nondistended.  Positive bowel sounds.  No hepatosplenomegaly was noted. NO REBOUND NO GUARDING    EXTREMITIES: NO EDEMA NO ERYTHEMA    NEUROLOGIC: NON FOCAL      SKIN: No ulceration or induration present. NO RASH        BLOOD CULTURES       URINE CX          LABS:                        7.3    8.4   )-----------( 265      ( 30 Jan 2018 12:16 )             23.5     01-30    137  |  92<L>  |  71.0<H>  ----------------------------<  104  4.7   |  24.0  |  8.76<H>    Ca    9.6      30 Jan 2018 09:18      PT/INR - ( 29 Jan 2018 21:02 )   PT: 16.8 sec;   INR: 1.51 ratio         PTT - ( 29 Jan 2018 21:02 )  PTT:30.1 sec      RADIOLOGY & ADDITIONAL STUDIES:      ASSESSMENT/PLAN  39 y/o female with ESRD on HD, DM II, HTN, came to the ER because of pain and swelling in the right calf area. no fever. occasional chills. swelling started about 1 month ago and gradually increased in size although patient stopped amlodipine to avoid increasing peripheral edema.    .    PT DENIES FEVERS OR CHILLS  CT SCAN OF LEG NO obvious infection   REPORTED OUTPT DUPLEX NEG   UNCLEAR IF TRUE INFECTION IF REPEAT DUPLEX NEG  CONSIDER CT PELVIS R/O MASS  BLOOD CX X2  ORDERED WILL FOLLOW UP WITH FURTHER RECOMMENDATIONS  WILL DEFER ABX FOR NOW  D/W HOSPITALIST                CELSO MEYER MD [facility] [place] OLLIE GOLDMAN MD FACP   CELSO CELIS MD  Diplomates American Board of Internal Medicine and Infecctious Diseases  [phone]  [phone] YESICA [id number]      HPI:  37 y/o female with ESRD on HD, DM II, HTN, came to the ER because of pain and swelling in the right calf area. no fever. occasional chills. swelling started about 1 month ago and gradually increased in size although patient stopped amlodipine to avoid increasing peripheral edema. Patient cannot take Clonidine for left foot pain developes with clonidine intake. outpatient lower ext doppler excluded DVT. S. potassium: 5.4.   PT DENIES FEVERS OR CHILLS  CT SCAN OF LEG NO obvious infection   REPORTED OUTPT DUPLEX NEG  ASKED TO EVALUATE FROM ID STANDPOINT     PAST MEDICAL & SURGICAL HISTORY:  Clostridium difficile diarrhea  Vitreous hemorrhage of left eye  Class 3 obesity due to excess calories with serious comorbidity in adult, unspecified BMI  DALTON (obstructive sleep apnea)  Pneumonia  End stage renal disease  Hypertension  Diabetes  Elective surgery: Left eye retina surgery  AVF (arteriovenous fistula)  Vascular dialysis catheter in place      ANTIBIOTICS  cefepime  IVPB 1000 milliGRAM(s) IV Intermittent <User Schedule>      Allergies    Mushrooms (Hives (Mild))  No Known Drug Allergies    Intolerances        SOCIAL HISTORY:    FAMILY HISTORY:  No pertinent family history in first degree relatives      Vital Signs Last 24 Hrs  T(C): 36.9 (31 Jan 2018 07:37), Max: 36.9 (31 Jan 2018 07:37)  T(F): 98.5 (31 Jan 2018 07:37), Max: 98.5 (31 Jan 2018 07:37)  HR: 77 (31 Jan 2018 07:37) (77 - 82)  BP: 145/81 (31 Jan 2018 07:37) (132/80 - 145/81)  BP(mean): --  RR: 18 (31 Jan 2018 07:37) (18 - 18)  SpO2: 98% (31 Jan 2018 07:37) (98% - 98%)  Drug Dosing Weight  Height (cm): 160.02 (29 Jan 2018 16:28)  Weight (kg): 117.9 (29 Jan 2018 16:28)  BMI (kg/m2): 46 (29 Jan 2018 16:28)  BSA (m2): 2.16 (29 Jan 2018 16:28)      REVIEW OF SYSTEMS:    CONSTITUTIONAL:  As per HPI.    HEENT:  Eyes:  No diplopia or blurred vision. ENT:  No earache, sore throat or runny nose.    CARDIOVASCULAR:  No pressure, squeezing, strangling, tightness, heaviness or aching about the chest, neck, axilla or epigastrium.    RESPIRATORY:  No cough, shortness of breath, PND or orthopnea.    GASTROINTESTINAL:  No nausea, vomiting or diarrhea.    GENITOURINARY:  No dysuria, frequency or urgency.    MUSCULOSKELETAL:  As per HPI.    SKIN:  No change in skin, hair or nails.    NEUROLOGIC:  No paresthesias, fasciculations, seizures or weakness.                  PHYSICAL EXAMINATION:    GENERAL: The patient is a well-developed, well-nourished _____in no apparent distress. ___ is alert and oriented x3.    VITAL SIGNS: T(C): 36.9 (01-31-18 @ 07:37), Max: 36.9 (01-31-18 @ 07:37)  HR: 77 (01-31-18 @ 07:37) (77 - 82)  BP: 145/81 (01-31-18 @ 07:37) (132/80 - 145/81)  RR: 18 (01-31-18 @ 07:37) (18 - 18)  SpO2: 98% (01-31-18 @ 07:37) (98% - 98%)  Wt(kg): --    HEENT: Head is normocephalic and atraumatic.  ANICTERIC  NECK: Supple. No carotid bruits.  No lymphadenopathy or thyromegaly.    LUNGS:COARSE BREATH SOUNDS    HEART: Regular rate and rhythm without murmur.    ABDOMEN: Soft, nontender, and nondistended.  Positive bowel sounds.  No hepatosplenomegaly was noted. NO REBOUND NO GUARDING    EXTREMITIES: right calf edema no warmth mild tenderness no cord no ln in groin palpated    NEUROLOGIC: NON FOCAL      SKIN: No ulceration or induration present. NO RASH        BLOOD CULTURES       URINE CX          LABS:                        7.3    8.4   )-----------( 265      ( 30 Jan 2018 12:16 )             23.5     01-30    137  |  92<L>  |  71.0<H>  ----------------------------<  104  4.7   |  24.0  |  8.76<H>    Ca    9.6      30 Jan 2018 09:18      PT/INR - ( 29 Jan 2018 21:02 )   PT: 16.8 sec;   INR: 1.51 ratio         PTT - ( 29 Jan 2018 21:02 )  PTT:30.1 sec      RADIOLOGY & ADDITIONAL STUDIES:      ASSESSMENT/PLAN  37 y/o female with ESRD on HD, DM II, HTN, came to the ER because of pain and swelling in the right calf area. no fever. occasional chills. swelling started about 1 month ago and gradually increased in size although patient stopped amlodipine to avoid increasing peripheral edema.    .    PT DENIES FEVERS OR CHILLS  CT SCAN OF LEG NO obvious infection   REPORTED OUTPT DUPLEX NEG   UNCLEAR IF TRUE INFECTION IF REPEAT DUPLEX NEG  CONSIDER CT PELVIS R/O MASS  BLOOD CX X2  ORDERED WILL FOLLOW UP WITH FURTHER RECOMMENDATIONS  WILL DEFER ABX FOR NOW  D/W HOSPITALIST                CELSO MEYER MD

## 2018-01-31 NOTE — PROGRESS NOTE ADULT - SUBJECTIVE AND OBJECTIVE BOX
Patient: SORAYA JIMENEZ [id number] 38y Female                 Internal Medicine Hospitalist Progress Note - Dr. Mane Hilton  Chief Complaint: Patient is a 38y old  Female who presents with a chief complaint of mother states pt was" to have  elective Lt eye surgery, during anesthesia pt became lo b/p" (30 Jan 2018 04:57)    HPI:  37 y/o female with ESRD on HD, DM II, HTN, came to the ER because of pain and swelling in the right calf area, worsening.  Outpatient LE doppler showed no DVT.  Serum K 5.4.    Started on abx for LE cellulitis.  Seen by nephrology.  Pt reported some initial improvement in LE edema and pain.  CT LE showed no abscess collection.      Today, pt c/o persistent LE edema and mild pain.  Ambulatory.  No fever / chills.  No weakness / numbness.  No recent trauma / immobility.    ____________________PHYSICAL EXAM:  Vitals reviewed as indicated below  GENERAL:  NAD Alert and Oriented x 3   HEENT: NCAT  CARDIOVASCULAR:  S1, S2  LUNGS: CTAB  ABDOMEN:  soft, (-) tenderness, (-) distension, (+) bowel sounds, (-) guarding, (-) rebound (-) rigidity  EXTREMITIES:  no cyanosis / clubbing + RLE edema and warmth.  No erythema.    ____________________    VITALS:  Vital Signs Last 24 Hrs  T(C): 36.9 (31 Jan 2018 07:37), Max: 37.5 (30 Jan 2018 15:08)  T(F): 98.5 (31 Jan 2018 07:37), Max: 99.5 (30 Jan 2018 15:08)  HR: 77 (31 Jan 2018 07:37) (77 - 82)  BP: 145/81 (31 Jan 2018 07:37) (132/80 - 147/85)  BP(mean): --  RR: 18 (31 Jan 2018 07:37) (18 - 18)  SpO2: 98% (31 Jan 2018 07:37) (95% - 98%) Daily     Daily   CAPILLARY BLOOD GLUCOSE      POCT Blood Glucose.: 133 mg/dL (31 Jan 2018 13:32)  POCT Blood Glucose.: 113 mg/dL (31 Jan 2018 08:17)  POCT Blood Glucose.: 169 mg/dL (30 Jan 2018 22:04)  POCT Blood Glucose.: 133 mg/dL (30 Jan 2018 17:46)    I&O's Summary    30 Jan 2018 07:01  -  31 Jan 2018 07:00  --------------------------------------------------------  IN: 0 mL / OUT: 3000 mL / NET: -3000 mL        LABS:                        7.3    8.4   )-----------( 265      ( 30 Jan 2018 12:16 )             23.5     01-30    137  |  92<L>  |  71.0<H>  ----------------------------<  104  4.7   |  24.0  |  8.76<H>    Ca    9.6      30 Jan 2018 09:18      PT/INR - ( 29 Jan 2018 21:02 )   PT: 16.8 sec;   INR: 1.51 ratio         PTT - ( 29 Jan 2018 21:02 )  PTT:30.1 sec            MEDICATIONS:  acetaminophen   Tablet 650 milliGRAM(s) Oral every 6 hours PRN  artificial  tears Solution 1 Drop(s) Both EYES every 4 hours PRN  aspirin enteric coated 81 milliGRAM(s) Oral daily  cefepime  IVPB 1000 milliGRAM(s) IV Intermittent <User Schedule>  dextrose 5%. 1000 milliLiter(s) IV Continuous <Continuous>  dextrose 50% Injectable 12.5 Gram(s) IV Push once  dextrose 50% Injectable 25 Gram(s) IV Push once  dextrose 50% Injectable 25 Gram(s) IV Push once  dextrose Gel 1 Dose(s) Oral once PRN  enoxaparin Injectable 30 milliGRAM(s) SubCutaneous daily  epoetin raina Injectable 71376 Unit(s) IV Push <User Schedule>  ergocalciferol 72501 Unit(s) Oral every week  folic acid 1 milliGRAM(s) Oral daily  glucagon  Injectable 1 milliGRAM(s) IntraMuscular once PRN  insulin lispro (HumaLOG) corrective regimen sliding scale   SubCutaneous three times a day before meals  labetalol 200 milliGRAM(s) Oral three times a day  lactobacillus acidophilus 1 Tablet(s) Oral daily  loratadine 10 milliGRAM(s) Oral daily  NIFEdipine XL 30 milliGRAM(s) Oral daily  nitroglycerin    2% Ointment 0.5 Inch(s) Transdermal two times a day PRN  oxyCODONE    5 mG/acetaminophen 325 mG 2 Tablet(s) Oral every 6 hours PRN  pantoprazole    Tablet 40 milliGRAM(s) Oral before breakfast  sevelamer hydrochloride 800 milliGRAM(s) Oral three times a day with meals  valsartan 160 milliGRAM(s) Oral daily

## 2018-01-31 NOTE — PROGRESS NOTE ADULT - ASSESSMENT
39 y/o female with right leg cellulitis, Hyperkalemia, ESRD on HD, hypertensive urgency, DM II.    > Anemia of chronic disease - on HD.  Monitor CBC.  Check Fe profile.  see below.

## 2018-01-31 NOTE — PROGRESS NOTE ADULT - SUBJECTIVE AND OBJECTIVE BOX
NEPHROLOGY INTERVAL HPI/OVERNIGHT EVENTS:    Feels better  Still with pain and edema of right leg   CT of right leg ordered 1-29 , still not done  Abx = Cefepime and vanco   BP under better control     MEDICATIONS  (STANDING):  aspirin enteric coated 81 milliGRAM(s) Oral daily  cefepime  IVPB 1000 milliGRAM(s) IV Intermittent <User Schedule>  dextrose 5%. 1000 milliLiter(s) (50 mL/Hr) IV Continuous <Continuous>  dextrose 50% Injectable 12.5 Gram(s) IV Push once  dextrose 50% Injectable 25 Gram(s) IV Push once  dextrose 50% Injectable 25 Gram(s) IV Push once  enoxaparin Injectable 30 milliGRAM(s) SubCutaneous daily  epoetin raina Injectable 83735 Unit(s) IV Push <User Schedule>  ergocalciferol 60688 Unit(s) Oral every week  folic acid 1 milliGRAM(s) Oral daily  insulin lispro (HumaLOG) corrective regimen sliding scale   SubCutaneous three times a day before meals  labetalol 200 milliGRAM(s) Oral three times a day  lactobacillus acidophilus 1 Tablet(s) Oral daily  loratadine 10 milliGRAM(s) Oral daily  NIFEdipine XL 30 milliGRAM(s) Oral daily  pantoprazole    Tablet 40 milliGRAM(s) Oral before breakfast  sevelamer hydrochloride 800 milliGRAM(s) Oral three times a day with meals  valsartan 160 milliGRAM(s) Oral daily    MEDICATIONS  (PRN):  acetaminophen   Tablet 650 milliGRAM(s) Oral every 6 hours PRN mild - moderate pain  artificial  tears Solution 1 Drop(s) Both EYES every 4 hours PRN Dry Eyes  dextrose Gel 1 Dose(s) Oral once PRN Blood Glucose LESS THAN 70 milliGRAM(s)/deciliter  glucagon  Injectable 1 milliGRAM(s) IntraMuscular once PRN Glucose LESS THAN 70 milligrams/deciliter  nitroglycerin    2% Ointment 0.5 Inch(s) Transdermal two times a day PRN SBP>180  oxyCODONE    5 mG/acetaminophen 325 mG 2 Tablet(s) Oral every 6 hours PRN Severe Pain (7 - 10)      Allergies    Mushrooms (Hives (Mild))  No Known Drug Allergies    Intolerances      Vital Signs Last 24 Hrs  T(C): 36.9 (31 Jan 2018 07:37), Max: 37.5 (30 Jan 2018 15:08)  T(F): 98.5 (31 Jan 2018 07:37), Max: 99.5 (30 Jan 2018 15:08)  HR: 77 (31 Jan 2018 07:37) (77 - 82)  BP: 145/81 (31 Jan 2018 07:37) (132/80 - 147/85)  BP(mean): --  RR: 18 (31 Jan 2018 07:37) (18 - 18)  SpO2: 98% (31 Jan 2018 07:37) (95% - 98%)  Daily     Daily   I&O's Detail    30 Jan 2018 07:01  -  31 Jan 2018 07:00  --------------------------------------------------------  IN:  Total IN: 0 mL    OUT:    Other: 3000 mL  Total OUT: 3000 mL    Total NET: -3000 mL        I&O's Summary    30 Jan 2018 07:01  -  31 Jan 2018 07:00  --------------------------------------------------------  IN: 0 mL / OUT: 3000 mL / NET: -3000 mL        PHYSICAL EXAM:  HEAD:  Atraumatic, No face edema   NECK: Supple, No JVD,   CHEST/LUNG: Clear / EAE .   HEART: Regular rate and rhythm; No rub . No gallop   ABDOMEN: Soft, Nontender, Nondistended; Bowel sounds present  EXTREMITIES:  ++ edema right leg . + tenderness ,. No erythema . ? palpable subcutaneous plaques     LABS:                        7.3    8.4   )-----------( 265      ( 30 Jan 2018 12:16 )             23.5     01-30    137  |  92<L>  |  71.0<H>  ----------------------------<  104  4.7   |  24.0  |  8.76<H>    Ca    9.6      30 Jan 2018 09:18      PT/INR - ( 29 Jan 2018 21:02 )   PT: 16.8 sec;   INR: 1.51 ratio         PTT - ( 29 Jan 2018 21:02 )  PTT:30.1 sec            RADIOLOGY & ADDITIONAL TESTS:

## 2018-02-01 LAB
ANION GAP SERPL CALC-SCNC: 17 MMOL/L — SIGNIFICANT CHANGE UP (ref 5–17)
BUN SERPL-MCNC: 63 MG/DL — HIGH (ref 8–20)
CALCIUM SERPL-MCNC: 9 MG/DL — SIGNIFICANT CHANGE UP (ref 8.6–10.2)
CHLORIDE SERPL-SCNC: 91 MMOL/L — LOW (ref 98–107)
CK SERPL-CCNC: 118 U/L — SIGNIFICANT CHANGE UP (ref 25–170)
CO2 SERPL-SCNC: 23 MMOL/L — SIGNIFICANT CHANGE UP (ref 22–29)
CREAT SERPL-MCNC: 7.44 MG/DL — HIGH (ref 0.5–1.3)
FERRITIN SERPL-MCNC: 703.9 NG/ML — HIGH (ref 11–306)
GLUCOSE BLDC GLUCOMTR-MCNC: 156 MG/DL — HIGH (ref 70–99)
GLUCOSE BLDC GLUCOMTR-MCNC: 90 MG/DL — SIGNIFICANT CHANGE UP (ref 70–99)
GLUCOSE BLDC GLUCOMTR-MCNC: 94 MG/DL — SIGNIFICANT CHANGE UP (ref 70–99)
GLUCOSE SERPL-MCNC: 79 MG/DL — SIGNIFICANT CHANGE UP (ref 70–115)
HAPTOGLOB SERPL-MCNC: 169 MG/DL — SIGNIFICANT CHANGE UP (ref 34–200)
HCT VFR BLD CALC: 23 % — LOW (ref 37–47)
HGB BLD-MCNC: 7.3 G/DL — LOW (ref 12–16)
IRON SATN MFR SERPL: 15 % — SIGNIFICANT CHANGE UP (ref 14–50)
IRON SATN MFR SERPL: 39 UG/DL — SIGNIFICANT CHANGE UP (ref 37–145)
LDH SERPL L TO P-CCNC: 226 U/L — HIGH (ref 98–192)
MCHC RBC-ENTMCNC: 26.4 PG — LOW (ref 27–31)
MCHC RBC-ENTMCNC: 31.7 G/DL — LOW (ref 32–36)
MCV RBC AUTO: 83 FL — SIGNIFICANT CHANGE UP (ref 81–99)
PHOSPHATE SERPL-MCNC: 6.5 MG/DL — HIGH (ref 2.4–4.7)
PLATELET # BLD AUTO: 270 K/UL — SIGNIFICANT CHANGE UP (ref 150–400)
POTASSIUM SERPL-MCNC: 4.6 MMOL/L — SIGNIFICANT CHANGE UP (ref 3.5–5.3)
POTASSIUM SERPL-SCNC: 4.6 MMOL/L — SIGNIFICANT CHANGE UP (ref 3.5–5.3)
RBC # BLD: 2.77 M/UL — LOW (ref 4.4–5.2)
RBC # FLD: 19.1 % — HIGH (ref 11–15.6)
SODIUM SERPL-SCNC: 131 MMOL/L — LOW (ref 135–145)
TIBC SERPL-MCNC: 262 UG/DL — SIGNIFICANT CHANGE UP (ref 220–430)
TRANSFERRIN SERPL-MCNC: 183 MG/DL — LOW (ref 192–382)
VANCOMYCIN TROUGH SERPL-MCNC: 11.5 UG/ML — SIGNIFICANT CHANGE UP (ref 10–20)
WBC # BLD: 8 K/UL — SIGNIFICANT CHANGE UP (ref 4.8–10.8)
WBC # FLD AUTO: 8 K/UL — SIGNIFICANT CHANGE UP (ref 4.8–10.8)

## 2018-02-01 PROCEDURE — 99233 SBSQ HOSP IP/OBS HIGH 50: CPT

## 2018-02-01 RX ORDER — SEVELAMER CARBONATE 2400 MG/1
1600 POWDER, FOR SUSPENSION ORAL
Qty: 0 | Refills: 0 | Status: DISCONTINUED | OUTPATIENT
Start: 2018-02-01 | End: 2018-02-01

## 2018-02-01 RX ORDER — CALCIUM ACETATE 667 MG
1334 TABLET ORAL
Qty: 0 | Refills: 0 | Status: DISCONTINUED | OUTPATIENT
Start: 2018-02-01 | End: 2018-02-16

## 2018-02-01 RX ADMIN — LORATADINE 10 MILLIGRAM(S): 10 TABLET ORAL at 17:59

## 2018-02-01 RX ADMIN — VALSARTAN 160 MILLIGRAM(S): 80 TABLET ORAL at 05:34

## 2018-02-01 RX ADMIN — ENOXAPARIN SODIUM 30 MILLIGRAM(S): 100 INJECTION SUBCUTANEOUS at 17:59

## 2018-02-01 RX ADMIN — Medication 1 TABLET(S): at 17:59

## 2018-02-01 RX ADMIN — Medication 1: at 18:00

## 2018-02-01 RX ADMIN — Medication 81 MILLIGRAM(S): at 17:59

## 2018-02-01 RX ADMIN — ERYTHROPOIETIN 10000 UNIT(S): 10000 INJECTION, SOLUTION INTRAVENOUS; SUBCUTANEOUS at 09:44

## 2018-02-01 RX ADMIN — Medication 1 MILLIGRAM(S): at 17:59

## 2018-02-01 RX ADMIN — Medication 1334 MILLIGRAM(S): at 17:58

## 2018-02-01 RX ADMIN — PANTOPRAZOLE SODIUM 40 MILLIGRAM(S): 20 TABLET, DELAYED RELEASE ORAL at 05:35

## 2018-02-01 RX ADMIN — Medication 30 MILLIGRAM(S): at 05:34

## 2018-02-01 NOTE — DIETITIAN INITIAL EVALUATION ADULT. - DIET TYPE
no concentrated potassium/consistent carbohydrate (evening snack)/DASH/TLC (sodium and cholesterol restricted diet)

## 2018-02-01 NOTE — PROGRESS NOTE ADULT - SUBJECTIVE AND OBJECTIVE BOX
Patient: SORAYA JIMENZE [id number] 38y Female                 Internal Medicine Hospitalist Progress Note - Dr. Mane Hilton  Chief Complaint: Patient is a 38y old  Female who presents with a chief complaint of mother states pt was" to have  elective Lt eye surgery, during anesthesia pt became lo b/p" (2018 04:57)    HPI:  37 y/o female with ESRD on HD, DM II, HTN, came to the ER because of pain and swelling in the right calf area, worsening.  Outpatient LE doppler showed no DVT.  Serum K 5.4.  Started on abx for LE cellulitis.  Seen by nephrology.  Pt reported some initial improvement in LE edema and pain on antibiotics, however, symptoms persisted.  ID was consulted.  CT LE showed no abscess collection.  Inpatient LE doppler negative for DVT.      Today, pt c/o persistent LE edema and mild pain.  Seen with Nephrology at bedside.  Ambulatory.  No fever / chills.  No weakness / numbness.  No recent trauma / immobility.    ____________________PHYSICAL EXAM:  Vitals reviewed as indicated below  GENERAL:  NAD Alert and Oriented x 3   HEENT: NCAT  CARDIOVASCULAR:  S1, S2  LUNGS: CTAB  ABDOMEN:  soft, (-) tenderness, (-) distension, (+) bowel sounds, (-) guarding, (-) rebound (-) rigidity  EXTREMITIES:  no cyanosis / clubbing + RLE edema and warmth.  No erythema.    ____________________    VITALS:  Vital Signs Last 24 Hrs  T(C): 36.5 (2018 07:10), Max: 36.6 (2018 17:37)  T(F): 97.7 (2018 07:10), Max: 97.9 (2018 17:37)  HR: 74 (2018 07:10) (74 - 88)  BP: 151/82 (2018 07:10) (120/73 - 151/82)  BP(mean): --  RR: 18 (2018 07:10) (18 - 18)  SpO2: 100% (2018 07:10) (95% - 100%) Daily     Daily Weight in k (2018 07:10)  CAPILLARY BLOOD GLUCOSE      POCT Blood Glucose.: 90 mg/dL (2018 07:54)  POCT Blood Glucose.: 164 mg/dL (2018 21:00)  POCT Blood Glucose.: 128 mg/dL (2018 17:21)  POCT Blood Glucose.: 133 mg/dL (2018 13:32)    I&O's Summary      LABS:                        7.3    8.0   )-----------( 270      ( 2018 07:58 )             23.0     02-01    131<L>  |  91<L>  |  63.0<H>  ----------------------------<  79  4.6   |  23.0  |  7.44<H>    Ca    9.0      2018 07:58  Phos  6.5     02-01            CARDIAC MARKERS ( 2018 07:58 )  x     / x     / 118 U/L / x     / x            MEDICATIONS:  acetaminophen   Tablet 650 milliGRAM(s) Oral every 6 hours PRN  artificial  tears Solution 1 Drop(s) Both EYES every 4 hours PRN  aspirin enteric coated 81 milliGRAM(s) Oral daily  dextrose 5%. 1000 milliLiter(s) IV Continuous <Continuous>  dextrose 50% Injectable 12.5 Gram(s) IV Push once  dextrose 50% Injectable 25 Gram(s) IV Push once  dextrose 50% Injectable 25 Gram(s) IV Push once  dextrose Gel 1 Dose(s) Oral once PRN  enoxaparin Injectable 30 milliGRAM(s) SubCutaneous daily  epoetin raina Injectable 09129 Unit(s) IV Push <User Schedule>  ergocalciferol 21269 Unit(s) Oral every week  folic acid 1 milliGRAM(s) Oral daily  glucagon  Injectable 1 milliGRAM(s) IntraMuscular once PRN  insulin lispro (HumaLOG) corrective regimen sliding scale   SubCutaneous three times a day before meals  labetalol 200 milliGRAM(s) Oral three times a day  lactobacillus acidophilus 1 Tablet(s) Oral daily  loratadine 10 milliGRAM(s) Oral daily  NIFEdipine XL 30 milliGRAM(s) Oral daily  nitroglycerin    2% Ointment 0.5 Inch(s) Transdermal two times a day PRN  oxyCODONE    5 mG/acetaminophen 325 mG 2 Tablet(s) Oral every 6 hours PRN  pantoprazole    Tablet 40 milliGRAM(s) Oral before breakfast  sevelamer hydrochloride 800 milliGRAM(s) Oral three times a day with meals  valsartan 160 milliGRAM(s) Oral daily

## 2018-02-01 NOTE — DIETITIAN INITIAL EVALUATION ADULT. - OTHER INFO
Pt seen at bedside with family present. Pt stated good appetite, no wt loss reported. Pt approached with nutrition education, Pt states dietitian at HD has discussed information with her previously. Pt states that family cooks for her and is non-compliant with her recommended diet, she is unable to stand on her feet for too long to make her own meals. Pt was provided written material about the potassium content of foods. 2+ RLE edema noted.

## 2018-02-01 NOTE — PROGRESS NOTE ADULT - ASSESSMENT
ESRD - HD Today     Anemia -  Continue  Epogen with HD   Transfuse as needed for Hgb < 7     HTN - Better on meds   preserved EF on recent ECHO    R leg pain and swelling   doppler w/o DVT  UF with HD   S/P Cefepime / Vanco  ID stopped Abx   For CT pelvis to look for locally compressive lesion   CT leg no collection       RO - Increase Renagel to 2 tid   Follow Phos ESRD - HD Today     Anemia -  Continue  Epogen with HD   Transfuse as needed for Hgb < 7     HTN - Better on meds   preserved EF on recent ECHO    R leg pain and swelling   doppler w/o DVT  UF with HD   S/P Cefepime / Vanco  ID stopped Abx   For CT pelvis to look for locally compressive lesion   CT leg no collection       RO - Change Renagel to Phoslo  Follow Phos

## 2018-02-01 NOTE — DIETITIAN INITIAL EVALUATION ADULT. - PERTINENT LABORATORY DATA
02-01 Na131 mmol/L<L> Glu 79 mg/dL K+ 4.6 mmol/L Cr  7.44 mg/dL<H> BUN 63.0 mg/dL<H> Phos 6.5 mg/dL<H> Alb n/a   PAB n/a

## 2018-02-01 NOTE — PROGRESS NOTE ADULT - SUBJECTIVE AND OBJECTIVE BOX
NEPHROLOGY INTERVAL HPI/OVERNIGHT EVENTS:    Feels better   No new complaints on HD today   Leg feels better   CT with out muscle infiltration      ID consult noted , Abx stopped     MEDICATIONS  (STANDING):  aspirin enteric coated 81 milliGRAM(s) Oral daily  dextrose 5%. 1000 milliLiter(s) (50 mL/Hr) IV Continuous <Continuous>  dextrose 50% Injectable 12.5 Gram(s) IV Push once  dextrose 50% Injectable 25 Gram(s) IV Push once  dextrose 50% Injectable 25 Gram(s) IV Push once  enoxaparin Injectable 30 milliGRAM(s) SubCutaneous daily  epoetin raina Injectable 33283 Unit(s) IV Push <User Schedule>  ergocalciferol 39542 Unit(s) Oral every week  folic acid 1 milliGRAM(s) Oral daily  insulin lispro (HumaLOG) corrective regimen sliding scale   SubCutaneous three times a day before meals  labetalol 200 milliGRAM(s) Oral three times a day  lactobacillus acidophilus 1 Tablet(s) Oral daily  loratadine 10 milliGRAM(s) Oral daily  NIFEdipine XL 30 milliGRAM(s) Oral daily  pantoprazole    Tablet 40 milliGRAM(s) Oral before breakfast  sevelamer hydrochloride 1600 milliGRAM(s) Oral three times a day with meals  valsartan 160 milliGRAM(s) Oral daily    MEDICATIONS  (PRN):  acetaminophen   Tablet 650 milliGRAM(s) Oral every 6 hours PRN mild - moderate pain  artificial  tears Solution 1 Drop(s) Both EYES every 4 hours PRN Dry Eyes  dextrose Gel 1 Dose(s) Oral once PRN Blood Glucose LESS THAN 70 milliGRAM(s)/deciliter  glucagon  Injectable 1 milliGRAM(s) IntraMuscular once PRN Glucose LESS THAN 70 milligrams/deciliter  nitroglycerin    2% Ointment 0.5 Inch(s) Transdermal two times a day PRN SBP>180  oxyCODONE    5 mG/acetaminophen 325 mG 2 Tablet(s) Oral every 6 hours PRN Severe Pain (7 - 10)      Allergies    Mushrooms (Hives (Mild))  No Known Drug Allergies    Intolerances      Vital Signs Last 24 Hrs  T(C): 36.8 (2018 11:10), Max: 36.8 (2018 11:10)  T(F): 98.2 (2018 11:10), Max: 98.2 (2018 11:10)  HR: 84 (2018 11:10) (74 - 88)  BP: 160/83 (2018 11:10) (120/73 - 160/83)  BP(mean): --  RR: 18 (2018 11:10) (18 - 18)  SpO2: 100% (2018 11:10) (95% - 100%)  Daily     Daily Weight in k (2018 11:10)  I&O's Detail    2018 07:01  -  2018 11:45  --------------------------------------------------------  IN:  Total IN: 0 mL    OUT:    Other: 3000 mL  Total OUT: 3000 mL    Total NET: -3000 mL        I&O's Summary    2018 07:01  -  2018 11:45  --------------------------------------------------------  IN: 0 mL / OUT: 3000 mL / NET: -3000 mL        PHYSICAL EXAM:  HEAD:  Atraumatic, No face edema   NECK: Supple, No JVD,   CHEST/LUNG: Clear / EAE .   HEART: Regular rate and rhythm; No rub . No gallop   ABDOMEN: Soft, Nontender, Nondistended; Bowel sounds present  EXTREMITIES:  ++ edema right leg . + tenderness ,. No erythema . ? palpable subcutaneous plaques       LABS:                        7.3    8.0   )-----------( 270      ( 2018 07:58 )             23.0     02-    131<L>  |  91<L>  |  63.0<H>  ----------------------------<  79  4.6   |  23.0  |  7.44<H>    Ca    9.0      2018 07:58  Phos  6.5     02          Phosphorus Level, Serum: 6.5 mg/dL ( @ 07:58)          RADIOLOGY & ADDITIONAL TESTS:

## 2018-02-01 NOTE — DIETITIAN INITIAL EVALUATION ADULT. - 20 CAL FROM
Next appt  1/25/18   Last appt 12/21/18    Refill request for  Novolog flexpen 100u/ml    Refilled per standing med protocol.   [id number]

## 2018-02-01 NOTE — PROGRESS NOTE ADULT - ASSESSMENT
39 y/o female with right leg cellulitis, Hyperkalemia, ESRD on HD, hypertensive urgency, DM II.    > Anemia of chronic disease - on HD.  Monitor CBC.  Check Fe profile.

## 2018-02-02 LAB
GLUCOSE BLDC GLUCOMTR-MCNC: 100 MG/DL — HIGH (ref 70–99)
GLUCOSE BLDC GLUCOMTR-MCNC: 108 MG/DL — HIGH (ref 70–99)
GLUCOSE BLDC GLUCOMTR-MCNC: 111 MG/DL — HIGH (ref 70–99)

## 2018-02-02 PROCEDURE — 99232 SBSQ HOSP IP/OBS MODERATE 35: CPT

## 2018-02-02 PROCEDURE — 99233 SBSQ HOSP IP/OBS HIGH 50: CPT

## 2018-02-02 PROCEDURE — 74178 CT ABD&PLV WO CNTR FLWD CNTR: CPT | Mod: 26

## 2018-02-02 RX ORDER — SENNA PLUS 8.6 MG/1
1 TABLET ORAL AT BEDTIME
Qty: 0 | Refills: 0 | Status: COMPLETED | OUTPATIENT
Start: 2018-02-02 | End: 2018-02-05

## 2018-02-02 RX ORDER — DOCUSATE SODIUM 100 MG
100 CAPSULE ORAL THREE TIMES A DAY
Qty: 0 | Refills: 0 | Status: DISCONTINUED | OUTPATIENT
Start: 2018-02-02 | End: 2018-02-16

## 2018-02-02 RX ORDER — GABAPENTIN 400 MG/1
100 CAPSULE ORAL THREE TIMES A DAY
Qty: 0 | Refills: 0 | Status: DISCONTINUED | OUTPATIENT
Start: 2018-02-02 | End: 2018-02-06

## 2018-02-02 RX ADMIN — Medication 1 TABLET(S): at 12:44

## 2018-02-02 RX ADMIN — Medication 30 MILLIGRAM(S): at 06:40

## 2018-02-02 RX ADMIN — ENOXAPARIN SODIUM 30 MILLIGRAM(S): 100 INJECTION SUBCUTANEOUS at 12:45

## 2018-02-02 RX ADMIN — OXYCODONE AND ACETAMINOPHEN 2 TABLET(S): 5; 325 TABLET ORAL at 10:55

## 2018-02-02 RX ADMIN — GABAPENTIN 100 MILLIGRAM(S): 400 CAPSULE ORAL at 22:48

## 2018-02-02 RX ADMIN — OXYCODONE AND ACETAMINOPHEN 2 TABLET(S): 5; 325 TABLET ORAL at 11:45

## 2018-02-02 RX ADMIN — VALSARTAN 160 MILLIGRAM(S): 80 TABLET ORAL at 06:40

## 2018-02-02 RX ADMIN — Medication 1 MILLIGRAM(S): at 12:44

## 2018-02-02 RX ADMIN — GABAPENTIN 100 MILLIGRAM(S): 400 CAPSULE ORAL at 14:22

## 2018-02-02 RX ADMIN — LORATADINE 10 MILLIGRAM(S): 10 TABLET ORAL at 12:44

## 2018-02-02 RX ADMIN — PANTOPRAZOLE SODIUM 40 MILLIGRAM(S): 20 TABLET, DELAYED RELEASE ORAL at 06:40

## 2018-02-02 RX ADMIN — Medication 1334 MILLIGRAM(S): at 12:44

## 2018-02-02 RX ADMIN — Medication 1334 MILLIGRAM(S): at 08:56

## 2018-02-02 RX ADMIN — Medication 81 MILLIGRAM(S): at 12:44

## 2018-02-02 NOTE — PROGRESS NOTE ADULT - PROBLEM SELECTOR PLAN 3
Diet control.  HD qT,Th,Sa

## 2018-02-02 NOTE — PROGRESS NOTE ADULT - PROBLEM SELECTOR PROBLEM 5
Type 2 diabetes mellitus with complication, with long-term current use of insulin

## 2018-02-02 NOTE — PROGRESS NOTE ADULT - SUBJECTIVE AND OBJECTIVE BOX
NEPHROLOGY INTERVAL HPI/OVERNIGHT EVENTS:    Examined earlier  R leg edema    Examined   MEDICATIONS  (STANDING):  aspirin enteric coated 81 milliGRAM(s) Oral daily  calcium acetate 1334 milliGRAM(s) Oral three times a day with meals  dextrose 5%. 1000 milliLiter(s) (50 mL/Hr) IV Continuous <Continuous>  dextrose 50% Injectable 12.5 Gram(s) IV Push once  dextrose 50% Injectable 25 Gram(s) IV Push once  dextrose 50% Injectable 25 Gram(s) IV Push once  enoxaparin Injectable 30 milliGRAM(s) SubCutaneous daily  epoetin raina Injectable 25624 Unit(s) IV Push <User Schedule>  ergocalciferol 07770 Unit(s) Oral every week  folic acid 1 milliGRAM(s) Oral daily  gabapentin 100 milliGRAM(s) Oral three times a day  insulin lispro (HumaLOG) corrective regimen sliding scale   SubCutaneous three times a day before meals  labetalol 200 milliGRAM(s) Oral three times a day  lactobacillus acidophilus 1 Tablet(s) Oral daily  loratadine 10 milliGRAM(s) Oral daily  NIFEdipine XL 30 milliGRAM(s) Oral daily  pantoprazole    Tablet 40 milliGRAM(s) Oral before breakfast  valsartan 160 milliGRAM(s) Oral daily    MEDICATIONS  (PRN):  acetaminophen   Tablet 650 milliGRAM(s) Oral every 6 hours PRN mild - moderate pain  artificial  tears Solution 1 Drop(s) Both EYES every 4 hours PRN Dry Eyes  dextrose Gel 1 Dose(s) Oral once PRN Blood Glucose LESS THAN 70 milliGRAM(s)/deciliter  glucagon  Injectable 1 milliGRAM(s) IntraMuscular once PRN Glucose LESS THAN 70 milligrams/deciliter  nitroglycerin    2% Ointment 0.5 Inch(s) Transdermal two times a day PRN SBP>180  oxyCODONE    5 mG/acetaminophen 325 mG 2 Tablet(s) Oral every 6 hours PRN Severe Pain (7 - 10)      Allergies    Mushrooms (Hives (Mild))  No Known Drug Allergies    Intolerances        Vital Signs Last 24 Hrs  T(C): 36.6 (2018 07:18), Max: 36.9 (2018 15:59)  T(F): 97.9 (2018 07:18), Max: 98.4 (2018 15:59)  HR: 91 (2018 12:12) (83 - 93)  BP: 158/84 (2018 12:12) (149/85 - 179/95)  BP(mean): --  RR: 20 (2018 07:18) (16 - 20)  SpO2: 99% (2018 07:18) (97% - 99%)  Daily     Daily Weight in k.4 (2018 15:21)    PHYSICAL EXAM:  HEAD:  Atraumatic, No face edema   NECK: Supple, No JVD,   CHEST/LUNG: Clear / EAE .   HEART: Regular rate and rhythm; No rub . No gallop   ABDOMEN: Soft, Nontender, Nondistended; Bowel sounds present  EXTREMITIES:  ++ edema right leg . + tenderness ,. No erythema . ? palpable subcutaneous plaques     LABS:                        7.3    8.0   )-----------( 270      ( 2018 07:58 )             23.0     02-01    131<L>  |  91<L>  |  63.0<H>  ----------------------------<  79  4.6   |  23.0  |  7.44<H>    Ca    9.0      2018 07:58  Phos  6.5     02-                  RADIOLOGY & ADDITIONAL TESTS:

## 2018-02-02 NOTE — PROGRESS NOTE ADULT - ASSESSMENT
7 y/o female with ESRD on HD, DM II, HTN, came to the ER because of pain and swelling in the right calf area. no fever. occasional chills. swelling started about 1 month ago and gradually increased in size although patient stopped amlodipine to avoid increasing peripheral edema. Patient cannot take Clonidine for left foot pain developes with clonidine intake. outpatient lower ext doppler excluded DVT. S. potassium: 5.4.   PT DENIES FEVERS OR CHILLS  CT SCAN OF LEG NO obvious infection   REPORTED OUTPT DUPLEX NEG  REPEAT DUPLEX NEG  AWAIT CT ABD PELVIS RULE OUT OBSTRUCTION  WILL DEFER ABX

## 2018-02-02 NOTE — PROGRESS NOTE ADULT - PROBLEM SELECTOR PLAN 2
Continue Procardia, ARB.
Continue Procardia, ARB.  Pt refusing Labetalol as she feels this is the cause of her edema.  Off Norvasc.
Continue Labetalol, Procardia, ARB.
Continue Procardia, ARB.  Pt refusing Labetalol as she feels this is the cause of her edema.  Off Norvasc.

## 2018-02-02 NOTE — PROGRESS NOTE ADULT - SUBJECTIVE AND OBJECTIVE BOX
Patient: SORAYA JIMENEZ [id number] 38y Female                 Internal Medicine Hospitalist Progress Note - Dr. Mane Hilton  Chief Complaint: Patient is a 38y old  Female who presents with a chief complaint of mother states pt was" to have  elective Lt eye surgery, during anesthesia pt became lo b/p" (2018 04:57)    HPI:  39 y/o female with ESRD on HD, DM II, HTN, came to the ER because of pain and swelling in the right calf area, worsening.  Outpatient LE doppler showed no DVT.  Serum K 5.4.  Started on abx for LE cellulitis.  Seen by nephrology.  Pt reported some initial improvement in LE edema and pain on antibiotics, however, symptoms persisted.  Denies history of trauma or immobility.  ID was consulted.  CT LE showed no abscess collection.  Inpatient LE doppler negative for DVT.      Pain persists in both LEs.  Able to ambulate.  No fever / chills.  No weakness / numbness.    ____________________PHYSICAL EXAM:  Vitals reviewed as indicated below  GENERAL:  NAD Alert and Oriented x 3   HEENT: NCAT  CARDIOVASCULAR:  S1, S2  LUNGS: CTAB  ABDOMEN:  soft, (-) tenderness, (-) distension, (+) bowel sounds, (-) guarding, (-) rebound (-) rigidity  EXTREMITIES:  no cyanosis / clubbing + RLE edema and warmth.  No erythema.    ____________________    VITALS:  Vital Signs Last 24 Hrs  T(C): 36.6 (2018 07:18), Max: 36.9 (2018 15:59)  T(F): 97.9 (2018 07:18), Max: 98.4 (2018 15:59)  HR: 91 (2018 12:12) (83 - 93)  BP: 158/84 (2018 12:12) (149/85 - 179/95)  BP(mean): --  RR: 20 (2018 07:18) (16 - 20)  SpO2: 99% (2018 07:18) (97% - 100%) Daily     Daily Weight in k.4 (2018 15:21)  CAPILLARY BLOOD GLUCOSE      POCT Blood Glucose.: 111 mg/dL (2018 12:39)  POCT Blood Glucose.: 100 mg/dL (2018 08:47)  POCT Blood Glucose.: 156 mg/dL (2018 17:57)  POCT Blood Glucose.: 94 mg/dL (2018 13:18)    I&O's Summary    2018 07:01  -  2018 07:00  --------------------------------------------------------  IN: 0 mL / OUT: 3000 mL / NET: -3000 mL        LABS:                        7.3    8.0   )-----------( 270      ( 2018 07:58 )             23.0     02-01    131<L>  |  91<L>  |  63.0<H>  ----------------------------<  79  4.6   |  23.0  |  7.44<H>    Ca    9.0      2018 07:58  Phos  6.5     02-01            CARDIAC MARKERS ( 2018 07:58 )  x     / x     / 118 U/L / x     / x            MEDICATIONS:  acetaminophen   Tablet 650 milliGRAM(s) Oral every 6 hours PRN  artificial  tears Solution 1 Drop(s) Both EYES every 4 hours PRN  aspirin enteric coated 81 milliGRAM(s) Oral daily  calcium acetate 1334 milliGRAM(s) Oral three times a day with meals  dextrose 5%. 1000 milliLiter(s) IV Continuous <Continuous>  dextrose 50% Injectable 12.5 Gram(s) IV Push once  dextrose 50% Injectable 25 Gram(s) IV Push once  dextrose 50% Injectable 25 Gram(s) IV Push once  dextrose Gel 1 Dose(s) Oral once PRN  enoxaparin Injectable 30 milliGRAM(s) SubCutaneous daily  epoetin raina Injectable 36358 Unit(s) IV Push <User Schedule>  ergocalciferol 26690 Unit(s) Oral every week  folic acid 1 milliGRAM(s) Oral daily  gabapentin 100 milliGRAM(s) Oral three times a day  glucagon  Injectable 1 milliGRAM(s) IntraMuscular once PRN  insulin lispro (HumaLOG) corrective regimen sliding scale   SubCutaneous three times a day before meals  labetalol 200 milliGRAM(s) Oral three times a day  lactobacillus acidophilus 1 Tablet(s) Oral daily  loratadine 10 milliGRAM(s) Oral daily  NIFEdipine XL 30 milliGRAM(s) Oral daily  nitroglycerin    2% Ointment 0.5 Inch(s) Transdermal two times a day PRN  oxyCODONE    5 mG/acetaminophen 325 mG 2 Tablet(s) Oral every 6 hours PRN  pantoprazole    Tablet 40 milliGRAM(s) Oral before breakfast  valsartan 160 milliGRAM(s) Oral daily

## 2018-02-02 NOTE — PROGRESS NOTE ADULT - PROBLEM SELECTOR PLAN 5
insulin therapy protocol
> Diabetes - monitor fingersticks.  Insulin coverage for hyperglycemia.  FS well controlled.
> Diabetes - monitor fingersticks.  Insulin coverage for hyperglycemia.  FS well controlled.
insulin therapy protocol

## 2018-02-02 NOTE — PROGRESS NOTE ADULT - SUBJECTIVE AND OBJECTIVE BOX
SORAYA JIMENEZ is a 38y Female with HPI:     39 y/o female with ESRD on HD, DM II, HTN, came to the ER because of pain and swelling in the right calf area. no fever. occasional chills. swelling started about 1 month ago and gradually increased in size although patient stopped amlodipine to avoid increasing peripheral edema. Patient cannot take Clonidine for left foot pain developes with clonidine intake. outpatient lower ext doppler excluded DVT. S. potassium: 5.4.   PT DENIES FEVERS OR CHILLS  CT SCAN OF LEG NO obvious infection   REPORTED OUTPT DUPLEX NEG  feels better       Allergies:  Mushrooms (Hives (Mild))  No Known Drug Allergies      Medications:  acetaminophen   Tablet 650 milliGRAM(s) Oral every 6 hours PRN  artificial  tears Solution 1 Drop(s) Both EYES every 4 hours PRN  aspirin enteric coated 81 milliGRAM(s) Oral daily  calcium acetate 1334 milliGRAM(s) Oral three times a day with meals  dextrose 5%. 1000 milliLiter(s) IV Continuous <Continuous>  dextrose 50% Injectable 12.5 Gram(s) IV Push once  dextrose 50% Injectable 25 Gram(s) IV Push once  dextrose 50% Injectable 25 Gram(s) IV Push once  dextrose Gel 1 Dose(s) Oral once PRN  docusate sodium 100 milliGRAM(s) Oral three times a day  enoxaparin Injectable 30 milliGRAM(s) SubCutaneous daily  epoetin raina Injectable 19456 Unit(s) IV Push <User Schedule>  ergocalciferol 82859 Unit(s) Oral every week  folic acid 1 milliGRAM(s) Oral daily  gabapentin 100 milliGRAM(s) Oral three times a day  glucagon  Injectable 1 milliGRAM(s) IntraMuscular once PRN  insulin lispro (HumaLOG) corrective regimen sliding scale   SubCutaneous three times a day before meals  labetalol 200 milliGRAM(s) Oral three times a day  lactobacillus acidophilus 1 Tablet(s) Oral daily  loratadine 10 milliGRAM(s) Oral daily  NIFEdipine XL 30 milliGRAM(s) Oral daily  nitroglycerin    2% Ointment 0.5 Inch(s) Transdermal two times a day PRN  oxyCODONE    5 mG/acetaminophen 325 mG 2 Tablet(s) Oral every 6 hours PRN  pantoprazole    Tablet 40 milliGRAM(s) Oral before breakfast  senna 1 Tablet(s) Oral at bedtime  valsartan 160 milliGRAM(s) Oral daily      ANTIBIOTICS:         Review of Systems: - Negative except as mentioned above     Physical Exam:  ICU Vital Signs Last 24 Hrs  T(C): 36.6 (02 Feb 2018 07:18), Max: 36.9 (01 Feb 2018 15:59)  T(F): 97.9 (02 Feb 2018 07:18), Max: 98.4 (01 Feb 2018 15:59)  HR: 91 (02 Feb 2018 12:12) (83 - 93)  BP: 158/84 (02 Feb 2018 12:12) (149/85 - 179/95)  BP(mean): --  ABP: --  ABP(mean): --  RR: 20 (02 Feb 2018 07:18) (16 - 20)  SpO2: 99% (02 Feb 2018 07:18) (97% - 99%)    GEN: NAD, pleasant  HEENT: normocephalic and atraumatic. EOMI. WILDA...  NECK: Supple. No carotid bruits.  No lymphadenopathy or thyromegaly.  LUNGS: Clear to auscultation.  HEART: Regular rate and rhythm without murmur.  ABDOMEN: Soft, nontender, and nondistended.  Positive bowel sounds.  No hepatosplenomegaly was noted.  NO REBOUND NO GUARDING  EXTREMITIES: DECREASED EDEMA AND TENDERNESS.  NEUROLOGIC: Cranial nerves II through XII are grossly intact.    SKIN: No ulceration or induration present.      Labs:

## 2018-02-02 NOTE — PROGRESS NOTE ADULT - ASSESSMENT
ESRD - [place] on TTS schedule    Anemia -  Continue  Epogen with HD   Transfuse as needed for Hgb < 7     HTN - Better on meds additional UF w HD may help  preserved EF on recent ECHO    R leg pain and swelling - will remove additional UF w HD  doppler w/o DVT  S/P Cefepime / Vanco  ID stopped Abx   For CT pelvis to look for locally compressive lesion   CT leg no collection - noted      RO - Change Renagel to Phoslo  Follow Phos ESRD - HD [place]   out pt schedule is MWF so will HD again this Mon to put back on schedule    Anemia -  Continue  Epogen with HD   Transfuse as needed for Hgb < 7     HTN - Better on meds additional UF w HD may help  preserved EF on recent ECHO    R leg pain and swelling - will remove additional UF w HD  doppler w/o DVT  S/P Cefepime / Vanco  ID stopped Abx   For CT pelvis to look for locally compressive lesion   CT leg no collection - noted      RO - Change Renagel to Phoslo  Follow Phos

## 2018-02-02 NOTE — PROGRESS NOTE ADULT - PROBLEM SELECTOR PLAN 1
Continue IV Abx.  F/u cultures.  Elevated LE.  ID evaluation.  Repeat LE doppler
Edema without erythema, normal WBC, afebrile.  Not c/w cellulitis.  ID consulted and case discussed.  Check CT Pelvis to r/o lymphatic obstruction.
Continue IV Abx.  F/u cultures.  Elevated LE.
Edema without erythema, normal WBC, afebrile.  Not c/w cellulitis.  ID consulted and case discussed.  Check CT Pelvis to r/o lymphatic obstruction.

## 2018-02-02 NOTE — PROGRESS NOTE ADULT - ASSESSMENT
39 y/o female with right leg cellulitis, Hyperkalemia, ESRD on HD, hypertensive urgency, DM II.    > Anemia of chronic disease - on HD.  Monitor CBC, stable.   > Leg pain - trial of Neurontin

## 2018-02-03 LAB
ANION GAP SERPL CALC-SCNC: 21 MMOL/L — HIGH (ref 5–17)
BUN SERPL-MCNC: 52 MG/DL — HIGH (ref 8–20)
CALCIUM SERPL-MCNC: 9 MG/DL — SIGNIFICANT CHANGE UP (ref 8.6–10.2)
CHLORIDE SERPL-SCNC: 84 MMOL/L — LOW (ref 98–107)
CO2 SERPL-SCNC: 22 MMOL/L — SIGNIFICANT CHANGE UP (ref 22–29)
CREAT SERPL-MCNC: 6.58 MG/DL — HIGH (ref 0.5–1.3)
GLUCOSE BLDC GLUCOMTR-MCNC: 122 MG/DL — HIGH (ref 70–99)
GLUCOSE BLDC GLUCOMTR-MCNC: 129 MG/DL — HIGH (ref 70–99)
GLUCOSE BLDC GLUCOMTR-MCNC: 90 MG/DL — SIGNIFICANT CHANGE UP (ref 70–99)
GLUCOSE SERPL-MCNC: 76 MG/DL — SIGNIFICANT CHANGE UP (ref 70–115)
HCT VFR BLD CALC: 24.4 % — LOW (ref 37–47)
HGB BLD-MCNC: 7.9 G/DL — LOW (ref 12–16)
MCHC RBC-ENTMCNC: 26.4 PG — LOW (ref 27–31)
MCHC RBC-ENTMCNC: 32.4 G/DL — SIGNIFICANT CHANGE UP (ref 32–36)
MCV RBC AUTO: 81.6 FL — SIGNIFICANT CHANGE UP (ref 81–99)
PLATELET # BLD AUTO: 278 K/UL — SIGNIFICANT CHANGE UP (ref 150–400)
POTASSIUM SERPL-MCNC: 4.3 MMOL/L — SIGNIFICANT CHANGE UP (ref 3.5–5.3)
POTASSIUM SERPL-SCNC: 4.3 MMOL/L — SIGNIFICANT CHANGE UP (ref 3.5–5.3)
RBC # BLD: 2.99 M/UL — LOW (ref 4.4–5.2)
RBC # FLD: 19.1 % — HIGH (ref 11–15.6)
SODIUM SERPL-SCNC: 127 MMOL/L — LOW (ref 135–145)
WBC # BLD: 8.1 K/UL — SIGNIFICANT CHANGE UP (ref 4.8–10.8)
WBC # FLD AUTO: 8.1 K/UL — SIGNIFICANT CHANGE UP (ref 4.8–10.8)

## 2018-02-03 PROCEDURE — 99232 SBSQ HOSP IP/OBS MODERATE 35: CPT

## 2018-02-03 PROCEDURE — 99233 SBSQ HOSP IP/OBS HIGH 50: CPT

## 2018-02-03 RX ORDER — LABETALOL HCL 100 MG
100 TABLET ORAL ONCE
Qty: 0 | Refills: 0 | Status: COMPLETED | OUTPATIENT
Start: 2018-02-03 | End: 2018-02-03

## 2018-02-03 RX ORDER — VALSARTAN 80 MG/1
320 TABLET ORAL DAILY
Qty: 0 | Refills: 0 | Status: DISCONTINUED | OUTPATIENT
Start: 2018-02-03 | End: 2018-02-16

## 2018-02-03 RX ORDER — HYDRALAZINE HCL 50 MG
10 TABLET ORAL ONCE
Qty: 0 | Refills: 0 | Status: COMPLETED | OUTPATIENT
Start: 2018-02-03 | End: 2018-02-03

## 2018-02-03 RX ORDER — HYDRALAZINE HCL 50 MG
25 TABLET ORAL EVERY 8 HOURS
Qty: 0 | Refills: 0 | Status: DISCONTINUED | OUTPATIENT
Start: 2018-02-03 | End: 2018-02-04

## 2018-02-03 RX ADMIN — Medication 25 MILLIGRAM(S): at 14:53

## 2018-02-03 RX ADMIN — Medication 81 MILLIGRAM(S): at 13:30

## 2018-02-03 RX ADMIN — GABAPENTIN 100 MILLIGRAM(S): 400 CAPSULE ORAL at 06:21

## 2018-02-03 RX ADMIN — GABAPENTIN 100 MILLIGRAM(S): 400 CAPSULE ORAL at 13:30

## 2018-02-03 RX ADMIN — Medication 25 MILLIGRAM(S): at 22:16

## 2018-02-03 RX ADMIN — LORATADINE 10 MILLIGRAM(S): 10 TABLET ORAL at 13:30

## 2018-02-03 RX ADMIN — GABAPENTIN 100 MILLIGRAM(S): 400 CAPSULE ORAL at 22:16

## 2018-02-03 RX ADMIN — Medication 10 MILLIGRAM(S): at 01:30

## 2018-02-03 RX ADMIN — ERYTHROPOIETIN 10000 UNIT(S): 10000 INJECTION, SOLUTION INTRAVENOUS; SUBCUTANEOUS at 10:45

## 2018-02-03 RX ADMIN — OXYCODONE AND ACETAMINOPHEN 2 TABLET(S): 5; 325 TABLET ORAL at 01:00

## 2018-02-03 RX ADMIN — PANTOPRAZOLE SODIUM 40 MILLIGRAM(S): 20 TABLET, DELAYED RELEASE ORAL at 06:21

## 2018-02-03 RX ADMIN — Medication 200 MILLIGRAM(S): at 09:15

## 2018-02-03 RX ADMIN — Medication 100 MILLIGRAM(S): at 10:51

## 2018-02-03 RX ADMIN — Medication 1334 MILLIGRAM(S): at 18:02

## 2018-02-03 RX ADMIN — Medication 30 MILLIGRAM(S): at 06:21

## 2018-02-03 RX ADMIN — OXYCODONE AND ACETAMINOPHEN 2 TABLET(S): 5; 325 TABLET ORAL at 02:58

## 2018-02-03 RX ADMIN — Medication 1334 MILLIGRAM(S): at 13:30

## 2018-02-03 RX ADMIN — VALSARTAN 160 MILLIGRAM(S): 80 TABLET ORAL at 06:21

## 2018-02-03 NOTE — PROGRESS NOTE ADULT - SUBJECTIVE AND OBJECTIVE BOX
Patient: SORAYA JIMENEZ [id number] 38y Female                 Internal Medicine Hospitalist Progress Note - Dr. Mane Hilton  Chief Complaint: Patient is a 38y old  Female who presents with a chief complaint of mother states pt was" to have  elective Lt eye surgery, during anesthesia pt became lo b/p" (2018 04:57)    HPI:  37 y/o female with ESRD on HD, DM II, HTN, came to the ER because of pain and swelling in the right calf area, worsening.  Outpatient LE doppler showed no DVT.  Serum K 5.4.  Started on abx for LE cellulitis.  Seen by nephrology.  Pt reported some initial improvement in LE edema and pain on antibiotics, however, symptoms persisted.  Denies history of trauma or immobility.  ID was consulted.  CT LE showed no abscess collection.  Inpatient LE doppler negative for DVT.  CT A/P showed no obstructive process.  There was diffuse anasarca and gross hepatomegaly.      Pt reports pain controlled.  Notes edema improves post HD.  Able to ambulate.  No fever / chills.  No weakness / numbness.    ____________________PHYSICAL EXAM:  Vitals reviewed as indicated below  GENERAL:  NAD Alert and Oriented x 3   HEENT: NCAT  CARDIOVASCULAR:  S1, S2  LUNGS: CTAB  ABDOMEN:  soft, (-) tenderness, (-) distension, (+) bowel sounds, (-) guarding, (-) rebound (-) rigidity  EXTREMITIES:  no cyanosis / clubbing + RLE edema and warmth.  No erythema.    ____________________    VITALS:  Vital Signs Last 24 Hrs  T(C): 36.6 (2018 07:18), Max: 36.9 (2018 15:59)  T(F): 97.9 (2018 07:18), Max: 98.4 (2018 15:59)  HR: 91 (2018 12:12) (83 - 93)  BP: 158/84 (2018 12:12) (149/85 - 179/95)  BP(mean): --  RR: 20 (2018 07:18) (16 - 20)  SpO2: 99% (2018 07:18) (97% - 100%) Daily     Daily Weight in k.4 (2018 15:21)  CAPILLARY BLOOD GLUCOSE      POCT Blood Glucose.: 111 mg/dL (2018 12:39)  POCT Blood Glucose.: 100 mg/dL (2018 08:47)  POCT Blood Glucose.: 156 mg/dL (2018 17:57)  POCT Blood Glucose.: 94 mg/dL (2018 13:18)    I&O's Summary    2018 07:01  -  2018 07:00  --------------------------------------------------------  IN: 0 mL / OUT: 3000 mL / NET: -3000 mL        LABS:                        7.3    8.0   )-----------( 270      ( 2018 07:58 )             23.0     02-01    131<L>  |  91<L>  |  63.0<H>  ----------------------------<  79  4.6   |  23.0  |  7.44<H>    Ca    9.0      2018 07:58  Phos  6.5     02-01            CARDIAC MARKERS ( 2018 07:58 )  x     / x     / 118 U/L / x     / x            MEDICATIONS:  acetaminophen   Tablet 650 milliGRAM(s) Oral every 6 hours PRN  artificial  tears Solution 1 Drop(s) Both EYES every 4 hours PRN  aspirin enteric coated 81 milliGRAM(s) Oral daily  calcium acetate 1334 milliGRAM(s) Oral three times a day with meals  dextrose 5%. 1000 milliLiter(s) IV Continuous <Continuous>  dextrose 50% Injectable 12.5 Gram(s) IV Push once  dextrose 50% Injectable 25 Gram(s) IV Push once  dextrose 50% Injectable 25 Gram(s) IV Push once  dextrose Gel 1 Dose(s) Oral once PRN  enoxaparin Injectable 30 milliGRAM(s) SubCutaneous daily  epoetin raina Injectable 42908 Unit(s) IV Push <User Schedule>  ergocalciferol 10322 Unit(s) Oral every week  folic acid 1 milliGRAM(s) Oral daily  gabapentin 100 milliGRAM(s) Oral three times a day  glucagon  Injectable 1 milliGRAM(s) IntraMuscular once PRN  insulin lispro (HumaLOG) corrective regimen sliding scale   SubCutaneous three times a day before meals  labetalol 200 milliGRAM(s) Oral three times a day  lactobacillus acidophilus 1 Tablet(s) Oral daily  loratadine 10 milliGRAM(s) Oral daily  NIFEdipine XL 30 milliGRAM(s) Oral daily  nitroglycerin    2% Ointment 0.5 Inch(s) Transdermal two times a day PRN  oxyCODONE    5 mG/acetaminophen 325 mG 2 Tablet(s) Oral every 6 hours PRN  pantoprazole    Tablet 40 milliGRAM(s) Oral before breakfast  valsartan 160 milliGRAM(s) Oral daily

## 2018-02-03 NOTE — PROGRESS NOTE ADULT - ASSESSMENT
9 y/o female with ESRD on HD, DM II, HTN, came to the ER because of pain and swelling in the right calf area. no fever. occasional chills. swelling started about 1 month ago and gradually increased in size although patient stopped amlodipine to avoid increasing peripheral edema. Patient cannot take Clonidine for left foot pain developes with clonidine intake. outpatient lower ext doppler excluded DVT. S. potassium: 5.4.   PT DENIES FEVERS OR CHILLS  CT SCAN OF LEG NO obvious infection   REPORTED OUTPT DUPLEX NEG  REPEAT DUPLEX NEG  NEGATIVE CT ABD PELVIS RULE OUT OBSTRUCTION    NO CLEAR ETIOLOGY  - TRIAL OF ACE WRAP    WILL DEFER ABX      WILL FOLLOW WITH YOU.

## 2018-02-03 NOTE — CHART NOTE - NSCHARTNOTEFT_GEN_A_CORE
Pt refusing po B/P meds. Pt on labetalol po and clonidine po at home. refusing in hospital? B/P now 186/100 P 91 NAD asymptomatic. Pt educated about importance of taking antihypertensive meds. A+OX3. Pt will only agree to hydralazine IVP. 10mg hydralazine IVP ordered.

## 2018-02-03 NOTE — PROGRESS NOTE ADULT - SUBJECTIVE AND OBJECTIVE BOX
NEPHROLOGY INTERVAL HPI/OVERNIGHT EVENTS:    Examined on HD  BP on high side    MEDICATIONS  (STANDING):  aspirin enteric coated 81 milliGRAM(s) Oral daily  calcium acetate 1334 milliGRAM(s) Oral three times a day with meals  dextrose 5%. 1000 milliLiter(s) (50 mL/Hr) IV Continuous <Continuous>  dextrose 50% Injectable 12.5 Gram(s) IV Push once  dextrose 50% Injectable 25 Gram(s) IV Push once  dextrose 50% Injectable 25 Gram(s) IV Push once  docusate sodium 100 milliGRAM(s) Oral three times a day  enoxaparin Injectable 30 milliGRAM(s) SubCutaneous daily  epoetin raina Injectable 31148 Unit(s) IV Push <User Schedule>  ergocalciferol 12820 Unit(s) Oral every week  folic acid 1 milliGRAM(s) Oral daily  gabapentin 100 milliGRAM(s) Oral three times a day  insulin lispro (HumaLOG) corrective regimen sliding scale   SubCutaneous three times a day before meals  labetalol 200 milliGRAM(s) Oral three times a day  labetalol 100 milliGRAM(s) Oral once  lactobacillus acidophilus 1 Tablet(s) Oral daily  loratadine 10 milliGRAM(s) Oral daily  NIFEdipine XL 30 milliGRAM(s) Oral daily  pantoprazole    Tablet 40 milliGRAM(s) Oral before breakfast  senna 1 Tablet(s) Oral at bedtime  valsartan 160 milliGRAM(s) Oral daily    MEDICATIONS  (PRN):  acetaminophen   Tablet 650 milliGRAM(s) Oral every 6 hours PRN mild - moderate pain  artificial  tears Solution 1 Drop(s) Both EYES every 4 hours PRN Dry Eyes  dextrose Gel 1 Dose(s) Oral once PRN Blood Glucose LESS THAN 70 milliGRAM(s)/deciliter  glucagon  Injectable 1 milliGRAM(s) IntraMuscular once PRN Glucose LESS THAN 70 milligrams/deciliter  nitroglycerin    2% Ointment 0.5 Inch(s) Transdermal two times a day PRN SBP>180  oxyCODONE    5 mG/acetaminophen 325 mG 2 Tablet(s) Oral every 6 hours PRN Severe Pain (7 - 10)      Allergies    Mushrooms (Hives (Mild))  No Known Drug Allergies    Intolerances        Vital Signs Last 24 Hrs  T(C): 37.1 (2018 07:25), Max: 37.1 (2018 07:25)  T(F): 98.8 (2018 07:25), Max: 98.8 (2018 07:25)  HR: 91 (:) (87 - 93)  BP: 189/96 (2018 07:25) (150/84 - 189/96)  BP(mean): --  RR: 18 (:) (18 - 19)  SpO2: 99% (:) (99% - 100%)  Daily     Daily Weight in k.7 (:)    PHYSICAL EXAM:  HEAD:  Atraumatic, No face edema   NECK: Supple, No JVD,   CHEST/LUNG: Clear / EAE .   HEART: Regular rate and rhythm; No rub . No gallop   ABDOMEN: Soft, Nontender, Nondistended; Bowel sounds present  EXTREMITIES:  ++ edema right leg . + tenderness ,. No erythema . ? palpable subcutaneous plaques       LABS:                        7.9    8.1   )-----------( 278      ( 2018 08:54 )             24.4     02-03    127<L>  |  84<L>  |  52.0<H>  ----------------------------<  76  4.3   |  22.0  |  6.58<H>    Ca    9.0      2018 08:54                  RADIOLOGY & ADDITIONAL TESTS:

## 2018-02-03 NOTE — PROGRESS NOTE ADULT - SUBJECTIVE AND OBJECTIVE BOX
SORAYA JIMENEZ is a 38y Female with HPI:     37 y/o female with ESRD on HD, DM II, HTN, came to the ER because of pain and swelling in the right calf area. no fever. occasional chills. swelling started about 1 month ago and gradually increased in size although patient stopped amlodipine to avoid increasing peripheral edema. Patient cannot take Clonidine for left foot pain developes with clonidine intake. outpatient lower ext doppler excluded DVT. S. potassium: 5.4.   PT DENIES FEVERS OR CHILLS  CT SCAN OF LEG NO obvious infection   DUPLEX NEGATIVE  S/P CT SCAN OF ABD PELVIS - NO MASSES IN RETRO-PERITONEUM  STILL WITH RIGHT LEG PAIN    Allergies:  Mushrooms (Hives (Mild))  No Known Drug Allergies      Home Medications:  amLODIPine 10 mg oral tablet: 1 tab(s) orally once a day (30 Jan 2018 10:53)  aspirin 81 mg oral delayed release tablet: 1 tab(s) orally once a day (30 Jan 2018 10:53)  calcium acetate 667 mg oral capsule: 2 cap(s) orally 3 times a day (with meals) (30 Jan 2018 10:53)  cloNIDine 0.1 mg oral tablet: 1 tab(s) orally 2 times a day (30 Jan 2018 10:53)  epoetin raina: 41382 unit(s) intravenous 3 times a week  as per renal (30 Jan 2018 10:53)  ergocalciferol 50,000 intl units (1.25 mg) oral capsule: 1 cap(s) orally once a day (after a meal) (30 Jan 2018 10:53)  fluticasone 50 mcg/inh nasal spray: 1 spray(s) nasal once a day (30 Jan 2018 10:53)  labetalol 100 mg oral tablet: 1 tab(s) orally 2 times a day (30 Jan 2018 10:53)  Refresh ophthalmic solution: 1 drop(s) to each affected eye every 4 hours, As needed, Dry Eyes (30 Jan 2018 10:53)  sevelamer hydrochloride 800 mg oral tablet: 1 tab(s) orally 3 times a day (with meals) (30 Jan 2018 10:53)  simvastatin 10 mg oral tablet: 1 tab(s) orally once a day (at bedtime) (30 Jan 2018 10:53)  valsartan 160 mg oral tablet: 1 tab(s) orally once a day (30 Jan 2018 10:53)        Review of Systems: - Negative except as mentioned above     Physical Exam:  Vital Signs Last 24 Hrs  T(C): 37.3 (03 Feb 2018 11:30), Max: 37.3 (03 Feb 2018 11:30)  T(F): 99.2 (03 Feb 2018 11:30), Max: 99.2 (03 Feb 2018 11:30)  HR: 97 (03 Feb 2018 11:30) (87 - 97)  BP: 177/81 (03 Feb 2018 11:30) (150/84 - 189/96)  BP(mean): --  RR: 18 (03 Feb 2018 11:30) (18 - 19)  SpO2: 95% (03 Feb 2018 11:30) (95% - 100%)    GEN: NAD, pleasant OBESE  HEENT: normocephalic and atraumatic. EOMI. WILDA...  NECK: Supple. No carotid bruits.  No lymphadenopathy or thyromegaly.  LUNGS: Clear to auscultation.  HEART: Regular rate and rhythm without murmur.  ABDOMEN: Soft, nontender, and nondistended.  Positive bowel sounds.  No hepatosplenomegaly was noted.  NO REBOUND NO GUARDING  EXTREMITIES: PERSISTENT EDEMA AND TENDERNESS.RIGHT LEG  GOOD PULSES BILATERALLY  NEUROLOGIC: Cranial nerves II through XII are grossly intact.  SKIN: No ulceration or induration present.          Labs:  02-03    127<L>  |  84<L>  |  52.0<H>  ----------------------------<  76  4.3   |  22.0  |  6.58<H>    Ca    9.0      03 Feb 2018 08:54                            7.9    8.1   )-----------( 278      ( 03 Feb 2018 08:54 )             24.4              POCT Blood Glucose.: 129 mg/dL (03 Feb 2018 13:29)  POCT Blood Glucose.: 90 mg/dL (03 Feb 2018 08:01)  POCT Blood Glucose.: 108 mg/dL (02 Feb 2018 18:33)        RECENT CULTURES:  01-31 @ 15:57 .Blood Blood     No growth at 48 hours

## 2018-02-03 NOTE — PROGRESS NOTE ADULT - ASSESSMENT
ESRD - HD Today  out pt schedule is MWF so will HD again this Mon to put back on schedule    Anemia -  Continue  Epogen with HD   Transfuse as needed for Hgb < 7     HTN - Better on meds additional UF w HD may help  preserved EF on recent ECHO    R leg pain and swelling - will remove additional UF w HD  doppler w/o DVT  S/P Cefepime / Vanco  ID stopped Abx   For CT pelvis to look for locally compressive lesion   CT leg no collection - noted      RO - Change Renagel to Phoslo  Follow Phos

## 2018-02-03 NOTE — PROGRESS NOTE ADULT - ASSESSMENT
39 y/o female with right leg cellulitis, Hyperkalemia, ESRD on HD, hypertensive urgency, DM II.    > LE edema, generalized anasarca - discussed fluid restriction with patient.  Continued HD.  Awaiting Nephrology f/u.  > Anemia of chronic disease - on HD.  Monitor CBC, stable.  Procrit.  > Leg pain - trial of Neurontin  > Hypertensive urgency - pt refusing Labetalol, Clonidine, Norvasc due to perceived impact on edema.  Educated patient.  Increase ARB, add hydralazine.   > ESRD - continue HD TIW.  > Diabetes - monitor fingersticks.  Insulin coverage for hyperglycemia.  > DVT Prophylaxis - Lovenox subcut

## 2018-02-04 LAB
GLUCOSE BLDC GLUCOMTR-MCNC: 114 MG/DL — HIGH (ref 70–99)
GLUCOSE BLDC GLUCOMTR-MCNC: 120 MG/DL — HIGH (ref 70–99)
GLUCOSE BLDC GLUCOMTR-MCNC: 99 MG/DL — SIGNIFICANT CHANGE UP (ref 70–99)

## 2018-02-04 PROCEDURE — 99232 SBSQ HOSP IP/OBS MODERATE 35: CPT

## 2018-02-04 RX ORDER — LABETALOL HCL 100 MG
100 TABLET ORAL EVERY 8 HOURS
Qty: 0 | Refills: 0 | Status: DISCONTINUED | OUTPATIENT
Start: 2018-02-04 | End: 2018-02-06

## 2018-02-04 RX ORDER — HYDRALAZINE HCL 50 MG
50 TABLET ORAL EVERY 8 HOURS
Qty: 0 | Refills: 0 | Status: DISCONTINUED | OUTPATIENT
Start: 2018-02-04 | End: 2018-02-06

## 2018-02-04 RX ADMIN — Medication 81 MILLIGRAM(S): at 13:15

## 2018-02-04 RX ADMIN — Medication 25 MILLIGRAM(S): at 05:23

## 2018-02-04 RX ADMIN — GABAPENTIN 100 MILLIGRAM(S): 400 CAPSULE ORAL at 05:23

## 2018-02-04 RX ADMIN — Medication 100 MILLIGRAM(S): at 21:46

## 2018-02-04 RX ADMIN — SENNA PLUS 1 TABLET(S): 8.6 TABLET ORAL at 21:46

## 2018-02-04 RX ADMIN — Medication 1334 MILLIGRAM(S): at 17:34

## 2018-02-04 RX ADMIN — Medication 30 MILLIGRAM(S): at 05:23

## 2018-02-04 RX ADMIN — Medication 100 MILLIGRAM(S): at 13:14

## 2018-02-04 RX ADMIN — PANTOPRAZOLE SODIUM 40 MILLIGRAM(S): 20 TABLET, DELAYED RELEASE ORAL at 05:25

## 2018-02-04 RX ADMIN — Medication 1 MILLIGRAM(S): at 13:14

## 2018-02-04 RX ADMIN — GABAPENTIN 100 MILLIGRAM(S): 400 CAPSULE ORAL at 21:46

## 2018-02-04 RX ADMIN — ENOXAPARIN SODIUM 30 MILLIGRAM(S): 100 INJECTION SUBCUTANEOUS at 13:15

## 2018-02-04 RX ADMIN — GABAPENTIN 100 MILLIGRAM(S): 400 CAPSULE ORAL at 13:14

## 2018-02-04 RX ADMIN — Medication 50 MILLIGRAM(S): at 13:15

## 2018-02-04 RX ADMIN — Medication 1334 MILLIGRAM(S): at 12:11

## 2018-02-04 RX ADMIN — VALSARTAN 320 MILLIGRAM(S): 80 TABLET ORAL at 05:23

## 2018-02-04 RX ADMIN — Medication 1334 MILLIGRAM(S): at 08:39

## 2018-02-04 RX ADMIN — LORATADINE 10 MILLIGRAM(S): 10 TABLET ORAL at 13:14

## 2018-02-04 RX ADMIN — Medication 50 MILLIGRAM(S): at 21:46

## 2018-02-04 NOTE — PROGRESS NOTE ADULT - SUBJECTIVE AND OBJECTIVE BOX
Patient: SORAYA JIMENEZ [id number] 38y Female                 Internal Medicine Hospitalist Progress Note - Dr. Mane Hilton  Chief Complaint: Patient is a 38y old  Female who presents with a chief complaint of mother states pt was" to have  elective Lt eye surgery, during anesthesia pt became lo b/p" (2018 04:57)    HPI:  37 y/o female with ESRD on HD, DM II, HTN, came to the ER because of pain and swelling in the right calf area, worsening.  Outpatient LE doppler showed no DVT.  Serum K 5.4.  Started on abx for LE cellulitis.  Seen by nephrology.  Pt reported some initial improvement in LE edema and pain on antibiotics, however, symptoms persisted.  Denies history of trauma or immobility.  ID was consulted.  CT LE showed no abscess collection.  Inpatient LE doppler negative for DVT.  CT A/P showed no obstructive process.  There was diffuse anasarca and gross hepatomegaly.      Pt reports pain controlled.  Able to ambulate, limited by pain.  No fever / chills.  No weakness / numbness.    ____________________PHYSICAL EXAM:  Vitals reviewed as indicated below  GENERAL:  NAD Alert and Oriented x 3   HEENT: NCAT  CARDIOVASCULAR:  S1, S2  LUNGS: CTAB  ABDOMEN:  soft, (-) tenderness, (-) distension, (+) bowel sounds, (-) guarding, (-) rebound (-) rigidity  EXTREMITIES:  no cyanosis / clubbing .  b/l LE edema.   ____________________    VITALS:  Vital Signs Last 24 Hrs  T(C): 37.3 (2018 08:09), Max: 37.3 (2018 11:30)  T(F): 99.1 (2018 08:09), Max: 99.2 (2018 11:30)  HR: 94 (2018 08:09) (90 - 97)  BP: 152/81 (2018 08:09) (152/81 - 179/89)  BP(mean): --  RR: 18 (2018 08:09) (18 - 18)  SpO2: 93% (2018 08:09) (92% - 95%) Daily     Daily Weight in k.5 (2018 11:30)  CAPILLARY BLOOD GLUCOSE      POCT Blood Glucose.: 99 mg/dL (2018 08:11)  POCT Blood Glucose.: 122 mg/dL (2018 17:09)  POCT Blood Glucose.: 129 mg/dL (2018 13:29)    I&O's Summary    2018 07:01  -  2018 07:00  --------------------------------------------------------  IN: 0 mL / OUT: 3200 mL / NET: -3200 mL        LABS:                        7.9    8.1   )-----------( 278      ( 2018 08:54 )             24.4     02-03    127<L>  |  84<L>  |  52.0<H>  ----------------------------<  76  4.3   |  22.0  |  6.58<H>    Ca    9.0      2018 08:54                  MEDICATIONS:  acetaminophen   Tablet 650 milliGRAM(s) Oral every 6 hours PRN  artificial  tears Solution 1 Drop(s) Both EYES every 4 hours PRN  aspirin enteric coated 81 milliGRAM(s) Oral daily  calcium acetate 1334 milliGRAM(s) Oral three times a day with meals  dextrose 5%. 1000 milliLiter(s) IV Continuous <Continuous>  dextrose 50% Injectable 12.5 Gram(s) IV Push once  dextrose 50% Injectable 25 Gram(s) IV Push once  dextrose 50% Injectable 25 Gram(s) IV Push once  dextrose Gel 1 Dose(s) Oral once PRN  docusate sodium 100 milliGRAM(s) Oral three times a day  enoxaparin Injectable 30 milliGRAM(s) SubCutaneous daily  epoetin raina Injectable 13417 Unit(s) IV Push <User Schedule>  ergocalciferol 00330 Unit(s) Oral every week  folic acid 1 milliGRAM(s) Oral daily  gabapentin 100 milliGRAM(s) Oral three times a day  glucagon  Injectable 1 milliGRAM(s) IntraMuscular once PRN  hydrALAZINE 25 milliGRAM(s) Oral every 8 hours  insulin lispro (HumaLOG) corrective regimen sliding scale   SubCutaneous three times a day before meals  labetalol 100 milliGRAM(s) Oral every 8 hours  loratadine 10 milliGRAM(s) Oral daily  NIFEdipine XL 30 milliGRAM(s) Oral daily  nitroglycerin    2% Ointment 0.5 Inch(s) Transdermal two times a day PRN  oxyCODONE    5 mG/acetaminophen 325 mG 2 Tablet(s) Oral every 6 hours PRN  pantoprazole    Tablet 40 milliGRAM(s) Oral before breakfast  senna 1 Tablet(s) Oral at bedtime  valsartan 320 milliGRAM(s) Oral daily

## 2018-02-04 NOTE — PROGRESS NOTE ADULT - SUBJECTIVE AND OBJECTIVE BOX
NEPHROLOGY INTERVAL HPI/OVERNIGHT EVENTS:    Examined earlier  BP on high side    MEDICATIONS  (STANDING):  aspirin enteric coated 81 milliGRAM(s) Oral daily  calcium acetate 1334 milliGRAM(s) Oral three times a day with meals  dextrose 5%. 1000 milliLiter(s) (50 mL/Hr) IV Continuous <Continuous>  dextrose 50% Injectable 12.5 Gram(s) IV Push once  dextrose 50% Injectable 25 Gram(s) IV Push once  dextrose 50% Injectable 25 Gram(s) IV Push once  docusate sodium 100 milliGRAM(s) Oral three times a day  enoxaparin Injectable 30 milliGRAM(s) SubCutaneous daily  epoetin raina Injectable 14754 Unit(s) IV Push <User Schedule>  ergocalciferol 37264 Unit(s) Oral every week  folic acid 1 milliGRAM(s) Oral daily  gabapentin 100 milliGRAM(s) Oral three times a day  hydrALAZINE 25 milliGRAM(s) Oral every 8 hours  insulin lispro (HumaLOG) corrective regimen sliding scale   SubCutaneous three times a day before meals  labetalol 100 milliGRAM(s) Oral every 8 hours  loratadine 10 milliGRAM(s) Oral daily  NIFEdipine XL 30 milliGRAM(s) Oral daily  pantoprazole    Tablet 40 milliGRAM(s) Oral before breakfast  senna 1 Tablet(s) Oral at bedtime  valsartan 320 milliGRAM(s) Oral daily    MEDICATIONS  (PRN):  acetaminophen   Tablet 650 milliGRAM(s) Oral every 6 hours PRN mild - moderate pain  artificial  tears Solution 1 Drop(s) Both EYES every 4 hours PRN Dry Eyes  dextrose Gel 1 Dose(s) Oral once PRN Blood Glucose LESS THAN 70 milliGRAM(s)/deciliter  glucagon  Injectable 1 milliGRAM(s) IntraMuscular once PRN Glucose LESS THAN 70 milligrams/deciliter  nitroglycerin    2% Ointment 0.5 Inch(s) Transdermal two times a day PRN SBP>180  oxyCODONE    5 mG/acetaminophen 325 mG 2 Tablet(s) Oral every 6 hours PRN Severe Pain (7 - 10)      Allergies    Mushrooms (Hives (Mild))  No Known Drug Allergies    Intolerances        Vital Signs Last 24 Hrs  T(C): 37.3 (2018 08:09), Max: 37.3 (2018 11:30)  T(F): 99.1 (2018 08:09), Max: 99.2 (2018 11:30)  HR: 94 (2018 08:09) (90 - 97)  BP: 152/81 (2018 08:09) (152/81 - 179/89)  BP(mean): --  RR: 18 (2018 08:09) (18 - 18)  SpO2: 93% (2018 08:09) (92% - 95%)  Daily     Daily Weight in k.5 (2018 11:30)    PHYSICAL EXAM:  HEAD:  Atraumatic, No face edema   NECK: Supple, No JVD,   CHEST/LUNG: Clear / EAE .   HEART: Regular rate and rhythm; No rub . No gallop   ABDOMEN: Soft, Nontender, Nondistended; Bowel sounds present  EXTREMITIES:  ++ edema right leg . + tenderness ,. No erythema . ? palpable subcutaneous plaques       LABS:                        7.9    8.1   )-----------( 278      ( 2018 08:54 )             24.4     02-03    127<L>  |  84<L>  |  52.0<H>  ----------------------------<  76  4.3   |  22.0  |  6.58<H>    Ca    9.0      2018 08:54                  RADIOLOGY & ADDITIONAL TESTS:

## 2018-02-04 NOTE — PROGRESS NOTE ADULT - ASSESSMENT
39 y/o female with right leg cellulitis, Hyperkalemia, ESRD on HD, hypertensive urgency, DM II.    > LE edema, generalized anasarca - discussed fluid restriction with patient.  Continued HD.  ACE wraps. OOB with PT.   > Anemia of chronic disease - on HD.  Monitor CBC, stable.  Procrit.  > Leg pain - trial of Neurontin  > Hypertensive urgency - pt refusing Labetalol, Clonidine, Norvasc due to perceived impact on edema.  Educated patient.  Increase ARB, add hydralazine.   > ESRD - continue HD TIW.  > Diabetes - monitor fingersticks.  Insulin coverage for hyperglycemia.  > DVT Prophylaxis - Lovenox subcut 37 y/o female with right leg cellulitis, Hyperkalemia, ESRD on HD, hypertensive urgency, DM II.    > LE edema, generalized anasarca - discussed fluid restriction with patient.  Continued HD.  ACE wraps. OOB with PT.   > Morbid Obesity - discussed gradual weight loss as outpatient.   > Anemia of chronic disease - on HD.  Monitor CBC, stable.  Procrit.  > Leg pain - trial of Neurontin  > Hypertensive urgency - pt refusing Labetalol, Clonidine, Norvasc due to perceived impact on edema.  Educated patient.  Increase hydralazine, as pt refusing Labetalol.   > ESRD - continue HD TIW.  > Diabetes - monitor fingersticks.  Insulin coverage for hyperglycemia.  > DVT Prophylaxis - Lovenox subcut

## 2018-02-04 NOTE — PROGRESS NOTE ADULT - ASSESSMENT
ESRD - HD tommorow will lengthen treatment and increase UF goals    out pt schedule is MWF     R leg pain and swelling - will remove additional UF w HD- doppler w/o DVT  CT abd pelvis noted- no compresive lesion, +diffuse anasraca  CT R leg no collection - noted  S/P Cefepime / Vanco- ID stopped Abx   Pain control on percocet prn -complaining of constipation -I added colace and senna yest    Anemia - cont t monitor h/h  Continue  Epogen with HD   Transfuse as needed for Hgb < 7     HTN - BP and HR on high side will ad Labetalol 100mg po TID  also additional UF w HD will help  preserved EF on recent ECHO    RO - Change Renagel to Phoslo  Follow Phos ESRD - HD tommorow will lengthen treatment and increase UF goals    May also do daily HD for a few days primaily for UF  out pt schedule is MWF     R leg pain and swelling - will remove additional UF w HD- doppler w/o DVT  CT abd pelvis noted- no compresive lesion, +diffuse anasraca  CT R leg no collection - noted  S/P Cefepime / Vanco- ID stopped Abx   Pain control on percocet prn -complaining of constipation -I added colace and senna yest    Anemia - cont t monitor h/h  Continue  Epogen with HD   Transfuse as needed for Hgb < 7     HTN - BP and HR on high side will ad Labetalol 100mg po TID  also additional UF w HD will help  preserved EF on recent ECHO    RO - Change Renagel to Phoslo  Follow Phos

## 2018-02-05 LAB
ALBUMIN SERPL ELPH-MCNC: 3.6 G/DL — SIGNIFICANT CHANGE UP (ref 3.3–5.2)
ALP SERPL-CCNC: 185 U/L — HIGH (ref 40–120)
ALT FLD-CCNC: 16 U/L — SIGNIFICANT CHANGE UP
ANION GAP SERPL CALC-SCNC: 20 MMOL/L — HIGH (ref 5–17)
AST SERPL-CCNC: 18 U/L — SIGNIFICANT CHANGE UP
BILIRUB DIRECT SERPL-MCNC: 0.8 MG/DL — HIGH (ref 0–0.3)
BILIRUB INDIRECT FLD-MCNC: 0.5 MG/DL — SIGNIFICANT CHANGE UP (ref 0.2–1)
BILIRUB SERPL-MCNC: 1.3 MG/DL — SIGNIFICANT CHANGE UP (ref 0.4–2)
BUN SERPL-MCNC: 52 MG/DL — HIGH (ref 8–20)
CALCIUM SERPL-MCNC: 9.4 MG/DL — SIGNIFICANT CHANGE UP (ref 8.6–10.2)
CHLORIDE SERPL-SCNC: 89 MMOL/L — LOW (ref 98–107)
CO2 SERPL-SCNC: 23 MMOL/L — SIGNIFICANT CHANGE UP (ref 22–29)
CREAT SERPL-MCNC: 6.74 MG/DL — HIGH (ref 0.5–1.3)
CULTURE RESULTS: SIGNIFICANT CHANGE UP
CULTURE RESULTS: SIGNIFICANT CHANGE UP
GLUCOSE BLDC GLUCOMTR-MCNC: 103 MG/DL — HIGH (ref 70–99)
GLUCOSE BLDC GLUCOMTR-MCNC: 103 MG/DL — HIGH (ref 70–99)
GLUCOSE BLDC GLUCOMTR-MCNC: 94 MG/DL — SIGNIFICANT CHANGE UP (ref 70–99)
GLUCOSE SERPL-MCNC: 113 MG/DL — SIGNIFICANT CHANGE UP (ref 70–115)
HCT VFR BLD CALC: 24 % — LOW (ref 37–47)
HGB BLD-MCNC: 7.5 G/DL — LOW (ref 12–16)
MCHC RBC-ENTMCNC: 25.4 PG — LOW (ref 27–31)
MCHC RBC-ENTMCNC: 31.3 G/DL — LOW (ref 32–36)
MCV RBC AUTO: 81.4 FL — SIGNIFICANT CHANGE UP (ref 81–99)
PLATELET # BLD AUTO: 271 K/UL — SIGNIFICANT CHANGE UP (ref 150–400)
POTASSIUM SERPL-MCNC: 4.3 MMOL/L — SIGNIFICANT CHANGE UP (ref 3.5–5.3)
POTASSIUM SERPL-SCNC: 4.3 MMOL/L — SIGNIFICANT CHANGE UP (ref 3.5–5.3)
PROT SERPL-MCNC: 7.9 G/DL — SIGNIFICANT CHANGE UP (ref 6.6–8.7)
RBC # BLD: 2.95 M/UL — LOW (ref 4.4–5.2)
RBC # FLD: 19.1 % — HIGH (ref 11–15.6)
SODIUM SERPL-SCNC: 132 MMOL/L — LOW (ref 135–145)
SPECIMEN SOURCE: SIGNIFICANT CHANGE UP
SPECIMEN SOURCE: SIGNIFICANT CHANGE UP
WBC # BLD: 8.5 K/UL — SIGNIFICANT CHANGE UP (ref 4.8–10.8)
WBC # FLD AUTO: 8.5 K/UL — SIGNIFICANT CHANGE UP (ref 4.8–10.8)

## 2018-02-05 PROCEDURE — 99232 SBSQ HOSP IP/OBS MODERATE 35: CPT

## 2018-02-05 PROCEDURE — 99233 SBSQ HOSP IP/OBS HIGH 50: CPT

## 2018-02-05 PROCEDURE — 73590 X-RAY EXAM OF LOWER LEG: CPT | Mod: 26,RT

## 2018-02-05 RX ORDER — ERYTHROPOIETIN 10000 [IU]/ML
10000 INJECTION, SOLUTION INTRAVENOUS; SUBCUTANEOUS
Qty: 0 | Refills: 0 | Status: DISCONTINUED | OUTPATIENT
Start: 2018-02-05 | End: 2018-02-16

## 2018-02-05 RX ORDER — HYDRALAZINE HCL 50 MG
10 TABLET ORAL ONCE
Qty: 0 | Refills: 0 | Status: COMPLETED | OUTPATIENT
Start: 2018-02-05 | End: 2018-02-05

## 2018-02-05 RX ADMIN — Medication 10 MILLIGRAM(S): at 01:40

## 2018-02-05 RX ADMIN — ENOXAPARIN SODIUM 30 MILLIGRAM(S): 100 INJECTION SUBCUTANEOUS at 17:14

## 2018-02-05 RX ADMIN — VALSARTAN 320 MILLIGRAM(S): 80 TABLET ORAL at 06:03

## 2018-02-05 RX ADMIN — Medication 1334 MILLIGRAM(S): at 08:18

## 2018-02-05 RX ADMIN — OXYCODONE AND ACETAMINOPHEN 2 TABLET(S): 5; 325 TABLET ORAL at 08:18

## 2018-02-05 RX ADMIN — Medication 81 MILLIGRAM(S): at 17:14

## 2018-02-05 RX ADMIN — Medication 100 MILLIGRAM(S): at 06:03

## 2018-02-05 RX ADMIN — Medication 50 MILLIGRAM(S): at 06:03

## 2018-02-05 RX ADMIN — GABAPENTIN 100 MILLIGRAM(S): 400 CAPSULE ORAL at 21:57

## 2018-02-05 RX ADMIN — Medication 1334 MILLIGRAM(S): at 17:12

## 2018-02-05 RX ADMIN — LORATADINE 10 MILLIGRAM(S): 10 TABLET ORAL at 17:13

## 2018-02-05 RX ADMIN — Medication 1 MILLIGRAM(S): at 17:13

## 2018-02-05 RX ADMIN — Medication 100 MILLIGRAM(S): at 17:13

## 2018-02-05 RX ADMIN — OXYCODONE AND ACETAMINOPHEN 2 TABLET(S): 5; 325 TABLET ORAL at 09:20

## 2018-02-05 RX ADMIN — Medication 100 MILLIGRAM(S): at 14:19

## 2018-02-05 RX ADMIN — ERYTHROPOIETIN 10000 UNIT(S): 10000 INJECTION, SOLUTION INTRAVENOUS; SUBCUTANEOUS at 13:50

## 2018-02-05 RX ADMIN — Medication 50 MILLIGRAM(S): at 21:56

## 2018-02-05 RX ADMIN — Medication 100 MILLIGRAM(S): at 21:57

## 2018-02-05 RX ADMIN — GABAPENTIN 100 MILLIGRAM(S): 400 CAPSULE ORAL at 17:15

## 2018-02-05 RX ADMIN — Medication 100 MILLIGRAM(S): at 21:56

## 2018-02-05 RX ADMIN — SENNA PLUS 1 TABLET(S): 8.6 TABLET ORAL at 21:55

## 2018-02-05 RX ADMIN — GABAPENTIN 100 MILLIGRAM(S): 400 CAPSULE ORAL at 06:03

## 2018-02-05 RX ADMIN — Medication 50 MILLIGRAM(S): at 14:19

## 2018-02-05 RX ADMIN — PANTOPRAZOLE SODIUM 40 MILLIGRAM(S): 20 TABLET, DELAYED RELEASE ORAL at 06:03

## 2018-02-05 RX ADMIN — Medication 30 MILLIGRAM(S): at 06:02

## 2018-02-05 NOTE — PROGRESS NOTE ADULT - SUBJECTIVE AND OBJECTIVE BOX
SORAYA JIMENEZ is a 38y Female with HPI:     39 y/o female with ESRD on HD, DM II, HTN, came to the ER because of pain and swelling in the right calf area. no fever. occasional chills. swelling started about 1 month ago and gradually increased in size although patient stopped amlodipine to avoid increasing peripheral edema. Patient cannot take Clonidine for left foot pain developes with clonidine intake. outpatient lower ext doppler excluded DVT. S. potassium: 5.4.   PT DENIES FEVERS OR CHILLS  CT SCAN OF LEG NO obvious infection   DUPLEX NEGATIVE  S/P CT SCAN OF ABD PELVIS - NO MASSES IN RETRO-PERITONEUM  STILL WITH RIGHT LEG PAIN  UNCLEAR ETIOLOGY ?CALCIPHYLAXIS    Allergies:  Mushrooms (Hives (Mild))  No Known Drug Allergies      Home Medications:  amLODIPine 10 mg oral tablet: 1 tab(s) orally once a day (30 Jan 2018 10:53)  aspirin 81 mg oral delayed release tablet: 1 tab(s) orally once a day (30 Jan 2018 10:53)  calcium acetate 667 mg oral capsule: 2 cap(s) orally 3 times a day (with meals) (30 Jan 2018 10:53)  cloNIDine 0.1 mg oral tablet: 1 tab(s) orally 2 times a day (30 Jan 2018 10:53)  epoetin raina: 11740 unit(s) intravenous 3 times a week  as per renal (30 Jan 2018 10:53)  ergocalciferol 50,000 intl units (1.25 mg) oral capsule: 1 cap(s) orally once a day (after a meal) (30 Jan 2018 10:53)  fluticasone 50 mcg/inh nasal spray: 1 spray(s) nasal once a day (30 Jan 2018 10:53)  labetalol 100 mg oral tablet: 1 tab(s) orally 2 times a day (30 Jan 2018 10:53)  Refresh ophthalmic solution: 1 drop(s) to each affected eye every 4 hours, As needed, Dry Eyes (30 Jan 2018 10:53)  sevelamer hydrochloride 800 mg oral tablet: 1 tab(s) orally 3 times a day (with meals) (30 Jan 2018 10:53)  simvastatin 10 mg oral tablet: 1 tab(s) orally once a day (at bedtime) (30 Jan 2018 10:53)  valsartan 160 mg oral tablet: 1 tab(s) orally once a day (30 Jan 2018 10:53)        Review of Systems: - Negative except as mentioned above     Physical Exam:   Vital Signs Last 24 Hrs  T(C): 36.7 (05 Feb 2018 08:17), Max: 36.9 (05 Feb 2018 00:10)  T(F): 98.1 (05 Feb 2018 08:17), Max: 98.4 (05 Feb 2018 00:10)  HR: 87 (05 Feb 2018 08:17) (87 - 96)  BP: 135/73 (05 Feb 2018 08:17) (135/73 - 182/92)  BP(mean): --  RR: 18 (05 Feb 2018 08:17) (18 - 18)  SpO2: 92% (05 Feb 2018 08:17) (89% - 95%)    GEN: NAD, pleasant OBESE  HEENT: normocephalic and atraumatic. EOMI. WILDA...  NECK: Supple. No carotid bruits.  No lymphadenopathy or thyromegaly.  LUNGS: Clear to auscultation.  HEART: Regular rate and rhythm without murmur.  ABDOMEN: Soft, nontender, and nondistended.  Positive bowel sounds.  No hepatosplenomegaly was noted.  NO REBOUND NO GUARDING  EXTREMITIES: PERSISTENT EDEMA AND TENDERNESS.RIGHT LEG  GOOD PULSES BILATERALLY  NEUROLOGIC: Cranial nerves II through XII are grossly intact.  SKIN: No ulceration or induration present.          Labs:  0            RECENT CULTURES:  01-31 @ 15:57 .Blood Blood     No growth at 48 hours

## 2018-02-05 NOTE — PROGRESS NOTE ADULT - SUBJECTIVE AND OBJECTIVE BOX
CC: Right LE edema    HPI:  39 y/o female with ESRD on HD, DM II, HTN, came to the ER because of pain and swelling in the right calf area.  Swelling started about 1 month ago and gradually increased in size although patient stopped amlodipine to avoid increasing peripheral edema. Patient cannot take Clonidine for left foot pain developes with clonidine intake. Outpatient lower ext doppler excluded DVT.     INTERVAL HPI/OVERNIGHT EVENTS: Patient seen and examined lying in bed.  Pain better controlled after receiving Percocet. Patient denies any headache, dizziness, SOB, CP, abdominal pain, nausea, vomiting.  Other ROS reviewed and are negative.    Vital Signs Last 24 Hrs  T(C): 36.7 (05 Feb 2018 08:17), Max: 36.9 (05 Feb 2018 00:10)  T(F): 98.1 (05 Feb 2018 08:17), Max: 98.4 (05 Feb 2018 00:10)  HR: 87 (05 Feb 2018 08:17) (87 - 96)  BP: 135/73 (05 Feb 2018 08:17) (135/73 - 182/92)  BP(mean): --  RR: 18 (05 Feb 2018 08:17) (18 - 18)  SpO2: 92% (05 Feb 2018 08:17) (89% - 95%)  I&O's Detail        PHYSICAL EXAM:  GENERAL: NAD, well-groomed, well-developed  HEAD:  Atraumatic, Normocephalic  NECK: Supple, No JVD, Normal thyroid  NERVOUS SYSTEM:  Alert & Oriented X3, Good concentration; Motor Strength 5/5 B/L upper and lower extremities  CHEST/LUNG: Clear to auscultation bilaterally; No rales, rhonchi, wheezing, or rubs  HEART: Regular rate and rhythm; No murmurs, rubs, or gallops  ABDOMEN: Soft, Nontender, Nondistended; Bowel sounds present  EXTREMITIES:  Bilateral LE edema R>L              CAPILLARY BLOOD GLUCOSE      POCT Blood Glucose.: 103 mg/dL (05 Feb 2018 11:52)  POCT Blood Glucose.: 94 mg/dL (05 Feb 2018 08:12)  POCT Blood Glucose.: 120 mg/dL (04 Feb 2018 17:33)          MEDICATIONS  (STANDING):  aspirin enteric coated 81 milliGRAM(s) Oral daily  calcium acetate 1334 milliGRAM(s) Oral three times a day with meals  dextrose 5%. 1000 milliLiter(s) (50 mL/Hr) IV Continuous <Continuous>  dextrose 50% Injectable 12.5 Gram(s) IV Push once  dextrose 50% Injectable 25 Gram(s) IV Push once  dextrose 50% Injectable 25 Gram(s) IV Push once  docusate sodium 100 milliGRAM(s) Oral three times a day  enoxaparin Injectable 30 milliGRAM(s) SubCutaneous daily  epoetin raina Injectable 25934 Unit(s) IV Push <User Schedule>  ergocalciferol 66035 Unit(s) Oral every week  folic acid 1 milliGRAM(s) Oral daily  gabapentin 100 milliGRAM(s) Oral three times a day  hydrALAZINE 50 milliGRAM(s) Oral every 8 hours  insulin lispro (HumaLOG) corrective regimen sliding scale   SubCutaneous three times a day before meals  labetalol 100 milliGRAM(s) Oral every 8 hours  loratadine 10 milliGRAM(s) Oral daily  NIFEdipine XL 30 milliGRAM(s) Oral daily  pantoprazole    Tablet 40 milliGRAM(s) Oral before breakfast  senna 1 Tablet(s) Oral at bedtime  valsartan 320 milliGRAM(s) Oral daily    MEDICATIONS  (PRN):  acetaminophen   Tablet 650 milliGRAM(s) Oral every 6 hours PRN mild - moderate pain  artificial  tears Solution 1 Drop(s) Both EYES every 4 hours PRN Dry Eyes  dextrose Gel 1 Dose(s) Oral once PRN Blood Glucose LESS THAN 70 milliGRAM(s)/deciliter  glucagon  Injectable 1 milliGRAM(s) IntraMuscular once PRN Glucose LESS THAN 70 milligrams/deciliter  nitroglycerin    2% Ointment 0.5 Inch(s) Transdermal two times a day PRN SBP>180  oxyCODONE    5 mG/acetaminophen 325 mG 2 Tablet(s) Oral every 6 hours PRN Severe Pain (7 - 10)

## 2018-02-05 NOTE — PROGRESS NOTE ADULT - SUBJECTIVE AND OBJECTIVE BOX
NEPHROLOGY INTERVAL HPI/OVERNIGHT EVENTS:    Examined earlier  HD today    MEDICATIONS  (STANDING):  aspirin enteric coated 81 milliGRAM(s) Oral daily  calcium acetate 1334 milliGRAM(s) Oral three times a day with meals  dextrose 5%. 1000 milliLiter(s) (50 mL/Hr) IV Continuous <Continuous>  dextrose 50% Injectable 12.5 Gram(s) IV Push once  dextrose 50% Injectable 25 Gram(s) IV Push once  dextrose 50% Injectable 25 Gram(s) IV Push once  docusate sodium 100 milliGRAM(s) Oral three times a day  enoxaparin Injectable 30 milliGRAM(s) SubCutaneous daily  epoetin raina Injectable 43309 Unit(s) IV Push <User Schedule>  ergocalciferol 05105 Unit(s) Oral every week  folic acid 1 milliGRAM(s) Oral daily  gabapentin 100 milliGRAM(s) Oral three times a day  hydrALAZINE 50 milliGRAM(s) Oral every 8 hours  insulin lispro (HumaLOG) corrective regimen sliding scale   SubCutaneous three times a day before meals  labetalol 100 milliGRAM(s) Oral every 8 hours  loratadine 10 milliGRAM(s) Oral daily  NIFEdipine XL 30 milliGRAM(s) Oral daily  pantoprazole    Tablet 40 milliGRAM(s) Oral before breakfast  senna 1 Tablet(s) Oral at bedtime  valsartan 320 milliGRAM(s) Oral daily    MEDICATIONS  (PRN):  acetaminophen   Tablet 650 milliGRAM(s) Oral every 6 hours PRN mild - moderate pain  artificial  tears Solution 1 Drop(s) Both EYES every 4 hours PRN Dry Eyes  dextrose Gel 1 Dose(s) Oral once PRN Blood Glucose LESS THAN 70 milliGRAM(s)/deciliter  glucagon  Injectable 1 milliGRAM(s) IntraMuscular once PRN Glucose LESS THAN 70 milligrams/deciliter  nitroglycerin    2% Ointment 0.5 Inch(s) Transdermal two times a day PRN SBP>180  oxyCODONE    5 mG/acetaminophen 325 mG 2 Tablet(s) Oral every 6 hours PRN Severe Pain (7 - 10)      Allergies    Mushrooms (Hives (Mild))  No Known Drug Allergies    Intolerances        Vital Signs Last 24 Hrs  T(C): 36.7 (05 Feb 2018 08:17), Max: 36.9 (05 Feb 2018 00:10)  T(F): 98.1 (05 Feb 2018 08:17), Max: 98.4 (05 Feb 2018 00:10)  HR: 87 (05 Feb 2018 08:17) (87 - 96)  BP: 135/73 (05 Feb 2018 08:17) (135/73 - 182/92)  BP(mean): --  RR: 18 (05 Feb 2018 08:17) (18 - 18)  SpO2: 92% (05 Feb 2018 08:17) (89% - 95%)  Daily     Daily     PHYSICAL EXAM:  HEAD:  Atraumatic, No face edema   NECK: Supple, No JVD,   CHEST/LUNG: Clear / EAE .   HEART: Regular rate and rhythm; No rub . No gallop   ABDOMEN: Soft, Nontender, Nondistended; Bowel sounds present  EXTREMITIES:  ++ edema right leg . + tenderness ,. No erythema . ? palpable subcutaneous plaques     RADIOLOGY & ADDITIONAL TESTS:

## 2018-02-05 NOTE — PROGRESS NOTE ADULT - ASSESSMENT
37 y/o female with ESRD on HD, DM II, HTN, came to the ER because of pain and swelling in the right calf area. no fever. occasional chills. swelling started about 1 month ago and gradually increased in size although patient stopped amlodipine to avoid increasing peripheral edema. Patient cannot take Clonidine for left foot pain developes with clonidine intake. outpatient lower ext doppler excluded DVT. S. potassium: 5.4.   PT DENIES FEVERS OR CHILLS  CT SCAN OF LEG NO obvious infection   REPORTED OUTPT DUPLEX NEG  REPEAT DUPLEX NEG  NEGATIVE CT ABD PELVIS RULE OUT OBSTRUCTION    NO CLEAR ETIOLOGY  - TRIAL OF ACE WRAP    WILL DEFER ABX  RECOMMEND XRAY OF SOFT TISUES R/O CALCIPHYLAXIS  WILL FOLLOW UP   SURGICAL EVAL

## 2018-02-05 NOTE — PROGRESS NOTE ADULT - ASSESSMENT
37 y/o female with right leg cellulitis, Hyperkalemia, ESRD on HD, hypertensive urgency, DM II.    > LE edema, generalized anasarca - discussed fluid restriction with patient.  Continued HD.  ACE wraps. OOB with PT. X-ray ordered as per ID to r/o Calciphylaxis.  > Morbid Obesity - discussed gradual weight loss as outpatient.   > Anemia of chronic disease - on HD.  Monitor CBC, stable.  Procrit.  > Leg pain - trial of Neurontin, Percocet prn.  > Hypertensive urgency - Continue Labetalol, Hydralazine, Valsartan, Procardia   > ESRD - continue HD TIW. Will give HD Monday, Tuesday and Wednesday this week per Renal.  > Diabetes - monitor fingersticks.  Insulin coverage for hyperglycemia.  > DVT Prophylaxis - Lovenox subcut

## 2018-02-05 NOTE — PROGRESS NOTE ADULT - ASSESSMENT
ESRD - HD today will lengthen treatment and increase UF goals    May also do daily HD for a few days primaily for UF  out pt schedule is MWF     R leg pain and swelling - will remove additional UF w HD- doppler w/o DVT  CT abd pelvis noted- no compresive lesion, +diffuse anasraca  CT R leg no collection - noted  S/P Cefepime / Vanco- ID stopped Abx   Will check for calciphylaxis R LE XR 3 views soft tissue- today- if + will give Ca - thiosulfate w HD  Pain control on percocet prn- this should be limited -complaining of constipation -I added colace and senna     Anemia - cont t monitor h/h  Continue  Epogen with HD   Transfuse as needed for Hgb < 7     HTN - BP and HR on high side will ad Labetalol 100mg po TID  also additional UF w HD will help  preserved EF on recent ECHO    RO - Change Renagel to Phoslo  Follow Phos

## 2018-02-06 LAB
ANION GAP SERPL CALC-SCNC: 18 MMOL/L — HIGH (ref 5–17)
BUN SERPL-MCNC: 39 MG/DL — HIGH (ref 8–20)
CALCIUM SERPL-MCNC: 9.1 MG/DL — SIGNIFICANT CHANGE UP (ref 8.6–10.2)
CHLORIDE SERPL-SCNC: 92 MMOL/L — LOW (ref 98–107)
CO2 SERPL-SCNC: 25 MMOL/L — SIGNIFICANT CHANGE UP (ref 22–29)
CREAT SERPL-MCNC: 5.27 MG/DL — HIGH (ref 0.5–1.3)
FERRITIN SERPL-MCNC: 293.3 NG/ML — SIGNIFICANT CHANGE UP (ref 11–306)
GLUCOSE BLDC GLUCOMTR-MCNC: 113 MG/DL — HIGH (ref 70–99)
GLUCOSE BLDC GLUCOMTR-MCNC: 167 MG/DL — HIGH (ref 70–99)
GLUCOSE BLDC GLUCOMTR-MCNC: 99 MG/DL — SIGNIFICANT CHANGE UP (ref 70–99)
GLUCOSE SERPL-MCNC: 146 MG/DL — HIGH (ref 70–115)
HCT VFR BLD CALC: 24.1 % — LOW (ref 37–47)
HGB BLD-MCNC: 7.3 G/DL — LOW (ref 12–16)
IRON SATN MFR SERPL: 14 % — SIGNIFICANT CHANGE UP (ref 14–50)
IRON SATN MFR SERPL: 36 UG/DL — LOW (ref 37–145)
MCHC RBC-ENTMCNC: 24.9 PG — LOW (ref 27–31)
MCHC RBC-ENTMCNC: 30.3 G/DL — LOW (ref 32–36)
MCV RBC AUTO: 82.3 FL — SIGNIFICANT CHANGE UP (ref 81–99)
PLATELET # BLD AUTO: 302 K/UL — SIGNIFICANT CHANGE UP (ref 150–400)
POTASSIUM SERPL-MCNC: 4.1 MMOL/L — SIGNIFICANT CHANGE UP (ref 3.5–5.3)
POTASSIUM SERPL-SCNC: 4.1 MMOL/L — SIGNIFICANT CHANGE UP (ref 3.5–5.3)
RBC # BLD: 2.93 M/UL — LOW (ref 4.4–5.2)
RBC # FLD: 18.9 % — HIGH (ref 11–15.6)
SODIUM SERPL-SCNC: 135 MMOL/L — SIGNIFICANT CHANGE UP (ref 135–145)
TIBC SERPL-MCNC: 259 UG/DL — SIGNIFICANT CHANGE UP (ref 220–430)
TRANSFERRIN SERPL-MCNC: 181 MG/DL — LOW (ref 192–382)
VANCOMYCIN TROUGH SERPL-MCNC: <4 UG/ML — LOW (ref 10–20)
WBC # BLD: 8.1 K/UL — SIGNIFICANT CHANGE UP (ref 4.8–10.8)
WBC # FLD AUTO: 8.1 K/UL — SIGNIFICANT CHANGE UP (ref 4.8–10.8)

## 2018-02-06 PROCEDURE — 99233 SBSQ HOSP IP/OBS HIGH 50: CPT

## 2018-02-06 RX ORDER — HYDRALAZINE HCL 50 MG
50 TABLET ORAL EVERY 6 HOURS
Qty: 0 | Refills: 0 | Status: DISCONTINUED | OUTPATIENT
Start: 2018-02-06 | End: 2018-02-09

## 2018-02-06 RX ORDER — VANCOMYCIN HCL 1 G
1000 VIAL (EA) INTRAVENOUS ONCE
Qty: 0 | Refills: 0 | Status: DISCONTINUED | OUTPATIENT
Start: 2018-02-06 | End: 2018-02-06

## 2018-02-06 RX ORDER — GABAPENTIN 400 MG/1
50 CAPSULE ORAL THREE TIMES A DAY
Qty: 0 | Refills: 0 | Status: DISCONTINUED | OUTPATIENT
Start: 2018-02-06 | End: 2018-02-16

## 2018-02-06 RX ORDER — VANCOMYCIN HCL 1 G
1000 VIAL (EA) INTRAVENOUS ONCE
Qty: 0 | Refills: 0 | Status: COMPLETED | OUTPATIENT
Start: 2018-02-06 | End: 2018-02-06

## 2018-02-06 RX ADMIN — OXYCODONE AND ACETAMINOPHEN 2 TABLET(S): 5; 325 TABLET ORAL at 22:48

## 2018-02-06 RX ADMIN — Medication 250 MILLIGRAM(S): at 20:12

## 2018-02-06 RX ADMIN — VALSARTAN 320 MILLIGRAM(S): 80 TABLET ORAL at 05:54

## 2018-02-06 RX ADMIN — Medication 50 MILLIGRAM(S): at 05:53

## 2018-02-06 RX ADMIN — Medication 1 MILLIGRAM(S): at 21:48

## 2018-02-06 RX ADMIN — Medication 81 MILLIGRAM(S): at 21:48

## 2018-02-06 RX ADMIN — GABAPENTIN 50 MILLIGRAM(S): 400 CAPSULE ORAL at 21:48

## 2018-02-06 RX ADMIN — GABAPENTIN 100 MILLIGRAM(S): 400 CAPSULE ORAL at 05:53

## 2018-02-06 RX ADMIN — Medication 30 MILLIGRAM(S): at 05:53

## 2018-02-06 RX ADMIN — LORATADINE 10 MILLIGRAM(S): 10 TABLET ORAL at 21:48

## 2018-02-06 RX ADMIN — ERGOCALCIFEROL 50000 UNIT(S): 1.25 CAPSULE ORAL at 21:48

## 2018-02-06 RX ADMIN — Medication 50 MILLIGRAM(S): at 23:03

## 2018-02-06 RX ADMIN — OXYCODONE AND ACETAMINOPHEN 2 TABLET(S): 5; 325 TABLET ORAL at 21:48

## 2018-02-06 RX ADMIN — Medication 50 MILLIGRAM(S): at 16:08

## 2018-02-06 RX ADMIN — Medication 100 MILLIGRAM(S): at 05:53

## 2018-02-06 RX ADMIN — PANTOPRAZOLE SODIUM 40 MILLIGRAM(S): 20 TABLET, DELAYED RELEASE ORAL at 05:55

## 2018-02-06 NOTE — PROGRESS NOTE ADULT - SUBJECTIVE AND OBJECTIVE BOX
CC: RLE edema    HPI:  37 y/o female with ESRD on HD, DM II, HTN, came to the ER because of pain and swelling in the right calf area.  Swelling started about 1 month ago and gradually increased in size although patient stopped amlodipine to avoid increasing peripheral edema. Patient cannot take Clonidine for left foot pain develops with clonidine intake. Outpatient lower ext doppler excluded DVT.     INTERVAL HPI/OVERNIGHT EVENTS: Patient seen and examined lying in bed.  Patient continues to complain of LE edema R>L with burning sensation when ambulating.  Patient denies any headache, dizziness, SOB, CP, abdominal pain, nausea, vomiting, dysuria.  Other ROS reviewed and are negative.    Vital Signs Last 24 Hrs  T(C): 36.7 (06 Feb 2018 07:19), Max: 37.2 (05 Feb 2018 15:10)  T(F): 98.1 (06 Feb 2018 07:19), Max: 99 (05 Feb 2018 15:10)  HR: 94 (06 Feb 2018 13:09) (88 - 101)  BP: 154/84 (06 Feb 2018 13:09) (145/76 - 168/84)  BP(mean): --  RR: 17 (06 Feb 2018 13:09) (17 - 18)  SpO2: 96% (06 Feb 2018 13:09) (94% - 97%)  I&O's Detail    05 Feb 2018 07:01  -  06 Feb 2018 07:00  --------------------------------------------------------  IN:  Total IN: 0 mL    OUT:    Other: 3600 mL  Total OUT: 3600 mL    Total NET: -3600 mL      PHYSICAL EXAM:  GENERAL: NAD, well-groomed, well-developed  HEAD:  Atraumatic, Normocephalic  NECK: Supple, No JVD, Normal thyroid  NERVOUS SYSTEM:  Alert & Oriented X3, Good concentration; Motor Strength 5/5 B/L upper and lower extremities  CHEST/LUNG: Clear to auscultation bilaterally; No rales, rhonchi, wheezing, or rubs  HEART: Regular rate and rhythm; No murmurs, rubs, or gallops  ABDOMEN: Soft, Nontender, Nondistended; Bowel sounds present  EXTREMITIES:  2+ Peripheral Pulses, R>L LE edema                                  7.5    8.5   )-----------( 271      ( 05 Feb 2018 12:39 )             24.0     05 Feb 2018 12:39    132    |  89     |  52.0   ----------------------------<  113    4.3     |  23.0   |  6.74     Ca    9.4        05 Feb 2018 12:39    TPro  7.9    /  Alb  3.6    /  TBili  1.3    /  DBili  0.8    /  AST  18     /  ALT  16     /  AlkPhos  185    05 Feb 2018 12:39      CAPILLARY BLOOD GLUCOSE      POCT Blood Glucose.: 113 mg/dL (06 Feb 2018 11:19)  POCT Blood Glucose.: 103 mg/dL (05 Feb 2018 17:11)    LIVER FUNCTIONS - ( 05 Feb 2018 12:39 )  Alb: 3.6 g/dL / Pro: 7.9 g/dL / ALK PHOS: 185 U/L / ALT: 16 U/L / AST: 18 U/L / GGT: x               MEDICATIONS  (STANDING):  aspirin enteric coated 81 milliGRAM(s) Oral daily  calcium acetate 1334 milliGRAM(s) Oral three times a day with meals  dextrose 5%. 1000 milliLiter(s) (50 mL/Hr) IV Continuous <Continuous>  dextrose 50% Injectable 12.5 Gram(s) IV Push once  dextrose 50% Injectable 25 Gram(s) IV Push once  dextrose 50% Injectable 25 Gram(s) IV Push once  docusate sodium 100 milliGRAM(s) Oral three times a day  enoxaparin Injectable 30 milliGRAM(s) SubCutaneous daily  epoetin raina Injectable 58834 Unit(s) IV Push <User Schedule>  ergocalciferol 48623 Unit(s) Oral every week  folic acid 1 milliGRAM(s) Oral daily  gabapentin   Solution 50 milliGRAM(s) Oral three times a day  hydrALAZINE 50 milliGRAM(s) Oral every 8 hours  insulin lispro (HumaLOG) corrective regimen sliding scale   SubCutaneous three times a day before meals  labetalol 100 milliGRAM(s) Oral every 8 hours  loratadine 10 milliGRAM(s) Oral daily  NIFEdipine XL 30 milliGRAM(s) Oral daily  pantoprazole    Tablet 40 milliGRAM(s) Oral before breakfast  valsartan 320 milliGRAM(s) Oral daily  vancomycin  IVPB 1000 milliGRAM(s) IV Intermittent once    MEDICATIONS  (PRN):  acetaminophen   Tablet 650 milliGRAM(s) Oral every 6 hours PRN mild - moderate pain  artificial  tears Solution 1 Drop(s) Both EYES every 4 hours PRN Dry Eyes  dextrose Gel 1 Dose(s) Oral once PRN Blood Glucose LESS THAN 70 milliGRAM(s)/deciliter  glucagon  Injectable 1 milliGRAM(s) IntraMuscular once PRN Glucose LESS THAN 70 milligrams/deciliter  nitroglycerin    2% Ointment 0.5 Inch(s) Transdermal two times a day PRN SBP>180  oxyCODONE    5 mG/acetaminophen 325 mG 2 Tablet(s) Oral every 6 hours PRN Severe Pain (7 - 10)      RADIOLOGY & ADDITIONAL TESTS:  < from: Xray Tibia + Fibula 2 Views, Right (02.05.18 @ 20:20) >   EXAM:  TIBIA FIBULA-RIGHT                          PROCEDURE DATE:  02/05/2018          INTERPRETATION:  Radiographs of the right tibia and fibula         CLINICAL INFORMATION:  Injury with  Pain.    TECHNIQUE:  Frontal and lateral views of the tibia and fibula were   obtained.    FINDINGS:  No prior studies are available for review.    The tibia and fibula appear intact.   No fracture is seen. There is   diffuse soft tissue swelling.   IMPRESSION :   No acute radiographic osseous pathology..                       MONIQUE TRACY M.D., ATTENDING RADIOLOGIST  This document has been electronically signed. Feb 6 2018  6:53AM                < end of copied text >

## 2018-02-06 NOTE — PROGRESS NOTE ADULT - SUBJECTIVE AND OBJECTIVE BOX
NEPHROLOGY INTERVAL HPI/OVERNIGHT EVENTS:    Examined earlier  R Leg calf remains edematous > L    MEDICATIONS  (STANDING):  aspirin enteric coated 81 milliGRAM(s) Oral daily  calcium acetate 1334 milliGRAM(s) Oral three times a day with meals  dextrose 5%. 1000 milliLiter(s) (50 mL/Hr) IV Continuous <Continuous>  dextrose 50% Injectable 12.5 Gram(s) IV Push once  dextrose 50% Injectable 25 Gram(s) IV Push once  dextrose 50% Injectable 25 Gram(s) IV Push once  docusate sodium 100 milliGRAM(s) Oral three times a day  enoxaparin Injectable 30 milliGRAM(s) SubCutaneous daily  epoetin raina Injectable 58255 Unit(s) IV Push <User Schedule>  ergocalciferol 75774 Unit(s) Oral every week  folic acid 1 milliGRAM(s) Oral daily  gabapentin   Solution 50 milliGRAM(s) Oral three times a day  hydrALAZINE 50 milliGRAM(s) Oral every 8 hours  insulin lispro (HumaLOG) corrective regimen sliding scale   SubCutaneous three times a day before meals  labetalol 100 milliGRAM(s) Oral every 8 hours  loratadine 10 milliGRAM(s) Oral daily  NIFEdipine XL 30 milliGRAM(s) Oral daily  pantoprazole    Tablet 40 milliGRAM(s) Oral before breakfast  valsartan 320 milliGRAM(s) Oral daily    MEDICATIONS  (PRN):  acetaminophen   Tablet 650 milliGRAM(s) Oral every 6 hours PRN mild - moderate pain  artificial  tears Solution 1 Drop(s) Both EYES every 4 hours PRN Dry Eyes  dextrose Gel 1 Dose(s) Oral once PRN Blood Glucose LESS THAN 70 milliGRAM(s)/deciliter  glucagon  Injectable 1 milliGRAM(s) IntraMuscular once PRN Glucose LESS THAN 70 milligrams/deciliter  nitroglycerin    2% Ointment 0.5 Inch(s) Transdermal two times a day PRN SBP>180  oxyCODONE    5 mG/acetaminophen 325 mG 2 Tablet(s) Oral every 6 hours PRN Severe Pain (7 - 10)      Allergies    Mushrooms (Hives (Mild))  No Known Drug Allergies    Intolerances        Vital Signs Last 24 Hrs  T(C): 36.7 (2018 07:19), Max: 37.2 (2018 15:10)  T(F): 98.1 (2018 07:19), Max: 99 (2018 15:10)  HR: 88 (:19) (88 - 101)  BP: 168/84 (2018 07:19) (145/76 - 168/84)  BP(mean): --  RR: 18 (2018 07:19) (18 - 18)  SpO2: 97% (:) (94% - 97%)  Daily     Daily Weight in k.8 (2018 15:10)    PHYSICAL EXAM:  HEAD:  Atraumatic, No face edema   NECK: Supple, No JVD,   CHEST/LUNG: Clear / EAE .   HEART: Regular rate and rhythm; No rub . No gallop   ABDOMEN: Soft, Nontender, Nondistended; Bowel sounds present  EXTREMITIES:  ++ edema right leg . + tenderness ,. No erythema . ? palpable subcutaneous plaques       LABS:                        7.5    8.5   )-----------( 271      ( 2018 12:39 )             24.0     -    132<L>  |  89<L>  |  52.0<H>  ----------------------------<  113  4.3   |  23.0  |  6.74<H>    Ca    9.4      2018 12:39    TPro  7.9  /  Alb  3.6  /  TBili  1.3  /  DBili  0.8<H>  /  AST  18  /  ALT  16  /  AlkPhos  185<H>  -                RADIOLOGY & ADDITIONAL TESTS: NEPHROLOGY INTERVAL HPI/OVERNIGHT EVENTS:    Examined earlier  R Leg calf remains edematous > L    MEDICATIONS  (STANDING):  aspirin enteric coated 81 milliGRAM(s) Oral daily  calcium acetate 1334 milliGRAM(s) Oral three times a day with meals  dextrose 5%. 1000 milliLiter(s) (50 mL/Hr) IV Continuous <Continuous>  dextrose 50% Injectable 12.5 Gram(s) IV Push once  dextrose 50% Injectable 25 Gram(s) IV Push once  dextrose 50% Injectable 25 Gram(s) IV Push once  docusate sodium 100 milliGRAM(s) Oral three times a day  enoxaparin Injectable 30 milliGRAM(s) SubCutaneous daily  epoetin raina Injectable 25655 Unit(s) IV Push <User Schedule>  ergocalciferol 98010 Unit(s) Oral every week  folic acid 1 milliGRAM(s) Oral daily  gabapentin   Solution 50 milliGRAM(s) Oral three times a day  hydrALAZINE 50 milliGRAM(s) Oral every 8 hours  insulin lispro (HumaLOG) corrective regimen sliding scale   SubCutaneous three times a day before meals  labetalol 100 milliGRAM(s) Oral every 8 hours  loratadine 10 milliGRAM(s) Oral daily  NIFEdipine XL 30 milliGRAM(s) Oral daily  pantoprazole    Tablet 40 milliGRAM(s) Oral before breakfast  valsartan 320 milliGRAM(s) Oral daily    MEDICATIONS  (PRN):  acetaminophen   Tablet 650 milliGRAM(s) Oral every 6 hours PRN mild - moderate pain  artificial  tears Solution 1 Drop(s) Both EYES every 4 hours PRN Dry Eyes  dextrose Gel 1 Dose(s) Oral once PRN Blood Glucose LESS THAN 70 milliGRAM(s)/deciliter  glucagon  Injectable 1 milliGRAM(s) IntraMuscular once PRN Glucose LESS THAN 70 milligrams/deciliter  nitroglycerin    2% Ointment 0.5 Inch(s) Transdermal two times a day PRN SBP>180  oxyCODONE    5 mG/acetaminophen 325 mG 2 Tablet(s) Oral every 6 hours PRN Severe Pain (7 - 10)      Allergies    Mushrooms (Hives (Mild))  No Known Drug Allergies    Intolerances        Vital Signs Last 24 Hrs  T(C): 36.7 (2018 07:19), Max: 37.2 (2018 15:10)  T(F): 98.1 (2018 07:19), Max: 99 (2018 15:10)  HR: 88 (:19) (88 - 101)  BP: 168/84 (2018 07:19) (145/76 - 168/84)  BP(mean): --  RR: 18 (2018 07:19) (18 - 18)  SpO2: 97% (:) (94% - 97%)  Daily     Daily Weight in k.8 (2018 15:10)    PHYSICAL EXAM:  HEAD:  Atraumatic, No face edema   NECK: Supple, No JVD,   CHEST/LUNG: Clear / EAE .   HEART: Regular rate and rhythm; No rub . No gallop   ABDOMEN: Soft, Nontender, Nondistended; Bowel sounds present  EXTREMITIES:  ++ edema right leg . + tenderness ,. some erythema      LABS:                        7.5    8.5   )-----------( 271      ( 2018 12:39 )             24.0     02-    132<L>  |  89<L>  |  52.0<H>  ----------------------------<  113  4.3   |  23.0  |  6.74<H>    Ca    9.4      2018 12:39    TPro  7.9  /  Alb  3.6  /  TBili  1.3  /  DBili  0.8<H>  /  AST  18  /  ALT  16  /  AlkPhos  185<H>  02-05                RADIOLOGY & ADDITIONAL TESTS:

## 2018-02-06 NOTE — PROGRESS NOTE ADULT - ASSESSMENT
ESRD - HD again  today I have lengthen treatment and increase UF goals    Will do daily HD for a few days primaily for UF  out pt schedule is MWF at [place]    R leg pain and  edema - will remove additional UF w HD- doppler w/o DVT  CT abd pelvis noted- no compresive lesion, +diffuse anasraca- No retroperitoneal mass- no compressive lesion  CT R leg no collection, no chronic DVT, no compressive lesion - I reviewed images w radiologist  Pt has large amount of subQ fluid collection in B/L R>>L Also has Anasarca in abd pelvis    I will give Vanco w HD 3x wk for 3-4 wks discussed w ID    Will check for calciphylaxis R LE XR 3 views soft tissue- if + will give Ca - thiosulfate w HD    Pain control on percocet prn- this should be limited -complaining of constipation -I added colace and senna     Anemia - cont t monitor h/h  Continue  Epogen with HD   Transfuse as needed for Hgb < 7     HTN - BP and HR on high side will ad Labetalol 100mg po TID  also additional UF w HD will help  preserved EF on recent ECHO    RO - Change Renagel to Phoslo  Follow Phos ESRD - HD again  today I have lengthen treatment and increase UF goals    Will do daily HD for a few days primaily for UF  out pt schedule is MWF at [place]    R leg pain and  edema - will remove additional UF w HD- doppler w/o DVT  CT abd pelvis noted- no compresive lesion, +diffuse anasraca- No retroperitoneal mass- no compressive lesion  CT R leg no collection, no chronic DVT, no compressive lesion - I reviewed images w radiologist  Pt has large amount of subQ fluid collection in B/L R>>L Also has Anasarca in abd pelvis    I will give Vanco w HD today- discussed w ID    Will check for calciphylaxis R LE XR 3 views soft tissue- if + will give Ca - thiosulfate w HD    Pain control on percocet prn- this should be limited -complaining of constipation -I added colace and senna     Anemia - cont t monitor h/h  Continue  Epogen with HD   Transfuse as needed for Hgb < 7     HTN - BP and HR on high side will ad Labetalol 100mg po TID  also additional UF w HD will help  preserved EF on recent ECHO    RO - Change Renagel to Phoslo  Follow Phos

## 2018-02-06 NOTE — PROGRESS NOTE ADULT - ASSESSMENT
37 y/o female with right leg cellulitis, Hyperkalemia, ESRD on HD, hypertensive urgency, DM II.    > LE edema, generalized anasarca - discussed fluid restriction with patient.  Continued HD.  ACE wraps. OOB with PT. X-ray ordered as per ID to r/o Calciphylaxis - negative.   > Morbid Obesity - discussed gradual weight loss as outpatient.   > Anemia of chronic disease - on HD.  Monitor CBC, stable.  Procrit.  > Leg pain - trial of Neurontin, Percocet prn.  > Hypertensive urgency - Continue Labetalol, Hydralazine, Valsartan, Procardia   > ESRD - continue HD TIW. Will give HD Monday, Tuesday and Wednesday this week per Renal.  > Diabetes - monitor fingersticks.  Insulin coverage for hyperglycemia.  > DVT Prophylaxis - Lovenox subcut

## 2018-02-07 LAB
GLUCOSE BLDC GLUCOMTR-MCNC: 108 MG/DL — HIGH (ref 70–99)
GLUCOSE BLDC GLUCOMTR-MCNC: 108 MG/DL — HIGH (ref 70–99)
GLUCOSE BLDC GLUCOMTR-MCNC: 113 MG/DL — HIGH (ref 70–99)
GLUCOSE BLDC GLUCOMTR-MCNC: 158 MG/DL — HIGH (ref 70–99)
VANCOMYCIN TROUGH SERPL-MCNC: 13.7 UG/ML — SIGNIFICANT CHANGE UP (ref 10–20)

## 2018-02-07 PROCEDURE — 99232 SBSQ HOSP IP/OBS MODERATE 35: CPT

## 2018-02-07 RX ORDER — VANCOMYCIN HCL 1 G
1250 VIAL (EA) INTRAVENOUS ONCE
Qty: 0 | Refills: 0 | Status: COMPLETED | OUTPATIENT
Start: 2018-02-07 | End: 2018-02-07

## 2018-02-07 RX ORDER — NIFEDIPINE 30 MG
60 TABLET, EXTENDED RELEASE 24 HR ORAL DAILY
Qty: 0 | Refills: 0 | Status: DISCONTINUED | OUTPATIENT
Start: 2018-02-07 | End: 2018-02-09

## 2018-02-07 RX ORDER — IRON SUCROSE 20 MG/ML
100 INJECTION, SOLUTION INTRAVENOUS
Qty: 0 | Refills: 0 | Status: DISCONTINUED | OUTPATIENT
Start: 2018-02-07 | End: 2018-02-16

## 2018-02-07 RX ADMIN — PANTOPRAZOLE SODIUM 40 MILLIGRAM(S): 20 TABLET, DELAYED RELEASE ORAL at 05:56

## 2018-02-07 RX ADMIN — Medication 1334 MILLIGRAM(S): at 08:08

## 2018-02-07 RX ADMIN — IRON SUCROSE 210 MILLIGRAM(S): 20 INJECTION, SOLUTION INTRAVENOUS at 16:09

## 2018-02-07 RX ADMIN — Medication 1334 MILLIGRAM(S): at 11:39

## 2018-02-07 RX ADMIN — Medication 50 MILLIGRAM(S): at 19:02

## 2018-02-07 RX ADMIN — Medication 166.67 MILLIGRAM(S): at 22:20

## 2018-02-07 RX ADMIN — Medication 81 MILLIGRAM(S): at 11:39

## 2018-02-07 RX ADMIN — GABAPENTIN 50 MILLIGRAM(S): 400 CAPSULE ORAL at 13:26

## 2018-02-07 RX ADMIN — Medication 30 MILLIGRAM(S): at 05:56

## 2018-02-07 RX ADMIN — Medication 1 MILLIGRAM(S): at 11:39

## 2018-02-07 RX ADMIN — GABAPENTIN 50 MILLIGRAM(S): 400 CAPSULE ORAL at 05:56

## 2018-02-07 RX ADMIN — Medication 1334 MILLIGRAM(S): at 19:04

## 2018-02-07 RX ADMIN — Medication 50 MILLIGRAM(S): at 22:53

## 2018-02-07 RX ADMIN — Medication 650 MILLIGRAM(S): at 22:09

## 2018-02-07 RX ADMIN — GABAPENTIN 50 MILLIGRAM(S): 400 CAPSULE ORAL at 22:53

## 2018-02-07 RX ADMIN — LORATADINE 10 MILLIGRAM(S): 10 TABLET ORAL at 11:39

## 2018-02-07 RX ADMIN — VALSARTAN 320 MILLIGRAM(S): 80 TABLET ORAL at 05:56

## 2018-02-07 RX ADMIN — ERYTHROPOIETIN 10000 UNIT(S): 10000 INJECTION, SOLUTION INTRAVENOUS; SUBCUTANEOUS at 15:55

## 2018-02-07 RX ADMIN — Medication 100 MILLIGRAM(S): at 22:53

## 2018-02-07 RX ADMIN — Medication 50 MILLIGRAM(S): at 05:56

## 2018-02-07 RX ADMIN — ENOXAPARIN SODIUM 30 MILLIGRAM(S): 100 INJECTION SUBCUTANEOUS at 11:39

## 2018-02-07 RX ADMIN — Medication 50 MILLIGRAM(S): at 11:39

## 2018-02-07 NOTE — PROGRESS NOTE ADULT - SUBJECTIVE AND OBJECTIVE BOX
Patient: SORAYA JIMENEZ [id number] 38y Female                 Internal Medicine Hospitalist Progress Note - Dr. Mane Hilton  Chief Complaint: Patient is a 38y old  Female who presents with a chief complaint of mother states pt was" to have  elective Lt eye surgery, during anesthesia pt became lo b/p" (2018 04:57)    HPI:  39 y/o female with ESRD on HD, DM II, HTN, came to the ER because of pain and swelling in the right calf area, worsening.  Outpatient LE doppler showed no DVT.  Serum K 5.4.  Started on abx for LE cellulitis.  Seen by nephrology.  Pt reported some initial improvement in LE edema and pain on antibiotics, however, symptoms persisted.  Denies history of trauma or immobility.  ID was consulted.  CT LE showed no abscess collection.  Inpatient LE doppler negative for DVT.  CT A/P showed no obstructive process.  There was diffuse anasarca and gross hepatomegaly.  Undergoing daily HD per Renal.      Pt given IV Abx for cellulitis.  Symptoms are improving.  Pt reports pain controlled.  Able to ambulate.  No fever / chills.  No weakness / numbness.    ____________________PHYSICAL EXAM:  Vitals reviewed as indicated below  GENERAL:  NAD Alert and Oriented x 3   HEENT: NCAT  CARDIOVASCULAR:  S1, S2  LUNGS: CTAB  ABDOMEN:  soft, (-) tenderness, (-) distension, (+) bowel sounds, (-) guarding, (-) rebound (-) rigidity  EXTREMITIES:  no cyanosis / clubbing .  b/l LE edema. R > L.   ____________________    VITALS:  Vital Signs Last 24 Hrs  T(C): 36.4 (2018 08:28), Max: 37.1 (2018 17:00)  T(F): 97.6 (2018 08:28), Max: 98.8 (2018 17:00)  HR: 95 (2018 08:28) (90 - 97)  BP: 160/90 (2018 08:28) (155/90 - 178/90)  BP(mean): --  RR: 20 (2018 08:28) (20 - 24)  SpO2: 98% (2018 08:28) (96% - 98%) Daily     Daily Weight in k.5 (2018 17:00)  CAPILLARY BLOOD GLUCOSE      POCT Blood Glucose.: 113 mg/dL (2018 11:31)  POCT Blood Glucose.: 108 mg/dL (2018 07:16)  POCT Blood Glucose.: 167 mg/dL (2018 21:56)  POCT Blood Glucose.: 99 mg/dL (2018 16:38)    I&O's Summary    2018 07:01  -  2018 07:00  --------------------------------------------------------  IN: 0 mL / OUT: 3500 mL / NET: -3500 mL        LABS:                        7.3    8.1   )-----------( 302      ( 2018 13:56 )             24.1     02-06    135  |  92<L>  |  39.0<H>  ----------------------------<  146<H>  4.1   |  25.0  |  5.27<H>    Ca    9.1      2018 13:56                  MEDICATIONS:  acetaminophen   Tablet 650 milliGRAM(s) Oral every 6 hours PRN  artificial  tears Solution 1 Drop(s) Both EYES every 4 hours PRN  aspirin enteric coated 81 milliGRAM(s) Oral daily  calcium acetate 1334 milliGRAM(s) Oral three times a day with meals  dextrose 5%. 1000 milliLiter(s) IV Continuous <Continuous>  dextrose 50% Injectable 12.5 Gram(s) IV Push once  dextrose 50% Injectable 25 Gram(s) IV Push once  dextrose 50% Injectable 25 Gram(s) IV Push once  dextrose Gel 1 Dose(s) Oral once PRN  docusate sodium 100 milliGRAM(s) Oral three times a day  enoxaparin Injectable 30 milliGRAM(s) SubCutaneous daily  epoetin raina Injectable 86429 Unit(s) IV Push <User Schedule>  ergocalciferol 41307 Unit(s) Oral every week  folic acid 1 milliGRAM(s) Oral daily  gabapentin   Solution 50 milliGRAM(s) Oral three times a day  glucagon  Injectable 1 milliGRAM(s) IntraMuscular once PRN  hydrALAZINE 50 milliGRAM(s) Oral every 6 hours  insulin lispro (HumaLOG) corrective regimen sliding scale   SubCutaneous three times a day before meals  iron sucrose IVPB 100 milliGRAM(s) IV Intermittent <User Schedule>  loratadine 10 milliGRAM(s) Oral daily  NIFEdipine XL 60 milliGRAM(s) Oral daily  nitroglycerin    2% Ointment 0.5 Inch(s) Transdermal two times a day PRN  pantoprazole    Tablet 40 milliGRAM(s) Oral before breakfast  valsartan 320 milliGRAM(s) Oral daily  vancomycin  IVPB 1250 milliGRAM(s) IV Intermittent once

## 2018-02-07 NOTE — PROGRESS NOTE ADULT - ASSESSMENT
ESRD - HD Today for additional fluid removal     Anemia -  Continue  Epogen with HD   Transfuse as needed for Hgb < 7   Add Venofer     HTN - Better on meds   preserved EF on recent ECHO    R leg pain and swelling   doppler w/o DVT  No calciphylaxis on xray or CT  No extrinsic lymphatic obstruction on CT abdomen   Vanco helping ---> Will continue   Additional fluid removal with HD as tolerated            RO - Changed Renagel to Phoslo

## 2018-02-07 NOTE — PROGRESS NOTE ADULT - SUBJECTIVE AND OBJECTIVE BOX
NEPHROLOGY INTERVAL HPI/OVERNIGHT EVENTS:    feels better   started on Vanco   edema slowly better with augmented ultrafiltration   XRay of leg w/o Calciphylaxis     MEDICATIONS  (STANDING):  aspirin enteric coated 81 milliGRAM(s) Oral daily  calcium acetate 1334 milliGRAM(s) Oral three times a day with meals  dextrose 5%. 1000 milliLiter(s) (50 mL/Hr) IV Continuous <Continuous>  dextrose 50% Injectable 12.5 Gram(s) IV Push once  dextrose 50% Injectable 25 Gram(s) IV Push once  dextrose 50% Injectable 25 Gram(s) IV Push once  docusate sodium 100 milliGRAM(s) Oral three times a day  enoxaparin Injectable 30 milliGRAM(s) SubCutaneous daily  epoetin raina Injectable 88316 Unit(s) IV Push <User Schedule>  ergocalciferol 48485 Unit(s) Oral every week  folic acid 1 milliGRAM(s) Oral daily  gabapentin   Solution 50 milliGRAM(s) Oral three times a day  hydrALAZINE 50 milliGRAM(s) Oral every 6 hours  insulin lispro (HumaLOG) corrective regimen sliding scale   SubCutaneous three times a day before meals  loratadine 10 milliGRAM(s) Oral daily  NIFEdipine XL 30 milliGRAM(s) Oral daily  pantoprazole    Tablet 40 milliGRAM(s) Oral before breakfast  valsartan 320 milliGRAM(s) Oral daily  vancomycin  IVPB 1250 milliGRAM(s) IV Intermittent once    MEDICATIONS  (PRN):  acetaminophen   Tablet 650 milliGRAM(s) Oral every 6 hours PRN mild - moderate pain  artificial  tears Solution 1 Drop(s) Both EYES every 4 hours PRN Dry Eyes  dextrose Gel 1 Dose(s) Oral once PRN Blood Glucose LESS THAN 70 milliGRAM(s)/deciliter  glucagon  Injectable 1 milliGRAM(s) IntraMuscular once PRN Glucose LESS THAN 70 milligrams/deciliter  nitroglycerin    2% Ointment 0.5 Inch(s) Transdermal two times a day PRN SBP>180      Allergies    Mushrooms (Hives (Mild))  No Known Drug Allergies    Intolerances        Vital Signs Last 24 Hrs  T(C): 36.4 (2018 08:28), Max: 37.1 (2018 17:00)  T(F): 97.6 (2018 08:28), Max: 98.8 (2018 17:00)  HR: 95 (2018 08:28) (90 - 97)  BP: 160/90 (2018 08:28) (154/84 - 178/90)  BP(mean): --  RR: 20 (2018 08:28) (17 - 24)  SpO2: 98% (2018 08:28) (94% - 98%)  Daily     Daily Weight in k.5 (2018 17:00)  I&O's Detail    2018 07:01  -  2018 07:00  --------------------------------------------------------  IN:  Total IN: 0 mL    OUT:    Other: 3500 mL  Total OUT: 3500 mL    Total NET: -3500 mL        I&O's Summary    2018 07:01  -  2018 07:00  --------------------------------------------------------  IN: 0 mL / OUT: 3500 mL / NET: -3500 mL        PHYSICAL EXAM:  HEAD:  Atraumatic, No face edema   NECK: Supple, No JVD,   CHEST/LUNG: Clear / EAE .   HEART: Regular rate and rhythm; No rub . No gallop   ABDOMEN: Soft, Nontender, Nondistended; Bowel sounds present  EXTREMITIES:  edema somewhat better   LABS:                        7.3    8.1   )-----------( 302      ( 2018 13:56 )             24.1     02-06    135  |  92<L>  |  39.0<H>  ----------------------------<  146<H>  4.1   |  25.0  |  5.27<H>    Ca    9.1      2018 13:56    TPro  7.9  /  Alb  3.6  /  TBili  1.3  /  DBili  0.8<H>  /  AST  18  /  ALT  16  /  AlkPhos  185<H>  02-05                RADIOLOGY & ADDITIONAL TESTS:

## 2018-02-08 LAB
GLUCOSE BLDC GLUCOMTR-MCNC: 110 MG/DL — HIGH (ref 70–99)
GLUCOSE BLDC GLUCOMTR-MCNC: 113 MG/DL — HIGH (ref 70–99)
GLUCOSE BLDC GLUCOMTR-MCNC: 98 MG/DL — SIGNIFICANT CHANGE UP (ref 70–99)
VANCOMYCIN TROUGH SERPL-MCNC: 24 UG/ML — HIGH (ref 10–20)

## 2018-02-08 PROCEDURE — 99233 SBSQ HOSP IP/OBS HIGH 50: CPT

## 2018-02-08 PROCEDURE — 99232 SBSQ HOSP IP/OBS MODERATE 35: CPT

## 2018-02-08 RX ORDER — VANCOMYCIN HCL 1 G
500 VIAL (EA) INTRAVENOUS ONCE
Qty: 0 | Refills: 0 | Status: COMPLETED | OUTPATIENT
Start: 2018-02-08 | End: 2018-02-08

## 2018-02-08 RX ADMIN — Medication 1 MILLIGRAM(S): at 12:34

## 2018-02-08 RX ADMIN — PANTOPRAZOLE SODIUM 40 MILLIGRAM(S): 20 TABLET, DELAYED RELEASE ORAL at 05:30

## 2018-02-08 RX ADMIN — Medication 50 MILLIGRAM(S): at 22:53

## 2018-02-08 RX ADMIN — Medication 1334 MILLIGRAM(S): at 16:50

## 2018-02-08 RX ADMIN — GABAPENTIN 50 MILLIGRAM(S): 400 CAPSULE ORAL at 22:53

## 2018-02-08 RX ADMIN — Medication 60 MILLIGRAM(S): at 05:31

## 2018-02-08 RX ADMIN — LORATADINE 10 MILLIGRAM(S): 10 TABLET ORAL at 12:35

## 2018-02-08 RX ADMIN — VALSARTAN 320 MILLIGRAM(S): 80 TABLET ORAL at 05:30

## 2018-02-08 RX ADMIN — Medication 100 MILLIGRAM(S): at 15:25

## 2018-02-08 RX ADMIN — GABAPENTIN 50 MILLIGRAM(S): 400 CAPSULE ORAL at 05:30

## 2018-02-08 RX ADMIN — Medication 81 MILLIGRAM(S): at 12:35

## 2018-02-08 RX ADMIN — IRON SUCROSE 210 MILLIGRAM(S): 20 INJECTION, SOLUTION INTRAVENOUS at 08:30

## 2018-02-08 RX ADMIN — GABAPENTIN 50 MILLIGRAM(S): 400 CAPSULE ORAL at 15:22

## 2018-02-08 RX ADMIN — Medication 50 MILLIGRAM(S): at 12:35

## 2018-02-08 RX ADMIN — Medication 50 MILLIGRAM(S): at 05:30

## 2018-02-08 RX ADMIN — Medication 50 MILLIGRAM(S): at 18:48

## 2018-02-08 NOTE — PROGRESS NOTE ADULT - SUBJECTIVE AND OBJECTIVE BOX
NEPHROLOGY INTERVAL HPI/OVERNIGHT EVENTS:    Seen at HD   Feels better   S/P Vanco yesterday     MEDICATIONS  (STANDING):  aspirin enteric coated 81 milliGRAM(s) Oral daily  calcium acetate 1334 milliGRAM(s) Oral three times a day with meals  dextrose 5%. 1000 milliLiter(s) (50 mL/Hr) IV Continuous <Continuous>  dextrose 50% Injectable 12.5 Gram(s) IV Push once  dextrose 50% Injectable 25 Gram(s) IV Push once  dextrose 50% Injectable 25 Gram(s) IV Push once  docusate sodium 100 milliGRAM(s) Oral three times a day  enoxaparin Injectable 30 milliGRAM(s) SubCutaneous daily  epoetin raina Injectable 24120 Unit(s) IV Push <User Schedule>  ergocalciferol 59972 Unit(s) Oral every week  folic acid 1 milliGRAM(s) Oral daily  gabapentin   Solution 50 milliGRAM(s) Oral three times a day  hydrALAZINE 50 milliGRAM(s) Oral every 6 hours  insulin lispro (HumaLOG) corrective regimen sliding scale   SubCutaneous three times a day before meals  iron sucrose IVPB 100 milliGRAM(s) IV Intermittent <User Schedule>  loratadine 10 milliGRAM(s) Oral daily  NIFEdipine XL 60 milliGRAM(s) Oral daily  pantoprazole    Tablet 40 milliGRAM(s) Oral before breakfast  valsartan 320 milliGRAM(s) Oral daily    MEDICATIONS  (PRN):  acetaminophen   Tablet 650 milliGRAM(s) Oral every 6 hours PRN mild - moderate pain  artificial  tears Solution 1 Drop(s) Both EYES every 4 hours PRN Dry Eyes  dextrose Gel 1 Dose(s) Oral once PRN Blood Glucose LESS THAN 70 milliGRAM(s)/deciliter  glucagon  Injectable 1 milliGRAM(s) IntraMuscular once PRN Glucose LESS THAN 70 milligrams/deciliter  nitroglycerin    2% Ointment 0.5 Inch(s) Transdermal two times a day PRN SBP>180      Allergies    Mushrooms (Hives (Mild))  No Known Drug Allergies    Intolerances        Vital Signs Last 24 Hrs  T(C): 36.4 (2018 07:15), Max: 36.9 (2018 18:00)  T(F): 97.6 (2018 07:15), Max: 98.4 (2018 18:00)  HR: 99 (2018 07:15) (90 - 99)  BP: 169/96 (2018 07:15) (140/98 - 195/100)  BP(mean): --  RR: 18 (2018 07:15) (18 - 18)  SpO2: 97% (2018 07:15) (93% - 97%)  Daily     Daily Weight in k.2 (2018 07:15)  I&O's Detail    2018 07:01  -  2018 07:00  --------------------------------------------------------  IN:  Total IN: 0 mL    OUT:    Other: 3100 mL  Total OUT: 3100 mL    Total NET: -3100 mL        I&O's Summary    2018 07:01  -  2018 07:00  --------------------------------------------------------  IN: 0 mL / OUT: 3100 mL / NET: -3100 mL        PHYSICAL EXAM:  HEAD:  Atraumatic, No face edema   NECK: Supple, No JVD,   CHEST/LUNG: Clear / EAE .   HEART: Regular rate and rhythm; No rub . No gallop   ABDOMEN: Soft, Nontender, Nondistended; Bowel sounds present  EXTREMITIES:  edema somewhat better   LABS:                        7.3    8.1   )-----------( 302      ( 2018 13:56 )             24.1     02-06    135  |  92<L>  |  39.0<H>  ----------------------------<  146<H>  4.1   |  25.0  |  5.27<H>    Ca    9.1      2018 13:56                  RADIOLOGY & ADDITIONAL TESTS:

## 2018-02-08 NOTE — PROGRESS NOTE ADULT - SUBJECTIVE AND OBJECTIVE BOX
Patient: SORAYA JIMENEZ [id number] 38y Female                 Internal Medicine Hospitalist Progress Note - Dr. Mane Hilton  Chief Complaint: Patient is a 38y old  Female who presents with a chief complaint of mother states pt was" to have  elective Lt eye surgery, during anesthesia pt became lo b/p" (2018 04:57)    HPI:  37 y/o female with ESRD on HD, DM II, HTN, came to the ER because of pain and swelling in the right calf area, worsening.  Outpatient LE doppler showed no DVT.  Serum K 5.4.  Started on abx for LE cellulitis.  Seen by nephrology.  Pt reported some initial improvement in LE edema and pain on antibiotics, however, symptoms persisted.  Denies history of trauma or immobility.  ID was consulted.  CT LE showed no abscess collection.  Inpatient LE doppler negative for DVT.  CT A/P showed no obstructive process.  There was diffuse anasarca and gross hepatomegaly.  Undergoing daily HD per Renal.  Restarted on antibiotics for presumed cellulitis.  Symptoms are improving.    S/p HD today.  Reports feeling better.  Pain improving.  Able to ambulate.  No fever / chills.  No weakness / numbness.    ____________________PHYSICAL EXAM:  Vitals reviewed as indicated below  GENERAL:  NAD Alert and Oriented x 3   HEENT: NCAT  CARDIOVASCULAR:  S1, S2  LUNGS: CTAB  ABDOMEN:  soft, (-) tenderness, (-) distension, (+) bowel sounds, (-) guarding, (-) rebound (-) rigidity  EXTREMITIES:  no cyanosis / clubbing .  b/l LE edema. R > L.   ____________________    VITALS:  Vital Signs Last 24 Hrs  T(C): 36.6 (2018 10:55), Max: 36.9 (2018 18:00)  T(F): 97.8 (2018 10:55), Max: 98.4 (2018 18:00)  HR: 98 (2018 10:55) (90 - 99)  BP: 155/85 (2018 10:55) (140/98 - 195/100)  BP(mean): --  RR: 18 (2018 10:55) (18 - 18)  SpO2: 98% (2018 10:55) (93% - 98%) Daily     Daily Weight in k.2 (2018 07:15)  CAPILLARY BLOOD GLUCOSE      POCT Blood Glucose.: 110 mg/dL (2018 11:30)  POCT Blood Glucose.: 98 mg/dL (2018 07:24)  POCT Blood Glucose.: 158 mg/dL (2018 22:24)  POCT Blood Glucose.: 108 mg/dL (2018 16:16)    I&O's Summary    2018 07:01  -  2018 07:00  --------------------------------------------------------  IN: 0 mL / OUT: 3100 mL / NET: -3100 mL        LABS:                        7.3    8.1   )-----------( 302      ( 2018 13:56 )             24.1     02-06    135  |  92<L>  |  39.0<H>  ----------------------------<  146<H>  4.1   |  25.0  |  5.27<H>    Ca    9.1      2018 13:56                  MEDICATIONS:  acetaminophen   Tablet 650 milliGRAM(s) Oral every 6 hours PRN  artificial  tears Solution 1 Drop(s) Both EYES every 4 hours PRN  aspirin enteric coated 81 milliGRAM(s) Oral daily  calcium acetate 1334 milliGRAM(s) Oral three times a day with meals  dextrose 5%. 1000 milliLiter(s) IV Continuous <Continuous>  dextrose 50% Injectable 12.5 Gram(s) IV Push once  dextrose 50% Injectable 25 Gram(s) IV Push once  dextrose 50% Injectable 25 Gram(s) IV Push once  dextrose Gel 1 Dose(s) Oral once PRN  docusate sodium 100 milliGRAM(s) Oral three times a day  enoxaparin Injectable 30 milliGRAM(s) SubCutaneous daily  epoetin raina Injectable 16381 Unit(s) IV Push <User Schedule>  ergocalciferol 53276 Unit(s) Oral every week  folic acid 1 milliGRAM(s) Oral daily  gabapentin   Solution 50 milliGRAM(s) Oral three times a day  glucagon  Injectable 1 milliGRAM(s) IntraMuscular once PRN  hydrALAZINE 50 milliGRAM(s) Oral every 6 hours  insulin lispro (HumaLOG) corrective regimen sliding scale   SubCutaneous three times a day before meals  iron sucrose IVPB 100 milliGRAM(s) IV Intermittent <User Schedule>  loratadine 10 milliGRAM(s) Oral daily  NIFEdipine XL 60 milliGRAM(s) Oral daily  nitroglycerin    2% Ointment 0.5 Inch(s) Transdermal two times a day PRN  pantoprazole    Tablet 40 milliGRAM(s) Oral before breakfast  valsartan 320 milliGRAM(s) Oral daily

## 2018-02-08 NOTE — PROGRESS NOTE ADULT - ASSESSMENT
39 y/o female with right leg cellulitis, Hyperkalemia, ESRD on HD, hypertensive urgency, DM II.    > LE edema, generalized anasarca - discussed fluid restriction with patient.  Continued HD.  ACE wraps. OOB with PT.   > RLE Cellulitis - responding to IV Vancomycin.  Renal input appreciated.  Continue Abx.   > H/o acute respiratory failure with Hypoxia - discussed with RN - check SaO2 on RA.  Unclear indication for home O2.    > Morbid Obesity - discussed gradual weight loss as outpatient.   > Anemia of chronic disease - on HD.  Monitor CBC, stable.  Procrit.  > Leg pain - trial of Neurontin  > Hypertensive urgency - pt refusing Labetalol, Clonidine, Norvasc due to perceived impact on edema.  Educated patient.  Increase hydralazine, as pt refusing Labetalol.   > ESRD - continue HD TIW.  > Diabetes - monitor fingersticks.  Insulin coverage for hyperglycemia.  > DVT Prophylaxis - Lovenox subcut

## 2018-02-08 NOTE — PROGRESS NOTE ADULT - ASSESSMENT
37 y/o female with ESRD on HD, DM II, HTN, came to the ER because of pain and swelling in the right calf area. no fever. occasional chills. swelling started about 1 month ago and gradually increased in size although patient stopped amlodipine to avoid increasing peripheral edema. Patient cannot take Clonidine for left foot pain developes with clonidine intake. outpatient lower ext doppler excluded DVT. S. potassium: 5.4.   PT DENIES FEVERS OR CHILLS  CT SCAN OF LEG NO obvious infection   REPORTED OUTPT DUPLEX NEG  REPEAT DUPLEX NEG  NEGATIVE CT ABD PELVIS RULE OUT OBSTRUCTION  SLOW IMPROVEMENT  AS PER RENAL ON IV VANCO WILL D/W THEM FURTHER

## 2018-02-08 NOTE — PROGRESS NOTE ADULT - ASSESSMENT
ESRD - HD Today for additional fluid removal     Anemia -  Continue  Epogen with HD   Transfuse as needed for Hgb < 7   Added Venofer     HTN - Better on meds   preserved EF on recent ECHO    R leg pain and swelling   doppler w/o DVT  No calciphylaxis on xray or CT  No extrinsic lymphatic obstruction on CT abdomen   Vanco helping ---> Will continue , level in am   Additional fluid removal with HD as tolerated

## 2018-02-08 NOTE — PROGRESS NOTE ADULT - SUBJECTIVE AND OBJECTIVE BOX
SORAYA JIMENEZ is a 38y Female with HPI:     39 y/o female with ESRD on HD, DM II, HTN, came to the ER because of pain and swelling in the right calf area. no fever. occasional chills. swelling started about 1 month ago and gradually increased in size although patient stopped amlodipine to avoid increasing peripheral edema. Patient cannot take Clonidine for left foot pain developes with clonidine intake. outpatient lower ext doppler excluded DVT. S. potassium: 5.4.   PT DENIES FEVERS OR CHILLS  CT SCAN OF LEG NO obvious infection   DUPLEX NEGATIVE  S/P CT SCAN OF ABD PELVIS - NO MASSES IN RETRO-PERITONEUM    SLOW IMPROVEMENT    Allergies:  Mushrooms (Hives (Mild))  No Known Drug Allergies      Home Medications:  amLODIPine 10 mg oral tablet: 1 tab(s) orally once a day (30 Jan 2018 10:53)  aspirin 81 mg oral delayed release tablet: 1 tab(s) orally once a day (30 Jan 2018 10:53)  calcium acetate 667 mg oral capsule: 2 cap(s) orally 3 times a day (with meals) (30 Jan 2018 10:53)  cloNIDine 0.1 mg oral tablet: 1 tab(s) orally 2 times a day (30 Jan 2018 10:53)  epoetin raina: 08384 unit(s) intravenous 3 times a week  as per renal (30 Jan 2018 10:53)  ergocalciferol 50,000 intl units (1.25 mg) oral capsule: 1 cap(s) orally once a day (after a meal) (30 Jan 2018 10:53)  fluticasone 50 mcg/inh nasal spray: 1 spray(s) nasal once a day (30 Jan 2018 10:53)  labetalol 100 mg oral tablet: 1 tab(s) orally 2 times a day (30 Jan 2018 10:53)  Refresh ophthalmic solution: 1 drop(s) to each affected eye every 4 hours, As needed, Dry Eyes (30 Jan 2018 10:53)  sevelamer hydrochloride 800 mg oral tablet: 1 tab(s) orally 3 times a day (with meals) (30 Jan 2018 10:53)  simvastatin 10 mg oral tablet: 1 tab(s) orally once a day (at bedtime) (30 Jan 2018 10:53)  valsartan 160 mg oral tablet: 1 tab(s) orally once a day (30 Jan 2018 10:53)        Review of Systems: - Negative except as mentioned above     Physical Exam:   ViVital Signs Last 24 Hrs  T(C): 36.6 (08 Feb 2018 10:55), Max: 36.9 (07 Feb 2018 18:00)  T(F): 97.8 (08 Feb 2018 10:55), Max: 98.4 (07 Feb 2018 18:00)  HR: 98 (08 Feb 2018 10:55) (90 - 99)  BP: 155/85 (08 Feb 2018 10:55) (140/98 - 195/100)  BP(mean): --  RR: 18 (08 Feb 2018 10:55) (18 - 18)  SpO2: 98% (08 Feb 2018 10:55) (93% - 98%)    GEN: NAD, pleasant OBESE  HEENT: normocephalic and atraumatic. EOMI. WILDA...  NECK: Supple. No carotid bruits.  No lymphadenopathy or thyromegaly.  LUNGS: Clear to auscultation.  HEART: Regular rate and rhythm without murmur.  ABDOMEN: Soft, nontender, and nondistended.  Positive bowel sounds.  No hepatosplenomegaly was noted.  NO REBOUND NO GUARDING  EXTREMITIES: DECREASED EDEMA AND TENDERNESS .RIGHT LEG  GOOD PULSES BILATERALLY  NEUROLOGIC: Cranial nerves II through XII are grossly intact.  SKIN: No ulceration or induration present.          Labs:  0            RECENT CULTURES:  01-31 @ 15:57 .Blood Blood     No growth at 48 hours

## 2018-02-08 NOTE — CHART NOTE - NSCHARTNOTEFT_GEN_A_CORE
Source: Patient [X]  Family [ ]   other [ ]    Current Diet: Diet, Consistent Carbohydrate w/Evening Snack:   DASH/TLC {Sodium & Cholesteral Restricted}  No Concentrated Potassium (01-30-18 @ 00:09)    Patient reports [ ] nausea  [ ] vomiting [ ] diarrhea [ ] constipation  [ ]chewing problems [ ] swallowing issues  [ ] other:     PO intake:  < 50% [ ]   50-75%  [ ]   %  [X]  other :    Source for PO intake [X] Patient [ ] family [ ] chart [ ] staff [ ] other    Enteral /Parenteral Nutrition:     Current Weight: 273.8lbs (2-08-18)    % Weight Change     Pertinent Medications: MEDICATIONS  (STANDING):  aspirin enteric coated 81 milliGRAM(s) Oral daily  calcium acetate 1334 milliGRAM(s) Oral three times a day with meals  dextrose 5%. 1000 milliLiter(s) (50 mL/Hr) IV Continuous <Continuous>  dextrose 50% Injectable 12.5 Gram(s) IV Push once  dextrose 50% Injectable 25 Gram(s) IV Push once  dextrose 50% Injectable 25 Gram(s) IV Push once  docusate sodium 100 milliGRAM(s) Oral three times a day  enoxaparin Injectable 30 milliGRAM(s) SubCutaneous daily  epoetin raina Injectable 59577 Unit(s) IV Push <User Schedule>  ergocalciferol 85094 Unit(s) Oral every week  folic acid 1 milliGRAM(s) Oral daily  gabapentin   Solution 50 milliGRAM(s) Oral three times a day  hydrALAZINE 50 milliGRAM(s) Oral every 6 hours  insulin lispro (HumaLOG) corrective regimen sliding scale   SubCutaneous three times a day before meals  iron sucrose IVPB 100 milliGRAM(s) IV Intermittent <User Schedule>  loratadine 10 milliGRAM(s) Oral daily  NIFEdipine XL 60 milliGRAM(s) Oral daily  pantoprazole    Tablet 40 milliGRAM(s) Oral before breakfast  valsartan 320 milliGRAM(s) Oral daily  vancomycin  IVPB 500 milliGRAM(s) IV Intermittent once    MEDICATIONS  (PRN):  acetaminophen   Tablet 650 milliGRAM(s) Oral every 6 hours PRN mild - moderate pain  artificial  tears Solution 1 Drop(s) Both EYES every 4 hours PRN Dry Eyes  dextrose Gel 1 Dose(s) Oral once PRN Blood Glucose LESS THAN 70 milliGRAM(s)/deciliter  glucagon  Injectable 1 milliGRAM(s) IntraMuscular once PRN Glucose LESS THAN 70 milligrams/deciliter  nitroglycerin    2% Ointment 0.5 Inch(s) Transdermal two times a day PRN SBP>180    Skin: Intact    Nutrition focused physical exam conducted - found signs of malnutrition [X]absent [ ]present    Subcutaneous fat loss: [ ] Orbital fat pads region, [ ]Buccal fat region, [ ]Triceps region,  [ ]Ribs region    Muscle wasting: [ ]Temples region, [ ]Clavicle region, [ ]Shoulder region, [ ]Scapula region, [ ]Interosseous region,  [ ]thigh region, [ ]Calf region    Estimated Needs:   [X] no change since previous assessment  [ ] recalculated:     Current Nutrition Diagnosis: 	Nutrition diagnosis of obesity continues. Pt with good appetite and PO intake. Is able to consume meals provided without difficulty.    Recommendations:   1) Continue current diet order  2) Monitor weight    Monitoring and Evaluation:   [X] PO intake [X] Tolerance to diet prescription [X] Weights  [X] Follow up per protocol [X] Labs:

## 2018-02-09 LAB
ANION GAP SERPL CALC-SCNC: 14 MMOL/L — SIGNIFICANT CHANGE UP (ref 5–17)
BUN SERPL-MCNC: 32 MG/DL — HIGH (ref 8–20)
CALCIUM SERPL-MCNC: 9.1 MG/DL — SIGNIFICANT CHANGE UP (ref 8.6–10.2)
CHLORIDE SERPL-SCNC: 90 MMOL/L — LOW (ref 98–107)
CO2 SERPL-SCNC: 26 MMOL/L — SIGNIFICANT CHANGE UP (ref 22–29)
CREAT SERPL-MCNC: 3.96 MG/DL — HIGH (ref 0.5–1.3)
GLUCOSE BLDC GLUCOMTR-MCNC: 103 MG/DL — HIGH (ref 70–99)
GLUCOSE BLDC GLUCOMTR-MCNC: 103 MG/DL — HIGH (ref 70–99)
GLUCOSE BLDC GLUCOMTR-MCNC: 163 MG/DL — HIGH (ref 70–99)
GLUCOSE BLDC GLUCOMTR-MCNC: 165 MG/DL — HIGH (ref 70–99)
GLUCOSE BLDC GLUCOMTR-MCNC: 98 MG/DL — SIGNIFICANT CHANGE UP (ref 70–99)
GLUCOSE SERPL-MCNC: 99 MG/DL — SIGNIFICANT CHANGE UP (ref 70–115)
HCT VFR BLD CALC: 25.5 % — LOW (ref 37–47)
HGB BLD-MCNC: 7.9 G/DL — LOW (ref 12–16)
MCHC RBC-ENTMCNC: 25.1 PG — LOW (ref 27–31)
MCHC RBC-ENTMCNC: 31 G/DL — LOW (ref 32–36)
MCV RBC AUTO: 81 FL — SIGNIFICANT CHANGE UP (ref 81–99)
PLATELET # BLD AUTO: 323 K/UL — SIGNIFICANT CHANGE UP (ref 150–400)
POTASSIUM SERPL-MCNC: 4.2 MMOL/L — SIGNIFICANT CHANGE UP (ref 3.5–5.3)
POTASSIUM SERPL-SCNC: 4.2 MMOL/L — SIGNIFICANT CHANGE UP (ref 3.5–5.3)
RBC # BLD: 3.15 M/UL — LOW (ref 4.4–5.2)
RBC # FLD: 19 % — HIGH (ref 11–15.6)
SODIUM SERPL-SCNC: 130 MMOL/L — LOW (ref 135–145)
VANCOMYCIN TROUGH SERPL-MCNC: 20.6 UG/ML — HIGH (ref 10–20)
WBC # BLD: 8.9 K/UL — SIGNIFICANT CHANGE UP (ref 4.8–10.8)
WBC # FLD AUTO: 8.9 K/UL — SIGNIFICANT CHANGE UP (ref 4.8–10.8)

## 2018-02-09 PROCEDURE — 99233 SBSQ HOSP IP/OBS HIGH 50: CPT

## 2018-02-09 RX ORDER — VANCOMYCIN HCL 1 G
500 VIAL (EA) INTRAVENOUS ONCE
Qty: 0 | Refills: 0 | Status: DISCONTINUED | OUTPATIENT
Start: 2018-02-09 | End: 2018-02-10

## 2018-02-09 RX ORDER — HYDRALAZINE HCL 50 MG
100 TABLET ORAL EVERY 8 HOURS
Qty: 0 | Refills: 0 | Status: DISCONTINUED | OUTPATIENT
Start: 2018-02-09 | End: 2018-02-16

## 2018-02-09 RX ORDER — NIFEDIPINE 30 MG
90 TABLET, EXTENDED RELEASE 24 HR ORAL DAILY
Qty: 0 | Refills: 0 | Status: DISCONTINUED | OUTPATIENT
Start: 2018-02-09 | End: 2018-02-16

## 2018-02-09 RX ADMIN — IRON SUCROSE 210 MILLIGRAM(S): 20 INJECTION, SOLUTION INTRAVENOUS at 11:17

## 2018-02-09 RX ADMIN — LORATADINE 10 MILLIGRAM(S): 10 TABLET ORAL at 14:34

## 2018-02-09 RX ADMIN — Medication 50 MILLIGRAM(S): at 06:14

## 2018-02-09 RX ADMIN — Medication 81 MILLIGRAM(S): at 14:39

## 2018-02-09 RX ADMIN — GABAPENTIN 50 MILLIGRAM(S): 400 CAPSULE ORAL at 14:35

## 2018-02-09 RX ADMIN — Medication 1 MILLIGRAM(S): at 14:35

## 2018-02-09 RX ADMIN — VALSARTAN 320 MILLIGRAM(S): 80 TABLET ORAL at 06:15

## 2018-02-09 RX ADMIN — ERYTHROPOIETIN 10000 UNIT(S): 10000 INJECTION, SOLUTION INTRAVENOUS; SUBCUTANEOUS at 11:16

## 2018-02-09 RX ADMIN — PANTOPRAZOLE SODIUM 40 MILLIGRAM(S): 20 TABLET, DELAYED RELEASE ORAL at 06:15

## 2018-02-09 RX ADMIN — Medication 100 MILLIGRAM(S): at 22:35

## 2018-02-09 RX ADMIN — ENOXAPARIN SODIUM 30 MILLIGRAM(S): 100 INJECTION SUBCUTANEOUS at 14:33

## 2018-02-09 RX ADMIN — Medication 650 MILLIGRAM(S): at 00:00

## 2018-02-09 RX ADMIN — Medication 1334 MILLIGRAM(S): at 06:40

## 2018-02-09 RX ADMIN — Medication 90 MILLIGRAM(S): at 13:01

## 2018-02-09 RX ADMIN — Medication 100 MILLIGRAM(S): at 15:00

## 2018-02-09 RX ADMIN — Medication 1334 MILLIGRAM(S): at 17:15

## 2018-02-09 RX ADMIN — Medication 60 MILLIGRAM(S): at 06:14

## 2018-02-09 RX ADMIN — GABAPENTIN 50 MILLIGRAM(S): 400 CAPSULE ORAL at 06:14

## 2018-02-09 NOTE — PROGRESS NOTE ADULT - SUBJECTIVE AND OBJECTIVE BOX
VITALS:  Vital Signs Last 24 Hrs  T(C): 36.7 (2018 09:05), Max: 36.8 (2018 08:26)  T(F): 98 (2018 09:05), Max: 98.2 (2018 08:26)  HR: 92 (2018 09:05) (92 - 100)  BP: 175/97 (2018 09:05) (145/87 - 187/102)  BP(mean): --  RR: 18 (2018 09:05) (16 - 18)  SpO2: 92% (2018 09:05) (91% - 98%) Daily     Daily Weight in k (2018 09:05)  CAPILLARY BLOOD GLUCOSE      POCT Blood Glucose.: 98 mg/dL (2018 07:40)  POCT Blood Glucose.: 165 mg/dL (2018 22:51)  POCT Blood Glucose.: 113 mg/dL (2018 16:40)  POCT Blood Glucose.: 110 mg/dL (2018 11:30)    I&O's Summary    2018 07:01  -  2018 07:00  --------------------------------------------------------  IN: 0 mL / OUT: 3000 mL / NET: -3000 mL        LABS:                        7.9    8.9   )-----------( 323      ( 2018 09:25 )             25.5     02-09    130<L>  |  90<L>  |  32.0<H>  ----------------------------<  99  4.2   |  26.0  |  3.96<H>    Ca    9.1      2018 09:25                  MEDICATIONS:  acetaminophen   Tablet 650 milliGRAM(s) Oral every 6 hours PRN  artificial  tears Solution 1 Drop(s) Both EYES every 4 hours PRN  aspirin enteric coated 81 milliGRAM(s) Oral daily  calcium acetate 1334 milliGRAM(s) Oral three times a day with meals  dextrose 5%. 1000 milliLiter(s) IV Continuous <Continuous>  dextrose 50% Injectable 12.5 Gram(s) IV Push once  dextrose 50% Injectable 25 Gram(s) IV Push once  dextrose 50% Injectable 25 Gram(s) IV Push once  dextrose Gel 1 Dose(s) Oral once PRN  docusate sodium 100 milliGRAM(s) Oral three times a day  enoxaparin Injectable 30 milliGRAM(s) SubCutaneous daily  epoetin raina Injectable 52781 Unit(s) IV Push <User Schedule>  ergocalciferol 25812 Unit(s) Oral every week  folic acid 1 milliGRAM(s) Oral daily  gabapentin   Solution 50 milliGRAM(s) Oral three times a day  glucagon  Injectable 1 milliGRAM(s) IntraMuscular once PRN  hydrALAZINE 50 milliGRAM(s) Oral every 6 hours  insulin lispro (HumaLOG) corrective regimen sliding scale   SubCutaneous three times a day before meals  iron sucrose IVPB 100 milliGRAM(s) IV Intermittent <User Schedule>  loratadine 10 milliGRAM(s) Oral daily  NIFEdipine XL 90 milliGRAM(s) Oral daily  nitroglycerin    2% Ointment 0.5 Inch(s) Transdermal two times a day PRN  pantoprazole    Tablet 40 milliGRAM(s) Oral before breakfast  valsartan 320 milliGRAM(s) Oral daily                         Patient: SORAYA JIMENEZ [id number] 38y Female                 Internal Medicine Hospitalist Progress Note - Dr. Mane Hilton  Chief Complaint: Patient is a 38y old  Female who presents with a chief complaint of mother states pt was" to have  elective Lt eye surgery, during anesthesia pt became lo b/p" (2018 04:57)    HPI:  39 y/o female with ESRD on HD, DM II, HTN, came to the ER because of pain and swelling in the right calf area, worsening.  Outpatient LE doppler showed no DVT.  Serum K 5.4.  Started on abx for LE cellulitis.  Seen by nephrology.  Pt reported some initial improvement in LE edema and pain on antibiotics, however, symptoms persisted.  Denies history of trauma or immobility.  ID was consulted.  CT LE showed no abscess collection.  Inpatient LE doppler negative for DVT.  CT A/P showed no obstructive process.  There was diffuse anasarca and gross hepatomegaly.  Undergoing daily HD per Renal.  Restarted on antibiotics for presumed cellulitis.  Symptoms are improving.    Seen at HD today.  Swelling / RLE discomfort unchanged.  Able to ambulate, limited.  No fever / chills.  No weakness / numbness.    ____________________PHYSICAL EXAM:  Vitals reviewed as indicated below  GENERAL:  NAD Alert and Oriented x 3   HEENT: NCAT  CARDIOVASCULAR:  S1, S2  LUNGS: CTAB  ABDOMEN:  soft, (-) tenderness, (-) distension, (+) bowel sounds, (-) guarding, (-) rebound (-) rigidity  EXTREMITIES:  no cyanosis / clubbing .  b/l LE edema. R > L.   ____________________

## 2018-02-09 NOTE — PROGRESS NOTE ADULT - ASSESSMENT
37 y/o female with right leg cellulitis, Hyperkalemia, ESRD on HD, hypertensive urgency, DM II.    > LE lymphedema, generalized anasarca - discussed fluid restriction with patient.  Continued HD.  ACE wraps. OOB with PT.  No DVT on LE doppler.  No pelvic compression on CT A/P.  Discussed evaluation by vascular.    > ? RLE Cellulitis - will d/w Dr. Small.  Appears to be no improvement on Vancomycin.     > Hypertensive urgency - pt refusing Labetalol, Clonidine, Norvasc due to perceived impact on edema.  Educated patient.  BP still elevated. Increase hydralazine, as pt refusing Labetalol.   > H/o acute respiratory failure with Hypoxia - discussed with RN - check SaO2 on RA.  Unclear indication for home O2.    > Morbid Obesity - discussed gradual weight loss as outpatient.   > Anemia of chronic disease - on HD.  Monitor CBC, stable.  Procrit.  > Leg pain - trial of Neurontin  > ESRD - continue HD TIW.  > Diabetes - monitor fingersticks.  Insulin coverage for hyperglycemia.  > DVT Prophylaxis - Lovenox subcut

## 2018-02-09 NOTE — PROGRESS NOTE ADULT - SUBJECTIVE AND OBJECTIVE BOX
NEPHROLOGY INTERVAL HPI/OVERNIGHT EVENTS:    MEDICATIONS  (STANDING):  aspirin enteric coated 81 milliGRAM(s) Oral daily  calcium acetate 1334 milliGRAM(s) Oral three times a day with meals  dextrose 5%. 1000 milliLiter(s) (50 mL/Hr) IV Continuous <Continuous>  dextrose 50% Injectable 12.5 Gram(s) IV Push once  dextrose 50% Injectable 25 Gram(s) IV Push once  dextrose 50% Injectable 25 Gram(s) IV Push once  docusate sodium 100 milliGRAM(s) Oral three times a day  enoxaparin Injectable 30 milliGRAM(s) SubCutaneous daily  epoetin raina Injectable 01426 Unit(s) IV Push <User Schedule>  ergocalciferol 84978 Unit(s) Oral every week  folic acid 1 milliGRAM(s) Oral daily  gabapentin   Solution 50 milliGRAM(s) Oral three times a day  hydrALAZINE 50 milliGRAM(s) Oral every 6 hours  insulin lispro (HumaLOG) corrective regimen sliding scale   SubCutaneous three times a day before meals  iron sucrose IVPB 100 milliGRAM(s) IV Intermittent <User Schedule>  loratadine 10 milliGRAM(s) Oral daily  NIFEdipine XL 90 milliGRAM(s) Oral daily  pantoprazole    Tablet 40 milliGRAM(s) Oral before breakfast  valsartan 320 milliGRAM(s) Oral daily    MEDICATIONS  (PRN):  acetaminophen   Tablet 650 milliGRAM(s) Oral every 6 hours PRN mild - moderate pain  artificial  tears Solution 1 Drop(s) Both EYES every 4 hours PRN Dry Eyes  dextrose Gel 1 Dose(s) Oral once PRN Blood Glucose LESS THAN 70 milliGRAM(s)/deciliter  glucagon  Injectable 1 milliGRAM(s) IntraMuscular once PRN Glucose LESS THAN 70 milligrams/deciliter  nitroglycerin    2% Ointment 0.5 Inch(s) Transdermal two times a day PRN SBP>180      Allergies    Mushrooms (Hives (Mild))  No Known Drug Allergies    Intolerances        Vital Signs Last 24 Hrs  T(C): 36.3 (2018 05:29), Max: 36.7 (2018 10:15)  T(F): 97.4 (2018 05:29), Max: 98.1 (2018 10:15)  HR: 98 (2018 05:29) (91 - 99)  BP: 180/92 (2018 05:29) (145/78 - 180/92)  BP(mean): --  RR: 18 (2018 05:29) (18 - 18)  SpO2: 95% (2018 05:29) (91% - 98%)  Daily     Daily Weight in k.2 (2018 10:15)    PHYSICAL EXAM:    GENERAL: appears  chronically ill  HEAD:  wnl  EYES: wnl  ENMT:   NECK: veins  full  NERVOUS SYSTEM:  same  CHEST/LUNG: decreased  bs bases, nasal 02  HEART: no rub, 1/6  systolic  murmur, tachy.  ABDOMEN: obese, not tender  EXTREMITIES:  left arm with bruit, right leg with  diffuse edema , eft trace  LYMPH:   SKIN: no rash    LABS:  reviewed plus  x-rays.                      RADIOLOGY & ADDITIONAL TESTS:

## 2018-02-09 NOTE — CONSULT NOTE ADULT - SUBJECTIVE AND OBJECTIVE BOX
Vascular Attending:  Dr. Hellen Molina      HPI:  39 y/o female with ESRD on HD, DM II, HTN, came to the ER because of pain and swelling in the right calf area. no fever. occasional chills. swelling started about 1 month ago and gradually increased in size although patient stopped amlodipine to avoid increasing peripheral edema. Patient cannot take Clonidine for left foot pain developes with clonidine intake. outpatient lower ext doppler excluded DVT. S. potassium: 5.4. (30 Jan 2018 00:10)      PAST MEDICAL & SURGICAL HISTORY:  Clostridium difficile diarrhea  Vitreous hemorrhage of left eye  Class 3 obesity due to excess calories with serious comorbidity in adult, unspecified BMI  DALTON (obstructive sleep apnea)  Pneumonia  End stage renal disease  Hypertension  Diabetes  Elective surgery: Left eye retina surgery  AVF (arteriovenous fistula)  Vascular dialysis catheter in place      REVIEW OF SYSTEMS  all are negative except for what is stated above    MEDICATIONS  (STANDING):  aspirin enteric coated 81 milliGRAM(s) Oral daily  calcium acetate 1334 milliGRAM(s) Oral three times a day with meals  dextrose 5%. 1000 milliLiter(s) (50 mL/Hr) IV Continuous <Continuous>  dextrose 50% Injectable 12.5 Gram(s) IV Push once  dextrose 50% Injectable 25 Gram(s) IV Push once  dextrose 50% Injectable 25 Gram(s) IV Push once  docusate sodium 100 milliGRAM(s) Oral three times a day  enoxaparin Injectable 30 milliGRAM(s) SubCutaneous daily  epoetin raina Injectable 43746 Unit(s) IV Push <User Schedule>  ergocalciferol 29423 Unit(s) Oral every week  folic acid 1 milliGRAM(s) Oral daily  gabapentin   Solution 50 milliGRAM(s) Oral three times a day  hydrALAZINE 100 milliGRAM(s) Oral every 8 hours  insulin lispro (HumaLOG) corrective regimen sliding scale   SubCutaneous three times a day before meals  iron sucrose IVPB 100 milliGRAM(s) IV Intermittent <User Schedule>  loratadine 10 milliGRAM(s) Oral daily  NIFEdipine XL 90 milliGRAM(s) Oral daily  pantoprazole    Tablet 40 milliGRAM(s) Oral before breakfast  valsartan 320 milliGRAM(s) Oral daily  vancomycin  IVPB 500 milliGRAM(s) IV Intermittent once    MEDICATIONS  (PRN):  acetaminophen   Tablet 650 milliGRAM(s) Oral every 6 hours PRN mild - moderate pain  artificial  tears Solution 1 Drop(s) Both EYES every 4 hours PRN Dry Eyes  dextrose Gel 1 Dose(s) Oral once PRN Blood Glucose LESS THAN 70 milliGRAM(s)/deciliter  glucagon  Injectable 1 milliGRAM(s) IntraMuscular once PRN Glucose LESS THAN 70 milligrams/deciliter  nitroglycerin    2% Ointment 0.5 Inch(s) Transdermal two times a day PRN SBP>180      Allergies    Mushrooms (Hives (Mild))  No Known Drug Allergies    Intolerances        SOCIAL HISTORY:      Vital Signs Last 24 Hrs  T(C): 36.8 (09 Feb 2018 16:15), Max: 37.1 (09 Feb 2018 13:00)  T(F): 98.3 (09 Feb 2018 16:15), Max: 98.7 (09 Feb 2018 13:00)  HR: 103 (09 Feb 2018 16:15) (92 - 105)  BP: 164/92 (09 Feb 2018 16:15) (135/81 - 187/102)  BP(mean): --  RR: 18 (09 Feb 2018 16:15) (16 - 18)  SpO2: 96% (09 Feb 2018 16:15) (92% - 97%)    PHYSICAL EXAM:    Left lower extremity has 2+ pitting edema, Ankle ROM is limited due to swelling, Homans and Verdin's test is positive, Right calf is tender to palpation. Pedal pulses are not palpable due to swelling, Toes are not edematous. Capillary refill <2sec. Warm, No sign of streaking or cellulitis. Lower leg skin is tight from swelling.     LABS:                        7.9    8.9   )-----------( 323      ( 09 Feb 2018 09:25 )             25.5     02-09    130<L>  |  90<L>  |  32.0<H>  ----------------------------<  99  4.2   |  26.0  |  3.96<H>    Ca    9.1      09 Feb 2018 09:25            RADIOLOGY & ADDITIONAL STUDIES    Impression and Plan:  HPI:  39 y/o female with ESRD on HD, DM II, HTN, came to the ER because of pain and swelling in the right calf area. no fever. occasional chills. swelling started about 1 month ago and gradually increased in size although patient stopped amlodipine to avoid increasing peripheral edema. Patient cannot take Clonidine for left foot pain developes with clonidine intake. outpatient lower ext doppler excluded DVT. S. potassium: 5.4. (30 Jan 2018 00:10)    Plan:  Pt has Lymphedema of the right lower leg  Recommend application of JESSIE Boot weekly  discontinue IV ABX  Ambulate as tolerated  Follow up out patient with Dr. Hellen Molina  Plan discussed with attending

## 2018-02-10 LAB
GLUCOSE BLDC GLUCOMTR-MCNC: 115 MG/DL — HIGH (ref 70–99)
GLUCOSE BLDC GLUCOMTR-MCNC: 132 MG/DL — HIGH (ref 70–99)
GLUCOSE BLDC GLUCOMTR-MCNC: 179 MG/DL — HIGH (ref 70–99)
GLUCOSE BLDC GLUCOMTR-MCNC: 91 MG/DL — SIGNIFICANT CHANGE UP (ref 70–99)
VANCOMYCIN TROUGH SERPL-MCNC: 12.4 UG/ML — SIGNIFICANT CHANGE UP (ref 10–20)

## 2018-02-10 PROCEDURE — 99232 SBSQ HOSP IP/OBS MODERATE 35: CPT

## 2018-02-10 RX ORDER — VANCOMYCIN HCL 1 G
500 VIAL (EA) INTRAVENOUS ONCE
Qty: 0 | Refills: 0 | Status: DISCONTINUED | OUTPATIENT
Start: 2018-02-10 | End: 2018-02-10

## 2018-02-10 RX ADMIN — Medication 1 MILLIGRAM(S): at 11:48

## 2018-02-10 RX ADMIN — Medication 100 MILLIGRAM(S): at 22:42

## 2018-02-10 RX ADMIN — Medication 81 MILLIGRAM(S): at 11:47

## 2018-02-10 RX ADMIN — ENOXAPARIN SODIUM 30 MILLIGRAM(S): 100 INJECTION SUBCUTANEOUS at 11:47

## 2018-02-10 RX ADMIN — Medication 100 MILLIGRAM(S): at 14:52

## 2018-02-10 RX ADMIN — Medication 100 MILLIGRAM(S): at 06:19

## 2018-02-10 RX ADMIN — Medication 1334 MILLIGRAM(S): at 11:47

## 2018-02-10 RX ADMIN — LORATADINE 10 MILLIGRAM(S): 10 TABLET ORAL at 11:46

## 2018-02-10 RX ADMIN — VALSARTAN 320 MILLIGRAM(S): 80 TABLET ORAL at 06:19

## 2018-02-10 RX ADMIN — PANTOPRAZOLE SODIUM 40 MILLIGRAM(S): 20 TABLET, DELAYED RELEASE ORAL at 06:19

## 2018-02-10 RX ADMIN — Medication 1334 MILLIGRAM(S): at 17:13

## 2018-02-10 RX ADMIN — Medication 90 MILLIGRAM(S): at 06:19

## 2018-02-10 NOTE — PROGRESS NOTE ADULT - SUBJECTIVE AND OBJECTIVE BOX
SORAYA JIMENEZ is a 38y Female with HPI:     37 y/o female with ESRD on HD, DM II, HTN, came to the ER because of pain and swelling in the right calf area. no fever. occasional chills. swelling started about 1 month ago and gradually increased in size although patient stopped amlodipine to avoid increasing peripheral edema. Patient cannot take Clonidine for left foot pain developes with clonidine intake. outpatient lower ext doppler excluded DVT. S. potassium: 5.4.   PT DENIES FEVERS OR CHILLS  CT SCAN OF LEG NO obvious infection   DUPLEX NEGATIVE  S/P CT SCAN OF ABD PELVIS - NO MASSES IN RETRO-PERITONEUM    SLOW IMPROVEMENT  ON VANCO AS PER RENAL    Allergies:  Mushrooms (Hives (Mild))  No Known Drug Allergies      Home Medications:  amLODIPine 10 mg oral tablet: 1 tab(s) orally once a day (30 Jan 2018 10:53)  aspirin 81 mg oral delayed release tablet: 1 tab(s) orally once a day (30 Jan 2018 10:53)  calcium acetate 667 mg oral capsule: 2 cap(s) orally 3 times a day (with meals) (30 Jan 2018 10:53)  cloNIDine 0.1 mg oral tablet: 1 tab(s) orally 2 times a day (30 Jan 2018 10:53)  epoetin raina: 62813 unit(s) intravenous 3 times a week  as per renal (30 Jan 2018 10:53)  ergocalciferol 50,000 intl units (1.25 mg) oral capsule: 1 cap(s) orally once a day (after a meal) (30 Jan 2018 10:53)  fluticasone 50 mcg/inh nasal spray: 1 spray(s) nasal once a day (30 Jan 2018 10:53)  labetalol 100 mg oral tablet: 1 tab(s) orally 2 times a day (30 Jan 2018 10:53)  Refresh ophthalmic solution: 1 drop(s) to each affected eye every 4 hours, As needed, Dry Eyes (30 Jan 2018 10:53)  sevelamer hydrochloride 800 mg oral tablet: 1 tab(s) orally 3 times a day (with meals) (30 Jan 2018 10:53)  simvastatin 10 mg oral tablet: 1 tab(s) orally once a day (at bedtime) (30 Jan 2018 10:53)  valsartan 160 mg oral tablet: 1 tab(s) orally once a day (30 Jan 2018 10:53)        Review of Systems: - Negative except as mentioned above     Physical Exam:   Vital Signs Last 24 Hrs  T(C): 36.3 (10 Feb 2018 09:02), Max: 37.1 (09 Feb 2018 13:00)  T(F): 97.4 (10 Feb 2018 09:02), Max: 98.7 (09 Feb 2018 13:00)  HR: 103 (10 Feb 2018 09:02) (96 - 105)  BP: 148/84 (10 Feb 2018 09:02) (135/81 - 164/92)  BP(mean): --  RR: 18 (10 Feb 2018 09:02) (18 - 18)  SpO2: 97% (10 Feb 2018 09:02) (92% - 97%)    GEN: NAD, pleasant OBESE  HEENT: normocephalic and atraumatic. EOMI. WILDA...  NECK: Supple. No carotid bruits.  No lymphadenopathy or thyromegaly.  LUNGS: Clear to auscultation.  HEART: Regular rate and rhythm without murmur.  ABDOMEN: Soft, nontender, and nondistended.  Positive bowel sounds.  No hepatosplenomegaly was noted.  NO REBOUND NO GUARDING  EXTREMITIES: DECREASED EDEMA AND TENDERNESS .RIGHT LEG  GOOD PULSES BILATERALLY  NEUROLOGIC: Cranial nerves II through XII are grossly intact.  SKIN: No ulceration or induration present.          Labs:                         7.9    8.9   )-----------( 323      ( 09 Feb 2018 09:25 )             25.5         02-09    130<L>  |  90<L>  |  32.0<H>  ----------------------------<  99  4.2   |  26.0  |  3.96<H>    Ca    9.1      09 Feb 2018 09:25         RECENT CULTURES:  01-31 @ 15:57 .Blood Blood     No growth at 48 hours

## 2018-02-10 NOTE — PROGRESS NOTE ADULT - SUBJECTIVE AND OBJECTIVE BOX
Patient: SORAYA JIMENEZ [id number] 38y Female                 Internal Medicine Hospitalist Progress Note - Dr. Mane Hilton  Chief Complaint: Patient is a 38y old  Female who presents with a chief complaint of mother states pt was" to have  elective Lt eye surgery, during anesthesia pt became lo b/p" (30 Jan 2018 04:57)    HPI:  37 y/o female with ESRD on HD, DM II, HTN, came to the ER because of pain and swelling in the right calf area, worsening.  Outpatient LE doppler showed no DVT.  Serum K 5.4.  Started on abx for LE cellulitis.  Seen by nephrology.  Pt reported some initial improvement in LE edema and pain on antibiotics, however, symptoms persisted.  Denies history of trauma or immobility.  ID was consulted.  CT LE showed no abscess collection.  Inpatient LE doppler negative for DVT.  CT A/P showed no obstructive process.  There was diffuse anasarca and gross hepatomegaly.  Undergoing daily HD per Renal.  Seen by Vascular, placed in Unna boot with improvement in symptoms.     LE swelling / discomfort improved.  Able to ambulate, limited.  No fever / chills.  No weakness / numbness.    ____________________PHYSICAL EXAM:  Vitals reviewed as indicated below  GENERAL:  NAD Alert and Oriented x 3   HEENT: NCAT  CARDIOVASCULAR:  S1, S2  LUNGS: CTAB  ABDOMEN:  soft, (-) tenderness, (-) distension, (+) bowel sounds, (-) guarding, (-) rebound (-) rigidity  EXTREMITIES:  no cyanosis / clubbing .  b/l LE edema. R > L. Unna boots in place.   ____________________    VITALS:  Vital Signs Last 24 Hrs  T(C): 36.3 (10 Feb 2018 09:02), Max: 36.8 (09 Feb 2018 16:15)  T(F): 97.4 (10 Feb 2018 09:02), Max: 98.3 (09 Feb 2018 16:15)  HR: 103 (10 Feb 2018 09:02) (96 - 103)  BP: 148/84 (10 Feb 2018 09:02) (142/78 - 164/92)  BP(mean): --  RR: 18 (10 Feb 2018 09:02) (18 - 18)  SpO2: 97% (10 Feb 2018 09:02) (96% - 97%) Daily     Daily   CAPILLARY BLOOD GLUCOSE      POCT Blood Glucose.: 115 mg/dL (10 Feb 2018 11:42)  POCT Blood Glucose.: 91 mg/dL (10 Feb 2018 07:38)  POCT Blood Glucose.: 163 mg/dL (09 Feb 2018 22:12)  POCT Blood Glucose.: 103 mg/dL (09 Feb 2018 17:02)    I&O's Summary    09 Feb 2018 07:01  -  10 Feb 2018 07:00  --------------------------------------------------------  IN: 0 mL / OUT: 3500 mL / NET: -3500 mL        LABS:                        7.9    8.9   )-----------( 323      ( 09 Feb 2018 09:25 )             25.5     02-09    130<L>  |  90<L>  |  32.0<H>  ----------------------------<  99  4.2   |  26.0  |  3.96<H>    Ca    9.1      09 Feb 2018 09:25                  MEDICATIONS:  acetaminophen   Tablet 650 milliGRAM(s) Oral every 6 hours PRN  artificial  tears Solution 1 Drop(s) Both EYES every 4 hours PRN  aspirin enteric coated 81 milliGRAM(s) Oral daily  calcium acetate 1334 milliGRAM(s) Oral three times a day with meals  dextrose 5%. 1000 milliLiter(s) IV Continuous <Continuous>  dextrose 50% Injectable 12.5 Gram(s) IV Push once  dextrose 50% Injectable 25 Gram(s) IV Push once  dextrose 50% Injectable 25 Gram(s) IV Push once  dextrose Gel 1 Dose(s) Oral once PRN  docusate sodium 100 milliGRAM(s) Oral three times a day  enoxaparin Injectable 30 milliGRAM(s) SubCutaneous daily  epoetin raina Injectable 25841 Unit(s) IV Push <User Schedule>  ergocalciferol 47632 Unit(s) Oral every week  folic acid 1 milliGRAM(s) Oral daily  gabapentin   Solution 50 milliGRAM(s) Oral three times a day  glucagon  Injectable 1 milliGRAM(s) IntraMuscular once PRN  hydrALAZINE 100 milliGRAM(s) Oral every 8 hours  insulin lispro (HumaLOG) corrective regimen sliding scale   SubCutaneous three times a day before meals  iron sucrose IVPB 100 milliGRAM(s) IV Intermittent <User Schedule>  loratadine 10 milliGRAM(s) Oral daily  NIFEdipine XL 90 milliGRAM(s) Oral daily  nitroglycerin    2% Ointment 0.5 Inch(s) Transdermal two times a day PRN  pantoprazole    Tablet 40 milliGRAM(s) Oral before breakfast  valsartan 320 milliGRAM(s) Oral daily

## 2018-02-10 NOTE — PROGRESS NOTE ADULT - ASSESSMENT
39 y/o female with right leg cellulitis, Hyperkalemia, ESRD on HD, hypertensive urgency, DM II.    > LE lymphedema, generalized anasarca - discussed fluid restriction with patient.  Continued HD, another session today.  No DVT on LE doppler.  No pelvic compression on CT A/P.  Vascular evaluation appreciated.  Symptoms improving with Unna boot.   > RLE Cellulitis ruled out.  Off antibiotics.   > Hypertensive urgency - pt refusing Labetalol, Clonidine, Norvasc due to perceived impact on edema.  Educated patient.  BP still elevated. Increased hydralazine.  Will increase Nifedipine as pt refusing other agents.   > H/o acute respiratory failure with Hypoxia - discussed with RN - check SaO2 on RA.  Unclear indication for home O2.    > Morbid Obesity - discussed gradual weight loss as outpatient.   > Anemia of chronic disease - on HD.  Monitor CBC, stable.  Procrit.  > Leg pain - trial of Neurontin  > ESRD - continue HD TIW.  > Diabetes - monitor fingersticks.  Insulin coverage for hyperglycemia.  > DVT Prophylaxis - Lovenox subcut

## 2018-02-10 NOTE — PROGRESS NOTE ADULT - ASSESSMENT
37 y/o female with ESRD on HD, DM II, HTN, came to the ER because of pain and swelling in the right calf area. no fever. occasional chills. swelling started about 1 month ago and gradually increased in size although patient stopped amlodipine to avoid increasing peripheral edema. Patient cannot take Clonidine for left foot pain developes with clonidine intake. outpatient lower ext doppler excluded DVT. S. potassium: 5.4.   PT DENIES FEVERS OR CHILLS  CT SCAN OF LEG NO obvious infection   REPORTED OUTPT DUPLEX NEG  REPEAT DUPLEX NEG  NEGATIVE CT ABD PELVIS RULE OUT OBSTRUCTION  SLOW IMPROVEMENT RIGHT CALF SOFT   AS PER RENAL ON IV VANCO    WILL FOLLOW UP AS NEEDED  PLEASE CALL IF QUESTIONS

## 2018-02-10 NOTE — PROGRESS NOTE ADULT - SUBJECTIVE AND OBJECTIVE BOX
NEPHROLOGY INTERVAL HPI/OVERNIGHT EVENTS: No new events.    MEDICATIONS  (STANDING):  aspirin enteric coated 81 milliGRAM(s) Oral daily  calcium acetate 1334 milliGRAM(s) Oral three times a day with meals  dextrose 5%. 1000 milliLiter(s) (50 mL/Hr) IV Continuous <Continuous>  dextrose 50% Injectable 12.5 Gram(s) IV Push once  dextrose 50% Injectable 25 Gram(s) IV Push once  dextrose 50% Injectable 25 Gram(s) IV Push once  docusate sodium 100 milliGRAM(s) Oral three times a day  enoxaparin Injectable 30 milliGRAM(s) SubCutaneous daily  epoetin raina Injectable 68584 Unit(s) IV Push <User Schedule>  ergocalciferol 98011 Unit(s) Oral every week  folic acid 1 milliGRAM(s) Oral daily  gabapentin   Solution 50 milliGRAM(s) Oral three times a day  hydrALAZINE 50 milliGRAM(s) Oral every 6 hours  insulin lispro (HumaLOG) corrective regimen sliding scale   SubCutaneous three times a day before meals  iron sucrose IVPB 100 milliGRAM(s) IV Intermittent <User Schedule>  loratadine 10 milliGRAM(s) Oral daily  NIFEdipine XL 90 milliGRAM(s) Oral daily  pantoprazole    Tablet 40 milliGRAM(s) Oral before breakfast  valsartan 320 milliGRAM(s) Oral daily    MEDICATIONS  (PRN):  acetaminophen   Tablet 650 milliGRAM(s) Oral every 6 hours PRN mild - moderate pain  artificial  tears Solution 1 Drop(s) Both EYES every 4 hours PRN Dry Eyes  dextrose Gel 1 Dose(s) Oral once PRN Blood Glucose LESS THAN 70 milliGRAM(s)/deciliter  glucagon  Injectable 1 milliGRAM(s) IntraMuscular once PRN Glucose LESS THAN 70 milligrams/deciliter  nitroglycerin    2% Ointment 0.5 Inch(s) Transdermal two times a day PRN SBP>180      Allergies    Mushrooms (Hives (Mild))  No Known Drug Allergies              Vital Signs Last 24 Hrs    T(C): 36.8 (10 Feb 2018 05:20), Max: 37.1 (2018 13:00)  T(F): 98.2 (10 Feb 2018 05:20), Max: 98.7 (2018 13:00)  HR: 99 (10 Feb 2018 05:20) (92 - 105)  BP: 156/84 (10 Feb 2018 05:20) (135/81 - 175/97)  BP(mean): --  RR: 18 (10 Feb 2018 05:20) (18 - 18)  SpO2: 96% (10 Feb 2018 05:20) (92% - 97%)  T(C): 36.3 (2018 05:29), Max: 36.7 (2018 10:15)  T(F): 97.4 (2018 05:29), Max: 98.1 (2018 10:15)  HR: 98 (2018 05:29) (91 - 99)  BP: 180/92 (2018 05:29) (145/78 - 180/92)  BP(mean): --  RR: 18 (2018 05:29) (18 - 18)  SpO2: 95% (2018 05:29) (91% - 98%)       Daily Weight in k.2 (2018 10:15)    PHYSICAL EXAM:    GENERAL: appears  chronically ill  HEAD:  wnl  EYES: wnl  ENMT:   NECK: veins  full  NERVOUS SYSTEM:  same  CHEST/LUNG: decreased  bs bases, nasal 02  HEART: no rub, 1/6  systolic  murmur, tachy.  ABDOMEN: obese, not tender  EXTREMITIES:  left arm with bruit, leg edema better, right leg now has wrap  LYMPH:   SKIN: no rash    LABS:  reviewed .                      RADIOLOGY & ADDITIONAL TESTS:

## 2018-02-11 LAB
GLUCOSE BLDC GLUCOMTR-MCNC: 105 MG/DL — HIGH (ref 70–99)
GLUCOSE BLDC GLUCOMTR-MCNC: 109 MG/DL — HIGH (ref 70–99)
GLUCOSE BLDC GLUCOMTR-MCNC: 115 MG/DL — HIGH (ref 70–99)
GLUCOSE BLDC GLUCOMTR-MCNC: 132 MG/DL — HIGH (ref 70–99)

## 2018-02-11 PROCEDURE — 99232 SBSQ HOSP IP/OBS MODERATE 35: CPT

## 2018-02-11 RX ORDER — IPRATROPIUM/ALBUTEROL SULFATE 18-103MCG
3 AEROSOL WITH ADAPTER (GRAM) INHALATION ONCE
Qty: 0 | Refills: 0 | Status: COMPLETED | OUTPATIENT
Start: 2018-02-11 | End: 2018-02-11

## 2018-02-11 RX ORDER — ALPRAZOLAM 0.25 MG
0.25 TABLET ORAL THREE TIMES A DAY
Qty: 0 | Refills: 0 | Status: DISCONTINUED | OUTPATIENT
Start: 2018-02-11 | End: 2018-02-16

## 2018-02-11 RX ORDER — ALBUTEROL 90 UG/1
2.5 AEROSOL, METERED ORAL EVERY 6 HOURS
Qty: 0 | Refills: 0 | Status: COMPLETED | OUTPATIENT
Start: 2018-02-11 | End: 2018-02-12

## 2018-02-11 RX ADMIN — Medication 1334 MILLIGRAM(S): at 16:49

## 2018-02-11 RX ADMIN — Medication 100 MILLIGRAM(S): at 22:08

## 2018-02-11 RX ADMIN — Medication 81 MILLIGRAM(S): at 11:51

## 2018-02-11 RX ADMIN — ALBUTEROL 2.5 MILLIGRAM(S): 90 AEROSOL, METERED ORAL at 20:49

## 2018-02-11 RX ADMIN — Medication 90 MILLIGRAM(S): at 05:28

## 2018-02-11 RX ADMIN — Medication 3 MILLILITER(S): at 09:30

## 2018-02-11 RX ADMIN — Medication 100 MILLIGRAM(S): at 05:28

## 2018-02-11 RX ADMIN — Medication 100 MILLIGRAM(S): at 14:42

## 2018-02-11 RX ADMIN — LORATADINE 10 MILLIGRAM(S): 10 TABLET ORAL at 11:52

## 2018-02-11 RX ADMIN — ENOXAPARIN SODIUM 30 MILLIGRAM(S): 100 INJECTION SUBCUTANEOUS at 11:52

## 2018-02-11 RX ADMIN — Medication 1 MILLIGRAM(S): at 11:52

## 2018-02-11 RX ADMIN — Medication 1334 MILLIGRAM(S): at 11:51

## 2018-02-11 RX ADMIN — Medication 1334 MILLIGRAM(S): at 07:57

## 2018-02-11 RX ADMIN — VALSARTAN 320 MILLIGRAM(S): 80 TABLET ORAL at 05:28

## 2018-02-11 RX ADMIN — Medication 0.25 MILLIGRAM(S): at 22:08

## 2018-02-11 RX ADMIN — PANTOPRAZOLE SODIUM 40 MILLIGRAM(S): 20 TABLET, DELAYED RELEASE ORAL at 08:02

## 2018-02-11 RX ADMIN — Medication 0.25 MILLIGRAM(S): at 14:42

## 2018-02-11 RX ADMIN — GABAPENTIN 50 MILLIGRAM(S): 400 CAPSULE ORAL at 22:09

## 2018-02-11 NOTE — PROGRESS NOTE ADULT - SUBJECTIVE AND OBJECTIVE BOX
Patient: SORAYA JIMENEZ [id number] 38y Female                 Internal Medicine Hospitalist Progress Note - Dr. Mane Hilton  Chief Complaint: Patient is a 38y old  Female who presents with a chief complaint of mother states pt was" to have  elective Lt eye surgery, during anesthesia pt became lo b/p" (2018 04:57)    HPI:  37 y/o female with ESRD on HD, DM II, HTN, came to the ER because of pain and swelling in the right calf area, worsening.  Outpatient LE doppler showed no DVT.  Serum K 5.4.  Started on abx for LE cellulitis.  Seen by nephrology.  Pt reported some initial improvement in LE edema and pain on antibiotics, however, symptoms persisted.  Denies history of trauma or immobility.  ID was consulted.  CT LE showed no abscess collection.  Inpatient LE doppler negative for DVT.  CT A/P showed no obstructive process.  There was diffuse anasarca and gross hepatomegaly.  Undergoing daily HD per Renal.  Seen by Vascular, placed in Unna boot with improvement in symptoms.     Seen today with cousin at bedside.  Pt c/o feeling depressed due to family issues at home, confirmed by cousin.  No suicidal / homicidal thoughts.  States she has not slept since last night.  Reports LE edema significantly improved.  Had c/o sob earlier, which cousin confirms is c/w her anxiety attacks.  No specific somatic complaints at present    ____________________PHYSICAL EXAM:  Vitals reviewed as indicated below  GENERAL:  Slightly anxious, Alert and Oriented x 3   HEENT: NCAT  CARDIOVASCULAR:  S1, S2  LUNGS: CTAB  ABDOMEN:  soft, (-) tenderness, (-) distension, (+) bowel sounds, (-) guarding, (-) rebound (-) rigidity  EXTREMITIES:  no cyanosis / clubbing .  b/l LE edema. R > L. Unna boots in place.   ____________________    VITALS:  Vital Signs Last 24 Hrs  T(C): 36.9 (2018 08:31), Max: 36.9 (2018 06:05)  T(F): 98.4 (2018 08:31), Max: 98.4 (2018 06:05)  HR: 109 (2018 08:31) (89 - 110)  BP: 174/60 (2018 08:31) (139/79 - 190/103)  BP(mean): --  RR: 18 (2018 08:31) (17 - 20)  SpO2: 97% (2018 08:31) (88% - 100%) Daily     Daily Weight in k (10 Feb 2018 16:40)  CAPILLARY BLOOD GLUCOSE      POCT Blood Glucose.: 132 mg/dL (2018 11:15)  POCT Blood Glucose.: 105 mg/dL (2018 07:51)  POCT Blood Glucose.: 179 mg/dL (10 Feb 2018 23:30)  POCT Blood Glucose.: 132 mg/dL (10 Feb 2018 16:26)    I&O's Summary    10 Feb 2018 07:01  -  2018 07:00  --------------------------------------------------------  IN: 500 mL / OUT: 3000 mL / NET: -2500 mL        LABS:                      MEDICATIONS:  acetaminophen   Tablet 650 milliGRAM(s) Oral every 6 hours PRN  ALPRAZolam 0.25 milliGRAM(s) Oral three times a day PRN  artificial  tears Solution 1 Drop(s) Both EYES every 4 hours PRN  aspirin enteric coated 81 milliGRAM(s) Oral daily  calcium acetate 1334 milliGRAM(s) Oral three times a day with meals  dextrose 5%. 1000 milliLiter(s) IV Continuous <Continuous>  dextrose 50% Injectable 12.5 Gram(s) IV Push once  dextrose 50% Injectable 25 Gram(s) IV Push once  dextrose 50% Injectable 25 Gram(s) IV Push once  dextrose Gel 1 Dose(s) Oral once PRN  docusate sodium 100 milliGRAM(s) Oral three times a day  enoxaparin Injectable 30 milliGRAM(s) SubCutaneous daily  epoetin raina Injectable 82083 Unit(s) IV Push <User Schedule>  ergocalciferol 27209 Unit(s) Oral every week  folic acid 1 milliGRAM(s) Oral daily  gabapentin   Solution 50 milliGRAM(s) Oral three times a day  glucagon  Injectable 1 milliGRAM(s) IntraMuscular once PRN  hydrALAZINE 100 milliGRAM(s) Oral every 8 hours  insulin lispro (HumaLOG) corrective regimen sliding scale   SubCutaneous three times a day before meals  iron sucrose IVPB 100 milliGRAM(s) IV Intermittent <User Schedule>  loratadine 10 milliGRAM(s) Oral daily  NIFEdipine XL 90 milliGRAM(s) Oral daily  nitroglycerin    2% Ointment 0.5 Inch(s) Transdermal two times a day PRN  pantoprazole    Tablet 40 milliGRAM(s) Oral before breakfast  valsartan 320 milliGRAM(s) Oral daily

## 2018-02-11 NOTE — PROGRESS NOTE ADULT - ASSESSMENT
37 y/o female with right leg cellulitis, Hyperkalemia, ESRD on HD, hypertensive urgency, DM II.    > Anxiety - extensively d/w patient and cousin at bedside.  She denies suicidal / homicidal thoughts.  Denies danger to self or others.  Trial of Xanax.    > LE lymphedema, generalized anasarca - discussed fluid restriction with patient.  Continued HD, another session today.  No DVT on LE doppler.  No pelvic compression on CT A/P.  Vascular evaluation appreciated.  Symptoms improving with Unna boot.   > RLE Cellulitis ruled out.  Off antibiotics.   > Hypertensive urgency - pt refusing Labetalol, Clonidine, Norvasc due to perceived impact on edema.  Educated patient.  Continue Hydralazine, Nifedipine.    > H/o acute respiratory failure with Hypoxia - discussed with RN - check SaO2 on RA.  Unclear indication for home O2.    > Morbid Obesity - discussed gradual weight loss as outpatient.   > Anemia of chronic disease - on HD.  Monitor CBC, stable.  Procrit.  > Leg pain - trial of Neurontin  > ESRD - continue HD TIW.  > Diabetes - monitor fingersticks.  Insulin coverage for hyperglycemia.  > DVT Prophylaxis - Lovenox subcut

## 2018-02-12 LAB
ANION GAP SERPL CALC-SCNC: 17 MMOL/L — SIGNIFICANT CHANGE UP (ref 5–17)
BUN SERPL-MCNC: 40 MG/DL — HIGH (ref 8–20)
CALCIUM SERPL-MCNC: 9.5 MG/DL — SIGNIFICANT CHANGE UP (ref 8.6–10.2)
CHLORIDE SERPL-SCNC: 92 MMOL/L — LOW (ref 98–107)
CO2 SERPL-SCNC: 26 MMOL/L — SIGNIFICANT CHANGE UP (ref 22–29)
CREAT SERPL-MCNC: 5.13 MG/DL — HIGH (ref 0.5–1.3)
GLUCOSE BLDC GLUCOMTR-MCNC: 104 MG/DL — HIGH (ref 70–99)
GLUCOSE BLDC GLUCOMTR-MCNC: 119 MG/DL — HIGH (ref 70–99)
GLUCOSE BLDC GLUCOMTR-MCNC: 121 MG/DL — HIGH (ref 70–99)
GLUCOSE BLDC GLUCOMTR-MCNC: 135 MG/DL — HIGH (ref 70–99)
GLUCOSE SERPL-MCNC: 97 MG/DL — SIGNIFICANT CHANGE UP (ref 70–115)
HCT VFR BLD CALC: 28.8 % — LOW (ref 37–47)
HGB BLD-MCNC: 8.7 G/DL — LOW (ref 12–16)
MCHC RBC-ENTMCNC: 24.8 PG — LOW (ref 27–31)
MCHC RBC-ENTMCNC: 30.2 G/DL — LOW (ref 32–36)
MCV RBC AUTO: 82.1 FL — SIGNIFICANT CHANGE UP (ref 81–99)
PHOSPHATE SERPL-MCNC: 2.7 MG/DL — SIGNIFICANT CHANGE UP (ref 2.4–4.7)
PLATELET # BLD AUTO: 365 K/UL — SIGNIFICANT CHANGE UP (ref 150–400)
POTASSIUM SERPL-MCNC: 5.5 MMOL/L — HIGH (ref 3.5–5.3)
POTASSIUM SERPL-SCNC: 5.5 MMOL/L — HIGH (ref 3.5–5.3)
RBC # BLD: 3.51 M/UL — LOW (ref 4.4–5.2)
RBC # FLD: 19.4 % — HIGH (ref 11–15.6)
SODIUM SERPL-SCNC: 135 MMOL/L — SIGNIFICANT CHANGE UP (ref 135–145)
VANCOMYCIN TROUGH SERPL-MCNC: 10.7 UG/ML — SIGNIFICANT CHANGE UP (ref 10–20)
WBC # BLD: 9.5 K/UL — SIGNIFICANT CHANGE UP (ref 4.8–10.8)
WBC # FLD AUTO: 9.5 K/UL — SIGNIFICANT CHANGE UP (ref 4.8–10.8)

## 2018-02-12 PROCEDURE — 99232 SBSQ HOSP IP/OBS MODERATE 35: CPT

## 2018-02-12 PROCEDURE — 71045 X-RAY EXAM CHEST 1 VIEW: CPT | Mod: 26

## 2018-02-12 RX ORDER — TIOTROPIUM BROMIDE 18 UG/1
1 CAPSULE ORAL; RESPIRATORY (INHALATION) DAILY
Qty: 0 | Refills: 0 | Status: DISCONTINUED | OUTPATIENT
Start: 2018-02-12 | End: 2018-02-16

## 2018-02-12 RX ORDER — HYDRALAZINE HCL 50 MG
15 TABLET ORAL EVERY 6 HOURS
Qty: 0 | Refills: 0 | Status: DISCONTINUED | OUTPATIENT
Start: 2018-02-12 | End: 2018-02-16

## 2018-02-12 RX ORDER — ATENOLOL 25 MG/1
25 TABLET ORAL DAILY
Qty: 0 | Refills: 0 | Status: DISCONTINUED | OUTPATIENT
Start: 2018-02-12 | End: 2018-02-13

## 2018-02-12 RX ORDER — ALBUTEROL 90 UG/1
1 AEROSOL, METERED ORAL EVERY 4 HOURS
Qty: 0 | Refills: 0 | Status: DISCONTINUED | OUTPATIENT
Start: 2018-02-12 | End: 2018-02-16

## 2018-02-12 RX ORDER — IPRATROPIUM/ALBUTEROL SULFATE 18-103MCG
3 AEROSOL WITH ADAPTER (GRAM) INHALATION EVERY 6 HOURS
Qty: 0 | Refills: 0 | Status: DISCONTINUED | OUTPATIENT
Start: 2018-02-12 | End: 2018-02-16

## 2018-02-12 RX ADMIN — LORATADINE 10 MILLIGRAM(S): 10 TABLET ORAL at 11:55

## 2018-02-12 RX ADMIN — ALBUTEROL 2.5 MILLIGRAM(S): 90 AEROSOL, METERED ORAL at 03:46

## 2018-02-12 RX ADMIN — ENOXAPARIN SODIUM 30 MILLIGRAM(S): 100 INJECTION SUBCUTANEOUS at 11:56

## 2018-02-12 RX ADMIN — Medication 90 MILLIGRAM(S): at 05:45

## 2018-02-12 RX ADMIN — Medication 1 MILLIGRAM(S): at 11:56

## 2018-02-12 RX ADMIN — Medication 100 MILLIGRAM(S): at 22:22

## 2018-02-12 RX ADMIN — Medication 3 MILLILITER(S): at 20:53

## 2018-02-12 RX ADMIN — Medication 3 MILLILITER(S): at 15:12

## 2018-02-12 RX ADMIN — GABAPENTIN 50 MILLIGRAM(S): 400 CAPSULE ORAL at 05:45

## 2018-02-12 RX ADMIN — IRON SUCROSE 210 MILLIGRAM(S): 20 INJECTION, SOLUTION INTRAVENOUS at 09:45

## 2018-02-12 RX ADMIN — Medication 100 MILLIGRAM(S): at 05:44

## 2018-02-12 RX ADMIN — Medication 1334 MILLIGRAM(S): at 16:50

## 2018-02-12 RX ADMIN — PANTOPRAZOLE SODIUM 40 MILLIGRAM(S): 20 TABLET, DELAYED RELEASE ORAL at 05:46

## 2018-02-12 RX ADMIN — Medication 81 MILLIGRAM(S): at 11:56

## 2018-02-12 RX ADMIN — ERYTHROPOIETIN 10000 UNIT(S): 10000 INJECTION, SOLUTION INTRAVENOUS; SUBCUTANEOUS at 09:45

## 2018-02-12 RX ADMIN — Medication 100 MILLIGRAM(S): at 13:49

## 2018-02-12 RX ADMIN — GABAPENTIN 50 MILLIGRAM(S): 400 CAPSULE ORAL at 22:22

## 2018-02-12 RX ADMIN — VALSARTAN 320 MILLIGRAM(S): 80 TABLET ORAL at 05:44

## 2018-02-12 RX ADMIN — GABAPENTIN 50 MILLIGRAM(S): 400 CAPSULE ORAL at 13:50

## 2018-02-12 NOTE — PROGRESS NOTE ADULT - SUBJECTIVE AND OBJECTIVE BOX
NEPHROLOGY INTERVAL HPI/OVERNIGHT EVENTS:  Complains of epigastric pain agg. with  swallowing, affects  breathing.    MEDICATIONS  (STANDING):  aspirin enteric coated 81 milliGRAM(s) Oral daily  calcium acetate 1334 milliGRAM(s) Oral three times a day with meals  dextrose 5%. 1000 milliLiter(s) (50 mL/Hr) IV Continuous <Continuous>  dextrose 50% Injectable 12.5 Gram(s) IV Push once  dextrose 50% Injectable 25 Gram(s) IV Push once  dextrose 50% Injectable 25 Gram(s) IV Push once  docusate sodium 100 milliGRAM(s) Oral three times a day  enoxaparin Injectable 30 milliGRAM(s) SubCutaneous daily  epoetin raina Injectable 53896 Unit(s) IV Push <User Schedule>  ergocalciferol 31837 Unit(s) Oral every week  folic acid 1 milliGRAM(s) Oral daily  gabapentin   Solution 50 milliGRAM(s) Oral three times a day  hydrALAZINE 50 milliGRAM(s) Oral every 6 hours  insulin lispro (HumaLOG) corrective regimen sliding scale   SubCutaneous three times a day before meals  iron sucrose IVPB 100 milliGRAM(s) IV Intermittent <User Schedule>  loratadine 10 milliGRAM(s) Oral daily  NIFEdipine XL 90 milliGRAM(s) Oral daily  pantoprazole    Tablet 40 milliGRAM(s) Oral before breakfast  valsartan 320 milliGRAM(s) Oral daily    MEDICATIONS  (PRN):  acetaminophen   Tablet 650 milliGRAM(s) Oral every 6 hours PRN mild - moderate pain  artificial  tears Solution 1 Drop(s) Both EYES every 4 hours PRN Dry Eyes  dextrose Gel 1 Dose(s) Oral once PRN Blood Glucose LESS THAN 70 milliGRAM(s)/deciliter  glucagon  Injectable 1 milliGRAM(s) IntraMuscular once PRN Glucose LESS THAN 70 milligrams/deciliter  nitroglycerin    2% Ointment 0.5 Inch(s) Transdermal two times a day PRN SBP>180      Allergies    Mushrooms (Hives (Mild))  No Known Drug Allergies              Vital Signs Last 24 Hrs     T(C): 36.9 (2018 05:52), Max: 36.9 (2018 08:31)  T(F): 98.5 (2018 05:52), Max: 98.5 (2018 05:52)  HR: 106 (2018 06:00) (98 - 109)  BP: 178/94 (2018 06:00) (144/67 - 178/94)  BP(mean): --  RR: 20 (2018 06:00) (17 - 20)  SpO2: 84% (2018 06:00) (84% - 97%)    T(C): 36.8 (10 Feb 2018 05:20), Max: 37.1 (2018 13:00)  T(F): 98.2 (10 Feb 2018 05:20), Max: 98.7 (2018 13:00)  HR: 99 (10 Feb 2018 05:20) (92 - 105)  BP: 156/84 (10 Feb 2018 05:20) (135/81 - 175/97)  BP(mean): --  RR: 18 (10 Feb 2018 05:20) (18 - 18)  SpO2: 96% (10 Feb 2018 05:20) (92% - 97%)  T(C): 36.3 (2018 05:29), Max: 36.7 (2018 10:15)  T(F): 97.4 (2018 05:29), Max: 98.1 (2018 10:15)  HR: 98 (2018 05:29) (91 - 99)  BP: 180/92 (2018 05:29) (145/78 - 180/92)  BP(mean): --  RR: 18 (2018 05:29) (18 - 18)  SpO2: 95% (2018 05:29) (91% - 98%)       Daily Weight in k.2 (2018 10:15)    PHYSICAL EXAM:    GENERAL: appears acutely  ill  HEAD:  wnl  EYES: wnl  ENMT:   NECK: veins  full  NERVOUS SYSTEM:  same  CHEST/LUNG: decreased  bs bases, 02 by mask  HEART: no rub, 1/6  systolic  murmur, tachy.  ABDOMEN: obese, not tender  EXTREMITIES:  left arm with bruit, leg edema better, right leg now has wrap  LYMPH:   SKIN: no rash    LABS:  reviewed .                      RADIOLOGY & ADDITIONAL TESTS:

## 2018-02-12 NOTE — PROGRESS NOTE ADULT - SUBJECTIVE AND OBJECTIVE BOX
Patient: SORAYA JIMENEZ [id number] 38y Female                 Internal Medicine Hospitalist Progress Note - Dr. Mane Hilton  Chief Complaint: Patient is a 38y old  Female who presents with a chief complaint of mother states pt was" to have  elective Lt eye surgery, during anesthesia pt became lo b/p" (2018 04:57)    HPI:  39 y/o female with ESRD on HD, DM II, HTN, came to the ER because of pain and swelling in the right calf area, worsening.  Outpatient LE doppler showed no DVT.  Serum K 5.4.  Started on abx for LE cellulitis.  Seen by nephrology.  Pt reported some initial improvement in LE edema and pain on antibiotics, however, symptoms persisted.  Denies history of trauma or immobility.  ID was consulted.  CT LE showed no abscess collection.  Inpatient LE doppler negative for DVT.  CT A/P showed no obstructive process.  There was diffuse anasarca and gross hepatomegaly.  Undergoing daily HD per Renal.  Seen by Vascular, placed in Unna boot with improvement in symptoms.     Mood improved.  Noted hypoxic overnight with SaO2 84% on 4L nc.  Pt had c/o epigastric pain with swallowing.  Now without complaints    ____________________PHYSICAL EXAM:  Vitals reviewed as indicated below  GENERAL:  Slightly anxious, Alert and Oriented x 3   HEENT: NCAT  CARDIOVASCULAR:  S1, S2  LUNGS: CTAB  ABDOMEN:  soft, (-) tenderness, (-) distension, (+) bowel sounds, (-) guarding, (-) rebound (-) rigidity  EXTREMITIES:  no cyanosis / clubbing .  b/l LE edema. R > L. Unna boots in place.   ____________________    VITALS:  Vital Signs Last 24 Hrs  T(C): 36.9 (2018 11:10), Max: 37.2 (2018 10:15)  T(F): 98.4 (2018 11:10), Max: 98.9 (2018 10:15)  HR: 110 (2018 13:45) (100 - 111)  BP: 166/81 (2018 13:45) (156/68 - 178/94)  BP(mean): --  RR: 20 (2018 11:10) (18 - 22)  SpO2: 93% (2018 10:15) (84% - 98%) Daily     Daily Weight in k (2018 10:15)  CAPILLARY BLOOD GLUCOSE      POCT Blood Glucose.: 104 mg/dL (2018 11:33)  POCT Blood Glucose.: 119 mg/dL (2018 07:53)  POCT Blood Glucose.: 115 mg/dL (2018 22:13)  POCT Blood Glucose.: 109 mg/dL (2018 16:44)    I&O's Summary    2018 07:01  -  2018 15:31  --------------------------------------------------------  IN: 0 mL / OUT: 3000 mL / NET: -3000 mL        LABS:                        8.7    9.5   )-----------( 365      ( 2018 06:41 )             28.8     02-12    135  |  92<L>  |  40.0<H>  ----------------------------<  97  5.5<H>   |  26.0  |  5.13<H>    Ca    9.5      2018 06:41  Phos  2.7     02-12                  MEDICATIONS:  acetaminophen   Tablet 650 milliGRAM(s) Oral every 6 hours PRN  ALBUTerol    90 MICROgram(s) HFA Inhaler 1 Puff(s) Inhalation every 4 hours  ALBUTerol/ipratropium for Nebulization 3 milliLiter(s) Nebulizer every 6 hours  ALPRAZolam 0.25 milliGRAM(s) Oral three times a day PRN  artificial  tears Solution 1 Drop(s) Both EYES every 4 hours PRN  aspirin enteric coated 81 milliGRAM(s) Oral daily  calcium acetate 1334 milliGRAM(s) Oral three times a day with meals  dextrose 5%. 1000 milliLiter(s) IV Continuous <Continuous>  dextrose 50% Injectable 12.5 Gram(s) IV Push once  dextrose 50% Injectable 25 Gram(s) IV Push once  dextrose 50% Injectable 25 Gram(s) IV Push once  dextrose Gel 1 Dose(s) Oral once PRN  docusate sodium 100 milliGRAM(s) Oral three times a day  enoxaparin Injectable 30 milliGRAM(s) SubCutaneous daily  epoetin raina Injectable 19037 Unit(s) IV Push <User Schedule>  ergocalciferol 01416 Unit(s) Oral every week  folic acid 1 milliGRAM(s) Oral daily  gabapentin   Solution 50 milliGRAM(s) Oral three times a day  glucagon  Injectable 1 milliGRAM(s) IntraMuscular once PRN  hydrALAZINE 100 milliGRAM(s) Oral every 8 hours  hydrALAZINE Injectable 15 milliGRAM(s) IV Push every 6 hours PRN  insulin lispro (HumaLOG) corrective regimen sliding scale   SubCutaneous three times a day before meals  iron sucrose IVPB 100 milliGRAM(s) IV Intermittent <User Schedule>  loratadine 10 milliGRAM(s) Oral daily  NIFEdipine XL 90 milliGRAM(s) Oral daily  nitroglycerin    2% Ointment 0.5 Inch(s) Transdermal two times a day PRN  pantoprazole    Tablet 40 milliGRAM(s) Oral before breakfast  tiotropium 18 MICROgram(s) Capsule 1 Capsule(s) Inhalation daily  valsartan 320 milliGRAM(s) Oral daily

## 2018-02-12 NOTE — PROGRESS NOTE ADULT - ASSESSMENT
39 y/o female with right leg cellulitis, Hyperkalemia, ESRD on HD, hypertensive urgency, DM II.    > ? Odynophagia - Esophagram ordered by Dr. Hu.   > Anxiety - Trial of Xanax.    > LE lymphedema, generalized anasarca - discussed fluid restriction with patient.  Continued HD, another session today.  No DVT on LE doppler.  No pelvic compression on CT A/P.  Vascular evaluation appreciated.  Symptoms improving with Unna boot.   > RLE Cellulitis ruled out.  Off antibiotics.   > Hypertensive urgency - pt refusing Labetalol, Clonidine, Norvasc due to perceived impact on edema.  Educated patient.  Continue Hydralazine, Nifedipine.  Trial of atenolol.   > H/o acute respiratory failure with Hypoxia - discussed with RN - check SaO2 on RA.  Unclear indication for home O2.    > Morbid Obesity - discussed gradual weight loss as outpatient.   > Anemia of chronic disease - on HD.  Monitor CBC, stable.  Procrit.  > Leg pain - trial of Neurontin  > ESRD - continue HD TIW.  > Diabetes - monitor fingersticks.  Insulin coverage for hyperglycemia.  > DVT Prophylaxis - Lovenox subcut

## 2018-02-13 LAB
GLUCOSE BLDC GLUCOMTR-MCNC: 103 MG/DL — HIGH (ref 70–99)
GLUCOSE BLDC GLUCOMTR-MCNC: 115 MG/DL — HIGH (ref 70–99)
GLUCOSE BLDC GLUCOMTR-MCNC: 118 MG/DL — HIGH (ref 70–99)
GLUCOSE BLDC GLUCOMTR-MCNC: 119 MG/DL — HIGH (ref 70–99)

## 2018-02-13 PROCEDURE — 99232 SBSQ HOSP IP/OBS MODERATE 35: CPT

## 2018-02-13 PROCEDURE — 73590 X-RAY EXAM OF LOWER LEG: CPT | Mod: 26,LT

## 2018-02-13 RX ORDER — ATENOLOL 25 MG/1
50 TABLET ORAL DAILY
Qty: 0 | Refills: 0 | Status: DISCONTINUED | OUTPATIENT
Start: 2018-02-13 | End: 2018-02-16

## 2018-02-13 RX ADMIN — VALSARTAN 320 MILLIGRAM(S): 80 TABLET ORAL at 05:14

## 2018-02-13 RX ADMIN — Medication 100 MILLIGRAM(S): at 05:15

## 2018-02-13 RX ADMIN — Medication 100 MILLIGRAM(S): at 14:44

## 2018-02-13 RX ADMIN — Medication 3 MILLILITER(S): at 02:39

## 2018-02-13 RX ADMIN — ATENOLOL 25 MILLIGRAM(S): 25 TABLET ORAL at 05:14

## 2018-02-13 RX ADMIN — Medication 100 MILLIGRAM(S): at 05:14

## 2018-02-13 RX ADMIN — Medication 81 MILLIGRAM(S): at 12:33

## 2018-02-13 RX ADMIN — Medication 90 MILLIGRAM(S): at 05:14

## 2018-02-13 RX ADMIN — LORATADINE 10 MILLIGRAM(S): 10 TABLET ORAL at 12:33

## 2018-02-13 RX ADMIN — Medication 3 MILLILITER(S): at 20:29

## 2018-02-13 RX ADMIN — Medication 100 MILLIGRAM(S): at 21:55

## 2018-02-13 RX ADMIN — GABAPENTIN 50 MILLIGRAM(S): 400 CAPSULE ORAL at 21:55

## 2018-02-13 RX ADMIN — PANTOPRAZOLE SODIUM 40 MILLIGRAM(S): 20 TABLET, DELAYED RELEASE ORAL at 05:15

## 2018-02-13 RX ADMIN — Medication 0.25 MILLIGRAM(S): at 23:53

## 2018-02-13 RX ADMIN — ATENOLOL 50 MILLIGRAM(S): 25 TABLET ORAL at 21:55

## 2018-02-13 RX ADMIN — Medication 1 MILLIGRAM(S): at 12:33

## 2018-02-13 RX ADMIN — Medication 3 MILLILITER(S): at 08:55

## 2018-02-13 RX ADMIN — GABAPENTIN 50 MILLIGRAM(S): 400 CAPSULE ORAL at 05:15

## 2018-02-13 RX ADMIN — GABAPENTIN 50 MILLIGRAM(S): 400 CAPSULE ORAL at 14:44

## 2018-02-13 RX ADMIN — ERGOCALCIFEROL 50000 UNIT(S): 1.25 CAPSULE ORAL at 12:33

## 2018-02-13 NOTE — PROVIDER CONTACT NOTE (OTHER) - SITUATION
pulse ox 67% on room air no reap distress pt with c/o diff breathing pt repositioned encouraged to deep breath and 02 replaced immediately

## 2018-02-13 NOTE — PROGRESS NOTE ADULT - SUBJECTIVE AND OBJECTIVE BOX
Patient: SORAYA JIMENEZ [id number] 38y Female                 Internal Medicine Hospitalist Progress Note - Dr. Mane Hilton  Chief Complaint: Patient is a 38y old  Female who presents with a chief complaint of mother states pt was" to have  elective Lt eye surgery, during anesthesia pt became lo b/p" (30 Jan 2018 04:57)    HPI:  39 y/o female with ESRD on HD, DM II, HTN, came to the ER because of pain and swelling in the right calf area, worsening.  Outpatient LE doppler showed no DVT.  Serum K 5.4.  Started on abx for LE cellulitis.  Seen by nephrology.  Pt reported some initial improvement in LE edema and pain on antibiotics, however, symptoms persisted.  Denies history of trauma or immobility.  ID was consulted.  CT LE showed no abscess collection.  Inpatient LE doppler negative for DVT.  CT A/P showed no obstructive process.  There was diffuse anasarca and gross hepatomegaly.  Undergoing daily HD per Renal.  Seen by Vascular, placed in Unna boot with improvement in symptoms.     Mood improved.  Per RN, pt with episode of hypoxia, SaO2 67%.  Other times, SaO2 documented as ~96% on RA.  Pt c/o new small nodules in LE.  No pain.  Denies SOB / cough / fever / chills.  No additional complaints.     ____________________PHYSICAL EXAM:  Vitals reviewed as indicated below  GENERAL:  Slightly anxious, Alert and Oriented x 3   HEENT: NCAT  CARDIOVASCULAR:  S1, S2  LUNGS: CTAB  ABDOMEN:  soft, (-) tenderness, (-) distension, (+) bowel sounds, (-) guarding, (-) rebound (-) rigidity  EXTREMITIES:  no cyanosis / clubbing .  b/l LE edema. R > L. Unna boots in place.   ____________________    VITALS:  Vital Signs Last 24 Hrs  T(C): 37 (13 Feb 2018 10:57), Max: 37.1 (13 Feb 2018 07:45)  T(F): 98.6 (13 Feb 2018 10:57), Max: 98.8 (13 Feb 2018 07:45)  HR: 84 (13 Feb 2018 10:57) (84 - 109)  BP: 142/87 (13 Feb 2018 10:57) (142/87 - 170/92)  BP(mean): --  RR: 18 (13 Feb 2018 10:57) (18 - 18)  SpO2: 96% (13 Feb 2018 10:57) (94% - 100%) Daily     Daily   CAPILLARY BLOOD GLUCOSE      POCT Blood Glucose.: 119 mg/dL (13 Feb 2018 12:19)  POCT Blood Glucose.: 103 mg/dL (13 Feb 2018 07:56)  POCT Blood Glucose.: 135 mg/dL (12 Feb 2018 21:55)  POCT Blood Glucose.: 121 mg/dL (12 Feb 2018 16:40)    I&O's Summary    12 Feb 2018 07:01  -  13 Feb 2018 07:00  --------------------------------------------------------  IN: 0 mL / OUT: 3000 mL / NET: -3000 mL        LABS:                        8.7    9.5   )-----------( 365      ( 12 Feb 2018 06:41 )             28.8     02-12    135  |  92<L>  |  40.0<H>  ----------------------------<  97  5.5<H>   |  26.0  |  5.13<H>    Ca    9.5      12 Feb 2018 06:41  Phos  2.7     02-12                  MEDICATIONS:  acetaminophen   Tablet 650 milliGRAM(s) Oral every 6 hours PRN  ALBUTerol    90 MICROgram(s) HFA Inhaler 1 Puff(s) Inhalation every 4 hours  ALBUTerol/ipratropium for Nebulization 3 milliLiter(s) Nebulizer every 6 hours  ALPRAZolam 0.25 milliGRAM(s) Oral three times a day PRN  artificial  tears Solution 1 Drop(s) Both EYES every 4 hours PRN  aspirin enteric coated 81 milliGRAM(s) Oral daily  ATENolol  Tablet 25 milliGRAM(s) Oral daily  calcium acetate 1334 milliGRAM(s) Oral three times a day with meals  dextrose 5%. 1000 milliLiter(s) IV Continuous <Continuous>  dextrose 50% Injectable 12.5 Gram(s) IV Push once  dextrose 50% Injectable 25 Gram(s) IV Push once  dextrose 50% Injectable 25 Gram(s) IV Push once  dextrose Gel 1 Dose(s) Oral once PRN  docusate sodium 100 milliGRAM(s) Oral three times a day  enoxaparin Injectable 30 milliGRAM(s) SubCutaneous daily  epoetin raina Injectable 15391 Unit(s) IV Push <User Schedule>  ergocalciferol 25347 Unit(s) Oral every week  folic acid 1 milliGRAM(s) Oral daily  gabapentin   Solution 50 milliGRAM(s) Oral three times a day  glucagon  Injectable 1 milliGRAM(s) IntraMuscular once PRN  hydrALAZINE 100 milliGRAM(s) Oral every 8 hours  hydrALAZINE Injectable 15 milliGRAM(s) IV Push every 6 hours PRN  insulin lispro (HumaLOG) corrective regimen sliding scale   SubCutaneous three times a day before meals  iron sucrose IVPB 100 milliGRAM(s) IV Intermittent <User Schedule>  loratadine 10 milliGRAM(s) Oral daily  NIFEdipine XL 90 milliGRAM(s) Oral daily  nitroglycerin    2% Ointment 0.5 Inch(s) Transdermal two times a day PRN  pantoprazole    Tablet 40 milliGRAM(s) Oral before breakfast  tiotropium 18 MICROgram(s) Capsule 1 Capsule(s) Inhalation daily  valsartan 320 milliGRAM(s) Oral daily

## 2018-02-13 NOTE — PROGRESS NOTE ADULT - ASSESSMENT
37 y/o female with right leg cellulitis, Hyperkalemia, ESRD on HD, hypertensive urgency, DM II with multiple somatic complaints.    > Hypoxia - unclear episodic hypoxia while awake.  No SOB / orthopnea.  Monitor continuous SaO2 on RA.    > Odynophagia - pt with multiple somatic complaints.  Esophagram ordered by Dr. Hu.   > Anxiety - Trial of Xanax.    > LE lymphedema, generalized anasarca - discussed fluid restriction with patient.  Continued HD, another session today.  No DVT on LE doppler.  No pelvic compression on CT A/P.  Vascular evaluation appreciated.  Symptoms improving with Unna boot.   > RLE Cellulitis ruled out.  Off antibiotics.   > Hypertensive urgency - pt refusing Labetalol, Clonidine, Norvasc due to perceived impact on edema.  Educated patient.  Continue Hydralazine, Nifedipine.  Increase atenolol.   > H/o acute respiratory failure with Hypoxia - discussed with RN - check SaO2 on RA.  Unclear indication for home O2.    > Morbid Obesity - discussed gradual weight loss as outpatient.   > Anemia of chronic disease - on HD.  Monitor CBC, stable.  Procrit.  > Leg pain - trial of Neurontin  > ESRD - continue HD TIW.  > Diabetes - monitor fingersticks.  Insulin coverage for hyperglycemia.  > DVT Prophylaxis - Lovenox subcut 39 y/o female with right leg cellulitis, Hyperkalemia, ESRD on HD, hypertensive urgency, DM II with multiple somatic complaints.    > Hypoxia - unclear episodic hypoxia while awake.  No SOB / orthopnea.  Monitor continuous SaO2 on RA.  Does not appear to be in acute CHF despite CXR showing pulmonary edema.  Will titrate FIO2 and monitor SaO2.    > Odynophagia - pt with multiple somatic complaints.  Esophagram ordered by Dr. Hu.   > Anxiety - Trial of Xanax.    > LE lymphedema, generalized anasarca - discussed fluid restriction with patient.  Continued HD, another session today.  No DVT on LE doppler.  No pelvic compression on CT A/P.  Vascular evaluation appreciated.  Symptoms improving with Unna boot.   > RLE Cellulitis ruled out.  Off antibiotics.   > Hypertensive urgency - pt refusing Labetalol, Clonidine, Norvasc due to perceived impact on edema.  Educated patient.  Continue Hydralazine, Nifedipine.  Increase atenolol.   > H/o acute respiratory failure with Hypoxia - discussed with RN - check SaO2 on RA.  Unclear indication for home O2.    > Morbid Obesity - discussed gradual weight loss as outpatient.   > Anemia of chronic disease - on HD.  Monitor CBC, stable.  Procrit.  > Leg pain - trial of Neurontin  > ESRD - continue HD TIW.  > Diabetes - monitor fingersticks.  Insulin coverage for hyperglycemia.  > DVT Prophylaxis - Lovenox subcut

## 2018-02-13 NOTE — PROGRESS NOTE ADULT - SUBJECTIVE AND OBJECTIVE BOX
NEPHROLOGY INTERVAL HPI/OVERNIGHT EVENTS:  Complains of lumps left leg and lower abdomin.    MEDICATIONS  (STANDING):  aspirin enteric coated 81 milliGRAM(s) Oral daily  calcium acetate 1334 milliGRAM(s) Oral three times a day with meals  dextrose 5%. 1000 milliLiter(s) (50 mL/Hr) IV Continuous <Continuous>  dextrose 50% Injectable 12.5 Gram(s) IV Push once  dextrose 50% Injectable 25 Gram(s) IV Push once  dextrose 50% Injectable 25 Gram(s) IV Push once  docusate sodium 100 milliGRAM(s) Oral three times a day  enoxaparin Injectable 30 milliGRAM(s) SubCutaneous daily  epoetin raina Injectable 61048 Unit(s) IV Push <User Schedule>  ergocalciferol 17997 Unit(s) Oral every week  folic acid 1 milliGRAM(s) Oral daily  gabapentin   Solution 50 milliGRAM(s) Oral three times a day  hydrALAZINE 50 milliGRAM(s) Oral every 6 hours  insulin lispro (HumaLOG) corrective regimen sliding scale   SubCutaneous three times a day before meals  iron sucrose IVPB 100 milliGRAM(s) IV Intermittent <User Schedule>  loratadine 10 milliGRAM(s) Oral daily  NIFEdipine XL 90 milliGRAM(s) Oral daily  pantoprazole    Tablet 40 milliGRAM(s) Oral before breakfast  valsartan 320 milliGRAM(s) Oral daily    MEDICATIONS  (PRN):  acetaminophen   Tablet 650 milliGRAM(s) Oral every 6 hours PRN mild - moderate pain  artificial  tears Solution 1 Drop(s) Both EYES every 4 hours PRN Dry Eyes  dextrose Gel 1 Dose(s) Oral once PRN Blood Glucose LESS THAN 70 milliGRAM(s)/deciliter  glucagon  Injectable 1 milliGRAM(s) IntraMuscular once PRN Glucose LESS THAN 70 milligrams/deciliter  nitroglycerin    2% Ointment 0.5 Inch(s) Transdermal two times a day PRN SBP>180      Allergies    Mushrooms (Hives (Mild))  No Known Drug Allergies              Vital Signs Last 24 Hrs   T(C): 37.1 (2018 07:45), Max: 37.2 (2018 10:15)  T(F): 98.8 (2018 07:45), Max: 98.9 (2018 10:15)  HR: 89 (2018 07:45) (89 - 110)  BP: 165/99 (2018 07:45) (156/68 - 170/92)  BP(mean): --  RR: 18 (2018 07:45) (18 - 20)  SpO2: 98% (2018 07:45) (93% - 100%)    T(C): 36.9 (2018 05:52), Max: 36.9 (2018 08:31)  T(F): 98.5 (2018 05:52), Max: 98.5 (2018 05:52)  HR: 106 (2018 06:00) (98 - 109)  BP: 178/94 (2018 06:00) (144/67 - 178/94)  BP(mean): --  RR: 20 (2018 06:00) (17 - 20)  SpO2: 84% (2018 06:00) (84% - 97%)    T(C): 36.8 (10 Feb 2018 05:20), Max: 37.1 (2018 13:00)  T(F): 98.2 (10 Feb 2018 05:20), Max: 98.7 (2018 13:00)  HR: 99 (10 Feb 2018 05:20) (92 - 105)  BP: 156/84 (10 Feb 2018 05:20) (135/81 - 175/97)  BP(mean): --  RR: 18 (10 Feb 2018 05:20) (18 - 18)  SpO2: 96% (10 Feb 2018 05:20) (92% - 97%)  T(C): 36.3 (2018 05:29), Max: 36.7 (2018 10:15)  T(F): 97.4 (2018 05:29), Max: 98.1 (2018 10:15)  HR: 98 (2018 05:29) (91 - 99)  BP: 180/92 (2018 05:29) (145/78 - 180/92)  BP(mean): --  RR: 18 (2018 05:29) (18 - 18)  SpO2: 95% (2018 05:29) (91% - 98%)       Daily Weight in k.2 (2018 10:15)    PHYSICAL EXAM:    GENERAL: appears acutely  ill  HEAD:  wnl  EYES: wnl  ENMT:   NECK: veins  full  NERVOUS SYSTEM:  same  CHEST/LUNG: decreased  bs bases, 02 by mask  HEART: no rub, 1/6  systolic  murmur, tachy.  ABDOMEN: obese, not tender, single left odule  EXTREMITIES:  left arm with bruit, leg edema same, right leg now has wrap, 2 small SQ nodules lower left leg and 1  higher  LYMPH:   SKIN: no rash    LABS:  reviewed .                      RADIOLOGY & ADDITIONAL TESTS:

## 2018-02-14 LAB
ANION GAP SERPL CALC-SCNC: 16 MMOL/L — SIGNIFICANT CHANGE UP (ref 5–17)
BUN SERPL-MCNC: 61 MG/DL — HIGH (ref 8–20)
CALCIUM SERPL-MCNC: 9.3 MG/DL — SIGNIFICANT CHANGE UP (ref 8.6–10.2)
CHLORIDE SERPL-SCNC: 90 MMOL/L — LOW (ref 98–107)
CO2 SERPL-SCNC: 24 MMOL/L — SIGNIFICANT CHANGE UP (ref 22–29)
CREAT SERPL-MCNC: 5.98 MG/DL — HIGH (ref 0.5–1.3)
GLUCOSE BLDC GLUCOMTR-MCNC: 100 MG/DL — HIGH (ref 70–99)
GLUCOSE BLDC GLUCOMTR-MCNC: 111 MG/DL — HIGH (ref 70–99)
GLUCOSE BLDC GLUCOMTR-MCNC: 117 MG/DL — HIGH (ref 70–99)
GLUCOSE BLDC GLUCOMTR-MCNC: 84 MG/DL — SIGNIFICANT CHANGE UP (ref 70–99)
GLUCOSE SERPL-MCNC: 93 MG/DL — SIGNIFICANT CHANGE UP (ref 70–115)
HCT VFR BLD CALC: 26.3 % — LOW (ref 37–47)
HGB BLD-MCNC: 8 G/DL — LOW (ref 12–16)
MCHC RBC-ENTMCNC: 24.6 PG — LOW (ref 27–31)
MCHC RBC-ENTMCNC: 30.4 G/DL — LOW (ref 32–36)
MCV RBC AUTO: 80.9 FL — LOW (ref 81–99)
PHOSPHATE SERPL-MCNC: 3.8 MG/DL — SIGNIFICANT CHANGE UP (ref 2.4–4.7)
PLATELET # BLD AUTO: 337 K/UL — SIGNIFICANT CHANGE UP (ref 150–400)
POTASSIUM SERPL-MCNC: 5.3 MMOL/L — SIGNIFICANT CHANGE UP (ref 3.5–5.3)
POTASSIUM SERPL-SCNC: 5.3 MMOL/L — SIGNIFICANT CHANGE UP (ref 3.5–5.3)
RBC # BLD: 3.25 M/UL — LOW (ref 4.4–5.2)
RBC # FLD: 18.8 % — HIGH (ref 11–15.6)
SODIUM SERPL-SCNC: 130 MMOL/L — LOW (ref 135–145)
WBC # BLD: 8.3 K/UL — SIGNIFICANT CHANGE UP (ref 4.8–10.8)
WBC # FLD AUTO: 8.3 K/UL — SIGNIFICANT CHANGE UP (ref 4.8–10.8)

## 2018-02-14 PROCEDURE — 99233 SBSQ HOSP IP/OBS HIGH 50: CPT

## 2018-02-14 RX ADMIN — PANTOPRAZOLE SODIUM 40 MILLIGRAM(S): 20 TABLET, DELAYED RELEASE ORAL at 05:32

## 2018-02-14 RX ADMIN — Medication 100 MILLIGRAM(S): at 13:23

## 2018-02-14 RX ADMIN — IRON SUCROSE 210 MILLIGRAM(S): 20 INJECTION, SOLUTION INTRAVENOUS at 10:17

## 2018-02-14 RX ADMIN — ENOXAPARIN SODIUM 30 MILLIGRAM(S): 100 INJECTION SUBCUTANEOUS at 13:21

## 2018-02-14 RX ADMIN — Medication 1 MILLIGRAM(S): at 13:20

## 2018-02-14 RX ADMIN — Medication 3 MILLILITER(S): at 03:39

## 2018-02-14 RX ADMIN — Medication 81 MILLIGRAM(S): at 13:23

## 2018-02-14 RX ADMIN — ERYTHROPOIETIN 10000 UNIT(S): 10000 INJECTION, SOLUTION INTRAVENOUS; SUBCUTANEOUS at 10:18

## 2018-02-14 RX ADMIN — Medication 100 MILLIGRAM(S): at 05:33

## 2018-02-14 RX ADMIN — Medication 1334 MILLIGRAM(S): at 17:12

## 2018-02-14 RX ADMIN — Medication 100 MILLIGRAM(S): at 22:02

## 2018-02-14 RX ADMIN — GABAPENTIN 50 MILLIGRAM(S): 400 CAPSULE ORAL at 13:22

## 2018-02-14 RX ADMIN — GABAPENTIN 50 MILLIGRAM(S): 400 CAPSULE ORAL at 22:19

## 2018-02-14 RX ADMIN — Medication 3 MILLILITER(S): at 21:30

## 2018-02-14 RX ADMIN — LORATADINE 10 MILLIGRAM(S): 10 TABLET ORAL at 13:21

## 2018-02-14 RX ADMIN — Medication 90 MILLIGRAM(S): at 05:29

## 2018-02-14 RX ADMIN — Medication 3 MILLILITER(S): at 15:31

## 2018-02-14 RX ADMIN — GABAPENTIN 50 MILLIGRAM(S): 400 CAPSULE ORAL at 05:29

## 2018-02-14 RX ADMIN — VALSARTAN 320 MILLIGRAM(S): 80 TABLET ORAL at 05:29

## 2018-02-14 RX ADMIN — ATENOLOL 50 MILLIGRAM(S): 25 TABLET ORAL at 05:29

## 2018-02-14 NOTE — PROGRESS NOTE ADULT - ASSESSMENT
ESRD: +chronic fluid overload  - HD today with UF as tolerates  - I again reinforced need for adherence to fluid and salt restrictions    Anemia: NEELAM and Venofer at HD  - trend H/H   - may eventually need PRBCs    RO: continue Phoslo and Ergocalciferol

## 2018-02-14 NOTE — PROGRESS NOTE ADULT - SUBJECTIVE AND OBJECTIVE BOX
Patient: SORAYA JIMENEZ [id number] 38y Female                 Internal Medicine Hospitalist Progress Note - Dr. Mane Hilton  Chief Complaint: Patient is a 38y old  Female who presents with a chief complaint of mother states pt was" to have  elective Lt eye surgery, during anesthesia pt became lo b/p" (2018 04:57)    HPI:  39 y/o female with ESRD on HD, DM II, HTN, came to the ER because of pain and swelling in the right calf area, worsening.  Outpatient LE doppler showed no DVT.  Serum K 5.4.  Started on abx for LE cellulitis.  Seen by nephrology.  Pt reported some initial improvement in LE edema and pain on antibiotics, however, symptoms persisted.  Denies history of trauma or immobility.  ID was consulted.  CT LE showed no abscess collection.  Inpatient LE doppler negative for DVT.  CT A/P showed no obstructive process.  There was diffuse anasarca and gross hepatomegaly.  Undergoing daily HD per Renal.  Seen by Vascular, placed in Unna boot with improvement in symptoms.  Pt noted hypoxic on RA, ~ 84%.      s/p HD today.  Denies SOB / cough / fever / chills.  No additional complaints.     ____________________PHYSICAL EXAM:  Vitals reviewed as indicated below  GENERAL:  Slightly anxious, Alert and Oriented x 3   HEENT: NCAT  CARDIOVASCULAR:  S1, S2  LUNGS: coarse BS b/l.   ABDOMEN:  soft, (-) tenderness, (-) distension, (+) bowel sounds, (-) guarding, (-) rebound (-) rigidity  EXTREMITIES:  no cyanosis / clubbing .  b/l LE edema. R > L. Unna boots in place.   ____________________    VITALS:  Vital Signs Last 24 Hrs  T(C): 36.8 (2018 13:19), Max: 36.8 (2018 19:56)  T(F): 98.3 (2018 13:19), Max: 98.3 (2018 13:19)  HR: 79 (2018 12:30) (76 - 82)  BP: 137/80 (2018 12:30) (134/72 - 168/87)  BP(mean): --  RR: 18 (2018 12:30) (16 - 18)  SpO2: 94% (2018 12:30) (92% - 99%) Daily     Daily Weight in k.9 (2018 11:26)  CAPILLARY BLOOD GLUCOSE      POCT Blood Glucose.: 84 mg/dL (2018 12:37)  POCT Blood Glucose.: 100 mg/dL (2018 07:40)  POCT Blood Glucose.: 115 mg/dL (2018 20:47)  POCT Blood Glucose.: 118 mg/dL (2018 17:06)    I&O's Summary    2018 07:01  -  2018 07:00  --------------------------------------------------------  IN: 120 mL / OUT: 0 mL / NET: 120 mL    2018 07:01  -  2018 13:44  --------------------------------------------------------  IN: 0 mL / OUT: 3000 mL / NET: -3000 mL        LABS:                        8.0    8.3   )-----------( 337      ( 2018 07:41 )             26.3     02-14    130<L>  |  90<L>  |  61.0<H>  ----------------------------<  93  5.3   |  24.0  |  5.98<H>    Ca    9.3      2018 07:41  Phos  3.8     02-14                  MEDICATIONS:  acetaminophen   Tablet 650 milliGRAM(s) Oral every 6 hours PRN  ALBUTerol    90 MICROgram(s) HFA Inhaler 1 Puff(s) Inhalation every 4 hours  ALBUTerol/ipratropium for Nebulization 3 milliLiter(s) Nebulizer every 6 hours  ALPRAZolam 0.25 milliGRAM(s) Oral three times a day PRN  artificial  tears Solution 1 Drop(s) Both EYES every 4 hours PRN  aspirin enteric coated 81 milliGRAM(s) Oral daily  ATENolol  Tablet 50 milliGRAM(s) Oral daily  calcium acetate 1334 milliGRAM(s) Oral three times a day with meals  dextrose 5%. 1000 milliLiter(s) IV Continuous <Continuous>  dextrose 50% Injectable 12.5 Gram(s) IV Push once  dextrose 50% Injectable 25 Gram(s) IV Push once  dextrose 50% Injectable 25 Gram(s) IV Push once  dextrose Gel 1 Dose(s) Oral once PRN  docusate sodium 100 milliGRAM(s) Oral three times a day  enoxaparin Injectable 30 milliGRAM(s) SubCutaneous daily  epoetin raina Injectable 01834 Unit(s) IV Push <User Schedule>  ergocalciferol 36534 Unit(s) Oral every week  folic acid 1 milliGRAM(s) Oral daily  gabapentin   Solution 50 milliGRAM(s) Oral three times a day  glucagon  Injectable 1 milliGRAM(s) IntraMuscular once PRN  hydrALAZINE 100 milliGRAM(s) Oral every 8 hours  hydrALAZINE Injectable 15 milliGRAM(s) IV Push every 6 hours PRN  insulin lispro (HumaLOG) corrective regimen sliding scale   SubCutaneous three times a day before meals  iron sucrose IVPB 100 milliGRAM(s) IV Intermittent <User Schedule>  loratadine 10 milliGRAM(s) Oral daily  NIFEdipine XL 90 milliGRAM(s) Oral daily  nitroglycerin    2% Ointment 0.5 Inch(s) Transdermal two times a day PRN  pantoprazole    Tablet 40 milliGRAM(s) Oral before breakfast  tiotropium 18 MICROgram(s) Capsule 1 Capsule(s) Inhalation daily  valsartan 320 milliGRAM(s) Oral daily

## 2018-02-14 NOTE — PROGRESS NOTE ADULT - ASSESSMENT
37 y/o female with right leg cellulitis, Hyperkalemia, ESRD on HD, hypertensive urgency, DM II with multiple somatic complaints.    > Hypoxia - due to pulmonary edema / atelectasis?  Pt's care discussed with pulmonary.  CT Chest with contrast to evaluate for infiltrate / thromboembolism.  continue O2.  Encourage incentive spirometry.    > Odynophagia - Resolved.  Continue po.   > Anxiety - Trial of Xanax.    > LE lymphedema, generalized anasarca - discussed fluid restriction with patient.  Continued HD, another session today.  No DVT on LE doppler.  No pelvic compression on CT A/P.  Vascular evaluation appreciated.  Symptoms improving with Unna boot.   > RLE Cellulitis ruled out.  Off antibiotics.   > Hypertensive urgency - pt refusing Labetalol, Clonidine, Norvasc due to perceived impact on edema.  Educated patient.  Continue Hydralazine, Nifedipine, Atenolol.   > Morbid Obesity - discussed gradual weight loss as outpatient.   > Anemia of chronic disease - on HD.  Monitor CBC, stable.  Procrit.  > Leg pain - trial of Neurontin  > ESRD - continue HD TIW.  > Diabetes - monitor fingersticks.  Insulin coverage for hyperglycemia.  > DVT Prophylaxis - Lovenox subcut    The Hospitalist Service will assume care of this patient beginning tomorrow.

## 2018-02-14 NOTE — PROGRESS NOTE ADULT - SUBJECTIVE AND OBJECTIVE BOX
NEPHROLOGY INTERVAL HPI/OVERNIGHT EVENTS:  pt seen at HD earlier  tolerating treatment with no acute distress    MEDICATIONS  (STANDING):  ALBUTerol    90 MICROgram(s) HFA Inhaler 1 Puff(s) Inhalation every 4 hours  ALBUTerol/ipratropium for Nebulization 3 milliLiter(s) Nebulizer every 6 hours  aspirin enteric coated 81 milliGRAM(s) Oral daily  ATENolol  Tablet 50 milliGRAM(s) Oral daily  calcium acetate 1334 milliGRAM(s) Oral three times a day with meals  dextrose 5%. 1000 milliLiter(s) (50 mL/Hr) IV Continuous <Continuous>  dextrose 50% Injectable 12.5 Gram(s) IV Push once  dextrose 50% Injectable 25 Gram(s) IV Push once  dextrose 50% Injectable 25 Gram(s) IV Push once  docusate sodium 100 milliGRAM(s) Oral three times a day  enoxaparin Injectable 30 milliGRAM(s) SubCutaneous daily  epoetin raina Injectable 19059 Unit(s) IV Push <User Schedule>  ergocalciferol 23709 Unit(s) Oral every week  folic acid 1 milliGRAM(s) Oral daily  gabapentin   Solution 50 milliGRAM(s) Oral three times a day  hydrALAZINE 100 milliGRAM(s) Oral every 8 hours  insulin lispro (HumaLOG) corrective regimen sliding scale   SubCutaneous three times a day before meals  iron sucrose IVPB 100 milliGRAM(s) IV Intermittent <User Schedule>  loratadine 10 milliGRAM(s) Oral daily  NIFEdipine XL 90 milliGRAM(s) Oral daily  pantoprazole    Tablet 40 milliGRAM(s) Oral before breakfast  tiotropium 18 MICROgram(s) Capsule 1 Capsule(s) Inhalation daily  valsartan 320 milliGRAM(s) Oral daily    MEDICATIONS  (PRN):  acetaminophen   Tablet 650 milliGRAM(s) Oral every 6 hours PRN mild - moderate pain  ALPRAZolam 0.25 milliGRAM(s) Oral three times a day PRN Anxiety  artificial  tears Solution 1 Drop(s) Both EYES every 4 hours PRN Dry Eyes  dextrose Gel 1 Dose(s) Oral once PRN Blood Glucose LESS THAN 70 milliGRAM(s)/deciliter  glucagon  Injectable 1 milliGRAM(s) IntraMuscular once PRN Glucose LESS THAN 70 milligrams/deciliter  hydrALAZINE Injectable 15 milliGRAM(s) IV Push every 6 hours PRN systolic bp >170  nitroglycerin    2% Ointment 0.5 Inch(s) Transdermal two times a day PRN SBP>180      Allergies    Mushrooms (Hives (Mild))  No Known Drug Allergies          Vital Signs Last 24 Hrs  T(C): 36.8 (14 Feb 2018 13:19), Max: 36.8 (13 Feb 2018 19:56)  T(F): 98.3 (14 Feb 2018 13:19), Max: 98.3 (14 Feb 2018 13:19)  HR: 79 (14 Feb 2018 12:30) (76 - 82)  BP: 137/80 (14 Feb 2018 12:30) (134/72 - 168/87)  BP(mean): --  RR: 18 (14 Feb 2018 12:30) (16 - 18)  SpO2: 94% (14 Feb 2018 12:30) (92% - 99%)    PHYSICAL EXAM:    GENERAL: NAD, generalized weakness  HEAD:  Atraumatic, Normocephalic  EYES: EOMI, PERRLA  NECK: Supple, No JVD  NERVOUS SYSTEM:  Alert & Oriented X3, intact and symmetric  CHEST/LUNG: Clear bilaterally  HEART: Regular rate and rhythm; No rub  ABDOMEN: Soft, Nontender, Nondistended;  +BS  EXTREMITIES:  2+ Peripheral Pulses, + generalized edema  SKIN: No rashes or lesions    LABS:                        8.0    8.3   )-----------( 337      ( 14 Feb 2018 07:41 )             26.3     Hemoglobin: 8.7 g/dL (02.12.18 @ 06:41)        02-14    130<L>  |  90<L>  |  61.0<H>  ----------------------------<  93  5.3   |  24.0  |  5.98<H>    Ca    9.3      14 Feb 2018 07:41  Phos  3.8     02-14          Phosphorus Level, Serum: 3.8 mg/dL (02-14 @ 07:41)      RADIOLOGY & ADDITIONAL TESTS:  < from: Xray Chest 1 View- PORTABLE-Routine (02.12.18 @ 15:06) >   EXAM:  XR CHEST PORTABLE ROUTINE 1V                          PROCEDURE DATE:  02/12/2018          INTERPRETATION:  History: Dyspnea    Technique:  AP portable    Comparisons:  Chest x-ray dated 11/6/2017    Findings:     Perihilar congestion. Cardiomegaly unchanged  .  No   significant pleural fluid       The thorax is normal for age.    Impression: Perihilar congestion unchanged. Findings compatible with CHF    < end of copied text >

## 2018-02-15 LAB
GLUCOSE BLDC GLUCOMTR-MCNC: 106 MG/DL — HIGH (ref 70–99)
GLUCOSE BLDC GLUCOMTR-MCNC: 108 MG/DL — HIGH (ref 70–99)
GLUCOSE BLDC GLUCOMTR-MCNC: 118 MG/DL — HIGH (ref 70–99)
GLUCOSE BLDC GLUCOMTR-MCNC: 142 MG/DL — HIGH (ref 70–99)

## 2018-02-15 PROCEDURE — 78582 LUNG VENTILAT&PERFUS IMAGING: CPT | Mod: 26

## 2018-02-15 PROCEDURE — 99233 SBSQ HOSP IP/OBS HIGH 50: CPT

## 2018-02-15 PROCEDURE — 71045 X-RAY EXAM CHEST 1 VIEW: CPT | Mod: 26

## 2018-02-15 PROCEDURE — 29580 STRAPPING UNNA BOOT: CPT

## 2018-02-15 RX ADMIN — Medication 100 MILLIGRAM(S): at 06:01

## 2018-02-15 RX ADMIN — Medication 1 MILLIGRAM(S): at 12:18

## 2018-02-15 RX ADMIN — GABAPENTIN 50 MILLIGRAM(S): 400 CAPSULE ORAL at 22:11

## 2018-02-15 RX ADMIN — Medication 3 MILLILITER(S): at 20:39

## 2018-02-15 RX ADMIN — Medication 3 MILLILITER(S): at 03:02

## 2018-02-15 RX ADMIN — Medication 3 MILLILITER(S): at 08:37

## 2018-02-15 RX ADMIN — Medication 1334 MILLIGRAM(S): at 12:16

## 2018-02-15 RX ADMIN — Medication 0.25 MILLIGRAM(S): at 22:09

## 2018-02-15 RX ADMIN — GABAPENTIN 50 MILLIGRAM(S): 400 CAPSULE ORAL at 06:01

## 2018-02-15 RX ADMIN — Medication 81 MILLIGRAM(S): at 13:29

## 2018-02-15 RX ADMIN — ENOXAPARIN SODIUM 30 MILLIGRAM(S): 100 INJECTION SUBCUTANEOUS at 13:29

## 2018-02-15 RX ADMIN — VALSARTAN 320 MILLIGRAM(S): 80 TABLET ORAL at 06:01

## 2018-02-15 RX ADMIN — GABAPENTIN 50 MILLIGRAM(S): 400 CAPSULE ORAL at 13:30

## 2018-02-15 RX ADMIN — Medication 100 MILLIGRAM(S): at 17:22

## 2018-02-15 RX ADMIN — Medication 100 MILLIGRAM(S): at 22:09

## 2018-02-15 RX ADMIN — PANTOPRAZOLE SODIUM 40 MILLIGRAM(S): 20 TABLET, DELAYED RELEASE ORAL at 06:01

## 2018-02-15 RX ADMIN — Medication 90 MILLIGRAM(S): at 06:01

## 2018-02-15 RX ADMIN — LORATADINE 10 MILLIGRAM(S): 10 TABLET ORAL at 12:17

## 2018-02-15 NOTE — PROGRESS NOTE ADULT - SUBJECTIVE AND OBJECTIVE BOX
NEPHROLOGY INTERVAL HPI/OVERNIGHT EVENTS:  pt comfortable when seen earlier  no acute distress noted  tolerated HD yesterday     MEDICATIONS  (STANDING):  ALBUTerol    90 MICROgram(s) HFA Inhaler 1 Puff(s) Inhalation every 4 hours  ALBUTerol/ipratropium for Nebulization 3 milliLiter(s) Nebulizer every 6 hours  aspirin enteric coated 81 milliGRAM(s) Oral daily  ATENolol  Tablet 50 milliGRAM(s) Oral daily  calcium acetate 1334 milliGRAM(s) Oral three times a day with meals  dextrose 5%. 1000 milliLiter(s) (50 mL/Hr) IV Continuous <Continuous>  dextrose 50% Injectable 12.5 Gram(s) IV Push once  dextrose 50% Injectable 25 Gram(s) IV Push once  dextrose 50% Injectable 25 Gram(s) IV Push once  docusate sodium 100 milliGRAM(s) Oral three times a day  enoxaparin Injectable 30 milliGRAM(s) SubCutaneous daily  epoetin raina Injectable 06511 Unit(s) IV Push <User Schedule>  ergocalciferol 35211 Unit(s) Oral every week  folic acid 1 milliGRAM(s) Oral daily  gabapentin   Solution 50 milliGRAM(s) Oral three times a day  hydrALAZINE 100 milliGRAM(s) Oral every 8 hours  insulin lispro (HumaLOG) corrective regimen sliding scale   SubCutaneous three times a day before meals  iron sucrose IVPB 100 milliGRAM(s) IV Intermittent <User Schedule>  loratadine 10 milliGRAM(s) Oral daily  NIFEdipine XL 90 milliGRAM(s) Oral daily  pantoprazole    Tablet 40 milliGRAM(s) Oral before breakfast  tiotropium 18 MICROgram(s) Capsule 1 Capsule(s) Inhalation daily  valsartan 320 milliGRAM(s) Oral daily    MEDICATIONS  (PRN):  acetaminophen   Tablet 650 milliGRAM(s) Oral every 6 hours PRN mild - moderate pain  ALPRAZolam 0.25 milliGRAM(s) Oral three times a day PRN Anxiety  artificial  tears Solution 1 Drop(s) Both EYES every 4 hours PRN Dry Eyes  dextrose Gel 1 Dose(s) Oral once PRN Blood Glucose LESS THAN 70 milliGRAM(s)/deciliter  glucagon  Injectable 1 milliGRAM(s) IntraMuscular once PRN Glucose LESS THAN 70 milligrams/deciliter  hydrALAZINE Injectable 15 milliGRAM(s) IV Push every 6 hours PRN systolic bp >170  nitroglycerin    2% Ointment 0.5 Inch(s) Transdermal two times a day PRN SBP>180      Allergies    Mushrooms (Hives (Mild))  No Known Drug Allergies          Vital Signs Last 24 Hrs  T(C): 37 (15 Feb 2018 07:45), Max: 37 (15 Feb 2018 07:45)  T(F): 98.6 (15 Feb 2018 07:45), Max: 98.6 (15 Feb 2018 07:45)  HR: 79 (15 Feb 2018 07:45) (79 - 83)  BP: 150/90 (15 Feb 2018 07:45) (137/80 - 151/82)  BP(mean): --  RR: 18 (15 Feb 2018 07:45) (18 - 18)  SpO2: 96% (15 Feb 2018 07:45) (93% - 96%)    PHYSICAL EXAM:  GENERAL: NAD, generalized weakness  HEAD:  Atraumatic, Normocephalic  EYES: EOMI, PERRLA  NECK: Supple, No JVD  NERVOUS SYSTEM:  Alert & Oriented X3, intact and symmetric  CHEST/LUNG: Clear bilaterally  HEART: Regular rate and rhythm; No rub  ABDOMEN: Soft, Nontender, Nondistended;  +BS  EXTREMITIES:  2+ Peripheral Pulses, + less edema  SKIN: No rashes or lesions    LABS:                        8.0    8.3   )-----------( 337      ( 14 Feb 2018 07:41 )             26.3     02-14    130<L>  |  90<L>  |  61.0<H>  ----------------------------<  93  5.3   |  24.0  |  5.98<H>    Ca    9.3      14 Feb 2018 07:41  Phos  3.8     02-14              RADIOLOGY & ADDITIONAL TESTS:

## 2018-02-15 NOTE — CHART NOTE - NSCHARTNOTEFT_GEN_A_CORE
Source: Patient [ ]  Family [ ]   other [x ]    Current Diet: Renal replacement, consistent CHO with 1.2L FR    Patient reports [ ] nausea  [ ] vomiting [ ] diarrhea [ ] constipation  [ ]chewing problems [ ] swallowing issues  [ ] other:     PO intake:  < 50% [ ]   50-75%  [ ]   %  [x ]  other :    Source for PO intake [ ] Patient [ ] family [ ] chart [ x] staff [ ] other    Enteral /Parenteral Nutrition:     Current Weight:     % Weight Change     Pertinent Medications: MEDICATIONS  (STANDING):  ALBUTerol    90 MICROgram(s) HFA Inhaler 1 Puff(s) Inhalation every 4 hours  ALBUTerol/ipratropium for Nebulization 3 milliLiter(s) Nebulizer every 6 hours  aspirin enteric coated 81 milliGRAM(s) Oral daily  ATENolol  Tablet 50 milliGRAM(s) Oral daily  calcium acetate 1334 milliGRAM(s) Oral three times a day with meals  dextrose 5%. 1000 milliLiter(s) (50 mL/Hr) IV Continuous <Continuous>  dextrose 50% Injectable 12.5 Gram(s) IV Push once  dextrose 50% Injectable 25 Gram(s) IV Push once  dextrose 50% Injectable 25 Gram(s) IV Push once  docusate sodium 100 milliGRAM(s) Oral three times a day  enoxaparin Injectable 30 milliGRAM(s) SubCutaneous daily  epoetin raina Injectable 32966 Unit(s) IV Push <User Schedule>  ergocalciferol 39651 Unit(s) Oral every week  folic acid 1 milliGRAM(s) Oral daily  gabapentin   Solution 50 milliGRAM(s) Oral three times a day  hydrALAZINE 100 milliGRAM(s) Oral every 8 hours  insulin lispro (HumaLOG) corrective regimen sliding scale   SubCutaneous three times a day before meals  iron sucrose IVPB 100 milliGRAM(s) IV Intermittent <User Schedule>  loratadine 10 milliGRAM(s) Oral daily  NIFEdipine XL 90 milliGRAM(s) Oral daily  pantoprazole    Tablet 40 milliGRAM(s) Oral before breakfast  tiotropium 18 MICROgram(s) Capsule 1 Capsule(s) Inhalation daily  valsartan 320 milliGRAM(s) Oral daily    MEDICATIONS  (PRN):  acetaminophen   Tablet 650 milliGRAM(s) Oral every 6 hours PRN mild - moderate pain  ALPRAZolam 0.25 milliGRAM(s) Oral three times a day PRN Anxiety  artificial  tears Solution 1 Drop(s) Both EYES every 4 hours PRN Dry Eyes  dextrose Gel 1 Dose(s) Oral once PRN Blood Glucose LESS THAN 70 milliGRAM(s)/deciliter  glucagon  Injectable 1 milliGRAM(s) IntraMuscular once PRN Glucose LESS THAN 70 milligrams/deciliter  hydrALAZINE Injectable 15 milliGRAM(s) IV Push every 6 hours PRN systolic bp >170  nitroglycerin    2% Ointment 0.5 Inch(s) Transdermal two times a day PRN SBP>180    Pertinent Labs: CBC Full  -  ( 14 Feb 2018 07:41 )  WBC Count : 8.3 K/uL  Hemoglobin : 8.0 g/dL  Hematocrit : 26.3 %  Platelet Count - Automated : 337 K/uL  Mean Cell Volume : 80.9 fl  Mean Cell Hemoglobin : 24.6 pg  Mean Cell Hemoglobin Concentration : 30.4 g/dL  Auto Neutrophil # : x  Auto Lymphocyte # : x  Auto Monocyte # : x  Auto Eosinophil # : x  Auto Basophil # : x  Auto Neutrophil % : x  Auto Lymphocyte % : x  Auto Monocyte % : x  Auto Eosinophil % : x  Auto Basophil % : x      02-14 Na130 mmol/L<L> Glu 93 mg/dL K+ 5.3 mmol/L Cr  5.98 mg/dL<H> BUN 61.0 mg/dL<H> Phos 3.8 mg/dL Alb n/a   PAB n/a           Skin:     Nutrition focused physical exam conducted - found signs of malnutrition [x ]absent [ ]present    Subcutaneous fat loss: [ ] Orbital fat pads region, [ ]Buccal fat region, [ ]Triceps region,  [ ]Ribs region    Muscle wasting: [ ]Temples region, [ ]Clavicle region, [ ]Shoulder region, [ ]Scapula region, [ ]Interosseous region,  [ ]thigh region, [ ]Calf region    Estimated Needs:   [x ] no change since previous assessment  [ ] recalculated:     Current Nutrition Diagnosis:  Nutrition diagnosis of obesity continues. Pt with good appetite and PO intake. Is able to consume meals provided without difficulty. Pt with impaired nutrient utilization related to ESRD on HD, DM as evidenced by high bun, high creat, high glu. Pt has been educated at HD center and was provided with info upon initial evaluation.      Recommendations: Continue to monitor po intake, labs    Monitoring and Evaluation:   [x ] PO intake [x ] Tolerance to diet prescription [X] Weights  [X] Follow up per protocol [X] Labs:

## 2018-02-15 NOTE — PROGRESS NOTE ADULT - ASSESSMENT
37 y/o female with ESRD on HD, DM II, HTN, came to the ER because of pain and swelling in the right calf area, worsening.  Outpatient LE doppler showed no DVT.  Serum K 5.4.  Started on abx for LE cellulitis.  Seen by nephrology.  Pt reported some initial improvement in LE edema and pain on antibiotics, however, symptoms persisted.  Denies history of trauma or immobility.  ID was consulted, CT LE showed no abscess collection.  Inpatient LE doppler negative for DVT,  CT A/P showed no obstructive process, There was diffuse anasarca and gross hepatomegaly, Undergoing daily HD per Renal.  Seen by Vascular, placed in Unna boot with improvement in symptoms, Pt noted hypoxic on RA, ~ 84%, patient refused to get CT Chest with contrast, got V/Q scan and showed low probability for PE, will get CT chest without contrast tomorrow, patient is on incentive spirometry, will continue with supplemental O2, Encourage incentive spirometry.     Plan:   > Hypoxia - due to pulmonary edema / atelectasis?  Pt's care discussed with pulmonary, patient refused to get CT Chest with contrast, got V/Q scan and showed low probability for PE, will get CT chest without contrast tomorrow, patient is on incentive spirometry, will continue with supplemental O2, Encourage incentive spirometry.   > Odynophagia - Resolved.  Continue po.   > Anxiety - Trial of Xanax.    > LE lymphedema, generalized anasarca, discussed fluid restriction with patient, continued HD,  No DVT on LE doppler, No pelvic compression on CT A/P.  Vascular evaluation appreciated, Symptoms improving with Unna boot.   > RLE Cellulitis ruled out.  Off antibiotics.   > Hypertensive urgency: pt refusing Labetalol, Clonidine, Norvasc due to perceived impact on edema.  Educated patient.  Continue Hydralazine, Nifedipine, Atenolol, will monitor BP and adjust the meds.  > Morbid Obesity - discussed gradual weight loss as outpatient.   > Anemia of chronic disease - on HD.  Monitor CBC, stable.  Procrit.  > Leg pain - trial of Neurontin  > ESRD - continue HD TIW.  > Diabetes - monitor fingersticks.  Insulin coverage for hyperglycemia.  > DVT Prophylaxis - Lovenox subcut 37 y/o female with ESRD on HD, DM II, HTN, came to the ER because of pain and swelling in the right calf area, worsening.  Outpatient LE doppler showed no DVT.  Serum K 5.4.  Started on abx for LE cellulitis.  Seen by nephrology.  Pt reported some initial improvement in LE edema and pain on antibiotics, however, symptoms persisted.  Denies history of trauma or immobility.  ID was consulted, CT LE showed no abscess collection.  Inpatient LE doppler negative for DVT,  CT A/P showed no obstructive process, There was diffuse anasarca and gross hepatomegaly, Undergoing daily HD per Renal.  Seen by Vascular, placed in Unna boot with improvement in symptoms, Pt noted hypoxic on RA, ~ 84%, patient refused to get CT Chest with contrast, got V/Q scan and showed low probability for PE, will get CT chest without contrast tomorrow, patient is on incentive spirometry, will continue with supplemental O2, Encourage incentive spirometry.     Plan:   > Hypoxia - due to pulmonary edema / atelectasis?  Pt's care discussed with pulmonary, patient refused to get CT Chest with contrast, got V/Q scan and showed low probability for PE, will get CT chest without contrast tomorrow if her oxygen sat is still low, will try to wean off oxygen, patient is on incentive spirometry, will continue with supplemental O2, Encourage incentive spirometry.   > Odynophagia - Resolved.  Continue po.   > Anxiety - Trial of Xanax.    > LE lymphedema, generalized anasarca, discussed fluid restriction with patient, continued HD,  No DVT on LE doppler, No pelvic compression on CT A/P.  Vascular evaluation appreciated, Symptoms improving with Unna boot.   > RLE Cellulitis ruled out.  Off antibiotics.   > Hypertensive urgency: pt refusing Labetalol, Clonidine, Norvasc due to perceived impact on edema.  Educated patient.  Continue Hydralazine, Nifedipine, Atenolol, will monitor BP and adjust the meds.  > Morbid Obesity - discussed gradual weight loss as outpatient.   > Anemia of chronic disease - on HD.  Monitor CBC, stable.  Procrit.  > Leg pain - trial of Neurontin  > ESRD - continue HD TIW.  > Diabetes - monitor fingersticks.  Insulin coverage for hyperglycemia.  > DVT Prophylaxis - Lovenox subcut

## 2018-02-15 NOTE — PROGRESS NOTE ADULT - ASSESSMENT
ESRD: +chronic fluid overload  - HD tomorrow  - needs better compliance with dietary restrictions    Anemia: NEELAM and Venofer at HD  - trend H/H     RO: continue Phoslo and Ergocalciferol

## 2018-02-15 NOTE — PROGRESS NOTE ADULT - SUBJECTIVE AND OBJECTIVE BOX
Patient is a 38y old  Female who presents with a chief complaint of mother states pt was" to have  elective Lt eye surgery, during anesthesia pt became lo b/p" (30 Jan 2018 04:57)  Had RLE swelling and pain. -DVT. Unna boot placed  Pt reports pain in RLE improved as is swelling    Vital Signs Last 24 Hrs  T(C): 37 (15 Feb 2018 07:45), Max: 37 (15 Feb 2018 07:45)  T(F): 98.6 (15 Feb 2018 07:45), Max: 98.6 (15 Feb 2018 07:45)  HR: 79 (15 Feb 2018 07:45) (77 - 83)  BP: 150/90 (15 Feb 2018 07:45) (137/80 - 160/86)  BP(mean): --  RR: 18 (15 Feb 2018 07:45) (18 - 18)  SpO2: 96% (15 Feb 2018 07:45) (93% - 96%)    aspirin enteric coated 81  ATENolol  Tablet 50  enoxaparin Injectable 30  hydrALAZINE 100  hydrALAZINE Injectable 15 PRN  NIFEdipine XL 90  nitroglycerin    2% Ointment 0.5 PRN  valsartan 320    PAST MEDICAL & SURGICAL HISTORY:  Clostridium difficile diarrhea  Vitreous hemorrhage of left eye  Class 3 obesity due to excess calories with serious comorbidity in adult, unspecified BMI  DALTON (obstructive sleep apnea)  Pneumonia  End stage renal disease  Hypertension  Diabetes  Elective surgery: Left eye retina surgery  AVF (arteriovenous fistula)  Vascular dialysis catheter in place      Exam:  Extremities: RLE with tr edema after unna boot removed. No ulcerations. Pedal pulse 2+        Assessment:38yFemaleHPI:  39 y/o female with ESRD on HD, DM II, HTN, came to the ER because of pain and swelling in the right calf area. no fever. occasional chills. swelling started about 1 month ago and gradually increased in size although patient stopped amlodipine to avoid increasing peripheral edema. Patient cannot take Clonidine for left foot pain developes with clonidine intake. outpatient lower ext doppler excluded DVT. S. potassium: 5.4. (30 Jan 2018 00:10)       Plan:  RLE unna boot  placed. Wrapped with ACE for gentle compression.   Ambulate  Elevate when OOB  Follow up for weekly unna boot changes at vascular clinic. [phone]

## 2018-02-15 NOTE — PROGRESS NOTE ADULT - SUBJECTIVE AND OBJECTIVE BOX
SORAYA BARBARA    [id number]    38y      Female    Patient is a 38y old  Female who presents with a chief complaint of mother states pt was" to have  elective Lt eye surgery, during anesthesia pt became lo b/p" (30 Jan 2018 04:57)      INTERVAL HPI/OVERNIGHT EVENTS:    REVIEW OF SYSTEMS:    CONSTITUTIONAL: No fever, weight loss, or fatigue  RESPIRATORY: No cough, wheezing, hemoptysis; No shortness of breath  CARDIOVASCULAR: No chest pain, palpitations  GASTROINTESTINAL: No abdominal or epigastric pain. No nausea, vomiting  NEUROLOGICAL: No headaches,  loss of strength.  MISCELLANEOUS: No joint swelling or pain       Vital Signs Last 24 Hrs  T(C): 37 (15 Feb 2018 07:45), Max: 37 (15 Feb 2018 07:45)  T(F): 98.6 (15 Feb 2018 07:45), Max: 98.6 (15 Feb 2018 07:45)  HR: 80 (15 Feb 2018 13:40) (79 - 83)  BP: 121/70 (15 Feb 2018 13:40) (121/70 - 151/82)  BP(mean): --  RR: 18 (15 Feb 2018 07:45) (18 - 18)  SpO2: 94% (15 Feb 2018 08:37) (93% - 96%)    PHYSICAL EXAM:    GENERAL: Elderly male looking   HEENT: PERRL, +EOMI  NECK: soft, Supple, No JVD,   CHEST/LUNG: Clear to auscultate bilaterally; No wheezing  HEART: S1S2+, Regular rate and rhythm; No murmurs, rubs, or gallops  ABDOMEN: Soft, Nontender, Nondistended; Bowel sounds present  EXTREMITIES:  2+ Peripheral Pulses, No clubbing, cyanosis, or edema  SKIN: No rashes or lesions  NEURO: AAOX3, no focal deficits, no motor r sensory loss  PSYCH: normal mood      LABS:                        8.0    8.3   )-----------( 337      ( 14 Feb 2018 07:41 )             26.3     02-14    130<L>  |  90<L>  |  61.0<H>  ----------------------------<  93  5.3   |  24.0  |  5.98<H>    Ca    9.3      14 Feb 2018 07:41  Phos  3.8     02-14              I&O's Summary    14 Feb 2018 07:01  -  15 Feb 2018 07:00  --------------------------------------------------------  IN: 480 mL / OUT: 3000 mL / NET: -2520 mL        MEDICATIONS  (STANDING):  ALBUTerol    90 MICROgram(s) HFA Inhaler 1 Puff(s) Inhalation every 4 hours  ALBUTerol/ipratropium for Nebulization 3 milliLiter(s) Nebulizer every 6 hours  aspirin enteric coated 81 milliGRAM(s) Oral daily  ATENolol  Tablet 50 milliGRAM(s) Oral daily  calcium acetate 1334 milliGRAM(s) Oral three times a day with meals  dextrose 5%. 1000 milliLiter(s) (50 mL/Hr) IV Continuous <Continuous>  dextrose 50% Injectable 12.5 Gram(s) IV Push once  dextrose 50% Injectable 25 Gram(s) IV Push once  dextrose 50% Injectable 25 Gram(s) IV Push once  docusate sodium 100 milliGRAM(s) Oral three times a day  enoxaparin Injectable 30 milliGRAM(s) SubCutaneous daily  epoetin raina Injectable 06287 Unit(s) IV Push <User Schedule>  ergocalciferol 29583 Unit(s) Oral every week  folic acid 1 milliGRAM(s) Oral daily  gabapentin   Solution 50 milliGRAM(s) Oral three times a day  hydrALAZINE 100 milliGRAM(s) Oral every 8 hours  insulin lispro (HumaLOG) corrective regimen sliding scale   SubCutaneous three times a day before meals  iron sucrose IVPB 100 milliGRAM(s) IV Intermittent <User Schedule>  loratadine 10 milliGRAM(s) Oral daily  NIFEdipine XL 90 milliGRAM(s) Oral daily  pantoprazole    Tablet 40 milliGRAM(s) Oral before breakfast  tiotropium 18 MICROgram(s) Capsule 1 Capsule(s) Inhalation daily  valsartan 320 milliGRAM(s) Oral daily    MEDICATIONS  (PRN):  acetaminophen   Tablet 650 milliGRAM(s) Oral every 6 hours PRN mild - moderate pain  ALPRAZolam 0.25 milliGRAM(s) Oral three times a day PRN Anxiety  artificial  tears Solution 1 Drop(s) Both EYES every 4 hours PRN Dry Eyes  dextrose Gel 1 Dose(s) Oral once PRN Blood Glucose LESS THAN 70 milliGRAM(s)/deciliter  glucagon  Injectable 1 milliGRAM(s) IntraMuscular once PRN Glucose LESS THAN 70 milligrams/deciliter  hydrALAZINE Injectable 15 milliGRAM(s) IV Push every 6 hours PRN systolic bp >170  nitroglycerin    2% Ointment 0.5 Inch(s) Transdermal two times a day PRN SBP>180 SORAYA BARBARA    [id number]    38y      Female    Patient is a 38y old  Female who presents with a chief complaint of mother states pt was" to have  elective Lt eye surgery, during anesthesia pt became lo b/p" (30 Jan 2018 04:57)      INTERVAL HPI/OVERNIGHT EVENTS:    Patient is doing well, she is still of supplemental oxygen, she has no nausea, vomiting, dizziness, fever, chills, she is refusing to get CT with chest with IV contrast.     REVIEW OF SYSTEMS:    CONSTITUTIONAL: No fever, some fatigue  RESPIRATORY: No cough, improving shortness of breath  CARDIOVASCULAR: No chest pain, palpitations  GASTROINTESTINAL: No abdominal, No nausea, vomiting  NEUROLOGICAL: No headaches,  loss of strength.  MISCELLANEOUS: No joint swelling or pain       Vital Signs Last 24 Hrs  T(C): 37 (15 Feb 2018 07:45), Max: 37 (15 Feb 2018 07:45)  T(F): 98.6 (15 Feb 2018 07:45), Max: 98.6 (15 Feb 2018 07:45)  HR: 80 (15 Feb 2018 13:40) (79 - 83)  BP: 121/70 (15 Feb 2018 13:40) (121/70 - 151/82)  RR: 18 (15 Feb 2018 07:45) (18 - 18)  SpO2: 94% (15 Feb 2018 08:37) (93% - 96%)    PHYSICAL EXAM:    GENERAL: middle age female looking comfortable  HEENT: PERRL, +EOMI  NECK: soft, Supple, No JVD,   CHEST/LUNG: Decrease air entry bilaterally; No wheezing  HEART: S1S2+, Regular rate and rhythm; No murmurs  ABDOMEN: Soft, Nontender, Nondistended; Bowel sounds present  EXTREMITIES:  1+ Peripheral Pulses, has edema  SKIN: No rashes or lesions  NEURO: AAOX3, no focal deficits, no motor r sensory loss  PSYCH: normal mood      LABS:                        8.0    8.3   )-----------( 337      ( 14 Feb 2018 07:41 )             26.3     02-14    130<L>  |  90<L>  |  61.0<H>  ----------------------------<  93  5.3   |  24.0  |  5.98<H>    Ca    9.3      14 Feb 2018 07:41  Phos  3.8     02-14              I&O's Summary    14 Feb 2018 07:01  -  15 Feb 2018 07:00  --------------------------------------------------------  IN: 480 mL / OUT: 3000 mL / NET: -2520 mL        MEDICATIONS  (STANDING):  ALBUTerol    90 MICROgram(s) HFA Inhaler 1 Puff(s) Inhalation every 4 hours  ALBUTerol/ipratropium for Nebulization 3 milliLiter(s) Nebulizer every 6 hours  aspirin enteric coated 81 milliGRAM(s) Oral daily  ATENolol  Tablet 50 milliGRAM(s) Oral daily  calcium acetate 1334 milliGRAM(s) Oral three times a day with meals  dextrose 5%. 1000 milliLiter(s) (50 mL/Hr) IV Continuous <Continuous>  dextrose 50% Injectable 12.5 Gram(s) IV Push once  dextrose 50% Injectable 25 Gram(s) IV Push once  dextrose 50% Injectable 25 Gram(s) IV Push once  docusate sodium 100 milliGRAM(s) Oral three times a day  enoxaparin Injectable 30 milliGRAM(s) SubCutaneous daily  epoetin raina Injectable 38512 Unit(s) IV Push <User Schedule>  ergocalciferol 89146 Unit(s) Oral every week  folic acid 1 milliGRAM(s) Oral daily  gabapentin   Solution 50 milliGRAM(s) Oral three times a day  hydrALAZINE 100 milliGRAM(s) Oral every 8 hours  insulin lispro (HumaLOG) corrective regimen sliding scale   SubCutaneous three times a day before meals  iron sucrose IVPB 100 milliGRAM(s) IV Intermittent <User Schedule>  loratadine 10 milliGRAM(s) Oral daily  NIFEdipine XL 90 milliGRAM(s) Oral daily  pantoprazole    Tablet 40 milliGRAM(s) Oral before breakfast  tiotropium 18 MICROgram(s) Capsule 1 Capsule(s) Inhalation daily  valsartan 320 milliGRAM(s) Oral daily    MEDICATIONS  (PRN):  acetaminophen   Tablet 650 milliGRAM(s) Oral every 6 hours PRN mild - moderate pain  ALPRAZolam 0.25 milliGRAM(s) Oral three times a day PRN Anxiety  artificial  tears Solution 1 Drop(s) Both EYES every 4 hours PRN Dry Eyes  dextrose Gel 1 Dose(s) Oral once PRN Blood Glucose LESS THAN 70 milliGRAM(s)/deciliter  glucagon  Injectable 1 milliGRAM(s) IntraMuscular once PRN Glucose LESS THAN 70 milligrams/deciliter  hydrALAZINE Injectable 15 milliGRAM(s) IV Push every 6 hours PRN systolic bp >170  nitroglycerin    2% Ointment 0.5 Inch(s) Transdermal two times a day PRN SBP>180

## 2018-02-16 ENCOUNTER — TRANSCRIPTION ENCOUNTER (OUTPATIENT)
Age: 38
End: 2018-02-16

## 2018-02-16 VITALS
SYSTOLIC BLOOD PRESSURE: 127 MMHG | TEMPERATURE: 98 F | DIASTOLIC BLOOD PRESSURE: 73 MMHG | HEART RATE: 75 BPM | RESPIRATION RATE: 18 BRPM | OXYGEN SATURATION: 96 %

## 2018-02-16 LAB
ANION GAP SERPL CALC-SCNC: 19 MMOL/L — HIGH (ref 5–17)
BUN SERPL-MCNC: 69 MG/DL — HIGH (ref 8–20)
CALCIUM SERPL-MCNC: 9 MG/DL — SIGNIFICANT CHANGE UP (ref 8.6–10.2)
CHLORIDE SERPL-SCNC: 91 MMOL/L — LOW (ref 98–107)
CO2 SERPL-SCNC: 23 MMOL/L — SIGNIFICANT CHANGE UP (ref 22–29)
CREAT SERPL-MCNC: 6.27 MG/DL — HIGH (ref 0.5–1.3)
GLUCOSE BLDC GLUCOMTR-MCNC: 106 MG/DL — HIGH (ref 70–99)
GLUCOSE BLDC GLUCOMTR-MCNC: 121 MG/DL — HIGH (ref 70–99)
GLUCOSE BLDC GLUCOMTR-MCNC: 145 MG/DL — HIGH (ref 70–99)
GLUCOSE SERPL-MCNC: 107 MG/DL — SIGNIFICANT CHANGE UP (ref 70–115)
HCT VFR BLD CALC: 25.8 % — LOW (ref 37–47)
HGB BLD-MCNC: 8.1 G/DL — LOW (ref 12–16)
MCHC RBC-ENTMCNC: 25.2 PG — LOW (ref 27–31)
MCHC RBC-ENTMCNC: 31.4 G/DL — LOW (ref 32–36)
MCV RBC AUTO: 80.4 FL — LOW (ref 81–99)
PHOSPHATE SERPL-MCNC: 4 MG/DL — SIGNIFICANT CHANGE UP (ref 2.4–4.7)
PLATELET # BLD AUTO: 391 K/UL — SIGNIFICANT CHANGE UP (ref 150–400)
POTASSIUM SERPL-MCNC: 5.1 MMOL/L — SIGNIFICANT CHANGE UP (ref 3.5–5.3)
POTASSIUM SERPL-SCNC: 5.1 MMOL/L — SIGNIFICANT CHANGE UP (ref 3.5–5.3)
RBC # BLD: 3.21 M/UL — LOW (ref 4.4–5.2)
RBC # FLD: 19.2 % — HIGH (ref 11–15.6)
SODIUM SERPL-SCNC: 133 MMOL/L — LOW (ref 135–145)
WBC # BLD: 7.9 K/UL — SIGNIFICANT CHANGE UP (ref 4.8–10.8)
WBC # FLD AUTO: 7.9 K/UL — SIGNIFICANT CHANGE UP (ref 4.8–10.8)

## 2018-02-16 PROCEDURE — 96374 THER/PROPH/DIAG INJ IV PUSH: CPT

## 2018-02-16 PROCEDURE — 85027 COMPLETE CBC AUTOMATED: CPT

## 2018-02-16 PROCEDURE — 85730 THROMBOPLASTIN TIME PARTIAL: CPT

## 2018-02-16 PROCEDURE — 82746 ASSAY OF FOLIC ACID SERUM: CPT

## 2018-02-16 PROCEDURE — A9540: CPT

## 2018-02-16 PROCEDURE — 83615 LACTATE (LD) (LDH) ENZYME: CPT

## 2018-02-16 PROCEDURE — 97163 PT EVAL HIGH COMPLEX 45 MIN: CPT

## 2018-02-16 PROCEDURE — 84466 ASSAY OF TRANSFERRIN: CPT

## 2018-02-16 PROCEDURE — 73590 X-RAY EXAM OF LOWER LEG: CPT

## 2018-02-16 PROCEDURE — 99239 HOSP IP/OBS DSCHRG MGMT >30: CPT

## 2018-02-16 PROCEDURE — 82728 ASSAY OF FERRITIN: CPT

## 2018-02-16 PROCEDURE — 73701 CT LOWER EXTREMITY W/DYE: CPT

## 2018-02-16 PROCEDURE — 97116 GAIT TRAINING THERAPY: CPT

## 2018-02-16 PROCEDURE — 71045 X-RAY EXAM CHEST 1 VIEW: CPT

## 2018-02-16 PROCEDURE — 87040 BLOOD CULTURE FOR BACTERIA: CPT

## 2018-02-16 PROCEDURE — 80202 ASSAY OF VANCOMYCIN: CPT

## 2018-02-16 PROCEDURE — 36415 COLL VENOUS BLD VENIPUNCTURE: CPT

## 2018-02-16 PROCEDURE — 80076 HEPATIC FUNCTION PANEL: CPT

## 2018-02-16 PROCEDURE — 85610 PROTHROMBIN TIME: CPT

## 2018-02-16 PROCEDURE — 99285 EMERGENCY DEPT VISIT HI MDM: CPT | Mod: 25

## 2018-02-16 PROCEDURE — 84702 CHORIONIC GONADOTROPIN TEST: CPT

## 2018-02-16 PROCEDURE — 80048 BASIC METABOLIC PNL TOTAL CA: CPT

## 2018-02-16 PROCEDURE — 78582 LUNG VENTILAT&PERFUS IMAGING: CPT

## 2018-02-16 PROCEDURE — 82962 GLUCOSE BLOOD TEST: CPT

## 2018-02-16 PROCEDURE — 83550 IRON BINDING TEST: CPT

## 2018-02-16 PROCEDURE — 83880 ASSAY OF NATRIURETIC PEPTIDE: CPT

## 2018-02-16 PROCEDURE — 96375 TX/PRO/DX INJ NEW DRUG ADDON: CPT

## 2018-02-16 PROCEDURE — 99261: CPT

## 2018-02-16 PROCEDURE — A9567: CPT

## 2018-02-16 PROCEDURE — 93306 TTE W/DOPPLER COMPLETE: CPT

## 2018-02-16 PROCEDURE — 97530 THERAPEUTIC ACTIVITIES: CPT

## 2018-02-16 PROCEDURE — 83010 ASSAY OF HAPTOGLOBIN QUANT: CPT

## 2018-02-16 PROCEDURE — 36000 PLACE NEEDLE IN VEIN: CPT | Mod: XU

## 2018-02-16 PROCEDURE — 93971 EXTREMITY STUDY: CPT

## 2018-02-16 PROCEDURE — 87340 HEPATITIS B SURFACE AG IA: CPT

## 2018-02-16 PROCEDURE — 74178 CT ABD&PLV WO CNTR FLWD CNTR: CPT

## 2018-02-16 PROCEDURE — 82550 ASSAY OF CK (CPK): CPT

## 2018-02-16 PROCEDURE — 94640 AIRWAY INHALATION TREATMENT: CPT

## 2018-02-16 PROCEDURE — 84100 ASSAY OF PHOSPHORUS: CPT

## 2018-02-16 RX ORDER — GABAPENTIN 400 MG/1
2.5 CAPSULE ORAL
Qty: 225 | Refills: 0 | OUTPATIENT
Start: 2018-02-16 | End: 2018-03-17

## 2018-02-16 RX ORDER — ENOXAPARIN SODIUM 100 MG/ML
40 INJECTION SUBCUTANEOUS EVERY 24 HOURS
Qty: 0 | Refills: 0 | Status: DISCONTINUED | OUTPATIENT
Start: 2018-02-16 | End: 2018-02-16

## 2018-02-16 RX ORDER — CALCIUM ACETATE 667 MG
667 TABLET ORAL
Qty: 0 | Refills: 0 | Status: DISCONTINUED | OUTPATIENT
Start: 2018-02-16 | End: 2018-02-16

## 2018-02-16 RX ORDER — LABETALOL HCL 100 MG
100 TABLET ORAL
Qty: 0 | Refills: 0 | Status: DISCONTINUED | OUTPATIENT
Start: 2018-02-16 | End: 2018-02-16

## 2018-02-16 RX ORDER — PANTOPRAZOLE SODIUM 20 MG/1
1 TABLET, DELAYED RELEASE ORAL
Qty: 0 | Refills: 0 | COMMUNITY
Start: 2018-02-16

## 2018-02-16 RX ORDER — FERROUS SULFATE 325(65) MG
1 TABLET ORAL
Qty: 60 | Refills: 0 | OUTPATIENT
Start: 2018-02-16 | End: 2018-03-17

## 2018-02-16 RX ORDER — IPRATROPIUM/ALBUTEROL SULFATE 18-103MCG
3 AEROSOL WITH ADAPTER (GRAM) INHALATION
Qty: 360 | Refills: 0 | OUTPATIENT
Start: 2018-02-16 | End: 2018-03-17

## 2018-02-16 RX ORDER — VALSARTAN 80 MG/1
1 TABLET ORAL
Qty: 30 | Refills: 0 | OUTPATIENT
Start: 2018-02-16 | End: 2018-03-17

## 2018-02-16 RX ORDER — LORATADINE 10 MG/1
1 TABLET ORAL
Qty: 30 | Refills: 0 | OUTPATIENT
Start: 2018-02-16 | End: 2018-03-17

## 2018-02-16 RX ORDER — SIMVASTATIN 20 MG/1
10 TABLET, FILM COATED ORAL AT BEDTIME
Qty: 0 | Refills: 0 | Status: DISCONTINUED | OUTPATIENT
Start: 2018-02-16 | End: 2018-02-16

## 2018-02-16 RX ORDER — ENOXAPARIN SODIUM 100 MG/ML
30 INJECTION SUBCUTANEOUS DAILY
Qty: 0 | Refills: 0 | Status: DISCONTINUED | OUTPATIENT
Start: 2018-02-16 | End: 2018-02-16

## 2018-02-16 RX ORDER — GABAPENTIN 400 MG/1
1 CAPSULE ORAL
Qty: 0 | Refills: 0 | COMMUNITY
Start: 2018-02-16

## 2018-02-16 RX ORDER — AMLODIPINE BESYLATE 2.5 MG/1
10 TABLET ORAL DAILY
Qty: 0 | Refills: 0 | Status: DISCONTINUED | OUTPATIENT
Start: 2018-02-16 | End: 2018-02-16

## 2018-02-16 RX ORDER — ALBUTEROL 90 UG/1
1 AEROSOL, METERED ORAL
Qty: 1 | Refills: 0 | OUTPATIENT
Start: 2018-02-16 | End: 2018-03-17

## 2018-02-16 RX ORDER — LABETALOL HCL 100 MG
1 TABLET ORAL
Qty: 0 | Refills: 0 | COMMUNITY

## 2018-02-16 RX ORDER — FLUTICASONE PROPIONATE 50 MCG
1 SPRAY, SUSPENSION NASAL DAILY
Qty: 0 | Refills: 0 | Status: DISCONTINUED | OUTPATIENT
Start: 2018-02-16 | End: 2018-02-16

## 2018-02-16 RX ORDER — DOCUSATE SODIUM 100 MG
1 CAPSULE ORAL
Qty: 90 | Refills: 0 | OUTPATIENT
Start: 2018-02-16 | End: 2018-03-17

## 2018-02-16 RX ORDER — HYDRALAZINE HCL 50 MG
1 TABLET ORAL
Qty: 90 | Refills: 0 | OUTPATIENT
Start: 2018-02-16 | End: 2018-03-17

## 2018-02-16 RX ORDER — VALSARTAN 80 MG/1
1 TABLET ORAL
Qty: 0 | Refills: 0 | COMMUNITY

## 2018-02-16 RX ORDER — TIOTROPIUM BROMIDE 18 UG/1
1 CAPSULE ORAL; RESPIRATORY (INHALATION)
Qty: 1 | Refills: 0 | OUTPATIENT
Start: 2018-02-16 | End: 2018-03-17

## 2018-02-16 RX ORDER — LABETALOL HCL 100 MG
1 TABLET ORAL
Qty: 60 | Refills: 0 | OUTPATIENT
Start: 2018-02-16 | End: 2018-03-17

## 2018-02-16 RX ORDER — FOLIC ACID 0.8 MG
1 TABLET ORAL
Qty: 0 | Refills: 0 | COMMUNITY
Start: 2018-02-16

## 2018-02-16 RX ADMIN — Medication 100 MILLIGRAM(S): at 05:33

## 2018-02-16 RX ADMIN — Medication 667 MILLIGRAM(S): at 16:57

## 2018-02-16 RX ADMIN — Medication 3 MILLILITER(S): at 03:40

## 2018-02-16 RX ADMIN — PANTOPRAZOLE SODIUM 40 MILLIGRAM(S): 20 TABLET, DELAYED RELEASE ORAL at 05:33

## 2018-02-16 RX ADMIN — Medication 1334 MILLIGRAM(S): at 16:56

## 2018-02-16 RX ADMIN — Medication 100 MILLIGRAM(S): at 14:49

## 2018-02-16 RX ADMIN — Medication 1 MILLIGRAM(S): at 11:56

## 2018-02-16 RX ADMIN — IRON SUCROSE 210 MILLIGRAM(S): 20 INJECTION, SOLUTION INTRAVENOUS at 09:40

## 2018-02-16 RX ADMIN — Medication 90 MILLIGRAM(S): at 05:33

## 2018-02-16 RX ADMIN — GABAPENTIN 50 MILLIGRAM(S): 400 CAPSULE ORAL at 14:49

## 2018-02-16 RX ADMIN — VALSARTAN 320 MILLIGRAM(S): 80 TABLET ORAL at 05:33

## 2018-02-16 RX ADMIN — GABAPENTIN 50 MILLIGRAM(S): 400 CAPSULE ORAL at 05:33

## 2018-02-16 RX ADMIN — ERYTHROPOIETIN 10000 UNIT(S): 10000 INJECTION, SOLUTION INTRAVENOUS; SUBCUTANEOUS at 09:35

## 2018-02-16 RX ADMIN — LORATADINE 10 MILLIGRAM(S): 10 TABLET ORAL at 11:56

## 2018-02-16 RX ADMIN — Medication 81 MILLIGRAM(S): at 11:56

## 2018-02-16 RX ADMIN — Medication 3 MILLILITER(S): at 09:08

## 2018-02-16 RX ADMIN — Medication 3 MILLILITER(S): at 16:25

## 2018-02-16 RX ADMIN — Medication 1334 MILLIGRAM(S): at 11:55

## 2018-02-16 NOTE — DISCHARGE NOTE ADULT - HOSPITAL COURSE
is a 37 y/o female with ESRD on HD, DM II, HTN, came to the ER because of pain and swelling in the right calf area, worsening.  Outpatient LE doppler showed no DVT.  Serum K 5.4.  Started on abx for LE cellulitis.  Seen by nephrology.  Pt reported some initial improvement in LE edema and pain on antibiotics, however, symptoms persisted.  Denies history of trauma or immobility.  ID was consulted, CT LE showed no abscess collection.  Inpatient LE doppler negative for DVT,  CT A/P showed no obstructive process, There was diffuse anasarca and gross hepatomegaly, Undergoing daily HD per Renal.  Seen by Vascular, placed in Unna boot with improvement in symptoms, Pt noted hypoxic on RA, ~ 84%, patient refused to get CT Chest with contrast, got V/Q scan and showed low probability for PE, will get CT chest without contrast tomorrow, patient is on incentive spirometry, will continue with supplemental O2, Encourage incentive spirometry.     For now, I think she can be discharged home as she's on her usual oxygen via nasal cannula. I've explained that I think she has atelectasis, the treatment is nebulizers and chest PT and sitting up or mobilizing. She understands the plan of care, I sent nebulizers to her pharmacy, she has a machine. She's comfortable and she's in full agreement with the discharge plan. I also communicated with nephrology and they are in full agreement with her discharge today.  understands my plan of care and is pleased with my care here at [facility].

## 2018-02-16 NOTE — DISCHARGE NOTE ADULT - CARE PLAN
Principal Discharge DX:	End stage renal disease  Goal:	Please take all meds and f/u with PMD  Assessment and plan of treatment:	Please take all meds and f/u with PMD  Secondary Diagnosis:	Hypoxia

## 2018-02-16 NOTE — DISCHARGE NOTE ADULT - CARE PROVIDER_API CALL
Mane Charles), Internal Medicine; Nephrology  [address] [place] [address]  Phone: [phone]  Fax: [phone]

## 2018-02-16 NOTE — DISCHARGE NOTE ADULT - MEDICATION SUMMARY - MEDICATIONS TO STOP TAKING
I will STOP taking the medications listed below when I get home from the hospital:    labetalol 100 mg oral tablet  -- 1 tab(s) by mouth 2 times a day

## 2018-02-16 NOTE — PROGRESS NOTE ADULT - SUBJECTIVE AND OBJECTIVE BOX
Patient was seen and evaluated on dialysis.   No c/o CP SOB NV  no F/C  no swelling, better    T(C): 36.6 (02-16-18 @ 11:20), Max: 37.2 (02-16-18 @ 05:02)  HR: 77 (02-16-18 @ 14:40) (77 - 86)  BP: 126/74 (02-16-18 @ 14:40) (126/74 - 161/90)  Wt(kg): --  PE ;  NAD  lungs - CTA  CV gr 1 murmer,  No gallop or rub  Abd : soft, NT BS +, No masses  Ext- tr edema  Neuro : Grossly intact, moving extremities                             8.1    7.9   )-----------( 391      ( 16 Feb 2018 08:11 )             25.8        02-16    133<L>  |  91<L>  |  69.0<H>  ----------------------------<  107  5.1   |  23.0  |  6.27<H>    Ca    9.0      16 Feb 2018 08:11  Phos  4.0     02-16        MEDICATIONS  (STANDING):  acetaminophen   Tablet PRN  ALBUTerol    90 MICROgram(s) HFA Inhaler  ALBUTerol/ipratropium for Nebulization  ALPRAZolam PRN  amLODIPine   Tablet  artificial  tears Solution PRN  aspirin enteric coated  ATENolol  Tablet  calcium acetate  calcium acetate  cloNIDine  dextrose 5%.  dextrose 50% Injectable  dextrose 50% Injectable  dextrose 50% Injectable  dextrose Gel PRN  docusate sodium  enoxaparin Injectable  epoetin raina Injectable  ergocalciferol  fluticasone propionate 50 MICROgram(s)/spray Nasal Spray  folic acid  gabapentin   Solution  glucagon  Injectable PRN  hydrALAZINE  hydrALAZINE Injectable PRN  insulin lispro (HumaLOG) corrective regimen sliding scale  iron sucrose IVPB  labetalol  loratadine  NIFEdipine XL  nitroglycerin    2% Ointment PRN  pantoprazole    Tablet  simvastatin  tiotropium 18 MICROgram(s) Capsule  valsartan      Patient stable  Mike HD easily  hb still low - on Procrit  Continue

## 2018-02-16 NOTE — PROGRESS NOTE ADULT - PROVIDER SPECIALTY LIST ADULT
Hospitalist
Infectious Disease
Nephrology
Vascular Surgery
Hospitalist
Hospitalist
Nephrology
Hospitalist

## 2018-02-16 NOTE — DISCHARGE NOTE ADULT - PATIENT PORTAL LINK FT
You can access the [facility] Patient Portal, offered by [facility], by registering with the following website: [email or web address]

## 2018-02-16 NOTE — DISCHARGE NOTE ADULT - MEDICATION SUMMARY - MEDICATIONS TO TAKE
I will START or STAY ON the medications listed below when I get home from the hospital:    aspirin 81 mg oral delayed release tablet  -- 1 tab(s) by mouth once a day  -- Indication: For Hypertensive urgency    valsartan 320 mg oral tablet  -- 1 tab(s) by mouth once a day MDD:1 tab  -- Do not take this drug if you are pregnant.  It is very important that you take or use this exactly as directed.  Do not skip doses or discontinue unless directed by your doctor.  Some non-prescription drugs may aggravate your condition.  Read all labels carefully.  If a warning appears, check with your doctor before taking.    -- Indication: For Htn    cloNIDine 0.1 mg oral tablet  -- 1 tab(s) by mouth 2 times a day MDD:2 tabs  -- Indication: For Htn    gabapentin 250 mg/5 mL oral solution  -- 2.5 milliliter(s) by mouth 3 times a day MDD:7.5mL  -- It is very important that you take or use this exactly as directed.  Do not skip doses or discontinue unless directed by your doctor.  Keep in refrigerator.  Do not freeze.  May cause drowsiness.  Alcohol may intensify this effect.  Use care when operating dangerous machinery.    -- Indication: For pain    HumaLOG KwikPen 100 units/mL subcutaneous solution  -- 3 milliliter(s) subcutaneous prn  -- Do not drink alcoholic beverages when taking this medication.  It is very important that you take or use this exactly as directed.  Do not skip doses or discontinue unless directed by your doctor.  Keep in refrigerator.  Do not freeze.    -- Indication: For diabetes    loratadine 10 mg oral tablet  -- 1 tab(s) by mouth once a day  -- Indication: For allergy    simvastatin 10 mg oral tablet  -- 1 tab(s) by mouth once a day (at bedtime)  -- Indication: For Hyperlipidemia    labetalol 200 mg oral tablet  -- 1 tab(s) by mouth 2 times a day MDD:2 tabs  -- It is very important that you take or use this exactly as directed.  Do not skip doses or discontinue unless directed by your doctor.  May cause drowsiness.  Alcohol may intensify this effect.  Use care when operating dangerous machinery.  Some non-prescription drugs may aggravate your condition.  Read all labels carefully.  If a warning appears, check with your doctor before taking.    -- Indication: For Htn    albuterol 90 mcg/inh inhalation aerosol  -- 1 puff(s) inhaled every 4 hours MDD:6 puff  -- Indication: For bronchospasm    tiotropium 18 mcg inhalation capsule  -- 1 cap(s) inhaled once a day MDD:1 inhalation  -- Indication: For bronchospasm    DuoNeb 0.5 mg-2.5 mg/3 mL inhalation solution  -- 3 milliliter(s) by nebulizer every 6 hours MDD:12 mL  -- For inhalation only.  It is very important that you take or use this exactly as directed.  Do not skip doses or discontinue unless directed by your doctor.  Obtain medical advice before taking any non-prescription drugs as some may affect the action of this medication.    -- Indication: For bronchospasm    amLODIPine 10 mg oral tablet  -- 1 tab(s) by mouth once a day  -- Indication: For Htn    epoetin raina  -- 75811 unit(s) intravenous 3 times a week  as per renal  -- Indication: For ESRD on hemodialysis    ferrous sulfate 325 mg (65 mg elemental iron) oral delayed release tablet  -- 1 tab(s) by mouth 2 times a day MDD:2 tabs  -- May discolor urine or feces.  Swallow whole.  Do not crush.    -- Indication: For supplementation    docusate sodium 100 mg oral capsule  -- 1 cap(s) by mouth 3 times a day, As Needed MDD:3 tabs   -- Indication: For Consitpation    fluticasone 50 mcg/inh nasal spray  -- 1 spray(s) into nose once a day  -- Indication: For allergy    calcium acetate 667 mg oral capsule  -- 2 cap(s) by mouth 3 times a day (with meals)  -- Indication: For ESRD on hemodialysis    sevelamer hydrochloride 800 mg oral tablet  -- 1 tab(s) by mouth 3 times a day (with meals)  -- Indication: For ESRD on hemodialysis    pantoprazole 40 mg oral delayed release tablet  -- 1 tab(s) by mouth once a day (before a meal)  -- Indication: For gi prophylaxis    folic acid 1 mg oral tablet  -- 1 tab(s) by mouth once a day  -- Indication: For supplementation    ergocalciferol 50,000 intl units (1.25 mg) oral capsule  -- 1 cap(s) by mouth once a day (after a meal)  -- Indication: For supplementation I will START or STAY ON the medications listed below when I get home from the hospital:    aspirin 81 mg oral delayed release tablet  -- 1 tab(s) by mouth once a day  -- Indication: For Hypertensive urgency    valsartan 320 mg oral tablet  -- 1 tab(s) by mouth once a day MDD:1 tab  -- Do not take this drug if you are pregnant.  It is very important that you take or use this exactly as directed.  Do not skip doses or discontinue unless directed by your doctor.  Some non-prescription drugs may aggravate your condition.  Read all labels carefully.  If a warning appears, check with your doctor before taking.    -- Indication: For Htn    cloNIDine 0.1 mg oral tablet  -- 1 tab(s) by mouth 2 times a day MDD:2 tabs  -- Indication: For Htn    gabapentin 250 mg/5 mL oral solution  -- 2.5 milliliter(s) by mouth 3 times a day MDD:7.5mL  -- It is very important that you take or use this exactly as directed.  Do not skip doses or discontinue unless directed by your doctor.  Keep in refrigerator.  Do not freeze.  May cause drowsiness.  Alcohol may intensify this effect.  Use care when operating dangerous machinery.    -- Indication: For pain    HumaLOG KwikPen 100 units/mL subcutaneous solution  -- 3 milliliter(s) subcutaneous prn  -- Do not drink alcoholic beverages when taking this medication.  It is very important that you take or use this exactly as directed.  Do not skip doses or discontinue unless directed by your doctor.  Keep in refrigerator.  Do not freeze.    -- Indication: For diabetes    loratadine 10 mg oral tablet  -- 1 tab(s) by mouth once a day  -- Indication: For allergy    simvastatin 10 mg oral tablet  -- 1 tab(s) by mouth once a day (at bedtime)  -- Indication: For Hyperlipidemia    labetalol 200 mg oral tablet  -- 1 tab(s) by mouth 2 times a day MDD:2 tabs  -- It is very important that you take or use this exactly as directed.  Do not skip doses or discontinue unless directed by your doctor.  May cause drowsiness.  Alcohol may intensify this effect.  Use care when operating dangerous machinery.  Some non-prescription drugs may aggravate your condition.  Read all labels carefully.  If a warning appears, check with your doctor before taking.    -- Indication: For Htn    albuterol 90 mcg/inh inhalation aerosol  -- 1 puff(s) inhaled every 4 hours MDD:6 puff  -- Indication: For bronchospasm    tiotropium 18 mcg inhalation capsule  -- 1 cap(s) inhaled once a day MDD:1 inhalation  -- Indication: For bronchospasm    DuoNeb 0.5 mg-2.5 mg/3 mL inhalation solution  -- 3 milliliter(s) by nebulizer every 6 hours MDD:12 mL  -- For inhalation only.  It is very important that you take or use this exactly as directed.  Do not skip doses or discontinue unless directed by your doctor.  Obtain medical advice before taking any non-prescription drugs as some may affect the action of this medication.    -- Indication: For bronchospasm    amLODIPine 10 mg oral tablet  -- 1 tab(s) by mouth once a day  -- Indication: For Htn    epoetin raina  -- 86275 unit(s) intravenous 3 times a week  as per renal  -- Indication: For ESRD on hemodialysis    ferrous sulfate 325 mg (65 mg elemental iron) oral delayed release tablet  -- 1 tab(s) by mouth 2 times a day MDD:2 tabs  -- May discolor urine or feces.  Swallow whole.  Do not crush.    -- Indication: For supplementation    docusate sodium 100 mg oral capsule  -- 1 cap(s) by mouth 3 times a day, As Needed MDD:3 tabs   -- Indication: For Consitpation    fluticasone 50 mcg/inh nasal spray  -- 1 spray(s) into nose once a day  -- Indication: For allergy    calcium acetate 667 mg oral capsule  -- 2 cap(s) by mouth 3 times a day (with meals)  -- Indication: For ESRD on hemodialysis    sevelamer hydrochloride 800 mg oral tablet  -- 1 tab(s) by mouth 3 times a day (with meals)  -- Indication: For ESRD on hemodialysis    pantoprazole 40 mg oral delayed release tablet  -- 1 tab(s) by mouth once a day (before a meal)  -- Indication: For gi prophylaxis    folic acid 1 mg oral tablet  -- 1 tab(s) by mouth once a day  -- Indication: For supplementation    ergocalciferol 50,000 intl units (1.25 mg) oral capsule  -- 1 cap(s) by mouth once a day (after a meal)  -- Indication: For supplementation

## 2018-02-16 NOTE — DISCHARGE NOTE ADULT - MEDICATION SUMMARY - MEDICATIONS TO CHANGE
I will SWITCH the dose or number of times a day I take the medications listed below when I get home from the hospital:    HumaLOG KwikPen 100 units/mL subcutaneous solution  -- 3 milliliter(s) subcutaneous prn  -- Do not drink alcoholic beverages when taking this medication.  It is very important that you take or use this exactly as directed.  Do not skip doses or discontinue unless directed by your doctor.  Keep in refrigerator.  Do not freeze.    valsartan 160 mg oral tablet  -- 1 tab(s) by mouth once a day    labetalol 200 mg oral tablet  -- 1 tab(s) by mouth 3 times a day

## 2018-02-21 ENCOUNTER — APPOINTMENT (OUTPATIENT)
Dept: VASCULAR SURGERY | Facility: CLINIC | Age: 38
End: 2018-02-21
Payer: MEDICARE

## 2018-02-21 VITALS
SYSTOLIC BLOOD PRESSURE: 126 MMHG | BODY MASS INDEX: 50.61 KG/M2 | RESPIRATION RATE: 16 BRPM | DIASTOLIC BLOOD PRESSURE: 76 MMHG | WEIGHT: 275 LBS | HEART RATE: 88 BPM | TEMPERATURE: 97.2 F | HEIGHT: 62 IN | OXYGEN SATURATION: 79 %

## 2018-02-21 PROCEDURE — 29580 STRAPPING UNNA BOOT: CPT | Mod: RT

## 2018-02-28 ENCOUNTER — APPOINTMENT (OUTPATIENT)
Dept: VASCULAR SURGERY | Facility: CLINIC | Age: 38
End: 2018-02-28
Payer: MEDICARE

## 2018-02-28 VITALS
SYSTOLIC BLOOD PRESSURE: 147 MMHG | BODY MASS INDEX: 46.93 KG/M2 | OXYGEN SATURATION: 99 % | TEMPERATURE: 97.8 F | RESPIRATION RATE: 16 BRPM | HEART RATE: 72 BPM | WEIGHT: 255 LBS | HEIGHT: 62 IN | DIASTOLIC BLOOD PRESSURE: 83 MMHG

## 2018-02-28 DIAGNOSIS — E66.01 MORBID (SEVERE) OBESITY DUE TO EXCESS CALORIES: ICD-10-CM

## 2018-02-28 DIAGNOSIS — Z01.818 ENCOUNTER FOR OTHER PREPROCEDURAL EXAMINATION: ICD-10-CM

## 2018-02-28 DIAGNOSIS — R60.0 LOCALIZED EDEMA: ICD-10-CM

## 2018-02-28 DIAGNOSIS — Q82.0 HEREDITARY LYMPHEDEMA: ICD-10-CM

## 2018-02-28 DIAGNOSIS — I87.2 VENOUS INSUFFICIENCY (CHRONIC) (PERIPHERAL): ICD-10-CM

## 2018-02-28 PROCEDURE — 99213 OFFICE O/P EST LOW 20 MIN: CPT

## 2018-02-28 RX ORDER — FLUOCINOLONE ACETONIDE 0.1 MG/G
0.01 CREAM TOPICAL TWICE DAILY
Qty: 2 | Refills: 2 | Status: ACTIVE | COMMUNITY
Start: 2018-02-28 | End: 1900-01-01

## 2018-02-28 RX ORDER — CLOBETASOL PROPIONATE 0.5 MG/G
0.05 CREAM TOPICAL TWICE DAILY
Qty: 1 | Refills: 3 | Status: ACTIVE | COMMUNITY
Start: 2018-02-28 | End: 1900-01-01

## 2018-03-01 ENCOUNTER — APPOINTMENT (OUTPATIENT)
Dept: VASCULAR SURGERY | Facility: CLINIC | Age: 38
End: 2018-03-01
Payer: MEDICARE

## 2018-03-01 VITALS
OXYGEN SATURATION: 83 % | WEIGHT: 255 LBS | SYSTOLIC BLOOD PRESSURE: 175 MMHG | TEMPERATURE: 98.2 F | HEART RATE: 95 BPM | BODY MASS INDEX: 46.93 KG/M2 | DIASTOLIC BLOOD PRESSURE: 103 MMHG | RESPIRATION RATE: 16 BRPM | HEIGHT: 62 IN

## 2018-03-01 DIAGNOSIS — Z99.2 DEPENDENCE ON RENAL DIALYSIS: ICD-10-CM

## 2018-03-01 DIAGNOSIS — N18.6 END STAGE RENAL DISEASE: ICD-10-CM

## 2018-03-01 PROCEDURE — 93931 UPPER EXTREMITY STUDY: CPT

## 2018-03-01 PROCEDURE — 99214 OFFICE O/P EST MOD 30 MIN: CPT

## 2018-03-02 VITALS
SYSTOLIC BLOOD PRESSURE: 175 MMHG | HEIGHT: 62 IN | RESPIRATION RATE: 16 BRPM | WEIGHT: 255.07 LBS | HEART RATE: 95 BPM | OXYGEN SATURATION: 83 % | DIASTOLIC BLOOD PRESSURE: 103 MMHG

## 2018-03-02 PROBLEM — R60.0 EDEMA OF RIGHT LOWER EXTREMITY: Status: ACTIVE | Noted: 2018-02-21

## 2018-03-02 PROBLEM — Q82.0 HEREDITARY LYMPHEDEMA OF LEGS: Status: ACTIVE | Noted: 2018-03-02

## 2018-03-02 NOTE — H&P PST ADULT - ASSESSMENT
The patient is a 38 year old female w ESRD on HD (MWF evenings) via L AVF, s/p L brachiocephalic AVF 2/2/2017 w palpable thrill. Continues to be O2 dependent.  Recent issues with access w clots extracted and high pressures/alarms. HD duplex unremarkable. Will plan for diagnostic fistulogram with possible intervention: LUE Fistulogram, possible angioplasty and possible stent.

## 2018-03-02 NOTE — H&P PST ADULT - SKIN COMMENTS
:. healed LUE transverse incision. Strong thrill in outflow vein in ac fossa and distal forearm. Slightly weaker in mid/prox upper arm, but present. Track marks along course of vein in distal forearm at site of punctures. Palpable radial pulse at wrist. Hand warm and well perfused

## 2018-03-02 NOTE — H&P PST ADULT - CARDIOVASCULAR COMMENTS
normal heart sounds and normal rate and rhythm. Radial: right 2+ and left 2+. Femoral: right 2+ and left 2+.

## 2018-03-02 NOTE — ASU PATIENT PROFILE, ADULT - VISION (WITH CORRECTIVE LENSES IF THE PATIENT USUALLY WEARS THEM):
Severely impaired: cannot locate objects without hearing or touching them or patient nonresponsive./left eye light only

## 2018-03-02 NOTE — H&P PST ADULT - HISTORY OF PRESENT ILLNESS
Pt is a 39 y/o female with L AVF secondary to ESRD for HD. She has been having difficulty with HD access over the past few weeks. HD machines have been alarming. U/S demonstrates adequate flow volumes.

## 2018-03-05 RX ORDER — SODIUM CHLORIDE 9 MG/ML
3 INJECTION INTRAMUSCULAR; INTRAVENOUS; SUBCUTANEOUS ONCE
Qty: 0 | Refills: 0 | Status: DISCONTINUED | OUTPATIENT
Start: 2018-03-06 | End: 2018-03-21

## 2018-03-06 ENCOUNTER — OUTPATIENT (OUTPATIENT)
Dept: OUTPATIENT SERVICES | Facility: HOSPITAL | Age: 38
LOS: 1 days | End: 2018-03-06
Payer: MEDICARE

## 2018-03-06 VITALS
TEMPERATURE: 98 F | RESPIRATION RATE: 17 BRPM | SYSTOLIC BLOOD PRESSURE: 159 MMHG | OXYGEN SATURATION: 100 % | DIASTOLIC BLOOD PRESSURE: 98 MMHG | HEART RATE: 96 BPM

## 2018-03-06 DIAGNOSIS — I77.0 ARTERIOVENOUS FISTULA, ACQUIRED: Chronic | ICD-10-CM

## 2018-03-06 DIAGNOSIS — N18.6 END STAGE RENAL DISEASE: ICD-10-CM

## 2018-03-06 DIAGNOSIS — I77.0 ARTERIOVENOUS FISTULA, ACQUIRED: ICD-10-CM

## 2018-03-06 DIAGNOSIS — Z99.2 DEPENDENCE ON RENAL DIALYSIS: Chronic | ICD-10-CM

## 2018-03-06 DIAGNOSIS — Z41.9 ENCOUNTER FOR PROCEDURE FOR PURPOSES OTHER THAN REMEDYING HEALTH STATE, UNSPECIFIED: Chronic | ICD-10-CM

## 2018-03-06 LAB — GLUCOSE BLDC GLUCOMTR-MCNC: 107 MG/DL — HIGH (ref 70–99)

## 2018-03-06 PROCEDURE — 36901 INTRO CATH DIALYSIS CIRCUIT: CPT | Mod: LT

## 2018-03-06 PROCEDURE — C1769: CPT

## 2018-03-06 PROCEDURE — 36901 INTRO CATH DIALYSIS CIRCUIT: CPT

## 2018-03-06 PROCEDURE — 76000 FLUOROSCOPY <1 HR PHYS/QHP: CPT

## 2018-03-06 PROCEDURE — 82962 GLUCOSE BLOOD TEST: CPT

## 2018-03-06 PROCEDURE — 75827 VEIN X-RAY CHEST: CPT | Mod: 26,59

## 2018-03-06 PROCEDURE — C1894: CPT

## 2018-03-06 RX ORDER — AMLODIPINE BESYLATE 2.5 MG/1
1 TABLET ORAL
Qty: 0 | Refills: 0 | COMMUNITY

## 2018-03-06 RX ORDER — SIMVASTATIN 20 MG/1
1 TABLET, FILM COATED ORAL
Qty: 0 | Refills: 0 | COMMUNITY

## 2018-03-06 RX ORDER — FLUTICASONE PROPIONATE 50 MCG
1 SPRAY, SUSPENSION NASAL
Qty: 0 | Refills: 0 | COMMUNITY

## 2018-03-06 RX ORDER — CALCIUM ACETATE 667 MG
2 TABLET ORAL
Qty: 0 | Refills: 0 | COMMUNITY

## 2018-03-07 ENCOUNTER — TRANSCRIPTION ENCOUNTER (OUTPATIENT)
Age: 38
End: 2018-03-07

## 2018-03-17 ENCOUNTER — INPATIENT (INPATIENT)
Facility: HOSPITAL | Age: 38
LOS: 1 days | Discharge: HOME | DRG: 682 | End: 2018-03-19
Attending: HOSPITALIST | Admitting: HOSPITALIST
Payer: MEDICARE

## 2018-03-17 VITALS
TEMPERATURE: 98 F | HEART RATE: 94 BPM | DIASTOLIC BLOOD PRESSURE: 129 MMHG | RESPIRATION RATE: 18 BRPM | OXYGEN SATURATION: 95 % | SYSTOLIC BLOOD PRESSURE: 232 MMHG

## 2018-03-17 DIAGNOSIS — R11.10 VOMITING, UNSPECIFIED: ICD-10-CM

## 2018-03-17 DIAGNOSIS — Z41.9 ENCOUNTER FOR PROCEDURE FOR PURPOSES OTHER THAN REMEDYING HEALTH STATE, UNSPECIFIED: Chronic | ICD-10-CM

## 2018-03-17 DIAGNOSIS — I77.0 ARTERIOVENOUS FISTULA, ACQUIRED: Chronic | ICD-10-CM

## 2018-03-17 DIAGNOSIS — Z99.2 DEPENDENCE ON RENAL DIALYSIS: Chronic | ICD-10-CM

## 2018-03-17 LAB
ALBUMIN SERPL ELPH-MCNC: 4.4 G/DL — SIGNIFICANT CHANGE UP (ref 3.3–5.2)
ALP SERPL-CCNC: 173 U/L — HIGH (ref 40–120)
ALT FLD-CCNC: 32 U/L — SIGNIFICANT CHANGE UP
ANION GAP SERPL CALC-SCNC: 28 MMOL/L — HIGH (ref 5–17)
ANISOCYTOSIS BLD QL: SLIGHT — SIGNIFICANT CHANGE UP
AST SERPL-CCNC: 41 U/L — HIGH
BASOPHILS # BLD AUTO: 0 K/UL — SIGNIFICANT CHANGE UP (ref 0–0.2)
BASOPHILS NFR BLD AUTO: 0.2 % — SIGNIFICANT CHANGE UP (ref 0–2)
BILIRUB SERPL-MCNC: 2.1 MG/DL — HIGH (ref 0.4–2)
BUN SERPL-MCNC: 86 MG/DL — HIGH (ref 8–20)
BURR CELLS BLD QL SMEAR: PRESENT — SIGNIFICANT CHANGE UP
C DIFF BY PCR RESULT: SIGNIFICANT CHANGE UP
C DIFF TOX GENS STL QL NAA+PROBE: SIGNIFICANT CHANGE UP
CALCIUM SERPL-MCNC: 9.9 MG/DL — SIGNIFICANT CHANGE UP (ref 8.6–10.2)
CHLORIDE SERPL-SCNC: 88 MMOL/L — LOW (ref 98–107)
CO2 SERPL-SCNC: 18 MMOL/L — LOW (ref 22–29)
CREAT SERPL-MCNC: 10.26 MG/DL — HIGH (ref 0.5–1.3)
ELLIPTOCYTES BLD QL SMEAR: SLIGHT — SIGNIFICANT CHANGE UP
EOSINOPHIL # BLD AUTO: 0 K/UL — SIGNIFICANT CHANGE UP (ref 0–0.5)
EOSINOPHIL NFR BLD AUTO: 0.6 % — SIGNIFICANT CHANGE UP (ref 0–6)
GLUCOSE BLDC GLUCOMTR-MCNC: 75 MG/DL — SIGNIFICANT CHANGE UP (ref 70–99)
GLUCOSE BLDC GLUCOMTR-MCNC: 76 MG/DL — SIGNIFICANT CHANGE UP (ref 70–99)
GLUCOSE SERPL-MCNC: 53 MG/DL — LOW (ref 70–115)
HCT VFR BLD CALC: 25.5 % — LOW (ref 37–47)
HGB BLD-MCNC: 8.2 G/DL — LOW (ref 12–16)
HYPOCHROMIA BLD QL: SLIGHT — SIGNIFICANT CHANGE UP
LIDOCAIN IGE QN: 31 U/L — SIGNIFICANT CHANGE UP (ref 22–51)
LYMPHOCYTES # BLD AUTO: 1.2 K/UL — SIGNIFICANT CHANGE UP (ref 1–4.8)
LYMPHOCYTES # BLD AUTO: 14.9 % — LOW (ref 20–55)
MACROCYTES BLD QL: SLIGHT — SIGNIFICANT CHANGE UP
MCHC RBC-ENTMCNC: 26.2 PG — LOW (ref 27–31)
MCHC RBC-ENTMCNC: 32.2 G/DL — SIGNIFICANT CHANGE UP (ref 32–36)
MCV RBC AUTO: 81.5 FL — SIGNIFICANT CHANGE UP (ref 81–99)
MICROCYTES BLD QL: SLIGHT — SIGNIFICANT CHANGE UP
MONOCYTES # BLD AUTO: 0.6 K/UL — SIGNIFICANT CHANGE UP (ref 0–0.8)
MONOCYTES NFR BLD AUTO: 7.8 % — SIGNIFICANT CHANGE UP (ref 3–10)
NEUTROPHILS # BLD AUTO: 6.2 K/UL — SIGNIFICANT CHANGE UP (ref 1.8–8)
NEUTROPHILS NFR BLD AUTO: 76.3 % — HIGH (ref 37–73)
OVALOCYTES BLD QL SMEAR: SLIGHT — SIGNIFICANT CHANGE UP
PLAT MORPH BLD: NORMAL — SIGNIFICANT CHANGE UP
PLATELET # BLD AUTO: 159 K/UL — SIGNIFICANT CHANGE UP (ref 150–400)
POIKILOCYTOSIS BLD QL AUTO: SLIGHT — SIGNIFICANT CHANGE UP
POLYCHROMASIA BLD QL SMEAR: SLIGHT — SIGNIFICANT CHANGE UP
POTASSIUM SERPL-MCNC: 6.7 MMOL/L — CRITICAL HIGH (ref 3.5–5.3)
POTASSIUM SERPL-SCNC: 6.7 MMOL/L — CRITICAL HIGH (ref 3.5–5.3)
PROT SERPL-MCNC: 8.7 G/DL — SIGNIFICANT CHANGE UP (ref 6.6–8.7)
RAPID RVP RESULT: DETECTED
RBC # BLD: 3.13 M/UL — LOW (ref 4.4–5.2)
RBC # FLD: 20.1 % — HIGH (ref 11–15.6)
RBC BLD AUTO: ABNORMAL
RV+EV RNA SPEC QL NAA+PROBE: DETECTED
SCHISTOCYTES BLD QL AUTO: SLIGHT — SIGNIFICANT CHANGE UP
SODIUM SERPL-SCNC: 134 MMOL/L — LOW (ref 135–145)
TARGETS BLD QL SMEAR: SLIGHT — SIGNIFICANT CHANGE UP
WBC # BLD: 8.2 K/UL — SIGNIFICANT CHANGE UP (ref 4.8–10.8)
WBC # FLD AUTO: 8.2 K/UL — SIGNIFICANT CHANGE UP (ref 4.8–10.8)

## 2018-03-17 PROCEDURE — 71045 X-RAY EXAM CHEST 1 VIEW: CPT | Mod: 26

## 2018-03-17 PROCEDURE — 99233 SBSQ HOSP IP/OBS HIGH 50: CPT

## 2018-03-17 PROCEDURE — 99285 EMERGENCY DEPT VISIT HI MDM: CPT | Mod: 25

## 2018-03-17 PROCEDURE — 93010 ELECTROCARDIOGRAM REPORT: CPT

## 2018-03-17 RX ORDER — SIMVASTATIN 20 MG/1
10 TABLET, FILM COATED ORAL AT BEDTIME
Qty: 0 | Refills: 0 | Status: DISCONTINUED | OUTPATIENT
Start: 2018-03-17 | End: 2018-03-19

## 2018-03-17 RX ORDER — LABETALOL HCL 100 MG
200 TABLET ORAL
Qty: 0 | Refills: 0 | Status: DISCONTINUED | OUTPATIENT
Start: 2018-03-17 | End: 2018-03-17

## 2018-03-17 RX ORDER — ACETAMINOPHEN 500 MG
650 TABLET ORAL EVERY 6 HOURS
Qty: 0 | Refills: 0 | Status: DISCONTINUED | OUTPATIENT
Start: 2018-03-17 | End: 2018-03-19

## 2018-03-17 RX ORDER — FOLIC ACID 0.8 MG
1 TABLET ORAL DAILY
Qty: 0 | Refills: 0 | Status: DISCONTINUED | OUTPATIENT
Start: 2018-03-17 | End: 2018-03-19

## 2018-03-17 RX ORDER — GLUCAGON INJECTION, SOLUTION 0.5 MG/.1ML
1 INJECTION, SOLUTION SUBCUTANEOUS ONCE
Qty: 0 | Refills: 0 | Status: DISCONTINUED | OUTPATIENT
Start: 2018-03-17 | End: 2018-03-19

## 2018-03-17 RX ORDER — HYDRALAZINE HCL 50 MG
100 TABLET ORAL EVERY 8 HOURS
Qty: 0 | Refills: 0 | Status: DISCONTINUED | OUTPATIENT
Start: 2018-03-17 | End: 2018-03-19

## 2018-03-17 RX ORDER — TIOTROPIUM BROMIDE 18 UG/1
1 CAPSULE ORAL; RESPIRATORY (INHALATION) DAILY
Qty: 0 | Refills: 0 | Status: DISCONTINUED | OUTPATIENT
Start: 2018-03-17 | End: 2018-03-19

## 2018-03-17 RX ORDER — SODIUM CHLORIDE 9 MG/ML
1000 INJECTION, SOLUTION INTRAVENOUS
Qty: 0 | Refills: 0 | Status: DISCONTINUED | OUTPATIENT
Start: 2018-03-17 | End: 2018-03-19

## 2018-03-17 RX ORDER — CALCIUM GLUCONATE 100 MG/ML
2 VIAL (ML) INTRAVENOUS ONCE
Qty: 0 | Refills: 0 | Status: COMPLETED | OUTPATIENT
Start: 2018-03-17 | End: 2018-03-17

## 2018-03-17 RX ORDER — ASPIRIN/CALCIUM CARB/MAGNESIUM 324 MG
81 TABLET ORAL DAILY
Qty: 0 | Refills: 0 | Status: DISCONTINUED | OUTPATIENT
Start: 2018-03-18 | End: 2018-03-19

## 2018-03-17 RX ORDER — VALSARTAN 80 MG/1
320 TABLET ORAL DAILY
Qty: 0 | Refills: 0 | Status: DISCONTINUED | OUTPATIENT
Start: 2018-03-18 | End: 2018-03-19

## 2018-03-17 RX ORDER — HYDRALAZINE HCL 50 MG
100 TABLET ORAL ONCE
Qty: 0 | Refills: 0 | Status: COMPLETED | OUTPATIENT
Start: 2018-03-17 | End: 2018-03-17

## 2018-03-17 RX ORDER — SODIUM CHLORIDE 9 MG/ML
200 INJECTION INTRAMUSCULAR; INTRAVENOUS; SUBCUTANEOUS ONCE
Qty: 0 | Refills: 0 | Status: COMPLETED | OUTPATIENT
Start: 2018-03-17 | End: 2018-03-17

## 2018-03-17 RX ORDER — NIFEDIPINE 30 MG
30 TABLET, EXTENDED RELEASE 24 HR ORAL
Qty: 0 | Refills: 0 | COMMUNITY

## 2018-03-17 RX ORDER — IPRATROPIUM/ALBUTEROL SULFATE 18-103MCG
3 AEROSOL WITH ADAPTER (GRAM) INHALATION EVERY 6 HOURS
Qty: 0 | Refills: 0 | Status: DISCONTINUED | OUTPATIENT
Start: 2018-03-17 | End: 2018-03-19

## 2018-03-17 RX ORDER — DEXTROSE 50 % IN WATER 50 %
25 SYRINGE (ML) INTRAVENOUS ONCE
Qty: 0 | Refills: 0 | Status: DISCONTINUED | OUTPATIENT
Start: 2018-03-17 | End: 2018-03-19

## 2018-03-17 RX ORDER — LABETALOL HCL 100 MG
200 TABLET ORAL ONCE
Qty: 0 | Refills: 0 | Status: DISCONTINUED | OUTPATIENT
Start: 2018-03-17 | End: 2018-03-17

## 2018-03-17 RX ORDER — PSEUDOEPHEDRINE HCL 30 MG
30 TABLET ORAL
Qty: 0 | Refills: 0 | Status: DISCONTINUED | OUTPATIENT
Start: 2018-03-17 | End: 2018-03-19

## 2018-03-17 RX ORDER — INSULIN HUMAN 100 [IU]/ML
10 INJECTION, SOLUTION SUBCUTANEOUS ONCE
Qty: 0 | Refills: 0 | Status: COMPLETED | OUTPATIENT
Start: 2018-03-17 | End: 2018-03-17

## 2018-03-17 RX ORDER — ONDANSETRON 8 MG/1
4 TABLET, FILM COATED ORAL EVERY 8 HOURS
Qty: 0 | Refills: 0 | Status: DISCONTINUED | OUTPATIENT
Start: 2018-03-17 | End: 2018-03-19

## 2018-03-17 RX ORDER — NIFEDIPINE 30 MG
90 TABLET, EXTENDED RELEASE 24 HR ORAL ONCE
Qty: 0 | Refills: 0 | Status: COMPLETED | OUTPATIENT
Start: 2018-03-17 | End: 2018-03-17

## 2018-03-17 RX ORDER — VALSARTAN 80 MG/1
320 TABLET ORAL ONCE
Qty: 0 | Refills: 0 | Status: COMPLETED | OUTPATIENT
Start: 2018-03-17 | End: 2018-03-17

## 2018-03-17 RX ORDER — CALCIUM ACETATE 667 MG
667 TABLET ORAL
Qty: 0 | Refills: 0 | Status: DISCONTINUED | OUTPATIENT
Start: 2018-03-17 | End: 2018-03-19

## 2018-03-17 RX ORDER — HYDRALAZINE HCL 50 MG
10 TABLET ORAL EVERY 6 HOURS
Qty: 0 | Refills: 0 | Status: DISCONTINUED | OUTPATIENT
Start: 2018-03-17 | End: 2018-03-19

## 2018-03-17 RX ORDER — SODIUM CHLORIDE 9 MG/ML
1000 INJECTION INTRAMUSCULAR; INTRAVENOUS; SUBCUTANEOUS ONCE
Qty: 0 | Refills: 0 | Status: DISCONTINUED | OUTPATIENT
Start: 2018-03-17 | End: 2018-03-17

## 2018-03-17 RX ORDER — HYDRALAZINE HCL 50 MG
0 TABLET ORAL
Qty: 0 | Refills: 0 | COMMUNITY

## 2018-03-17 RX ORDER — SEVELAMER CARBONATE 2400 MG/1
800 POWDER, FOR SUSPENSION ORAL THREE TIMES A DAY
Qty: 0 | Refills: 0 | Status: DISCONTINUED | OUTPATIENT
Start: 2018-03-17 | End: 2018-03-19

## 2018-03-17 RX ORDER — FERROUS SULFATE 325(65) MG
325 TABLET ORAL
Qty: 0 | Refills: 0 | Status: DISCONTINUED | OUTPATIENT
Start: 2018-03-17 | End: 2018-03-19

## 2018-03-17 RX ORDER — DEXTROSE 50 % IN WATER 50 %
12.5 SYRINGE (ML) INTRAVENOUS ONCE
Qty: 0 | Refills: 0 | Status: DISCONTINUED | OUTPATIENT
Start: 2018-03-17 | End: 2018-03-19

## 2018-03-17 RX ORDER — DEXTROSE 50 % IN WATER 50 %
1 SYRINGE (ML) INTRAVENOUS ONCE
Qty: 0 | Refills: 0 | Status: DISCONTINUED | OUTPATIENT
Start: 2018-03-17 | End: 2018-03-19

## 2018-03-17 RX ORDER — DEXTROSE 50 % IN WATER 50 %
50 SYRINGE (ML) INTRAVENOUS ONCE
Qty: 0 | Refills: 0 | Status: COMPLETED | OUTPATIENT
Start: 2018-03-17 | End: 2018-03-17

## 2018-03-17 RX ORDER — HYDRALAZINE HCL 50 MG
100 TABLET ORAL
Qty: 0 | Refills: 0 | COMMUNITY

## 2018-03-17 RX ORDER — NIFEDIPINE 30 MG
90 TABLET, EXTENDED RELEASE 24 HR ORAL DAILY
Qty: 0 | Refills: 0 | Status: DISCONTINUED | OUTPATIENT
Start: 2018-03-18 | End: 2018-03-19

## 2018-03-17 RX ORDER — VALSARTAN 80 MG/1
0 TABLET ORAL
Qty: 0 | Refills: 0 | COMMUNITY

## 2018-03-17 RX ORDER — AMLODIPINE BESYLATE 2.5 MG/1
10 TABLET ORAL ONCE
Qty: 0 | Refills: 0 | Status: DISCONTINUED | OUTPATIENT
Start: 2018-03-17 | End: 2018-03-17

## 2018-03-17 RX ORDER — INSULIN LISPRO 100/ML
VIAL (ML) SUBCUTANEOUS
Qty: 0 | Refills: 0 | Status: DISCONTINUED | OUTPATIENT
Start: 2018-03-17 | End: 2018-03-19

## 2018-03-17 RX ORDER — PANTOPRAZOLE SODIUM 20 MG/1
40 TABLET, DELAYED RELEASE ORAL
Qty: 0 | Refills: 0 | Status: DISCONTINUED | OUTPATIENT
Start: 2018-03-17 | End: 2018-03-19

## 2018-03-17 RX ORDER — SODIUM BICARBONATE 1 MEQ/ML
50 SYRINGE (ML) INTRAVENOUS ONCE
Qty: 0 | Refills: 0 | Status: COMPLETED | OUTPATIENT
Start: 2018-03-17 | End: 2018-03-17

## 2018-03-17 RX ORDER — FAMOTIDINE 10 MG/ML
20 INJECTION INTRAVENOUS ONCE
Qty: 0 | Refills: 0 | Status: COMPLETED | OUTPATIENT
Start: 2018-03-17 | End: 2018-03-17

## 2018-03-17 RX ORDER — ONDANSETRON 8 MG/1
4 TABLET, FILM COATED ORAL ONCE
Qty: 0 | Refills: 0 | Status: COMPLETED | OUTPATIENT
Start: 2018-03-17 | End: 2018-03-17

## 2018-03-17 RX ADMIN — SODIUM CHLORIDE 200 MILLILITER(S): 9 INJECTION INTRAMUSCULAR; INTRAVENOUS; SUBCUTANEOUS at 08:39

## 2018-03-17 RX ADMIN — INSULIN HUMAN 10 UNIT(S): 100 INJECTION, SOLUTION SUBCUTANEOUS at 11:30

## 2018-03-17 RX ADMIN — Medication 200 GRAM(S): at 11:29

## 2018-03-17 RX ADMIN — SIMVASTATIN 10 MILLIGRAM(S): 20 TABLET, FILM COATED ORAL at 22:40

## 2018-03-17 RX ADMIN — Medication 50 MILLIEQUIVALENT(S): at 11:29

## 2018-03-17 RX ADMIN — VALSARTAN 320 MILLIGRAM(S): 80 TABLET ORAL at 08:00

## 2018-03-17 RX ADMIN — Medication 325 MILLIGRAM(S): at 18:19

## 2018-03-17 RX ADMIN — Medication 90 MILLIGRAM(S): at 08:47

## 2018-03-17 RX ADMIN — FAMOTIDINE 20 MILLIGRAM(S): 10 INJECTION INTRAVENOUS at 09:26

## 2018-03-17 RX ADMIN — Medication 10 MILLIGRAM(S): at 14:17

## 2018-03-17 RX ADMIN — Medication 50 MILLILITER(S): at 11:29

## 2018-03-17 RX ADMIN — Medication 100 MILLIGRAM(S): at 22:40

## 2018-03-17 RX ADMIN — ONDANSETRON 4 MILLIGRAM(S): 8 TABLET, FILM COATED ORAL at 08:39

## 2018-03-17 RX ADMIN — Medication 667 MILLIGRAM(S): at 18:18

## 2018-03-17 RX ADMIN — Medication 50 MILLILITER(S): at 12:00

## 2018-03-17 RX ADMIN — Medication 100 MILLIGRAM(S): at 08:47

## 2018-03-17 RX ADMIN — ONDANSETRON 4 MILLIGRAM(S): 8 TABLET, FILM COATED ORAL at 22:39

## 2018-03-17 NOTE — H&P ADULT - HISTORY OF PRESENT ILLNESS
Pt is a 38yoF with h/o HTN, DM, ESRD on HD M/ W /F ( last dialysis on Monday- missed 2 session), presented to ED with nausea /vomiting for last 4 days and then developed abdomen pain with diarrhea for 2 days. Pain generalized, intermittent, 7/10 in severity nonradiaiting, no relieving or aggravating factor. Diarrhea non-bloody, watery in consistency. She also reported subjective chills, weakness, sore throat, productive cough with brownish phlegm and mild SOB. Pt denied fever, dizziness, chest pain, palpitation. She missed 2 session of HD due to her symptoms and also reports medication non-compliance, reports she misses medicine dose twice a week.

## 2018-03-17 NOTE — ED ADULT NURSE NOTE - OBJECTIVE STATEMENT
Patient received in CDU16L, lying in bed, a&ox4, able to make needs known. Patient complains of nausea, vomiting and diarrhea since yesterday. Patient denies any pain at this time, no chest pain, sob. Patient with no active vomiting or diarrhea at this time ut remains nauseous. Patient is on Hemodialysis every M-W-F at [facility]. Patient with left av fistula, no active bleeding. + bruit and thrills. Patient denies headache, light headed ness and dizziness Not in respiratory distress. To continue to monitor. Pink ID placed on left wrist. Patient received on o@ 2 lpm via nc. Patient states she is on oxygen therapy at home ever since DX: PNA before.

## 2018-03-17 NOTE — ED ADULT NURSE REASSESSMENT NOTE - NS ED NURSE REASSESS COMMENT FT1
unable to get IV access at this time. DR Ventura made aware.
Patient received at 0700; awake; alert and oriented x4. c/o abd pain and nausea. Denies SOB, dizziness. AVF to R arm noted, + thrill and bruit. mother at bedside. Pt hypertensive at this time. Dr Ventura made aware. No distress noted. VSS. Respirations unlabored. Report received at bedside. Call bell and personal items in reach. Continue to monitor patient and maintain safety.
Patient A&OX3, c/o mild abd pain at this time. VSS. CM in place. NSR.
report given to CARLEY Carmona.

## 2018-03-17 NOTE — H&P ADULT - NSHPLABSRESULTS_GEN_ALL_CORE
LABS:                        8.2    8.2   )-----------( 159      ( 17 Mar 2018 09:25 )             25.5     03-17    134<L>  |  88<L>  |  86.0<H>  ----------------------------<  53<L>  6.7<HH>   |  18.0<L>  |  10.26<H>    Ca    9.9      17 Mar 2018 09:25    TPro  8.7  /  Alb  4.4  /  TBili  2.1<H>  /  DBili  x   /  AST  41<H>  /  ALT  32  /  AlkPhos  173<H>  03-17        LIVER FUNCTIONS - ( 17 Mar 2018 09:25 )  Alb: 4.4 g/dL / Pro: 8.7 g/dL / ALK PHOS: 173 U/L / ALT: 32 U/L / AST: 41 U/L / GGT: x

## 2018-03-17 NOTE — ED PROVIDER NOTE - MEDICAL DECISION MAKING DETAILS
PT with HTN, DM, ESRD On HD presenting with possible viral like syndrome, copious diarrhea since Friday; and missed dialysis since Monday - plan to check blood work, CXR, RVP, treat symptomatically and d/w renal for likely HD

## 2018-03-17 NOTE — ED PROVIDER NOTE - OBJECTIVE STATEMENT
Pt is a 38yoF with h/o HTN, DM, ESRD on HD MWF ( last dialysis on Monday), presenting with vomiting since Wednesday, and diarrhea since Friday. Pt notes upper abdominal discomfort; states diarrhea is very watery and on ROS notes h/o C diff in the past;  pt denies fevers/chills. Also endorses rhinitis, sore throat, and cough. Pt states she feels SOB 'sometimes".  Last dose of antihypertensives on Thursday . Pt states she has been tolerated minimal amounts of fluid. Not anuric. Pt denies any known sick contacts.      PMD: Dr. Dedra Plaza  Renal: Veronica

## 2018-03-17 NOTE — H&P ADULT - NSHPPHYSICALEXAM_GEN_ALL_CORE
PHYSICAL EXAM:    Vital Signs Last 24 Hrs  T(C): 36.4 (17 Mar 2018 12:28), Max: 36.4 (17 Mar 2018 06:12)  T(F): 97.5 (17 Mar 2018 12:28), Max: 97.6 (17 Mar 2018 07:25)  HR: 104 (17 Mar 2018 12:28) (93 - 104)  BP: 189/92 (17 Mar 2018 12:28) (159/88 - 232/129)  BP(mean): --  RR: 20 (17 Mar 2018 12:28) (18 - 20)  SpO2: 96% (17 Mar 2018 12:28) (94% - 98%)    GENERAL: Pt lying comfortably, NAD.  ENMT: PERRL, +EOMI.  NECK: soft, Supple, No JVD,   CHEST/LUNG: Crackles +, No wheezing.  HEART: S1S2+, Regular rate and rhythm; No murmurs.  ABDOMEN: Soft, Nontender, Nondistended; Bowel sounds present.  MUSCULOSKELETAL: Normal range of motion.  SKIN: No rashes or lesions.  Extremities: LUE AVF with thrill, trace LE edema.  NEURO: AAOX3, no focal deficits, no motor r sensory loss.  PSYCH: normal mood.

## 2018-03-17 NOTE — CONSULT NOTE ADULT - ASSESSMENT
ESRD: +chronic fluid overload missed two HD sessions  - HD today with UF as tolerates  - I again reinforced need for adherence to fluid and salt restrictions    HyperKalemia HD on 2K bath    Viral illness would also r/o cdiff    HD Consent in chart

## 2018-03-17 NOTE — ED PROVIDER NOTE - CARDIAC, MLM
Normal rate, regular rhythm.  Heart sounds S1, S2.  No murmurs, rubs or gallops. + bruit/ thrill over L AVF

## 2018-03-17 NOTE — CONSULT NOTE ADULT - SUBJECTIVE AND OBJECTIVE BOX
HPI: 38yoF well known to us with h/o HTN, DM, ESRD on HD MWF ( last dialysis on Monday), presenting with vomiting since Wednesday, and diarrhea since Friday. Pt notes upper abdominal discomfort; states diarrhea is very watery and on ROS notes h/o C diff in the past;  pt denies fevers/chills. Also endorses rhinitis, sore throat, and cough. Pt states she feels SOB 'sometimes".  Last dose of antihypertensives on Thursday .    ROS per HPI and denies CP +nausea + vomitting + diarrhea    PAST MEDICAL & SURGICAL HISTORY:  Clostridium difficile diarrhea  Vitreous hemorrhage of left eye  Class 3 obesity due to excess calories with serious comorbidity in adult, unspecified BMI  DALTON (obstructive sleep apnea)  Pneumonia  End stage renal disease  Hypertension  Diabetes  Elective surgery: Left eye retina surgery  AVF (arteriovenous fistula)  Vascular dialysis catheter in place   DM 2, MO, hypothyroidism, prior DVT and IVC filter; Cholecystectomy    FAMILY HISTORY:  No pertinent family history in first degree relatives  [place]    Social History:Non smoker    MEDICATIONS  (STANDING):  calcium gluconate IVPB 2 Gram(s) IV Intermittent Once  dextrose 50% Injectable 50 milliLiter(s) IV Push Once  insulin regular  human recombinant. 10 Unit(s) IV Push once  sodium bicarbonate  Injectable 50 milliEquivalent(s) IV Push Once    MEDICATIONS  (PRN):   Meds reviewed    Allergies    Mushrooms (Hives (Mild))  No Known Drug Allergies    Vital Signs Last 24 Hrs  T(C): 36.4 (17 Mar 2018 07:25), Max: 36.4 (17 Mar 2018 06:12)  T(F): 97.6 (17 Mar 2018 07:25), Max: 97.6 (17 Mar 2018 07:25)  HR: 93 (17 Mar 2018 07:25) (93 - 94)  BP: 217/137 (17 Mar 2018 07:25) (217/137 - 232/129)  BP(mean): --  RR: 18 (17 Mar 2018 07:25) (18 - 18)  SpO2: 98% (17 Mar 2018 07:25) (95% - 98%)  Daily     Daily     PHYSICAL EXAM:  GENERAL: NAD, generalized weakness  HEAD:  Atraumatic, Normocephalic  EYES: EOMI, PERRLA  NECK: Supple, No JVD  NERVOUS SYSTEM:  Alert & Oriented X3, intact and symmetric  CHEST/LUNG: Clear bilaterally  HEART: Regular rate and rhythm; No rub  ABDOMEN: Soft, Nontender, Nondistended;  +BS  EXTREMITIES:  2+ Peripheral Pulses, + generalized edema  SKIN: No rashes or lesions          LABS:                        8.2    8.2   )-----------( 159      ( 17 Mar 2018 09:25 )             25.5     03-17    134<L>  |  88<L>  |  86.0<H>  ----------------------------<  53<L>  6.7<HH>   |  18.0<L>  |  10.26<H>    Ca    9.9      17 Mar 2018 09:25    TPro  8.7  /  Alb  4.4  /  TBili  2.1<H>  /  DBili  x   /  AST  41<H>  /  ALT  32  /  AlkPhos  173<H>  03-17                RADIOLOGY & ADDITIONAL TESTS:

## 2018-03-17 NOTE — ED PROVIDER NOTE - SKIN, MLM
Skin normal color for race, warm, dry and intact. No evidence of rash. L AVF site clean/dry/ intact.

## 2018-03-18 DIAGNOSIS — N18.6 END STAGE RENAL DISEASE: ICD-10-CM

## 2018-03-18 DIAGNOSIS — N18.5 CHRONIC KIDNEY DISEASE, STAGE 5: ICD-10-CM

## 2018-03-18 DIAGNOSIS — E11.9 TYPE 2 DIABETES MELLITUS WITHOUT COMPLICATIONS: ICD-10-CM

## 2018-03-18 DIAGNOSIS — E87.5 HYPERKALEMIA: ICD-10-CM

## 2018-03-18 DIAGNOSIS — R11.10 VOMITING, UNSPECIFIED: ICD-10-CM

## 2018-03-18 DIAGNOSIS — I10 ESSENTIAL (PRIMARY) HYPERTENSION: ICD-10-CM

## 2018-03-18 LAB
ANION GAP SERPL CALC-SCNC: 18 MMOL/L — HIGH (ref 5–17)
BUN SERPL-MCNC: 57 MG/DL — HIGH (ref 8–20)
CALCIUM SERPL-MCNC: 8.8 MG/DL — SIGNIFICANT CHANGE UP (ref 8.6–10.2)
CHLORIDE SERPL-SCNC: 91 MMOL/L — LOW (ref 98–107)
CO2 SERPL-SCNC: 24 MMOL/L — SIGNIFICANT CHANGE UP (ref 22–29)
CREAT SERPL-MCNC: 7.23 MG/DL — HIGH (ref 0.5–1.3)
GLUCOSE BLDC GLUCOMTR-MCNC: 111 MG/DL — HIGH (ref 70–99)
GLUCOSE BLDC GLUCOMTR-MCNC: 181 MG/DL — HIGH (ref 70–99)
GLUCOSE BLDC GLUCOMTR-MCNC: 84 MG/DL — SIGNIFICANT CHANGE UP (ref 70–99)
GLUCOSE BLDC GLUCOMTR-MCNC: 91 MG/DL — SIGNIFICANT CHANGE UP (ref 70–99)
GLUCOSE SERPL-MCNC: 103 MG/DL — SIGNIFICANT CHANGE UP (ref 70–115)
HBA1C BLD-MCNC: 5.2 % — SIGNIFICANT CHANGE UP (ref 4–5.6)
HBV CORE AB SER-ACNC: SIGNIFICANT CHANGE UP
HBV CORE IGM SER-ACNC: SIGNIFICANT CHANGE UP
HBV SURFACE AB SER-ACNC: <3 MIU/ML — LOW
HBV SURFACE AG SER-ACNC: SIGNIFICANT CHANGE UP
HCG SERPL QL: NEGATIVE — SIGNIFICANT CHANGE UP
HCT VFR BLD CALC: 23.7 % — LOW (ref 37–47)
HCV AB S/CO SERPL IA: 0.18 S/CO — SIGNIFICANT CHANGE UP
HCV AB SERPL-IMP: SIGNIFICANT CHANGE UP
HGB BLD-MCNC: 7.6 G/DL — LOW (ref 12–16)
MCHC RBC-ENTMCNC: 25.8 PG — LOW (ref 27–31)
MCHC RBC-ENTMCNC: 32.1 G/DL — SIGNIFICANT CHANGE UP (ref 32–36)
MCV RBC AUTO: 80.3 FL — LOW (ref 81–99)
PLATELET # BLD AUTO: 152 K/UL — SIGNIFICANT CHANGE UP (ref 150–400)
POTASSIUM SERPL-MCNC: 4.7 MMOL/L — SIGNIFICANT CHANGE UP (ref 3.5–5.3)
POTASSIUM SERPL-SCNC: 4.7 MMOL/L — SIGNIFICANT CHANGE UP (ref 3.5–5.3)
RBC # BLD: 2.95 M/UL — LOW (ref 4.4–5.2)
RBC # FLD: 19.8 % — HIGH (ref 11–15.6)
SODIUM SERPL-SCNC: 133 MMOL/L — LOW (ref 135–145)
WBC # BLD: 5.8 K/UL — SIGNIFICANT CHANGE UP (ref 4.8–10.8)
WBC # FLD AUTO: 5.8 K/UL — SIGNIFICANT CHANGE UP (ref 4.8–10.8)

## 2018-03-18 PROCEDURE — 99233 SBSQ HOSP IP/OBS HIGH 50: CPT | Mod: GC

## 2018-03-18 RX ADMIN — Medication 1 MILLIGRAM(S): at 11:17

## 2018-03-18 RX ADMIN — Medication 667 MILLIGRAM(S): at 17:21

## 2018-03-18 RX ADMIN — Medication 100 MILLIGRAM(S): at 05:19

## 2018-03-18 RX ADMIN — Medication 325 MILLIGRAM(S): at 05:20

## 2018-03-18 RX ADMIN — TIOTROPIUM BROMIDE 1 CAPSULE(S): 18 CAPSULE ORAL; RESPIRATORY (INHALATION) at 10:06

## 2018-03-18 RX ADMIN — Medication 667 MILLIGRAM(S): at 11:17

## 2018-03-18 RX ADMIN — Medication 325 MILLIGRAM(S): at 17:21

## 2018-03-18 RX ADMIN — Medication 650 MILLIGRAM(S): at 01:55

## 2018-03-18 RX ADMIN — Medication 90 MILLIGRAM(S): at 05:20

## 2018-03-18 RX ADMIN — PANTOPRAZOLE SODIUM 40 MILLIGRAM(S): 20 TABLET, DELAYED RELEASE ORAL at 05:19

## 2018-03-18 RX ADMIN — Medication 81 MILLIGRAM(S): at 11:17

## 2018-03-18 RX ADMIN — Medication 100 MILLIGRAM(S): at 15:25

## 2018-03-18 RX ADMIN — Medication 100 MILLIGRAM(S): at 22:13

## 2018-03-18 RX ADMIN — SIMVASTATIN 10 MILLIGRAM(S): 20 TABLET, FILM COATED ORAL at 22:13

## 2018-03-18 RX ADMIN — VALSARTAN 320 MILLIGRAM(S): 80 TABLET ORAL at 05:19

## 2018-03-18 NOTE — PROGRESS NOTE ADULT - ASSESSMENT
ESRD: +chronic fluid overload missed two HD sessions    - HD yest with UF as tolerates, will have next HD vega  - I again reinforced need for adherence to fluid and salt restrictions    HyperKalemia HD on 2K bath    Viral illness, agree likely viral gastroenteritis     C-diff negative    HD Consent in chart

## 2018-03-18 NOTE — PROGRESS NOTE ADULT - SUBJECTIVE AND OBJECTIVE BOX
NEPHROLOGY INTERVAL HPI/OVERNIGHT EVENTS:    Examined   HD yest    MEDICATIONS  (STANDING):  aspirin  chewable 81 milliGRAM(s) Oral daily  calcium acetate 667 milliGRAM(s) Oral three times a day with meals  dextrose 5%. 1000 milliLiter(s) (50 mL/Hr) IV Continuous <Continuous>  dextrose 50% Injectable 12.5 Gram(s) IV Push once  dextrose 50% Injectable 25 Gram(s) IV Push once  dextrose 50% Injectable 25 Gram(s) IV Push once  ferrous    sulfate 325 milliGRAM(s) Oral two times a day  folic acid 1 milliGRAM(s) Oral daily  hydrALAZINE 100 milliGRAM(s) Oral every 8 hours  insulin lispro (HumaLOG) corrective regimen sliding scale   SubCutaneous three times a day before meals  NIFEdipine XL 90 milliGRAM(s) Oral daily  ondansetron Injectable 4 milliGRAM(s) IV Push every 8 hours  pantoprazole    Tablet 40 milliGRAM(s) Oral before breakfast  sevelamer hydrochloride 800 milliGRAM(s) Oral three times a day  simvastatin 10 milliGRAM(s) Oral at bedtime  tiotropium 18 MICROgram(s) Capsule 1 Capsule(s) Inhalation daily  valsartan 320 milliGRAM(s) Oral daily    MEDICATIONS  (PRN):  acetaminophen   Tablet 650 milliGRAM(s) Oral every 6 hours PRN For Temp greater than 38 C (100.4 F)  acetaminophen   Tablet. 650 milliGRAM(s) Oral every 6 hours PRN Mild Pain (1 - 3)  ALBUTerol/ipratropium for Nebulization 3 milliLiter(s) Nebulizer every 6 hours PRN SOB  dextrose Gel 1 Dose(s) Oral once PRN Blood Glucose LESS THAN 70 milliGRAM(s)/deciliter  glucagon  Injectable 1 milliGRAM(s) IntraMuscular once PRN Glucose LESS THAN 70 milligrams/deciliter  guaiFENesin    Syrup 100 milliGRAM(s) Oral every 6 hours PRN Cough  hydrALAZINE Injectable 10 milliGRAM(s) IV Push every 6 hours PRN SBP>170  pseudoephedrine 30 milliGRAM(s) Oral two times a day PRN congestion      Allergies    Mushrooms (Hives (Mild))  No Known Drug Allergies    Intolerances        Vital Signs Last 24 Hrs  T(C): 36.9 (18 Mar 2018 10:16), Max: 37.2 (17 Mar 2018 15:21)  T(F): 98.4 (18 Mar 2018 10:16), Max: 98.9 (17 Mar 2018 15:21)  HR: 80 (18 Mar 2018 10:16) (80 - 104)  BP: 121/66 (18 Mar 2018 10:16) (111/59 - 189/92)  BP(mean): --  RR: 18 (18 Mar 2018 10:16) (15 - 20)  SpO2: 92% (18 Mar 2018 05:12) (92% - 100%)  Daily     Daily     PHYSICAL EXAM:  GENERAL: NAD, generalized weakness  HEAD:  Atraumatic, Normocephalic  EYES: EOMI, PERRLA  NECK: Supple, No JVD  NERVOUS SYSTEM:  Alert & Oriented X3, intact and symmetric  CHEST/LUNG: Clear bilaterally  HEART: Regular rate and rhythm; No rub  ABDOMEN: Soft, Nontender, Nondistended;  +BS  EXTREMITIES:  2+ Peripheral Pulses, + generalized edema  SKIN: No rashes or lesions    LABS:                        7.6    5.8   )-----------( 152      ( 18 Mar 2018 05:50 )             23.7     03-18    133<L>  |  91<L>  |  57.0<H>  ----------------------------<  103  4.7   |  24.0  |  7.23<H>    Ca    8.8      18 Mar 2018 05:50    TPro  8.7  /  Alb  4.4  /  TBili  2.1<H>  /  DBili  x   /  AST  41<H>  /  ALT  32  /  AlkPhos  173<H>  03-17                RADIOLOGY & ADDITIONAL TESTS:

## 2018-03-18 NOTE — PROGRESS NOTE ADULT - ASSESSMENT
38 y.o. Female with h/o HTN, DM, ESRD on HD M/ W /F ( last dialysis on Monday- missed 2 session), presented to ED with nausea /vomiting for last 4 days and then developed abdomen pain with diarrhea for 2 days - on admission was hypertensive and hyperkalemic - resolved with HD. No events on monitor.

## 2018-03-18 NOTE — PROGRESS NOTE ADULT - SUBJECTIVE AND OBJECTIVE BOX
CC: Nausea, vomiting, abd pain, diarrhea. (17 Mar 2018 12:12)    HPI: 38 y.o. Female with h/o HTN, DM, ESRD on HD M/ W /F ( last dialysis on Monday- missed 2 session), presented to ED with nausea /vomiting for last 4 days and then developed abdomen pain with diarrhea for 2 days. Pain generalized, intermittent, 7/10 in severity nonradiaiting, no relieving or aggravating factor. Diarrhea non-bloody, watery in consistency. She also reported subjective chills, weakness, sore throat, productive cough with brownish phlegm and mild SOB. Pt denied fever, dizziness, chest pain, palpitation. She missed 2 session of HD due to her symptoms and also reports medication non-compliance, reports she misses medicine dose twice a week. (17 Mar 2018 12:12)    INTERVAL HPI/OVERNIGHT EVENTS: abdominal pain resolved, no N/V/D, + dry cough  Other ROS reviewed and neg     Vital Signs Last 24 Hrs  T(C): 36.9 (18 Mar 2018 10:16), Max: 37.2 (17 Mar 2018 15:21)  T(F): 98.4 (18 Mar 2018 10:16), Max: 98.9 (17 Mar 2018 15:21)  HR: 80 (18 Mar 2018 10:16) (80 - 93)  BP: 121/66 (18 Mar 2018 10:16) (111/59 - 160/76)  RR: 18 (18 Mar 2018 10:16) (15 - 20)  SpO2: 92% (18 Mar 2018 05:12) (92% - 100%)  I&O's Detail    17 Mar 2018 07:01  -  18 Mar 2018 07:00  --------------------------------------------------------  IN:  Total IN: 0 mL    OUT:    Other: 3000 mL  Total OUT: 3000 mL    Total NET: -3000 mL                      7.6    5.8   )-----------( 152      ( 18 Mar 2018 05:50 )             23.7     18 Mar 2018 05:50    133    |  91     |  57.0   ----------------------------<  103    4.7     |  24.0   |  7.23     Ca    8.8        18 Mar 2018 05:50    TPro  8.7    /  Alb  4.4    /  TBili  2.1    /  DBili  x      /  AST  41     /  ALT  32     /  AlkPhos  173    17 Mar 2018 09:25    POCT Blood Glucose.: 84 mg/dL (18 Mar 2018 12:27)  POCT Blood Glucose.: 91 mg/dL (18 Mar 2018 08:28)  POCT Blood Glucose.: 75 mg/dL (17 Mar 2018 17:54)  POCT Blood Glucose.: 76 mg/dL (17 Mar 2018 15:22)    LIVER FUNCTIONS - ( 17 Mar 2018 09:25 )  Alb: 4.4 g/dL / Pro: 8.7 g/dL / ALK PHOS: 173 U/L / ALT: 32 U/L / AST: 41 U/L / GGT: x         Hemoglobin A1C, Whole Blood: 5.2 % (03-18-18 @ 05:50)    MEDICATIONS  (STANDING):  aspirin  chewable 81 milliGRAM(s) Oral daily  calcium acetate 667 milliGRAM(s) Oral three times a day with meals  dextrose 5%. 1000 milliLiter(s) (50 mL/Hr) IV Continuous <Continuous>  dextrose 50% Injectable 12.5 Gram(s) IV Push once  dextrose 50% Injectable 25 Gram(s) IV Push once  dextrose 50% Injectable 25 Gram(s) IV Push once  ferrous    sulfate 325 milliGRAM(s) Oral two times a day  folic acid 1 milliGRAM(s) Oral daily  hydrALAZINE 100 milliGRAM(s) Oral every 8 hours  insulin lispro (HumaLOG) corrective regimen sliding scale   SubCutaneous three times a day before meals  NIFEdipine XL 90 milliGRAM(s) Oral daily  ondansetron Injectable 4 milliGRAM(s) IV Push every 8 hours  pantoprazole    Tablet 40 milliGRAM(s) Oral before breakfast  sevelamer hydrochloride 800 milliGRAM(s) Oral three times a day  simvastatin 10 milliGRAM(s) Oral at bedtime  tiotropium 18 MICROgram(s) Capsule 1 Capsule(s) Inhalation daily  valsartan 320 milliGRAM(s) Oral daily    MEDICATIONS  (PRN):  acetaminophen   Tablet 650 milliGRAM(s) Oral every 6 hours PRN For Temp greater than 38 C (100.4 F)  acetaminophen   Tablet. 650 milliGRAM(s) Oral every 6 hours PRN Mild Pain (1 - 3)  ALBUTerol/ipratropium for Nebulization 3 milliLiter(s) Nebulizer every 6 hours PRN SOB  dextrose Gel 1 Dose(s) Oral once PRN Blood Glucose LESS THAN 70 milliGRAM(s)/deciliter  glucagon  Injectable 1 milliGRAM(s) IntraMuscular once PRN Glucose LESS THAN 70 milligrams/deciliter  guaiFENesin    Syrup 100 milliGRAM(s) Oral every 6 hours PRN Cough  hydrALAZINE Injectable 10 milliGRAM(s) IV Push every 6 hours PRN SBP>170  pseudoephedrine 30 milliGRAM(s) Oral two times a day PRN congestion    RADIOLOGY & ADDITIONAL TESTS: personally visualized    PHYSICAL EXAM:    General: obese female in no acute distress  Eyes: PERRLA, EOMI; conjunctiva and sclera clear  Head: Normocephalic; atraumatic  ENMT: No nasal discharge; airway clear  Neck: Supple; non tender; no masses  Respiratory: No wheezes, rales or rhonchi  Cardiovascular: Regular rate and rhythm. S1 and S2   Gastrointestinal: Soft non-tender non-distended; Normal bowel sounds  Genitourinary: No costovertebral angle tenderness  Extremities: Normal range of motion, No clubbing, cyanosis or edema, LUE AVF with + thrill  Vascular: Peripheral pulses palpable 2+ bilaterally  Neurological: Alert and oriented x4  Skin: Warm and dry.   Musculoskeletal: Normal gait, tone, without deformities  Psychiatric: Cooperative and appropriate

## 2018-03-19 ENCOUNTER — TRANSCRIPTION ENCOUNTER (OUTPATIENT)
Age: 38
End: 2018-03-19

## 2018-03-19 VITALS
RESPIRATION RATE: 18 BRPM | SYSTOLIC BLOOD PRESSURE: 128 MMHG | DIASTOLIC BLOOD PRESSURE: 60 MMHG | TEMPERATURE: 98 F | OXYGEN SATURATION: 96 % | HEART RATE: 88 BPM

## 2018-03-19 LAB
ANION GAP SERPL CALC-SCNC: 17 MMOL/L — SIGNIFICANT CHANGE UP (ref 5–17)
BUN SERPL-MCNC: 70 MG/DL — HIGH (ref 8–20)
CALCIUM SERPL-MCNC: 8.7 MG/DL — SIGNIFICANT CHANGE UP (ref 8.6–10.2)
CHLORIDE SERPL-SCNC: 91 MMOL/L — LOW (ref 98–107)
CO2 SERPL-SCNC: 24 MMOL/L — SIGNIFICANT CHANGE UP (ref 22–29)
CREAT SERPL-MCNC: 8.22 MG/DL — HIGH (ref 0.5–1.3)
GLUCOSE BLDC GLUCOMTR-MCNC: 103 MG/DL — HIGH (ref 70–99)
GLUCOSE BLDC GLUCOMTR-MCNC: 105 MG/DL — HIGH (ref 70–99)
GLUCOSE BLDC GLUCOMTR-MCNC: 95 MG/DL — SIGNIFICANT CHANGE UP (ref 70–99)
GLUCOSE SERPL-MCNC: 94 MG/DL — SIGNIFICANT CHANGE UP (ref 70–115)
HCT VFR BLD CALC: 23.5 % — LOW (ref 37–47)
HGB BLD-MCNC: 7.6 G/DL — LOW (ref 12–16)
MCHC RBC-ENTMCNC: 26.5 PG — LOW (ref 27–31)
MCHC RBC-ENTMCNC: 32.3 G/DL — SIGNIFICANT CHANGE UP (ref 32–36)
MCV RBC AUTO: 81.9 FL — SIGNIFICANT CHANGE UP (ref 81–99)
PLATELET # BLD AUTO: 174 K/UL — SIGNIFICANT CHANGE UP (ref 150–400)
POTASSIUM SERPL-MCNC: 5 MMOL/L — SIGNIFICANT CHANGE UP (ref 3.5–5.3)
POTASSIUM SERPL-SCNC: 5 MMOL/L — SIGNIFICANT CHANGE UP (ref 3.5–5.3)
RBC # BLD: 2.87 M/UL — LOW (ref 4.4–5.2)
RBC # FLD: 19.5 % — HIGH (ref 11–15.6)
SODIUM SERPL-SCNC: 132 MMOL/L — LOW (ref 135–145)
WBC # BLD: 6.3 K/UL — SIGNIFICANT CHANGE UP (ref 4.8–10.8)
WBC # FLD AUTO: 6.3 K/UL — SIGNIFICANT CHANGE UP (ref 4.8–10.8)

## 2018-03-19 PROCEDURE — 83036 HEMOGLOBIN GLYCOSYLATED A1C: CPT

## 2018-03-19 PROCEDURE — 83690 ASSAY OF LIPASE: CPT

## 2018-03-19 PROCEDURE — 85027 COMPLETE CBC AUTOMATED: CPT

## 2018-03-19 PROCEDURE — 82962 GLUCOSE BLOOD TEST: CPT

## 2018-03-19 PROCEDURE — 87493 C DIFF AMPLIFIED PROBE: CPT

## 2018-03-19 PROCEDURE — 96374 THER/PROPH/DIAG INJ IV PUSH: CPT

## 2018-03-19 PROCEDURE — 86705 HEP B CORE ANTIBODY IGM: CPT

## 2018-03-19 PROCEDURE — 86803 HEPATITIS C AB TEST: CPT

## 2018-03-19 PROCEDURE — 96375 TX/PRO/DX INJ NEW DRUG ADDON: CPT

## 2018-03-19 PROCEDURE — 87798 DETECT AGENT NOS DNA AMP: CPT

## 2018-03-19 PROCEDURE — 36000 PLACE NEEDLE IN VEIN: CPT | Mod: XU

## 2018-03-19 PROCEDURE — 87486 CHLMYD PNEUM DNA AMP PROBE: CPT

## 2018-03-19 PROCEDURE — 99239 HOSP IP/OBS DSCHRG MGMT >30: CPT

## 2018-03-19 PROCEDURE — 86706 HEP B SURFACE ANTIBODY: CPT

## 2018-03-19 PROCEDURE — 86704 HEP B CORE ANTIBODY TOTAL: CPT

## 2018-03-19 PROCEDURE — 94640 AIRWAY INHALATION TREATMENT: CPT

## 2018-03-19 PROCEDURE — 84703 CHORIONIC GONADOTROPIN ASSAY: CPT

## 2018-03-19 PROCEDURE — 36415 COLL VENOUS BLD VENIPUNCTURE: CPT

## 2018-03-19 PROCEDURE — 71045 X-RAY EXAM CHEST 1 VIEW: CPT

## 2018-03-19 PROCEDURE — 87633 RESP VIRUS 12-25 TARGETS: CPT

## 2018-03-19 PROCEDURE — 99285 EMERGENCY DEPT VISIT HI MDM: CPT | Mod: 25

## 2018-03-19 PROCEDURE — 93005 ELECTROCARDIOGRAM TRACING: CPT

## 2018-03-19 PROCEDURE — 87340 HEPATITIS B SURFACE AG IA: CPT

## 2018-03-19 PROCEDURE — 80053 COMPREHEN METABOLIC PANEL: CPT

## 2018-03-19 PROCEDURE — 80048 BASIC METABOLIC PNL TOTAL CA: CPT

## 2018-03-19 PROCEDURE — 87581 M.PNEUMON DNA AMP PROBE: CPT

## 2018-03-19 RX ORDER — ERYTHROPOIETIN 10000 [IU]/ML
10000 INJECTION, SOLUTION INTRAVENOUS; SUBCUTANEOUS ONCE
Qty: 0 | Refills: 0 | Status: COMPLETED | OUTPATIENT
Start: 2018-03-19 | End: 2018-03-19

## 2018-03-19 RX ADMIN — Medication 325 MILLIGRAM(S): at 06:27

## 2018-03-19 RX ADMIN — ERYTHROPOIETIN 10000 UNIT(S): 10000 INJECTION, SOLUTION INTRAVENOUS; SUBCUTANEOUS at 10:50

## 2018-03-19 RX ADMIN — Medication 81 MILLIGRAM(S): at 12:08

## 2018-03-19 RX ADMIN — VALSARTAN 320 MILLIGRAM(S): 80 TABLET ORAL at 06:26

## 2018-03-19 RX ADMIN — Medication 100 MILLIGRAM(S): at 06:26

## 2018-03-19 RX ADMIN — PANTOPRAZOLE SODIUM 40 MILLIGRAM(S): 20 TABLET, DELAYED RELEASE ORAL at 06:26

## 2018-03-19 RX ADMIN — Medication 1 MILLIGRAM(S): at 12:08

## 2018-03-19 RX ADMIN — Medication 90 MILLIGRAM(S): at 06:26

## 2018-03-19 RX ADMIN — Medication 667 MILLIGRAM(S): at 12:08

## 2018-03-19 RX ADMIN — Medication 667 MILLIGRAM(S): at 17:17

## 2018-03-19 RX ADMIN — Medication 100 MILLIGRAM(S): at 13:31

## 2018-03-19 RX ADMIN — Medication 325 MILLIGRAM(S): at 17:17

## 2018-03-19 NOTE — DISCHARGE NOTE ADULT - PLAN OF CARE
. Continue with your diet as tolerated. Follow up with your primary care physician for further management. Hemodialysis as scheduled. Continue on your cardiac medications. Continue on home oxygen.

## 2018-03-19 NOTE — DISCHARGE NOTE ADULT - PATIENT PORTAL LINK FT
You can access the [facility] Patient Portal, offered by [facility], by registering with the following website: [email or web address]

## 2018-03-19 NOTE — PROGRESS NOTE ADULT - SUBJECTIVE AND OBJECTIVE BOX
SORAYAALEX JIMENEZ  ----------------------------------------  The patient was seen and evaluated for nausea and vomiting.  The patient is in no acute distress.  Denied any chest pain, palpitations, dyspnea, or abdominal pain.  Tolerated oral intake.    Vital Signs Last 24 Hrs  T(C): 36.7 (19 Mar 2018 12:05), Max: 36.9 (18 Mar 2018 22:07)  T(F): 98.1 (19 Mar 2018 12:05), Max: 98.4 (18 Mar 2018 22:07)  HR: 78 (19 Mar 2018 12:05) (78 - 90)  BP: 130/62 (19 Mar 2018 12:05) (127/79 - 138/72)  BP(mean): --  RR: 17 (19 Mar 2018 12:05) (17 - 18)  SpO2: 97% (19 Mar 2018 12:05) (92% - 98%)    CAPILLARY BLOOD GLUCOSE  POCT Blood Glucose.: 103 mg/dL (19 Mar 2018 12:33)  POCT Blood Glucose.: 95 mg/dL (19 Mar 2018 07:30)  POCT Blood Glucose.: 181 mg/dL (18 Mar 2018 21:41)  POCT Blood Glucose.: 111 mg/dL (18 Mar 2018 16:58)    PHYSICAL EXAMINATION:  ----------------------------------------  General appearance: NAD, Awake, Alert  HEENT: NCAT, Conjunctiva clear, EOMI  Neck: Supple, No JVD, No tenderness  Lungs: Clear to auscultation, Breath sound equal bilaterally, No wheezes, No rales  Cardiovascular: S1S2, Regular rhythm  Abdomen: Soft, Nontender, Nondistended, No guarding/rebound, Positive bowel sounds  Extremities: No clubbing, No cyanosis, No edema, No calf tenderness, Left arm fistula  Neuro: Strength equal bilaterally, No tremors  Psychiatric: Appropriate mood, Normal affect    LABORATORY STUDIES:  ----------------------------------------             7.6    6.3   )-----------( 174      ( 19 Mar 2018 07:52 )             23.5     03-19    132<L>  |  91<L>  |  70.0<H>  ----------------------------<  94  5.0   |  24.0  |  8.22<H>    Ca    8.7      19 Mar 2018 07:52    MEDICATIONS  (STANDING):  aspirin  chewable 81 milliGRAM(s) Oral daily  calcium acetate 667 milliGRAM(s) Oral three times a day with meals  dextrose 5%. 1000 milliLiter(s) (50 mL/Hr) IV Continuous <Continuous>  dextrose 50% Injectable 12.5 Gram(s) IV Push once  dextrose 50% Injectable 25 Gram(s) IV Push once  dextrose 50% Injectable 25 Gram(s) IV Push once  ferrous    sulfate 325 milliGRAM(s) Oral two times a day  folic acid 1 milliGRAM(s) Oral daily  hydrALAZINE 100 milliGRAM(s) Oral every 8 hours  insulin lispro (HumaLOG) corrective regimen sliding scale   SubCutaneous three times a day before meals  NIFEdipine XL 90 milliGRAM(s) Oral daily  ondansetron Injectable 4 milliGRAM(s) IV Push every 8 hours  pantoprazole    Tablet 40 milliGRAM(s) Oral before breakfast  sevelamer hydrochloride 800 milliGRAM(s) Oral three times a day  simvastatin 10 milliGRAM(s) Oral at bedtime  tiotropium 18 MICROgram(s) Capsule 1 Capsule(s) Inhalation daily  valsartan 320 milliGRAM(s) Oral daily    MEDICATIONS  (PRN):  acetaminophen   Tablet 650 milliGRAM(s) Oral every 6 hours PRN For Temp greater than 38 C (100.4 F)  acetaminophen   Tablet. 650 milliGRAM(s) Oral every 6 hours PRN Mild Pain (1 - 3)  ALBUTerol/ipratropium for Nebulization 3 milliLiter(s) Nebulizer every 6 hours PRN SOB  dextrose Gel 1 Dose(s) Oral once PRN Blood Glucose LESS THAN 70 milliGRAM(s)/deciliter  glucagon  Injectable 1 milliGRAM(s) IntraMuscular once PRN Glucose LESS THAN 70 milligrams/deciliter  guaiFENesin    Syrup 100 milliGRAM(s) Oral every 6 hours PRN Cough  hydrALAZINE Injectable 10 milliGRAM(s) IV Push every 6 hours PRN SBP>170  pseudoephedrine 30 milliGRAM(s) Oral two times a day PRN congestion      ASSESSMENT / PLAN:  ----------------------------------------  ESRD - Hemodialysis as scheduled. Discussed with Nephrology.    Gastroenteritis - Resolved. Tolerating oral intake.    Hyperkalemia - Improved with hemodialysis.    Hypertension - Close blood pressure monitoring.    Anemia - Secondary to renal failure. On iron supplementation.    Diabetes - Insulin coverage, close monitoring of blood glucose levels.

## 2018-03-19 NOTE — DISCHARGE NOTE ADULT - HOSPITAL COURSE
38F with a prior admission for cellulitis presented with a four day history of nausea and vomiting as well as a two day history abdominal pain and diarrhea. She had also missed two sessions of hemodialysis due to the symptoms. On presentation, WBC(8.2), H/H(8.2/25.5), Na(134), K(6.7), BUN/Cr(86/10.26). The patient was seen by Nephrology and underwent hemodialysis. Respiratory viral panel was positive for entero/rhino virus. Cdiff PCR was negative. Repeat laboratory results noted improvement in the hyperkalemia. The patient had improvement in her symptoms and was tolerant of oral intake. She was discharged home with instructions to follow up with her primary care physician for further management.    35 minutes total time

## 2018-03-19 NOTE — DISCHARGE NOTE ADULT - CARE PROVIDER_API CALL
Mane Charles), Internal Medicine; Nephrology  [address] [place] [address]  Phone: [phone]  Fax: [phone]

## 2018-03-19 NOTE — DISCHARGE NOTE ADULT - MEDICATION SUMMARY - MEDICATIONS TO TAKE
I will START or STAY ON the medications listed below when I get home from the hospital:    valsartan 320 mg oral tablet  -- Indication: For HTN    loratadine  -- Indication: For Rhinitis    Procardia  -- 90  orally  -- Indication: For HTN    hydrALAZINE 100 mg oral tablet  -- orally 3 times a day  -- Indication: For HTN    folic acid  -- Indication: For Supplement

## 2018-03-19 NOTE — PROGRESS NOTE ADULT - SUBJECTIVE AND OBJECTIVE BOX
NEPHROLOGY INTERVAL HPI/OVERNIGHT EVENTS:    Examined earlier  HD today    MEDICATIONS  (STANDING):  aspirin  chewable 81 milliGRAM(s) Oral daily  calcium acetate 667 milliGRAM(s) Oral three times a day with meals  dextrose 5%. 1000 milliLiter(s) (50 mL/Hr) IV Continuous <Continuous>  dextrose 50% Injectable 12.5 Gram(s) IV Push once  dextrose 50% Injectable 25 Gram(s) IV Push once  dextrose 50% Injectable 25 Gram(s) IV Push once  ferrous    sulfate 325 milliGRAM(s) Oral two times a day  folic acid 1 milliGRAM(s) Oral daily  hydrALAZINE 100 milliGRAM(s) Oral every 8 hours  insulin lispro (HumaLOG) corrective regimen sliding scale   SubCutaneous three times a day before meals  NIFEdipine XL 90 milliGRAM(s) Oral daily  ondansetron Injectable 4 milliGRAM(s) IV Push every 8 hours  pantoprazole    Tablet 40 milliGRAM(s) Oral before breakfast  sevelamer hydrochloride 800 milliGRAM(s) Oral three times a day  simvastatin 10 milliGRAM(s) Oral at bedtime  tiotropium 18 MICROgram(s) Capsule 1 Capsule(s) Inhalation daily  valsartan 320 milliGRAM(s) Oral daily    MEDICATIONS  (PRN):  acetaminophen   Tablet 650 milliGRAM(s) Oral every 6 hours PRN For Temp greater than 38 C (100.4 F)  acetaminophen   Tablet. 650 milliGRAM(s) Oral every 6 hours PRN Mild Pain (1 - 3)  ALBUTerol/ipratropium for Nebulization 3 milliLiter(s) Nebulizer every 6 hours PRN SOB  dextrose Gel 1 Dose(s) Oral once PRN Blood Glucose LESS THAN 70 milliGRAM(s)/deciliter  glucagon  Injectable 1 milliGRAM(s) IntraMuscular once PRN Glucose LESS THAN 70 milligrams/deciliter  guaiFENesin    Syrup 100 milliGRAM(s) Oral every 6 hours PRN Cough  hydrALAZINE Injectable 10 milliGRAM(s) IV Push every 6 hours PRN SBP>170  pseudoephedrine 30 milliGRAM(s) Oral two times a day PRN congestion      Allergies    Mushrooms (Hives (Mild))  No Known Drug Allergies    Intolerances        Vital Signs Last 24 Hrs  T(C): 36.7 (19 Mar 2018 12:05), Max: 36.9 (18 Mar 2018 22:07)  T(F): 98.1 (19 Mar 2018 12:05), Max: 98.4 (18 Mar 2018 22:07)  HR: 78 (19 Mar 2018 12:05) (78 - 90)  BP: 130/62 (19 Mar 2018 12:05) (127/79 - 138/72)  BP(mean): --  RR: 17 (19 Mar 2018 12:05) (17 - 18)  SpO2: 97% (19 Mar 2018 12:05) (92% - 98%)  Daily     Daily Weight in k (19 Mar 2018 11:00)    PHYSICAL EXAM:  GENERAL: NAD, generalized weakness  HEAD:  Atraumatic, Normocephalic  EYES: EOMI, PERRLA  NECK: Supple, No JVD  NERVOUS SYSTEM:  Alert & Oriented X3, intact and symmetric  CHEST/LUNG: Clear bilaterally  HEART: Regular rate and rhythm; No rub  ABDOMEN: Soft, Nontender, Nondistended;  +BS  EXTREMITIES:  2+ Peripheral Pulses, + generalized edema  SKIN: No rashes or lesions      LABS:                        7.6    6.3   )-----------( 174      ( 19 Mar 2018 07:52 )             23.5     03-19    132<L>  |  91<L>  |  70.0<H>  ----------------------------<  94  5.0   |  24.0  |  8.22<H>    Ca    8.7      19 Mar 2018 07:52                  RADIOLOGY & ADDITIONAL TESTS:

## 2018-03-19 NOTE — DISCHARGE NOTE ADULT - CARE PLAN
Principal Discharge DX:	Vomiting and diarrhea  Goal:	.  Assessment and plan of treatment:	Continue with your diet as tolerated. Follow up with your primary care physician for further management.  Secondary Diagnosis:	End stage renal disease  Assessment and plan of treatment:	Hemodialysis as scheduled.  Secondary Diagnosis:	Hypertension  Assessment and plan of treatment:	Continue on your cardiac medications.  Secondary Diagnosis:	DALTON (obstructive sleep apnea)  Assessment and plan of treatment:	Continue on home oxygen.

## 2018-03-19 NOTE — PROGRESS NOTE ADULT - ASSESSMENT
ESRD: +chronic fluid overload missed two HD sessions    - HD today with UF as tolerated  - I again reinforced need for adherence to fluid and salt restrictions    HyperKalemia HD on 2K bath    Viral illness, agree likely viral gastroenteritis     C-diff negative    HD Consent in chart

## 2018-03-21 ENCOUNTER — APPOINTMENT (OUTPATIENT)
Dept: VASCULAR SURGERY | Facility: CLINIC | Age: 38
End: 2018-03-21

## 2018-03-23 ENCOUNTER — INPATIENT (INPATIENT)
Facility: HOSPITAL | Age: 38
LOS: 2 days | Discharge: HOME | DRG: 640 | End: 2018-03-26
Attending: HOSPITALIST | Admitting: FAMILY MEDICINE
Payer: MEDICARE

## 2018-03-23 VITALS
RESPIRATION RATE: 22 BRPM | TEMPERATURE: 97 F | SYSTOLIC BLOOD PRESSURE: 179 MMHG | DIASTOLIC BLOOD PRESSURE: 96 MMHG | HEIGHT: 63 IN | OXYGEN SATURATION: 93 % | WEIGHT: 255.07 LBS | HEART RATE: 97 BPM

## 2018-03-23 DIAGNOSIS — I77.0 ARTERIOVENOUS FISTULA, ACQUIRED: Chronic | ICD-10-CM

## 2018-03-23 DIAGNOSIS — Z99.2 DEPENDENCE ON RENAL DIALYSIS: Chronic | ICD-10-CM

## 2018-03-23 DIAGNOSIS — Z41.9 ENCOUNTER FOR PROCEDURE FOR PURPOSES OTHER THAN REMEDYING HEALTH STATE, UNSPECIFIED: Chronic | ICD-10-CM

## 2018-03-23 LAB
ALBUMIN SERPL ELPH-MCNC: 4.1 G/DL — SIGNIFICANT CHANGE UP (ref 3.3–5.2)
ALP SERPL-CCNC: 147 U/L — HIGH (ref 40–120)
ALT FLD-CCNC: 30 U/L — SIGNIFICANT CHANGE UP
ANION GAP SERPL CALC-SCNC: 21 MMOL/L — HIGH (ref 5–17)
APTT BLD: 33.3 SEC — SIGNIFICANT CHANGE UP (ref 27.5–37.4)
AST SERPL-CCNC: 20 U/L — SIGNIFICANT CHANGE UP
BASOPHILS # BLD AUTO: 0 K/UL — SIGNIFICANT CHANGE UP (ref 0–0.2)
BASOPHILS NFR BLD AUTO: 0.3 % — SIGNIFICANT CHANGE UP (ref 0–2)
BILIRUB SERPL-MCNC: 1.1 MG/DL — SIGNIFICANT CHANGE UP (ref 0.4–2)
BUN SERPL-MCNC: 97 MG/DL — HIGH (ref 8–20)
CALCIUM SERPL-MCNC: 9.4 MG/DL — SIGNIFICANT CHANGE UP (ref 8.6–10.2)
CHLORIDE SERPL-SCNC: 94 MMOL/L — LOW (ref 98–107)
CO2 SERPL-SCNC: 21 MMOL/L — LOW (ref 22–29)
CREAT SERPL-MCNC: 10.39 MG/DL — HIGH (ref 0.5–1.3)
EOSINOPHIL # BLD AUTO: 0.3 K/UL — SIGNIFICANT CHANGE UP (ref 0–0.5)
EOSINOPHIL NFR BLD AUTO: 4.3 % — SIGNIFICANT CHANGE UP (ref 0–6)
GLUCOSE SERPL-MCNC: 103 MG/DL — SIGNIFICANT CHANGE UP (ref 70–115)
HCT VFR BLD CALC: 27 % — LOW (ref 37–47)
HGB BLD-MCNC: 8.7 G/DL — LOW (ref 12–16)
INR BLD: 1.24 RATIO — HIGH (ref 0.88–1.16)
LYMPHOCYTES # BLD AUTO: 0.8 K/UL — LOW (ref 1–4.8)
LYMPHOCYTES # BLD AUTO: 12.7 % — LOW (ref 20–55)
MAGNESIUM SERPL-MCNC: 2.6 MG/DL — SIGNIFICANT CHANGE UP (ref 1.6–2.6)
MCHC RBC-ENTMCNC: 26 PG — LOW (ref 27–31)
MCHC RBC-ENTMCNC: 32.2 G/DL — SIGNIFICANT CHANGE UP (ref 32–36)
MCV RBC AUTO: 80.6 FL — LOW (ref 81–99)
MONOCYTES # BLD AUTO: 0.5 K/UL — SIGNIFICANT CHANGE UP (ref 0–0.8)
MONOCYTES NFR BLD AUTO: 7.9 % — SIGNIFICANT CHANGE UP (ref 3–10)
NEUTROPHILS # BLD AUTO: 4.5 K/UL — SIGNIFICANT CHANGE UP (ref 1.8–8)
NEUTROPHILS NFR BLD AUTO: 74.6 % — HIGH (ref 37–73)
NT-PROBNP SERPL-SCNC: HIGH PG/ML (ref 0–300)
PLATELET # BLD AUTO: 212 K/UL — SIGNIFICANT CHANGE UP (ref 150–400)
POTASSIUM SERPL-MCNC: 5.7 MMOL/L — HIGH (ref 3.5–5.3)
POTASSIUM SERPL-SCNC: 5.7 MMOL/L — HIGH (ref 3.5–5.3)
PROT SERPL-MCNC: 8.4 G/DL — SIGNIFICANT CHANGE UP (ref 6.6–8.7)
PROTHROM AB SERPL-ACNC: 13.7 SEC — HIGH (ref 9.8–12.7)
RBC # BLD: 3.35 M/UL — LOW (ref 4.4–5.2)
RBC # FLD: 19.7 % — HIGH (ref 11–15.6)
SODIUM SERPL-SCNC: 136 MMOL/L — SIGNIFICANT CHANGE UP (ref 135–145)
TROPONIN T SERPL-MCNC: 0.48 NG/ML — HIGH (ref 0–0.06)
TSH SERPL-MCNC: 7.48 UIU/ML — HIGH (ref 0.27–4.2)
WBC # BLD: 6 K/UL — SIGNIFICANT CHANGE UP (ref 4.8–10.8)
WBC # FLD AUTO: 6 K/UL — SIGNIFICANT CHANGE UP (ref 4.8–10.8)

## 2018-03-23 PROCEDURE — 71045 X-RAY EXAM CHEST 1 VIEW: CPT | Mod: 26

## 2018-03-23 PROCEDURE — 93010 ELECTROCARDIOGRAM REPORT: CPT

## 2018-03-23 PROCEDURE — 99285 EMERGENCY DEPT VISIT HI MDM: CPT

## 2018-03-23 RX ORDER — ASPIRIN/CALCIUM CARB/MAGNESIUM 324 MG
325 TABLET ORAL ONCE
Qty: 0 | Refills: 0 | Status: COMPLETED | OUTPATIENT
Start: 2018-03-23 | End: 2018-03-24
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
## 2018-03-23 NOTE — CONSULT NOTE ADULT - ASSESSMENT
ESRD - Missed 2 HD treatments  + Clinical fluid overload   + SOB and Pulm congestion on exam   Will arrange emergent HD   See orders   Labs from ER pending ----> Will follow     HTN - Reassess post fluid removal w/ HD

## 2018-03-23 NOTE — CONSULT NOTE ADULT - SUBJECTIVE AND OBJECTIVE BOX
Patient is a 38y old  Female who presents with a chief complaint of     HPI: ESRD , Missed HD in [place] x 2 ( Missed Mon and WED treatments ) . + SOB and uncontrolled HTN     PAST MEDICAL & SURGICAL HISTORY:  Clostridium difficile diarrhea  Vitreous hemorrhage of left eye  Class 3 obesity due to excess calories with serious comorbidity in adult, unspecified BMI  DALTON (obstructive sleep apnea)  Pneumonia  End stage renal disease  Hypertension  Diabetes  Elective surgery: Left eye retina surgery  AVF (arteriovenous fistula)  Vascular dialysis catheter in place      FAMILY HISTORY:  No pertinent family history in first degree relatives      Social History:    MEDICATIONS  (STANDING):    MEDICATIONS  (PRN):      Allergies    Mushrooms (Hives (Mild))  No Known Drug Allergies    Intolerances  ALLERY AND IMMUNOLOGIC: No hives or eczema      Vital Signs Last 24 Hrs  T(C): 37 (23 Mar 2018 19:13), Max: 37 (23 Mar 2018 19:13)  T(F): 98.6 (23 Mar 2018 19:13), Max: 98.6 (23 Mar 2018 19:13)  HR: 94 (23 Mar 2018 19:13) (94 - 97)  BP: 180/102 (23 Mar 2018 19:13) (179/96 - 180/102)  BP(mean): --  RR: 20 (23 Mar 2018 19:13) (20 - 22)  SpO2: 93% (23 Mar 2018 19:13) (93% - 93%)  Daily Height in cm: 160.02 (23 Mar 2018 17:34)    Daily   I&O's Detail    I&O's Summary      PHYSICAL EXAM:    GENERAL: NAD, well-groomed, well-developed  HEAD:  Atraumatic, No edema   NECK: Supple, No JVD,   CHEST/LUNG: Clear / EAE . BIbasilar crackles   HEART: Regular rate and rhythm; No rub   ABDOMEN: Soft, Nontender, Nondistended; Bowel sounds present  EXTREMITIES:  + edema . L Access with thrill           LABS:                      RADIOLOGY & ADDITIONAL TESTS:

## 2018-03-23 NOTE — ED PROVIDER NOTE - NS ED ROS FT
no weight change, no fever or chills  no rash, no bruises  no visual changes no eye discharge  +cough cold or congestion,   +sob, no chest pain  no orthopnea, no pnd  no abd pain, no n/v/d  no hematuria, no change in urinary habits  no joint pain, no deformity  no headache, no paresthesia

## 2018-03-23 NOTE — ED ADULT NURSE NOTE - ED STAT RN HANDOFF DETAILS
pt transported to 5twr on cm with , pt refusing iv placement md jacobs aware, bedside report given per policy

## 2018-03-23 NOTE — ED PROVIDER NOTE - MEDICAL DECISION MAKING DETAILS
Pt with HTN, presents after missing 2 dialysis appts and SOB. Will check labs, x-ray and admit for dialysis.

## 2018-03-23 NOTE — ED PROVIDER NOTE - PHYSICAL EXAMINATION
Constitutional : Appears comfortably, talking in short sentences, obese   Head :NC AT , no swelling  Eyes :eomi, no swelling  Mouth :mm moist,  Neck : supple, trachea in midline  Chest :tachypneic,  no wheezing  Heart :S1 S2 distant, b/l chest sounds distant  Abdomen :abd distended, non tender  Musc/Skel :ext no swelling, no deformity, no spine tenderness, distal pulses present, left mid upper extremity bruit 1 stich on distal end no signs of impaction  Neuro  :AAO 3 no focal deficits

## 2018-03-24 DIAGNOSIS — N18.6 END STAGE RENAL DISEASE: ICD-10-CM

## 2018-03-24 DIAGNOSIS — D64.9 ANEMIA, UNSPECIFIED: ICD-10-CM

## 2018-03-24 DIAGNOSIS — I10 ESSENTIAL (PRIMARY) HYPERTENSION: ICD-10-CM

## 2018-03-24 DIAGNOSIS — R07.9 CHEST PAIN, UNSPECIFIED: ICD-10-CM

## 2018-03-24 DIAGNOSIS — R06.02 SHORTNESS OF BREATH: ICD-10-CM

## 2018-03-24 DIAGNOSIS — Z29.9 ENCOUNTER FOR PROPHYLACTIC MEASURES, UNSPECIFIED: ICD-10-CM

## 2018-03-24 DIAGNOSIS — R94.6 ABNORMAL RESULTS OF THYROID FUNCTION STUDIES: ICD-10-CM

## 2018-03-24 DIAGNOSIS — E87.5 HYPERKALEMIA: ICD-10-CM

## 2018-03-24 DIAGNOSIS — E87.79 OTHER FLUID OVERLOAD: ICD-10-CM

## 2018-03-24 LAB
ANION GAP SERPL CALC-SCNC: 18 MMOL/L — HIGH (ref 5–17)
BUN SERPL-MCNC: 56 MG/DL — HIGH (ref 8–20)
CALCIUM SERPL-MCNC: 9.1 MG/DL — SIGNIFICANT CHANGE UP (ref 8.6–10.2)
CHLORIDE SERPL-SCNC: 94 MMOL/L — LOW (ref 98–107)
CK SERPL-CCNC: 91 U/L — SIGNIFICANT CHANGE UP (ref 25–170)
CK SERPL-CCNC: 96 U/L — SIGNIFICANT CHANGE UP (ref 25–170)
CO2 SERPL-SCNC: 25 MMOL/L — SIGNIFICANT CHANGE UP (ref 22–29)
CREAT SERPL-MCNC: 6.83 MG/DL — HIGH (ref 0.5–1.3)
GLUCOSE SERPL-MCNC: 92 MG/DL — SIGNIFICANT CHANGE UP (ref 70–115)
HCT VFR BLD CALC: 24.2 % — LOW (ref 37–47)
HGB BLD-MCNC: 7.6 G/DL — LOW (ref 12–16)
MCHC RBC-ENTMCNC: 25.7 PG — LOW (ref 27–31)
MCHC RBC-ENTMCNC: 31.4 G/DL — LOW (ref 32–36)
MCV RBC AUTO: 81.8 FL — SIGNIFICANT CHANGE UP (ref 81–99)
PLATELET # BLD AUTO: 185 K/UL — SIGNIFICANT CHANGE UP (ref 150–400)
POTASSIUM SERPL-MCNC: 5.1 MMOL/L — SIGNIFICANT CHANGE UP (ref 3.5–5.3)
POTASSIUM SERPL-SCNC: 5.1 MMOL/L — SIGNIFICANT CHANGE UP (ref 3.5–5.3)
RBC # BLD: 2.96 M/UL — LOW (ref 4.4–5.2)
RBC # FLD: 19.7 % — HIGH (ref 11–15.6)
SODIUM SERPL-SCNC: 137 MMOL/L — SIGNIFICANT CHANGE UP (ref 135–145)
T4 AB SER-ACNC: 8.4 UG/DL — SIGNIFICANT CHANGE UP (ref 4.5–12)
TROPONIN T SERPL-MCNC: 0.45 NG/ML — HIGH (ref 0–0.06)
TROPONIN T SERPL-MCNC: 0.47 NG/ML — HIGH (ref 0–0.06)
WBC # BLD: 5 K/UL — SIGNIFICANT CHANGE UP (ref 4.8–10.8)
WBC # FLD AUTO: 5 K/UL — SIGNIFICANT CHANGE UP (ref 4.8–10.8)

## 2018-03-24 PROCEDURE — 12345: CPT | Mod: NC

## 2018-03-24 PROCEDURE — 99223 1ST HOSP IP/OBS HIGH 75: CPT

## 2018-03-24 RX ORDER — ENOXAPARIN SODIUM 100 MG/ML
30 INJECTION SUBCUTANEOUS DAILY
Qty: 0 | Refills: 0 | Status: DISCONTINUED | OUTPATIENT
Start: 2018-03-24 | End: 2018-03-24

## 2018-03-24 RX ORDER — LORATADINE 10 MG/1
10 TABLET ORAL DAILY
Qty: 0 | Refills: 0 | Status: DISCONTINUED | OUTPATIENT
Start: 2018-03-24 | End: 2018-03-26

## 2018-03-24 RX ORDER — HYDRALAZINE HCL 50 MG
100 TABLET ORAL THREE TIMES A DAY
Qty: 0 | Refills: 0 | Status: DISCONTINUED | OUTPATIENT
Start: 2018-03-24 | End: 2018-03-26

## 2018-03-24 RX ORDER — ASPIRIN/CALCIUM CARB/MAGNESIUM 324 MG
81 TABLET ORAL DAILY
Qty: 0 | Refills: 0 | Status: DISCONTINUED | OUTPATIENT
Start: 2018-03-24 | End: 2018-03-26

## 2018-03-24 RX ORDER — NIFEDIPINE 30 MG
90 TABLET, EXTENDED RELEASE 24 HR ORAL DAILY
Qty: 0 | Refills: 0 | Status: DISCONTINUED | OUTPATIENT
Start: 2018-03-24 | End: 2018-03-26

## 2018-03-24 RX ORDER — ERYTHROPOIETIN 10000 [IU]/ML
10000 INJECTION, SOLUTION INTRAVENOUS; SUBCUTANEOUS ONCE
Qty: 0 | Refills: 0 | Status: COMPLETED | OUTPATIENT
Start: 2018-03-24 | End: 2018-03-24

## 2018-03-24 RX ORDER — LEVOTHYROXINE SODIUM 125 MCG
25 TABLET ORAL DAILY
Qty: 0 | Refills: 0 | Status: DISCONTINUED | OUTPATIENT
Start: 2018-03-24 | End: 2018-03-26

## 2018-03-24 RX ORDER — VALSARTAN 80 MG/1
320 TABLET ORAL DAILY
Qty: 0 | Refills: 0 | Status: DISCONTINUED | OUTPATIENT
Start: 2018-03-24 | End: 2018-03-26

## 2018-03-24 RX ORDER — FOLIC ACID 0.8 MG
1 TABLET ORAL DAILY
Qty: 0 | Refills: 0 | Status: DISCONTINUED | OUTPATIENT
Start: 2018-03-24 | End: 2018-03-26

## 2018-03-24 RX ORDER — ACETAMINOPHEN 500 MG
650 TABLET ORAL EVERY 6 HOURS
Qty: 0 | Refills: 0 | Status: DISCONTINUED | OUTPATIENT
Start: 2018-03-24 | End: 2018-03-26

## 2018-03-24 RX ADMIN — Medication 325 MILLIGRAM(S): at 00:12

## 2018-03-24 RX ADMIN — Medication 650 MILLIGRAM(S): at 02:53

## 2018-03-24 RX ADMIN — LORATADINE 10 MILLIGRAM(S): 10 TABLET ORAL at 12:30

## 2018-03-24 RX ADMIN — Medication 90 MILLIGRAM(S): at 03:29

## 2018-03-24 RX ADMIN — Medication 1 MILLIGRAM(S): at 12:30

## 2018-03-24 RX ADMIN — VALSARTAN 320 MILLIGRAM(S): 80 TABLET ORAL at 05:13

## 2018-03-24 RX ADMIN — Medication 100 MILLIGRAM(S): at 03:10

## 2018-03-24 RX ADMIN — Medication 100 MILLIGRAM(S): at 23:02

## 2018-03-24 RX ADMIN — Medication 81 MILLIGRAM(S): at 12:30

## 2018-03-24 RX ADMIN — ERYTHROPOIETIN 10000 UNIT(S): 10000 INJECTION, SOLUTION INTRAVENOUS; SUBCUTANEOUS at 17:58

## 2018-03-24 RX ADMIN — Medication 650 MILLIGRAM(S): at 02:06

## 2018-03-24 NOTE — PROGRESS NOTE ADULT - SUBJECTIVE AND OBJECTIVE BOX
NEPHROLOGY INTERVAL HPI/OVERNIGHT EVENTS:    MEDICATIONS  (STANDING):  aspirin enteric coated 81 milliGRAM(s) Oral daily  folic acid 1 milliGRAM(s) Oral daily  hydrALAZINE 100 milliGRAM(s) Oral three times a day  loratadine 10 milliGRAM(s) Oral daily  NIFEdipine XL 90 milliGRAM(s) Oral daily  valsartan 320 milliGRAM(s) Oral daily    MEDICATIONS  (PRN):  acetaminophen   Tablet. 650 milliGRAM(s) Oral every 6 hours PRN Mild Pain (1 - 3)      Allergies    Mushrooms (Hives (Mild))  No Known Drug Allergies    Intolerances        Vital Signs Last 24 Hrs  T(C): 36.3 (24 Mar 2018 04:30), Max: 37 (23 Mar 2018 19:13)  T(F): 97.3 (24 Mar 2018 04:30), Max: 98.6 (23 Mar 2018 19:13)  HR: 94 (24 Mar 2018 04:30) (91 - 98)  BP: 147/81 (24 Mar 2018 04:30) (147/81 - 180/102)  BP(mean): --  RR: 18 (24 Mar 2018 04:30) (18 - 22)  SpO2: 94% (24 Mar 2018 04:30) (92% - 94%)  Daily Height in cm: 160.02 (23 Mar 2018 17:34)    Daily     PHYSICAL EXAM:    GENERAL: appears  chronically ill  HEAD:  wnl  EYES:   ENMT:   NECK: veins  full  NERVOUS SYSTEM:  same  CHEST/LUNG: Nasal  02 with poor air movement  HEART: no rub  ABDOMEN: soft, obese  EXTREMITIES:  pitting leg edema R>L  LYMPH:   SKIN:   Gu neg    LABS:                        8.7    6.0   )-----------( 212      ( 23 Mar 2018 22:48 )             27.0     03-23    136  |  94<L>  |  97.0<H>  ----------------------------<  103  5.7<H>   |  21.0<L>  |  10.39<H>    Ca    9.4      23 Mar 2018 22:48  Mg     2.6     03-23    TPro  8.4  /  Alb  4.1  /  TBili  1.1  /  DBili  x   /  AST  20  /  ALT  30  /  AlkPhos  147<H>  03-23    PT/INR - ( 23 Mar 2018 22:48 )   PT: 13.7 sec;   INR: 1.24 ratio         PTT - ( 23 Mar 2018 22:48 )  PTT:33.3 sec    Magnesium, Serum: 2.6 mg/dL (03-23 @ 22:48)          RADIOLOGY & ADDITIONAL TESTS:
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
## 2018-03-24 NOTE — ED ADULT NURSE REASSESSMENT NOTE - NS ED NURSE REASSESS COMMENT FT1
Per Dialysis RN pt is refusing IV placement, Dialysis RN to call md jacobs and request order for admission without PIV
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
## 2018-03-25 PROCEDURE — 99233 SBSQ HOSP IP/OBS HIGH 50: CPT

## 2018-03-25 RX ADMIN — Medication 100 MILLIGRAM(S): at 06:12

## 2018-03-25 RX ADMIN — Medication 90 MILLIGRAM(S): at 06:12

## 2018-03-25 RX ADMIN — Medication 81 MILLIGRAM(S): at 12:11

## 2018-03-25 RX ADMIN — VALSARTAN 320 MILLIGRAM(S): 80 TABLET ORAL at 06:12

## 2018-03-25 RX ADMIN — Medication 25 MICROGRAM(S): at 06:12

## 2018-03-25 RX ADMIN — Medication 100 MILLIGRAM(S): at 21:22

## 2018-03-25 RX ADMIN — Medication 100 MILLIGRAM(S): at 12:11

## 2018-03-25 RX ADMIN — LORATADINE 10 MILLIGRAM(S): 10 TABLET ORAL at 12:11

## 2018-03-25 RX ADMIN — Medication 1 MILLIGRAM(S): at 12:11

## 2018-03-25 NOTE — PROGRESS NOTE ADULT - SUBJECTIVE AND OBJECTIVE BOX
NEPHROLOGY INTERVAL HPI/OVERNIGHT EVENTS:    No new events. feels better after extra  dialysis.      MEDICATIONS  (STANDING):  aspirin enteric coated 81 milliGRAM(s) Oral daily  folic acid 1 milliGRAM(s) Oral daily  hydrALAZINE 100 milliGRAM(s) Oral three times a day  loratadine 10 milliGRAM(s) Oral daily  NIFEdipine XL 90 milliGRAM(s) Oral daily  valsartan 320 milliGRAM(s) Oral daily    MEDICATIONS  (PRN):  acetaminophen   Tablet. 650 milliGRAM(s) Oral every 6 hours PRN Mild Pain (1 - 3)      Allergies    Mushrooms (Hives (Mild))  No Known Drug Allergies            Vital Signs Last 24 Hrs    T(C): 36.8 (25 Mar 2018 12:09), Max: 36.8 (25 Mar 2018 06:09)  T(F): 98.2 (25 Mar 2018 12:09), Max: 98.3 (25 Mar 2018 06:09)  HR: 93 (25 Mar 2018 12:09) (89 - 98)  BP: 148/85 (25 Mar 2018 12:09) (146/83 - 165/98)  BP(mean): --  RR: 20 (25 Mar 2018 12:09) (20 - 20)  SpO2: 98% (24 Mar 2018 20:02) (93% - 98%)  T(C): 36.3 (24 Mar 2018 04:30), Max: 37 (23 Mar 2018 19:13)  T(F): 97.3 (24 Mar 2018 04:30), Max: 98.6 (23 Mar 2018 19:13)  HR: 94 (24 Mar 2018 04:30) (91 - 98)  BP: 147/81 (24 Mar 2018 04:30) (147/81 - 180/102)  BP(mean): --  RR: 18 (24 Mar 2018 04:30) (18 - 22)  SpO2: 94% (24 Mar 2018 04:30) (92% - 94%)  Daily Height in cm: 160.02 (23 Mar 2018 17:34)         PHYSICAL EXAM:    GENERAL: appears much more comfortable today  HEAD:  wnl  EYES:   ENMT:   NECK: veins  full  NERVOUS SYSTEM:  same  CHEST/LUNG: Nasal  02 with clear lungs today  HEART: no rub  ABDOMEN: soft, obese  EXTREMITIES:  pitting leg edema R>L better  LYMPH:   SKIN:   Gu neg    LABS:             03-24    137  |  94<L>  |  56.0<H>  ----------------------------<  92  5.1   |  25.0  |  6.83<H>    Ca    9.1      24 Mar 2018 17:16  Mg     2.6     03-23    TPro  8.4  /  Alb  4.1  /  TBili  1.1  /  DBili  x   /  AST  20  /  ALT  30  /  AlkPhos  147<H>  03-23               8.7    6.0   )-----------( 212      ( 23 Mar 2018 22:48 )             27.0     03-23    136  |  94<L>  |  97.0<H>  ----------------------------<  103  5.7<H>   |  21.0<L>  |  10.39<H>    Ca    9.4      23 Mar 2018 22:48  Mg     2.6     03-23    TPro  8.4  /  Alb  4.1  /  TBili  1.1  /  DBili  x   /  AST  20  /  ALT  30  /  AlkPhos  147<H>  03-23    PT/INR - ( 23 Mar 2018 22:48 )   PT: 13.7 sec;   INR: 1.24 ratio         PTT - ( 23 Mar 2018 22:48 )  PTT:33.3 sec    Magnesium, Serum: 2.6 mg/dL (03-23 @ 22:48)          RADIOLOGY & ADDITIONAL TESTS:

## 2018-03-25 NOTE — PROGRESS NOTE ADULT - ASSESSMENT
37 y/o female with ESRD on HD, HTN here with hypervolemia due to missed dialysis session. 37 y/o female with ESRD on HD, HTN here with hypervolemia due to missed dialysis session. Improved after HD, however with worsening anemia (secondary to CKD)

## 2018-03-25 NOTE — PROGRESS NOTE ADULT - SUBJECTIVE AND OBJECTIVE BOX
HOSPITALIST PROGRESS NOTE    SORAYA JIMENEZ  [id number]  38yFemale    Patient is a 38y old  Female who presents with a chief complaint of SOB and Chest pain (24 Mar 2018 02:36)      SUBJECTIVE:   Chart reviewed since admission, discussed with Dr Bedolla.  Patient seen and examined at bedside for ESRD.  Denies any dyspnea (though on chronic Oxygen at home after pneumonia last year)  Denies chest pain, palpitations, dizziness, cough, fever or chills.        OBJECTIVE:  Vital Signs Last 24 Hrs  T(C): 36.8 (25 Mar 2018 12:09), Max: 36.8 (25 Mar 2018 06:09)  T(F): 98.2 (25 Mar 2018 12:09), Max: 98.3 (25 Mar 2018 06:09)  HR: 93 (25 Mar 2018 12:09) (89 - 98)  BP: 148/85 (25 Mar 2018 12:09) (146/83 - 165/98)   RR: 20 (25 Mar 2018 12:09) (20 - 20)  SpO2: 98% (24 Mar 2018 20:02) (93% - 98%)    PHYSICAL EXAMINATION  General: NAD[+] Obese[+] Lying on bed sleeping, ,awakens  HEENT: AT/NC[]  PERRLA[]  EOMI[+]   Moist oral mucosa[+]  Pharyngeal exudates[]  NECK: Supple[+]  JVD[] Carotid bruit[]  CVS: RRR[+]  Irregular[]  S1+S2[+]   Murmur[]  RESP: Fair air entry bilaterally[+]   Clear sounds[+]   poor effort []  wheeze[]   Crackles[]  GI: Soft[+]  Nondistended[]   Nontender[+]   Bowel Sounds[+]  Mass[]   HSM[]   Ascites[]  : suprapubic tenderness[-]   CVA Tenderness[]   Moore[-]  MS: FROM[+]   Edema[+] LUE AVF[+]thrill, bruit  CNS: AAOx3[+]   grossly intact  INTEG: Skin is Warm[+]  dry[+] Lesion[] Decubitus[]  PSYCH:  Fair Mood, Affect    MONITOR:  CAPILLARY BLOOD GLUCOSE            I&O's Summary    24 Mar 2018 07:01  -  25 Mar 2018 07:00  --------------------------------------------------------  IN: 0 mL / OUT: 2100 mL / NET: -2100 mL                            7.6    5.0   )-----------( 185      ( 24 Mar 2018 17:16 )             24.2     PT/INR - ( 23 Mar 2018 22:48 )   PT: 13.7 sec;   INR: 1.24 ratio         PTT - ( 23 Mar 2018 22:48 )  PTT:33.3 sec      137  |  94<L>  |  56.0<H>  ----------------------------<  92  5.1   |  25.0  |  6.83<H>    Ca    9.1      24 Mar 2018 17:16  Mg     2.6         TPro  8.4  /  Alb  4.1  /  TBili  1.1  /  DBili  x   /  AST  20  /  ALT  30  /  AlkPhos  147<H>  0323    CARDIAC MARKERS ( 24 Mar 2018 17:16 )  x     / 0.47 ng/mL / 96 U/L / x     / x      CARDIAC MARKERS ( 24 Mar 2018 05:53 )  x     / 0.45 ng/mL / 91 U/L / x     / x      CARDIAC MARKERS ( 23 Mar 2018 22:48 )  x     / 0.48 ng/mL / x     / x     / x          TTE:  < from: TTE Echo Complete w/Doppler (18 @ 12:51) >  EXAM:  ECHO TRANSTHORACIC COMP W DOPP      PROCEDURE DATE:  2018   .      INTERPRETATION:  REPORT:    TRANSTHORACIC ECHOCARDIOGRAM REPORT         Patient Name:   SORAYA JIMENEZ Patient Location: [facility]  Medical Rec #:  [id number]         Accession #:      [id number]  Account #:      [id number]         Height:           63.0 in 160.0 cm  YOB: 1980        Weight:           260.1 lb 118.00 kg  Patient Age:    38 years         BSA:              2.16 m²  Patient Gender: F         BP:               159/83 mmHg       Date of Exam:        2018 12:51:08 PM  Sonographer:         Lauryn Cruz  Referring Physician: Mane Hilton MD    Procedure:   2D Echo/Doppler/Color Doppler Complete.  Indications: Edema, unspecified - R60.9  Diagnosis:   Edema, unspecified - R60.9         2D AND M-MODE MEASUREMENTS (normal ranges within parentheses):  Left                Normal    Aorta/Left           Normal  Ventricle:                    Atrium:  IVSd (2D):    1.08  (0.7-1.1) Aortic Root  2.40 cm (2.4-3.7)                cm              (Mmode):  LVPWd (2D):   1.33  (0.7-1.1) LA Volume    26.8                cm              Index        ml/m²  LVIDd (2D):   4.37  (3.4-5.7) Right Ventricle:                cm              RVd (2D):        3.61 cm  LVIDs (2D):   3.09            TAPSE:           1.79 cm                cm  LV FS (2D):   29.3  (>25%)                %  Relative Wall 0.61  (<0.42)  Thickness    LV SYSTOLIC FUNCTION BY 2D PLANIMETRY (MOD):  EF-A4C View: 72.6 % EF-A2C View: 81.9 % EF-Biplane: 77.5 %    LV DIASTOLIC FUNCTION:  MV Peak E: 1.15 m/s E/e' Ratio: 11.50  MV Peak A: 1.05 m/s Decel Time: 151 msec  E/A Ratio: 1.10    SPECTRAL DOPPLER ANALYSIS (where applicable):  Mitral Valve:  MV P1/2 Time: 43.79 msec  MV Area, PHT: 5.02 cm²    Aortic Valve: AoV Max Sameer: 2.14 m/s AoV Peak P.4 mmHg AoV Mean P.3 mmHg    LVOT Vmax: 1.14 m/s LVOT VTI: 0.194 m LVOT Diameter:    AoV Area, Vmax:  AoV Area, VTI:  AoV Area, Vmn:  Ao VTI: 0.386  Tricuspid Valve and PA/RV Systolic Pressure: TR Max Velocity: 2.86 m/s RA   Pressure: 5 mmHg RVSP/PASP: 37.7 mmHg       PHYSICIAN INTERPRETATION:  Left Ventricle: The left ventricular internal cavity size is normal. Left   ventricular wall thickness is mildly increased. There is mild concentric   left ventricular hypertrophy.  Global LV systolic function was normal. Left ventricular ejection   fraction, by visual estimation, is 70 to 75%. Spectral Doppler shows   normal pattern of LV diastolic filling. Normal LV filling pressures.  Right Ventricle: Normal right ventricular size and function. TV S' 1.1   m/s.  Left Atrium: The left atrium is normal in size.  Right Atrium: The right atrium is normal in size.  Pericardium: There is no evidence of pericardial effusion. There is a   small pleural effusion in the left lateral region.  Mitral Valve: Thickening of the anterior mitral valve leaflet. No   evidence of mitral valve regurgitation is seen.  Tricuspid Valve: Mild tricuspid regurgitation is visualized. Estimated   pulmonary artery systolic pressure is 37.7 mmHg assuming a right atrial   pressure of 5 mmHg, which is consistent with borderline pulmonary   hypertension.  Aortic Valve: Normal trileaflet aortic valve with normal opening. No   evidence of aortic valve regurgitation is seen.  Pulmonic Valve: The pulmonic valve was not well visualized. Trace   pulmonic valve regurgitation.  Aorta: The aortic root and ascending aorta are structurally normal, with   no evidence of dilitation.  Pulmonary Artery: The pulmonary artery is not well seen. Normal pulmonary   artery pressure.  Venous: A normal flow pattern is recorded from the right upper pulmonary   vein. The inferior vena cava is not well visualized.       Summary:   1. The left atrium is normal in size.   2. There is mild concentric left ventricular hypertrophy.   3. Hyperdynamic wall motion. Left ventricular ejection fraction, by   visual estimation, is 70 to 75%.   4. The right atrium is normal in size.   5. Normal right ventricular size and function.   6. No significant valvular abnormality.   7. There is no evidence of pericardial effusion.    MD Jason Electronically signed on 2018 at 8:03:27 PM              *** Final ***                  JOSSELYN HEATH M.D.,ATTENDING CARDIOLOGY  This document has been electronically signed. 2018 12:51PM    < end of copied text >    RADIOLOGY  < from: Xray Chest 1 View-PORTABLE IMMEDIATE (18 @ 22:08) >   EXAM:  XR CHEST PORTABLE IMMED 1V                          PROCEDURE DATE:  2018          INTERPRETATION:  Portable chest radiograph        CLINICAL INFORMATION:   Short of breath. Chest pain    TECHNIQUE:  Portable  AP view of the chest wasobtained.    COMPARISON: 3/17/2018 available for review.    FINDINGS:   The lungs  a mild diffuse interstitial perihilar infiltrates which may   indicate pulmonary or interstitial infectious infiltrates.    The  heart is enlarged in transverse diameter. No hilar mass. Trachea   midline.        Visualized osseous structures are intact.        IMPRESSION: Cardiomegaly.  Interstitial infiltrates ...              MONIQUE TRACY M.D., ATTENDING RADIOLOGIST  This document has been electronically signed. Mar 24 2018 10:15AM    < end of copied text >        MEDICATIONS  (STANDING):  aspirin enteric coated 81 milliGRAM(s) Oral daily  folic acid 1 milliGRAM(s) Oral daily  hydrALAZINE 100 milliGRAM(s) Oral three times a day  levothyroxine 25 MICROGram(s) Oral daily  loratadine 10 milliGRAM(s) Oral daily  NIFEdipine XL 90 milliGRAM(s) Oral daily  valsartan 320 milliGRAM(s) Oral daily      MEDICATIONS  (PRN):  acetaminophen   Tablet. 650 milliGRAM(s) Oral every 6 hours PRN Mild Pain (1 - 3) HOSPITALIST PROGRESS NOTE    SORAYA JIMENEZ  [id number]  38yFemale    Patient is a 38y old  Female who presents with a chief complaint of SOB and Chest pain (24 Mar 2018 02:36)      SUBJECTIVE:   Chart reviewed since admission, discussed with Dr Bedolla.  Patient seen and examined at bedside for ESRD.  Denies any dyspnea (though on chronic Oxygen at home after pneumonia last year)  Denies chest pain, palpitations, dizziness, cough, fever or chills.  Wants to go home.        OBJECTIVE:  Vital Signs Last 24 Hrs  T(C): 36.8 (25 Mar 2018 12:09), Max: 36.8 (25 Mar 2018 06:09)  T(F): 98.2 (25 Mar 2018 12:09), Max: 98.3 (25 Mar 2018 06:09)  HR: 93 (25 Mar 2018 12:09) (89 - 98)  BP: 148/85 (25 Mar 2018 12:09) (146/83 - 165/98)   RR: 20 (25 Mar 2018 12:09) (20 - 20)  SpO2: 98% (24 Mar 2018 20:02) (93% - 98%)    PHYSICAL EXAMINATION  General: NAD[+] Obese[+] Lying on bed sleeping, ,awakens  HEENT: AT/NC[]  PERRLA[]  EOMI[+]   Moist oral mucosa[+]  Pharyngeal exudates[]  NECK: Supple[+]  JVD[] Carotid bruit[]  CVS: RRR[+]  Irregular[]  S1+S2[+]   Murmur[]  RESP: Fair air entry bilaterally[+]   Clear sounds[+]   poor effort []  wheeze[]   Crackles[]  GI: Soft[+]  Nondistended[]   Nontender[+]   Bowel Sounds[+]  Mass[]   HSM[]   Ascites[]  : suprapubic tenderness[-]   CVA Tenderness[]   Moore[-]  MS: FROM[+]   Edema[+] LUE AVF[+]thrill, bruit  CNS: AAOx3[+]   grossly intact  INTEG: Skin is Warm[+]  dry[+] Lesion[] Decubitus[]  PSYCH:  Fair Mood, Affect           I&O's Summary    24 Mar 2018 07:01  -  25 Mar 2018 07:00  --------------------------------------------------------  IN: 0 mL / OUT: 2100 mL / NET: -2100 mL                            7.6    5.0   )-----------( 185      ( 24 Mar 2018 17:16 )             24.2     PT/INR - ( 23 Mar 2018 22:48 )   PT: 13.7 sec;   INR: 1.24 ratio         PTT - ( 23 Mar 2018 22:48 )  PTT:33.3 sec      137  |  94<L>  |  56.0<H>  ----------------------------<  92  5.1   |  25.0  |  6.83<H>    Ca    9.1      24 Mar 2018 17:16  Mg     2.6         TPro  8.4  /  Alb  4.1  /  TBili  1.1  /  DBili  x   /  AST  20  /  ALT  30  /  AlkPhos  147<H>  0323    CARDIAC MARKERS ( 24 Mar 2018 17:16 )  x     / 0.47 ng/mL / 96 U/L / x     / x      CARDIAC MARKERS ( 24 Mar 2018 05:53 )  x     / 0.45 ng/mL / 91 U/L / x     / x      CARDIAC MARKERS ( 23 Mar 2018 22:48 )  x     / 0.48 ng/mL / x     / x     / x          TTE:  < from: TTE Echo Complete w/Doppler (18 @ 12:51) >  EXAM:  ECHO TRANSTHORACIC COMP W DOPP      PROCEDURE DATE:  2018   .      INTERPRETATION:  REPORT:    TRANSTHORACIC ECHOCARDIOGRAM REPORT         Patient Name:   SORAYA JIMENEZ Patient Location: Inpatient  Medical Rec #:  [id number]         Accession #:      [id number]  Account #:      [id number]         Height:           63.0 in 160.0 cm  YOB: 1980        Weight:           260.1 lb 118.00 kg  Patient Age:    38 years         BSA:              2.16 m²  Patient Gender: F         BP:               159/83 mmHg       Date of Exam:        2018 12:51:08 PM  Sonographer:         Lauryn Cruz  Referring Physician: Mane Hilton MD    Procedure:   2D Echo/Doppler/Color Doppler Complete.  Indications: Edema, unspecified - R60.9  Diagnosis:   Edema, unspecified - R60.9         2D AND M-MODE MEASUREMENTS (normal ranges within parentheses):  Left                Normal    Aorta/Left           Normal  Ventricle:                    Atrium:  IVSd (2D):    1.08  (0.7-1.1) Aortic Root  2.40 cm (2.4-3.7)                cm              (Mmode):  LVPWd (2D):   1.33  (0.7-1.1) LA Volume    26.8                cm              Index        ml/m²  LVIDd (2D):   4.37  (3.4-5.7) Right Ventricle:                cm              RVd (2D):        3.61 cm  LVIDs (2D):   3.09            TAPSE:           1.79 cm                cm  LV FS (2D):   29.3  (>25%)                %  Relative Wall 0.61  (<0.42)  Thickness    LV SYSTOLIC FUNCTION BY 2D PLANIMETRY (MOD):  EF-A4C View: 72.6 % EF-A2C View: 81.9 % EF-Biplane: 77.5 %    LV DIASTOLIC FUNCTION:  MV Peak E: 1.15 m/s E/e' Ratio: 11.50  MV Peak A: 1.05 m/s Decel Time: 151 msec  E/A Ratio: 1.10    SPECTRAL DOPPLER ANALYSIS (where applicable):  Mitral Valve:  MV P1/2 Time: 43.79 msec  MV Area, PHT: 5.02 cm²    Aortic Valve: AoV Max Sameer: 2.14 m/s AoV Peak P.4 mmHg AoV Mean P.3 mmHg    LVOT Vmax: 1.14 m/s LVOT VTI: 0.194 m LVOT Diameter:    AoV Area, Vmax:  AoV Area, VTI:  AoV Area, Vmn:  Ao VTI: 0.386  Tricuspid Valve and PA/RV Systolic Pressure: TR Max Velocity: 2.86 m/s RA   Pressure: 5 mmHg RVSP/PASP: 37.7 mmHg       PHYSICIAN INTERPRETATION:  Left Ventricle: The left ventricular internal cavity size is normal. Left   ventricular wall thickness is mildly increased. There is mild concentric   left ventricular hypertrophy.  Global LV systolic function was normal. Left ventricular ejection   fraction, by visual estimation, is 70 to 75%. Spectral Doppler shows   normal pattern of LV diastolic filling. Normal LV filling pressures.  Right Ventricle: Normal right ventricular size and function. TV S' 1.1   m/s.  Left Atrium: The left atrium is normal in size.  Right Atrium: The right atrium is normal in size.  Pericardium: There is no evidence of pericardial effusion. There is a   small pleural effusion in the left lateral region.  Mitral Valve: Thickening of the anterior mitral valve leaflet. No   evidence of mitral valve regurgitation is seen.  Tricuspid Valve: Mild tricuspid regurgitation is visualized. Estimated   pulmonary artery systolic pressure is 37.7 mmHg assuming a right atrial   pressure of 5 mmHg, which is consistent with borderline pulmonary   hypertension.  Aortic Valve: Normal trileaflet aortic valve with normal opening. No   evidence of aortic valve regurgitation is seen.  Pulmonic Valve: The pulmonic valve was not well visualized. Trace   pulmonic valve regurgitation.  Aorta: The aortic root and ascending aorta are structurally normal, with   no evidence of dilitation.  Pulmonary Artery: The pulmonary artery is not well seen. Normal pulmonary   artery pressure.  Venous: A normal flow pattern is recorded from the right upper pulmonary   vein. The inferior vena cava is not well visualized.       Summary:   1. The left atrium is normal in size.   2. There is mild concentric left ventricular hypertrophy.   3. Hyperdynamic wall motion. Left ventricular ejection fraction, by   visual estimation, is 70 to 75%.   4. The right atrium is normal in size.   5. Normal right ventricular size and function.   6. No significant valvular abnormality.   7. There is no evidence of pericardial effusion.    MD Jason Electronically signed on 2018 at 8:03:27 PM              *** Final ***                  JOSSELYN HEATH M.D.,ATTENDING CARDIOLOGY  This document has been electronically signed. 2018 12:51PM    < end of copied text >    RADIOLOGY  < from: Xray Chest 1 View-PORTABLE IMMEDIATE (18 @ 22:08) >   EXAM:  XR CHEST PORTABLE IMMED 1V                          PROCEDURE DATE:  2018          INTERPRETATION:  Portable chest radiograph        CLINICAL INFORMATION:   Short of breath. Chest pain    TECHNIQUE:  Portable  AP view of the chest wasobtained.    COMPARISON: 3/17/2018 available for review.    FINDINGS:   The lungs  a mild diffuse interstitial perihilar infiltrates which may   indicate pulmonary or interstitial infectious infiltrates.    The  heart is enlarged in transverse diameter. No hilar mass. Trachea   midline.        Visualized osseous structures are intact.        IMPRESSION: Cardiomegaly.  Interstitial infiltrates ...              MONIQUE TRACY M.D., ATTENDING RADIOLOGIST  This document has been electronically signed. Mar 24 2018 10:15AM    < end of copied text >        MEDICATIONS  (STANDING):  aspirin enteric coated 81 milliGRAM(s) Oral daily  folic acid 1 milliGRAM(s) Oral daily  hydrALAZINE 100 milliGRAM(s) Oral three times a day  levothyroxine 25 MICROGram(s) Oral daily  loratadine 10 milliGRAM(s) Oral daily  NIFEdipine XL 90 milliGRAM(s) Oral daily  valsartan 320 milliGRAM(s) Oral daily      MEDICATIONS  (PRN):  acetaminophen   Tablet. 650 milliGRAM(s) Oral every 6 hours PRN Mild Pain (1 - 3)

## 2018-03-26 ENCOUNTER — TRANSCRIPTION ENCOUNTER (OUTPATIENT)
Age: 38
End: 2018-03-26

## 2018-03-26 VITALS
SYSTOLIC BLOOD PRESSURE: 140 MMHG | HEART RATE: 80 BPM | DIASTOLIC BLOOD PRESSURE: 83 MMHG | TEMPERATURE: 99 F | RESPIRATION RATE: 20 BRPM

## 2018-03-26 PROCEDURE — 93005 ELECTROCARDIOGRAM TRACING: CPT

## 2018-03-26 PROCEDURE — 80048 BASIC METABOLIC PNL TOTAL CA: CPT

## 2018-03-26 PROCEDURE — 99261: CPT

## 2018-03-26 PROCEDURE — 85027 COMPLETE CBC AUTOMATED: CPT

## 2018-03-26 PROCEDURE — 99239 HOSP IP/OBS DSCHRG MGMT >30: CPT

## 2018-03-26 PROCEDURE — 80053 COMPREHEN METABOLIC PANEL: CPT

## 2018-03-26 PROCEDURE — 83880 ASSAY OF NATRIURETIC PEPTIDE: CPT

## 2018-03-26 PROCEDURE — 84484 ASSAY OF TROPONIN QUANT: CPT

## 2018-03-26 PROCEDURE — 99285 EMERGENCY DEPT VISIT HI MDM: CPT

## 2018-03-26 PROCEDURE — 36415 COLL VENOUS BLD VENIPUNCTURE: CPT

## 2018-03-26 PROCEDURE — 82550 ASSAY OF CK (CPK): CPT

## 2018-03-26 PROCEDURE — 85610 PROTHROMBIN TIME: CPT

## 2018-03-26 PROCEDURE — 84443 ASSAY THYROID STIM HORMONE: CPT

## 2018-03-26 PROCEDURE — 83735 ASSAY OF MAGNESIUM: CPT

## 2018-03-26 PROCEDURE — 84436 ASSAY OF TOTAL THYROXINE: CPT

## 2018-03-26 PROCEDURE — 85730 THROMBOPLASTIN TIME PARTIAL: CPT

## 2018-03-26 PROCEDURE — 71045 X-RAY EXAM CHEST 1 VIEW: CPT

## 2018-03-26 RX ORDER — ERYTHROPOIETIN 10000 [IU]/ML
10000 INJECTION, SOLUTION INTRAVENOUS; SUBCUTANEOUS ONCE
Qty: 0 | Refills: 0 | Status: COMPLETED | OUTPATIENT
Start: 2018-03-26 | End: 2018-03-26

## 2018-03-26 RX ORDER — LEVOTHYROXINE SODIUM 125 MCG
1 TABLET ORAL
Qty: 30 | Refills: 0 | OUTPATIENT
Start: 2018-03-26

## 2018-03-26 RX ORDER — IRON SUCROSE 20 MG/ML
200 INJECTION, SOLUTION INTRAVENOUS ONCE
Qty: 0 | Refills: 0 | Status: COMPLETED | OUTPATIENT
Start: 2018-03-26 | End: 2018-03-26

## 2018-03-26 RX ADMIN — Medication 90 MILLIGRAM(S): at 06:46

## 2018-03-26 RX ADMIN — ERYTHROPOIETIN 10000 UNIT(S): 10000 INJECTION, SOLUTION INTRAVENOUS; SUBCUTANEOUS at 11:06

## 2018-03-26 RX ADMIN — IRON SUCROSE 110 MILLIGRAM(S): 20 INJECTION, SOLUTION INTRAVENOUS at 10:03

## 2018-03-26 RX ADMIN — VALSARTAN 320 MILLIGRAM(S): 80 TABLET ORAL at 06:46

## 2018-03-26 RX ADMIN — Medication 1 MILLIGRAM(S): at 13:49

## 2018-03-26 RX ADMIN — LORATADINE 10 MILLIGRAM(S): 10 TABLET ORAL at 13:49

## 2018-03-26 RX ADMIN — Medication 25 MICROGRAM(S): at 06:46

## 2018-03-26 RX ADMIN — Medication 100 MILLIGRAM(S): at 06:46

## 2018-03-26 RX ADMIN — Medication 100 MILLIGRAM(S): at 13:49

## 2018-03-26 RX ADMIN — Medication 81 MILLIGRAM(S): at 13:49

## 2018-03-26 NOTE — DISCHARGE NOTE ADULT - PLAN OF CARE
prevent fluid overload Diet and medications as prescribed.  Follow up at HD as scheduled Diet and medications as prescribed.  Follow up with PMD

## 2018-03-26 NOTE — DISCHARGE NOTE ADULT - MEDICATION SUMMARY - MEDICATIONS TO TAKE
I will START or STAY ON the medications listed below when I get home from the hospital:    aspirin 81 mg oral tablet  -- 1 tab(s) by mouth once a day  -- Indication: For Prevent stroke, heart attack    valsartan 320 mg oral tablet  -- Indication: For blood pressure    loratadine  -- Indication: For Allergies    Procardia  -- 90  orally  -- Indication: For blood pressure    levothyroxine 25 mcg (0.025 mg) oral tablet  -- 1 tab(s) by mouth once a day  -- Indication: For thyroid    hydrALAZINE 100 mg oral tablet  -- orally 3 times a day  -- Indication: For blood pressure    folic acid  -- Indication: For Supplement

## 2018-03-26 NOTE — PROGRESS NOTE ADULT - ASSESSMENT
Called RRT nurse to come lay eyes on patient. Concerned about increase trending HR.    39 y/o female with right leg cellulitis, Hyperkalemia, ESRD on HD, hypertensive urgency, DM II.    > LE edema, generalized anasarca - discussed fluid restriction with patient.  Continued HD.  ACE wraps. OOB with PT.   > RLE Cellulitis - responding to IV Vancomycin.  Renal input appreciated.  Continue Abx.   > Morbid Obesity - discussed gradual weight loss as outpatient.   > Anemia of chronic disease - on HD.  Monitor CBC, stable.  Procrit.  > Leg pain - trial of Neurontin  > Hypertensive urgency - pt refusing Labetalol, Clonidine, Norvasc due to perceived impact on edema.  Educated patient.  Increase hydralazine, as pt refusing Labetalol.   > ESRD - continue HD TIW.  > Diabetes - monitor fingersticks.  Insulin coverage for hyperglycemia.  > DVT Prophylaxis - Lovenox subcut

## 2018-03-26 NOTE — PROGRESS NOTE ADULT - SUBJECTIVE AND OBJECTIVE BOX
NEPHROLOGY INTERVAL HPI/OVERNIGHT EVENTS:    No new events.    MEDICATIONS  (STANDING):  aspirin enteric coated 81 milliGRAM(s) Oral daily  folic acid 1 milliGRAM(s) Oral daily  hydrALAZINE 100 milliGRAM(s) Oral three times a day  loratadine 10 milliGRAM(s) Oral daily  NIFEdipine XL 90 milliGRAM(s) Oral daily  valsartan 320 milliGRAM(s) Oral daily    MEDICATIONS  (PRN):  acetaminophen   Tablet. 650 milliGRAM(s) Oral every 6 hours PRN Mild Pain (1 - 3)      Allergies    Mushrooms (Hives (Mild))  No Known Drug Allergies    s  Vital Signs Last 24 Hrs  T(C): 36.6 (26 Mar 2018 08:15), Max: 36.9 (25 Mar 2018 21:25)  T(F): 97.8 (26 Mar 2018 08:15), Max: 98.5 (25 Mar 2018 21:25)  HR: 90 (26 Mar 2018 08:15) (90 - 93)  BP: 154/75 (26 Mar 2018 08:15) (148/85 - 154/75)  BP(mean): --  RR: 18 (26 Mar 2018 08:15) (18 - 20)  SpO2: 99% (26 Mar 2018 08:15) (98% - 99%)  T(C): 36.8 (25 Mar 2018 12:09), Max: 36.8 (25 Mar 2018 06:09)  T(F): 98.2 (25 Mar 2018 12:09), Max: 98.3 (25 Mar 2018 06:09)  HR: 93 (25 Mar 2018 12:09) (89 - 98)  BP: 148/85 (25 Mar 2018 12:09) (146/83 - 165/98)  BP(mean): --  RR: 20 (25 Mar 2018 12:09) (20 - 20)  SpO2: 98% (24 Mar 2018 20:02) (93% - 98%)  T(C): 36.3 (24 Mar 2018 04:30), Max: 37 (23 Mar 2018 19:13)  T(F): 97.3 (24 Mar 2018 04:30), Max: 98.6 (23 Mar 2018 19:13)  HR: 94 (24 Mar 2018 04:30) (91 - 98)  BP: 147/81 (24 Mar 2018 04:30) (147/81 - 180/102)  BP(mean): --  RR: 18 (24 Mar 2018 04:30) (18 - 22)  SpO2: 94% (24 Mar 2018 04:30) (92% - 94%)  Daily Height in cm: 160.02 (23 Mar 2018 17:34)         PHYSICAL EXAM:    GENERAL: comfortable in bed with nasal 02  HEAD:  wnl  EYES:   ENMT:   NECK: veins  full  NERVOUS SYSTEM:  same  CHEST/LUNG: Nasal  02 with clear lungs   HEART: no rub  ABDOMEN: soft, obese  EXTREMITIES:  pitting leg edema R>L better  LYMPH:   SKIN: no rash  Gu neg    LABS:             03-24    137  |  94<L>  |  56.0<H>  ----------------------------<  92  5.1   |  25.0  |  6.83<H>    Ca    9.1      24 Mar 2018 17:16  Mg     2.6     03-23    TPro  8.4  /  Alb  4.1  /  TBili  1.1  /  DBili  x   /  AST  20  /  ALT  30  /  AlkPhos  147<H>  03-23               8.7    6.0   )-----------( 212      ( 23 Mar 2018 22:48 )             27.0     03-23    136  |  94<L>  |  97.0<H>  ----------------------------<  103  5.7<H>   |  21.0<L>  |  10.39<H>    Ca    9.4      23 Mar 2018 22:48  Mg     2.6     03-23    TPro  8.4  /  Alb  4.1  /  TBili  1.1  /  DBili  x   /  AST  20  /  ALT  30  /  AlkPhos  147<H>  03-23    PT/INR - ( 23 Mar 2018 22:48 )   PT: 13.7 sec;   INR: 1.24 ratio         PTT - ( 23 Mar 2018 22:48 )  PTT:33.3 sec    Magnesium, Serum: 2.6 mg/dL (03-23 @ 22:48)          RADIOLOGY & ADDITIONAL TESTS:

## 2018-03-26 NOTE — DISCHARGE NOTE ADULT - NS AS ACTIVITY OBS
Bathing allowed/Showering allowed/Driving allowed/Walking-Outdoors allowed/Walking-Indoors allowed/Stairs allowed/Sex allowed

## 2018-03-26 NOTE — DISCHARGE NOTE ADULT - HOSPITAL COURSE
37 y/o female with ESRD on HD, HTN here with hypervolemia due to missed dialysis session. Improved after HD x2, however with worsening anemia (secondary to CKD). Also received Epogen IV iron with HD.  She was slo noted to have an elevated TSH and was started on Thyroxine      Patient was seen and examined at bedside today. No complaints. Dyspnea and chest pain have resolved after the first HD.  VSS, examination unchanged.    Discussed with CCC, HD set up at [facility]    Discharge home in stable condition    Discharge time - 35 minutes

## 2018-03-26 NOTE — DISCHARGE NOTE ADULT - CARE PLAN
Principal Discharge DX:	End stage renal disease  Goal:	prevent fluid overload  Assessment and plan of treatment:	Diet and medications as prescribed.  Follow up at HD as scheduled  Secondary Diagnosis:	Hypertension, unspecified type  Assessment and plan of treatment:	Diet and medications as prescribed.  Follow up with PMD  Secondary Diagnosis:	Acquired hypothyroidism  Assessment and plan of treatment:	Diet and medications as prescribed.  Follow up with PMD  Secondary Diagnosis:	Hyperkalemia, diminished renal excretion  Assessment and plan of treatment:	Diet and medications as prescribed.  Follow up with PMD  Secondary Diagnosis:	Anemia in chronic kidney disease, on chronic dialysis  Assessment and plan of treatment:	Diet and medications as prescribed.  Follow up with PMD

## 2018-03-26 NOTE — DISCHARGE NOTE ADULT - PATIENT PORTAL LINK FT
You can access the [facility] Patient Portal, offered by [facility], by registering with the following website: [email or web address]

## 2018-03-26 NOTE — DISCHARGE NOTE ADULT - SECONDARY DIAGNOSIS.
Hypertension, unspecified type Acquired hypothyroidism Hyperkalemia, diminished renal excretion Anemia in chronic kidney disease, on chronic dialysis

## 2018-03-26 NOTE — DISCHARGE NOTE ADULT - CARE PROVIDER_API CALL
Angel Hu), Internal Medicine; Nephrology  [address] [place] [id number]  Phone: [phone]  Fax: [phone]

## 2018-03-31 ENCOUNTER — INPATIENT (INPATIENT)
Facility: HOSPITAL | Age: 38
LOS: 5 days | Discharge: HOME | DRG: 555 | End: 2018-04-06
Attending: INTERNAL MEDICINE | Admitting: HOSPITALIST
Payer: MEDICARE

## 2018-03-31 VITALS
RESPIRATION RATE: 19 BRPM | SYSTOLIC BLOOD PRESSURE: 194 MMHG | HEART RATE: 106 BPM | HEIGHT: 64 IN | OXYGEN SATURATION: 91 % | TEMPERATURE: 98 F | WEIGHT: 244.93 LBS | DIASTOLIC BLOOD PRESSURE: 95 MMHG

## 2018-03-31 DIAGNOSIS — I77.0 ARTERIOVENOUS FISTULA, ACQUIRED: Chronic | ICD-10-CM

## 2018-03-31 DIAGNOSIS — Z41.9 ENCOUNTER FOR PROCEDURE FOR PURPOSES OTHER THAN REMEDYING HEALTH STATE, UNSPECIFIED: Chronic | ICD-10-CM

## 2018-03-31 DIAGNOSIS — Z99.2 DEPENDENCE ON RENAL DIALYSIS: Chronic | ICD-10-CM

## 2018-03-31 PROCEDURE — 93971 EXTREMITY STUDY: CPT | Mod: 26,RT

## 2018-03-31 PROCEDURE — 99285 EMERGENCY DEPT VISIT HI MDM: CPT

## 2018-03-31 RX ORDER — MORPHINE SULFATE 50 MG/1
4 CAPSULE, EXTENDED RELEASE ORAL ONCE
Qty: 0 | Refills: 0 | Status: DISCONTINUED | OUTPATIENT
Start: 2018-03-31 | End: 2018-03-31

## 2018-03-31 RX ADMIN — MORPHINE SULFATE 4 MILLIGRAM(S): 50 CAPSULE, EXTENDED RELEASE ORAL at 21:11

## 2018-03-31 RX ADMIN — MORPHINE SULFATE 4 MILLIGRAM(S): 50 CAPSULE, EXTENDED RELEASE ORAL at 22:30

## 2018-03-31 NOTE — ED PROVIDER NOTE - CARDIAC, MLM
Normal rate, regular rhythm.  Heart sounds S1, S2.  No murmurs, rubs or gallops. 2+ radial pule right wrist.

## 2018-03-31 NOTE — ED ADULT NURSE NOTE - CHIEF COMPLAINT QUOTE
Patient arrived to ED today with c/o right arm swelling and pain.  Patient was just discharged from hospital 6 days ago for SOB.  Patient received hemodialysis also.

## 2018-03-31 NOTE — ED PROVIDER NOTE - MUSCULOSKELETAL, MLM
RUE is swollen, with tenderness at right AC joint. RUE is swollen especially proximally, with tenderness at right AC joint. mild erythema near A /C joint and + warmth .2+ radial pulses to b/l UE

## 2018-03-31 NOTE — ED PROVIDER NOTE - OBJECTIVE STATEMENT
37 y/o F pt with hx of hemodialysis, HTN and DM presents to ED with c/o right arm pain x 3 days. She reports her right arm swelled up s/p last dialysis visit 3 days ago and went to her PMD, who told her to get an MRI; results are pending. The pt indicates she has not gone to dialysis over the last 3 days due to the severity of the arm pain. She was previously discharged from [facility] 6 days ago due to volume overload. Additionally complains of diarrhea and chills. Denies fever, CP and SOB. No further complaints at this time.   PMD: Johan 39 y/o F pt with hx of hemodialysis, HTN and DM presents to ED with c/o right arm pain x 3 days. She reports her right arm swelled up s/p last dialysis visit 3 days ago and went to her PMD, who told her to get an MRI; results are pending. The pt indicates she has not gone to dialysis since Weds due to the severity of the arm pain. She was previously discharged from [facility] 6 days ago due to volume overload. Additionally complains of diarrhea and chills. Denies fever, CP and SOB. No further complaints at this time.   PMD: Johan 37 y/o F pt with hx of hemodialysis, HTN and DM presents to ED with c/o right arm pain x 3 days. She reports her right arm swelled up s/p last hospitalization when she had IV access at that site; last dialysis visit 3 days ago ([facility]); pt went to her PMD, who ordered an MRI of that extremity; results are pending. The pt indicates she has not gone to dialysis since Weds due to the severity of the arm pain. She was previously discharged from [facility] 6 days ago after hospitalization for voulme overload. Additionally complains of diarrhea and chills. Denies fever, CP and SOB. No further complaints at this time.   PMD: Johan

## 2018-03-31 NOTE — ED ADULT TRIAGE NOTE - CHIEF COMPLAINT QUOTE
Patient arrived to ED today with c/o right arm swelling and pain.  Patient was just discharged from hospital 6 days ago. Patient arrived to ED today with c/o right arm swelling and pain.  Patient was just discharged from hospital 6 days ago for SOB.  Patient received hemodialysis also.

## 2018-03-31 NOTE — ED ADULT NURSE REASSESSMENT NOTE - NS ED NURSE REASSESS COMMENT FT1
Unable to obtain peripheral IV, pt refusing EJ by MD, morphine given SQ as per MD orders, MD Elizondo will attempt IV w/ US and send blood work after ultrasound.

## 2018-03-31 NOTE — ED ADULT NURSE NOTE - OBJECTIVE STATEMENT
Pt axox3 c/o left upper arm tightness/ tenderness. Pt was DC'd here 5 days ago, is on hemodialysis and 02 dependant. Pt left upper arm is tender to touch warm and reddened, pt states that is where she had her IV.

## 2018-04-01 DIAGNOSIS — Z29.9 ENCOUNTER FOR PROPHYLACTIC MEASURES, UNSPECIFIED: ICD-10-CM

## 2018-04-01 DIAGNOSIS — L03.90 CELLULITIS, UNSPECIFIED: ICD-10-CM

## 2018-04-01 DIAGNOSIS — L03.113 CELLULITIS OF RIGHT UPPER LIMB: ICD-10-CM

## 2018-04-01 DIAGNOSIS — N18.9 CHRONIC KIDNEY DISEASE, UNSPECIFIED: ICD-10-CM

## 2018-04-01 DIAGNOSIS — I10 ESSENTIAL (PRIMARY) HYPERTENSION: ICD-10-CM

## 2018-04-01 DIAGNOSIS — N18.6 END STAGE RENAL DISEASE: ICD-10-CM

## 2018-04-01 DIAGNOSIS — M60.821: ICD-10-CM

## 2018-04-01 LAB
ALBUMIN SERPL ELPH-MCNC: 3.4 G/DL — SIGNIFICANT CHANGE UP (ref 3.3–5.2)
ALP SERPL-CCNC: 124 U/L — HIGH (ref 40–120)
ALT FLD-CCNC: 16 U/L — SIGNIFICANT CHANGE UP
ANION GAP SERPL CALC-SCNC: 18 MMOL/L — HIGH (ref 5–17)
ANISOCYTOSIS BLD QL: SLIGHT — SIGNIFICANT CHANGE UP
AST SERPL-CCNC: 17 U/L — SIGNIFICANT CHANGE UP
BASOPHILS # BLD AUTO: 0 K/UL — SIGNIFICANT CHANGE UP (ref 0–0.2)
BASOPHILS NFR BLD AUTO: 0.5 % — SIGNIFICANT CHANGE UP (ref 0–2)
BILIRUB SERPL-MCNC: 1 MG/DL — SIGNIFICANT CHANGE UP (ref 0.4–2)
BUN SERPL-MCNC: 57 MG/DL — HIGH (ref 8–20)
CALCIUM SERPL-MCNC: 7.8 MG/DL — LOW (ref 8.6–10.2)
CHLORIDE SERPL-SCNC: 100 MMOL/L — SIGNIFICANT CHANGE UP (ref 98–107)
CK SERPL-CCNC: 110 U/L — SIGNIFICANT CHANGE UP (ref 25–170)
CK SERPL-CCNC: 137 U/L — SIGNIFICANT CHANGE UP (ref 25–170)
CO2 SERPL-SCNC: 22 MMOL/L — SIGNIFICANT CHANGE UP (ref 22–29)
CREAT SERPL-MCNC: 7.24 MG/DL — HIGH (ref 0.5–1.3)
CRP SERPL-MCNC: 5.2 MG/DL — HIGH (ref 0–0.4)
EOSINOPHIL # BLD AUTO: 0.2 K/UL — SIGNIFICANT CHANGE UP (ref 0–0.5)
EOSINOPHIL NFR BLD AUTO: 1.8 % — SIGNIFICANT CHANGE UP (ref 0–6)
ERYTHROCYTE [SEDIMENTATION RATE] IN BLOOD: 48 MM/HR — HIGH (ref 0–20)
GLUCOSE BLDC GLUCOMTR-MCNC: 95 MG/DL — SIGNIFICANT CHANGE UP (ref 70–99)
GLUCOSE SERPL-MCNC: 79 MG/DL — SIGNIFICANT CHANGE UP (ref 70–115)
HCT VFR BLD CALC: 25.9 % — LOW (ref 37–47)
HGB BLD-MCNC: 7.9 G/DL — LOW (ref 12–16)
HYPOCHROMIA BLD QL: SLIGHT — SIGNIFICANT CHANGE UP
LACTATE BLDV-MCNC: 1.4 MMOL/L — SIGNIFICANT CHANGE UP (ref 0.5–2)
LYMPHOCYTES # BLD AUTO: 1.2 K/UL — SIGNIFICANT CHANGE UP (ref 1–4.8)
LYMPHOCYTES # BLD AUTO: 14.1 % — LOW (ref 20–55)
MACROCYTES BLD QL: SLIGHT — SIGNIFICANT CHANGE UP
MCHC RBC-ENTMCNC: 25.1 PG — LOW (ref 27–31)
MCHC RBC-ENTMCNC: 30.5 G/DL — LOW (ref 32–36)
MCV RBC AUTO: 82.2 FL — SIGNIFICANT CHANGE UP (ref 81–99)
MICROCYTES BLD QL: SLIGHT — SIGNIFICANT CHANGE UP
MONOCYTES # BLD AUTO: 0.7 K/UL — SIGNIFICANT CHANGE UP (ref 0–0.8)
MONOCYTES NFR BLD AUTO: 8.1 % — SIGNIFICANT CHANGE UP (ref 3–10)
NEUTROPHILS # BLD AUTO: 6.2 K/UL — SIGNIFICANT CHANGE UP (ref 1.8–8)
NEUTROPHILS NFR BLD AUTO: 75.3 % — HIGH (ref 37–73)
OVALOCYTES BLD QL SMEAR: SLIGHT — SIGNIFICANT CHANGE UP
PLAT MORPH BLD: NORMAL — SIGNIFICANT CHANGE UP
PLATELET # BLD AUTO: 201 K/UL — SIGNIFICANT CHANGE UP (ref 150–400)
POIKILOCYTOSIS BLD QL AUTO: SLIGHT — SIGNIFICANT CHANGE UP
POLYCHROMASIA BLD QL SMEAR: SLIGHT — SIGNIFICANT CHANGE UP
POTASSIUM SERPL-MCNC: 4.3 MMOL/L — SIGNIFICANT CHANGE UP (ref 3.5–5.3)
POTASSIUM SERPL-SCNC: 4.3 MMOL/L — SIGNIFICANT CHANGE UP (ref 3.5–5.3)
PROT SERPL-MCNC: 6.8 G/DL — SIGNIFICANT CHANGE UP (ref 6.6–8.7)
RBC # BLD: 3.15 M/UL — LOW (ref 4.4–5.2)
RBC # FLD: 19.7 % — HIGH (ref 11–15.6)
RBC BLD AUTO: ABNORMAL
SCHISTOCYTES BLD QL AUTO: SLIGHT — SIGNIFICANT CHANGE UP
SODIUM SERPL-SCNC: 140 MMOL/L — SIGNIFICANT CHANGE UP (ref 135–145)
TARGETS BLD QL SMEAR: SLIGHT — SIGNIFICANT CHANGE UP
WBC # BLD: 8.2 K/UL — SIGNIFICANT CHANGE UP (ref 4.8–10.8)
WBC # FLD AUTO: 8.2 K/UL — SIGNIFICANT CHANGE UP (ref 4.8–10.8)

## 2018-04-01 PROCEDURE — 99223 1ST HOSP IP/OBS HIGH 75: CPT

## 2018-04-01 PROCEDURE — 99222 1ST HOSP IP/OBS MODERATE 55: CPT

## 2018-04-01 PROCEDURE — 12345: CPT | Mod: NC

## 2018-04-01 RX ORDER — NIFEDIPINE 30 MG
90 TABLET, EXTENDED RELEASE 24 HR ORAL DAILY
Qty: 0 | Refills: 0 | Status: DISCONTINUED | OUTPATIENT
Start: 2018-04-01 | End: 2018-04-06

## 2018-04-01 RX ORDER — LEVOTHYROXINE SODIUM 125 MCG
25 TABLET ORAL DAILY
Qty: 0 | Refills: 0 | Status: DISCONTINUED | OUTPATIENT
Start: 2018-04-01 | End: 2018-04-06

## 2018-04-01 RX ORDER — ONDANSETRON 8 MG/1
4 TABLET, FILM COATED ORAL EVERY 6 HOURS
Qty: 0 | Refills: 0 | Status: DISCONTINUED | OUTPATIENT
Start: 2018-04-01 | End: 2018-04-06

## 2018-04-01 RX ORDER — SODIUM CHLORIDE 9 MG/ML
3 INJECTION INTRAMUSCULAR; INTRAVENOUS; SUBCUTANEOUS EVERY 8 HOURS
Qty: 0 | Refills: 0 | Status: DISCONTINUED | OUTPATIENT
Start: 2018-04-01 | End: 2018-04-06

## 2018-04-01 RX ORDER — HEPARIN SODIUM 5000 [USP'U]/ML
5000 INJECTION INTRAVENOUS; SUBCUTANEOUS EVERY 8 HOURS
Qty: 0 | Refills: 0 | Status: DISCONTINUED | OUTPATIENT
Start: 2018-04-01 | End: 2018-04-06

## 2018-04-01 RX ORDER — FOLIC ACID 0.8 MG
1 TABLET ORAL DAILY
Qty: 0 | Refills: 0 | Status: DISCONTINUED | OUTPATIENT
Start: 2018-04-01 | End: 2018-04-06

## 2018-04-01 RX ORDER — FERROUS SULFATE 325(65) MG
325 TABLET ORAL DAILY
Qty: 0 | Refills: 0 | Status: DISCONTINUED | OUTPATIENT
Start: 2018-04-01 | End: 2018-04-06

## 2018-04-01 RX ORDER — VANCOMYCIN HCL 1 G
1000 VIAL (EA) INTRAVENOUS ONCE
Qty: 0 | Refills: 0 | Status: COMPLETED | OUTPATIENT
Start: 2018-04-01 | End: 2018-04-01

## 2018-04-01 RX ORDER — VALSARTAN 80 MG/1
320 TABLET ORAL DAILY
Qty: 0 | Refills: 0 | Status: DISCONTINUED | OUTPATIENT
Start: 2018-04-01 | End: 2018-04-06

## 2018-04-01 RX ORDER — INFLUENZA VIRUS VACCINE 15; 15; 15; 15 UG/.5ML; UG/.5ML; UG/.5ML; UG/.5ML
0.5 SUSPENSION INTRAMUSCULAR ONCE
Qty: 0 | Refills: 0 | Status: COMPLETED | OUTPATIENT
Start: 2018-04-01 | End: 2018-04-01

## 2018-04-01 RX ORDER — HYDRALAZINE HCL 50 MG
100 TABLET ORAL THREE TIMES A DAY
Qty: 0 | Refills: 0 | Status: DISCONTINUED | OUTPATIENT
Start: 2018-04-01 | End: 2018-04-06

## 2018-04-01 RX ORDER — MORPHINE SULFATE 50 MG/1
4 CAPSULE, EXTENDED RELEASE ORAL EVERY 4 HOURS
Qty: 0 | Refills: 0 | Status: DISCONTINUED | OUTPATIENT
Start: 2018-04-01 | End: 2018-04-02

## 2018-04-01 RX ORDER — ACETAMINOPHEN 500 MG
650 TABLET ORAL EVERY 6 HOURS
Qty: 0 | Refills: 0 | Status: DISCONTINUED | OUTPATIENT
Start: 2018-04-01 | End: 2018-04-06

## 2018-04-01 RX ORDER — MORPHINE SULFATE 50 MG/1
4 CAPSULE, EXTENDED RELEASE ORAL ONCE
Qty: 0 | Refills: 0 | Status: DISCONTINUED | OUTPATIENT
Start: 2018-04-01 | End: 2018-04-01

## 2018-04-01 RX ORDER — SACCHAROMYCES BOULARDII 250 MG
250 POWDER IN PACKET (EA) ORAL
Qty: 0 | Refills: 0 | Status: DISCONTINUED | OUTPATIENT
Start: 2018-04-01 | End: 2018-04-06

## 2018-04-01 RX ORDER — ASPIRIN/CALCIUM CARB/MAGNESIUM 324 MG
81 TABLET ORAL DAILY
Qty: 0 | Refills: 0 | Status: DISCONTINUED | OUTPATIENT
Start: 2018-04-01 | End: 2018-04-06

## 2018-04-01 RX ADMIN — Medication 100 MILLIGRAM(S): at 05:43

## 2018-04-01 RX ADMIN — Medication 650 MILLIGRAM(S): at 18:24

## 2018-04-01 RX ADMIN — Medication 250 MILLIGRAM(S): at 18:24

## 2018-04-01 RX ADMIN — Medication 325 MILLIGRAM(S): at 14:17

## 2018-04-01 RX ADMIN — HEPARIN SODIUM 5000 UNIT(S): 5000 INJECTION INTRAVENOUS; SUBCUTANEOUS at 05:42

## 2018-04-01 RX ADMIN — MORPHINE SULFATE 4 MILLIGRAM(S): 50 CAPSULE, EXTENDED RELEASE ORAL at 02:26

## 2018-04-01 RX ADMIN — VALSARTAN 320 MILLIGRAM(S): 80 TABLET ORAL at 05:42

## 2018-04-01 RX ADMIN — MORPHINE SULFATE 4 MILLIGRAM(S): 50 CAPSULE, EXTENDED RELEASE ORAL at 11:42

## 2018-04-01 RX ADMIN — SODIUM CHLORIDE 3 MILLILITER(S): 9 INJECTION INTRAMUSCULAR; INTRAVENOUS; SUBCUTANEOUS at 06:38

## 2018-04-01 RX ADMIN — Medication 250 MILLIGRAM(S): at 02:26

## 2018-04-01 RX ADMIN — Medication 250 MILLIGRAM(S): at 05:42

## 2018-04-01 RX ADMIN — Medication 100 MILLIGRAM(S): at 21:52

## 2018-04-01 RX ADMIN — Medication 90 MILLIGRAM(S): at 05:43

## 2018-04-01 RX ADMIN — Medication 1 MILLIGRAM(S): at 11:51

## 2018-04-01 RX ADMIN — MORPHINE SULFATE 4 MILLIGRAM(S): 50 CAPSULE, EXTENDED RELEASE ORAL at 22:51

## 2018-04-01 RX ADMIN — MORPHINE SULFATE 4 MILLIGRAM(S): 50 CAPSULE, EXTENDED RELEASE ORAL at 23:00

## 2018-04-01 RX ADMIN — Medication 81 MILLIGRAM(S): at 11:51

## 2018-04-01 RX ADMIN — SODIUM CHLORIDE 3 MILLILITER(S): 9 INJECTION INTRAMUSCULAR; INTRAVENOUS; SUBCUTANEOUS at 21:38

## 2018-04-01 RX ADMIN — SODIUM CHLORIDE 3 MILLILITER(S): 9 INJECTION INTRAMUSCULAR; INTRAVENOUS; SUBCUTANEOUS at 12:00

## 2018-04-01 RX ADMIN — MORPHINE SULFATE 4 MILLIGRAM(S): 50 CAPSULE, EXTENDED RELEASE ORAL at 11:57

## 2018-04-01 RX ADMIN — Medication 100 MILLIGRAM(S): at 14:23

## 2018-04-01 NOTE — H&P ADULT - ITE SK HX ROS MEA POS PC
Critical care medicine progress note    Impression & Recommendations:    1) ACUTE on CHRONIC RESPIRATORY FAILURE, hypoxemic and hypercapnic - secondary to an acute exacerbation of COPD (see below). Doing fair at this time. Slightly more acidotic as sedation requirement increases. Having increasing agitation and subsequent sedation. See discussion below. At this time we'll continue her current mechanical ventilation and change sedation. Will not wean further today.    2) ACUTE EXACERBATION of COPD - continued gradual improvement. Moderate amount of thick secretions from ET tube. Lungs clear. CXR unremarkable. We will wean steroids but continue frequent nebulizer treatments. Continue azithromycin. Follow close.    3) CHF - doing well off Lasix. Follow.    4) TOBACCO USE - nicotine patch in place. Will rediscuss cessation when able however previously the patient has apparently stated that she will never quit.    5) PSYCHIATRY - bipolar disorder and agitation. Currently the patient is having increasing agitation. Possibly secondary to her underlying psychiatric disease but must consider withdrawal. We'll start Haldol and back off fentanyl and propofol. Once agitation is better controlled we can work towards extubation.    Other medical problems as outlined below - care as per the Hospitalist Service.    _______________________________________________________________  _______________________________________________________________    Interval History:    Patient seen and examined. Chart reviewed. Now comfortably sedated however very agitated when sedation is lightened. No obvious complaints/concerns.    ALLERGIES:   Allergen Reactions   • Penicillins Dermatitis       Current Facility-Administered Medications   Medication   • haloperidol (HALDOL) 5 MG/ML injection 5 mg   • methylPREDNISolone (solu-MEDROL) PF injection 30 mg   • hydrALAZINE (APRESOLINE) tablet 10 mg   • fentaNYL 1000 mcg in sodium chloride 0.9% 100 mL  infusion premix   • propofol (DIPRIVAN) infusion   • sodium chloride (PF) 0.9 % injection 2 mL   • sodium chloride (PF) 0.9 % injection 2 mL   • potassium chloride (K-DUR,KLOR-CON) CR tablet 20 mEq   • potassium chloride (KLOR-CON) packet 20 mEq   • potassium chloride 20 mEq/100mL IVPB premix   • potassium chloride (K-DUR,KLOR-CON) CR tablet 40 mEq   • potassium chloride (KLOR-CON) packet 40 mEq   • potassium chloride 20 mEq/100mL IVPB premix   • enoxaparin (LOVENOX) injection 40 mg   • azithromycin (ZITHROMAX) 500 mg in sodium chloride 0.9 % 250 mL IVPB   • acetaminophen (TYLENOL) tablet 650 mg   • docusate sodium-sennosides (SENOKOT S) 50-8.6 MG 2 tablet   • nitroGLYcerin (NITROSTAT) sublingual tablet 0.4 mg   • nicotine patch removal    And   • nicotine (NICODERM) 14 MG/24HR patch 1 patch   • sodium chloride 0.9% infusion   • fentaNYL (SUBLIMAZE) injection  mcg   • chlorhexidine gluconate (PERIDEX) 0.12 % solution 15 mL    And   • chlorhexidine gluconate (PERIDEX) 0.12 % solution 15 mL   • petrolatum(white)/mineral oil/NaCL (REFRESH PM, OCULAR LUBRICANT) ophthalmic ointment 1 application   • famotidine (PEPCID) injection 20 mg   • MIDAZolam (VERSED) injection 2-4 mg   • aspirin suppository 300 mg   • dextrose 50 % injection 25 g   • dextrose 5 % infusion   • glucagon (GLUCAGEN) injection 1 mg   • dextrose (GLUTOSE) 40 % gel 15 g   • insulin regular (human) (HumuLIN R, NovoLIN R) sliding scale injection   • albuterol (VENTOLIN) nebulizer 2.5 mg   • albuterol-ipratropium 2.5 mg/0.5 mg (DUONEB) nebulizer solution 3 mL   • amLODIPine (NORVASC) tablet 10 mg   • levothyroxine (SYNTHROID, LEVOTHROID) tablet 100 mcg   • lisinopril (ZESTRIL) tablet 10 mg   • hydrALAZINE (APRESOLINE) injection 10 mg       Patient Active Problem List    Diagnosis Date Noted   • Acute respiratory failure with hypoxia and hypercapnia 05/04/2017     Priority: Low   • COPD with acute exacerbation 05/04/2017     Priority: Low   • Tobacco  use disorder 05/04/2017     Priority: Low   • Bipolar disorder 05/04/2017     Priority: Low   • Hypoxemia 02/03/2016     Priority: Low   • Hypokalemia 02/03/2016     Priority: Low   • Adrenal mass, left 02/03/2016     Priority: Low   • Pulmonary emphysema 02/03/2016     Priority: Low   • Adrenal adenoma 02/03/2016     Priority: Low     3 cm adrenal mass favoring adrenal adenoma     • Chronic respiratory acidosis 02/03/2016     Priority: Low       Past Medical History:   Diagnosis Date   • Essential (primary) hypertension    • High cholesterol    • Thyroid condition      (Also see Problem List)    Past Surgical History:   Procedure Laterality Date   • APPENDECTOMY       (Also in Problem List)    Social  Social History     Social History   • Marital status:      Spouse name: N/A   • Number of children: N/A   • Years of education: N/A     Occupational History   • Not on file.     Social History Main Topics   • Smoking status: Current Every Day Smoker     Packs/day: 1.00   • Smokeless tobacco: Not on file   • Alcohol use 0.0 oz/week     0 Standard drinks or equivalent per week      Comment: 1 beer daily   • Drug use: No   • Sexual activity: Not on file     Other Topics Concern   • Not on file     Social History Narrative       Family History   Problem Relation Age of Onset   • Diabetes Mother    • Mental illness Father      Physical Exam:    Vital Signs: Blood pressure 132/73, pulse 74, temperature 97.6 °F (36.4 °C), temperature source Temporal Artery, resp. rate 12, height 5' 7\" (1.702 m), weight 92.2 kg, SpO2 96 %., Body mass index is 31.84 kg/(m^2).  Constitutional: Reveals a obese, elderly female who is on mechanical ventilation. She is in no apparent distress.  HEENT: Head is normocephalic and atraumatic. Pupils are round and reactive to light. Nose is patent. OG and ET are in position.  Neck: Neck is  obese, supple and symmetric. There is no JVD, thyromegaly, or lymphadenopathy.  Thorax/Back: Thorax is  obese and barrel shaped. There is no point tenderness or bony deformity.  Lungs: Lungs are clear to auscultation and percussion bilaterally. There is no wheeze, rhonchi or crackles present. Air movement is fair.  Cardiac: Heart is  distant and regular with normal S1 and S2. There is no S3 or S4. There is also no appreciable murmur or rub.  Abdomen: Abdomen is  obese and soft with hypoactive bowel sounds. No mass, tenderness, or hepatosplenomegaly.  Musculoskeletal: Extremities show no clubbing or cyanosis. There is no pedal edema.  Neurologic: Patient is comfortably sedated. She does move all 4.  Dermatologic: No acute rash.    Ancillary Studies:    CXR unremarkable. Labs noted. All were reviewed by me.    [facility] time: 05:30-06:05   right UE cellulitis

## 2018-04-01 NOTE — H&P ADULT - HISTORY OF PRESENT ILLNESS
37 y/o female with history of ESRD due to uncontrolled HTN. Patient is dialyzed M/W/F via left arm AVF. She was dcd here 5 days ago after being admitted for volume overload due to missing HD. She states she was feeling fine when dcd, but over the past 5 days has been having increasing right UE swelling, redness, and pain. She denies history of trauma, clots or prior cellulitis of that arm. She missed her last HD session on Friday due to pain and swelling. She went to see her PMD on Thursday and was sent for MRI which she had done but has not received the results yet. She was told to come to ED by her PMD if pain or swelling got worse. She denies any fever, but admits to chills. No pain or numbness in distal extrem. In ED doppler done to r/o UE dvt which was neg. no fever or leukocytosis, however arm appears cellulitic, no crepitus or evidence of necrotizing fasciitis.

## 2018-04-01 NOTE — CONSULT NOTE ADULT - ASSESSMENT
ESRD next HD tommorow MWF schedule  Electrolytes volume status ok  Consent obtained in chart    RUE cellulitis was given vanco x1 in ED would cont   Rec ID consult  noted US RUE neg DVT    HTN BP on high side likely from arm pain  will remove additional UF on HD tommorow  if BP remains high will adjust meds
THIS 38 Y.O. F WITH ESRD ON HD, HERE WITH RIGHT ARM PAIN AND SWELLING. MYOSITIS VS. MUSCLE STRAIN ON OUTPATIENT MRI.    LACK OF FEVERS AND NO WBC ELEVATION.   CONSERVATIVE MANAGEMENT FOR NOW.  DEFER ANTIBIOTICS. ELEVATION.

## 2018-04-01 NOTE — H&P ADULT - EXTREMITIES COMMENTS
LUE AVF + thrill + bruit, distal extrem N/V intact  RUE with diffuse edema, no crepitus, +warmth, + erythema, Distal extrem N/V intact

## 2018-04-01 NOTE — CONSULT NOTE ADULT - SUBJECTIVE AND OBJECTIVE BOX
HPI:  37 y/o female with history of ESRD due to uncontrolled HTN. Patient is dialyzed M/W/F via left arm AVF. She was dcd here 5 days ago after being admitted for volume overload due to missing HD at that time. She states she was feeling fine when dcd, but over the past 5 days has been having increasing right UE swelling, redness, and pain. She denies history of trauma, clots or prior cellulitis of that arm. She missed her last HD session on Friday due to pain and swelling. She went to see her PMD on Thursday and was sent for MRI which she had done but has not received the results yet. She was told to come to ED by her PMD if pain or swelling got worse. She denies any fever, but admits to chills. No pain or numbness in distal extrem. In ED doppler done to r/o UE dvt which was neg. no fever or leukocytosis, however arm appears cellulitic, no crepitus or evidence of necrotizing fasciitis.    ROS: +N/V, +Pain RUE denies HA CP, no SOB    PAST MEDICAL & SURGICAL HISTORY:  Clostridium difficile diarrhea  Vitreous hemorrhage of left eye  Class 3 obesity due to excess calories with serious comorbidity in adult, unspecified BMI  DALTON (obstructive sleep apnea)  Pneumonia  End stage renal disease  Hypertension  Diabetes  Elective surgery: Left eye retina surgery  AVF (arteriovenous fistula)  Vascular dialysis catheter in place   DM 2, MO, hypothyroidism, prior DVT and IVC filter; Cholecystectomy    FAMILY HISTORY:  No pertinent family history in first degree relatives  [place]    Social History:Non smoker    MEDICATIONS  (STANDING):  aspirin enteric coated 81 milliGRAM(s) Oral daily  folic acid 1 milliGRAM(s) Oral daily  heparin  Injectable 5000 Unit(s) SubCutaneous every 8 hours  hydrALAZINE 100 milliGRAM(s) Oral three times a day  levothyroxine 25 MICROGram(s) Oral daily  NIFEdipine XL 90 milliGRAM(s) Oral daily  saccharomyces boulardii 250 milliGRAM(s) Oral two times a day  sodium chloride 0.9% lock flush 3 milliLiter(s) IV Push every 8 hours  valsartan 320 milliGRAM(s) Oral daily    MEDICATIONS  (PRN):  acetaminophen   Tablet 650 milliGRAM(s) Oral every 6 hours PRN For Temp greater than 38.5 C (101.3 F)  morphine  - Injectable 4 milliGRAM(s) IV Push every 4 hours PRN Moderate Pain (4 - 6)  ondansetron Injectable 4 milliGRAM(s) IV Push every 6 hours PRN Nausea   Meds reviewed    Vital Signs Last 24 Hrs  T(C): 36.7 (01 Apr 2018 07:57), Max: 36.7 (31 Mar 2018 18:32)  T(F): 98.1 (01 Apr 2018 07:57), Max: 98.1 (01 Apr 2018 07:57)  HR: 94 (01 Apr 2018 07:57) (78 - 106)  BP: 193/108 (01 Apr 2018 07:57) (134/70 - 200/95)  BP(mean): 124 (01 Apr 2018 02:33) (124 - 124)  RR: 18 (01 Apr 2018 07:57) (16 - 19)  SpO2: 96% (01 Apr 2018 05:36) (88% - 98%)  Daily Height in cm: 162.56 (31 Mar 2018 18:32)    Daily     PHYSICAL EXAM:    GENERAL: appears chronically ill, obese  HEAD:  Atraumatic, Normocephalic  EYES: EOMI  NECK: Supple, neck  veins full  NERVOUS SYSTEM:  Alert & Oriented X3  CHEST/LUNG: Clear to percussion bilaterally; No rales  HEART: Regular rate and rhythm; No murmur  ABDOMEN: Soft, Nontender, Nondistended; Bowel sounds present  EXTREMITIES:  LUE AVF+ bruit, RUE with diffuse edema, +warmth, + erythema      LABS:                        7.9    8.2   )-----------( 201      ( 01 Apr 2018 01:06 )             25.9     04-01    140  |  100  |  57.0<H>  ----------------------------<  79  4.3   |  22.0  |  7.24<H>    Ca    7.8<L>      01 Apr 2018 01:06    TPro  6.8  /  Alb  3.4  /  TBili  1.0  /  DBili  x   /  AST  17  /  ALT  16  /  AlkPhos  124<H>  04-01                RADIOLOGY & ADDITIONAL TESTS:
[facility] INFECTIOUS DISEASES AND INTERNAL MEDICINE at [place]  =======================================================  Ned Bangura MD [facility]LLY De MD  Diplomates American Board of Internal Medicine and Infectious Diseases  =======================================================    [id number]  SORAYA JIMENEZ   This is a 38y  Female 38 with active ESRD on HD  MWF, due to histroy of uncontrolled HTN, has left arm AVF, recently admitted for missing dialysis and volume overloaded.  During hospital stay. she had IV acces in the right lower arm, but not in upper arm.  Over the past 5 days has been having increasing right UE swelling, redness, and pain.   At first, she related it to sleeping with both arms under pillow.  Pain was worsened after they checked her BP on the RIGHT arm during HD this past Wednesday She went to see her PMD on Thursday and was sent for MRI on 3/30/18.  RESULTS now POSTED BELOW.  She was told to come to ED by her PMD if pain or swelling got worse.     no other trauma noted.     =======================================================  Past Medical & Surgical Hx:  =====================  PAST MEDICAL & SURGICAL HISTORY:  Clostridium difficile diarrhea  Vitreous hemorrhage of left eye  Class 3 obesity due to excess calories with serious comorbidity in adult, unspecified BMI  DALTON (obstructive sleep apnea)  Pneumonia  End stage renal disease  Hypertension  Diabetes  Elective surgery: Left eye retina surgery  AVF (arteriovenous fistula)  Vascular dialysis catheter in place      Problem List:  ==========  HEALTH ISSUES - PROBLEM Dx:  Prophylactic measure: Prophylactic measure  Anemia due to chronic kidney disease: Anemia due to chronic kidney disease  Hypertension, unspecified type: Hypertension, unspecified type  End stage renal disease: End stage renal disease  Cellulitis of right upper extremity: Cellulitis of right upper extremity       Social Hx:  =======  no toxic habits currently    Family History:  no significant family history of immunosuppressive disorders.  FAMILY HISTORY:  No pertinent family history in first degree relatives       Allergies  Mushrooms (Hives (Mild))  No Known Drug Allergies  Intolerances        MEDICATIONS  (STANDING):  aspirin enteric coated 81 milliGRAM(s) Oral daily  ferrous    sulfate 325 milliGRAM(s) Oral daily  folic acid 1 milliGRAM(s) Oral daily  heparin  Injectable 5000 Unit(s) SubCutaneous every 8 hours  hydrALAZINE 100 milliGRAM(s) Oral three times a day  levothyroxine 25 MICROGram(s) Oral daily  NIFEdipine XL 90 milliGRAM(s) Oral daily  saccharomyces boulardii 250 milliGRAM(s) Oral two times a day  sodium chloride 0.9% lock flush 3 milliLiter(s) IV Push every 8 hours  valsartan 320 milliGRAM(s) Oral daily    MEDICATIONS  (PRN):  acetaminophen   Tablet 650 milliGRAM(s) Oral every 6 hours PRN For Temp greater than 38.5 C (101.3 F)  morphine  - Injectable 4 milliGRAM(s) IV Push every 4 hours PRN Moderate Pain (4 - 6)  ondansetron Injectable 4 milliGRAM(s) IV Push every 6 hours PRN Nausea        =======================================================  REVIEW OF SYSTEMS:  as above  ======================================================  Physical Exam:  ============  Vital Signs Last 24 Hrs  T(C): 36.7 (2018 07:57), Max: 36.7 (31 Mar 2018 18:32)  T(F): 98.1 (2018 07:57), Max: 98.1 (2018 07:57)  HR: 85 (2018 11:09) (78 - 106)  BP: 193/108 (2018 07:57) (134/70 - 200/95)  BP(mean): 124 (2018 02:33) (124 - 124)  RR: 16 (2018 11:09) (16 - 19)  SpO2: 95% (2018 11:09) (88% - 98%)  Height (cm): 162.56 ( @ 18:32)  Weight (kg): 111.1 ( @ 18:32)  BMI (kg/m2): 42 ( @ 18:32)  BSA (m2): 2.13 ( @ 18:32)    General: Alert and oriented, No acute distress.  obese  Eye: Pupils are equal, round and reactive to light, disconjugate gaze  HENT: Normocephalic, Oral mucosa is moist, No pharyngeal erythema, No sinus tenderness.  Neck: Supple, No lymphadenopathy.  Respiratory: Lungs are clear to auscultation, Respirations are non-labored.  Cardiovascular: Normal rate, Regular rhythm, No murmur, Good pulses equal in all extremities, No edema.  Gastrointestinal: Soft, Non-tender, Non-distended, Normal bowel sounds.  Genitourinary: No costovertebral angle tenderness.  Musculoskeletal: Normal range of motion, Normal strength.  RIGHT ARM WITH TENDERNESS AND SWELLING IN BICEPS AREA  Integumentary: No rash. SLIGHT ERYTHEMA OF ARM.   Neurologic: Alert, Oriented, No focal deficits, Cranial Nerves II-XII are grossly intact.  Psychiatric: Appropriate mood & affect.      =======================================================  Labs:  ====  04-    140  |  100  |  57.0<H>  ----------------------------<  79  4.3   |  22.0  |  7.24<H>    Ca    7.8<L>      2018 01:06    TPro  6.8  /  Alb  3.4  /  TBili  1.0  /  DBili  x   /  AST  17  /  ALT  16  /  AlkPhos  124<H>                            7.9    8.2   )-----------( 201      ( 2018 01:06 )             25.9           LIVER FUNCTIONS - ( 2018 01:06 )  Alb: 3.4 g/dL / Pro: 6.8 g/dL / ALK PHOS: 124 U/L / ALT: 16 U/L / AST: 17 U/L / GGT: x           CARDIAC MARKERS ( 2018 01:06 )  x     / x     / 110 U/L / x     / x          CAPILLARY BLOOD GLUCOSE      POCT Blood Glucose.: 95 mg/dL (2018 11:31)        ==================    [facility]    Office Location: [address] [place] [address]  Office Phone: [phone]  Office Fax: [phone]    R. Phys. Name: Dedra Sanchez  R. Phys. Address: [address] [place] [address]  R. Phys. Phone: [phone]    PATIENT NAME: Soraya Jimenez  PATIENT ID: [id number]   : 1980  DATE OF EXAM: 2018    MRI-3T RIGHT ARM NON CONTRAST  HISTORY: R22.31 Swelling/Mass/Lump right upper limb   TECHNIQUE: MR imaging of the right upper arm was performed without IV contrast on a 3.0 Farzana high-field wide-bore magnet.  COMPARISON: None    FINDINGS:  There is marked subcutaneous edema surrounding the mid to lower arm and also partially imaged in the right lateral chest wall which can be seen in the setting of lymphedema. There is edema tracking into the biceps and brachialis muscles indicative of either myositis or muscle strain. There is no tendon tear.   The bones are intact. There is no fracture or bone marrow edema.    IMPRESSION:  There is marked subcutaneous edema surrounding the mid to lower arm and also partially imaged in the right lateral chest wall which can be seen in the setting of lymphedema. There is edema tracking into the biceps and brachialis muscles indicative of either myositis or muscle strain.    Signed by: Nikolas Keller MD  Signed Date: 3/30/2018 12:30 PM  SIGNED BY: Nikolas Keller M.D., Ext. [phone] 12:30 PM  VIEW

## 2018-04-01 NOTE — PROGRESS NOTE ADULT - PROBLEM SELECTOR PLAN 1
CT with contrast pending  Trend CK - doubt compartment syndrome as extremity is soft and still with palpable pulses. Pt denies paresthesias  C/w vancomycin and probiotics  ID consult pending

## 2018-04-01 NOTE — H&P ADULT - PROBLEM SELECTOR PLAN 1
Admit to medical bed, cont. Vanco, probiotics, monitor temp curve, wbc. Arm elevation, warm compress. Check CPK although low suspicion for rhabdomyolysis with soft compartments and normal distal N/V exam. F/U Blood cx.

## 2018-04-02 DIAGNOSIS — I15.0 RENOVASCULAR HYPERTENSION: ICD-10-CM

## 2018-04-02 LAB
FERRITIN SERPL-MCNC: 780 NG/ML — HIGH (ref 11–306)
GLUCOSE BLDC GLUCOMTR-MCNC: 117 MG/DL — HIGH (ref 70–99)
HCT VFR BLD CALC: 25.7 % — LOW (ref 37–47)
HGB BLD-MCNC: 8.1 G/DL — LOW (ref 12–16)
IRON SATN MFR SERPL: 11 % — LOW (ref 14–50)
IRON SATN MFR SERPL: 27 UG/DL — LOW (ref 37–145)
MCHC RBC-ENTMCNC: 25.3 PG — LOW (ref 27–31)
MCHC RBC-ENTMCNC: 31.5 G/DL — LOW (ref 32–36)
MCV RBC AUTO: 80.3 FL — LOW (ref 81–99)
PHOSPHATE SERPL-MCNC: 8.1 MG/DL — HIGH (ref 2.4–4.7)
PLATELET # BLD AUTO: 206 K/UL — SIGNIFICANT CHANGE UP (ref 150–400)
RBC # BLD: 3.2 M/UL — LOW (ref 4.4–5.2)
RBC # FLD: 19.5 % — HIGH (ref 11–15.6)
TIBC SERPL-MCNC: 243 UG/DL — SIGNIFICANT CHANGE UP (ref 220–430)
TRANSFERRIN SERPL-MCNC: 170 MG/DL — LOW (ref 192–382)
WBC # BLD: 10.2 K/UL — SIGNIFICANT CHANGE UP (ref 4.8–10.8)
WBC # FLD AUTO: 10.2 K/UL — SIGNIFICANT CHANGE UP (ref 4.8–10.8)

## 2018-04-02 PROCEDURE — 99233 SBSQ HOSP IP/OBS HIGH 50: CPT

## 2018-04-02 RX ORDER — HYDROMORPHONE HYDROCHLORIDE 2 MG/ML
0.5 INJECTION INTRAMUSCULAR; INTRAVENOUS; SUBCUTANEOUS
Qty: 0 | Refills: 0 | Status: DISCONTINUED | OUTPATIENT
Start: 2018-04-02 | End: 2018-04-02

## 2018-04-02 RX ORDER — DOCUSATE SODIUM 100 MG
100 CAPSULE ORAL
Qty: 0 | Refills: 0 | Status: DISCONTINUED | OUTPATIENT
Start: 2018-04-02 | End: 2018-04-06

## 2018-04-02 RX ORDER — SENNA PLUS 8.6 MG/1
2 TABLET ORAL AT BEDTIME
Qty: 0 | Refills: 0 | Status: DISCONTINUED | OUTPATIENT
Start: 2018-04-02 | End: 2018-04-06

## 2018-04-02 RX ORDER — HYDROMORPHONE HYDROCHLORIDE 2 MG/ML
1 INJECTION INTRAMUSCULAR; INTRAVENOUS; SUBCUTANEOUS EVERY 4 HOURS
Qty: 0 | Refills: 0 | Status: DISCONTINUED | OUTPATIENT
Start: 2018-04-02 | End: 2018-04-02

## 2018-04-02 RX ORDER — ERYTHROPOIETIN 10000 [IU]/ML
10000 INJECTION, SOLUTION INTRAVENOUS; SUBCUTANEOUS
Qty: 0 | Refills: 0 | Status: DISCONTINUED | OUTPATIENT
Start: 2018-04-02 | End: 2018-04-06

## 2018-04-02 RX ORDER — HYDROMORPHONE HYDROCHLORIDE 2 MG/ML
4 INJECTION INTRAMUSCULAR; INTRAVENOUS; SUBCUTANEOUS THREE TIMES A DAY
Qty: 0 | Refills: 0 | Status: DISCONTINUED | OUTPATIENT
Start: 2018-04-02 | End: 2018-04-03

## 2018-04-02 RX ORDER — ACETAMINOPHEN 500 MG
650 TABLET ORAL EVERY 6 HOURS
Qty: 0 | Refills: 0 | Status: DISCONTINUED | OUTPATIENT
Start: 2018-04-02 | End: 2018-04-06

## 2018-04-02 RX ORDER — MORPHINE SULFATE 50 MG/1
4 CAPSULE, EXTENDED RELEASE ORAL
Qty: 0 | Refills: 0 | Status: DISCONTINUED | OUTPATIENT
Start: 2018-04-02 | End: 2018-04-03

## 2018-04-02 RX ADMIN — HYDROMORPHONE HYDROCHLORIDE 4 MILLIGRAM(S): 2 INJECTION INTRAMUSCULAR; INTRAVENOUS; SUBCUTANEOUS at 22:02

## 2018-04-02 RX ADMIN — ERYTHROPOIETIN 10000 UNIT(S): 10000 INJECTION, SOLUTION INTRAVENOUS; SUBCUTANEOUS at 15:22

## 2018-04-02 RX ADMIN — MORPHINE SULFATE 4 MILLIGRAM(S): 50 CAPSULE, EXTENDED RELEASE ORAL at 03:14

## 2018-04-02 RX ADMIN — Medication 325 MILLIGRAM(S): at 11:05

## 2018-04-02 RX ADMIN — Medication 650 MILLIGRAM(S): at 11:06

## 2018-04-02 RX ADMIN — SODIUM CHLORIDE 3 MILLILITER(S): 9 INJECTION INTRAMUSCULAR; INTRAVENOUS; SUBCUTANEOUS at 05:32

## 2018-04-02 RX ADMIN — Medication 250 MILLIGRAM(S): at 05:29

## 2018-04-02 RX ADMIN — Medication 1 MILLIGRAM(S): at 11:05

## 2018-04-02 RX ADMIN — Medication 250 MILLIGRAM(S): at 18:40

## 2018-04-02 RX ADMIN — HEPARIN SODIUM 5000 UNIT(S): 5000 INJECTION INTRAVENOUS; SUBCUTANEOUS at 05:29

## 2018-04-02 RX ADMIN — Medication 40 MILLIGRAM(S): at 11:04

## 2018-04-02 RX ADMIN — Medication 100 MILLIGRAM(S): at 05:29

## 2018-04-02 RX ADMIN — MORPHINE SULFATE 4 MILLIGRAM(S): 50 CAPSULE, EXTENDED RELEASE ORAL at 02:59

## 2018-04-02 RX ADMIN — Medication 90 MILLIGRAM(S): at 05:29

## 2018-04-02 RX ADMIN — VALSARTAN 320 MILLIGRAM(S): 80 TABLET ORAL at 05:29

## 2018-04-02 RX ADMIN — HYDROMORPHONE HYDROCHLORIDE 4 MILLIGRAM(S): 2 INJECTION INTRAMUSCULAR; INTRAVENOUS; SUBCUTANEOUS at 22:46

## 2018-04-02 RX ADMIN — Medication 100 MILLIGRAM(S): at 22:02

## 2018-04-02 RX ADMIN — Medication 50 MILLIGRAM(S): at 22:01

## 2018-04-02 RX ADMIN — Medication 81 MILLIGRAM(S): at 11:05

## 2018-04-02 NOTE — PROVIDER CONTACT NOTE (OTHER) - SITUATION
Patient says IV is bothering her. IV is still flushing and there is no redness. Patient wants IV out and refusing another IV inserted. Patient is not receiving anything through the IV.

## 2018-04-02 NOTE — PROGRESS NOTE ADULT - PROBLEM SELECTOR PLAN 1
continue Pain control and Arm elevation  Pain Uncontrolled, start IV Dilaudid 0.5 mg PRN for Moderate pain and 1 mg IV Q 3 PRN for severe pain  USG neg for DVT  No evidence of Infection as per ID and so stopped Abx  Normal CPK levels continue Pain control and Arm elevation  trial with steroids today and see the response in 24 hrs   Pain Uncontrolled, started PO DILAUDID 4 MG TID with PRN IV morphine for severe Pain  USG neg for DVT  No evidence of Infection as per ID and so stopped Abx  Normal CPK levels. No evidence of Rhabdo and no clinical evidence of compartment syndrome

## 2018-04-03 LAB
ALBUMIN SERPL ELPH-MCNC: 3.7 G/DL — SIGNIFICANT CHANGE UP (ref 3.3–5.2)
ALP SERPL-CCNC: 128 U/L — HIGH (ref 40–120)
ALT FLD-CCNC: 14 U/L — SIGNIFICANT CHANGE UP
ANION GAP SERPL CALC-SCNC: 16 MMOL/L — SIGNIFICANT CHANGE UP (ref 5–17)
AST SERPL-CCNC: 15 U/L — SIGNIFICANT CHANGE UP
BILIRUB SERPL-MCNC: 0.8 MG/DL — SIGNIFICANT CHANGE UP (ref 0.4–2)
BUN SERPL-MCNC: 60 MG/DL — HIGH (ref 8–20)
CALCIUM SERPL-MCNC: 8.9 MG/DL — SIGNIFICANT CHANGE UP (ref 8.6–10.2)
CHLORIDE SERPL-SCNC: 90 MMOL/L — LOW (ref 98–107)
CK SERPL-CCNC: 109 U/L — SIGNIFICANT CHANGE UP (ref 25–170)
CO2 SERPL-SCNC: 25 MMOL/L — SIGNIFICANT CHANGE UP (ref 22–29)
CREAT SERPL-MCNC: 6.69 MG/DL — HIGH (ref 0.5–1.3)
GLUCOSE SERPL-MCNC: 188 MG/DL — HIGH (ref 70–115)
HCT VFR BLD CALC: 25 % — LOW (ref 37–47)
HGB BLD-MCNC: 8.1 G/DL — LOW (ref 12–16)
MCHC RBC-ENTMCNC: 26.4 PG — LOW (ref 27–31)
MCHC RBC-ENTMCNC: 32.4 G/DL — SIGNIFICANT CHANGE UP (ref 32–36)
MCV RBC AUTO: 81.4 FL — SIGNIFICANT CHANGE UP (ref 81–99)
PLATELET # BLD AUTO: 192 K/UL — SIGNIFICANT CHANGE UP (ref 150–400)
POTASSIUM SERPL-MCNC: 5.2 MMOL/L — SIGNIFICANT CHANGE UP (ref 3.5–5.3)
POTASSIUM SERPL-SCNC: 5.2 MMOL/L — SIGNIFICANT CHANGE UP (ref 3.5–5.3)
PROT SERPL-MCNC: 7.9 G/DL — SIGNIFICANT CHANGE UP (ref 6.6–8.7)
RBC # BLD: 3.07 M/UL — LOW (ref 4.4–5.2)
RBC # FLD: 19.3 % — HIGH (ref 11–15.6)
SODIUM SERPL-SCNC: 131 MMOL/L — LOW (ref 135–145)
WBC # BLD: 8.4 K/UL — SIGNIFICANT CHANGE UP (ref 4.8–10.8)
WBC # FLD AUTO: 8.4 K/UL — SIGNIFICANT CHANGE UP (ref 4.8–10.8)

## 2018-04-03 PROCEDURE — 99233 SBSQ HOSP IP/OBS HIGH 50: CPT

## 2018-04-03 RX ORDER — IRON SUCROSE 20 MG/ML
100 INJECTION, SOLUTION INTRAVENOUS
Qty: 0 | Refills: 0 | Status: DISCONTINUED | OUTPATIENT
Start: 2018-04-03 | End: 2018-04-04

## 2018-04-03 RX ORDER — HYDROMORPHONE HYDROCHLORIDE 2 MG/ML
4 INJECTION INTRAMUSCULAR; INTRAVENOUS; SUBCUTANEOUS EVERY 4 HOURS
Qty: 0 | Refills: 0 | Status: DISCONTINUED | OUTPATIENT
Start: 2018-04-03 | End: 2018-04-06

## 2018-04-03 RX ADMIN — Medication 1 MILLIGRAM(S): at 11:31

## 2018-04-03 RX ADMIN — Medication 650 MILLIGRAM(S): at 22:30

## 2018-04-03 RX ADMIN — Medication 250 MILLIGRAM(S): at 05:32

## 2018-04-03 RX ADMIN — Medication 81 MILLIGRAM(S): at 11:31

## 2018-04-03 RX ADMIN — Medication 50 MILLIGRAM(S): at 13:40

## 2018-04-03 RX ADMIN — HYDROMORPHONE HYDROCHLORIDE 4 MILLIGRAM(S): 2 INJECTION INTRAMUSCULAR; INTRAVENOUS; SUBCUTANEOUS at 05:32

## 2018-04-03 RX ADMIN — Medication 100 MILLIGRAM(S): at 05:32

## 2018-04-03 RX ADMIN — Medication 100 MILLIGRAM(S): at 13:40

## 2018-04-03 RX ADMIN — Medication 90 MILLIGRAM(S): at 05:32

## 2018-04-03 RX ADMIN — Medication 250 MILLIGRAM(S): at 17:41

## 2018-04-03 RX ADMIN — HYDROMORPHONE HYDROCHLORIDE 4 MILLIGRAM(S): 2 INJECTION INTRAMUSCULAR; INTRAVENOUS; SUBCUTANEOUS at 06:10

## 2018-04-03 RX ADMIN — Medication 325 MILLIGRAM(S): at 11:31

## 2018-04-03 RX ADMIN — HEPARIN SODIUM 5000 UNIT(S): 5000 INJECTION INTRAVENOUS; SUBCUTANEOUS at 05:32

## 2018-04-03 RX ADMIN — VALSARTAN 320 MILLIGRAM(S): 80 TABLET ORAL at 05:32

## 2018-04-03 NOTE — PROGRESS NOTE ADULT - PROBLEM SELECTOR PLAN 1
continue Pain control and Arm elevation  trial with steroids   USG neg for DVT  No evidence of Infection as per ID and so stopped Abx  Normal CPK levels. No evidence of Rhabdo and no clinical evidence of compartment syndrome

## 2018-04-04 LAB
ANION GAP SERPL CALC-SCNC: 21 MMOL/L — HIGH (ref 5–17)
BUN SERPL-MCNC: 93 MG/DL — HIGH (ref 8–20)
CALCIUM SERPL-MCNC: 8.8 MG/DL — SIGNIFICANT CHANGE UP (ref 8.6–10.2)
CHLORIDE SERPL-SCNC: 87 MMOL/L — LOW (ref 98–107)
CO2 SERPL-SCNC: 22 MMOL/L — SIGNIFICANT CHANGE UP (ref 22–29)
CREAT SERPL-MCNC: 8.24 MG/DL — HIGH (ref 0.5–1.3)
GLUCOSE BLDC GLUCOMTR-MCNC: 203 MG/DL — HIGH (ref 70–99)
GLUCOSE SERPL-MCNC: 211 MG/DL — HIGH (ref 70–115)
HCT VFR BLD CALC: 24.8 % — LOW (ref 37–47)
HGB BLD-MCNC: 7.8 G/DL — LOW (ref 12–16)
MCHC RBC-ENTMCNC: 25.1 PG — LOW (ref 27–31)
MCHC RBC-ENTMCNC: 31.5 G/DL — LOW (ref 32–36)
MCV RBC AUTO: 79.7 FL — LOW (ref 81–99)
PHOSPHATE SERPL-MCNC: 7.5 MG/DL — HIGH (ref 2.4–4.7)
PLATELET # BLD AUTO: 215 K/UL — SIGNIFICANT CHANGE UP (ref 150–400)
POTASSIUM SERPL-MCNC: 5.2 MMOL/L — SIGNIFICANT CHANGE UP (ref 3.5–5.3)
POTASSIUM SERPL-SCNC: 5.2 MMOL/L — SIGNIFICANT CHANGE UP (ref 3.5–5.3)
PTH-INTACT FLD-MCNC: 298 PG/ML — HIGH (ref 15–65)
RBC # BLD: 3.11 M/UL — LOW (ref 4.4–5.2)
RBC # FLD: 19 % — HIGH (ref 11–15.6)
SODIUM SERPL-SCNC: 130 MMOL/L — LOW (ref 135–145)
WBC # BLD: 9.5 K/UL — SIGNIFICANT CHANGE UP (ref 4.8–10.8)
WBC # FLD AUTO: 9.5 K/UL — SIGNIFICANT CHANGE UP (ref 4.8–10.8)

## 2018-04-04 PROCEDURE — 99232 SBSQ HOSP IP/OBS MODERATE 35: CPT

## 2018-04-04 RX ORDER — ACETAMINOPHEN 500 MG
650 TABLET ORAL EVERY 6 HOURS
Qty: 0 | Refills: 0 | Status: DISCONTINUED | OUTPATIENT
Start: 2018-04-04 | End: 2018-04-06

## 2018-04-04 RX ORDER — IRON SUCROSE 20 MG/ML
100 INJECTION, SOLUTION INTRAVENOUS
Qty: 0 | Refills: 0 | Status: DISCONTINUED | OUTPATIENT
Start: 2018-04-04 | End: 2018-04-06

## 2018-04-04 RX ORDER — IRON SUCROSE 20 MG/ML
100 INJECTION, SOLUTION INTRAVENOUS
Qty: 0 | Refills: 0 | Status: DISCONTINUED | OUTPATIENT
Start: 2018-04-04 | End: 2018-04-04

## 2018-04-04 RX ADMIN — Medication 100 MILLIGRAM(S): at 21:52

## 2018-04-04 RX ADMIN — Medication 100 MILLIGRAM(S): at 14:18

## 2018-04-04 RX ADMIN — ERYTHROPOIETIN 10000 UNIT(S): 10000 INJECTION, SOLUTION INTRAVENOUS; SUBCUTANEOUS at 09:54

## 2018-04-04 RX ADMIN — Medication 50 MILLIGRAM(S): at 14:20

## 2018-04-04 RX ADMIN — Medication 100 MILLIGRAM(S): at 10:26

## 2018-04-04 RX ADMIN — SODIUM CHLORIDE 3 MILLILITER(S): 9 INJECTION INTRAMUSCULAR; INTRAVENOUS; SUBCUTANEOUS at 21:52

## 2018-04-04 RX ADMIN — Medication 250 MILLIGRAM(S): at 17:40

## 2018-04-04 RX ADMIN — VALSARTAN 320 MILLIGRAM(S): 80 TABLET ORAL at 10:26

## 2018-04-04 RX ADMIN — Medication 1 MILLIGRAM(S): at 14:17

## 2018-04-04 RX ADMIN — Medication 325 MILLIGRAM(S): at 14:17

## 2018-04-04 RX ADMIN — HEPARIN SODIUM 5000 UNIT(S): 5000 INJECTION INTRAVENOUS; SUBCUTANEOUS at 06:07

## 2018-04-04 RX ADMIN — Medication 81 MILLIGRAM(S): at 14:17

## 2018-04-04 RX ADMIN — Medication 90 MILLIGRAM(S): at 10:26

## 2018-04-04 RX ADMIN — HEPARIN SODIUM 5000 UNIT(S): 5000 INJECTION INTRAVENOUS; SUBCUTANEOUS at 14:19

## 2018-04-04 RX ADMIN — Medication 250 MILLIGRAM(S): at 06:07

## 2018-04-04 RX ADMIN — IRON SUCROSE 210 MILLIGRAM(S): 20 INJECTION, SOLUTION INTRAVENOUS at 09:13

## 2018-04-04 NOTE — PROGRESS NOTE ADULT - PROBLEM SELECTOR PLAN 1
exact etiolgoy Un known. MRI results in chart ( done Outside )  continue Pain control and Arm elevation  continue trial with steroids prednisone 50 mg QD. Improving  USG neg for DVT  No evidence of Infection as per ID and so stopped Abx  Normal CPK levels. No evidence of Rhabdo and no clinical evidence of compartment syndrome  consult Rheumatology today

## 2018-04-05 LAB — CALCIUM SERPL-MCNC: 9 MG/DL — SIGNIFICANT CHANGE UP (ref 8.4–10.5)

## 2018-04-05 PROCEDURE — 99232 SBSQ HOSP IP/OBS MODERATE 35: CPT

## 2018-04-05 RX ORDER — SEVELAMER CARBONATE 2400 MG/1
800 POWDER, FOR SUSPENSION ORAL THREE TIMES A DAY
Qty: 0 | Refills: 0 | Status: DISCONTINUED | OUTPATIENT
Start: 2018-04-05 | End: 2018-04-06

## 2018-04-05 RX ORDER — ERGOCALCIFEROL 1.25 MG/1
50000 CAPSULE ORAL
Qty: 0 | Refills: 0 | Status: DISCONTINUED | OUTPATIENT
Start: 2018-04-05 | End: 2018-04-06

## 2018-04-05 RX ADMIN — Medication 325 MILLIGRAM(S): at 11:35

## 2018-04-05 RX ADMIN — Medication 1 MILLIGRAM(S): at 11:35

## 2018-04-05 RX ADMIN — ERGOCALCIFEROL 50000 UNIT(S): 1.25 CAPSULE ORAL at 11:35

## 2018-04-05 RX ADMIN — Medication 650 MILLIGRAM(S): at 12:21

## 2018-04-05 RX ADMIN — Medication 650 MILLIGRAM(S): at 23:14

## 2018-04-05 RX ADMIN — SODIUM CHLORIDE 3 MILLILITER(S): 9 INJECTION INTRAMUSCULAR; INTRAVENOUS; SUBCUTANEOUS at 06:47

## 2018-04-05 RX ADMIN — Medication 100 MILLIGRAM(S): at 22:31

## 2018-04-05 RX ADMIN — Medication 90 MILLIGRAM(S): at 06:45

## 2018-04-05 RX ADMIN — Medication 100 MILLIGRAM(S): at 06:45

## 2018-04-05 RX ADMIN — Medication 250 MILLIGRAM(S): at 06:46

## 2018-04-05 RX ADMIN — Medication 50 MILLIGRAM(S): at 06:46

## 2018-04-05 RX ADMIN — HEPARIN SODIUM 5000 UNIT(S): 5000 INJECTION INTRAVENOUS; SUBCUTANEOUS at 06:45

## 2018-04-05 RX ADMIN — Medication 81 MILLIGRAM(S): at 11:35

## 2018-04-05 RX ADMIN — VALSARTAN 320 MILLIGRAM(S): 80 TABLET ORAL at 06:46

## 2018-04-05 NOTE — PROGRESS NOTE ADULT - PROBLEM SELECTOR PROBLEM 4
Renovascular hypertension
Prophylactic measure
Renovascular hypertension

## 2018-04-05 NOTE — PROGRESS NOTE ADULT - PROBLEM SELECTOR PLAN 5
continue SQ heparin for DVT prophylaxis

## 2018-04-05 NOTE — PROGRESS NOTE ADULT - NSHPATTENDINGPLANDISCUSS_GEN_ALL_CORE
patient, Nursing staff, social
patient, Nursing staff, social work and case management. Home in am after HD
ID to see, and nephrology
Patient. Home in am based on final recommendations from Rheumatology
patient, and Nursing staff

## 2018-04-05 NOTE — PROGRESS NOTE ADULT - PROBLEM SELECTOR PROBLEM 3
Anemia due to chronic kidney disease
End stage renal disease

## 2018-04-05 NOTE — PROGRESS NOTE ADULT - PROBLEM SELECTOR PROBLEM 1
Cellulitis of right upper extremity
Myositis of right upper arm, unspecified myositis type

## 2018-04-05 NOTE — PROGRESS NOTE ADULT - PROBLEM SELECTOR PROBLEM 2
Anemia due to chronic kidney disease
Anemia due to chronic kidney disease
End stage renal disease
Anemia due to chronic kidney disease
Anemia due to chronic kidney disease

## 2018-04-05 NOTE — PROGRESS NOTE ADULT - PROBLEM SELECTOR PLAN 3
C/w ferrous sulfate and folic acid
Renal consulted for HD while in hospital  HD days M, W and Friday

## 2018-04-05 NOTE — PROGRESS NOTE ADULT - PROBLEM SELECTOR PLAN 4
continue all Home medications with holding parameters
HSQ
continue all Home medications with holding parameters

## 2018-04-06 ENCOUNTER — TRANSCRIPTION ENCOUNTER (OUTPATIENT)
Age: 38
End: 2018-04-06

## 2018-04-06 VITALS
DIASTOLIC BLOOD PRESSURE: 75 MMHG | TEMPERATURE: 98 F | OXYGEN SATURATION: 97 % | RESPIRATION RATE: 17 BRPM | HEART RATE: 95 BPM | SYSTOLIC BLOOD PRESSURE: 146 MMHG

## 2018-04-06 LAB
CK SERPL-CCNC: 49 U/L — SIGNIFICANT CHANGE UP (ref 25–170)
CULTURE RESULTS: SIGNIFICANT CHANGE UP
CULTURE RESULTS: SIGNIFICANT CHANGE UP
SPECIMEN SOURCE: SIGNIFICANT CHANGE UP
SPECIMEN SOURCE: SIGNIFICANT CHANGE UP

## 2018-04-06 PROCEDURE — 85652 RBC SED RATE AUTOMATED: CPT

## 2018-04-06 PROCEDURE — 82310 ASSAY OF CALCIUM: CPT

## 2018-04-06 PROCEDURE — 83550 IRON BINDING TEST: CPT

## 2018-04-06 PROCEDURE — 83970 ASSAY OF PARATHORMONE: CPT

## 2018-04-06 PROCEDURE — 84466 ASSAY OF TRANSFERRIN: CPT

## 2018-04-06 PROCEDURE — 36415 COLL VENOUS BLD VENIPUNCTURE: CPT

## 2018-04-06 PROCEDURE — 96374 THER/PROPH/DIAG INJ IV PUSH: CPT

## 2018-04-06 PROCEDURE — 99261: CPT

## 2018-04-06 PROCEDURE — 83605 ASSAY OF LACTIC ACID: CPT

## 2018-04-06 PROCEDURE — 86140 C-REACTIVE PROTEIN: CPT

## 2018-04-06 PROCEDURE — 99285 EMERGENCY DEPT VISIT HI MDM: CPT | Mod: 25

## 2018-04-06 PROCEDURE — 84100 ASSAY OF PHOSPHORUS: CPT

## 2018-04-06 PROCEDURE — 85027 COMPLETE CBC AUTOMATED: CPT

## 2018-04-06 PROCEDURE — 82962 GLUCOSE BLOOD TEST: CPT

## 2018-04-06 PROCEDURE — 82550 ASSAY OF CK (CPK): CPT

## 2018-04-06 PROCEDURE — 93971 EXTREMITY STUDY: CPT

## 2018-04-06 PROCEDURE — 87040 BLOOD CULTURE FOR BACTERIA: CPT

## 2018-04-06 PROCEDURE — 80053 COMPREHEN METABOLIC PANEL: CPT

## 2018-04-06 PROCEDURE — 82728 ASSAY OF FERRITIN: CPT

## 2018-04-06 PROCEDURE — 99239 HOSP IP/OBS DSCHRG MGMT >30: CPT

## 2018-04-06 PROCEDURE — 80048 BASIC METABOLIC PNL TOTAL CA: CPT

## 2018-04-06 RX ORDER — ACETAMINOPHEN 500 MG
2 TABLET ORAL
Qty: 20 | Refills: 0 | OUTPATIENT
Start: 2018-04-06

## 2018-04-06 RX ADMIN — Medication 250 MILLIGRAM(S): at 06:15

## 2018-04-06 RX ADMIN — Medication 90 MILLIGRAM(S): at 06:15

## 2018-04-06 RX ADMIN — Medication 325 MILLIGRAM(S): at 11:26

## 2018-04-06 RX ADMIN — VALSARTAN 320 MILLIGRAM(S): 80 TABLET ORAL at 06:16

## 2018-04-06 RX ADMIN — Medication 50 MILLIGRAM(S): at 06:15

## 2018-04-06 RX ADMIN — Medication 650 MILLIGRAM(S): at 00:03

## 2018-04-06 RX ADMIN — ERYTHROPOIETIN 10000 UNIT(S): 10000 INJECTION, SOLUTION INTRAVENOUS; SUBCUTANEOUS at 10:11

## 2018-04-06 RX ADMIN — IRON SUCROSE 210 MILLIGRAM(S): 20 INJECTION, SOLUTION INTRAVENOUS at 10:07

## 2018-04-06 RX ADMIN — Medication 1 MILLIGRAM(S): at 11:26

## 2018-04-06 RX ADMIN — Medication 81 MILLIGRAM(S): at 11:26

## 2018-04-06 RX ADMIN — Medication 100 MILLIGRAM(S): at 06:15

## 2018-04-06 NOTE — PROGRESS NOTE ADULT - PROVIDER SPECIALTY LIST ADULT
Hospitalist
Hospitalist
Infectious Disease
Nephrology
Hospitalist
Hospitalist

## 2018-04-06 NOTE — DISCHARGE NOTE ADULT - PATIENT PORTAL LINK FT
You can access the [facility] Patient Portal, offered by [facility], by registering with the following website: [email or web address]

## 2018-04-06 NOTE — DISCHARGE NOTE ADULT - OTHER SIGNIFICANT FINDINGS
prednisone 10 mg oral tablet: Tapering discharge Instructions as below:    -- 4 tab(s) oral - by mouth once a day x 3 days  3 tab(s) oral - by mouth once a day x 3 days  2 tab(s) oral - by mouth once a day x 3 days  1 tab(s) oral - by mouth once a day x 3 days  Please follow up with Rheumatology Dr Kleiner in 1 week

## 2018-04-06 NOTE — DISCHARGE NOTE ADULT - HOSPITAL COURSE
7 y/o female with hx of ESRD on HD, HTN, Chronic anemia currently admitted with worsening pain and swelling Rt arm. Had MRI Rt upper limb which showed findings consistent with  Myositis. CK  levels normal. She improved with steroids and discharged home with tapering prednisone. No muscle biopsy was done, as pt's symptoms improved with steroids. Recommended follow up with Rheumatology Dr Kleinert in 1 week.     USG neg for DVT  No evidence of Infection as per ID and so stopped Antibiotics   Normal CPK levels  started trial with steroids, Improving, but doesn't feel comfortable going home. Consulted Rheumatology today     Problem/Plan - 1:  ·  Problem: Myositis of right upper arm, unspecified myositis type.  Plan: exact etiology Un known. MRI results in chart ( done Outside )  continue Pain control and Arm elevation  continue trial with steroids prednisone 50 mg QD. Improving  USG neg for DVT  No evidence of Infection as per ID and so stopped Abx  Normal CPK levels. No evidence of Rhabdo and no clinical evidence of compartment syndrome  consult Rheumatology today.      Problem/Plan - 2:  ·  Problem: Anemia due to chronic kidney disease.  Plan: Stable, cont. procrit per renal, FA  continue IV iron.      Problem/Plan - 3:  ·  Problem: End stage renal disease.  Plan: Renal consulted for HD while in hospital  HD days M, W and Friday.      Problem/Plan - 4:  ·  Problem: Renovascular hypertension.  Plan: continue all Home medications with holding parameters. 9 y/o female with hx of ESRD on HD, HTN, Chronic anemia currently admitted with worsening pain and swelling Rt arm. Had MRI Rt upper limb which showed findings consistent with  Myositis. CK  levels normal. She improved with steroids and discharged home with tapering prednisone. No muscle biopsy was done, as pt's symptoms improved with steroids. Recommended follow up with Rheumatology Dr Kleinert in 1 week.     USG neg for DVT  No evidence of Infection as per ID and so stopped Antibiotics   Normal CPK levels  started trial with steroids, Improving, and discharged home on tapering prednisone    predniSONE 10 mg oral tablet  -- 4 tab(s) oral - by mouth once a day x 3 days  3 tab(s) oral - by mouth once a day x 3 days  2 tab(s) oral - by mouth once a day x 3 days  1 tab(s) oral - by mouth once a day x 3 days  Please follow up with Rheumatology Dr Kleiner in 1 week         ·  Problem: Anemia due to chronic kidney disease.  Plan: Stable, cont. procrit per renal, FA  continue iron    ·  Problem: End stage renal disease.  Plan: Renal consulted for HD while in hospital  HD days M, W and Friday.     ·  Problem: Renovascular hypertension.  Plan: continue all Home medications with holding parameters.     Disposition: Home.   She is On Home Oxygen 2 L. Saturating well on  2L at the time of discharge.  spoke with social work and case management on the day of discharge for safe discharge planning     Total time spent in discharge 40 Minutes, More than 50 % time spent in counselling and co-ordination of patient's care

## 2018-04-06 NOTE — DISCHARGE NOTE ADULT - CARE PLAN
Principal Discharge DX:	Myositis of right upper arm, unspecified myositis type  Goal:	Decrease the swelling and pain of Rt Upper Arm  Assessment and plan of treatment:	predniSONE 10 mg oral tablet  -- 4 tab(s) oral - by mouth once a day x 3 days  3 tab(s) oral - by mouth once a day x 3 days  2 tab(s) oral - by mouth once a day x 3 days  1 tab(s) oral - by mouth once a day x 3 days  Please follow up with Rheumatology Dr Kleiner in 1 week  Secondary Diagnosis:	Renovascular hypertension  Assessment and plan of treatment:	continue all your Home medications  Secondary Diagnosis:	End stage renal disease  Assessment and plan of treatment:	continue your regular Hemo-dialysis Mon, wed and Friday  Secondary Diagnosis:	Anemia due to chronic kidney disease  Assessment and plan of treatment:	please continue taking your Iron pills every day as advice  Secondary Diagnosis:	DALTON (obstructive sleep apnea)

## 2018-04-06 NOTE — DISCHARGE NOTE ADULT - CARE PROVIDER_API CALL
Kleiner, Myron I (MD), Internal Medicine; Rheumatology  [address] [place] [address]  Phone: [phone]  Fax: [phone]    PCP,   Phone: (   )    -  Fax: (   )    -    Mane Charles), Internal Medicine; Nephrology  [address] [place] [address]  Phone: [phone]  Fax: [phone]

## 2018-04-06 NOTE — DISCHARGE NOTE ADULT - PLAN OF CARE
Decrease the swelling and pain of Rt Upper Arm predniSONE 10 mg oral tablet  -- 4 tab(s) oral - by mouth once a day x 3 days  3 tab(s) oral - by mouth once a day x 3 days  2 tab(s) oral - by mouth once a day x 3 days  1 tab(s) oral - by mouth once a day x 3 days  Please follow up with Rheumatology Dr Kleiner in 1 week continue all your Home medications continue your regular Hemo-dialysis Mon, wed and Friday please continue taking your Iron pills every day as advice

## 2018-04-06 NOTE — PROGRESS NOTE ADULT - SUBJECTIVE AND OBJECTIVE BOX
NEPHROLOGY INTERVAL HPI/OVERNIGHT EVENTS:  pt still with R arm pain  no cp, sob, n/v/d  tolerating diet    MEDICATIONS  (STANDING):  aspirin enteric coated 81 milliGRAM(s) Oral daily  ferrous    sulfate 325 milliGRAM(s) Oral daily  folic acid 1 milliGRAM(s) Oral daily  heparin  Injectable 5000 Unit(s) SubCutaneous every 8 hours  hydrALAZINE 100 milliGRAM(s) Oral three times a day  levothyroxine 25 MICROGram(s) Oral daily  NIFEdipine XL 90 milliGRAM(s) Oral daily  saccharomyces boulardii 250 milliGRAM(s) Oral two times a day  sodium chloride 0.9% lock flush 3 milliLiter(s) IV Push every 8 hours  valsartan 320 milliGRAM(s) Oral daily    MEDICATIONS  (PRN):  acetaminophen   Tablet 650 milliGRAM(s) Oral every 6 hours PRN For Temp greater than 38.5 C (101.3 F)  morphine  - Injectable 4 milliGRAM(s) IV Push every 4 hours PRN Moderate Pain (4 - 6)  ondansetron Injectable 4 milliGRAM(s) IV Push every 6 hours PRN Nausea      Allergies    Mushrooms (Hives (Mild))  No Known Drug Allergies    Intolerances      Vital Signs Last 24 Hrs  T(C): 36.8 (02 Apr 2018 07:48), Max: 36.8 (02 Apr 2018 07:48)  T(F): 98.2 (02 Apr 2018 07:48), Max: 98.2 (02 Apr 2018 07:48)  HR: 90 (02 Apr 2018 07:48) (85 - 96)  BP: 150/82 (02 Apr 2018 07:48) (143/80 - 157/87)  BP(mean): --  RR: 18 (02 Apr 2018 07:48) (16 - 18)  SpO2: 98% (02 Apr 2018 07:48) (93% - 98%)    PHYSICAL EXAM:  GENERAL: Obese  HEAD:  Atraumatic, Normocephalic  EYES: EOMI  NECK: Supple, no jvd  NERVOUS SYSTEM:  Alert & Oriented X3; non focal exam  CHEST/LUNG: Clear to percussion bilaterally; diminished BS at bases  HEART: Regular rate and rhythm; No rub  ABDOMEN: Soft, Nontender, Nondistended; Bowel sounds +  EXTREMITIES:  No LE edema; L AVF patent; R arm ++edema    LABS:                        7.9    8.2   )-----------( 201      ( 01 Apr 2018 01:06 )             25.9     04-01    140  |  100  |  57.0<H>  ----------------------------<  79  4.3   |  22.0  |  7.24<H>    Ca    7.8<L>      01 Apr 2018 01:06    TPro  6.8  /  Alb  3.4  /  TBili  1.0  /  DBili  x   /  AST  17  /  ALT  16  /  AlkPhos  124<H>  04-01            RADIOLOGY & ADDITIONAL TESTS:  < from: US Duplex Venous Upper Ext Ltd, Right (03.31.18 @ 22:49) >     EXAM:  US DPLX UPR EXT VEINS LTD RT                          PROCEDURE DATE:  03/31/2018          INTERPRETATION:  CLINICAL INFORMATION: Right upper extremity edema and   pain.    COMPARISON: None available.    TECHNIQUE: Duplex sonography of the RIGHT UPPER extremity with color and   spectral Doppler, with and without compression.      FINDINGS:    The right internal jugular, subclavian, axillary, brachial, basilic and   cephalic veins are patent and compressible where applicable.     Dopplerexamination shows normal spontaneous and phasic flow.    There is diffuse subcutaneous edema throughout the arm, nonspecific.    IMPRESSION:     No evidence of right upper extremity deep venous thrombosis.    Upper extremity subcutaneous edema, nonspecific. Please correlate   clinically for cellulitis.          < end of copied text >      < from: Xray Chest 1 View-PORTABLE IMMEDIATE (03.23.18 @ 22:08) >     EXAM:  XR CHEST PORTABLE IMMED 1V                          PROCEDURE DATE:  03/23/2018          INTERPRETATION:  Portable chest radiograph        CLINICAL INFORMATION:   Short of breath. Chest pain    TECHNIQUE:  Portable  AP view of the chest wasobtained.    COMPARISON: 3/17/2018 available for review.    FINDINGS:   The lungs  a mild diffuse interstitial perihilar infiltrates which may   indicate pulmonary or interstitial infectious infiltrates.    The  heart is enlarged in transverse diameter. No hilar mass. Trachea   midline.        Visualized osseous structures are intact.        IMPRESSION: Cardiomegaly.  Interstitial infiltrates ...          < end of copied text >
NEPHROLOGY INTERVAL HPI/OVERNIGHT EVENTS:  pt clinically stable  Tolerated HD yesterday  R arm with less pain    MEDICATIONS  (STANDING):  aspirin enteric coated 81 milliGRAM(s) Oral daily  docusate sodium 100 milliGRAM(s) Oral two times a day  epoetin raina Injectable 44158 Unit(s) IV Push <User Schedule>  ferrous    sulfate 325 milliGRAM(s) Oral daily  folic acid 1 milliGRAM(s) Oral daily  heparin  Injectable 5000 Unit(s) SubCutaneous every 8 hours  hydrALAZINE 100 milliGRAM(s) Oral three times a day  iron sucrose IVPB 100 milliGRAM(s) IV Intermittent <User Schedule>  levothyroxine 25 MICROGram(s) Oral daily  NIFEdipine XL 90 milliGRAM(s) Oral daily  predniSONE   Tablet 50 milliGRAM(s) Oral daily  saccharomyces boulardii 250 milliGRAM(s) Oral two times a day  senna 2 Tablet(s) Oral at bedtime  sodium chloride 0.9% lock flush 3 milliLiter(s) IV Push every 8 hours  valsartan 320 milliGRAM(s) Oral daily    MEDICATIONS  (PRN):  acetaminophen   Tablet 650 milliGRAM(s) Oral every 6 hours PRN For Temp greater than 38.5 C (101.3 F)  acetaminophen   Tablet 650 milliGRAM(s) Oral every 6 hours PRN For Temp greater than 38 C (100.4 F)  acetaminophen   Tablet. 650 milliGRAM(s) Oral every 6 hours PRN Mild Pain (1 - 3)  HYDROmorphone   Tablet 4 milliGRAM(s) Oral every 4 hours PRN Moderate Pain (4 - 6)  ondansetron Injectable 4 milliGRAM(s) IV Push every 6 hours PRN Nausea      Allergies    Mushrooms (Hives (Mild))  No Known Drug Allergies          Vital Signs Last 24 Hrs  T(C): 36.4 (05 Apr 2018 08:43), Max: 36.9 (04 Apr 2018 13:43)  T(F): 97.5 (05 Apr 2018 08:43), Max: 98.4 (04 Apr 2018 13:43)  HR: 84 (05 Apr 2018 08:43) (80 - 94)  BP: 151/83 (05 Apr 2018 08:43) (133/80 - 159/63)  BP(mean): --  RR: 18 (05 Apr 2018 08:43) (18 - 20)  SpO2: 100% (05 Apr 2018 08:43) (92% - 100%)    PHYSICAL EXAM:  GENERAL: NAD; Obese  HEAD:  Atraumatic, Normocephalic  EYES: EOMI  NECK: Supple, no jvd  NERVOUS SYSTEM:  Alert & Oriented X3; non focal exam  CHEST/LUNG: Clear to percussion bilaterally; diminished BS at bases  HEART: Regular rate and rhythm; No rub  ABDOMEN: Soft, Nontender, Nondistended; Bowel sounds +  EXTREMITIES:  No LE edema; L AVF patent; R arm edema improved    LABS:                        7.8    9.5   )-----------( 215      ( 04 Apr 2018 08:35 )             24.8     Hemoglobin: 8.1 g/dL (04.03.18 @ 07:29)        04-04    130<L>  |  87<L>  |  93.0<H>  ----------------------------<  211<H>  5.2   |  22.0  |  8.24<H>    Ca    8.8      04 Apr 2018 08:35  Phos  7.5     04-04  Intact PTH: 298: PTH METHOD: Roche  Guide for Interpretation of PTH and Calcium Results                           Calcium             PTH                           MG/DL               PG/ML  Normal                   8.4-10.5            15-65  Primary  Hyperparathyroidism      >10.5               >50  Non-PTH Hypercalcemia    >10.5               0-20  Hypoparathroidism        <8.4                0-20  Pseudohypoparathyroid    <8.4                >50  This is intended as a guide only. Factors such as sunlight exposure,  Vitamin D status and renal function should be evaluated along with  clinical presentation. pg/mL (04.04.18 @ 18:22)    Vitamin D, 25-Hydroxy: 6.8: Classification of 25 OH Vitamin D Status:  Vitamin D Status       25 OH Vitamin D Value  Deficiency                    < 10.0 ng/mL  Insufficiency                10.0 - 30.0 ng/mL  Sufficiency                   30.0 - 100.0 ng/mL  Toxicity            > 100.0 ng/mL  This assay measures the total of Vitamin D2 (ergocalciferol) and D3  (cholecalciferol). ng/mL (01.28.17 @ 20:51)            Intact PTH: 298 pg/mL (04-04 @ 18:22)      RADIOLOGY & ADDITIONAL TESTS:
NEPHROLOGY INTERVAL HPI/OVERNIGHT EVENTS:  pt seen at HD; tolerating well  no cp, sob, n/v/d    MEDICATIONS  (STANDING):  aspirin enteric coated 81 milliGRAM(s) Oral daily  docusate sodium 100 milliGRAM(s) Oral two times a day  epoetin raina Injectable 91619 Unit(s) IV Push <User Schedule>  ergocalciferol 57861 Unit(s) Oral every week  ferrous    sulfate 325 milliGRAM(s) Oral daily  folic acid 1 milliGRAM(s) Oral daily  heparin  Injectable 5000 Unit(s) SubCutaneous every 8 hours  hydrALAZINE 100 milliGRAM(s) Oral three times a day  iron sucrose IVPB 100 milliGRAM(s) IV Intermittent <User Schedule>  levothyroxine 25 MICROGram(s) Oral daily  NIFEdipine XL 90 milliGRAM(s) Oral daily  predniSONE   Tablet 50 milliGRAM(s) Oral daily  saccharomyces boulardii 250 milliGRAM(s) Oral two times a day  senna 2 Tablet(s) Oral at bedtime  sevelamer hydrochloride 800 milliGRAM(s) Oral three times a day  sodium chloride 0.9% lock flush 3 milliLiter(s) IV Push every 8 hours  valsartan 320 milliGRAM(s) Oral daily    MEDICATIONS  (PRN):  acetaminophen   Tablet 650 milliGRAM(s) Oral every 6 hours PRN For Temp greater than 38.5 C (101.3 F)  acetaminophen   Tablet 650 milliGRAM(s) Oral every 6 hours PRN For Temp greater than 38 C (100.4 F)  acetaminophen   Tablet. 650 milliGRAM(s) Oral every 6 hours PRN Mild Pain (1 - 3)  HYDROmorphone   Tablet 4 milliGRAM(s) Oral every 4 hours PRN Moderate Pain (4 - 6)  ondansetron Injectable 4 milliGRAM(s) IV Push every 6 hours PRN Nausea      Allergies    Mushrooms (Hives (Mild))  No Known Drug Allergies    Intolerances              Vital Signs Last 24 Hrs  T(C): 36.7 (06 Apr 2018 07:10), Max: 36.7 (05 Apr 2018 16:22)  T(F): 98.1 (06 Apr 2018 07:10), Max: 98.1 (06 Apr 2018 07:10)  HR: 87 (06 Apr 2018 07:10) (87 - 95)  BP: 154/61 (06 Apr 2018 07:10) (132/78 - 154/61)  BP(mean): --  RR: 18 (06 Apr 2018 07:10) (10 - 18)  SpO2: 97% (06 Apr 2018 07:10) (93% - 97%)    PHYSICAL EXAM:  GENERAL: NAD; Obese  HEAD:  Atraumatic, Normocephalic  EYES: EOMI  NECK: Supple, no jvd  NERVOUS SYSTEM:  Alert & Oriented X3; non focal exam  CHEST/LUNG: Clear to percussion bilaterally; diminished BS at bases  HEART: Regular rate and rhythm; No rub  ABDOMEN: Soft, Nontender, Nondistended; Bowel sounds +  EXTREMITIES:  No LE edema; L AVF patent; R arm less swollen and less pain, better ROM      LABS:  Phosphorus Level, Serum: 7.5 mg/dL (04.04.18 @ 08:35)                    RADIOLOGY & ADDITIONAL TESTS:
Patient seen and examined    still c/o pain SERGIO  no c/o CP SOB NV   No swelling feet    Vital Signs Last 24 Hrs  T(C): 36.6 (03 Apr 2018 08:04), Max: 36.6 (03 Apr 2018 08:04)  T(F): 97.9 (03 Apr 2018 08:04), Max: 97.9 (03 Apr 2018 08:04)  HR: 94 (03 Apr 2018 13:40) (88 - 97)  BP: 146/85 (03 Apr 2018 13:40) (144/87 - 156/86)  BP(mean): --  RR: 18 (03 Apr 2018 08:04) (16 - 18)  SpO2: 97% (03 Apr 2018 08:04) (95% - 97%)    PHYSICAL EXAM    GENERAL: NAD,   EYES:  conjunctiva and sclera clear  NECK: Supple, No JVD/Bruit  NERVOUS SYSTEM:  A/O x3,   CHEST:  No rales, No rhonchi  HEART:  RRR, No murmur  ABDOMEN: Soft, NT/ND BS+  EXTREMITIES:  No Edema; SERGIO swollen/tender  SKIN: No rashes    03 Apr 2018 07:29    131    |  90     |  60.0   ----------------------------<  188    5.2     |  25.0   |  6.69     Ca    8.9        03 Apr 2018 07:29  Phos  8.1       02 Apr 2018 14:34    TPro  7.9    /  Alb  3.7    /  TBili  0.8    /  DBili  x      /  AST  15     /  ALT  14     /  AlkPhos  128    03 Apr 2018 07:29                          8.1    8.4   )-----------( 192      ( 03 Apr 2018 07:29 )             25.0       cause of ? myosisitis unclear - felt may ave slept over UA  no Abx per ID  Continue present treatment  HD am  t/c Rheum eval if not better
Patient was seen and evaluated ; s/p dialysis.   No c/o CP SOB NV  no F/C  stll with pain and swelling R UA biceps area; sl better    T(C): 36.9 (04-04-18 @ 13:43), Max: 36.9 (04-04-18 @ 13:43)  HR: 80 (04-04-18 @ 13:43) (80 - 89)  BP: 152/74 (04-04-18 @ 13:43) (128/83 - 167/84)  Wt(kg): --  PE ;  NAD  lungs - CTA  CV gr 1 murmer,  No gallop or rub  Abd : soft, NT BS +, No masses  Ext- No edema ft; R UA tender, movements sl better  Neuro : Grossly intact, moving extremities                             7.8    9.5   )-----------( 215      ( 04 Apr 2018 08:35 )             24.8        04-04    130<L>  |  87<L>  |  93.0<H>  ----------------------------<  211<H>  5.2   |  22.0  |  8.24<H>    Ca    8.8      04 Apr 2018 08:35  Phos  7.5     04-04    TPro  7.9  /  Alb  3.7  /  TBili  0.8  /  DBili  x   /  AST  15  /  ALT  14  /  AlkPhos  128<H>  04-03      MEDICATIONS  (STANDING):  acetaminophen   Tablet PRN  acetaminophen   Tablet PRN  aspirin enteric coated  docusate sodium  epoetin raina Injectable  ferrous    sulfate  folic acid  heparin  Injectable  hydrALAZINE  HYDROmorphone   Tablet PRN  iron sucrose IVPB  levothyroxine  NIFEdipine XL  ondansetron Injectable PRN  predniSONE   Tablet  saccharomyces boulardii  senna  sodium chloride 0.9% lock flush  valsartan      Patient stable ex v slow improvement R arm pain/swelling  hb remains on the lower side  Mike HD easily  Continue
SORAYA JIMENEZ    [id number]    38y      Female    INTERVAL HPI/OVERNIGHT EVENTS: Pt reports that her right arm swelling is much worse today and is signficantly more painful.    Hospital course:  37 yo F with h/o ESRD on HD due to uncontrolled HTN (left arm fistula) presents with worsening right UE swelling. Denies any redness, fevers, chillls. States that symptoms started about one week ago without any inciting trauma. She reportedly had an MRI as outpt but has not been told of her results yet. In the ED, US of RUE was negative for DVT but showed subcutaneous edema. Pt was started on vancomycin.     REVIEW OF SYSTEMS:    CONSTITUTIONAL: No fever   RESPIRATORY: No cough   CARDIOVASCULAR: No chest pain       Vital Signs Last 24 Hrs  T(C): 36.7 (01 Apr 2018 07:57), Max: 36.7 (31 Mar 2018 18:32)  T(F): 98.1 (01 Apr 2018 07:57), Max: 98.1 (01 Apr 2018 07:57)  HR: 85 (01 Apr 2018 11:09) (78 - 106)  BP: 193/108 (01 Apr 2018 07:57) (134/70 - 200/95)  BP(mean): 124 (01 Apr 2018 02:33) (124 - 124)  RR: 16 (01 Apr 2018 11:09) (16 - 19)  SpO2: 95% (01 Apr 2018 11:09) (88% - 98%)    PHYSICAL EXAM:    GENERAL: NAD, morbidly obese  HEENT: MMM  CHEST/LUNG: Clear to percussion bilaterally   HEART: S1S2+, Regular rate and rhythm  ABDOMEN: Soft, Nontender, Nondistended; Bowel sounds present  EXTREMITIES: +RUE significantly more swollen than LUE, +warmth, TTP palpation, +erythema, no crepitus, + 2 radial pulse on right UE    LABS:                        7.9    8.2   )-----------( 201      ( 01 Apr 2018 01:06 )             25.9     04-01    140  |  100  |  57.0<H>  ----------------------------<  79  4.3   |  22.0  |  7.24<H>    Ca    7.8<L>      01 Apr 2018 01:06    TPro  6.8  /  Alb  3.4  /  TBili  1.0  /  DBili  x   /  AST  17  /  ALT  16  /  AlkPhos  124<H>  04-01            MEDICATIONS  (STANDING):  aspirin enteric coated 81 milliGRAM(s) Oral daily  folic acid 1 milliGRAM(s) Oral daily  heparin  Injectable 5000 Unit(s) SubCutaneous every 8 hours  hydrALAZINE 100 milliGRAM(s) Oral three times a day  levothyroxine 25 MICROGram(s) Oral daily  NIFEdipine XL 90 milliGRAM(s) Oral daily  saccharomyces boulardii 250 milliGRAM(s) Oral two times a day  sodium chloride 0.9% lock flush 3 milliLiter(s) IV Push every 8 hours  valsartan 320 milliGRAM(s) Oral daily    MEDICATIONS  (PRN):  acetaminophen   Tablet 650 milliGRAM(s) Oral every 6 hours PRN For Temp greater than 38.5 C (101.3 F)  morphine  - Injectable 4 milliGRAM(s) IV Push every 4 hours PRN Moderate Pain (4 - 6)  ondansetron Injectable 4 milliGRAM(s) IV Push every 6 hours PRN Nausea      RADIOLOGY & ADDITIONAL TESTS:
SORAYA JIMENEZ  ----------------------------------------    CC :  Follow up visit for Rt Upper Limb myositis    Rt arm swelling better today, but doesn't feel comfortable going home. Consulted Rheumatologist Dr Kleiner     saw her at HDU    Vital Signs Last 24 Hrs  T(C): 36.7 (04 Apr 2018 10:50), Max: 36.7 (04 Apr 2018 10:50)  T(F): 98 (04 Apr 2018 10:50), Max: 98 (04 Apr 2018 10:50)  HR: 83 (04 Apr 2018 10:50) (82 - 94)  BP: 159/63 (04 Apr 2018 10:50) (128/83 - 167/84)  BP(mean): --  RR: 18 (04 Apr 2018 10:50) (16 - 18)  SpO2: 100% (04 Apr 2018 10:50) (96% - 100%)    CAPILLARY BLOOD GLUCOSE      POCT Blood Glucose.: 203 mg/dL (04 Apr 2018 07:28)    PHYSICAL EXAMINATION:  ----------------------------------------    General appearance: NAD, Awake, Alert  HEENT: NCAT, Conjunctiva clear, + Pallor  Neck: Supple, No JVD  Lungs: decreased breath sounds l bilaterally  Cardiovascular: S1S2, Regular rhythm  Abdomen: Soft, Nontender, Positive bowel sounds  Extremities:+  edema Rt Upper Limb  + Rt arm swollen and tender  + Good Rt Radial Pulses  CNS: alert and Oriented times 3     LABORATORY STUDIES:  ----------------------------------------                        7.8    9.5   )-----------( 215      ( 04 Apr 2018 08:35 )             24.8     04-04    130<L>  |  87<L>  |  93.0<H>  ----------------------------<  211<H>  5.2   |  22.0  |  8.24<H>    Ca    8.8      04 Apr 2018 08:35  Phos  7.5     04-04    TPro  7.9  /  Alb  3.7  /  TBili  0.8  /  DBili  x   /  AST  15  /  ALT  14  /  AlkPhos  128<H>  04-03    LIVER FUNCTIONS - ( 03 Apr 2018 07:29 )  Alb: 3.7 g/dL / Pro: 7.9 g/dL / ALK PHOS: 128 U/L / ALT: 14 U/L / AST: 15 U/L / GGT: x           CARDIAC MARKERS ( 03 Apr 2018 07:29 )  x     / x     / 109 U/L / x     / x          MEDICATIONS  (STANDING):    aspirin enteric coated 81 milliGRAM(s) Oral daily  docusate sodium 100 milliGRAM(s) Oral two times a day  epoetin raina Injectable 15558 Unit(s) IV Push <User Schedule>  ferrous    sulfate 325 milliGRAM(s) Oral daily  folic acid 1 milliGRAM(s) Oral daily  heparin  Injectable 5000 Unit(s) SubCutaneous every 8 hours  hydrALAZINE 100 milliGRAM(s) Oral three times a day  iron sucrose IVPB 100 milliGRAM(s) IV Intermittent <User Schedule>  levothyroxine 25 MICROGram(s) Oral daily  NIFEdipine XL 90 milliGRAM(s) Oral daily  predniSONE   Tablet 50 milliGRAM(s) Oral daily  saccharomyces boulardii 250 milliGRAM(s) Oral two times a day  senna 2 Tablet(s) Oral at bedtime  sodium chloride 0.9% lock flush 3 milliLiter(s) IV Push every 8 hours  valsartan 320 milliGRAM(s) Oral daily    MEDICATIONS  (PRN):  acetaminophen   Tablet 650 milliGRAM(s) Oral every 6 hours PRN For Temp greater than 38.5 C (101.3 F)  acetaminophen   Tablet 650 milliGRAM(s) Oral every 6 hours PRN For Temp greater than 38 C (100.4 F)  HYDROmorphone   Tablet 4 milliGRAM(s) Oral every 4 hours PRN Moderate Pain (4 - 6)  ondansetron Injectable 4 milliGRAM(s) IV Push every 6 hours PRN Nausea      ASSESSMENT / PLAN:  ----------------------------------------
SORAYA JIMENEZ  ----------------------------------------    CC:  FOLLOW UP VISIT FOR Rt Arm swelling and Pain    Pt C/O severe Rt Arm pain  swelling same as yesterday. No better, But no worse either  discussed ID recommendations    Vital Signs Last 24 Hrs  T(C): 36.8 (02 Apr 2018 07:48), Max: 36.8 (02 Apr 2018 07:48)  T(F): 98.2 (02 Apr 2018 07:48), Max: 98.2 (02 Apr 2018 07:48)  HR: 90 (02 Apr 2018 07:48) (85 - 96)  BP: 150/82 (02 Apr 2018 07:48) (143/80 - 157/87)  BP(mean): --  RR: 18 (02 Apr 2018 07:48) (16 - 18)  SpO2: 98% (02 Apr 2018 07:48) (93% - 98%)    CAPILLARY BLOOD GLUCOSE      POCT Blood Glucose.: 95 mg/dL (01 Apr 2018 11:31)    PHYSICAL EXAMINATION:  ----------------------------------------    General appearance: NAD, Awake, Alert  HEENT: NCAT, Conjunctiva clear, + Pallor  Neck: Supple, No JVD  Lungs: decreased breath sounds l bilaterally  Cardiovascular: S1S2, Regular rhythm  Abdomen: Soft, Nontender, Positive bowel sounds  Extremities:+  edema Rt Upper Limb  + Rt arm swollen and tender  + Good Rt Radial Pulses  CNS: alert and Oriented times 3     LABORATORY STUDIES:  ----------------------------------------                        7.9    8.2   )-----------( 201      ( 01 Apr 2018 01:06 )             25.9     04-01    140  |  100  |  57.0<H>  ----------------------------<  79  4.3   |  22.0  |  7.24<H>    Ca    7.8<L>      01 Apr 2018 01:06    TPro  6.8  /  Alb  3.4  /  TBili  1.0  /  DBili  x   /  AST  17  /  ALT  16  /  AlkPhos  124<H>  04-01    LIVER FUNCTIONS - ( 01 Apr 2018 01:06 )  Alb: 3.4 g/dL / Pro: 6.8 g/dL / ALK PHOS: 124 U/L / ALT: 16 U/L / AST: 17 U/L / GGT: x             CARDIAC MARKERS ( 01 Apr 2018 13:41 )  x     / x     / 137 U/L / x     / x      CARDIAC MARKERS ( 01 Apr 2018 01:06 )  x     / x     / 110 U/L / x     / x          MEDICATIONS  (STANDING):    aspirin enteric coated 81 milliGRAM(s) Oral daily  docusate sodium 100 milliGRAM(s) Oral two times a day  epoetin raina Injectable 93609 Unit(s) IV Push <User Schedule>  ferrous    sulfate 325 milliGRAM(s) Oral daily  folic acid 1 milliGRAM(s) Oral daily  heparin  Injectable 5000 Unit(s) SubCutaneous every 8 hours  hydrALAZINE 100 milliGRAM(s) Oral three times a day  HYDROmorphone   Tablet 4 milliGRAM(s) Oral three times a day  levothyroxine 25 MICROGram(s) Oral daily  NIFEdipine XL 90 milliGRAM(s) Oral daily  saccharomyces boulardii 250 milliGRAM(s) Oral two times a day  senna 2 Tablet(s) Oral at bedtime  sodium chloride 0.9% lock flush 3 milliLiter(s) IV Push every 8 hours  valsartan 320 milliGRAM(s) Oral daily    MEDICATIONS  (PRN):  acetaminophen   Tablet 650 milliGRAM(s) Oral every 6 hours PRN For Temp greater than 38.5 C (101.3 F)  acetaminophen   Tablet 650 milliGRAM(s) Oral every 6 hours PRN For Temp greater than 38 C (100.4 F)  HYDROmorphone  Injectable 0.5 milliGRAM(s) IV Push every 3 hours PRN Moderate Pain (4 - 6)  HYDROmorphone  Injectable 1 milliGRAM(s) IV Push every 4 hours PRN Severe Pain (7 - 10)  ondansetron Injectable 4 milliGRAM(s) IV Push every 6 hours PRN Nausea      ASSESSMENT / PLAN:  ----------------------------------------
SORAYA JIMENEZ  ----------------------------------------    CC :  Follow up visit for myositis    Rt arm swelling better today    Vital Signs Last 24 Hrs    T(C): 36.6 (03 Apr 2018 08:04), Max: 36.6 (02 Apr 2018 18:22)  T(F): 97.9 (03 Apr 2018 08:04), Max: 97.9 (03 Apr 2018 08:04)  HR: 88 (03 Apr 2018 08:04) (88 - 97)  BP: 144/87 (03 Apr 2018 08:04) (144/87 - 169/83)  BP(mean): --  RR: 18 (03 Apr 2018 08:04) (16 - 18)  SpO2: 97% (03 Apr 2018 08:04) (95% - 100%)    CAPILLARY BLOOD GLUCOSE      POCT Blood Glucose.: 117 mg/dL (02 Apr 2018 17:27)    PHYSICAL EXAMINATION:  ----------------------------------------    General appearance: NAD, Awake, Alert  HEENT: NCAT, Conjunctiva clear, + Pallor  Neck: Supple, No JVD  Lungs: decreased breath sounds l bilaterally  Cardiovascular: S1S2, Regular rhythm  Abdomen: Soft, Nontender, Positive bowel sounds  Extremities:+  edema Rt Upper Limb  + Rt arm swollen and tender  + Good Rt Radial Pulses  CNS: alert and Oriented times 3       LABORATORY STUDIES:  ----------------------------------------                        8.1    8.4   )-----------( 192      ( 03 Apr 2018 07:29 )             25.0     04-03    131<L>  |  90<L>  |  60.0<H>  ----------------------------<  188<H>  5.2   |  25.0  |  6.69<H>    Ca    8.9      03 Apr 2018 07:29  Phos  8.1     04-02    TPro  7.9  /  Alb  3.7  /  TBili  0.8  /  DBili  x   /  AST  15  /  ALT  14  /  AlkPhos  128<H>  04-03    LIVER FUNCTIONS - ( 03 Apr 2018 07:29 )  Alb: 3.7 g/dL / Pro: 7.9 g/dL / ALK PHOS: 128 U/L / ALT: 14 U/L / AST: 15 U/L / GGT: x               CARDIAC MARKERS ( 03 Apr 2018 07:29 )  x     / x     / 109 U/L / x     / x      CARDIAC MARKERS ( 01 Apr 2018 13:41 )  x     / x     / 137 U/L / x     / x          MEDICATIONS  (STANDING):    aspirin enteric coated 81 milliGRAM(s) Oral daily  docusate sodium 100 milliGRAM(s) Oral two times a day  epoetin raina Injectable 68830 Unit(s) IV Push <User Schedule>  ferrous    sulfate 325 milliGRAM(s) Oral daily  folic acid 1 milliGRAM(s) Oral daily  heparin  Injectable 5000 Unit(s) SubCutaneous every 8 hours  hydrALAZINE 100 milliGRAM(s) Oral three times a day  HYDROmorphone   Tablet 4 milliGRAM(s) Oral three times a day  levothyroxine 25 MICROGram(s) Oral daily  NIFEdipine XL 90 milliGRAM(s) Oral daily  predniSONE   Tablet 50 milliGRAM(s) Oral once  saccharomyces boulardii 250 milliGRAM(s) Oral two times a day  senna 2 Tablet(s) Oral at bedtime  sodium chloride 0.9% lock flush 3 milliLiter(s) IV Push every 8 hours  valsartan 320 milliGRAM(s) Oral daily    MEDICATIONS  (PRN):  acetaminophen   Tablet 650 milliGRAM(s) Oral every 6 hours PRN For Temp greater than 38.5 C (101.3 F)  acetaminophen   Tablet 650 milliGRAM(s) Oral every 6 hours PRN For Temp greater than 38 C (100.4 F)  morphine  - Injectable 4 milliGRAM(s) IV Push every 3 hours PRN Severe Pain (7 - 10)  ondansetron Injectable 4 milliGRAM(s) IV Push every 6 hours PRN Nausea      ASSESSMENT / PLAN:  ----------------------------------------
SORAYA JIMENEZ  ----------------------------------------    CC :  Follow up visit for Rt Upper Limb myositis    Rt arm swelling better today Consulted Rheumatologist Dr Kleinert waiting for their recommendations      Vital Signs Last 24 Hrs    T(C): 36.4 (05 Apr 2018 08:43), Max: 36.9 (04 Apr 2018 13:43)  T(F): 97.5 (05 Apr 2018 08:43), Max: 98.4 (04 Apr 2018 13:43)  HR: 84 (05 Apr 2018 08:43) (80 - 94)  BP: 151/83 (05 Apr 2018 08:43) (133/80 - 152/74)  BP(mean): --  RR: 18 (05 Apr 2018 08:43) (18 - 20)  SpO2: 100% (05 Apr 2018 08:43) (92% - 100%)    CAPILLARY BLOOD GLUCOSE        PHYSICAL EXAMINATION:  ----------------------------------------    General appearance: NAD, Awake, Alert  HEENT: NCAT, Conjunctiva clear, + Pallor  Neck: Supple, No JVD  Lungs: decreased breath sounds l bilaterally  Cardiovascular: S1S2, Regular rhythm  Abdomen: Soft, Nontender, Positive bowel sounds  Extremities:+  edema Rt Upper Limb  + Rt arm swollen and tender  + Good Rt Radial Pulses  CNS: alert and Oriented times 3       LABORATORY STUDIES:  ----------------------------------------                        7.8    9.5   )-----------( 215      ( 04 Apr 2018 08:35 )             24.8     04-04    130<L>  |  87<L>  |  93.0<H>  ----------------------------<  211<H>  5.2   |  22.0  |  8.24<H>    Ca    8.8      04 Apr 2018 08:35  Phos  7.5     04-04      MEDICATIONS  (STANDING):    aspirin enteric coated 81 milliGRAM(s) Oral daily  docusate sodium 100 milliGRAM(s) Oral two times a day  epoetin raina Injectable 90884 Unit(s) IV Push <User Schedule>  ergocalciferol 31048 Unit(s) Oral every week  ferrous    sulfate 325 milliGRAM(s) Oral daily  folic acid 1 milliGRAM(s) Oral daily  heparin  Injectable 5000 Unit(s) SubCutaneous every 8 hours  hydrALAZINE 100 milliGRAM(s) Oral three times a day  iron sucrose IVPB 100 milliGRAM(s) IV Intermittent <User Schedule>  levothyroxine 25 MICROGram(s) Oral daily  NIFEdipine XL 90 milliGRAM(s) Oral daily  predniSONE   Tablet 50 milliGRAM(s) Oral daily  saccharomyces boulardii 250 milliGRAM(s) Oral two times a day  senna 2 Tablet(s) Oral at bedtime  sevelamer hydrochloride 800 milliGRAM(s) Oral three times a day  sodium chloride 0.9% lock flush 3 milliLiter(s) IV Push every 8 hours  valsartan 320 milliGRAM(s) Oral daily    MEDICATIONS  (PRN):  acetaminophen   Tablet 650 milliGRAM(s) Oral every 6 hours PRN For Temp greater than 38.5 C (101.3 F)  acetaminophen   Tablet 650 milliGRAM(s) Oral every 6 hours PRN For Temp greater than 38 C (100.4 F)  acetaminophen   Tablet. 650 milliGRAM(s) Oral every 6 hours PRN Mild Pain (1 - 3)  HYDROmorphone   Tablet 4 milliGRAM(s) Oral every 4 hours PRN Moderate Pain (4 - 6)  ondansetron Injectable 4 milliGRAM(s) IV Push every 6 hours PRN Nausea      ASSESSMENT / PLAN:  ----------------------------------------

## 2018-04-06 NOTE — DISCHARGE NOTE ADULT - ADDITIONAL INSTRUCTIONS
Take tapering prednisone as below :   predniSONE 10 mg oral tablet  -- 4 tab(s) oral - by mouth once a day x 3 days  3 tab(s) oral - by mouth once a day x 3 days  2 tab(s) oral - by mouth once a day x 3 days  1 tab(s) oral - by mouth once a day x 3 days  Please follow up with Rheumatology Dr Kleiner in 1 week

## 2018-04-06 NOTE — DISCHARGE NOTE ADULT - PROVIDER TOKENS
TOKEN:'[id number]',FREE:[LAST:[PCP],PHONE:[(   )    -],FAX:[(   )    -]],TOKEN:'[id number]'

## 2018-04-06 NOTE — DISCHARGE NOTE ADULT - SECONDARY DIAGNOSIS.
Renovascular hypertension End stage renal disease Anemia due to chronic kidney disease DALTON (obstructive sleep apnea)

## 2018-04-06 NOTE — DISCHARGE NOTE ADULT - MEDICATION SUMMARY - MEDICATIONS TO TAKE
I will START or STAY ON the medications listed below when I get home from the hospital:    predniSONE 10 mg oral tablet  -- 4 tab(s) oral - by mouth once a day x 3 days  3 tab(s) oral - by mouth once a day x 3 days  2 tab(s) oral - by mouth once a day x 3 days  1 tab(s) oral - by mouth once a day x 3 days  -- It is very important that you take or use this exactly as directed.  Do not skip doses or discontinue unless directed by your doctor.  Obtain medical advice before taking any non-prescription drugs as some may affect the action of this medication.  Take with food or milk.    -- Indication: For Myositis of right upper arm, unspecified myositis type    aspirin 81 mg oral tablet  -- 1 tab(s) by mouth once a day  -- Indication: For Cardio prevention    acetaminophen 325 mg oral tablet  -- 2 tab(s) by mouth every 6 hours, As Needed -For Temp greater than 38.5 C (101.3 F) - for mild pain MDD:2000 mg  -- Indication: For Pain    valsartan 320 mg oral tablet  -- Indication: For CHF     loratadine  -- Indication: For Allergy    Procardia  -- 90  orally  -- Indication: For HTN    Mary-Sequels (as elemental iron) 65 mg-25 mg oral tablet  -- 1 tab(s) by mouth 2 times a day   -- Check with your doctor before becoming pregnant.  Do not take dairy products, antacids, or iron preparations within one hour of this medication.  May discolor urine or feces.  Obtain medical advice before taking any non-prescription drugs as some may affect the action of this medication.    -- Indication: For Anemia due to chronic kidney disease    levothyroxine 25 mcg (0.025 mg) oral tablet  -- 1 tab(s) by mouth once a day  -- Indication: For Hypothyroidism    hydrALAZINE 100 mg oral tablet  -- orally 3 times a day  -- Indication: For Renovascular hypertension    folic acid  -- Indication: For Anemia due to chronic kidney disease

## 2018-04-06 NOTE — PROGRESS NOTE ADULT - ASSESSMENT
ESRD: + PVC  - HD today with UF as tolerates    Anemia: + CRF  - add NEELAM at HD  - check Fe stores  - follow H/H    RO: check phos, iPTH and Vit D    R arm swelling: no DVT  No infectious process  - arm elevation and pain control
37 y/o female with hx of ESRD on HD, HTN, Chronic anemia currently admitted with worsening pain and swelling Rt arm, currently  Treating for  RUE Myositis    USG neg for DVT  No evidence of Infection as per ID and so stopped Abx  Normal CPK levels  Pain Uncontrolled, start IV Dilaudid 0.5 mg PRN for Moderate pain and 1 mg IV Q 3 PRN for severe pain
39 y/o female with hx of ESRD on HD, HTN, Chronic anemia currently admitted with worsening pain and swelling Rt arm, currently  Treating for  RUE Myositis    USG neg for DVT  No evidence of Infection as per ID and so stopped Abx  Normal CPK levels  started trial with steroids, Improving, but doesn't feel comfortable going home. Consulted Rheumatology today
39 yo F with h/o ESRD on HD due to uncontrolled HTN here with right UE cellulitis.
ESRD:   - HD MWF    Anemia: + CRF; % Saturation, Iron: 11 % (04.02.18 @ 14:34); Ferritin, Serum: 780 ng/mL (04.02.18 @ 14:34)  - added NEELAM at HD  - cotinue IV Fe  - follow H/H  - consider PRBCs    RO: Hyperphosphatemia; low Vitamin D  - add binders and Ergocalciferol    R arm swelling: no DVT  No infectious process  - arm elevation and pain control
ESRD:   - HD today    Anemia: + CRF; % Saturation, Iron: 11 % (04.02.18 @ 14:34); Ferritin, Serum: 780 ng/mL (04.02.18 @ 14:34)  - cotinue IV Fe and NEELAM at HD  - follow H/H  - may need to consider PRBCs    RO: Hyperphosphatemia; low Vitamin D  - added binders and Ergocalciferol
37 y/o female with hx of ESRD on HD, HTN, Chronic anemia currently admitted with worsening pain and swelling Rt arm, currently  Treating for  RUE Myositis    USG neg for DVT  No evidence of Infection as per ID and so stopped Abx  Normal CPK levels  Pain Uncontrolled, start IV Dilaudid 0.5 mg PRN for Moderate pain and 1 mg IV Q 3 PRN for severe pain
39 y/o female with hx of ESRD on HD, HTN, Chronic anemia currently admitted with worsening pain and swelling Rt arm, currently  Treating for  RUE Myositis    USG neg for DVT  No evidence of Infection as per ID and so stopped Abx  Normal CPK levels  started trial with steroids, Improving, but doesn't feel comfortable going home. Consulted Rheumatology today

## 2018-04-06 NOTE — DISCHARGE NOTE ADULT - CARE PROVIDERS DIRECT ADDRESSES
[email or web address],DirectAddress_Unknown,DirectAddress_Unknown

## 2018-04-13 ENCOUNTER — APPOINTMENT (OUTPATIENT)
Dept: RHEUMATOLOGY | Facility: CLINIC | Age: 38
End: 2018-04-13

## 2018-05-16 NOTE — DIETITIAN INITIAL EVALUATION ADULT. - FACTORS AFF FOOD INTAKE
Initial Anesthesia Post-op Note    Patient: Huong Graves  Procedure(s) Performed: F/U COLONOSCOPY  Anesthesia type: Monitor Anesthesia Care    Vital Last Value   Temperature     Pulse 61 (05/16/18 1227)   Respiratory Rate 16 (05/16/18 1227)   Non-Invasive   Blood Pressure 143/67 (05/16/18 1227)   Arterial  Blood Pressure     Pulse Oximetry 99 % (05/16/18 1227)     Last 24 I/O: No intake or output data in the 24 hours ending 05/16/18 1228    PATIENT LOCATION: PACU Phase 1  POST-OP VITAL SIGNS: stable  LEVEL OF CONSCIOUSNESS: answers questions appropriately  RESPIRATORY STATUS: spontaneous ventilation and room air  CARDIOVASCULAR: blood pressure returned to baseline  HYDRATION: euvolemic    PAIN MANAGEMENT: well controlled  NAUSEA: None  AIRWAY PATENCY: patent  POST-OP ASSESSMENT: no complications and patient tolerated procedure well with no complications  COMPLICATIONS: none and None  Comments: Reported to PACU RN with patient's medical history, surgical course/procedure, intraoperative anesthetic management and expectations/plans for the early post-procedure period. All the questions were answered. Patient's care was transferred to PACU team.  HANDOFF:  Handoff to receiving nurse was performed and questions were answered       currently NPO for procedure

## 2018-06-24 NOTE — PATIENT PROFILE ADULT. - CAREGIVER
[facility] Emergency Department.
No

## 2018-07-30 NOTE — DIETITIAN INITIAL EVALUATION ADULT. - SIGNS/SYMPTOMS
Patient called again, forgot to ask for refill on tizanidine 4mg   Please refill and geni pt as evidenced by low H/H, low MCV/MCH, low Na+, elevated BUN/Cr

## 2018-08-01 PROCEDURE — 93306 TTE W/DOPPLER COMPLETE: CPT

## 2018-08-01 PROCEDURE — 86901 BLOOD TYPING SEROLOGIC RH(D): CPT

## 2018-08-01 PROCEDURE — 82550 ASSAY OF CK (CPK): CPT

## 2018-08-01 PROCEDURE — 84466 ASSAY OF TRANSFERRIN: CPT

## 2018-08-01 PROCEDURE — 87040 BLOOD CULTURE FOR BACTERIA: CPT

## 2018-08-01 PROCEDURE — 84484 ASSAY OF TROPONIN QUANT: CPT

## 2018-08-01 PROCEDURE — 81001 URINALYSIS AUTO W/SCOPE: CPT

## 2018-08-01 PROCEDURE — 80048 BASIC METABOLIC PNL TOTAL CA: CPT

## 2018-08-01 PROCEDURE — 84157 ASSAY OF PROTEIN OTHER: CPT

## 2018-08-01 PROCEDURE — 94640 AIRWAY INHALATION TREATMENT: CPT

## 2018-08-01 PROCEDURE — P9016: CPT

## 2018-08-01 PROCEDURE — 82728 ASSAY OF FERRITIN: CPT

## 2018-08-01 PROCEDURE — 83880 ASSAY OF NATRIURETIC PEPTIDE: CPT

## 2018-08-01 PROCEDURE — 87070 CULTURE OTHR SPECIMN AEROBIC: CPT

## 2018-08-01 PROCEDURE — 83605 ASSAY OF LACTIC ACID: CPT

## 2018-08-01 PROCEDURE — 99261: CPT

## 2018-08-01 PROCEDURE — 86922 COMPATIBILITY TEST ANTIGLOB: CPT

## 2018-08-01 PROCEDURE — 86706 HEP B SURFACE ANTIBODY: CPT

## 2018-08-01 PROCEDURE — 84100 ASSAY OF PHOSPHORUS: CPT

## 2018-08-01 PROCEDURE — 84145 PROCALCITONIN (PCT): CPT

## 2018-08-01 PROCEDURE — 86431 RHEUMATOID FACTOR QUANT: CPT

## 2018-08-01 PROCEDURE — 86850 RBC ANTIBODY SCREEN: CPT

## 2018-08-01 PROCEDURE — 80053 COMPREHEN METABOLIC PANEL: CPT

## 2018-08-01 PROCEDURE — 83986 ASSAY PH BODY FLUID NOS: CPT

## 2018-08-01 PROCEDURE — 87205 SMEAR GRAM STAIN: CPT

## 2018-08-01 PROCEDURE — 80074 ACUTE HEPATITIS PANEL: CPT

## 2018-08-01 PROCEDURE — 36430 TRANSFUSION BLD/BLD COMPNT: CPT

## 2018-08-01 PROCEDURE — 71045 X-RAY EXAM CHEST 1 VIEW: CPT

## 2018-08-01 PROCEDURE — 71250 CT THORAX DX C-: CPT

## 2018-08-01 PROCEDURE — 87075 CULTR BACTERIA EXCEPT BLOOD: CPT

## 2018-08-01 PROCEDURE — 99285 EMERGENCY DEPT VISIT HI MDM: CPT | Mod: 25

## 2018-08-01 PROCEDURE — 85610 PROTHROMBIN TIME: CPT

## 2018-08-01 PROCEDURE — 86900 BLOOD TYPING SEROLOGIC ABO: CPT

## 2018-08-01 PROCEDURE — 87186 SC STD MICRODIL/AGAR DIL: CPT

## 2018-08-01 PROCEDURE — 85027 COMPLETE CBC AUTOMATED: CPT

## 2018-08-01 PROCEDURE — 86038 ANTINUCLEAR ANTIBODIES: CPT

## 2018-08-01 PROCEDURE — 93005 ELECTROCARDIOGRAM TRACING: CPT

## 2018-08-01 PROCEDURE — 83735 ASSAY OF MAGNESIUM: CPT

## 2018-08-01 PROCEDURE — 83036 HEMOGLOBIN GLYCOSYLATED A1C: CPT

## 2018-08-01 PROCEDURE — 80202 ASSAY OF VANCOMYCIN: CPT

## 2018-08-01 PROCEDURE — 82945 GLUCOSE OTHER FLUID: CPT

## 2018-08-01 PROCEDURE — 82042 OTHER SOURCE ALBUMIN QUAN EA: CPT

## 2018-08-01 PROCEDURE — 89051 BODY FLUID CELL COUNT: CPT

## 2018-08-01 PROCEDURE — 83550 IRON BINDING TEST: CPT

## 2018-08-01 PROCEDURE — 87086 URINE CULTURE/COLONY COUNT: CPT

## 2018-08-01 PROCEDURE — 36415 COLL VENOUS BLD VENIPUNCTURE: CPT

## 2018-08-01 PROCEDURE — 86703 HIV-1/HIV-2 1 RESULT ANTBDY: CPT

## 2018-08-01 PROCEDURE — 83615 LACTATE (LD) (LDH) ENZYME: CPT

## 2018-08-01 PROCEDURE — 86870 RBC ANTIBODY IDENTIFICATION: CPT

## 2018-08-03 ENCOUNTER — INPATIENT (INPATIENT)
Facility: HOSPITAL | Age: 38
LOS: 2 days | Discharge: HOME | DRG: 291 | End: 2018-08-06
Attending: INTERNAL MEDICINE | Admitting: INTERNAL MEDICINE
Payer: MEDICARE

## 2018-08-03 VITALS — HEIGHT: 63 IN | WEIGHT: 244.93 LBS

## 2018-08-03 DIAGNOSIS — Z41.9 ENCOUNTER FOR PROCEDURE FOR PURPOSES OTHER THAN REMEDYING HEALTH STATE, UNSPECIFIED: Chronic | ICD-10-CM

## 2018-08-03 DIAGNOSIS — R06.02 SHORTNESS OF BREATH: ICD-10-CM

## 2018-08-03 DIAGNOSIS — I77.0 ARTERIOVENOUS FISTULA, ACQUIRED: Chronic | ICD-10-CM

## 2018-08-03 DIAGNOSIS — Z99.2 DEPENDENCE ON RENAL DIALYSIS: Chronic | ICD-10-CM

## 2018-08-03 PROBLEM — N18.6 END STAGE RENAL DISEASE: Chronic | Status: ACTIVE | Noted: 2017-04-05

## 2018-08-03 PROBLEM — G47.33 OBSTRUCTIVE SLEEP APNEA (ADULT) (PEDIATRIC): Chronic | Status: ACTIVE | Noted: 2017-11-06

## 2018-08-03 PROBLEM — H43.12 VITREOUS HEMORRHAGE, LEFT EYE: Chronic | Status: ACTIVE | Noted: 2017-11-06

## 2018-08-03 PROBLEM — A04.72 ENTEROCOLITIS DUE TO CLOSTRIDIUM DIFFICILE, NOT SPECIFIED AS RECURRENT: Chronic | Status: ACTIVE | Noted: 2018-01-30

## 2018-08-03 PROBLEM — E66.09 OTHER OBESITY DUE TO EXCESS CALORIES: Chronic | Status: ACTIVE | Noted: 2017-11-06

## 2018-08-03 PROBLEM — I10 ESSENTIAL (PRIMARY) HYPERTENSION: Chronic | Status: ACTIVE | Noted: 2017-04-05

## 2018-08-03 LAB
ALBUMIN SERPL ELPH-MCNC: 4.2 G/DL — SIGNIFICANT CHANGE UP (ref 3.3–5.2)
ALP SERPL-CCNC: 99 U/L — SIGNIFICANT CHANGE UP (ref 40–120)
ALT FLD-CCNC: 16 U/L — SIGNIFICANT CHANGE UP
ANION GAP SERPL CALC-SCNC: 23 MMOL/L — HIGH (ref 5–17)
ANION GAP SERPL CALC-SCNC: 25 MMOL/L — HIGH (ref 5–17)
AST SERPL-CCNC: 22 U/L — SIGNIFICANT CHANGE UP
BASE EXCESS BLDV CALC-SCNC: -3.3 MMOL/L — LOW (ref -2–2)
BASOPHILS # BLD AUTO: 0 K/UL — SIGNIFICANT CHANGE UP (ref 0–0.2)
BASOPHILS NFR BLD AUTO: 0.3 % — SIGNIFICANT CHANGE UP (ref 0–2)
BILIRUB SERPL-MCNC: 0.7 MG/DL — SIGNIFICANT CHANGE UP (ref 0.4–2)
BUN SERPL-MCNC: 110 MG/DL — HIGH (ref 8–20)
BUN SERPL-MCNC: 110 MG/DL — HIGH (ref 8–20)
CA-I SERPL-SCNC: 1.05 MMOL/L — LOW (ref 1.15–1.33)
CALCIUM SERPL-MCNC: 9 MG/DL — SIGNIFICANT CHANGE UP (ref 8.6–10.2)
CALCIUM SERPL-MCNC: 9 MG/DL — SIGNIFICANT CHANGE UP (ref 8.6–10.2)
CHLORIDE BLDV-SCNC: 99 MMOL/L — SIGNIFICANT CHANGE UP (ref 98–107)
CHLORIDE SERPL-SCNC: 94 MMOL/L — LOW (ref 98–107)
CHLORIDE SERPL-SCNC: 95 MMOL/L — LOW (ref 98–107)
CO2 SERPL-SCNC: 17 MMOL/L — LOW (ref 22–29)
CO2 SERPL-SCNC: 20 MMOL/L — LOW (ref 22–29)
CREAT SERPL-MCNC: 13.63 MG/DL — HIGH (ref 0.5–1.3)
CREAT SERPL-MCNC: 13.73 MG/DL — HIGH (ref 0.5–1.3)
EOSINOPHIL # BLD AUTO: 0.2 K/UL — SIGNIFICANT CHANGE UP (ref 0–0.5)
EOSINOPHIL NFR BLD AUTO: 2.4 % — SIGNIFICANT CHANGE UP (ref 0–6)
GAS PNL BLDV: 140 MMOL/L — SIGNIFICANT CHANGE UP (ref 135–145)
GAS PNL BLDV: SIGNIFICANT CHANGE UP
GAS PNL BLDV: SIGNIFICANT CHANGE UP
GLUCOSE BLDC GLUCOMTR-MCNC: 92 MG/DL — SIGNIFICANT CHANGE UP (ref 70–99)
GLUCOSE BLDV-MCNC: 148 MG/DL — HIGH (ref 70–99)
GLUCOSE SERPL-MCNC: 151 MG/DL — HIGH (ref 70–115)
GLUCOSE SERPL-MCNC: 96 MG/DL — SIGNIFICANT CHANGE UP (ref 70–115)
HCO3 BLDV-SCNC: 22 MMOL/L — SIGNIFICANT CHANGE UP (ref 21–29)
HCT VFR BLD CALC: 28.7 % — LOW (ref 37–47)
HCT VFR BLDA CALC: 30 — LOW (ref 39–50)
HGB BLD CALC-MCNC: 9.6 G/DL — LOW (ref 11.5–15.5)
HGB BLD-MCNC: 9.2 G/DL — LOW (ref 12–16)
LACTATE BLDV-MCNC: 1.2 MMOL/L — SIGNIFICANT CHANGE UP (ref 0.5–2)
LYMPHOCYTES # BLD AUTO: 0.8 K/UL — LOW (ref 1–4.8)
LYMPHOCYTES # BLD AUTO: 11.1 % — LOW (ref 20–55)
MCHC RBC-ENTMCNC: 26.3 PG — LOW (ref 27–31)
MCHC RBC-ENTMCNC: 32.1 G/DL — SIGNIFICANT CHANGE UP (ref 32–36)
MCV RBC AUTO: 82 FL — SIGNIFICANT CHANGE UP (ref 81–99)
MONOCYTES # BLD AUTO: 0.5 K/UL — SIGNIFICANT CHANGE UP (ref 0–0.8)
MONOCYTES NFR BLD AUTO: 7 % — SIGNIFICANT CHANGE UP (ref 3–10)
NEUTROPHILS # BLD AUTO: 5.5 K/UL — SIGNIFICANT CHANGE UP (ref 1.8–8)
NEUTROPHILS NFR BLD AUTO: 78.9 % — HIGH (ref 37–73)
NT-PROBNP SERPL-SCNC: HIGH PG/ML (ref 0–300)
OTHER CELLS CSF MANUAL: 13 ML/DL — LOW (ref 18–22)
PCO2 BLDV: 37 MMHG — SIGNIFICANT CHANGE UP (ref 35–50)
PH BLDV: 7.37 — SIGNIFICANT CHANGE UP (ref 7.32–7.43)
PLATELET # BLD AUTO: 190 K/UL — SIGNIFICANT CHANGE UP (ref 150–400)
PO2 BLDV: 143 MMHG — HIGH (ref 25–45)
POTASSIUM BLDV-SCNC: 5.9 MMOL/L — HIGH (ref 3.4–4.5)
POTASSIUM SERPL-MCNC: 6.3 MMOL/L — CRITICAL HIGH (ref 3.5–5.3)
POTASSIUM SERPL-MCNC: 6.5 MMOL/L — CRITICAL HIGH (ref 3.5–5.3)
POTASSIUM SERPL-SCNC: 6.3 MMOL/L — CRITICAL HIGH (ref 3.5–5.3)
POTASSIUM SERPL-SCNC: 6.5 MMOL/L — CRITICAL HIGH (ref 3.5–5.3)
PROT SERPL-MCNC: 7.8 G/DL — SIGNIFICANT CHANGE UP (ref 6.6–8.7)
RBC # BLD: 3.5 M/UL — LOW (ref 4.4–5.2)
RBC # FLD: 16.7 % — HIGH (ref 11–15.6)
SAO2 % BLDV: 100 % — SIGNIFICANT CHANGE UP
SODIUM SERPL-SCNC: 136 MMOL/L — SIGNIFICANT CHANGE UP (ref 135–145)
SODIUM SERPL-SCNC: 138 MMOL/L — SIGNIFICANT CHANGE UP (ref 135–145)
TROPONIN T SERPL-MCNC: 0.48 NG/ML — HIGH (ref 0–0.06)
WBC # BLD: 7 K/UL — SIGNIFICANT CHANGE UP (ref 4.8–10.8)
WBC # FLD AUTO: 7 K/UL — SIGNIFICANT CHANGE UP (ref 4.8–10.8)

## 2018-08-03 PROCEDURE — 71045 X-RAY EXAM CHEST 1 VIEW: CPT | Mod: 26

## 2018-08-03 PROCEDURE — 99285 EMERGENCY DEPT VISIT HI MDM: CPT

## 2018-08-03 PROCEDURE — 99223 1ST HOSP IP/OBS HIGH 75: CPT

## 2018-08-03 PROCEDURE — 93010 ELECTROCARDIOGRAM REPORT: CPT

## 2018-08-03 RX ORDER — LORATADINE 10 MG/1
0 TABLET ORAL
Qty: 0 | Refills: 0 | COMMUNITY

## 2018-08-03 RX ORDER — HEPARIN SODIUM 5000 [USP'U]/ML
5000 INJECTION INTRAVENOUS; SUBCUTANEOUS EVERY 8 HOURS
Qty: 0 | Refills: 0 | Status: DISCONTINUED | OUTPATIENT
Start: 2018-08-03 | End: 2018-08-06

## 2018-08-03 RX ORDER — HYDRALAZINE HCL 50 MG
100 TABLET ORAL THREE TIMES A DAY
Qty: 0 | Refills: 0 | Status: DISCONTINUED | OUTPATIENT
Start: 2018-08-03 | End: 2018-08-06

## 2018-08-03 RX ORDER — ASPIRIN/CALCIUM CARB/MAGNESIUM 324 MG
81 TABLET ORAL DAILY
Qty: 0 | Refills: 0 | Status: DISCONTINUED | OUTPATIENT
Start: 2018-08-03 | End: 2018-08-06

## 2018-08-03 RX ORDER — FOLIC ACID 0.8 MG
1 TABLET ORAL DAILY
Qty: 0 | Refills: 0 | Status: DISCONTINUED | OUTPATIENT
Start: 2018-08-03 | End: 2018-08-06

## 2018-08-03 RX ORDER — ALBUTEROL 90 UG/1
2.5 AEROSOL, METERED ORAL ONCE
Qty: 0 | Refills: 0 | Status: COMPLETED | OUTPATIENT
Start: 2018-08-03 | End: 2018-08-03

## 2018-08-03 RX ORDER — DEXTROSE 50 % IN WATER 50 %
25 SYRINGE (ML) INTRAVENOUS ONCE
Qty: 0 | Refills: 0 | Status: DISCONTINUED | OUTPATIENT
Start: 2018-08-03 | End: 2018-08-03

## 2018-08-03 RX ORDER — NIFEDIPINE 30 MG
90 TABLET, EXTENDED RELEASE 24 HR ORAL DAILY
Qty: 0 | Refills: 0 | Status: DISCONTINUED | OUTPATIENT
Start: 2018-08-03 | End: 2018-08-06

## 2018-08-03 RX ORDER — VALSARTAN 80 MG/1
0 TABLET ORAL
Qty: 0 | Refills: 0 | COMMUNITY

## 2018-08-03 RX ORDER — FERROUS SULFATE 325(65) MG
325 TABLET ORAL DAILY
Qty: 0 | Refills: 0 | Status: DISCONTINUED | OUTPATIENT
Start: 2018-08-03 | End: 2018-08-06

## 2018-08-03 RX ORDER — CALCIUM GLUCONATE 100 MG/ML
1 VIAL (ML) INTRAVENOUS ONCE
Qty: 0 | Refills: 0 | Status: DISCONTINUED | OUTPATIENT
Start: 2018-08-03 | End: 2018-08-03

## 2018-08-03 RX ORDER — INSULIN HUMAN 100 [IU]/ML
5 INJECTION, SOLUTION SUBCUTANEOUS ONCE
Qty: 0 | Refills: 0 | Status: DISCONTINUED | OUTPATIENT
Start: 2018-08-03 | End: 2018-08-03

## 2018-08-03 RX ORDER — FUROSEMIDE 40 MG
40 TABLET ORAL ONCE
Qty: 0 | Refills: 0 | Status: DISCONTINUED | OUTPATIENT
Start: 2018-08-03 | End: 2018-08-03

## 2018-08-03 RX ADMIN — Medication 100 MILLIGRAM(S): at 23:24

## 2018-08-03 NOTE — ED PROVIDER NOTE - OBJECTIVE STATEMENT
37 y/o F with hx of ESRD, DM, and HTN presents to the ED with SOB after 2 days of missed dialysis. Pt reports she missed her Monday and Wednesday dialysis appointments due to excessive episodes of diarrhea. 37 y/o F with hx of ESRD (on dialysis Monday, Wednesday and Friday), DM, and HTN presents to the ED with SOB starting this morning, after 2 days of missed dialysis. Pt reports she missed her Monday and Wednesday dialysis appointments due to excessive episodes of diarrhea. Pt presented to dialysis today with SOB and chest tightness, and was sent to the ED for evaluation and dialysis treatment. States she has already had two episodes of loose, watery stool this morning. Pt still produces urine on her own. Takes baby aspirin once daily. She reports recent change in BP medication but is unable to specify. Denies f/c/n/v/cp/palpitations/rash/headache/dizziness/abd.pain/c/dysuria/hematuria. No recent travel out of the country, no sick contact. No further complaints at this time.   Nephrology: Dr. [facility]

## 2018-08-03 NOTE — ED ADULT NURSE NOTE - OBJECTIVE STATEMENT
Pt care assumed at 1145, presents to ED A&ox3 c/o missed dialysis. States on Monday " I didn't feel like going" and Wednesday she reports " I was having diarrhea so I didn't want to have to go to the bathroom while I was there." Pt with hx of being non compliant with dialysis. AV fistula present to left arm + bruit + thrill. Pt reports associated chest discomfort intermittently for the past few days. Denies any sob or difficulty breathing, respirations even and unlabored. Denies any other further complaints. Labs drawn and sent, results pending, will continue to monitor and reassess.

## 2018-08-03 NOTE — CONSULT NOTE ADULT - SUBJECTIVE AND OBJECTIVE BOX
HPI: 37 y/o F with hx of ESRD (on dialysis Monday, Wednesday and Friday), DM, and HTN presents to the ED with SOB starting this morning, after 2 days of missed dialysis. Pt reports she missed her Monday and Wednesday dialysis appointments due to excessive episodes of diarrhea. Pt presented to dialysis today with SOB and chest tightness, and was sent to the ED for evaluation and dialysis treatment. States she has already had two episodes of loose, watery stool this morning.  ROS above      PAST MEDICAL & SURGICAL HISTORY:  Clostridium difficile diarrhea  Vitreous hemorrhage of left eye  Class 3 obesity due to excess calories with serious comorbidity in adult, unspecified BMI  DALTON (obstructive sleep apnea)  Pneumonia  End stage renal disease  Hypertension  Diabetes  Elective surgery: Left eye retina surgery  AVF (arteriovenous fistula)  Vascular dialysis catheter in place      FAMILY HISTORY:  No pertinent family history in first degree relatives  [place]    Social History:Non smoker    MEDICATIONS  (STANDING):  furosemide   Injectable 40 milliGRAM(s) IV Push Once    MEDICATIONS  (PRN):   Meds reviewed    Allergies    Mushrooms (Hives (Mild))  No Known Drug Allergies      Vital Signs Last 24 Hrs  T(C): 36.6 (03 Aug 2018 10:24), Max: 36.6 (03 Aug 2018 10:24)  T(F): 97.9 (03 Aug 2018 10:24), Max: 97.9 (03 Aug 2018 10:24)  HR: 91 (03 Aug 2018 10:24) (91 - 91)  BP: 159/82 (03 Aug 2018 10:24) (159/82 - 159/82)  BP(mean): --  RR: 21 (03 Aug 2018 10:24) (21 - 21)  SpO2: 90% (03 Aug 2018 10:24) (90% - 90%)  Daily Height in cm: 160.02 (03 Aug 2018 10:07)    Daily     PHYSICAL EXAM:    GENERAL: appears chronically ill, oriented.  HEAD:  Atraumatic, Normocephalic  EYES: EOMI,  NECK: Supple, neck  veins full  NERVOUS SYSTEM:  Alert & Oriented X3  CHEST/LUNG: Clear to percussion bilaterally; rales at bases  HEART: Regular rate and rhythm; No murmur  ABDOMEN: Soft, Nontender, Nondistended; Bowel sounds present  EXTREMITIES:  + LE edema      LABS:                        9.2    7.0   )-----------( 190      ( 03 Aug 2018 12:24 )             28.7     08-03    138  |  95<L>  |  110.0<H>  ----------------------------<  151<H>  6.3<HH>   |  20.0<L>  |  13.73<H>    Ca    9.0      03 Aug 2018 12:24    TPro  7.8  /  Alb  4.2  /  TBili  0.7  /  DBili  x   /  AST  22  /  ALT  16  /  AlkPhos  99  08-03                RADIOLOGY & ADDITIONAL TESTS:

## 2018-08-03 NOTE — H&P ADULT - HISTORY OF PRESENT ILLNESS
37 y/o F with hx of ESRD (on dialysis Monday, Wednesday and Friday) sec to either FSGS or HTN (per renal), Pt still produces urine on her own.  DM, and HTN presents to the ED with SOB starting this morning, after 2 days of missed dialysis. Pt reports she missed her Monday and Wednesday dialysis appointments due to excessive episodes of diarrhea. Pt presented to dialysis today with SOB and chest tightness, and was sent to the ED for evaluation and dialysis treatment. States she has already had two episodes of loose, watery stool this morning. She reports recent change in BP medication but is unable to specify. No further complaints at this time.     Sh- denies habits

## 2018-08-03 NOTE — ED ADULT NURSE NOTE - NSIMPLEMENTINTERV_GEN_ALL_ED
Implemented All Universal Safety Interventions:  [place] to call system. Call bell, personal items and telephone within reach. Instruct patient to call for assistance. Room bathroom lighting operational. Non-slip footwear when patient is off stretcher. Physically safe environment: no spills, clutter or unnecessary equipment. Stretcher in lowest position, wheels locked, appropriate side rails in place.

## 2018-08-03 NOTE — CONSULT NOTE ADULT - ASSESSMENT
ESRD on HD MWF schedule   Missed HD last two sessions  Hyperkalemia needs HD 2K bath  SOB will remove additional UF today on HD  Will HD again vega as well  Consent in chart    Will follow

## 2018-08-03 NOTE — ED PROVIDER NOTE - MEDICAL DECISION MAKING DETAILS
37 y/o with fluid overload on exam, will need dialysis today. Will reach out to nephrology group, labs, EKG and admit.

## 2018-08-03 NOTE — H&P ADULT - NSHPPHYSICALEXAM_GEN_ALL_CORE
ICU Vital Signs Last 24 Hrs  T(C): 36.6 (03 Aug 2018 10:24), Max: 36.6 (03 Aug 2018 10:24)  T(F): 97.9 (03 Aug 2018 10:24), Max: 97.9 (03 Aug 2018 10:24)  HR: 91 (03 Aug 2018 10:24) (91 - 91)  BP: 159/82 (03 Aug 2018 10:24) (159/82 - 159/82)  BP(mean): --  ABP: --  ABP(mean): --  RR: 21 (03 Aug 2018 10:24) (21 - 21)  SpO2: 90% (03 Aug 2018 10:24) (90% - 90%)

## 2018-08-03 NOTE — H&P ADULT - ASSESSMENT
1. Fluid overload/ Hyperkalemia/ AG metabolic acidosis/ Missed HD- Urgent HD. Renal consult appreciated.   2. ESRD on HD- Cont rest of renal meds. d/w renal- likely FSGS or HTn etiology  3. Diarrhea- with h/o C diff in the past will send for C diff testing. stool studies  4. HTN- Cont home meds  5. DM denied. Reviewed past records and medications, pt not DM2. hgba1c 5.2    Heparin 1. Fluid overload/ Hyperkalemia/ AG metabolic acidosis/ Missed HD- Urgent HD. Renal consult appreciated.   2. ESRD on HD- Cont rest of renal meds. d/w renal- likely FSGS or HTn etiology  3. Diarrhea- with h/o C diff in the past will send for C diff testing. stool studies  4. HTN- Cont home meds  5. DM denied. Reviewed past records and medications, pt not DM2. hgba1c 5.2  6. Hyperkalemia- No acute EKG changes. treated in ED with calcium/ insulin/ dextrose  Heparin

## 2018-08-04 LAB — TROPONIN T SERPL-MCNC: 0.4 NG/ML — HIGH (ref 0–0.06)

## 2018-08-04 PROCEDURE — 99232 SBSQ HOSP IP/OBS MODERATE 35: CPT

## 2018-08-04 RX ORDER — ACETAMINOPHEN 500 MG
650 TABLET ORAL ONCE
Qty: 0 | Refills: 0 | Status: COMPLETED | OUTPATIENT
Start: 2018-08-04 | End: 2018-08-04

## 2018-08-04 RX ORDER — ALBUTEROL 90 UG/1
2.5 AEROSOL, METERED ORAL ONCE
Qty: 0 | Refills: 0 | Status: COMPLETED | OUTPATIENT
Start: 2018-08-04 | End: 2018-08-04

## 2018-08-04 RX ORDER — ALBUTEROL 90 UG/1
2 AEROSOL, METERED ORAL EVERY 6 HOURS
Qty: 0 | Refills: 0 | Status: DISCONTINUED | OUTPATIENT
Start: 2018-08-04 | End: 2018-08-06

## 2018-08-04 RX ADMIN — Medication 100 MILLIGRAM(S): at 16:12

## 2018-08-04 RX ADMIN — Medication 100 MILLIGRAM(S): at 05:28

## 2018-08-04 RX ADMIN — Medication 90 MILLIGRAM(S): at 06:35

## 2018-08-04 RX ADMIN — ALBUTEROL 2.5 MILLIGRAM(S): 90 AEROSOL, METERED ORAL at 11:20

## 2018-08-04 RX ADMIN — ALBUTEROL 2 PUFF(S): 90 AEROSOL, METERED ORAL at 20:19

## 2018-08-04 RX ADMIN — Medication 100 MILLIGRAM(S): at 21:55

## 2018-08-04 NOTE — CHART NOTE - NSCHARTNOTEFT_GEN_A_CORE
Called by RN to see patient c/o SOB with tightness. Patient would like a nebulizer treatment.   Patient states she takes Albuterol neb. treatments at home PRN. No orders at present for nebulizer treatments.   Patient is in no acute distress. She is on 2L NC with O2 sat of 97.  Pulse is 67.  Lungs are clear to A.  Albuterol nebulizer treatment ordered at this time x 1.  Con't to observe.

## 2018-08-04 NOTE — PROGRESS NOTE ADULT - SUBJECTIVE AND OBJECTIVE BOX
cc: sob, diarrhea     interval hx : patient seen and evaluated at HD unit. comfortable. denies sob, cp, palpitations, dizziness.   tolerating HD well. c/o mild nasal stuffiness at night. denies fever or chills or cough   diarrhea resolved . denies abd pain or n/v     Temperature  Temp (F): 98.2 Degrees F  Temp (C) Temp (C): 36.8 Degrees C  Temp site Temp Site: oral    Heart Rate  Heart Rate Heart Rate (beats/min): 92 /min  Heart Rate Method: noninvasive blood pressure monitor    Noninvasive Blood Pressure  BP Systolic Systolic: 146 mm Hg  BP Diastolic Diastolic (mm Hg): 76 mm Hg  Blood Pressure - Site Site: right upper arm  Blood Pressure - Method Method: electronic    Respiratory/Pulse Oximetry  Respiration Rate (breaths/min) Respiration Rate (breaths/min): 18 /min  SpO2 (%) SpO2 (%): 95 %  O2 delivery Patient On: supplemental O2    Physical Exam:  · Constitutional	detailed exam  · Constitutional Details	obese  · Eyes	EOMI; PERRL; no drainage or redness  · ENMT	No oral lesions; no gross abnormalities  · Neck	No bruits; no thyromegaly or nodules  · Back	No deformity or limitation of movement  · Respiratory Details	diminished breath sounds, L; diminished breath sounds, R  · Cardiovascular	Regular rate & rhythm, normal S1, S2; no murmurs, gallops or rubs; no S3, S4  · Gastrointestinal	Soft, non-tender, no hepatosplenomegaly, normal bowel sounds  · Extremities mild pedal edema  · Vascular	Equal and normal pulses (carotid, femoral, dorsalis pedis)  · Neurological	Alert & oriented; no sensory, motor or coordination deficits, normal reflexes  · Skin	No lesions; no rash      meds / labs / imaging reviewe d

## 2018-08-04 NOTE — PROGRESS NOTE ADULT - SUBJECTIVE AND OBJECTIVE BOX
NEPHROLOGY INTERVAL HPI/OVERNIGHT EVENTS:    Examined earlier    MEDICATIONS  (STANDING):  aspirin enteric coated 81 milliGRAM(s) Oral daily  ferrous    sulfate 325 milliGRAM(s) Oral daily  folic acid 1 milliGRAM(s) Oral daily  heparin  Injectable 5000 Unit(s) SubCutaneous every 8 hours  hydrALAZINE 100 milliGRAM(s) Oral three times a day  NIFEdipine XL 90 milliGRAM(s) Oral daily    MEDICATIONS  (PRN):      Allergies    Mushrooms (Hives (Mild))  No Known Drug Allergies    Intolerances        Vital Signs Last 24 Hrs  T(C): 36.8 (04 Aug 2018 11:00), Max: 36.9 (03 Aug 2018 14:54)  T(F): 98.2 (04 Aug 2018 11:00), Max: 98.5 (03 Aug 2018 18:44)  HR: 93 (04 Aug 2018 11:00) (86 - 96)  BP: 158/80 (04 Aug 2018 11:00) (150/84 - 168/84)  BP(mean): --  RR: 18 (04 Aug 2018 11:00) (18 - 88)  SpO2: 97% (04 Aug 2018 05:21) (90% - 97%)  Daily     Daily Weight in k.8 (04 Aug 2018 11:00)    PHYSICAL EXAM:  GENERAL: appears chronically ill, oriented.  HEAD:  Atraumatic, Normocephalic  EYES: EOMI,  NECK: Supple, neck  veins full  NERVOUS SYSTEM:  Alert & Oriented X3  CHEST/LUNG: Clear to percussion bilaterally; rales at bases  HEART: Regular rate and rhythm; No murmur  ABDOMEN: Soft, Nontender, Nondistended; Bowel sounds present  EXTREMITIES:  + LE edema    LABS:                        9.2    7.0   )-----------( 190      ( 03 Aug 2018 12:24 )             28.7     08-03    136  |  94<L>  |  110.0<H>  ----------------------------<  96  6.5<HH>   |  17.0<L>  |  13.63<H>    Ca    9.0      03 Aug 2018 14:26    TPro  7.8  /  Alb  4.2  /  TBili  0.7  /  DBili  x   /  AST  22  /  ALT  16  /  AlkPhos  99  08-03                RADIOLOGY & ADDITIONAL TESTS:

## 2018-08-04 NOTE — PROGRESS NOTE ADULT - ASSESSMENT
ESRD on HD MWF schedule   Missed HD two sessions will HD again today  Clinically volume overloaded will remove additional UF w HD  Hyperkalemia 2K bath  SOB better  Consent in chart    Will follow

## 2018-08-04 NOTE — PROGRESS NOTE ADULT - ASSESSMENT
1. Fluid overload AG metabolic acidosis  Missed HD, in HD now . Renal consult appreciated.   monitor electrolytes   2- Hyperkalemia- No acute EKG changes. treated in ED with calcium/ insulin/ dextrose  3- Elevated trops: likley sec to ESRD, denies any cp.  cycle trops. EKG 12 lead   3. ESRD on HD- Cont rest of renal meds. d/w renal- likely FSGS or HTn etiology  4. Diarrhea- resolved. less likely cdiff. f/u remaining of stool studies  5. HTN- Cont home meds  6. DM denied. Reviewed past records and medications, pt not DM2. hgba1c 5.2

## 2018-08-05 LAB
ANION GAP SERPL CALC-SCNC: 16 MMOL/L — SIGNIFICANT CHANGE UP (ref 5–17)
BUN SERPL-MCNC: 44 MG/DL — HIGH (ref 8–20)
CALCIUM SERPL-MCNC: 8.8 MG/DL — SIGNIFICANT CHANGE UP (ref 8.6–10.2)
CHLORIDE SERPL-SCNC: 95 MMOL/L — LOW (ref 98–107)
CO2 SERPL-SCNC: 27 MMOL/L — SIGNIFICANT CHANGE UP (ref 22–29)
CREAT SERPL-MCNC: 6.34 MG/DL — HIGH (ref 0.5–1.3)
GLUCOSE SERPL-MCNC: 73 MG/DL — SIGNIFICANT CHANGE UP (ref 70–115)
POTASSIUM SERPL-MCNC: 4.6 MMOL/L — SIGNIFICANT CHANGE UP (ref 3.5–5.3)
POTASSIUM SERPL-SCNC: 4.6 MMOL/L — SIGNIFICANT CHANGE UP (ref 3.5–5.3)
SODIUM SERPL-SCNC: 138 MMOL/L — SIGNIFICANT CHANGE UP (ref 135–145)

## 2018-08-05 PROCEDURE — 99231 SBSQ HOSP IP/OBS SF/LOW 25: CPT

## 2018-08-05 RX ORDER — IPRATROPIUM/ALBUTEROL SULFATE 18-103MCG
3 AEROSOL WITH ADAPTER (GRAM) INHALATION EVERY 6 HOURS
Qty: 0 | Refills: 0 | Status: DISCONTINUED | OUTPATIENT
Start: 2018-08-05 | End: 2018-08-06

## 2018-08-05 RX ORDER — TIOTROPIUM BROMIDE 18 UG/1
1 CAPSULE ORAL; RESPIRATORY (INHALATION) DAILY
Qty: 0 | Refills: 0 | Status: DISCONTINUED | OUTPATIENT
Start: 2018-08-05 | End: 2018-08-06

## 2018-08-05 RX ORDER — ALBUTEROL 90 UG/1
1 AEROSOL, METERED ORAL EVERY 4 HOURS
Qty: 0 | Refills: 0 | Status: DISCONTINUED | OUTPATIENT
Start: 2018-08-05 | End: 2018-08-05

## 2018-08-05 RX ORDER — FLUTICASONE PROPIONATE 50 MCG
1 SPRAY, SUSPENSION NASAL
Qty: 0 | Refills: 0 | Status: DISCONTINUED | OUTPATIENT
Start: 2018-08-05 | End: 2018-08-06

## 2018-08-05 RX ADMIN — Medication 3 MILLILITER(S): at 14:33

## 2018-08-05 RX ADMIN — Medication 3 MILLILITER(S): at 21:10

## 2018-08-05 RX ADMIN — Medication 90 MILLIGRAM(S): at 05:43

## 2018-08-05 RX ADMIN — Medication 650 MILLIGRAM(S): at 00:09

## 2018-08-05 RX ADMIN — Medication 1 MILLIGRAM(S): at 12:04

## 2018-08-05 RX ADMIN — Medication 100 MILLIGRAM(S): at 05:44

## 2018-08-05 RX ADMIN — Medication 1 SPRAY(S): at 22:34

## 2018-08-05 RX ADMIN — Medication 325 MILLIGRAM(S): at 12:04

## 2018-08-05 RX ADMIN — Medication 100 MILLIGRAM(S): at 22:35

## 2018-08-05 NOTE — PROGRESS NOTE ADULT - SUBJECTIVE AND OBJECTIVE BOX
cc: sob, diarrhea     interval hx :   patient seen and evaluated   comfortable. denies sob, cp,  denies fever or chills or cough   diarrhea resolved . denies abd pain or n/v     Vital Signs Last 24 Hrs  T(C): 36.6 (05 Aug 2018 16:16), Max: 37.1 (05 Aug 2018 05:00)  T(F): 97.8 (05 Aug 2018 16:16), Max: 98.7 (05 Aug 2018 05:00)  HR: 95 (05 Aug 2018 16:16) (92 - 97)  BP: 155/87 (05 Aug 2018 16:16) (136/78 - 155/88)  BP(mean): --  RR: 18 (05 Aug 2018 16:16) (18 - 18)  SpO2: 92% (05 Aug 2018 16:16) (92% - 97%)    Physical Exam:  · Constitutional	detailed exam  · Constitutional Details	obese  · Eyes	EOMI; PERRL; no drainage or redness  · ENMT	No oral lesions; no gross abnormalities  · Neck	No bruits; no thyromegaly or nodules  · Back	No deformity or limitation of movement  · Respiratory Details	good air entry b/l   · Cardiovascular	Regular rate & rhythm, normal S1, S2; no murmurs, gallops or rubs; no S3, S4  · Gastrointestinal	Soft, non-tender, no hepatosplenomegaly, normal bowel sounds  · Extremities mild pedal edema      LABS:    08-05    138  |  95<L>  |  44.0<H>  ----------------------------<  73  4.6   |  27.0  |  6.34<H>    Ca    8.8      05 Aug 2018 08:30

## 2018-08-05 NOTE — PROGRESS NOTE ADULT - ASSESSMENT
ESRD on HD MWF schedule   Missed HD two sessions will HD again tommorow to keep on out pt schedule  Clinically volume overloaded will remove additional UF w HD  Hyperkalemia better on 2K bath  SOB better  Consent in chart    Will follow

## 2018-08-05 NOTE — PROGRESS NOTE ADULT - SUBJECTIVE AND OBJECTIVE BOX
NEPHROLOGY INTERVAL HPI/OVERNIGHT EVENTS:    Examined earlier    MEDICATIONS  (STANDING):  ALBUTerol/ipratropium for Nebulization 3 milliLiter(s) Nebulizer every 6 hours  aspirin enteric coated 81 milliGRAM(s) Oral daily  ferrous    sulfate 325 milliGRAM(s) Oral daily  folic acid 1 milliGRAM(s) Oral daily  heparin  Injectable 5000 Unit(s) SubCutaneous every 8 hours  hydrALAZINE 100 milliGRAM(s) Oral three times a day  NIFEdipine XL 90 milliGRAM(s) Oral daily  tiotropium 18 MICROgram(s) Capsule 1 Capsule(s) Inhalation daily    MEDICATIONS  (PRN):  ALBUTerol    90 MICROgram(s) HFA Inhaler 2 Puff(s) Inhalation every 6 hours PRN Shortness of Breath and/or Wheezing      Allergies    Mushrooms (Hives (Mild))  No Known Drug Allergies    Intolerances        Vital Signs Last 24 Hrs  T(C): 36.6 (05 Aug 2018 11:30), Max: 37.1 (05 Aug 2018 05:00)  T(F): 97.9 (05 Aug 2018 11:30), Max: 98.7 (05 Aug 2018 05:00)  HR: 92 (05 Aug 2018 11:30) (92 - 97)  BP: 136/78 (05 Aug 2018 11:30) (136/78 - 155/88)  BP(mean): --  RR: 18 (05 Aug 2018 11:30) (18 - 18)  SpO2: 96% (05 Aug 2018 05:00) (92% - 97%)  Daily     Daily Weight in k.8 (04 Aug 2018 14:15)    PHYSICAL EXAM:  GENERAL: appears chronically ill, oriented.  HEAD:  Atraumatic, Normocephalic  EYES: EOMI,  NECK: Supple, neck  veins full  NERVOUS SYSTEM:  Alert & Oriented X3  CHEST/LUNG: Clear to percussion bilaterally; rales at bases  HEART: Regular rate and rhythm; No murmur  ABDOMEN: Soft, Nontender, Nondistended; Bowel sounds present  EXTREMITIES:  + LE edema    LABS:        138  |  95<L>  |  44.0<H>  ----------------------------<  73  4.6   |  27.0  |  6.34<H>    Ca    8.8      05 Aug 2018 08:30                  RADIOLOGY & ADDITIONAL TESTS:

## 2018-08-05 NOTE — PROGRESS NOTE ADULT - ASSESSMENT
1. Fluid overload AG metabolic acidosis  Missed HD, s/p HD yesterday. on M/W/F schedule   monitor electrolytes. HD in am. dounebs as per home schedule   2- acute on chronic diastolic chf : sec to missed HD. improved sxs now. c/w HD in am as per schedule   3- Hyperkalemia- improved after HD  4- Elevated trops: peaked. likley sec to ESRD, denies any cp.  5. ESRD on HD- Cont rest of renal meds. d/w renal- likely FSGS or HTn etiology  6. Diarrhea- resolved. less likely cdiff. f/u remaining of stool studies  7. HTN- Cont home meds  8. DM denied. Reviewed past records and medications, pt not DM2. hgba1c 5.2        possible dc home in am if medically stable

## 2018-08-06 ENCOUNTER — TRANSCRIPTION ENCOUNTER (OUTPATIENT)
Age: 38
End: 2018-08-06

## 2018-08-06 VITALS — OXYGEN SATURATION: 91 %

## 2018-08-06 LAB
ANION GAP SERPL CALC-SCNC: 19 MMOL/L — HIGH (ref 5–17)
BUN SERPL-MCNC: 64 MG/DL — HIGH (ref 8–20)
CALCIUM SERPL-MCNC: 8.9 MG/DL — SIGNIFICANT CHANGE UP (ref 8.6–10.2)
CHLORIDE SERPL-SCNC: 97 MMOL/L — LOW (ref 98–107)
CO2 SERPL-SCNC: 24 MMOL/L — SIGNIFICANT CHANGE UP (ref 22–29)
CREAT SERPL-MCNC: 7.72 MG/DL — HIGH (ref 0.5–1.3)
GLUCOSE SERPL-MCNC: 75 MG/DL — SIGNIFICANT CHANGE UP (ref 70–115)
HBV SURFACE AG SER-ACNC: SIGNIFICANT CHANGE UP
HCT VFR BLD CALC: 26.5 % — LOW (ref 37–47)
HGB BLD-MCNC: 8.6 G/DL — LOW (ref 12–16)
MCHC RBC-ENTMCNC: 27 PG — SIGNIFICANT CHANGE UP (ref 27–31)
MCHC RBC-ENTMCNC: 32.5 G/DL — SIGNIFICANT CHANGE UP (ref 32–36)
MCV RBC AUTO: 83.3 FL — SIGNIFICANT CHANGE UP (ref 81–99)
PLATELET # BLD AUTO: 174 K/UL — SIGNIFICANT CHANGE UP (ref 150–400)
POTASSIUM SERPL-MCNC: 4.8 MMOL/L — SIGNIFICANT CHANGE UP (ref 3.5–5.3)
POTASSIUM SERPL-SCNC: 4.8 MMOL/L — SIGNIFICANT CHANGE UP (ref 3.5–5.3)
RBC # BLD: 3.18 M/UL — LOW (ref 4.4–5.2)
RBC # FLD: 16.6 % — HIGH (ref 11–15.6)
SODIUM SERPL-SCNC: 140 MMOL/L — SIGNIFICANT CHANGE UP (ref 135–145)
WBC # BLD: 5.6 K/UL — SIGNIFICANT CHANGE UP (ref 4.8–10.8)
WBC # FLD AUTO: 5.6 K/UL — SIGNIFICANT CHANGE UP (ref 4.8–10.8)

## 2018-08-06 PROCEDURE — 87340 HEPATITIS B SURFACE AG IA: CPT

## 2018-08-06 PROCEDURE — 99239 HOSP IP/OBS DSCHRG MGMT >30: CPT

## 2018-08-06 PROCEDURE — 85027 COMPLETE CBC AUTOMATED: CPT

## 2018-08-06 PROCEDURE — 82435 ASSAY OF BLOOD CHLORIDE: CPT

## 2018-08-06 PROCEDURE — 94640 AIRWAY INHALATION TREATMENT: CPT

## 2018-08-06 PROCEDURE — 36415 COLL VENOUS BLD VENIPUNCTURE: CPT

## 2018-08-06 PROCEDURE — 84132 ASSAY OF SERUM POTASSIUM: CPT

## 2018-08-06 PROCEDURE — 84484 ASSAY OF TROPONIN QUANT: CPT

## 2018-08-06 PROCEDURE — 71045 X-RAY EXAM CHEST 1 VIEW: CPT

## 2018-08-06 PROCEDURE — 82947 ASSAY GLUCOSE BLOOD QUANT: CPT

## 2018-08-06 PROCEDURE — 85014 HEMATOCRIT: CPT

## 2018-08-06 PROCEDURE — 82962 GLUCOSE BLOOD TEST: CPT

## 2018-08-06 PROCEDURE — 80053 COMPREHEN METABOLIC PANEL: CPT

## 2018-08-06 PROCEDURE — 84295 ASSAY OF SERUM SODIUM: CPT

## 2018-08-06 PROCEDURE — 82330 ASSAY OF CALCIUM: CPT

## 2018-08-06 PROCEDURE — 99261: CPT

## 2018-08-06 PROCEDURE — 83880 ASSAY OF NATRIURETIC PEPTIDE: CPT

## 2018-08-06 PROCEDURE — 99285 EMERGENCY DEPT VISIT HI MDM: CPT | Mod: 25

## 2018-08-06 PROCEDURE — 93005 ELECTROCARDIOGRAM TRACING: CPT

## 2018-08-06 PROCEDURE — 80048 BASIC METABOLIC PNL TOTAL CA: CPT

## 2018-08-06 PROCEDURE — 83605 ASSAY OF LACTIC ACID: CPT

## 2018-08-06 PROCEDURE — 82803 BLOOD GASES ANY COMBINATION: CPT

## 2018-08-06 RX ORDER — HYDRALAZINE HCL 50 MG
1 TABLET ORAL
Qty: 0 | Refills: 0 | COMMUNITY
Start: 2018-08-06

## 2018-08-06 RX ORDER — ALBUTEROL 90 UG/1
2 AEROSOL, METERED ORAL
Qty: 0 | Refills: 0 | COMMUNITY
Start: 2018-08-06

## 2018-08-06 RX ORDER — FOLIC ACID 0.8 MG
0 TABLET ORAL
Qty: 0 | Refills: 0 | COMMUNITY

## 2018-08-06 RX ORDER — FOLIC ACID 0.8 MG
1 TABLET ORAL
Qty: 0 | Refills: 0 | COMMUNITY
Start: 2018-08-06

## 2018-08-06 RX ORDER — HYDRALAZINE HCL 50 MG
0 TABLET ORAL
Qty: 0 | Refills: 0 | COMMUNITY

## 2018-08-06 RX ORDER — ASPIRIN/CALCIUM CARB/MAGNESIUM 324 MG
1 TABLET ORAL
Qty: 0 | Refills: 0 | COMMUNITY
Start: 2018-08-06

## 2018-08-06 RX ORDER — ASPIRIN/CALCIUM CARB/MAGNESIUM 324 MG
1 TABLET ORAL
Qty: 0 | Refills: 0 | COMMUNITY

## 2018-08-06 RX ORDER — NIFEDIPINE 30 MG
90 TABLET, EXTENDED RELEASE 24 HR ORAL
Qty: 0 | Refills: 0 | COMMUNITY

## 2018-08-06 RX ORDER — TIOTROPIUM BROMIDE 18 UG/1
1 CAPSULE ORAL; RESPIRATORY (INHALATION)
Qty: 0 | Refills: 0 | COMMUNITY
Start: 2018-08-06

## 2018-08-06 RX ORDER — NIFEDIPINE 30 MG
1 TABLET, EXTENDED RELEASE 24 HR ORAL
Qty: 0 | Refills: 0 | COMMUNITY
Start: 2018-08-06

## 2018-08-06 RX ORDER — FLUTICASONE PROPIONATE 50 MCG
1 SPRAY, SUSPENSION NASAL
Qty: 0 | Refills: 0 | COMMUNITY
Start: 2018-08-06

## 2018-08-06 RX ADMIN — Medication 100 MILLIGRAM(S): at 05:36

## 2018-08-06 RX ADMIN — Medication 100 MILLIGRAM(S): at 13:04

## 2018-08-06 RX ADMIN — Medication 1 SPRAY(S): at 05:36

## 2018-08-06 RX ADMIN — Medication 90 MILLIGRAM(S): at 05:37

## 2018-08-06 RX ADMIN — Medication 1 MILLIGRAM(S): at 13:04

## 2018-08-06 RX ADMIN — Medication 3 MILLILITER(S): at 16:27

## 2018-08-06 RX ADMIN — Medication 3 MILLILITER(S): at 03:46

## 2018-08-06 RX ADMIN — Medication 325 MILLIGRAM(S): at 13:04

## 2018-08-06 NOTE — DISCHARGE NOTE ADULT - CARE PLAN
Principal Discharge DX:	Other hypervolemia  Goal:	avoid volume overload  Assessment and plan of treatment:	c/w Hemodyalisis as scheduled  Secondary Diagnosis:	Shortness of breath  Goal:	avoid volume overload  Assessment and plan of treatment:	c/w Hemodyalisis as scheduled  c/w nebs as scheduled  Secondary Diagnosis:	End stage renal disease

## 2018-08-06 NOTE — DISCHARGE NOTE ADULT - MEDICATION SUMMARY - MEDICATIONS TO TAKE
I will START or STAY ON the medications listed below when I get home from the hospital:    aspirin 81 mg oral delayed release tablet  -- 1 tab(s) by mouth once a day  -- Indication: For heart    albuterol 90 mcg/inh inhalation aerosol  -- 2 puff(s) inhaled every 6 hours, As needed, Shortness of Breath and/or Wheezing  -- Indication: For lungs    tiotropium 18 mcg inhalation capsule  -- 1 cap(s) inhaled once a day  -- Indication: For lungs    NIFEdipine 90 mg oral tablet, extended release  -- 1 tab(s) by mouth once a day  -- Indication: For hypertension    Mary-Sequels (as elemental iron) 65 mg-25 mg oral tablet  -- 1 tab(s) by mouth 2 times a day   -- Check with your doctor before becoming pregnant.  Do not take dairy products, antacids, or iron preparations within one hour of this medication.  May discolor urine or feces.  Obtain medical advice before taking any non-prescription drugs as some may affect the action of this medication.    -- Indication: For anemia     fluticasone 50 mcg/inh nasal spray  -- 1 spray(s) into nose 2 times a day  -- Indication: For breathing     hydrALAZINE 100 mg oral tablet  -- 1 tab(s) by mouth 3 times a day  -- Indication: For blood pressure     folic acid 1 mg oral tablet  -- 1 tab(s) by mouth once a day  -- Indication: For Supplement

## 2018-08-06 NOTE — DISCHARGE NOTE ADULT - HOSPITAL COURSE
cc: sob, diarrhea     37 y/o F with hx of ESRD (on dialysis Monday, Wednesday and Friday), DM, and HTN presents to the ED with SOB starting this morning, after 2 days of missed dialysis. Pt reports she missed her Monday and Wednesday dialysis appointments due to excessive episodes of diarrhea. Pt presented to dialysis today with SOB and chest tightness, and was sent to the ED for evaluation and dialysis treatment. States she has already had two episodes of loose, watery stool this morning.   sob likely sec to volume overload/ pulmonary congestion ,acute on chronic DCHF improved after HD .   also had mildy elevated trops, likely sec to ESRD, no new ekg changes, denies any chest pain     interval hx :   patient seen and evaluated comfortable. denies sob, cp, denies fever or chills or cough diarrhea resolved . denies abd pain or n/v     Temperature  Temp (F): 98.4 Degrees F  Temp (C) Temp (C): 36.9 Degrees C  Temp site Temp Site: oral  Heart Rate  Heart Rate Heart Rate (beats/min): 90 /min  Noninvasive Blood Pressure  BP Systolic Systolic: 145 mm Hg  BP Diastolic Diastolic (mm Hg): 77 mm Hg  Blood Pressure - Method Method: electronic  Respiratory/Pulse Oximetry  Respiration Rate (breaths/min) Respiration Rate (breaths/min): 18 /min  SpO2 (%) SpO2 (%): 93 %  O2 delivery Patient On: supplemental O2      Physical Exam:  · Constitutional	detailed exam  · Constitutional Details	obese  · Eyes	EOMI; PERRL; no drainage or redness  · ENMT	No oral lesions; no gross abnormalities  · Neck	No bruits; no thyromegaly or nodules  · Back	No deformity or limitation of movement  · Respiratory Details	good air entry b/l   · Cardiovascular	Regular rate & rhythm, normal S1, S2; no murmurs, gallops or rubs; no S3, S4  · Gastrointestinal	Soft, non-tender, no hepatosplenomegaly, normal bowel sounds  · Extremities mild pedal edema    labs meds reviewed.   138  |  95<L>  |  44.0<H>  ----------------------------<  73  4.6   |  27.0  |  6.34<H>    Ca    8.8      05 Aug 2018 08:30    1. Fluid overload AG metabolic acidosis  resolved. c/w HD as per [place]   2- acute on chronic diastolic chf : sec to missed HD. improved sxs now. c/w HD in am as per schedule   3- Hyperkalemia- improved after HD  4- Elevated trops: peaked. erlinley sec to ESRD, denies any cp.  5. ESRD on HD- Cont rest of renal meds. d/w renal- likely FSGS or HTn etiology  6. Diarrhea- resolved. less likely cdiff.  7. HTN- Cont home meds  8. DM denied. Reviewed past records and medications, pt not DM2. hgba1c 5.2  9. patient also has home o2 of 2 L NC prior to admission     time spent for discharge 35 minutes

## 2018-08-06 NOTE — DISCHARGE NOTE ADULT - PATIENT PORTAL LINK FT
You can access the [email or web address], offered by [facility], by registering with the following website: [email or web address]

## 2018-08-06 NOTE — PROGRESS NOTE ADULT - SUBJECTIVE AND OBJECTIVE BOX
Patient was seen and evaluated on dialysis.   No c/o CP SOB NV  no F/C  mild LE swelling    T(C): 36.9 (08-06-18 @ 11:15), Max: 36.9 (08-06-18 @ 07:35)  HR: 90 (08-06-18 @ 11:15) (77 - 100)  BP: 145/77 (08-06-18 @ 11:15) (144/82 - 172/91)  Wt(kg): --  PE ;  NAD  lungs - CTA  CV gr 1 murmer,  No gallop or rub  Abd : soft, NT BS +, No masses  Ext- No edema  Neuro : Grossly intact, moving extremities                             8.6    5.6   )-----------( 174      ( 06 Aug 2018 08:32 )             26.5        08-06    140  |  97<L>  |  64.0<H>  ----------------------------<  75  4.8   |  24.0  |  7.72<H>    Ca    8.9      06 Aug 2018 08:32        MEDICATIONS  (STANDING):  ALBUTerol    90 MICROgram(s) HFA Inhaler PRN  ALBUTerol/ipratropium for Nebulization  aspirin enteric coated  ferrous    sulfate  fluticasone propionate 50 MICROgram(s)/spray Nasal Spray  folic acid  heparin  Injectable  hydrALAZINE  NIFEdipine XL  tiotropium 18 MICROgram(s) Capsule      Patient stable  Mike HD easily  K normal now  Continue

## 2018-09-05 ENCOUNTER — INPATIENT (INPATIENT)
Facility: HOSPITAL | Age: 38
LOS: 2 days | Discharge: HOME | DRG: 640 | End: 2018-09-08
Attending: HOSPITALIST | Admitting: HOSPITALIST
Payer: MEDICARE

## 2018-09-05 VITALS
SYSTOLIC BLOOD PRESSURE: 173 MMHG | TEMPERATURE: 98 F | HEART RATE: 92 BPM | DIASTOLIC BLOOD PRESSURE: 76 MMHG | OXYGEN SATURATION: 95 % | RESPIRATION RATE: 20 BRPM

## 2018-09-05 DIAGNOSIS — Z41.9 ENCOUNTER FOR PROCEDURE FOR PURPOSES OTHER THAN REMEDYING HEALTH STATE, UNSPECIFIED: Chronic | ICD-10-CM

## 2018-09-05 DIAGNOSIS — I77.0 ARTERIOVENOUS FISTULA, ACQUIRED: Chronic | ICD-10-CM

## 2018-09-05 DIAGNOSIS — I50.9 HEART FAILURE, UNSPECIFIED: ICD-10-CM

## 2018-09-05 DIAGNOSIS — E11.22 TYPE 2 DIABETES MELLITUS WITH DIABETIC CHRONIC KIDNEY DISEASE: ICD-10-CM

## 2018-09-05 DIAGNOSIS — N18.6 END STAGE RENAL DISEASE: ICD-10-CM

## 2018-09-05 DIAGNOSIS — I10 ESSENTIAL (PRIMARY) HYPERTENSION: ICD-10-CM

## 2018-09-05 DIAGNOSIS — Z99.2 DEPENDENCE ON RENAL DIALYSIS: Chronic | ICD-10-CM

## 2018-09-05 DIAGNOSIS — Z29.9 ENCOUNTER FOR PROPHYLACTIC MEASURES, UNSPECIFIED: ICD-10-CM

## 2018-09-05 LAB
ALBUMIN SERPL ELPH-MCNC: 4.1 G/DL — SIGNIFICANT CHANGE UP (ref 3.3–5.2)
ALP SERPL-CCNC: 80 U/L — SIGNIFICANT CHANGE UP (ref 40–120)
ALT FLD-CCNC: 16 U/L — SIGNIFICANT CHANGE UP
ANION GAP SERPL CALC-SCNC: 23 MMOL/L — HIGH (ref 5–17)
APTT BLD: 34.3 SEC — SIGNIFICANT CHANGE UP (ref 27.5–37.4)
AST SERPL-CCNC: 33 U/L — HIGH
BASOPHILS # BLD AUTO: 0 K/UL — SIGNIFICANT CHANGE UP (ref 0–0.2)
BASOPHILS NFR BLD AUTO: 0.3 % — SIGNIFICANT CHANGE UP (ref 0–2)
BILIRUB SERPL-MCNC: 0.7 MG/DL — SIGNIFICANT CHANGE UP (ref 0.4–2)
BUN SERPL-MCNC: 116 MG/DL — HIGH (ref 8–20)
CALCIUM SERPL-MCNC: 9.5 MG/DL — SIGNIFICANT CHANGE UP (ref 8.6–10.2)
CHLORIDE SERPL-SCNC: 95 MMOL/L — LOW (ref 98–107)
CO2 SERPL-SCNC: 20 MMOL/L — LOW (ref 22–29)
CREAT SERPL-MCNC: 11.4 MG/DL — HIGH (ref 0.5–1.3)
EOSINOPHIL # BLD AUTO: 0.2 K/UL — SIGNIFICANT CHANGE UP (ref 0–0.5)
EOSINOPHIL NFR BLD AUTO: 2.5 % — SIGNIFICANT CHANGE UP (ref 0–6)
GLUCOSE SERPL-MCNC: 111 MG/DL — SIGNIFICANT CHANGE UP (ref 70–115)
HCT VFR BLD CALC: 28 % — LOW (ref 37–47)
HGB BLD-MCNC: 9 G/DL — LOW (ref 12–16)
INR BLD: 1.23 RATIO — HIGH (ref 0.88–1.16)
LYMPHOCYTES # BLD AUTO: 1.2 K/UL — SIGNIFICANT CHANGE UP (ref 1–4.8)
LYMPHOCYTES # BLD AUTO: 19.7 % — LOW (ref 20–55)
MCHC RBC-ENTMCNC: 26.6 PG — LOW (ref 27–31)
MCHC RBC-ENTMCNC: 32.1 G/DL — SIGNIFICANT CHANGE UP (ref 32–36)
MCV RBC AUTO: 82.8 FL — SIGNIFICANT CHANGE UP (ref 81–99)
MONOCYTES # BLD AUTO: 0.5 K/UL — SIGNIFICANT CHANGE UP (ref 0–0.8)
MONOCYTES NFR BLD AUTO: 8 % — SIGNIFICANT CHANGE UP (ref 3–10)
NEUTROPHILS # BLD AUTO: 4.4 K/UL — SIGNIFICANT CHANGE UP (ref 1.8–8)
NEUTROPHILS NFR BLD AUTO: 69.3 % — SIGNIFICANT CHANGE UP (ref 37–73)
NT-PROBNP SERPL-SCNC: HIGH PG/ML (ref 0–300)
PLATELET # BLD AUTO: 236 K/UL — SIGNIFICANT CHANGE UP (ref 150–400)
POTASSIUM SERPL-MCNC: 6.2 MMOL/L — CRITICAL HIGH (ref 3.5–5.3)
POTASSIUM SERPL-SCNC: 6.2 MMOL/L — CRITICAL HIGH (ref 3.5–5.3)
PROT SERPL-MCNC: 8.2 G/DL — SIGNIFICANT CHANGE UP (ref 6.6–8.7)
PROTHROM AB SERPL-ACNC: 13.6 SEC — HIGH (ref 9.8–12.7)
RBC # BLD: 3.38 M/UL — LOW (ref 4.4–5.2)
RBC # FLD: 16.8 % — HIGH (ref 11–15.6)
SODIUM SERPL-SCNC: 138 MMOL/L — SIGNIFICANT CHANGE UP (ref 135–145)
TROPONIN T SERPL-MCNC: 0.38 NG/ML — HIGH (ref 0–0.06)
WBC # BLD: 6.3 K/UL — SIGNIFICANT CHANGE UP (ref 4.8–10.8)
WBC # FLD AUTO: 6.3 K/UL — SIGNIFICANT CHANGE UP (ref 4.8–10.8)

## 2018-09-05 PROCEDURE — 93010 ELECTROCARDIOGRAM REPORT: CPT

## 2018-09-05 PROCEDURE — 71045 X-RAY EXAM CHEST 1 VIEW: CPT | Mod: 26

## 2018-09-05 PROCEDURE — 99285 EMERGENCY DEPT VISIT HI MDM: CPT

## 2018-09-05 PROCEDURE — 99223 1ST HOSP IP/OBS HIGH 75: CPT

## 2018-09-05 RX ORDER — DEXTROSE 50 % IN WATER 50 %
25 SYRINGE (ML) INTRAVENOUS ONCE
Qty: 0 | Refills: 0 | Status: DISCONTINUED | OUTPATIENT
Start: 2018-09-05 | End: 2018-09-06

## 2018-09-05 RX ORDER — HEPARIN SODIUM 5000 [USP'U]/ML
5000 INJECTION INTRAVENOUS; SUBCUTANEOUS EVERY 8 HOURS
Qty: 0 | Refills: 0 | Status: DISCONTINUED | OUTPATIENT
Start: 2018-09-05 | End: 2018-09-08

## 2018-09-05 RX ORDER — DEXTROSE 50 % IN WATER 50 %
50 SYRINGE (ML) INTRAVENOUS ONCE
Qty: 0 | Refills: 0 | Status: COMPLETED | OUTPATIENT
Start: 2018-09-05 | End: 2018-09-05

## 2018-09-05 RX ORDER — SODIUM CHLORIDE 9 MG/ML
1000 INJECTION, SOLUTION INTRAVENOUS
Qty: 0 | Refills: 0 | Status: DISCONTINUED | OUTPATIENT
Start: 2018-09-05 | End: 2018-09-06

## 2018-09-05 RX ORDER — FOLIC ACID 0.8 MG
1 TABLET ORAL DAILY
Qty: 0 | Refills: 0 | Status: DISCONTINUED | OUTPATIENT
Start: 2018-09-05 | End: 2018-09-08

## 2018-09-05 RX ORDER — NITROGLYCERIN 6.5 MG
1 CAPSULE, EXTENDED RELEASE ORAL ONCE
Qty: 0 | Refills: 0 | Status: COMPLETED | OUTPATIENT
Start: 2018-09-05 | End: 2018-09-05

## 2018-09-05 RX ORDER — IPRATROPIUM/ALBUTEROL SULFATE 18-103MCG
3 AEROSOL WITH ADAPTER (GRAM) INHALATION ONCE
Qty: 0 | Refills: 0 | Status: COMPLETED | OUTPATIENT
Start: 2018-09-05 | End: 2018-09-05

## 2018-09-05 RX ORDER — NIFEDIPINE 30 MG
90 TABLET, EXTENDED RELEASE 24 HR ORAL DAILY
Qty: 0 | Refills: 0 | Status: DISCONTINUED | OUTPATIENT
Start: 2018-09-05 | End: 2018-09-08

## 2018-09-05 RX ORDER — ASPIRIN/CALCIUM CARB/MAGNESIUM 324 MG
325 TABLET ORAL ONCE
Qty: 0 | Refills: 0 | Status: COMPLETED | OUTPATIENT
Start: 2018-09-05 | End: 2018-09-05

## 2018-09-05 RX ORDER — INSULIN LISPRO 100/ML
VIAL (ML) SUBCUTANEOUS
Qty: 0 | Refills: 0 | Status: DISCONTINUED | OUTPATIENT
Start: 2018-09-05 | End: 2018-09-06

## 2018-09-05 RX ORDER — TIOTROPIUM BROMIDE 18 UG/1
1 CAPSULE ORAL; RESPIRATORY (INHALATION) DAILY
Qty: 0 | Refills: 0 | Status: DISCONTINUED | OUTPATIENT
Start: 2018-09-05 | End: 2018-09-07

## 2018-09-05 RX ORDER — ASPIRIN/CALCIUM CARB/MAGNESIUM 324 MG
81 TABLET ORAL DAILY
Qty: 0 | Refills: 0 | Status: DISCONTINUED | OUTPATIENT
Start: 2018-09-05 | End: 2018-09-08

## 2018-09-05 RX ORDER — DEXTROSE 50 % IN WATER 50 %
12.5 SYRINGE (ML) INTRAVENOUS ONCE
Qty: 0 | Refills: 0 | Status: DISCONTINUED | OUTPATIENT
Start: 2018-09-05 | End: 2018-09-06

## 2018-09-05 RX ORDER — ALBUTEROL 90 UG/1
2 AEROSOL, METERED ORAL EVERY 6 HOURS
Qty: 0 | Refills: 0 | Status: DISCONTINUED | OUTPATIENT
Start: 2018-09-05 | End: 2018-09-07

## 2018-09-05 RX ORDER — GLUCAGON INJECTION, SOLUTION 0.5 MG/.1ML
1 INJECTION, SOLUTION SUBCUTANEOUS ONCE
Qty: 0 | Refills: 0 | Status: DISCONTINUED | OUTPATIENT
Start: 2018-09-05 | End: 2018-09-06

## 2018-09-05 RX ORDER — FLUTICASONE PROPIONATE 50 MCG
1 SPRAY, SUSPENSION NASAL
Qty: 0 | Refills: 0 | Status: DISCONTINUED | OUTPATIENT
Start: 2018-09-05 | End: 2018-09-08

## 2018-09-05 RX ORDER — SODIUM CHLORIDE 9 MG/ML
3 INJECTION INTRAMUSCULAR; INTRAVENOUS; SUBCUTANEOUS ONCE
Qty: 0 | Refills: 0 | Status: COMPLETED | OUTPATIENT
Start: 2018-09-05 | End: 2018-09-05

## 2018-09-05 RX ORDER — SODIUM POLYSTYRENE SULFONATE 4.1 MEQ/G
15 POWDER, FOR SUSPENSION ORAL ONCE
Qty: 0 | Refills: 0 | Status: COMPLETED | OUTPATIENT
Start: 2018-09-05 | End: 2018-09-05

## 2018-09-05 RX ORDER — INSULIN HUMAN 100 [IU]/ML
5 INJECTION, SOLUTION SUBCUTANEOUS ONCE
Qty: 0 | Refills: 0 | Status: COMPLETED | OUTPATIENT
Start: 2018-09-05 | End: 2018-09-05

## 2018-09-05 RX ORDER — ACETAMINOPHEN 500 MG
650 TABLET ORAL EVERY 6 HOURS
Qty: 0 | Refills: 0 | Status: DISCONTINUED | OUTPATIENT
Start: 2018-09-05 | End: 2018-09-08

## 2018-09-05 RX ORDER — DEXTROSE 50 % IN WATER 50 %
15 SYRINGE (ML) INTRAVENOUS ONCE
Qty: 0 | Refills: 0 | Status: DISCONTINUED | OUTPATIENT
Start: 2018-09-05 | End: 2018-09-06

## 2018-09-05 RX ORDER — HYDRALAZINE HCL 50 MG
100 TABLET ORAL THREE TIMES A DAY
Qty: 0 | Refills: 0 | Status: DISCONTINUED | OUTPATIENT
Start: 2018-09-05 | End: 2018-09-08

## 2018-09-05 RX ADMIN — SODIUM CHLORIDE 3 MILLILITER(S): 9 INJECTION INTRAMUSCULAR; INTRAVENOUS; SUBCUTANEOUS at 20:55

## 2018-09-05 RX ADMIN — Medication 100 MILLIGRAM(S): at 23:34

## 2018-09-05 RX ADMIN — Medication 3 MILLILITER(S): at 19:00

## 2018-09-05 RX ADMIN — INSULIN HUMAN 5 UNIT(S): 100 INJECTION, SOLUTION SUBCUTANEOUS at 23:34

## 2018-09-05 RX ADMIN — Medication 3 MILLILITER(S): at 20:43

## 2018-09-05 RX ADMIN — SODIUM POLYSTYRENE SULFONATE 15 GRAM(S): 4.1 POWDER, FOR SUSPENSION ORAL at 23:33

## 2018-09-05 RX ADMIN — Medication 325 MILLIGRAM(S): at 20:43

## 2018-09-05 RX ADMIN — Medication 50 MILLILITER(S): at 23:33

## 2018-09-05 RX ADMIN — Medication 125 MILLIGRAM(S): at 20:52

## 2018-09-05 RX ADMIN — Medication 1 INCH(S): at 20:43

## 2018-09-05 NOTE — ED PROVIDER NOTE - CARE PLAN
Principal Discharge DX:	CHF (congestive heart failure)  Secondary Diagnosis:	Hypertension  Secondary Diagnosis:	Diabetes

## 2018-09-05 NOTE — ED PROVIDER NOTE - OBJECTIVE STATEMENT
The patient is a 38 year old female with history of HTN and DM with ESRD on HD now presents with SOB and cough for one week.  The patient had a HD but still feels SOB. No CP, No Fever, No chill, No abd pain, No motor no sensory loss

## 2018-09-05 NOTE — CONSULT NOTE ADULT - SUBJECTIVE AND OBJECTIVE BOX
Patient is a 38y old  Female who presents with a chief complaint of SOB with Hx chest pain last week.     HPI:  Hx CRF 5 on HD 3x/ wk, nopt shown up for her  dialysis since last Friday. States she has chest pain that day but told no one. Hx of DALTON, DM 2, and hypertension.   PAST MEDICAL & SURGICAL HISTORY:  Clostridium difficile diarrhea  Vitreous hemorrhage of left eye  Class 3 obesity due to excess calories with serious comorbidity in adult, unspecified BMI  DALTON (obstructive sleep apnea)  Pneumonia  End stage renal disease  Hypertension  Diabetes  Elective surgery: Left eye retina surgery  AVF (arteriovenous fistula)        FAMILY HISTORY:  No pertinent family history in first degree relatives  [place]    Social History: Non smoker    MEDICATIONS  (STANDING):  aspirin enteric coated 81 milliGRAM(s) Oral daily  dextrose 50% Injectable 50 milliLiter(s) IV Push Once  fluticasone propionate 50 MICROgram(s)/spray Nasal Spray 1 Spray(s) Both Nostrils two times a day  folic acid 1 milliGRAM(s) Oral daily  heparin  Injectable 5000 Unit(s) SubCutaneous every 8 hours  hydrALAZINE  Oral Tab/Cap - Peds 100 milliGRAM(s) Oral three times a day  insulin regular  human recombinant. 5 Unit(s) IV Push once  NIFEdipine XL 90 milliGRAM(s) Oral daily  sodium polystyrene sulfonate Suspension 15 Gram(s) Oral Once  tiotropium 18 MICROgram(s) Capsule 1 Capsule(s) Inhalation daily    MEDICATIONS  (PRN):  acetaminophen   Tablet .. 650 milliGRAM(s) Oral every 6 hours PRN Temp greater or equal to 38C (100.4F), Moderate Pain (4 - 6)  ALBUTerol    90 MICROgram(s) HFA Inhaler 2 Puff(s) Inhalation every 6 hours PRN Shortness of Breath and/or Wheezing   Meds reviewed    Allergies    Mushrooms (Hives (Mild))  No Known Drug Allergies    I  REVIEW OF SYSTEMS:    CONSTITUTIONAL:  sob, weight gain, feels weak  EYES: No eye pain, visual disturbances, or discharge  ENMT:  No difficulty hearing, tinnitus, vertigo; No sinus or throat pain  NECK: No pain or stiffness  BREASTS: No pain, masses, or nipple discharge  RESPIRATORY: sob  CARDIOVASCULAR: No chest pain now,    GASTROINTESTINAL: No abdominal or epigastric pain. No nausea, vomiting, or hematemesis; No diarrhea or constipation. No melena or   GENITOURINARY: No dysuria, frequency, hematuria, or incontinence  NEUROLOGICAL: No headaches, memory loss, loss of strength, feels anxious at times  SKIN: neg  LYMPH NODES: No enlarged glands  ENDOCRINE: No heat or cold intolerance; No hair loss  MUSCULOSKELETAL: Chronic lymphedema legs with scars  PSYCHIATRIC: No depression, anxiety, mood swings, or difficulty sleeping  HEME/LYMPH: No easy bruising, or bleeding gums  ALLERGY  AND IMMUNOLOGIC: No hives or eczema      Vital Signs Last 24 Hrs  T(C): 36.9 (05 Sep 2018 17:57), Max: 36.9 (05 Sep 2018 17:57)  T(F): 98.4 (05 Sep 2018 17:57), Max: 98.4 (05 Sep 2018 17:57)  HR: 92 (05 Sep 2018 17:57) (92 - 92)  BP: 173/76 (05 Sep 2018 17:57) (173/76 - 173/76)  BP(mean): --  RR: 20 (05 Sep 2018 17:57) (20 - 20)  SpO2: 95% (05 Sep 2018 17:57) (95% - 95%)      PHYSICAL EXAM:    GENERAL: appears chronically ill, oriented.  HEAD:  Atraumatic, Normocephalic  EYES: EOMI, PERRLA, conjunctiva and sclera clear  ENMT: No tonsillar erythema, exudates, or enlargement; Moist mucous membranes, Good dentition, No lesions  NECK: Supple, neck  veins full  NERVOUS SYSTEM:  Alert & Oriented X3, Good concentration; Motor Strength wnl upper and lower extremities;Anxious.  CHEST/LUNG: Clear to percussion bilaterally; No rales, rhonchi, wheezing, or rubs  HEART: Regular rate and rhythm; soft murmur,  no rubs,  ABDOMEN: Soft, Nontender, Nondistended; Bowel sounds present, body wall and flank edema  EXTREMITIES: Leg edema  LYMPH: No lymphadenopathy noted  SKIN: No rashes or lesions, pale      LABS:                        9.0    6.3   )-----------( 236      ( 05 Sep 2018 20:05 )             28.0     09-05    138  |  95<L>  |  116.0<H>  ----------------------------<  111  6.2<HH>   |  20.0<L>  |  11.40<H>    Ca    9.5      05 Sep 2018 20:05    TPro  8.2  /  Alb  4.1  /  TBili  0.7  /  DBili  x   /  AST  33<H>  /  ALT  16  /  AlkPhos  80  09-05    PT/INR - ( 05 Sep 2018 21:24 )   PT: 13.6 sec;   INR: 1.23 ratio         PTT - ( 05 Sep 2018 21:24 )  PTT:34.3 sec            RADIOLOGY & ADDITIONAL TESTS:

## 2018-09-05 NOTE — H&P ADULT - PROBLEM SELECTOR PLAN 2
Dyspnea on exertion likely 2/2 to large R sided pleural effusion. I suspect pleural effusion is 2/2 to volume overload.   -Troponin #1 elevated at 0.38 likely in setting of ESRD, however given dyspnea will trend. EKG NSR without acute ST-T wave changes.   -Echo from Jan 2018 shows LVEF 70-75%, mild LVH, no sig valvular pathology.   -Stress test from Aug 2017 reviewed and was normal.   -if dyspnea continues despite receiving HD consider Echo.

## 2018-09-05 NOTE — H&P ADULT - HISTORY OF PRESENT ILLNESS
Pt is 39 yo F presenting w/ worsening dyspnea over the last week. PMH ESRD on HD, HTN, DM2.   Patient states that she has been having progressively worsening SOB since last week, she states she was started on home O2 (2LNC) when she started dialysis approx 1-1.5 years ago. She states the dyspnea has been occurring even at rest and at night time when she lays flat it gets worse. She missed HD on Monday because she was not feeling well. She denies any associated chest pain or pressure. She denies any sick contacts, no increased fluid intake. She denies getting exertional dyspnea normally aside from the events over the last week.     Denies headaches, chest pain, abd pain, diarrhea, constipation, melena, hematochezia. She was previously on Insulin for DM2 but no longer takes it.     While in the ER patient was noted to have K 6.2 and mod hemolysis and received albuterol, Kayexelate and IV insulin in ED. No EKG changes, EKG NSR, troponin 0.38, BNP 35k.

## 2018-09-05 NOTE — ED ADULT TRIAGE NOTE - CHIEF COMPLAINT QUOTE
Pt BIBA A&ox3 hx of copd and asthma c/o subjective fever, chills and shortness of breath. Lungs are clear to auscultation B/L, pt afebrile in triage. Pt in no apparent distress. Pt o2 dependant at home 2LPM.

## 2018-09-05 NOTE — ED PROVIDER NOTE - CHPI ED SYMPTOMS POS
COUGH/DIFFICULTY BREATHING/NASAL CONGESTION/DYSPNEA ON EXERTION/SHORTNESS OF BREATH/CHEST CONGESTION

## 2018-09-05 NOTE — ED ADULT NURSE REASSESSMENT NOTE - NS ED NURSE REASSESS COMMENT FT1
patient resting remains on monitor sl to right wrist intact patient medciated as ordered. Report given to Dialysis and patient transport to ddialysis as ordered for HC tonight

## 2018-09-05 NOTE — H&P ADULT - PROBLEM SELECTOR PLAN 1
ESRD on HD likely 2/2 to poorly controlled BP and DM2. Patient cannot tell me if she had renal biopsy in past.   -her Dyspnea likely 2/2 to volume overload.   -nephrology consult called for HD to manage fluid status

## 2018-09-06 DIAGNOSIS — R94.31 ABNORMAL ELECTROCARDIOGRAM [ECG] [EKG]: ICD-10-CM

## 2018-09-06 DIAGNOSIS — I10 ESSENTIAL (PRIMARY) HYPERTENSION: ICD-10-CM

## 2018-09-06 DIAGNOSIS — R06.00 DYSPNEA, UNSPECIFIED: ICD-10-CM

## 2018-09-06 LAB
ANION GAP SERPL CALC-SCNC: 18 MMOL/L — HIGH (ref 5–17)
ANISOCYTOSIS BLD QL: SLIGHT — SIGNIFICANT CHANGE UP
BUN SERPL-MCNC: 61 MG/DL — HIGH (ref 8–20)
CALCIUM SERPL-MCNC: 9.6 MG/DL — SIGNIFICANT CHANGE UP (ref 8.6–10.2)
CHLORIDE SERPL-SCNC: 92 MMOL/L — LOW (ref 98–107)
CO2 SERPL-SCNC: 24 MMOL/L — SIGNIFICANT CHANGE UP (ref 22–29)
CREAT SERPL-MCNC: 6.48 MG/DL — HIGH (ref 0.5–1.3)
ELLIPTOCYTES BLD QL SMEAR: SLIGHT — SIGNIFICANT CHANGE UP
GLUCOSE BLDC GLUCOMTR-MCNC: 229 MG/DL — HIGH (ref 70–99)
GLUCOSE SERPL-MCNC: 155 MG/DL — HIGH (ref 70–115)
HBA1C BLD-MCNC: 4.7 % — SIGNIFICANT CHANGE UP (ref 4–5.6)
HCT VFR BLD CALC: 29.1 % — LOW (ref 37–47)
HGB BLD-MCNC: 9.1 G/DL — LOW (ref 12–16)
HYPOCHROMIA BLD QL: SIGNIFICANT CHANGE UP
LYMPHOCYTES # BLD AUTO: 7 % — LOW (ref 20–55)
MACROCYTES BLD QL: SLIGHT — SIGNIFICANT CHANGE UP
MCHC RBC-ENTMCNC: 26.3 PG — LOW (ref 27–31)
MCHC RBC-ENTMCNC: 31.3 G/DL — LOW (ref 32–36)
MCV RBC AUTO: 84.1 FL — SIGNIFICANT CHANGE UP (ref 81–99)
MICROCYTES BLD QL: SLIGHT — SIGNIFICANT CHANGE UP
MONOCYTES NFR BLD AUTO: 2 % — LOW (ref 3–10)
NEUTROPHILS NFR BLD AUTO: 91 % — HIGH (ref 37–73)
OVALOCYTES BLD QL SMEAR: SLIGHT — SIGNIFICANT CHANGE UP
PLAT MORPH BLD: NORMAL — SIGNIFICANT CHANGE UP
PLATELET # BLD AUTO: 198 K/UL — SIGNIFICANT CHANGE UP (ref 150–400)
POIKILOCYTOSIS BLD QL AUTO: SLIGHT — SIGNIFICANT CHANGE UP
POTASSIUM SERPL-MCNC: 3.9 MMOL/L — SIGNIFICANT CHANGE UP (ref 3.5–5.3)
POTASSIUM SERPL-MCNC: 3.9 MMOL/L — SIGNIFICANT CHANGE UP (ref 3.5–5.3)
POTASSIUM SERPL-SCNC: 3.9 MMOL/L — SIGNIFICANT CHANGE UP (ref 3.5–5.3)
POTASSIUM SERPL-SCNC: 3.9 MMOL/L — SIGNIFICANT CHANGE UP (ref 3.5–5.3)
RBC # BLD: 3.46 M/UL — LOW (ref 4.4–5.2)
RBC # FLD: 16.6 % — HIGH (ref 11–15.6)
RBC BLD AUTO: ABNORMAL
SODIUM SERPL-SCNC: 134 MMOL/L — LOW (ref 135–145)
TROPONIN T SERPL-MCNC: 0.36 NG/ML — HIGH (ref 0–0.06)
WBC # BLD: 5.7 K/UL — SIGNIFICANT CHANGE UP (ref 4.8–10.8)
WBC # FLD AUTO: 5.7 K/UL — SIGNIFICANT CHANGE UP (ref 4.8–10.8)

## 2018-09-06 PROCEDURE — 99233 SBSQ HOSP IP/OBS HIGH 50: CPT

## 2018-09-06 PROCEDURE — 93010 ELECTROCARDIOGRAM REPORT: CPT

## 2018-09-06 RX ORDER — CHLORHEXIDINE GLUCONATE 213 G/1000ML
1 SOLUTION TOPICAL
Qty: 0 | Refills: 0 | Status: DISCONTINUED | OUTPATIENT
Start: 2018-09-06 | End: 2018-09-08

## 2018-09-06 RX ORDER — ERYTHROPOIETIN 10000 [IU]/ML
10000 INJECTION, SOLUTION INTRAVENOUS; SUBCUTANEOUS
Qty: 0 | Refills: 0 | Status: DISCONTINUED | OUTPATIENT
Start: 2018-09-06 | End: 2018-09-08

## 2018-09-06 RX ORDER — INFLUENZA VIRUS VACCINE 15; 15; 15; 15 UG/.5ML; UG/.5ML; UG/.5ML; UG/.5ML
0.5 SUSPENSION INTRAMUSCULAR ONCE
Qty: 0 | Refills: 0 | Status: COMPLETED | OUTPATIENT
Start: 2018-09-06 | End: 2018-09-06

## 2018-09-06 RX ORDER — SODIUM CHLORIDE 0.65 %
1 AEROSOL, SPRAY (ML) NASAL
Qty: 0 | Refills: 0 | Status: DISCONTINUED | OUTPATIENT
Start: 2018-09-06 | End: 2018-09-08

## 2018-09-06 RX ORDER — HYDRALAZINE HCL 50 MG
10 TABLET ORAL ONCE
Qty: 0 | Refills: 0 | Status: COMPLETED | OUTPATIENT
Start: 2018-09-06 | End: 2018-09-06

## 2018-09-06 RX ORDER — HYDRALAZINE HCL 50 MG
10 TABLET ORAL EVERY 4 HOURS
Qty: 0 | Refills: 0 | Status: DISCONTINUED | OUTPATIENT
Start: 2018-09-06 | End: 2018-09-08

## 2018-09-06 RX ADMIN — TIOTROPIUM BROMIDE 1 CAPSULE(S): 18 CAPSULE ORAL; RESPIRATORY (INHALATION) at 10:09

## 2018-09-06 RX ADMIN — Medication 100 MILLIGRAM(S): at 13:53

## 2018-09-06 RX ADMIN — Medication 1 SPRAY(S): at 05:03

## 2018-09-06 RX ADMIN — Medication 650 MILLIGRAM(S): at 01:48

## 2018-09-06 RX ADMIN — Medication 10 MILLIGRAM(S): at 02:30

## 2018-09-06 RX ADMIN — Medication 100 MILLIGRAM(S): at 21:51

## 2018-09-06 RX ADMIN — Medication 0.1 MILLIGRAM(S): at 12:08

## 2018-09-06 RX ADMIN — CHLORHEXIDINE GLUCONATE 1 APPLICATION(S): 213 SOLUTION TOPICAL at 11:25

## 2018-09-06 RX ADMIN — Medication 100 MILLIGRAM(S): at 05:06

## 2018-09-06 RX ADMIN — Medication 650 MILLIGRAM(S): at 22:29

## 2018-09-06 RX ADMIN — Medication 90 MILLIGRAM(S): at 05:04

## 2018-09-06 RX ADMIN — Medication 1 MILLIGRAM(S): at 11:23

## 2018-09-06 RX ADMIN — Medication 650 MILLIGRAM(S): at 01:09

## 2018-09-06 RX ADMIN — Medication 1 SPRAY(S): at 13:52

## 2018-09-06 RX ADMIN — Medication 2: at 08:22

## 2018-09-06 NOTE — PROGRESS NOTE ADULT - ASSESSMENT
ESRD on HD MWF schedule   missed HD Mon had HD yest will HD again today and tommorow  Clinically volume overloaded needs to be fluid restricted  Anemia will check iron studies, and will give epo w HD   HTN will remove additional UF w HD will adjust BP meds if remains high    Will follow

## 2018-09-06 NOTE — CONSULT NOTE ADULT - ASSESSMENT
Fluid overload, DM, CRF 5, Anemia.
39 yo F w/ h/o ESRD/HD/AV fistula, normal LVEF, DM, HTN who p/w dyspnea. Of note, patient has missed recent HD sessions. More to this point, patient recent urgent HD session last night and reports feeling better today. Note, mild troponin elevation likely secondary to volume overload/renal failure and not primary cardiac process. Consider QT prolongation secondary to electrolyte imbalance.

## 2018-09-06 NOTE — PROGRESS NOTE ADULT - SUBJECTIVE AND OBJECTIVE BOX
NEPHROLOGY INTERVAL HPI/OVERNIGHT EVENTS:    Examined in ED  Clinically volume overloaded    MEDICATIONS  (STANDING):  aspirin enteric coated 81 milliGRAM(s) Oral daily  chlorhexidine 2% Cloths 1 Application(s) Topical <User Schedule>  cloNIDine 0.1 milliGRAM(s) Oral three times a day  fluticasone propionate 50 MICROgram(s)/spray Nasal Spray 1 Spray(s) Both Nostrils two times a day  folic acid 1 milliGRAM(s) Oral daily  heparin  Injectable 5000 Unit(s) SubCutaneous every 8 hours  hydrALAZINE  Oral Tab/Cap - Peds 100 milliGRAM(s) Oral three times a day  influenza   Vaccine 0.5 milliLiter(s) IntraMuscular once  NIFEdipine XL 90 milliGRAM(s) Oral daily  sodium chloride 0.65% Nasal 1 Spray(s) Both Nostrils two times a day  tiotropium 18 MICROgram(s) Capsule 1 Capsule(s) Inhalation daily    MEDICATIONS  (PRN):  acetaminophen   Tablet .. 650 milliGRAM(s) Oral every 6 hours PRN Temp greater or equal to 38C (100.4F), Moderate Pain (4 - 6)  ALBUTerol    90 MICROgram(s) HFA Inhaler 2 Puff(s) Inhalation every 6 hours PRN Shortness of Breath and/or Wheezing  hydrALAZINE Injectable 10 milliGRAM(s) IV Push every 4 hours PRN for sbp above 160mmhg      Allergies    Mushrooms (Hives (Mild))  No Known Drug Allergies    Intolerances        Vital Signs Last 24 Hrs  T(C): 36.4 (06 Sep 2018 11:17), Max: 36.9 (05 Sep 2018 17:57)  T(F): 97.5 (06 Sep 2018 11:17), Max: 98.4 (05 Sep 2018 17:57)  HR: 97 (06 Sep 2018 11:17) (87 - 97)  BP: 191/92 (06 Sep 2018 11:17) (166/82 - 191/92)  BP(mean): --  RR: 22 (06 Sep 2018 11:17) (18 - 22)  SpO2: 90% (06 Sep 2018 11:17) (90% - 100%)  Daily     Daily     PHYSICAL EXAM:    GENERAL: NAD  HEAD:  Atraumatic, Normocephalic  EYES: EOMI  NECK: Supple  NERVOUS SYSTEM:  Alert & Oriented X3  CHEST/LUNG: Clear to percussion bilaterally, trace rales at bases B/L  HEART: Regular rate and rhythm; No murmur  ABDOMEN: Soft, Nontender, Nondistended; Bowel sounds present, obese  EXTREMITIES:  1-2 + edema b/L LE    LABS:                        9.1    5.7   )-----------( 198      ( 06 Sep 2018 06:19 )             29.1     09-06    134<L>  |  92<L>  |  61.0<H>  ----------------------------<  155<H>  3.9   |  24.0  |  6.48<H>    Ca    9.6      06 Sep 2018 03:05    TPro  8.2  /  Alb  4.1  /  TBili  0.7  /  DBili  x   /  AST  33<H>  /  ALT  16  /  AlkPhos  80  09-05    PT/INR - ( 05 Sep 2018 21:24 )   PT: 13.6 sec;   INR: 1.23 ratio         PTT - ( 05 Sep 2018 21:24 )  PTT:34.3 sec            RADIOLOGY & ADDITIONAL TESTS:

## 2018-09-06 NOTE — PROGRESS NOTE ADULT - SUBJECTIVE AND OBJECTIVE BOX
SORAYA JIMENEZ    [id number]    38y      Female    INTERVAL HPI/OVERNIGHT EVENTS:    patient being seen for esrd, htn and med management. Patient seen at bedside and denies any complaints.       REVIEW OF SYSTEMS:    CONSTITUTIONAL: No fever, weight loss, or fatigue  RESPIRATORY: No cough, wheezing, hemoptysis; No shortness of breath  CARDIOVASCULAR: No chest pain, palpitations  GASTROINTESTINAL: No abdominal or epigastric pain. No nausea, vomiting  NEUROLOGICAL: No headaches, memory loss, loss of strength.  MISCELLANEOUS:      Vital Signs Last 24 Hrs  T(C): 36 (06 Sep 2018 08:21), Max: 36.9 (05 Sep 2018 17:57)  T(F): 96.8 (06 Sep 2018 08:21), Max: 98.4 (05 Sep 2018 17:57)  HR: 94 (06 Sep 2018 10:09) (87 - 95)  BP: 176/92 (06 Sep 2018 08:21) (166/82 - 188/88)  BP(mean): --  RR: 18 (06 Sep 2018 08:21) (18 - 22)  SpO2: 93% (06 Sep 2018 10:09) (93% - 100%)    PHYSICAL EXAM:    GENERAL: NAD,  NECK: soft, Supple, No JVD,   CHEST/LUNG: Clear to auscultation bilaterally; No wheezing  HEART: S1S2+, Regular rate and rhythm; No murmurs  ABDOMEN: Soft, Nontender,   EXTREMITIES:  2+ Peripheral Pulses, No edema  SKIN: No rashes or lesions  NEURO: AAOX3,   PSYCH: normal mood      LABS:                        9.1    5.7   )-----------( 198      ( 06 Sep 2018 06:19 )             29.1     09-06    134<L>  |  92<L>  |  61.0<H>  ----------------------------<  155<H>  3.9   |  24.0  |  6.48<H>    Ca    9.6      06 Sep 2018 03:05    TPro  8.2  /  Alb  4.1  /  TBili  0.7  /  DBili  x   /  AST  33<H>  /  ALT  16  /  AlkPhos  80  09-05    PT/INR - ( 05 Sep 2018 21:24 )   PT: 13.6 sec;   INR: 1.23 ratio         PTT - ( 05 Sep 2018 21:24 )  PTT:34.3 sec        MEDICATIONS  (STANDING):  aspirin enteric coated 81 milliGRAM(s) Oral daily  chlorhexidine 2% Cloths 1 Application(s) Topical <User Schedule>  fluticasone propionate 50 MICROgram(s)/spray Nasal Spray 1 Spray(s) Both Nostrils two times a day  folic acid 1 milliGRAM(s) Oral daily  heparin  Injectable 5000 Unit(s) SubCutaneous every 8 hours  hydrALAZINE  Oral Tab/Cap - Peds 100 milliGRAM(s) Oral three times a day  influenza   Vaccine 0.5 milliLiter(s) IntraMuscular once  NIFEdipine XL 90 milliGRAM(s) Oral daily  tiotropium 18 MICROgram(s) Capsule 1 Capsule(s) Inhalation daily    MEDICATIONS  (PRN):  acetaminophen   Tablet .. 650 milliGRAM(s) Oral every 6 hours PRN Temp greater or equal to 38C (100.4F), Moderate Pain (4 - 6)  ALBUTerol    90 MICROgram(s) HFA Inhaler 2 Puff(s) Inhalation every 6 hours PRN Shortness of Breath and/or Wheezing      RADIOLOGY & ADDITIONAL TESTS:    tte --> ordered

## 2018-09-06 NOTE — PROGRESS NOTE ADULT - ASSESSMENT
Pt is 37 yo F presenting w/ volume overload 2/2 ESRD on HD. PMH ESRD on HD, HTN, DM2 presents with fluid overload       Problem/Plan - 1:  ·  Problem: Fluid overload secondary to missing HD on Monday  --> nephro following  --> advised better diet compliance, patient states her mother cooks with salt  --> tte ordered       Problem/Plan - 2:  ·  Problem: ESRD--> nephro consult appreciated  --> had HD yesterday and is due for hd today     Problem/Plan - 3:  ·  Problem: R/O Hyperkalemia. --> resolved     Problem/Plan - 4:  ·  Problem: Essential hypertension.  Plan: BP elevated at 173/76, restart home BP meds Nifedepine and Hydralazine  --> clonidine 0.1 added  --> iv hydralaizne prn .      Problem/Plan - 5:  ·  Problem: Type 2 diabetes mellitus with chronic kidney disease on chronic dialysis, without long-term current use of insulin.    --> A1c well controlled  --> dc sliding scale      Problem/Plan - 6:  Problem: R/O Obesity with serious comorbidity, unspecified classification, unspecified obesity type. Plan: Encouraged weight loss.     Problem/Plan - 7:  ·  Problem: R/O Anemia in chronic kidney disease, on chronic dialysis.  Plan: Hgb 9.0 and at baseline. No signs of active blood loss.  -continue to monitor.      Problem/Plan - 8:  ·  Problem: NSTEMI --> likely seocndarty to ckd  --> called [facility]   --> tte

## 2018-09-06 NOTE — CONSULT NOTE ADULT - SUBJECTIVE AND OBJECTIVE BOX
Patient is a 38y old  Female who presents with a chief complaint of Dyspnea/Volume overload (05 Sep 2018 22:16)        PAST MEDICAL & SURGICAL HISTORY:  Clostridium difficile diarrhea  Vitreous hemorrhage of left eye  Class 3 obesity due to excess calories with serious comorbidity in adult, unspecified BMI  DALTON (obstructive sleep apnea)  Pneumonia  End stage renal disease  Hypertension  Diabetes  Elective surgery: Left eye retina surgery  AVF (arteriovenous fistula)  Vascular dialysis catheter in place      Summary of admission HPI:                PREVIOUS DIAGNOSTIC TESTING:      ECHO  FINDINGS:    STRESS  FINDINGS:    CATHETERIZATION  FINDINGS:    ELECTROPHYSIOLOGY STUDY  FINDINGS:    CAROTID ULTRASOUND:  FINDINGS    VENOUS DUPLEX SCAN:  FINDINGS:    CHEST CT PULMONARY ANGIO with IV Contrast:  FINDINGS:  MEDICATIONS  (STANDING):  aspirin enteric coated 81 milliGRAM(s) Oral daily  chlorhexidine 2% Cloths 1 Application(s) Topical <User Schedule>  dextrose 5%. 1000 milliLiter(s) (50 mL/Hr) IV Continuous <Continuous>  dextrose 50% Injectable 12.5 Gram(s) IV Push once  dextrose 50% Injectable 25 Gram(s) IV Push once  dextrose 50% Injectable 25 Gram(s) IV Push once  fluticasone propionate 50 MICROgram(s)/spray Nasal Spray 1 Spray(s) Both Nostrils two times a day  folic acid 1 milliGRAM(s) Oral daily  heparin  Injectable 5000 Unit(s) SubCutaneous every 8 hours  hydrALAZINE  Oral Tab/Cap - Peds 100 milliGRAM(s) Oral three times a day  influenza   Vaccine 0.5 milliLiter(s) IntraMuscular once  insulin lispro (HumaLOG) corrective regimen sliding scale   SubCutaneous three times a day before meals  NIFEdipine XL 90 milliGRAM(s) Oral daily  tiotropium 18 MICROgram(s) Capsule 1 Capsule(s) Inhalation daily    MEDICATIONS  (PRN):  acetaminophen   Tablet .. 650 milliGRAM(s) Oral every 6 hours PRN Temp greater or equal to 38C (100.4F), Moderate Pain (4 - 6)  ALBUTerol    90 MICROgram(s) HFA Inhaler 2 Puff(s) Inhalation every 6 hours PRN Shortness of Breath and/or Wheezing  dextrose 40% Gel 15 Gram(s) Oral once PRN Blood Glucose LESS THAN 70 milliGRAM(s)/deciliter  glucagon  Injectable 1 milliGRAM(s) IntraMuscular once PRN Glucose LESS THAN 70 milligrams/deciliter      FAMILY HISTORY:  Family history of renal disease (Grandparent)      SOCIAL HISTORY:    CIGARETTES:    ALCOHOL:    REVIEW OF SYSTEMS:    CONSTITUTIONAL: No fever, weight loss, chills, shakes, or fatigue  EYES: No eye pain, visual disturbances, or discharge  ENMT:  No difficulty hearing, tinnitus, vertigo; No sinus or throat pain  NECK: No pain or stiffness  BREASTS: No pain, masses, or nipple discharge  RESPIRATORY: No cough, wheezing, hemoptysis, or shortness of breath  CARDIOVASCULAR: No chest pain, dyspnea, palpitations, dizziness, syncope, paroxysmal nocturnal dyspnea, orthopnea, or arm or leg swelling  GASTROINTESTINAL: No abdominal  or epigastric pain, nausea, vomiting, hematemesis, diarrhea, constipation, melena or bright red blood.  GENITOURINARY: No dysuria, nocturia, hematuria, or urinary incontinence  NEUROLOGICAL: No headaches, memory loss, slurred speech, limb weakness, loss of strength, numbness, or tremors  SKIN: No itching, burning, rashes, or lesions   LYMPH NODES: No enlarged glands  ENDOCRINE: No heat or cold intolerance, or hair loss  MUSCULOSKELETAL: No joint pain or swelling, muscle, back, or extremity pain  PSYCHIATRIC: No depression, anxiety, or difficulty sleeping  HEME/LYMPH: No easy bruising or bleeding gums  ALLERY AND IMMUNOLOGIC: No hives or rash.      Vital Signs Last 24 Hrs  T(C): 36 (06 Sep 2018 08:21), Max: 36.9 (05 Sep 2018 17:57)  T(F): 96.8 (06 Sep 2018 08:21), Max: 98.4 (05 Sep 2018 17:57)  HR: 94 (06 Sep 2018 10:09) (87 - 95)  BP: 176/92 (06 Sep 2018 08:21) (166/82 - 188/88)  BP(mean): --  RR: 18 (06 Sep 2018 08:21) (18 - 22)  SpO2: 93% (06 Sep 2018 10:09) (93% - 100%)    PHYSICAL EXAM:    GENERAL: In no apparent distress, well nourished, and hydrated.  HEAD:  Atraumatic, Normocephalic  EYES: EOMI, PERRLA, conjunctiva and sclera clear  ENMT: No tonsillar erythema, exudates, or enlargement; Moist mucous membranes, Good dentition, No lesions  NECK: Supple and normal thyroid.  No JVD or carotid bruit.  Carotid pulse is 2+ bilaterally.  HEART: Regular rate and rhythm; No murmurs, rubs, or gallops.  PULMONARY: Clear to auscultation and perfusion.  No rales, wheezing, or rhonchi bilaterally.  ABDOMEN: Soft, Nontender, Nondistended; Bowel sounds present  EXTREMITIES:  2+ Peripheral Pulses, No clubbing, cyanosis, or edema  LYMPH: No lymphadenopathy noted  NEUROLOGICAL: Grossly nonfocal          INTERPRETATION OF TELEMETRY:    ECG:    I&O's Detail    05 Sep 2018 07:01  -  06 Sep 2018 07:00  --------------------------------------------------------  IN:    Other: 800 mL  Total IN: 800 mL    OUT:    Other: 5600 mL  Total OUT: 5600 mL    Total NET: -4800 mL          LABS:                        9.1    5.7   )-----------( 198      ( 06 Sep 2018 06:19 )             29.1     09-06    134<L>  |  92<L>  |  61.0<H>  ----------------------------<  155<H>  3.9   |  24.0  |  6.48<H>    Ca    9.6      06 Sep 2018 03:05    TPro  8.2  /  Alb  4.1  /  TBili  0.7  /  DBili  x   /  AST  33<H>  /  ALT  16  /  AlkPhos  80  09-05    CARDIAC MARKERS ( 06 Sep 2018 03:05 )  x     / 0.36 ng/mL / x     / x     / x      CARDIAC MARKERS ( 05 Sep 2018 20:05 )  x     / 0.38 ng/mL / x     / x     / x          PT/INR - ( 05 Sep 2018 21:24 )   PT: 13.6 sec;   INR: 1.23 ratio         PTT - ( 05 Sep 2018 21:24 )  PTT:34.3 sec    BNPSerum Pro-Brain Natriuretic Peptide: [id number] pg/mL (09-05 @ 20:05)    I&O's Detail    05 Sep 2018 07:01  -  06 Sep 2018 07:00  --------------------------------------------------------  IN:    Other: 800 mL  Total IN: 800 mL    OUT:    Other: 5600 mL  Total OUT: 5600 mL    Total NET: -4800 mL        Daily     Daily     RADIOLOGY & ADDITIONAL STUDIES: Patient is a 38y old  Female who presents with a chief complaint of Dyspnea/Volume overload (05 Sep 2018 22:16)        PAST MEDICAL & SURGICAL HISTORY:  Clostridium difficile diarrhea  Vitreous hemorrhage of left eye  Class 3 obesity due to excess calories with serious comorbidity in adult, unspecified BMI  DALTON (obstructive sleep apnea)  Pneumonia  End stage renal disease  Hypertension  Diabetes  Elective surgery: Left eye retina surgery  AVF (arteriovenous fistula)  Vascular dialysis catheter in place      Summary of admission HPI:      37 yo F w/ h/o ESRD/HD/AV fistula, normal LVEF, DM, HTN who p/w dyspnea. Of note, patient has missed recent HD sessions. More to this point, patient recent urgent HD session last night and reports feeling better today.           CHEST CT PULMONARY ANGIO with IV Contrast:  FINDINGS:  MEDICATIONS  (STANDING):  aspirin enteric coated 81 milliGRAM(s) Oral daily  chlorhexidine 2% Cloths 1 Application(s) Topical <User Schedule>  dextrose 5%. 1000 milliLiter(s) (50 mL/Hr) IV Continuous <Continuous>  dextrose 50% Injectable 12.5 Gram(s) IV Push once  dextrose 50% Injectable 25 Gram(s) IV Push once  dextrose 50% Injectable 25 Gram(s) IV Push once  fluticasone propionate 50 MICROgram(s)/spray Nasal Spray 1 Spray(s) Both Nostrils two times a day  folic acid 1 milliGRAM(s) Oral daily  heparin  Injectable 5000 Unit(s) SubCutaneous every 8 hours  hydrALAZINE  Oral Tab/Cap - Peds 100 milliGRAM(s) Oral three times a day  influenza   Vaccine 0.5 milliLiter(s) IntraMuscular once  insulin lispro (HumaLOG) corrective regimen sliding scale   SubCutaneous three times a day before meals  NIFEdipine XL 90 milliGRAM(s) Oral daily  tiotropium 18 MICROgram(s) Capsule 1 Capsule(s) Inhalation daily    MEDICATIONS  (PRN):  acetaminophen   Tablet .. 650 milliGRAM(s) Oral every 6 hours PRN Temp greater or equal to 38C (100.4F), Moderate Pain (4 - 6)  ALBUTerol    90 MICROgram(s) HFA Inhaler 2 Puff(s) Inhalation every 6 hours PRN Shortness of Breath and/or Wheezing  dextrose 40% Gel 15 Gram(s) Oral once PRN Blood Glucose LESS THAN 70 milliGRAM(s)/deciliter  glucagon  Injectable 1 milliGRAM(s) IntraMuscular once PRN Glucose LESS THAN 70 milligrams/deciliter      FAMILY HISTORY:  Family history of renal disease (Grandparent)        CONSTITUTIONAL: No fever, weight loss, chills, shakes, or fatigue  EYES: No eye pain, visual disturbances, or discharge  ENMT:  No difficulty hearing, tinnitus, vertigo; No sinus or throat pain  GASTROINTESTINAL: No abdominal  or epigastric pain, nausea, vomiting, hematemesis, diarrhea, constipation, melena or bright red blood.  GENITOURINARY: No dysuria, nocturia, hematuria, or urinary incontinence  NEUROLOGICAL: No headaches, memory loss, slurred speech, limb weakness, loss of strength, numbness, or tremors  SKIN: No itching, burning, rashes, or lesions   LYMPH NODES: No enlarged glands  ENDOCRINE: No heat or cold intolerance, or hair loss  MUSCULOSKELETAL: No joint pain or swelling, muscle, back, or extremity pain  PSYCHIATRIC: No depression, anxiety, or difficulty sleeping  HEME/LYMPH: No easy bruising or bleeding gums  ALLERY AND IMMUNOLOGIC: No hives or rash.      Vital Signs Last 24 Hrs  T(C): 36 (06 Sep 2018 08:21), Max: 36.9 (05 Sep 2018 17:57)  T(F): 96.8 (06 Sep 2018 08:21), Max: 98.4 (05 Sep 2018 17:57)  HR: 94 (06 Sep 2018 10:09) (87 - 95)  BP: 176/92 (06 Sep 2018 08:21) (166/82 - 188/88)  BP(mean): --  RR: 18 (06 Sep 2018 08:21) (18 - 22)  SpO2: 93% (06 Sep 2018 10:09) (93% - 100%)    PHYSICAL EXAM:    GENERAL: In no apparent distress, well nourished, and hydrated.  HEAD:  Atraumatic, Normocephalic  EYES: EOMI, PERRLA, conjunctiva and sclera clear  ENMT: No tonsillar erythema, exudates, or enlargement; Moist mucous membranes, Good dentition, No lesions  NECK: Supple and normal thyroid.  No JVD or carotid bruit.  Carotid pulse is 2+ bilaterally.  HEART: Regular rate and rhythm; No murmurs, rubs, or gallops.  PULMONARY: decreased BS BL  ABDOMEN: Soft, Nontender, Nondistended; Bowel sounds present  EXTREMITIES:  +trace edema BL- warm      ECG: sinus rhythm + NSST's + prolonged QT    I&O's Detail    05 Sep 2018 07:01  -  06 Sep 2018 07:00  --------------------------------------------------------  IN:    Other: 800 mL  Total IN: 800 mL    OUT:    Other: 5600 mL  Total OUT: 5600 mL    Total NET: -4800 mL          LABS:                        9.1    5.7   )-----------( 198      ( 06 Sep 2018 06:19 )             29.1     09-06    134<L>  |  92<L>  |  61.0<H>  ----------------------------<  155<H>  3.9   |  24.0  |  6.48<H>    Ca    9.6      06 Sep 2018 03:05    TPro  8.2  /  Alb  4.1  /  TBili  0.7  /  DBili  x   /  AST  33<H>  /  ALT  16  /  AlkPhos  80  09-05    CARDIAC MARKERS ( 06 Sep 2018 03:05 )  x     / 0.36 ng/mL / x     / x     / x      CARDIAC MARKERS ( 05 Sep 2018 20:05 )  x     / 0.38 ng/mL / x     / x     / x          PT/INR - ( 05 Sep 2018 21:24 )   PT: 13.6 sec;   INR: 1.23 ratio         PTT - ( 05 Sep 2018 21:24 )  PTT:34.3 sec    BNPSerum Pro-Brain Natriuretic Peptide: 46787 pg/mL (09-05 @ 20:05)    I&O's Detail    05 Sep 2018 07:01  -  06 Sep 2018 07:00  --------------------------------------------------------  IN:    Other: 800 mL  Total IN: 800 mL    OUT:    Other: 5600 mL  Total OUT: 5600 mL    Total NET: -4800 mL        Daily     Daily     RADIOLOGY & ADDITIONAL STUDIES:

## 2018-09-07 DIAGNOSIS — E87.70 FLUID OVERLOAD, UNSPECIFIED: ICD-10-CM

## 2018-09-07 LAB
ANION GAP SERPL CALC-SCNC: 17 MMOL/L — SIGNIFICANT CHANGE UP (ref 5–17)
BUN SERPL-MCNC: 53 MG/DL — HIGH (ref 8–20)
CALCIUM SERPL-MCNC: 9 MG/DL — SIGNIFICANT CHANGE UP (ref 8.6–10.2)
CHLORIDE SERPL-SCNC: 94 MMOL/L — LOW (ref 98–107)
CO2 SERPL-SCNC: 26 MMOL/L — SIGNIFICANT CHANGE UP (ref 22–29)
CREAT SERPL-MCNC: 6.06 MG/DL — HIGH (ref 0.5–1.3)
GLUCOSE SERPL-MCNC: 105 MG/DL — SIGNIFICANT CHANGE UP (ref 70–115)
HBV SURFACE AG SER-ACNC: SIGNIFICANT CHANGE UP
HCT VFR BLD CALC: 27.3 % — LOW (ref 37–47)
HGB BLD-MCNC: 8.9 G/DL — LOW (ref 12–16)
IRON SATN MFR SERPL: 85 UG/DL — SIGNIFICANT CHANGE UP (ref 37–145)
MAGNESIUM SERPL-MCNC: 2.2 MG/DL — SIGNIFICANT CHANGE UP (ref 1.6–2.6)
MCHC RBC-ENTMCNC: 27.2 PG — SIGNIFICANT CHANGE UP (ref 27–31)
MCHC RBC-ENTMCNC: 32.6 G/DL — SIGNIFICANT CHANGE UP (ref 32–36)
MCV RBC AUTO: 83.5 FL — SIGNIFICANT CHANGE UP (ref 81–99)
PLATELET # BLD AUTO: 197 K/UL — SIGNIFICANT CHANGE UP (ref 150–400)
POTASSIUM SERPL-MCNC: 4.2 MMOL/L — SIGNIFICANT CHANGE UP (ref 3.5–5.3)
POTASSIUM SERPL-SCNC: 4.2 MMOL/L — SIGNIFICANT CHANGE UP (ref 3.5–5.3)
RBC # BLD: 3.27 M/UL — LOW (ref 4.4–5.2)
RBC # FLD: 16.4 % — HIGH (ref 11–15.6)
SODIUM SERPL-SCNC: 137 MMOL/L — SIGNIFICANT CHANGE UP (ref 135–145)
WBC # BLD: 6.4 K/UL — SIGNIFICANT CHANGE UP (ref 4.8–10.8)
WBC # FLD AUTO: 6.4 K/UL — SIGNIFICANT CHANGE UP (ref 4.8–10.8)

## 2018-09-07 PROCEDURE — 99233 SBSQ HOSP IP/OBS HIGH 50: CPT

## 2018-09-07 RX ORDER — IPRATROPIUM/ALBUTEROL SULFATE 18-103MCG
3 AEROSOL WITH ADAPTER (GRAM) INHALATION EVERY 6 HOURS
Qty: 0 | Refills: 0 | Status: DISCONTINUED | OUTPATIENT
Start: 2018-09-07 | End: 2018-09-08

## 2018-09-07 RX ADMIN — Medication 0.1 MILLIGRAM(S): at 17:40

## 2018-09-07 RX ADMIN — CHLORHEXIDINE GLUCONATE 1 APPLICATION(S): 213 SOLUTION TOPICAL at 06:40

## 2018-09-07 RX ADMIN — ERYTHROPOIETIN 10000 UNIT(S): 10000 INJECTION, SOLUTION INTRAVENOUS; SUBCUTANEOUS at 14:46

## 2018-09-07 RX ADMIN — Medication 650 MILLIGRAM(S): at 21:14

## 2018-09-07 RX ADMIN — Medication 100 MILLIGRAM(S): at 23:45

## 2018-09-07 RX ADMIN — Medication 1 MILLIGRAM(S): at 18:23

## 2018-09-07 RX ADMIN — Medication 650 MILLIGRAM(S): at 22:00

## 2018-09-07 RX ADMIN — Medication 3 MILLILITER(S): at 22:45

## 2018-09-07 RX ADMIN — Medication 100 MILLIGRAM(S): at 06:40

## 2018-09-07 RX ADMIN — Medication 90 MILLIGRAM(S): at 06:56

## 2018-09-07 RX ADMIN — Medication 100 MILLIGRAM(S): at 18:24

## 2018-09-07 NOTE — PROGRESS NOTE ADULT - ASSESSMENT
ESRD   H/O Noncompliance with outpatient treatments   improved fluid overload   HD today     Anemia - Epogen with HD TIW     HTN - Reassess post fluid removal with HD     RO - Check ca x phos in am

## 2018-09-07 NOTE — PROGRESS NOTE ADULT - PROBLEM SELECTOR PLAN 3
1. Electrolyte imbalance likely contributing- work to maintain normal electrolyte balance  2. Avoid QT prolonging drugs  3. Please call back cardiology with questions

## 2018-09-07 NOTE — PROGRESS NOTE ADULT - ASSESSMENT
37 yo F w/ h/o ESRD/HD/AV fistula, normal LVEF, DM, HTN who p/w dyspnea. Of note, patient has missed recent HD sessions. More to this point, patient recent urgent HD session upon initial presentation and reports feeling better thereafter. Note, mild troponin elevation likely secondary to volume overload/renal failure and not primary cardiac process.

## 2018-09-07 NOTE — PROGRESS NOTE ADULT - SUBJECTIVE AND OBJECTIVE BOX
CC: Dyspnea/Volume overload     HPI:  39 y/o F with PMH ESRD on HD, HTN, DM2 presented w/ worsening dyspnea over the last week. PMH ESRD on HD, HTN, DM2. She states she was started on home O2 (2LNC) when she started dialysis approximately 1-1.5 years ago. She missed HD on Monday because she was not feeling well. While in the ER patient was noted to have K 6.2 and mod hemolysis and received albuterol, Kayexalate and IV insulin in ED.     INTERVAL HPI/OVERNIGHT EVENTS: Patient seen and examined sitting up in bed.  Patient states she has ROMERO but reports feeling better.  Patient denies any headache, dizziness, CP, abdominal pain, nausea, vomiting, dysuria.  Other ROS reviewed and are negative.      Vital Signs Last 24 Hrs  T(C): 36.9 (07 Sep 2018 14:17), Max: 37.1 (06 Sep 2018 23:35)  T(F): 98.4 (07 Sep 2018 14:17), Max: 98.8 (06 Sep 2018 23:35)  HR: 92 (07 Sep 2018 14:17) (85 - 95)  BP: 162/90 (07 Sep 2018 14:17) (146/82 - 162/90)  BP(mean): --  RR: 18 (07 Sep 2018 14:17) (16 - 23)  SpO2: 97% (07 Sep 2018 14:17) (90% - 99%)  I&O's Detail    06 Sep 2018 07:01  -  07 Sep 2018 07:00  --------------------------------------------------------  IN:  Total IN: 0 mL    OUT:    Other: 3000 mL  Total OUT: 3000 mL    Total NET: -3000 mL    PHYSICAL EXAM:  GENERAL: NAD, well-groomed, well-developed  HEAD:  Atraumatic, Normocephalic  NECK: Supple, No JVD, Normal thyroid  NERVOUS SYSTEM:  Alert & Oriented X3, Good concentration; Motor Strength 5/5 B/L upper and lower extremities  CHEST/LUNG: Clear to auscultation bilaterally; No rales, rhonchi, wheezing, or rubs  HEART: Regular rate and rhythm; No murmurs, rubs, or gallops  ABDOMEN: Soft, Nontender, Nondistended; Bowel sounds present  EXTREMITIES:  2+ Peripheral Pulses, No clubbing, cyanosis, or edema        CARDIAC MARKERS ( 06 Sep 2018 03:05 )  x     / 0.36 ng/mL / x     / x     / x      CARDIAC MARKERS ( 05 Sep 2018 20:05 )  x     / 0.38 ng/mL / x     / x     / x                                8.9    6.4   )-----------( 197      ( 07 Sep 2018 07:50 )             27.3     07 Sep 2018 07:50    137    |  94     |  53.0   ----------------------------<  105    4.2     |  26.0   |  6.06     Ca    9.0        07 Sep 2018 07:50  Mg     2.2       07 Sep 2018 07:50    TPro  8.2    /  Alb  4.1    /  TBili  0.7    /  DBili  x      /  AST  33     /  ALT  16     /  AlkPhos  80     05 Sep 2018 20:05    PT/INR - ( 05 Sep 2018 21:24 )   PT: 13.6 sec;   INR: 1.23 ratio         PTT - ( 05 Sep 2018 21:24 )  PTT:34.3 sec      LIVER FUNCTIONS - ( 05 Sep 2018 20:05 )  Alb: 4.1 g/dL / Pro: 8.2 g/dL / ALK PHOS: 80 U/L / ALT: 16 U/L / AST: 33 U/L / GGT: x             Hemoglobin A1C, Whole Blood: 4.7 % (09-06-18 @ 06:19)    MEDICATIONS  (STANDING):  aspirin enteric coated 81 milliGRAM(s) Oral daily  chlorhexidine 2% Cloths 1 Application(s) Topical <User Schedule>  cloNIDine 0.1 milliGRAM(s) Oral three times a day  epoetin raina Injectable 87430 Unit(s) IV Push <User Schedule>  fluticasone propionate 50 MICROgram(s)/spray Nasal Spray 1 Spray(s) Both Nostrils two times a day  folic acid 1 milliGRAM(s) Oral daily  heparin  Injectable 5000 Unit(s) SubCutaneous every 8 hours  hydrALAZINE  Oral Tab/Cap - Peds 100 milliGRAM(s) Oral three times a day  influenza   Vaccine 0.5 milliLiter(s) IntraMuscular once  NIFEdipine XL 90 milliGRAM(s) Oral daily  sodium chloride 0.65% Nasal 1 Spray(s) Both Nostrils two times a day    MEDICATIONS  (PRN):  acetaminophen   Tablet .. 650 milliGRAM(s) Oral every 6 hours PRN Temp greater or equal to 38C (100.4F), Moderate Pain (4 - 6)  ALBUTerol/ipratropium for Nebulization 3 milliLiter(s) Nebulizer every 6 hours PRN Shortness of Breath and/or Wheezing  hydrALAZINE Injectable 10 milliGRAM(s) IV Push every 4 hours PRN for sbp above 160mmhg

## 2018-09-07 NOTE — PROGRESS NOTE ADULT - ASSESSMENT
37 y/o F with PMH ESRD on HD, HTN, DM2 presenting w/ volume overload 2/2 ESRD on HD.        Problem/Plan - 1:  ·  Problem: Fluid overload secondary to missing HD on Monday  --> nephro following  --> advised better diet compliance, patient states her mother cooks with salt  --> tte pending       Problem/Plan - 2:  ·  Problem: ESRD--> nephro following  --> had HD yesterday and is due for hd today     Problem/Plan - 3:  ·  Problem: R/O Hyperkalemia. --> resolved     Problem/Plan - 4:  ·  Problem: Essential hypertension.  Plan: Continue Nifedipine and Hydralazine and Clonidine  --> iv hydralazine prn .      Problem/Plan - 5:  ·  Problem: Type 2 diabetes mellitus with chronic kidney disease on chronic dialysis, without long-term current use of insulin.    --> A1c well controlled  --> dc sliding scale      Problem/Plan - 6:  Problem: R/O Obesity with serious comorbidity, unspecified classification, unspecified obesity type. Plan: Encouraged weight loss.     Problem/Plan - 7:  ·  Problem: R/O Anemia in chronic kidney disease, on chronic dialysis.  Plan: Hgb 9.0 and at baseline. No signs of active blood loss.  -continue to monitor.      Problem/Plan - 8:  ·  Problem: NSTEMI --> likely secondary to ckd  --> cardio following   --> tte pending    Disp: Possible discharge 9/8/18.

## 2018-09-07 NOTE — PROGRESS NOTE ADULT - SUBJECTIVE AND OBJECTIVE BOX
NEPHROLOGY INTERVAL HPI/OVERNIGHT EVENTS:    Feels better  with fluid removal with HD   Cardio follow up noted     MEDICATIONS  (STANDING):  aspirin enteric coated 81 milliGRAM(s) Oral daily  chlorhexidine 2% Cloths 1 Application(s) Topical <User Schedule>  cloNIDine 0.1 milliGRAM(s) Oral three times a day  epoetin raina Injectable 49091 Unit(s) IV Push <User Schedule>  fluticasone propionate 50 MICROgram(s)/spray Nasal Spray 1 Spray(s) Both Nostrils two times a day  folic acid 1 milliGRAM(s) Oral daily  heparin  Injectable 5000 Unit(s) SubCutaneous every 8 hours  hydrALAZINE  Oral Tab/Cap - Peds 100 milliGRAM(s) Oral three times a day  influenza   Vaccine 0.5 milliLiter(s) IntraMuscular once  NIFEdipine XL 90 milliGRAM(s) Oral daily  sodium chloride 0.65% Nasal 1 Spray(s) Both Nostrils two times a day    MEDICATIONS  (PRN):  acetaminophen   Tablet .. 650 milliGRAM(s) Oral every 6 hours PRN Temp greater or equal to 38C (100.4F), Moderate Pain (4 - 6)  ALBUTerol/ipratropium for Nebulization 3 milliLiter(s) Nebulizer every 6 hours PRN Shortness of Breath and/or Wheezing  hydrALAZINE Injectable 10 milliGRAM(s) IV Push every 4 hours PRN for sbp above 160mmhg      Allergies    Mushrooms (Hives (Mild))  No Known Drug Allergies    Intolerances          Vital Signs Last 24 Hrs  T(C): 36.9 (07 Sep 2018 14:17), Max: 37.1 (06 Sep 2018 23:35)  T(F): 98.4 (07 Sep 2018 14:17), Max: 98.8 (06 Sep 2018 23:35)  HR: 92 (07 Sep 2018 14:17) (85 - 95)  BP: 162/90 (07 Sep 2018 14:17) (146/82 - 162/90)  BP(mean): --  RR: 18 (07 Sep 2018 14:17) (16 - 23)  SpO2: 97% (07 Sep 2018 14:17) (90% - 99%)  Daily     Daily Weight in k.7 (07 Sep 2018 14:17)  I&O's Detail    06 Sep 2018 07:01  -  07 Sep 2018 07:00  --------------------------------------------------------  IN:  Total IN: 0 mL    OUT:    Other: 3000 mL  Total OUT: 3000 mL    Total NET: -3000 mL        I&O's Summary    06 Sep 2018 07:01  -  07 Sep 2018 07:00  --------------------------------------------------------  IN: 0 mL / OUT: 3000 mL / NET: -3000 mL        PHYSICAL EXAM:  HEAD:  Atraumatic, Normocephalic  EYES: EOMI  NECK: Supple  NERVOUS SYSTEM:  Alert & Oriented X3  CHEST/LUNG: Clear to percussion bilaterally, trace rales at bases B/L  HEART: Regular rate and rhythm; No murmur  ABDOMEN: Soft, Nontender, Nondistended; Bowel sounds present, obese  EXTREMITIES:  1-2 + edema b/L LE    LABS:                        8.9    6.4   )-----------( 197      ( 07 Sep 2018 07:50 )             27.3         137  |  94<L>  |  53.0<H>  ----------------------------<  105  4.2   |  26.0  |  6.06<H>    Ca    9.0      07 Sep 2018 07:50  Mg     2.2         TPro  8.2  /  Alb  4.1  /  TBili  0.7  /  DBili  x   /  AST  33<H>  /  ALT  16  /  AlkPhos  80      PT/INR - ( 05 Sep 2018 21:24 )   PT: 13.6 sec;   INR: 1.23 ratio         PTT - ( 05 Sep 2018 21:24 )  PTT:34.3 sec    Magnesium, Serum: 2.2 mg/dL ( @ 07:50)          RADIOLOGY & ADDITIONAL TESTS:

## 2018-09-07 NOTE — PROGRESS NOTE ADULT - SUBJECTIVE AND OBJECTIVE BOX
Patient is a 38y old  Female who presents with a chief complaint of Dyspnea/Volume overload (06 Sep 2018 13:34)        PAST MEDICAL & SURGICAL HISTORY:  Clostridium difficile diarrhea  Vitreous hemorrhage of left eye  Class 3 obesity due to excess calories with serious comorbidity in adult, unspecified BMI  DALTON (obstructive sleep apnea)  Pneumonia  End stage renal disease  Hypertension  Diabetes  Elective surgery: Left eye retina surgery  AVF (arteriovenous fistula)  Vascular dialysis catheter in place        CHEST CT PULMONARY ANGIO with IV Contrast:  FINDINGS:  MEDICATIONS  (STANDING):  aspirin enteric coated 81 milliGRAM(s) Oral daily  chlorhexidine 2% Cloths 1 Application(s) Topical <User Schedule>  cloNIDine 0.1 milliGRAM(s) Oral three times a day  epoetin raina Injectable 98216 Unit(s) IV Push <User Schedule>  fluticasone propionate 50 MICROgram(s)/spray Nasal Spray 1 Spray(s) Both Nostrils two times a day  folic acid 1 milliGRAM(s) Oral daily  heparin  Injectable 5000 Unit(s) SubCutaneous every 8 hours  hydrALAZINE  Oral Tab/Cap - Peds 100 milliGRAM(s) Oral three times a day  influenza   Vaccine 0.5 milliLiter(s) IntraMuscular once  NIFEdipine XL 90 milliGRAM(s) Oral daily  sodium chloride 0.65% Nasal 1 Spray(s) Both Nostrils two times a day  tiotropium 18 MICROgram(s) Capsule 1 Capsule(s) Inhalation daily    MEDICATIONS  (PRN):  acetaminophen   Tablet .. 650 milliGRAM(s) Oral every 6 hours PRN Temp greater or equal to 38C (100.4F), Moderate Pain (4 - 6)  ALBUTerol    90 MICROgram(s) HFA Inhaler 2 Puff(s) Inhalation every 6 hours PRN Shortness of Breath and/or Wheezing  hydrALAZINE Injectable 10 milliGRAM(s) IV Push every 4 hours PRN for sbp above 160mmhg      ALLERY AND IMMUNOLOGIC: No hives or rash.      Vital Signs Last 24 Hrs  T(C): 36.7 (07 Sep 2018 08:25), Max: 37.1 (06 Sep 2018 23:35)  T(F): 98.1 (07 Sep 2018 08:25), Max: 98.8 (06 Sep 2018 23:35)  HR: 85 (07 Sep 2018 08:25) (85 - 97)  BP: 151/89 (07 Sep 2018 08:25) (146/82 - 191/92)  BP(mean): --  RR: 18 (07 Sep 2018 08:25) (16 - 23)  SpO2: 99% (07 Sep 2018 08:25) (90% - 99%)    PHYSICAL EXAM:      GENERAL: In no apparent distress, well nourished, and hydrated.  HEAD:  Atraumatic, Normocephalic  EYES: EOMI, PERRLA, conjunctiva and sclera clear  ENMT: No tonsillar erythema, exudates, or enlargement; Moist mucous membranes, Good dentition, No lesions  NECK: Supple and normal thyroid.  No JVD or carotid bruit.  Carotid pulse is 2+ bilaterally.  HEART: Regular rate and rhythm; No murmurs, rubs, or gallops.  PULMONARY: decreased BS BL  ABDOMEN: Soft, Nontender, Nondistended; Bowel sounds present  EXTREMITIES:  +trace edema BL- warm      ECG: sinus rhythm + NSST's + prolonged QT    I&O's Detail    06 Sep 2018 07:01  -  07 Sep 2018 07:00  --------------------------------------------------------  IN:  Total IN: 0 mL    OUT:    Other: 3000 mL  Total OUT: 3000 mL    Total NET: -3000 mL          LABS:                        8.9    6.4   )-----------( 197      ( 07 Sep 2018 07:50 )             27.3     09-07    137  |  94<L>  |  53.0<H>  ----------------------------<  105  4.2   |  26.0  |  6.06<H>    Ca    9.0      07 Sep 2018 07:50  Mg     2.2     09-07    TPro  8.2  /  Alb  4.1  /  TBili  0.7  /  DBili  x   /  AST  33<H>  /  ALT  16  /  AlkPhos  80  09-05    CARDIAC MARKERS ( 06 Sep 2018 03:05 )  x     / 0.36 ng/mL / x     / x     / x      CARDIAC MARKERS ( 05 Sep 2018 20:05 )  x     / 0.38 ng/mL / x     / x     / x          PT/INR - ( 05 Sep 2018 21:24 )   PT: 13.6 sec;   INR: 1.23 ratio         PTT - ( 05 Sep 2018 21:24 )  PTT:34.3 sec    BNP  I&O's Detail    06 Sep 2018 07:01  -  07 Sep 2018 07:00  --------------------------------------------------------  IN:  Total IN: 0 mL    OUT:    Other: 3000 mL  Total OUT: 3000 mL    Total NET: -3000 mL        Daily     Daily     RADIOLOGY & ADDITIONAL STUDIES:

## 2018-09-08 ENCOUNTER — TRANSCRIPTION ENCOUNTER (OUTPATIENT)
Age: 38
End: 2018-09-08

## 2018-09-08 VITALS
RESPIRATION RATE: 18 BRPM | OXYGEN SATURATION: 92 % | HEART RATE: 90 BPM | TEMPERATURE: 98 F | DIASTOLIC BLOOD PRESSURE: 82 MMHG | SYSTOLIC BLOOD PRESSURE: 134 MMHG

## 2018-09-08 PROCEDURE — 99285 EMERGENCY DEPT VISIT HI MDM: CPT | Mod: 25

## 2018-09-08 PROCEDURE — 85027 COMPLETE CBC AUTOMATED: CPT

## 2018-09-08 PROCEDURE — 82962 GLUCOSE BLOOD TEST: CPT

## 2018-09-08 PROCEDURE — 84132 ASSAY OF SERUM POTASSIUM: CPT

## 2018-09-08 PROCEDURE — 83880 ASSAY OF NATRIURETIC PEPTIDE: CPT

## 2018-09-08 PROCEDURE — 94760 N-INVAS EAR/PLS OXIMETRY 1: CPT

## 2018-09-08 PROCEDURE — 99239 HOSP IP/OBS DSCHRG MGMT >30: CPT

## 2018-09-08 PROCEDURE — 94640 AIRWAY INHALATION TREATMENT: CPT

## 2018-09-08 PROCEDURE — 83735 ASSAY OF MAGNESIUM: CPT

## 2018-09-08 PROCEDURE — 36415 COLL VENOUS BLD VENIPUNCTURE: CPT

## 2018-09-08 PROCEDURE — 93005 ELECTROCARDIOGRAM TRACING: CPT

## 2018-09-08 PROCEDURE — 84484 ASSAY OF TROPONIN QUANT: CPT

## 2018-09-08 PROCEDURE — 93306 TTE W/DOPPLER COMPLETE: CPT

## 2018-09-08 PROCEDURE — 80053 COMPREHEN METABOLIC PANEL: CPT

## 2018-09-08 PROCEDURE — 80048 BASIC METABOLIC PNL TOTAL CA: CPT

## 2018-09-08 PROCEDURE — 85610 PROTHROMBIN TIME: CPT

## 2018-09-08 PROCEDURE — 87340 HEPATITIS B SURFACE AG IA: CPT

## 2018-09-08 PROCEDURE — 71045 X-RAY EXAM CHEST 1 VIEW: CPT

## 2018-09-08 PROCEDURE — 83540 ASSAY OF IRON: CPT

## 2018-09-08 PROCEDURE — 85730 THROMBOPLASTIN TIME PARTIAL: CPT

## 2018-09-08 PROCEDURE — 83036 HEMOGLOBIN GLYCOSYLATED A1C: CPT

## 2018-09-08 PROCEDURE — 96374 THER/PROPH/DIAG INJ IV PUSH: CPT

## 2018-09-08 PROCEDURE — 99261: CPT

## 2018-09-08 RX ORDER — FLUTICASONE PROPIONATE 50 MCG
1 SPRAY, SUSPENSION NASAL
Qty: 0 | Refills: 0 | COMMUNITY
Start: 2018-09-08

## 2018-09-08 RX ADMIN — Medication 100 MILLIGRAM(S): at 16:00

## 2018-09-08 RX ADMIN — Medication 100 MILLIGRAM(S): at 06:15

## 2018-09-08 RX ADMIN — Medication 3 MILLILITER(S): at 06:39

## 2018-09-08 RX ADMIN — Medication 90 MILLIGRAM(S): at 06:14

## 2018-09-08 RX ADMIN — Medication 1 MILLIGRAM(S): at 16:00

## 2018-09-08 NOTE — DISCHARGE NOTE ADULT - CARE PLAN
Principal Discharge DX:	Orthopnea  Goal:	ADLs  Assessment and plan of treatment:	Follow up 3X weekly HD  Follow up with pmd in 3-5 days   Up ad zeus with assistance  Renally restricted diet  Secondary Diagnosis:	Anemia in chronic kidney disease, on chronic dialysis  Secondary Diagnosis:	End stage renal disease  Secondary Diagnosis:	Essential hypertension  Secondary Diagnosis:	Class 3 obesity due to excess calories with serious comorbidity in adult, unspecified BMI  Secondary Diagnosis:	Diabetes

## 2018-09-08 NOTE — PROGRESS NOTE ADULT - REASON FOR ADMISSION
Dyspnea/Volume overload

## 2018-09-08 NOTE — DISCHARGE NOTE ADULT - NURSING SECTION COMPLETE
Patient called stating she needs an order for an colonoscopy as well as requesting labs for her life insurance. Patient was unsure as to what labs she needed. She will schedule an appointment for a physical   Patient/Caregiver provided printed discharge information.

## 2018-09-08 NOTE — CHART NOTE - NSCHARTNOTEFT_GEN_A_CORE
PA called by RN to report pt noted to desat into the 80s on  monitor while sleeping. Upon awakening pt O2 sat 96-98%. Several episodes of pt desating while sleeping and O2 sat stabilizing into the 90s when pt is awakened. Pt reports she has h/o DALTON, does not use CPAP at home. Pt without c/o SOB, dyspnea, cough. Ordered to apply high flow oxygen to maintain O2 sat >92% while sleeping. Pt to be evaluated in the AM, recommend consult/sleep study.

## 2018-09-08 NOTE — PROGRESS NOTE ADULT - SUBJECTIVE AND OBJECTIVE BOX
Patient seen and examined    Feels fine  no c/o CP SOB NV HA  No swelling feet    Vital Signs Last 24 Hrs  T(C): 36.9 (08 Sep 2018 07:29), Max: 36.9 (08 Sep 2018 07:29)  T(F): 98.4 (08 Sep 2018 07:29), Max: 98.4 (08 Sep 2018 07:29)  HR: 94 (08 Sep 2018 07:29) (77 - 94)  BP: 146/80 (08 Sep 2018 07:29) (131/85 - 152/85)  BP(mean): --  RR: 20 (08 Sep 2018 07:29) (18 - 24)  SpO2: 90% (08 Sep 2018 07:29) (90% - 100%)    PHYSICAL EXAM    GENERAL: NAD,   EYES:  conjunctiva and sclera clear  NECK: Supple, No JVD/Bruit  NERVOUS SYSTEM:  A/O x3,   CHEST:  No rales, No rhonchi  HEART:  RRR, No murmur  ABDOMEN: Soft, NT/ND BS+  EXTREMITIES:  mild Edema;  SKIN: No rashes    07 Sep 2018 07:50    137    |  94     |  53.0   ----------------------------<  105    4.2     |  26.0   |  6.06     Ca    9.0        07 Sep 2018 07:50  Mg     2.2       07 Sep 2018 07:50                            8.9    6.4   )-----------( 197      ( 07 Sep 2018 07:50 )             27.3

## 2018-09-08 NOTE — DISCHARGE NOTE ADULT - CARE PROVIDER_API CALL
Fili Ibrahim (MD), Cardiovascular Disease; Internal Medicine  [address] [place] [address]  Phone: [phone]  Fax: [phone]    Dilan Martin (DO), Medicine  [address] [place] [address] [place] [address]  Phone: [phone]  Fax: [phone]

## 2018-09-08 NOTE — DISCHARGE NOTE ADULT - SECONDARY DIAGNOSIS.
Anemia in chronic kidney disease, on chronic dialysis End stage renal disease Essential hypertension Class 3 obesity due to excess calories with serious comorbidity in adult, unspecified BMI Diabetes

## 2018-09-08 NOTE — DISCHARGE NOTE ADULT - HOSPITAL COURSE
SOB due to fluid overload. Improved with daily HD sessions  Orthopnea at night with oxygen desaturation down to the 80s  but rebounds 98% sat when awake.  Obese , sleep apnea.    Patient requested to be discharged and refusing to stay or corperated with medical mgt.  Patient will continue HD  MWF as outpatient.   HPI:  Pt is 37 yo F presenting w/ worsening dyspnea over the last week. PMH ESRD on HD, HTN, DM2.   Patient states that she has been having progressively worsening SOB since last week, she states she was started on home O2 (2LNC) when she started dialysis approx 1-1.5 years ago. She states the dyspnea has been occurring even at rest and at night time when she lays flat it gets worse. She missed HD on Monday because she was not feeling well. She denies any associated chest pain or pressure. She denies any sick contacts, no increased fluid intake. She denies getting exertional dyspnea normally aside from the events over the last week.

## 2018-09-08 NOTE — PROGRESS NOTE ADULT - ASSESSMENT
39 y/o F with PMH ESRD on HD, HTN, DM2 presenting w/ volume overload 2/2 ESRD on HD.        Problem/Plan - 1:  ·  Problem: Fluid overload secondary to missing HD on Monday  Had daily HD sessions  TTE Ef 65%       Problem/Plan - 2:  ·  Problem: ESRD--> nephro following  -->  HD   will possible discharge tomorrow to continue HD outpatient on monday      Problem/Plan - 3:  ·  Problem: R/O Hyperkalemia. --> resolved     Problem/Plan - 4:  ·  Problem: Essential hypertension.  Plan: Continue Nifedipine and Hydralazine and Clonidine  --> iv hydralazine prn .      Problem/Plan - 5:  ·  Problem: Type 2 diabetes mellitus with chronic kidney disease on chronic dialysis, without long-term current use of insulin.    --> A1c well controlled  --> dc sliding scale      Problem/Plan - 6:  Problem: R/O Obesity with serious comorbidity, unspecified classification, unspecified obesity type. Plan: Encouraged weight loss.     Problem/Plan - 7:  ·  Problem: R/O Anemia in chronic kidney disease, on chronic dialysis.  Plan: Hgb 9.0 and at baseline. No signs of active blood loss.  -continue to monitor.      Problem/Plan - 8:  ·  Problem: NSTEMI --> likely secondary to ckd  --> cardio following   -    Disp: Possible discharge 9/9/18.

## 2018-09-08 NOTE — DISCHARGE NOTE ADULT - MEDICATION SUMMARY - MEDICATIONS TO TAKE
I will START or STAY ON the medications listed below when I get home from the hospital:    aspirin 81 mg oral delayed release tablet  -- 1 tab(s) by mouth once a day  -- Indication: For Diabetes    albuterol 90 mcg/inh inhalation aerosol  -- 2 puff(s) inhaled every 6 hours, As needed, Shortness of Breath and/or Wheezing  -- Indication: For Dyspnea on exertion    tiotropium 18 mcg inhalation capsule  -- 1 cap(s) inhaled once a day  -- Indication: For Dyspnea    NIFEdipine 90 mg oral tablet, extended release  -- 1 tab(s) by mouth once a day  -- Indication: For Essential hypertension    Mary-Sequels (as elemental iron) 65 mg-25 mg oral tablet  -- 1 tab(s) by mouth 2 times a day   -- Check with your doctor before becoming pregnant.  Do not take dairy products, antacids, or iron preparations within one hour of this medication.  May discolor urine or feces.  Obtain medical advice before taking any non-prescription drugs as some may affect the action of this medication.    -- Indication: For Anemia in chronic kidney disease, on chronic dialysis    fluticasone 50 mcg/inh nasal spray  -- 1 spray(s) into nose 2 times a day  -- Indication: For Dyspnea    hydrALAZINE 100 mg oral tablet  -- 1 tab(s) by mouth 3 times a day  -- Indication: For Essential hypertension    folic acid 1 mg oral tablet  -- 1 tab(s) by mouth once a day  -- Indication: For Anemia in chronic kidney disease, on chronic dialysis

## 2018-09-08 NOTE — DISCHARGE NOTE ADULT - PLAN OF CARE
ADLs Follow up 3X weekly HD  Follow up with pmd in 3-5 days   Up ad zeus with assistance  Renally restricted diet

## 2018-09-08 NOTE — DISCHARGE NOTE ADULT - PATIENT PORTAL LINK FT
You can access the [facility] Portal, offered by [facility], by registering with the following website: [email or web address]

## 2018-09-08 NOTE — PROGRESS NOTE ADULT - ASSESSMENT
Fluid overload - missed HD Monday  Cardiol noed re elevated trops  ESRD - HD MWF  Htn - better controlled in hosp - d/w pt at length

## 2018-09-08 NOTE — PROGRESS NOTE ADULT - SUBJECTIVE AND OBJECTIVE BOX
CC: SOB due to fluid overload. Improved with daily HD sessions  Orthopnea at night with oxygen desaturation down to the 80s  but rebounds 98% sat when awake.  Obese , sleep apnea.   HPI:  Pt is 37 yo F presenting w/ worsening dyspnea over the last week. PMH ESRD on HD, HTN, DM2.   Patient states that she has been having progressively worsening SOB since last week, she states she was started on home O2 (2LNC) when she started dialysis approx 1-1.5 years ago. She states the dyspnea has been occurring even at rest and at night time when she lays flat it gets worse. She missed HD on Monday because she was not feeling well. She denies any associated chest pain or pressure. She denies any sick contacts, no increased fluid intake. She denies getting exertional dyspnea normally aside from the events over the last week.     Denies headaches, chest pain, abd pain, diarrhea, constipation, melena, hematochezia. She was previously on Insulin for DM2 but no longer takes it.     While in the ER patient was noted to have K 6.2 and mod hemolysis and received albuterol, Kayexelate and IV insulin in ED. No EKG changes, EKG NSR, troponin 0.38, BNP 35k. (05 Sep 2018 22:16)    REVIEW OF SYSTEMS:    Patient denied fever, chills, abdominal pain, nausea, vomiting, cough, shortness of breath, chest pain or palpitations    Vital Signs Last 24 Hrs  T(C): 36.9 (08 Sep 2018 07:29), Max: 36.9 (07 Sep 2018 14:17)  T(F): 98.4 (08 Sep 2018 07:29), Max: 98.4 (07 Sep 2018 14:17)  HR: 94 (08 Sep 2018 07:29) (77 - 94)  BP: 146/80 (08 Sep 2018 07:29) (131/85 - 171/78)  BP(mean): --  RR: 20 (08 Sep 2018 07:29) (18 - 24)  SpO2: 90% (08 Sep 2018 07:29) (90% - 100%)I&O's Summary    07 Sep 2018 07:01  -  08 Sep 2018 07:00  --------------------------------------------------------  IN: 0 mL / OUT: 2500 mL / NET: -2500 mL      PHYSICAL EXAM:  GENERAL: NAD, Obese  HEENT: PERRL, +EOMI, anicteric, no [place]  NECK: Supple, No JVD   CHEST/LUNG: CTA bilaterally; Normal effort  HEART: S1S2 Normal intensity, no murmurs, gallops or rubs noted  ABDOMEN: Soft, BS Normoactive, NT, ND, no HSM noted  EXTREMITIES:  2+ radial and DP pulses noted, no clubbing, cyanosis, or edema noted, Limited mobility   SKIN: No rashes or lesions noted  NEURO: A&Ox3, no focal deficits noted, CN II-XII intact  PSYCH: Depressed mood and affect; insight/judgement appropriate  LABS:                        8.9    6.4   )-----------( 197      ( 07 Sep 2018 07:50 )             27.3     09-07    137  |  94<L>  |  53.0<H>  ----------------------------<  105  4.2   |  26.0  |  6.06<H>    Ca    9.0      07 Sep 2018 07:50  Mg     2.2     09-07          RADIOLOGY & ADDITIONAL TESTS:    MEDICATIONS:  MEDICATIONS  (STANDING):  aspirin enteric coated 81 milliGRAM(s) Oral daily  chlorhexidine 2% Cloths 1 Application(s) Topical <User Schedule>  cloNIDine 0.1 milliGRAM(s) Oral three times a day  epoetin raina Injectable 80061 Unit(s) IV Push <User Schedule>  fluticasone propionate 50 MICROgram(s)/spray Nasal Spray 1 Spray(s) Both Nostrils two times a day  folic acid 1 milliGRAM(s) Oral daily  heparin  Injectable 5000 Unit(s) SubCutaneous every 8 hours  hydrALAZINE  Oral Tab/Cap - Peds 100 milliGRAM(s) Oral three times a day  influenza   Vaccine 0.5 milliLiter(s) IntraMuscular once  NIFEdipine XL 90 milliGRAM(s) Oral daily  sodium chloride 0.65% Nasal 1 Spray(s) Both Nostrils two times a day    MEDICATIONS  (PRN):  acetaminophen   Tablet .. 650 milliGRAM(s) Oral every 6 hours PRN Temp greater or equal to 38C (100.4F), Moderate Pain (4 - 6)  ALBUTerol/ipratropium for Nebulization 3 milliLiter(s) Nebulizer every 6 hours PRN Shortness of Breath and/or Wheezing  hydrALAZINE Injectable 10 milliGRAM(s) IV Push every 4 hours PRN for sbp above 160mmhg

## 2018-10-31 NOTE — ASU PATIENT PROFILE, ADULT - NS PREOP UNDERSTANDS INFO
After Visit Summary   10/31/2018    Caden Bush    MRN: [id number]           Patient Information     Date Of Birth          1959        Visit Information        Provider Department      10/31/2018 1:00 PM [facility]        Today's Diagnoses     Type 2 diabetes mellitus with both eyes affected by moderate nonproliferative retinopathy without macular edema, without long-term current use of insulin (H)    -  1       Follow-ups after your visit        Your next 10 appointments already scheduled     Nov 21, 2018  1:00 PM CST   TELEMEDICINE with Aliyah BARRON Angeles   [facility] ([facility])    [address] [place] [address] [phone]           Note: this is not an onsite visit; there is no need to come to the facility.            Dec 03, 2018  2:10 PM CST   Adult Med Follow UP with Natali Dang MD   [facility] ([facility])    [place] [id number]  [address] [place] [address] [phone]            Jan 02, 2019 10:00 AM CST   PHYSICAL with Annie Hart MD   [facility]    [address] [place] [address] [phone]              Who to contact     If you have questions or need follow up information about today's clinic visit or your schedule please contact [facility] directly at [phone].  Normal or non-critical lab and imaging results will be communicated to you by MyChart, letter or phone within 4 business days after the clinic has received the results. If you do not hear from us within 7 days, please contact the clinic through MyChart or phone. If you have a critical or abnormal lab result, we will notify you by phone as soon as possible.  Submit refill requests through SMS Assist or call your pharmacy and they will forward the refill request to us. Please  allow 3 business days for your refill to be completed.          Additional Information About Your Visit        MyChart Information     [employer] gives you secure access to your electronic health record. If you see a primary care provider, you can also send messages to your care team and make appointments. If you have questions, please call your primary care clinic.  If you do not have a primary care provider, please call [phone] and they will assist you.        Care EveryWhere ID     This is your Care EveryWhere ID. This could be used by other organizations to access your [place] medical records  [id number]         Blood Pressure from Last 3 Encounters:   09/05/18 96/52   07/17/18 136/80   04/30/18 136/80    Weight from Last 3 Encounters:   09/05/18 143 lb (64.9 kg)   07/17/18 137 lb (62.1 kg)   04/17/18 142 lb (64.4 kg)              Today, you had the following     No orders found for display       Primary Care Provider Office Phone # Fax #    Annie Jeniffer Hart [phone] [address] [place] [address]        Equal Access to Services     [facility]: Hadii aad ku hadasho Soomaali, waaxda luqadaha, qaybta kaalmada adeegyada, marcia braden hayeladio diaz . So [facility] [phone].    ATENCIÓN: Si habla español, tiene a magaña disposición servicios gratuitos de asistencia lingüística. [facility] [phone].    We comply with applicable federal civil rights laws and [place] laws. We do not discriminate on the basis of race, color, national origin, age, disability, sex, sexual orientation, or gender identity.            Thank you!     Thank you for choosing [facility]  for your care. Our goal is always to provide you with excellent care. Hearing back from our patients is one way we can continue to improve our services. Please take a few minutes to complete the written survey that you may receive in the mail after your visit with us. Thank you!    yes           Your Updated Medication List - Protect others around you: Learn how to safely use, store and throw away your medicines at www.disposemymeds.org.          This list is accurate as of 10/31/18 11:59 PM.  Always use your most recent med list.                   Brand Name Dispense Instructions for use Diagnosis    ACE/ARB/ARNI NOT PRESCRIBED (INTENTIONAL)      Please choose reason not prescribed, below- hypotension        aspirin 81 MG EC tablet      Take 81 mg by mouth daily.        B-12 1000 MCG Caps      Take 1 capsule by mouth daily        BASAGLAR 100 UNIT/ML injection     15 mL    Inject 7 Units Subcutaneous At Bedtime We will make further adjustments as we see your response    Type 2 diabetes mellitus with both eyes affected by moderate nonproliferative retinopathy without macular edema, without long-term current use of insulin (H)       blood glucose lancets standard    no brand specified    100 each    Use to test blood sugar 2 times daily or as directed.    Type 2 diabetes mellitus with both eyes affected by moderate nonproliferative retinopathy without macular edema, without long-term current use of insulin (H)       blood glucose monitoring meter device kit    no brand specified    1 kit    Use to test blood sugar 2 times daily or as directed.    Type 2 diabetes mellitus with both eyes affected by moderate nonproliferative retinopathy without macular edema, without long-term current use of insulin (H)       blood glucose monitoring test strip    no brand specified    100 strip    Use to test blood sugars 2 times daily or as directed    Type 2 diabetes mellitus with both eyes affected by moderate nonproliferative retinopathy without macular edema, without long-term current use of insulin (H)       cholecalciferol 1000 UNIT tablet    vitamin D3     Take 1 tablet by mouth daily.        COMPLETE NEEDLE COLLECTION SYS Misc     1 each    1 each daily    Type 2 diabetes mellitus with both eyes affected by  moderate nonproliferative retinopathy without macular edema, with long-term current use of insulin (H)       ferrous sulfate 325 (65 Fe) MG tablet    IRON     Take 325 mg by mouth daily        FISH OIL      1 capsule daily        insulin pen needle 32G X 4 MM    BD BRENDAN U/F    100 each    Use 1 daily as directed.    Type 2 diabetes mellitus with both eyes affected by moderate nonproliferative retinopathy without macular edema, without long-term current use of insulin (H)       metFORMIN 500 MG 24 hr tablet    GLUCOPHAGE-XR    360 tablet    TAKE 2 TABLETS BY MOUTH TWICE DAILY    Type 2 diabetes mellitus with both eyes affected by moderate nonproliferative retinopathy without macular edema, without long-term current use of insulin (H)       pioglitazone 45 MG tablet    ACTOS    90 tablet    TAKE 1 TABLET BY MOUTH DAILY    Encounter for medication refill       risperiDONE 3 MG tablet    risperDAL    30 tablet    Take 1 tablet (3 mg) by mouth At Bedtime    Schizoaffective disorder, bipolar type (H)       simvastatin 40 MG tablet    ZOCOR    90 tablet    TAKE ONE TABLET BY MOUTH EVERY NIGHT AT BEDTIME    Encounter for medication refill       traZODone 50 MG tablet    DESYREL    30 tablet    Take 1 tablet (50 mg) by mouth nightly as needed for sleep    Schizoaffective disorder, bipolar type (H)       * venlafaxine 150 MG 24 hr capsule    EFFEXOR XR    30 capsule    Take 1 capsule (150 mg) by mouth daily Along with a 75 mg for a total of 225 mg daily dose.    Schizoaffective disorder, bipolar type (H)       * venlafaxine 75 MG 24 hr capsule    EFFEXOR XR    30 capsule    Take 1 capsule (75 mg) by mouth daily Please take with Effexor 150 mg for a total dose of 225 mg daily    Schizoaffective disorder, bipolar type (H)       * Notice:  This list has 2 medication(s) that are the same as other medications prescribed for you. Read the directions carefully, and ask your doctor or other care provider to review them with you.

## 2018-11-26 NOTE — PROGRESS NOTE ADULT - PROBLEM SELECTOR PLAN 5
Natalie called asked for refill on:  Paroxetine 20 mg  Clonazepam 0 5 mg    Aripiprazole 2 mg - asked if you can send 30 tablets she is taking this medication once a day at 2 mg         [facility]# [phone] - slightly elevated blood pressure (162 mmHg before dialysis yesterday)   - continue almlodipine, ace, beta-blocker

## 2019-02-19 NOTE — CONSULT NOTE ADULT - CONSULT REQUESTED BY NAME
Dr Baer
Dr. Awa Monte
Hide Additional Notes?: No
Jose
Patient
tereso
Detail Level: Zone
Detail Level: Detailed

## 2019-03-15 NOTE — PROGRESS NOTE ADULT - ATTENDING COMMENTS
Subjective   Patient ID: Gudelia is a 70 year old female who presents for her Subsequent Annual Medicare Wellness Visit. .    Chief Complaint   Patient presents with   • Medicare Wellness Visit     WELLNESS EXAM    Since being back on Advair 250/50, she has NO cough, No sputum and is breathing much better  No need for rescue inhaler  Former smoker    Getting more constipated  Hadn't needed miralax in over a year or two, but now that on the powder kayexalate, she needs the miralax again from time to time.   Last colonoscopy 12/2010          Gudelia has a past medical history of COPD, mild (CMS/HCC) (1/15/2014), Elbow fracture, left (07/2018), Elevated fasting glucose (10/1/2014), History of cigarette smoking, IFG (impaired fasting glucose), Labile hypertension (1/15/2014), Left wrist fracture (2014), Osteopenia of left hip (8/16/2017), Pure hypercholesterolemia (1/15/2014), Squamous cell cancer of skin of hand, Trigeminal neuralgia of right side of face (1/15/2014), and Vasovagal syncope.  Gudelia has Vitamin D deficiency; Trigeminal neuralgia of right side of face; Pure hypercholesterolemia; Osteopenia of left hip; Labile hypertension; Elevated fasting glucose; Electrolyte abnormality; Diverticulosis of intestine without bleeding; COPD, mild (CMS/HCC); Atherosclerosis of right carotid artery; Dermatitis; and Wellness examination on their problem list.  Gudelia has a past surgical history that includes hb mohs surgery 1st stage and Wrist fracture surgery (Left).  Her family history includes Blood Disorder in her mother; Cancer in her maternal aunt; Cancer, Breast in her mother; Colon Polyps in her maternal aunt; Depression in her maternal aunt; Diabetes in her maternal aunt; Hypertension in her father and mother; Osteoporosis in her mother; Stroke in her father.  Gudelia reports that she quit smoking about 11 years ago. She has a 29.25 pack-year smoking history. she has never used smokeless tobacco. She reports that she drinks  about 4.2 oz of alcohol per week. She reports that she does not use drugs.  Gudelia has a current medication list which includes the following prescription(s): amlodipine, metoprolol succinate, atorvastatin, furosemide, fluticasone-salmeterol, vitamin d3, carbamazepine, sodium polystyrene sulfonate, aspirin, and vitamin c.  Gudelia   Current Outpatient Medications   Medication Sig Dispense Refill   • amLODIPine (NORVASC) 5 MG tablet Take 1 tablet by mouth daily. 90 tablet 3   • metoPROLOL succinate (TOPROL-XL) 25 MG 24 hr tablet Take 1 tablet by mouth daily. 90 tablet 1   • atorvastatin (LIPITOR) 40 MG tablet Take 1 tablet by mouth at bedtime. 90 tablet 0   • furosemide (LASIX) 20 MG tablet Take 1 tablet by mouth daily. 90 tablet 0   • fluticasone-salmeterol (ADVAIR DISKUS) 250-50 MCG/DOSE inhaler Inhale 1 puff into the lungs 2 times daily. Rinse mouth out after use. 1 each 11   • Cholecalciferol (VITAMIN D3) 5000 units capsule Take 1 capsule by mouth daily.     • carbamazepine (TEGRETOL XR) 400 MG 12 hr tablet Take 400 mg by mouth 2 times daily.     • sodium polystyrene sulfonate (SPS) powder Take 8 g by mouth 1 time for 1 dose. 454 g    • aspirin 81 MG EC tablet      • Ascorbic Acid (VITAMIN C) 500 MG Cap TAKE 1 CAPSULE TWICE DAILY per derm       No current facility-administered medications for this visit.      Gudelia is allergic to pravastatin sodium.    1.) Do you have an Advance directive, living will, or power of  for health care document that contains your wishes for end of life care?:  No     2.) Would you like additional information on advance directives?:  Yes          Cognitive Assessment: no evidence of cognitive dysfunction by direct observation    Recent PHQ 2/9 Score  Date Adult PHQ 2 Score   3/15/2019 0          DEPRESSION ASSESSMENT/PLAN:  Depression screening is negative no further plan needed.     Body mass index is 24.66 kg/m².  BMI ASSESSMENT/PLAN:  Patient BMI is within normal range.      Needed Screening/Treatment:   As per wellness assessment     Needed follow up:  As per wellness assessment    Review of Systems   Constitutional: Negative for appetite change, fatigue and unexpected weight change.   HENT: Negative for dental problem, hearing loss, sinus pain, sore throat and trouble swallowing.    Eyes: Negative for pain and visual disturbance.   Respiratory: Negative for cough, shortness of breath and wheezing.    Cardiovascular: Negative for chest pain, palpitations and leg swelling.   Gastrointestinal: Positive for constipation. Negative for abdominal pain, blood in stool, diarrhea, nausea and vomiting.   Endocrine: Negative for cold intolerance and heat intolerance.   Genitourinary: Negative for dysuria, frequency and hematuria.   Musculoskeletal: Positive for arthralgias. Negative for back pain, gait problem, joint swelling and myalgias.        Chronic knee and hip pain on L   Skin: Negative for rash and wound.   Allergic/Immunologic: Negative for environmental allergies.   Neurological: Negative for dizziness, tremors, light-headedness, numbness and headaches.   Hematological: Negative for adenopathy. Does not bruise/bleed easily.   Psychiatric/Behavioral: Negative for dysphoric mood and sleep disturbance. The patient is not nervous/anxious.        Objective   Vitals: /78 (BP Location: [facility])   Pulse 64   Temp 98.3 °F (36.8 °C) (Oral)   Ht 5' 4.5\" (1.638 m)   Wt 66.2 kg (145 lb 15.1 oz)   BMI 24.66 kg/m²   BSA 1.72 m²   Physical Exam    Problem List Items Addressed This Visit        Nervous    Trigeminal neuralgia of right side of face     As per Dr Marie  Continue to watch labs  The tegretol has been causing a low WBC count, previously evaluated by hematology         Relevant Orders    CBC & AUTO DIFFERENTIAL    COMPREHENSIVE METABOLIC PANEL       Respiratory    COPD, mild (CMS/HCC)     COPD is improving with treatment.  continue Advair                Circulatory    Labile  hypertension     HYPERTENSION...  control at present:  Continues to be labile, will work on switching over to amlodipine.  Office BP's are higher than home BP's  medication plan:  Decrease the metoprolol to 25 mg daily and increase the amlodipine to 5 mg daily  Lifestyle management:  keep weight down, regular aerobic exercise, limit alcohol  Diet:  low sodium  discussed at length           Relevant Medications    amLODIPine (NORVASC) 5 MG tablet    metoPROLOL succinate (TOPROL-XL) 25 MG 24 hr tablet       Digestive    Vitamin D deficiency    Relevant Orders    VITAMIN D -25 HYDROXY       Musculoskeletal    Osteopenia of left hip     ...  DEXA due later this year/ Aug or Sept  daily wt bearing exercise encouraged   calcium in form of 3 dairy equivalents daily encouraged...if not able, then calcium 500-600 mg twice daily with food  Vitamin D level with labs  Medication:  On an alendronate holiday since 9/2017 (but an elbow fracture 8/2017)  Discussed at length              Endocrine    Pure hypercholesterolemia    Relevant Medications    amLODIPine (NORVASC) 5 MG tablet    metoPROLOL succinate (TOPROL-XL) 25 MG 24 hr tablet    Other Relevant Orders    LIPID PANEL WITH REFLEX    THYROID STIMULATING HORMONE REFLEX    Electrolyte abnormality    Elevated fasting glucose     Avoiding simple carbs, getting in daily exercise and losing weight are ways to prevent the progression to diabetes.              Relevant Orders    GLYCOHEMOGLOBIN       Other    Wellness examination - Primary     Vaccines:  Up to date  Labs:  Fasting today  Pap:  Aged out  Mammogram:  Due next month/ ordered  Colonoscopy:  Due in 2020, but may move up if labs abnl  DEXA:  Due in Aug/ Sept  Daily aerobic exercise advised.  Three dairy equivalents daily recommended for bone health, if not possible, use calcium supplements to take in a total of 1200 mg calcium daily  Check w/ Dr Marie to see if he wants you on the aspirin - I'm thinking you may not  need it based on current guidelines               Other Visit Diagnoses     Breast screening        Relevant Orders    MAMMO SCREENING BILATERAL W OLEGARIO          Advance Directives:  discussed and advised the patient to complete one, discussed and Advance Directive document was given to the patient and forms for POA and LW given and explained to pt.           HOME SAFETY...  discussed risks of area rugs in the home  encouraged use of grab bars, good lighting, etc.  discussed at length      Schedule follow up: in 6 months for a fasting naked medical exam/ long appt   See Me for a BP check in one month    Screening schedule/checklist for next 5-10 years:  Health Maintenance Due   Topic Date Due   • Medicare Wellness 65+  11/18/2013   • Shingles Vaccine (2 of 3) 04/18/2017        A written education, counseling, referral, and plan for obtaining appropriate screening services has been given to patient. See patient instructions.     Patient Care Team:  Zeenat Valdez MD as PCP - General  Obi London MD as Referring Provider (Dermatology)  Carlos Marie MD as Referring Provider (Neurology)  [email or web address] (Orthopaedic Surgery - Foot & Ankle Surgery)  Shaquille (Dentistry)   (Ophthalmology)     Will follow

## 2019-06-10 NOTE — ASU PREOP CHECKLIST - BP NONINVASIVE SYSTOLIC (MM HG)
Received fax from pharmacy requesting refill on pts medication(s). Pt was last seen in office on 5/14/2019  and has a follow up scheduled for 6/14/2019. Will send request to  [facility]  for patient.      Requested Prescriptions     Pending Prescriptions Disp Refills    0752 Oximity 8 MG TB24 [Pharmacy Med Name: 8125 Oximity 8 MG ER TABLET] 30 tablet 0     Sig: TAKE ONE TABLET BY MOUTH DAY 160

## 2019-08-19 NOTE — PROGRESS NOTE ADULT - ASSESSMENT
ESRD on HD TTS schedule Next HD today  ID f/u noted + Blood Cx  Serratia marcescen (8/1) on Abx source could be HD cath.   Had + Blood cx same organism 6/12  Discussed w vasc sx should exchange permcath  Cdiff colitis on PO vanco feeling better [de-identified] : PT COMES FOR F/U DM AND DYSLIPIDEMIA\par SEEN BY GYN AND DX WITH HPV,PT CONCERN HAS HAD RELATIONSHIP WITH ONLY ONE PARTNER SINCE LAST PAP TEST 4 Y AGO WHEN IT WAS NORMAL \par POOR COMPLIANCE WITH LIPITOR

## 2019-11-25 NOTE — H&P ADULT - SKIN
Thank you for choosing Dr. Tavarez at [facility]. It's a pleasure to be a part of your healthcare team. Please let us know if you need anything---[phone], press 2 for the nursing staff. Have a great day!    -Tala CHANG & Dayanna EVERETT    No lesions; no rash

## 2019-12-11 NOTE — PATIENT PROFILE ADULT. - NS PRO REFERRAL CMGT
How Severe Is This Condition?: mild Additional History: Per patient “eczema comes and goes, I’m putting on the Desonide more, I don’t know if it’s doing anything” None

## 2020-02-07 NOTE — PATIENT PROFILE ADULT. - BLOOD TRANSFUSION, PREVIOUS, PROFILE
Spoke with patient. She will call back when not in so much pain for BP check. Has no further questions at this time. no

## 2020-02-14 NOTE — ED PROVIDER NOTE - CROS ED GI ALL NEG
REFERREDTO:  MD Bhanu Massey.[facility] [address] [place] [address], [facility] [place]  [phone]            DISCHARGEMEDICATIONS:  New Prescriptions    No medications on file       (Please note that portions of this note were completed with a voice recognition program.  Efforts were made to edit thedictations but occasionally words are mis-transcribed.)    Brittany Whipple MD  Attending Emergency Physician                        Brittany Whipple MD  02/14/20 1548 - - -

## 2020-02-19 NOTE — PROGRESS NOTE ADULT - PROBLEM SELECTOR PLAN 3
[facility] Call Center    Phone Message    May a detailed message be left on voicemail: yes    Reason for Call: Other: pt wants to know if Tamiko wants to see him again as he missed some appts and needs to know if what kind of schedule to get back on and etc. He missed a few and wants to get her input   She was monitoring his wounds.    He would like an afternoon appt hard for him to get going in the AM    Action Taken: Message routed to:  [facility] [place]    
This was routed to a pool that was not accessed by the department. Patient did seek information via another message. This message is being closed.   
cont with HD as scheduled

## 2020-05-01 NOTE — PRE-OP CHECKLIST - AS BP NONINV SITE
VSS. Discharge instructions reviewed with patient and Larri Mediate, sister-in-law and copy of instructions sent home with patient. Dr. Cierra Fitzgerald spoke with patient and family member prior to discharge. Questions answered. Discharged via wheelchair, wheeled out by Jenniffer Beard, [address] [place] [address]. IV discontinued prior to discharge. Personal items with patient at discharge: clothing, dentures right upper arm

## 2020-06-20 NOTE — CONSULT NOTE ADULT - CONSULT REQUESTED DATE/TIME
28-Sep-2017 18:57
28-Sep-2017 22:25
impaired balance/decreased strength/impaired postural control/decreased endurance

## 2020-07-07 NOTE — ED ADULT NURSE NOTE - GASTROINTESTINAL WDL
Abdomen soft, nontender, nondistended, bowel sounds present in all 4 quadrants.

## 2020-10-16 NOTE — PRE-OP CHECKLIST - NS PREOP CHK HIBICLENS NA
Spoke to patient's son Agusto Gay in regards to initiating anticoagulation with apixaban in the setting of Afib and met lung cancer.  Explain to patients son that given patient's malignancy and decreased ambulation, patient is also at increased risk to develop DVT/PE  Risks and benefits were discussed, questions address. Patient's son in agreement to start medication .          Case d/w Dr. Abran HO  Oncology Physician Assistant  [facility]  Pager [phone]    If after 5pm or weekends please page On-call Oncology Fellow N/A

## 2020-12-22 NOTE — PATIENT PROFILE ADULT. - TEACHING/LEARNING OTHER LEARNERS
family Bilateral Helical Rim Advancement Flap Text: The defect edges were debeveled with a #15 blade scalpel.  Given the location of the defect and the proximity to free margins (helical rim) a bilateral helical rim advancement flap was deemed most appropriate.  Using a sterile surgical marker, the appropriate advancement flaps were drawn incorporating the defect and placing the expected incisions between the helical rim and antihelix where possible.  The area thus outlined was incised through and through with a #15 scalpel blade.  With a skin hook and iris scissors, the flaps were gently and sharply undermined and freed up.

## 2021-04-21 NOTE — PRE-OP CHECKLIST - BP NONINVASIVE SYSTOLIC (MM HG)
Quality 402: Tobacco Use And Help With Quitting Among Adolescents: Patient screened for tobacco and never smoked Quality 130: Documentation Of Current Medications In The Medical Record: Current Medications Documented Quality 431: Preventive Care And Screening: Unhealthy Alcohol Use - Screening: Patient screened for unhealthy alcohol use using a single question and scores 2 or greater episodes per year and brief intervention occurred Detail Level: Detailed 151

## 2021-04-21 NOTE — PROGRESS NOTE ADULT - PROBLEM SELECTOR PROBLEM 3
Hypertension, unspecified type Partially impaired: cannot see medication labels or newsprint, but can see obstacles in path, and the surrounding layout; can count fingers at arm's length

## 2021-06-07 NOTE — ED ADULT NURSE NOTE - PERIPHERAL VASCULAR
Internal Medicine Outpatient Annual Exam    HISTORY    Rafat Morgan is a 59 year old male who presents for annual physical exam. His last appointment was July 2019.     Since his last appointment he has noticed dark spots on his face which have appeared in the past, he has seen a dermatologist who has burned them off but they have recurred, he would like to see derm again.      Palpitations have been occurring about 3 times per month especially when sleeping, they resolve when he applies pressure to his wrist.  He denies any chest pain, shortness of breath, diaphoresis during these episodes of palpitations.  Checks his blood pressure at home typically runs in the 120/80 range.      He believes that he had COVID-19 infection in November 2020, states his wife also had symptoms consistent with Covid including loss of smell.  He states his symptoms only lasted a few days and were mild.  He is not interested in getting the Covid vaccine.      States his alcohol consumption has increased over the past 4 years since retiring.  On average drinks about 15 drinks per week.  Denies smoking history.      He was scheduled for a colonoscopy last year but did not make the appointment, his parents both have history of colon cancer in the 80s.  He denies any weight loss, night sweats, hematochezia.  Stool has been loose, takes Metamucil daily due to prior history of constipation.     He has nagging right shoulder discomfort especially with overhead activity but it does not impact his ADLs.     Reports progressive hearing loss especially in the left ear, he has seen ENT Dr. Pak in the past for this and would like to see him again.     Reports worsening erectile dysfunction and is requesting Viagra.     Preventative Medicine  Vaccines: tetanus within last 5 years, refusing COVID vaccine, looking into shingrix  Colonoscopy: Never   PSA:1.48 (6/1/2021)    MEDICAL HISTORY    Patient Active Problem List   Diagnosis   • Essential  familial hypercholesterolemia   • Pigmented nevus   • Bilateral hearing loss   • Screening for colon cancer   • Annual physical exam       SURGICAL HISTORY    Past Surgical History:   Procedure Laterality Date   • Appendectomy         SOCIAL HISTORY    Social History     Tobacco Use   • Smoking status: Never Smoker   • Smokeless tobacco: Never Used   Substance Use Topics   • Alcohol use: Yes     Comment: once in while       FAMILY HISTORY    Family History   Problem Relation Age of Onset   • Patient is unaware of any medical problems Mother    • Natural Causes Father        MEDICATIONS    Current Outpatient Medications   Medication Sig   • ketoconazole (NIZORAL) 2 % shampoo Shampoo hair/scalp as directed     No current facility-administered medications for this visit.       ALLERGIES    ALLERGIES:  No Known Allergies    Review of Systems   Constitutional: Negative for chills, diaphoresis, fatigue, fever and unexpected weight change.   HENT: Positive for hearing loss.    Eyes: Negative for visual disturbance.   Respiratory: Negative for cough and shortness of breath.    Cardiovascular: Positive for palpitations. Negative for chest pain and leg swelling.   Gastrointestinal: Negative for abdominal pain, blood in stool, constipation, diarrhea, nausea, rectal pain and vomiting.   Endocrine: Negative for polyuria.   Genitourinary: Negative for hematuria and urgency.   Musculoskeletal: Negative for back pain, joint swelling and myalgias.        Right shoulder pain   Skin: Negative for pallor and rash.        Small areas of dark pigmentation to face   Neurological: Negative for seizures, weakness and headaches.   All other systems reviewed and are negative.      Visit Vitals  BP (!) 145/90   Pulse 61   Temp 96.5 °F (35.8 °C) (Tympanic)   Ht 5' 10.5\" (1.791 m)   Wt 83.8 kg (184 lb 12.8 oz)   BMI 26.14 kg/m²     Physical Exam   Constitutional: He is oriented to person, place, and time and well-developed, well-nourished, and  in no distress. No distress.   HENT:   Left ear cerumen impaction with TM obscured. Right TM appears normal, cerumen present.    Eyes: No scleral icterus.   Cardiovascular: Normal rate, regular rhythm, normal heart sounds and intact distal pulses. Exam reveals no gallop and no friction rub.   No murmur heard.  Pulmonary/Chest: Effort normal and breath sounds normal. No respiratory distress. He has no wheezes. He has no rales.   Abdominal: Soft. Bowel sounds are normal. He exhibits no distension and no mass. There is no abdominal tenderness.   No hepatosplenomegaly   Musculoskeletal:         General: No edema.      Comments: No restriction of bilateral shoulder range of motion. Negative empty can and speeds tests. No AC joint tenderness. Mild lateral tenderness to palpation of shoulder.    Neurological: He is alert and oriented to person, place, and time.   No focal abnormalities.    Skin: Skin is warm and dry. No rash noted. He is not diaphoretic. No pallor.   Two small nevi below left eyelid margin and left cheekbone. Right face seborrheic keratosis within sideburn. Onychomycosis of left great toe nail.    Nursing note and vitals reviewed.        Rafat was seen today for annual exam.    Diagnoses and all orders for this visit:    Annual physical exam    Bilateral hearing loss, unspecified hearing loss type  -     SERVICE TO ENT    Screening for colon cancer  -     Cancel: OPEN ACCESS COLONOSCOPY  -     OPEN ACCESS COLONOSCOPY    Essential familial hypercholesterolemia    Erectile dysfunction, unspecified erectile dysfunction type  -     sildenafil (Viagra) 50 MG tablet; Take 1 tablet by mouth as needed for Erectile Dysfunction.    Seborrheic keratosis  -     SERVICE TO DERMATOLOGY    Palpitations  -     ELECTROCARDIOGRAM 12-LEAD; Future    Melanocytic nevus of face, other location      Discussion & Plan  Rafat was seen in the clinic today for annual physical exam.  Blood work was reviewed with patient in the clinic,  LDL is worsening however no need for statin at this point given his blood pressures at home are well controlled and ASCVD is less than 7.5%. Has seen dermatology for cryotherapy of seborrheic keratosis of face in the past and would like to follow-up again, referral to Dr. Lugo provided.  He does have cerumen impaction of the left ear advised he could use over-the-counter drops and a soft bulb with warm water and to schedule an appointment for removal if unable to get wax out, also provided referral to ENT Dr. Pak which she has seen in the past for hearing problems.  In regards to his palpitations, they are infrequent in nature and not associated with chest pain, EKG in the clinic today revealed sinus bradycardia without ST or T wave abnormalities.  We will continue to monitor and consider a Holter monitor in the future if they worsen.  Screening colonoscopy ordered.  Viagra sent to pharmacy.    Follow up in 1 year.     Patient discussed with Dr. Theo Cheatham, DO  Internal Medicine PGY-1       - - -

## 2021-07-02 NOTE — H&P ADULT - ASSESSMENT
note pending. Pt is a 38yoF with h/o HTN, DM, ESRD on HD M/ W /F ( last dialysis on Monday- missed 2 session), presented to ED with nausea /vomiting for last 4 days and then developed abdomen pain with diarrhea for 2 days. She also reported subjective chills, weakness, sore throat, productive cough with brownish phlegm and mild SOB. She missed 2 session of HD due to her symptoms and also reports medication non-compliance, reports she misses medicine dose twice a week. In ED patient noted to be in fluid overload, accelerated HTN, hyperkalemia, worsening renal function, RVP positive for enterovirus, C diff negative. Pt admitted to medicine for fluid overload in setting missed HD and abd pain /diarrhea likely viral.    Plan:    Abd pain /diarrhea likely viral gastroenteritis / Uremia  - C diff negative. Send stool cx, ova /parasites.  - Obtain CT abd to r/o colitis  - Supportive care, start clear liquid diet.    Fluid overload in setting of ESRD with HD non-compliance:  - Seen by nephrology in ED and patient getting HD now.  - Resume outpatient meds. Zofran prn.    Viral URI:  RVP shows enterovirus.  - Supportive care, Tylenol prn fever /pain, cough syrup, decongestant prn.    Hyponatremia /hyperkalemia:  - Secondary to renal failure.  - Getting HD.  - Repeat labs tomorrow.    H/o Uncontrolled HTN- BP on arrival to /129, current 190/92. Resume home meds. Pt getting HD- hopefully will be better after HD  H/o DM- Keep on LSS with FS monitoring  H/o Anemia- Resume iron /folate supplements.  DVT ppx- Heparin subc. H Plasty Text: Given the location of the defect, shape of the defect and the proximity to free margins a H-plasty was deemed most appropriate for repair.  Using a sterile surgical marker, the appropriate advancement arms of the H-plasty were drawn incorporating the defect and placing the expected incisions within the relaxed skin tension lines where possible. The area thus outlined was incised deep to adipose tissue with a #15 scalpel blade. The skin margins were undermined to an appropriate distance in all directions utilizing iris scissors.  The opposing advancement arms were then advanced into place in opposite direction and anchored with interrupted buried subcutaneous sutures.

## 2021-07-06 NOTE — ED ADULT NURSE NOTE - NS ED NURSE DISCH DISPOSITION
done decreased chris/decreased weight-shifting ability/decreased step length/decreased stride length/unsteady, limited by pain Admitted

## 2021-08-25 NOTE — ED PROVIDER NOTE - ATTESTATION, MLM
Called Mom back and left message. Told her that Kimberlee should be seen by her primary care provider to evaluate why she is having nose bleeds.   ----- Message from Janeen Merida MD sent at 8/25/2021 12:34 PM CDT -----  Regarding: RE: Nose Bleeds  Not a side effect of prednisone. Recommend eval by primary.   Janeen  ----- Message -----  From: Sandra Castro RN  Sent: 8/25/2021  11:33 AM CDT  To: Janeen Merida MD  Subject: Nose Bleeds                                      Suleiman Vernon,     Mom called saying Joaquin has had a couple nose bleeds since started the prednisone for her relapse. Is that a side effect of prednisone I am not familiar with? If not, I can ask she see PCP and have her BP checked.     Sandra         I have reviewed and confirmed nurses' notes for patient's medications, allergies, medical history, and surgical history.

## 2021-10-20 NOTE — ED ADULT TRIAGE NOTE - NS ED NURSE BANDS TYPE
From: Sofia Dang  To: Reema Maloney  Sent: 10/19/2021 6:12 PM CDT  Subject: Follow up of August 6th visit     When I came in on August 6th, the mole on my left thigh was scrapped off and I was told to follow up. The same mole has come back and it looks exactly the same as it did before. Same size, color, shape, everything. Should I make an appointment to have a biopsy done?  
Name band;

## 2021-11-23 NOTE — DIETITIAN INITIAL EVALUATION ADULT. - NUTRITIONGOAL OUTCOME1
Improve nutrition related lab values Drysol Counseling:  I discussed with the patient the risks of drysol/aluminum chloride including but not limited to skin rash, itching, irritation, burning.

## 2022-02-10 NOTE — DIETITIAN INITIAL EVALUATION ADULT. - ADHERENCE
2/4/2022 Last office visit with Chantal Frias MD    Lantus was last filled 12/2/21 15 mL    Refilled per protocol    Wt Readings from Last 1 Encounters:   02/08/22 67.6 kg (149 lb)        BP Readings from Last 2 Encounters:   02/09/22 130/46   02/07/22 118/60   ]    Lab Results   Component Value Date    SODIUM 140 02/04/2022    POTASSIUM 4.7 02/04/2022    CHLORIDE 105 02/04/2022    CO2 32 02/04/2022    BUN 34 (H) 02/04/2022    CREATININE 1.01 (H) 02/04/2022    GLUCOSE 184 (H) 02/04/2022     Hemoglobin A1C (%)   Date Value   02/04/2022 7.0 (H)     No results found for: TSH  Lab Results   Component Value Date    CHOLESTEROL 170 02/04/2022    HDL 61 02/04/2022    CALCLDL 85 02/04/2022    TRIGLYCERIDE 118 02/04/2022     Lab Results   Component Value Date    AST 15 02/04/2022    GPT 16 02/04/2022    ALKPT 121 (H) 02/04/2022    BILIRUBIN 0.3 02/04/2022       
poor

## 2022-04-25 NOTE — ED PROVIDER NOTE - CPE EDP [facility] NORM
[id number] [facility] [email or web address] .  [address] [address]  Dept: [phone]  Dept Fax: [phone]  Loc: [phone]    Visit Date: 4/25/2022    Dora Santiago is a 76 y.o. male who presents todayfor:  Chief Complaint   Patient presents with    Cardiac Clearance     needs clearance to drive school bus    Coronary Artery Disease    Hypertension    Hyperlipidemia     Lately some night sweats   Know moderate CAD  Cath before  Running low HR   In the 40s  No dizziness  No syncope  No chest pain   No changes in breathing      HPI:  HPI  Past Medical History:   Diagnosis Date    Arthritis     ASHD (arteriosclerotic heart disease) 1/21/2013    Eructation 8/14/2014    GERD (gastroesophageal reflux disease)     Hyperlipidemia     Splenomegaly 9/15/2014      Past Surgical History:   Procedure Laterality Date    APPENDECTOMY  09/22/2016    Dr. Yaron Alvarenga COLONOSCOPY  07/26/2016    [facility]   [address] WITH STENT PLACEMENT  2013    2 stents lad    FINGER SURGERY       Family History   Problem Relation Age of Onset    Heart Disease Father 50        MI    Stroke Father     Heart Disease Brother 61        STENTS    Arthritis Sister     Heart Disease Brother     Heart Disease Brother     No Known Problems Brother     No Known Problems Brother     No Known Problems Brother     No Known Problems Sister     No Known Problems Sister     No Known Problems Sister      Social History     Tobacco Use    Smoking status: Never Smoker    Smokeless tobacco: Never Used   Substance Use Topics    Alcohol use:  Yes     Alcohol/week: 1.0 standard drink     Types: 1 Glasses of wine per week     Comment: socially      Current Outpatient Medications   Medication Sig Dispense Refill    omeprazole (PRILOSEC) 40 MG delayed release capsule Take 40 mg by mouth daily       Multiple Vitamins-Minerals (THERAPEUTIC MULTIVITAMIN-MINERALS) tablet Take 1 tablet by mouth daily      hydroCHLOROthiazide (HYDRODIURIL) 25 MG tablet Take 1 tablet by mouth daily 90 tablet 3    simvastatin (ZOCOR) 40 MG tablet Take 1 tablet by mouth nightly 90 tablet 3    nitroGLYCERIN (NITROSTAT) 0.4 MG SL tablet DISSOLVE 1 TAB UNDER TONGUE EVERY 5 MINUTES AS NEEDED FOR CHEST PAIN. After third dose and still no relief, call 911 25 tablet 1    aspirin 81 MG tablet Take 81 mg by mouth daily. Take 2 tablets daily. No current facility-administered medications for this visit. No Known Allergies  Health Maintenance   Topic Date Due    Hepatitis C screen  Never done    Lipids  03/29/2023    Potassium  03/29/2023    Creatinine  03/29/2023    Depression Screen  04/11/2023    Pneumococcal 65+ years Vaccine (2 - PCV) 04/11/2023    Annual Wellness Visit (AWV)  04/12/2023    DTaP/Tdap/Td vaccine (3 - Td or Tdap) 10/23/2028    Colorectal Cancer Screen  08/02/2031    Flu vaccine  Completed    Shingles Vaccine  Completed    COVID-19 Vaccine  Completed    Hepatitis A vaccine  Aged Out    Hepatitis B vaccine  Aged Out    Hib vaccine  Aged Out    Meningococcal (ACWY) vaccine  Aged Out       Subjective:  Review of Systems  General:   No fever, no chills, No fatigue or weight loss  Pulmonary:    No dyspnea, no wheezing  Cardiac:    Denies recent chest pain,   GI:     No nausea or vomiting, no abdominal pain  Neuro:    No dizziness or light headedness,   Musculoskeletal:  No recent active issues  Extremities:   No edema, no obvious claudication       Objective:  Physical Exam  /72   Pulse (!) 45   Ht 5' 10\" (1.778 m)   Wt 196 lb (88.9 kg)   BMI 28.12 kg/m²   General:   Well developed, well nourished  Lungs:   Clear to auscultation  Heart:    Normal S1 S2, Slight murmur. no rubs, no gallops  Abdomen:   Soft, non tender, no organomegalies, positive bowel sounds  Extremities:   No edema, no cyanosis, good peripheral pulses  Neurological:   Awake, alert, oriented.  No obvious focal deficits  Musculoskelatal:  No obvious deformities    Assessment:      Diagnosis Orders   1. ASHD (arteriosclerotic heart disease)  EKG 12 Lead   2. Primary hypertension     3. Familial hypercholesterolemia     as above  Low HR   Could explain his night sweats  Vs other causes   ECG in office was done today. I reviewed the ECG. No acute findings, low HR       Plan:  No follow-ups on file. Consider a holter  Monitor symptoms  Continue risk factor modification and medical management  Thank you for allowing me to participate in the care of your patient. Please don't hesitate to contact me regarding any further issues related to the patient care    Orders Placed:  Orders Placed This Encounter   Procedures    EKG 12 Lead     Order Specific Question:   Reason for Exam?     Answer: Other       Medications Prescribed:  No orders of the defined types were placed in this encounter. Discussed use, benefit, and side effects of prescribed medications. All patient questions answered. Pt voicedunderstanding. Instructed to continue current medications, diet and exercise. Continue risk factor modification and medical management. Patient agreed with treatment plan. Follow up as directed.     Electronically signedby Chantell Clement MD on 4/25/2022 at 7:44 AM normal...

## 2022-05-26 NOTE — INPATIENT CERTIFICATION FOR MEDICARE PATIENTS - NS ICMP CERT SIG IND
I certify as stated above. SSKI Counseling:  I discussed with the patient the risks of SSKI including but not limited to thyroid abnormalities, metallic taste, GI upset, fever, headache, acne, arthralgias, paraesthesias, lymphadenopathy, easy bleeding, arrhythmias, and allergic reaction.

## 2022-06-17 NOTE — PATIENT PROFILE ADULT. - NS TRANSFER PATIENT BELONGINGS
Problem: Pain - Standard  Goal: Alleviation of pain or a reduction in pain to the patient’s comfort goal  Outcome: Progressing     Problem: Knowledge Deficit - Standard  Goal: Patient and family/care givers will demonstrate understanding of plan of care, disease process/condition, diagnostic tests and medications  Outcome: Progressing     Problem: Hemodynamics  Goal: Patient's hemodynamics, fluid balance and neurologic status will be stable or improve  Outcome: Progressing     Problem: Fluid Volume  Goal: Fluid volume balance will be maintained  Outcome: Progressing     Problem: Urinary - Renal Perfusion  Goal: Ability to achieve and maintain adequate renal perfusion and functioning will improve  Outcome: Progressing     Problem: Respiratory  Goal: Patient will achieve/maintain optimum respiratory ventilation and gas exchange  Outcome: Progressing     Problem: Physical Regulation  Goal: Diagnostic test results will improve  Outcome: Progressing  Goal: Signs and symptoms of infection will decrease  Outcome: Progressing     Problem: Skin Integrity  Goal: Skin integrity is maintained or improved  Outcome: Progressing     Problem: Fall Risk  Goal: Patient will remain free from falls  Outcome: Progressing   The patient is Stable - Low risk of patient condition declining or worsening    Shift Goals  Clinical Goals: pain control, vac change today  Patient Goals: pain control  Family Goals: N/A    Progress made toward(s) clinical / shift goals: Pt turns self in bed. Pt educated on importance of OOB mobility. Medications used for pain control. Wound vac change today.     Patient is not progressing towards the following goals: N/A    Szuan Newman R.N.         None

## 2022-07-26 NOTE — H&P ADULT - RESPIRATORY
Occupational Therapy   Evaluation/Treatment    Name: Mariaelena Grubbs  MRN: [id number]  Admitting Diagnosis:  Hypokalemia  Recent Surgery: * No surgery found *      Recommendations:     Discharge Recommendations: home health OT  Discharge Equipment Recommendations:  walker, rolling  Barriers to discharge:  None    Assessment:     Mariaelena Grubbs is a 76 y.o. female with a medical diagnosis of Hypokalemia.  Performance deficits affecting function: weakness, impaired endurance, impaired self care skills, impaired functional mobility, gait instability, impaired balance, decreased upper extremity function, decreased lower extremity function. Patient highly recommended to use a RW (not a rollator) at this time for safety and improved stability for functional ambulation/transfers at this time. Patient would benefit from continued skilled acute OT 3x/wk to improve functional mobility, increase independence with ADLs, and address established goals. Recommending HHOT once medically appropriate for discharge to increase maximal independence, reduce burden of care, and ensure safety.     Rehab Prognosis: Good; patient would benefit from acute skilled OT services to address these deficits and reach maximum level of function.       Plan:     Patient to be seen 3 x/week to address the above listed problems via self-care/home management, therapeutic exercises, therapeutic activities  · Plan of Care Expires: 08/26/22  · Plan of Care Reviewed with: patient    Subjective     Chief Complaint: weakness, headaches   Patient/Family Comments/goals: to get better    Occupational Profile:  Living Environment: Patient lives with daughter in a H with a SHARON to enter. Patient has a tub shower combo with no grab bars. Patient has access to a tub bench if needed. Patient has a high toilet with no grab bars. Patient owns a rollator. Patient drives as needed. Patient was independent with ADLs and functional mobility PTA.   Equipment Used at Home:   rollator, raised toilet (access to a tub bench)  Assistance upon Discharge: Daughter, but she sleeps during the day due to her working nights per patient.     Pain/Comfort:  · Pain Rating 1:  (patient did not rate)  · Location - Orientation 1: generalized  · Location 1:  (headache)  · Pain Addressed 1: Reposition, Distraction  · Pain Rating Post-Intervention 1:  (patient did not rate)    Patients cultural, spiritual, Druze conflicts given the current situation: no    Objective:     Communicated with: NSMIKA prior to session.  Patient found HOB elevated with   upon OT entry to room.    General Precautions: Standard, fall   Orthopedic Precautions:N/A   Braces: N/A  Respiratory Status: Room air    Occupational Performance:    Bed Mobility:    · Patient completed Supine to Sit with modified independence  · Patient completed Sit to Supine with modified independence    Functional Mobility/Transfers:  · Patient completed Sit <> Stand Transfer with stand by assistance  with  no assistive device and supervision with RW   · Patient completed Toilet Transfer bed>toilet with functional ambulation technique with stand by assistance with  no AD; supervision with RW toilet>bed with functional ambulation. Patient demonstrated increased stability with RW.     Activities of Daily Living:  · Upper Body Dressing: stand by assistance Donning back gown  · Lower Body Dressing: modified independence [place] and donning socks  · Toileting: stand by assistance      Cognitive/Visual Perceptual:  Cognitive/Psychosocial Skills:     -       Oriented to: Person, Place, Time and Situation   -       Follows Commands/attention:Follows multistep  commands  -       Communication: clear/fluent  -       Memory: No Deficits noted  -       Safety awareness/insight to disability: intact   -       Mood/Affect/Coping skills/emotional control: Appropriate to situation  Visual/Perceptual:      -Intact      Physical Exam:  Upper Extremity Range of Motion:      -       Right Upper Extremity: WFL  -       Left Upper Extremity: WFL  Upper Extremity Strength:     Strength:    -       Right Upper Extremity: 3/5  -       Left Upper Extremity: 3/5  Fine Motor Coordination:    -       Intact  Gross motor coordination:   WFL    AMPAC 6 Click ADL:  AMPAC Total Score: 21    Treatment & Education:  Role of OT and POC  ADL retraining  Functional mobility training  Safety  Discharge planning    Education:  Patient left HOB elevated with call button in reach, medical staff present and all needs met.     GOALS:   Multidisciplinary Problems     Occupational Therapy Goals        Problem: Occupational Therapy    Goal Priority Disciplines Outcome Interventions   Occupational Therapy Goal     OT, PT/OT Ongoing, Progressing    Description: Goals to be met by:  8/16/2022    Patient will increase functional independence with ADLs by performing:    Grooming while standing at sink with Modified [place].  Toileting from toilet with Modified [place] for hygiene and clothing management.   Supine to sit with Modified [place].  Stand pivot transfers with Modified [place].  Toilet transfer to toilet with Modified [place].                     History:     Past Medical History:   Diagnosis Date    Anxiety 4/13/2014    Arthritis     Asthma     Asthma 4/13/2014    Cancer     Breast    Gout     HTN (hypertension) 4/13/2014    Hyperlipidemia 4/13/2014    Hypertension        Past Surgical History:   Procedure Laterality Date    APPENDECTOMY      BREAST SURGERY      HYSTERECTOMY      Port a cath placement and removal      SHOULDER ARTHROSCOPY  2012    TONSILLECTOMY         Time Tracking:     OT Date of Treatment: 07/26/22  OT Start Time: 0900  OT Stop Time: 0932  OT Total Time (min): 32 min    Billable Minutes:Evaluation 9  Self Care/Home Management 23    7/26/2022     detailed exam

## 2022-08-05 NOTE — H&P ADULT. - PROBLEM SELECTOR PLAN 4
3 = A little assistance Likely due to renal failure.  no chest pain, oxygen saturation in normal range.  monitor  re-eval Likely due to renal failure.  no chest pain, oxygen saturation in normal range.  monitor and re-eval  re-eval

## 2022-08-29 NOTE — DISCHARGE NOTE ADULT - NS AS DC FU INST LIST INST
Patient returned back to cath lab holding room 2. Procedure cancelled and Dr. Seda Asif now speaking with the patient.  called in regards to cancellation due to inability to get preapproval from patients neuro surgeon. Patient assisted to get dressed and now awaiting  for Dr. Seda Asif to speak to him prior to patients discharge home. IV removed per orders.  [facility] 8/29/2022 yes

## 2022-10-10 NOTE — ED PROVIDER NOTE - NS ED NOTE AC HIGH RISK COUNTRIES
" EMERGENCY DEPARTMENT ENCOUNTER    Pt Name: Avelina Ceron  MRN: [id number]  Pt :   1940  Room Number:    Date of encounter:  10/10/2022  PCP: Dre Moura MD  ED Provider: AMADOR Martinez    Historian: Patient and daughter      HPI:  Chief Complaint: Wet cough        Context: Avelina Ceron is a 81 y.o. female who presents to the ED c/o rhinorrhea and annoying wet cough onset Thursday evening.  Patient states that she could not get into see her primary care provider or pulmonologist.  She went to urgent treatment center today who directed her to the emergency department.  She has a history of COPD with oxygen dependence 24 hours/day.  She has not been hospitalized for pneumonia in approximately 7 years.  She denies any more shortness of breath than usual and she has a mild sore throat.    Other review of systems is negative for fever or chills.  Positive for wet cough without chest pain and without any more shortness of breath than usual.  She denies any GI issues and denies abdominal pain.  She denies any generalized weakness, fatigue or dizziness or syncope.  No neurosensory complaints or focal weakness      PAST MEDICAL HISTORY  Past Medical History:   Diagnosis Date   • COPD (chronic obstructive pulmonary disease) (HCC)    • Degenerative lumbar disc    • Disease of thyroid gland     \"low thyroid\"   • History of colonoscopy    • Osteoarthritis          PAST SURGICAL HISTORY  Past Surgical History:   Procedure Laterality Date   • GALLBLADDER SURGERY     • HYSTERECTOMY     • TOTAL HIP ARTHROPLASTY Bilateral          FAMILY HISTORY  Family History   Problem Relation Age of Onset   • Cancer Other         BREAST, COLON, OVARIAN   • Depression Other    • Hyperlipidemia Other    • Hypertension Other    • Heart disease Mother    • Hypertension Mother    • Arthritis Daughter    • Cancer Daughter    • Hyperlipidemia Daughter    • Liver disease Daughter    • Thyroid disease Daughter    • Cancer " Maternal Aunt    • Diabetes Maternal Uncle    • Alzheimer's disease Sister          SOCIAL HISTORY  Social History     Socioeconomic History   • Marital status:    Tobacco Use   • Smoking status: Former     Packs/day: 1.00     Years: 50.00     Pack years: 50.00     Types: Cigarettes     Quit date:      Years since quittin.7   • Smokeless tobacco: Never   Vaping Use   • Vaping Use: Never used   Substance and Sexual Activity   • Alcohol use: Not Currently     Comment: no etoh for past 10 years   • Drug use: No   • Sexual activity: Defer         ALLERGIES  Codeine, Penicillins, and Dopamine        REVIEW OF SYSTEMS  Review of Systems     All systems reviewed and negative except for those discussed in HPI.       PHYSICAL EXAM    I have reviewed the triage vital signs and nursing notes.    ED Triage Vitals [10/10/22 1231]   Temp Heart Rate Resp BP SpO2   97.4 °F (36.3 °C) 64 20 155/61 92 %      Temp src Heart Rate Source Patient Position BP Location FiO2 (%)   Oral Monitor Sitting Left arm --       Physical Exam  GENERAL:   Appears in no acute distress and she is oxygenating well on her oxygen at normal dose.  She is a very good historian together with her daughter  HENT: Nares patent.  EYES: No scleral icterus.  CV: Regular rhythm, regular rate.  No tachycardia.  No peripheral edema  RESPIRATORY: Normal effort.  No audible wheezes, rales or rhonchi.  No adventitious sounds.  No respiratory distress  ABDOMEN: Soft, nontender  MUSCULOSKELETAL: No deformities.   NEURO: Alert, moves all extremities, follows commands.  SKIN: Warm, dry, no rash visualized.        LAB RESULTS  Recent Results (from the past 24 hour(s))   ECG 12 Lead    Collection Time: 10/10/22  1:00 PM   Result Value Ref Range    QT Interval 434 ms    QTC Interval 434 ms   Respiratory Panel PCR w/COVID-19(SARS-CoV-2) NASIR/ELIAS/RHIANNA/PAD/COR/MAD/FELIPE In-House, NP Swab in UTM/VTM, 3-4 HR TAT - Swab, Nasopharynx    Collection Time: 10/10/22  1:51 PM     Specimen: Nasopharynx; Swab   Result Value Ref Range    ADENOVIRUS, PCR Not Detected Not Detected    Coronavirus 229E Not Detected Not Detected    Coronavirus HKU1 Not Detected Not Detected    Coronavirus NL63 Not Detected Not Detected    Coronavirus OC43 Not Detected Not Detected    COVID19 Not Detected Not Detected - Ref. Range    Human Metapneumovirus Not Detected Not Detected    Human Rhinovirus/Enterovirus Not Detected Not Detected    Influenza A PCR Not Detected Not Detected    Influenza B PCR Not Detected Not Detected    Parainfluenza Virus 1 Not Detected Not Detected    Parainfluenza Virus 2 Not Detected Not Detected    Parainfluenza Virus 3 Not Detected Not Detected    Parainfluenza Virus 4 Not Detected Not Detected    RSV, PCR Not Detected Not Detected    Bordetella pertussis pcr Not Detected Not Detected    Bordetella parapertussis PCR Not Detected Not Detected    Chlamydophila pneumoniae PCR Not Detected Not Detected    Mycoplasma pneumo by PCR Not Detected Not Detected   Comprehensive Metabolic Panel    Collection Time: 10/10/22  2:01 PM    Specimen: Blood   Result Value Ref Range    Glucose 107 (H) 65 - 99 mg/dL    BUN 8 8 - 23 mg/dL    Creatinine 0.41 (L) 0.57 - 1.00 mg/dL    Sodium 137 136 - 145 mmol/L    Potassium 4.3 3.5 - 5.2 mmol/L    Chloride 96 (L) 98 - 107 mmol/L    CO2 34.0 (H) 22.0 - 29.0 mmol/L    Calcium 9.6 8.6 - 10.5 mg/dL    Total Protein 7.0 6.0 - 8.5 g/dL    Albumin 4.00 3.50 - 5.20 g/dL    ALT (SGPT) 13 1 - 33 U/L    AST (SGOT) 14 1 - 32 U/L    Alkaline Phosphatase 69 39 - 117 U/L    Total Bilirubin 0.5 0.0 - 1.2 mg/dL    Globulin 3.0 gm/dL    A/G Ratio 1.3 g/dL    BUN/Creatinine Ratio 19.5 7.0 - 25.0    Anion Gap 7.0 5.0 - 15.0 mmol/L    eGFR 99.0 >60.0 mL/min/1.73   BNP    Collection Time: 10/10/22  2:01 PM    Specimen: Blood   Result Value Ref Range    proBNP 184.2 0.0 - 1,800.0 pg/mL   Troponin    Collection Time: 10/10/22  2:01 PM    Specimen: Blood   Result Value Ref Range     Troponin T <0.010 0.000 - 0.030 ng/mL   Green Top (Gel)    Collection Time: 10/10/22  2:01 PM   Result Value Ref Range    Extra Tube Hold for add-ons.    Lavender Top    Collection Time: 10/10/22  2:01 PM   Result Value Ref Range    Extra Tube hold for add-on    Gold Top - SST    Collection Time: 10/10/22  2:01 PM   Result Value Ref Range    Extra Tube Hold for add-ons.    Light Blue Top    Collection Time: 10/10/22  2:01 PM   Result Value Ref Range    Extra Tube Hold for add-ons.    CBC Auto Differential    Collection Time: 10/10/22  2:01 PM    Specimen: Blood   Result Value Ref Range    WBC 12.58 (H) 3.40 - 10.80 10*3/mm3    RBC 3.95 3.77 - 5.28 10*6/mm3    Hemoglobin 12.6 12.0 - 15.9 g/dL    Hematocrit 39.0 34.0 - 46.6 %    MCV 98.7 (H) 79.0 - 97.0 fL    MCH 31.9 26.6 - 33.0 pg    MCHC 32.3 31.5 - 35.7 g/dL    RDW 12.6 12.3 - 15.4 %    RDW-SD 45.5 37.0 - 54.0 fl    MPV 8.9 6.0 - 12.0 fL    Platelets 221 140 - 450 10*3/mm3    Neutrophil % 68.6 42.7 - 76.0 %    Lymphocyte % 21.3 19.6 - 45.3 %    Monocyte % 6.9 5.0 - 12.0 %    Eosinophil % 2.1 0.3 - 6.2 %    Basophil % 0.6 0.0 - 1.5 %    Immature Grans % 0.5 0.0 - 0.5 %    Neutrophils, Absolute 8.64 (H) 1.70 - 7.00 10*3/mm3    Lymphocytes, Absolute 2.68 0.70 - 3.10 10*3/mm3    Monocytes, Absolute 0.87 0.10 - 0.90 10*3/mm3    Eosinophils, Absolute 0.26 0.00 - 0.40 10*3/mm3    Basophils, Absolute 0.07 0.00 - 0.20 10*3/mm3    Immature Grans, Absolute 0.06 (H) 0.00 - 0.05 10*3/mm3    nRBC 0.0 0.0 - 0.2 /100 WBC       If labs were ordered, I independently reviewed the results.        RADIOLOGY  XR Chest 1 View    Result Date: 10/10/2022   DATE OF EXAM: 10/10/2022 1:31 PM  PROCEDURE: XR CHEST 1 VW-  INDICATIONS: SOA triage protocol  COMPARISON: 08/04/2020 and prior  TECHNIQUE: Portable Chest  FINDINGS:  The heart and mediastinal contours are within normal limits. There is mild pulmonary vascular congestion and prominence of the interstitial markings with a perihilar  and basilar predominance. Increased linear opacities noted at the lung bases bilaterally. Osseous structures demonstrate no acute abnormality      IMPRESSION : Mild prominence of the perihilar markings and pulmonary vascularity. Increased opacity at the lung bases may represent atelectasis or mild pulmonary edema.[  This report was finalized on 10/10/2022 1:54 PM by Zack Perez.        PROCEDURES    Procedures    ECG 12 Lead   Final Result   Test Reason : SOA   Blood Pressure :   */*   mmHG   Vent. Rate :  60 BPM     Atrial Rate :  60 BPM      P-R Int : 158 ms          QRS Dur :  82 ms       QT Int : 434 ms       P-R-T Axes :  44  42  44 degrees      QTc Int : 434 ms      Normal sinus rhythm with sinus arrhythmia   Normal ECG   No previous ECGs available   Confirmed by SANTO MCGILL MD ([id number]) on 10/10/2022 2:39:41 PM      Referred By: EDMD           Confirmed By: SANTO MCGILL MD          MEDICATIONS GIVEN IN ER    Medications   sodium chloride 0.9 % flush 10 mL (has no administration in time range)       ED Course as of 10/10/22 1517   Mon Oct 10, 2022   1508 Patient's work-up is most remarkable for normal oxygenation and absence of any respiratory distress or discomfort.  Patient is eager for discharge.  She feels well.  She is able to take and tolerate fluids.  She does not perceive any oxygen hunger.  Her chest x-ray shows increased markings and her respiratory panel does not detect a viral process.  Will initiate doxycycline.  Have conferred with Dr. Mcgill.  Patient understands and concurs with this outpatient plan and close follow-up [MS]      ED Course User Index  [MS] Paty Carter APRN             AS OF 15:17 EDT VITALS:    BP - 142/58  HR - 61  TEMP - 97.4 °F (36.3 °C) (Oral)  O2 SATS - 96%                  DIAGNOSIS  Final diagnoses:   Upper respiratory tract infection, unspecified type   History of COPD         DISPOSITION    DISCHARGE    Patient discharged in stable condition.    Reviewed  implications of results, diagnosis, meds, responsibility to follow up, warning signs and symptoms of possible worsening, potential complications and reasons to return to ER.    Patient/Family voiced understanding of above instructions.    Discussed plan for discharge, as there is no emergent indication for admission.  Pt/family is agreeable and understands need for follow up and possible repeat testing.  Pt/family is aware that discharge does not mean that nothing is wrong but that it indicates no emergency is currently present that requires admission and they must continue care with follow-up as given below or with a physician of their choice.     FOLLOW-UP  Dre Moura MD  [address] [facility] [address]  [place] [id number] [phone]    Schedule an appointment as soon as possible for a visit   If symptoms worsen         Medication List      New Prescriptions    doxycycline 100 MG capsule  Commonly known as: MONODOX  Take 1 capsule by mouth 2 (Two) Times a Day.           Where to Get Your Medications      These medications were sent to [facility]/pharmacy #[id number] - [place], [place] - [address] [facility] - [phone] [facility] [phone]   [address] [facility] [address]    Phone: [phone]   · doxycycline 100 MG capsule                  Paty Carter APRN  10/10/22 4918     No

## 2023-01-01 NOTE — ED ADULT NURSE REASSESSMENT NOTE - NS ED NURSE REASSESS COMMENT FT1
MD at bedside gaining peripheral access after two unsuccessful attempts by two RN's, pt verbalize understanding and is cooperative to treatment. specimens drawn, labels verified with pt, sent to laboratory. POC discussed with pt, awaiting admission orders.
Adolfoo Clamp

## 2023-01-19 NOTE — BRIEF OPERATIVE NOTE - SURGICAL END DATE/TIME
CC:  FEDERICO Stone is here today for BP F/U.      Medications: medications verified, no change  Refills needed today?  YES  denies Latex allergy or sensitivity  Tobacco history: verified  Health Maintenance Due   Topic Date Due   • Shingles Vaccine (1 of 2) Never done   • DTaP/Tdap/Td Vaccine (1 - Tdap) 04/22/2004   • COVID-19 Vaccine (4 - Booster for Pfizer series) 01/10/2022   • Medicare Advantage- Medicare Wellness Visit  01/01/2023   • Depression Screening  07/18/2023     Patient is due for topics as listed above but is not proceeding with MWV (Medicare Wellness Visit) at this time.      11-Aug-2017 11:12

## 2023-01-31 NOTE — DISCHARGE NOTE ADULT - NS MD DC FALL RISK RISK
None For information on Fall & Injury Prevention, visit [email or web address]

## 2023-03-14 NOTE — ED ADULT NURSE NOTE - OBJECTIVE STATEMENT
Render Note In Bullet Format When Appropriate: No Number Of Freeze-Thaw Cycles: 2 freeze-thaw cycles Duration Of Freeze Thaw-Cycle (Seconds): 0 Post-Care Instructions: I reviewed with the patient in detail post-care instructions. Patient is to wear sunprotection, and avoid picking at any of the treated lesions. Pt may apply Vaseline to crusted or scabbing areas. Consent: The patient's consent was obtained including but not limited to risks of crusting, scabbing, blistering, scarring, darker or lighter pigmentary change, recurrence, incomplete removal and infection. Detail Level: Detailed Render Post-Care Instructions In Note?: yes 38 year old female a&ox3 comes to ED c/o chest pain and sob since last night. pt. states she was in her bed when it started. pt. is a dialysis pt. M/W/F. left upper arm AV fistula, last had dialysis Monday. +3 pitting edema noted to RT lower extremity. pt. states she was told she does have edema. pt. on home o2 2LNC. 38 year old female a&ox3 comes to ED c/o chest pain and sob since last night. pt. states she was in her bed when it started. pt. is a dialysis pt. M/W/F. left upper arm AV fistula + bruit, thrill. last had dialysis Monday. +3 pitting edema noted to RT lower extremity. pt. states she was told she does have edema. pt. on home o2 2LNC.

## 2023-03-22 NOTE — CHART NOTE - NSCHARTNOTEFT_GEN_A_CORE
Spoke to Dr Duncan, vascular surgery yesterday, who said to call the resident on call. Spoked then to Dr Quinones, surgery resident on call who agreed to see the pt. Today we spoke to Dr Ovalles (Vascular surgery) in the morning who said that she was going to see the pt in the afternoon. Called the resident on call Dr Benoit [phone] in the afternoon. He said that the vascular NP said that Dr Farmer is going to see the pt tomorrow. No indicators present

## 2023-06-01 NOTE — CONSULT NOTE ADULT - PROBLEM SELECTOR RECOMMENDATION 9
Patient with Fever, tachycardia, hypoxia and leukocytosis o 16.4k  CXR reporting no active disease, images showing with possible left sided effusion vs PNA  Will do CT Chest  UA with no evidence of infection  Symptoms point to respiratory source  Blood cultures pending  Will switch to Cefepime 1gm IV q 24hours Stelara Counseling:  I discussed with the patient the risks of ustekinumab including but not limited to immunosuppression, malignancy, posterior leukoencephalopathy syndrome, and serious infections.  The patient understands that monitoring is required including a PPD at baseline and must alert us or the primary physician if symptoms of infection or other concerning signs are noted.

## 2023-07-25 NOTE — ASU PATIENT PROFILE, ADULT - FALL HARM RISK CONCLUSION
PROCEDURE NOTE     PREOPERATIVE DIAGNOSIS: Nevi of the right and left forehead  POSTOPERATIVE DIAGNOSIS: Nevi of the right and left forehead  PROCEDURE: Excision of nevi of the right neck, 1.2cm; Excision of nevi of the scalp, 1.2cm; Layered closure of the right neck, 2cm and rigt scalp, 2cm  SURGEON: Warren Nicole MD  ASSISTANT: Kavitha Workman PA-C  SPECIMENS REMOVED: 1) Right neck nevus; 2) Scalp nevus  ESTIMATED BLOOD LOSS: Minimal  BLOOD ADMINISTRATION: None  COMPLICATIONS: None  GRAFTS/IMPLANTS: None     INDICATIONS:  Shawanda August is a 12 year old female who presents with 2 nevi of the head and neck. I discussed the risks, benefits, and alternatives to surgery. I specifically discussed the risk of bleeding, infection, wound dehiscence, poor scar, recurrence, and injury to adjacent structures, specifically neurovascular structures. All questions were answered in clinic.      DESCRIPTION OF PROCEDURE:  Patient was brought to the procedure room where they were positioned on the procedure bed. A surgical time out was performed where the patient was identified, the procedure and laterality confirmed. Liocaine with epinephrine were injected around the lesion on the right neck and the scalp.  This was allowed time to take effect.       Attention was first paid to the right neck nevus. The right neck was prepped with betadine and draped in sterile fashion.  The lesion measured 6 mm in diameter.  A 1 mm margin was marked around the lesion. An ellipse was then drawn incorporating this margin around the lesion.  This measured 12 mm by 8 mm .  A 15 blade was used to incise the skin down to subcutaneous tissue. The lesion was removed en bloc. The wound edges were elevated circumferentially.  The deep dermis was closed with simple interrupted buried 6-0 Monocryl for a length of 20 mm.  A running 6-0 Monocryl was used to close the subcuticular layer for length of 20 mm. The incision was dressed with DermaBond and  SteriStrips.     Attention was turned to the scalp nevus. The scalp was prepped with betadine and draped in sterile fashion.  The lesion measured 7 mm in diameter.  A 1 mm margin was marked around the lesion. An ellipse was then drawn incorporating this margin around the lesion.  This measured 15 mm by 9 mm .  A 15 blade was used to incise the skin down to subcutaneous tissue. The lesion was removed en bloc. The wound edges were elevated circumferentially.  The deep dermis was closed with simple interrupted buried 3-0 Vicryl for a length of 20 mm.  A running 5-0 Chromic was used to close the subcuticular layer for length of 20 mm.  The patient tolerated the procedure without complication.       ATTESTATION: I, Dr. Warren Nicole, was present for the entire procedure, and performed of directly supervised all key portions.  There were no qualified residents available for assistance.  All surgical counts were correct at the close of the case.     Wound care instructions were provided.  I asked that they return in 2 weeks.  Thank you for the opportunity participate in this patient's care.     Universal Safety Interventions

## 2023-09-06 NOTE — ED PROVIDER NOTE - NSTIMEPROVIDERCAREINITIATE_GEN_ER
23-Mar-2018 19:50 This was a shared visit with the VIKASH. I reviewed and verified the documentation and independently performed the documented:

## 2023-09-18 NOTE — ED ADULT TRIAGE NOTE - MODE OF ARRIVAL
55 y/o male from home with h/o Asthma, NIDDM, HTN, GERD, recent candida infection of the throat presented to the ED with complaints of  fever, chills, coughing up brownish sputum, wheezing, shortness of breath, lack of taste, myalgia, weakness for 5 days. Chest Xray was clear. CT chest showed new nodular patchy opacities in both lower lobes and right middle lobe consistent with pneumonia. He was admitted for management of pneumonia. We started him on Azithromycin 500mg OD and Rocephin 1g OD. Blood culture was negative.    Patient has a history of Asthma, takes singulair and advair at home. He had mild bilateral wheezing on exam. In the hospital, treated with Duoneb, Symbicort and Solumedrol. We continued his home meds      Patient has a history of Diabetes, under Glipizide at home. His HbA1C was 9.2 and his blood glucose remained fairly elevated during the hospital stay. He was started on 8u Lantus, 2u Lispro pre meals.       Patient also has acute kidney injury, serum creatinine was 1.3, most likely secondary to dehydration. IV fluids was given and resolved.      Patient has a history of Hypertension, was on Bisoprolol at home. Held off his home med given his soft BP during the stay.      Patient has a history of Gastroesophageal Reflux Disease, takes Omeprazole at home. Gave him Pantoprazole during his stay.      Patient is stable for discharge. Patient has been advised to follow up as outpatient. Case has been discussed with the attending.       Private Vehicle 55 y/o male from home with h/o Asthma, NIDDM, HTN, GERD, recent candida infection of the throat presented to the ED with complaints of  fever, chills, coughing up brownish sputum, wheezing, shortness of breath, lack of taste, myalgia, weakness for 5 days. Chest Xray was clear. CT chest showed new nodular patchy opacities in both lower lobes and right middle lobe consistent with pneumonia. He was admitted for management of pneumonia. We started him on Azithromycin 500mg OD and Rocephin 1g OD. Blood culture was negative.    Patient has a history of Asthma, takes singulair and advair at home. He had mild bilateral wheezing on exam. In the hospital, treated with Duoneb, Symbicort and Solumedrol. We continued his home meds      Patient has a history of Diabetes, under Glipizide at home. His HbA1C was 9.2 and his blood glucose remained fairly elevated during the hospital stay.   He was started on 8u Lantus, 2u Lispro pre meals inpatient. He needs to follow up with his PCP regarding DM management.      Patient also has acute kidney injury, serum creatinine was 1.3, most likely secondary to dehydration. IV fluids was given and resolved.      Patient has a history of Hypertension, was on Bisoprolol at home. Held off his home med given his soft BP during the stay.      Patient has a history of Gastroesophageal Reflux Disease, takes Omeprazole at home. Gave him Pantoprazole during his stay.      Patient is stable for discharge. Patient has been advised to follow up as outpatient. Case has been discussed with the attending.

## 2023-11-21 NOTE — PATIENT PROFILE ADULT. - BRADEN SCORE (IF 18 OR LESS ACTIVATE SKIN INJURY RISK INCREASED GUIDELINE), MLM
Date of Discharge: 11/13/2023    Discharge Diagnosis: Cancer sigmoid colon    Presenting Problem/History of Present Illness  Active Hospital Problems    Diagnosis  POA    **Cancer of sigmoid [C18.7]  Unknown    Cancer of sigmoid colon [C18.7]  Yes      Resolved Hospital Problems   No resolved problems to display.        Hospital Course  Patient was admitted postoperatively for symptom control.  His diet was gradually increased as his bowel function returned.  Void trial was completed by urologist, drain remained in place with low output.  On day of discharge he was tolerating a diet and having bowel movements.  He was discharged with plans to follow-up with me in 2 weeks.  He will also need follow-up with urology for ureteral stent removal.    Procedures Performed    Procedure(s):  COLON RESECTION LAPAROSCOPIC SIGMOID WITH DAVINCI ROBOT  CYSTOSCOPY, URETERAL ICG INSERTION  CYSTOSCOPY RETROGRADE PYELOGRAM AND STENT INSERTION  -------------------        Consults:   Consults       No orders found from 10/12/2023 to 11/11/2023.            Condition on Discharge:  Satisfactory    Vital Signs       Physical Exam:       Discharge Disposition  Home or Self Care    Discharge Medications     Discharge Medications        New Medications        Instructions Start Date   finasteride 5 MG tablet  Commonly known as: PROSCAR   5 mg, Oral, Daily      HYDROcodone-acetaminophen 5-325 MG per tablet  Commonly known as: Norco   1 tablet, Oral, Every 6 Hours PRN      ondansetron 4 MG tablet  Commonly known as: Zofran   4 mg, Oral, Every 8 Hours PRN      tamsulosin 0.4 MG capsule 24 hr capsule  Commonly known as: FLOMAX   0.4 mg, Oral, Daily             Continue These Medications        Instructions Start Date   aspirin 81 MG EC tablet   81 mg, Oral, Daily      atorvastatin 40 MG tablet  Commonly known as: LIPITOR   40 mg, Oral, Nightly      CBD oral oil  Commonly known as: cannabidiol   1 mL, Oral, Daily, For arthritis PRN holding       glimepiride 2 MG tablet  Commonly known as: AMARYL   2 mg, Oral, Every Morning Before Breakfast      lisinopril 20 MG tablet  Commonly known as: PRINIVIL,ZESTRIL   40 mg, Oral, 2 Times Daily      metFORMIN 1000 MG tablet  Commonly known as: GLUCOPHAGE   1,000 mg, Oral, 2 Times Daily With Meals             Stop These Medications      metroNIDAZOLE 500 MG tablet  Commonly known as: Flagyl     neomycin 500 MG tablet  Commonly known as: MYCIFRADIN            ASK your doctor about these medications        Instructions Start Date   cephalexin 500 MG capsule  Commonly known as: KEFLEX  Ask about: Should I take this medication?   500 mg, Oral, Every 8 Hours Scheduled               Discharge Diet:   Diet Instructions       Diet: Regular/House Diet; Thin (IDDSI 0)      Discharge Diet: Regular/House Diet    Fluid Consistency: Thin (IDDSI 0)      Regular             Activity at Discharge:   Activity Instructions       Discharge Activity      1) No driving for 48 hours and no longer taking narcotics.   2) Return to school / work in 2 weeks  3) May shower / sponge bathe in 48 hours, do not soak in water for 2 weeks  4) Do not lift / push / pull more than 10 pounds for 2 weeks   Additional Activity Instructions:    As Tolerated           Follow-up Appointments  Future Appointments   Date Time Provider Department Center   11/21/2023 10:30 AM Dilshad Chavira MD [facility]   11/27/2023 11:40 AM Huseyin Do MD [facility]     Additional Instructions for the Follow-ups that You Need to Schedule       Discharge Follow-up with Specified Provider: Fan; 2 Weeks   As directed      To: Fan   Follow Up: 2 Weeks        Notify Physician or Go To The ED For the Following Conditions   As directed      Fever, severe pain, vomiting    Order Comments: Fever, severe pain, vomiting                 Test Results Pending at Discharge       Dilshad Chavira MD  11/21/23  08:58 EST          21

## 2023-12-11 NOTE — PROVIDER CONTACT NOTE (CRITICAL VALUE NOTIFICATION) - NS PROVIDER READ BACK TO LAB
----- Message from Fidel Holt DO sent at 12/10/2023  8:32 AM CST -----  Please notify the patient of normal results.  Follow-up as planned.   yes

## 2024-02-23 NOTE — DIETITIAN INITIAL EVALUATION ADULT. - NS AS NUTRI INTERV MEALS SNACK
Patient is being referred to a orthopedics. Please schedule accordingly.    [facility]   [phone]     renal, cho cons

## 2025-03-14 NOTE — H&P ADULT - RS GEN PE MLT RESP DETAILS PC
The patient is a 20y Female complaining of wound check.
no rhonchi/no wheezes/airway patent/breath sounds equal/respirations non-labored

## 2025-04-02 NOTE — ED PROVIDER NOTE - CONSTITUTIONAL, MLM
The ECG shows the rhythm is unchanged. ECG rate  = 71 bpm. normal... Well appearing, well nourished, awake, alert, oriented to person, place, time/situation and in no apparent distress.

## 2025-06-04 NOTE — ASU DISCHARGE PLAN (ADULT/PEDIATRIC). - NO HEAVY LIFTING, STRAINING, OR BENDING
In an effort to ensure that our patients [employer], a Team Member has reviewed your chart and identified an opportunity to provide the best care possible. An attempt was made to discuss or schedule due or overdue Preventive or Chronic Condition care.Care Gaps identified: Colorectal Cancer Screening.    The Outcome was Contact was not made, letter/portal message sent.  We are attempting to schedule a Colonoscopy. If you have any questions or need help with scheduling, contact our Health Outreach Team at [phone].   Type of Appointment needed: Colonoscopy   Statement Selected